# Patient Record
Sex: FEMALE | Race: WHITE | Employment: OTHER | RURAL
[De-identification: names, ages, dates, MRNs, and addresses within clinical notes are randomized per-mention and may not be internally consistent; named-entity substitution may affect disease eponyms.]

---

## 2017-05-13 ENCOUNTER — OFFICE VISIT (OUTPATIENT)
Dept: PRIMARY CARE CLINIC | Age: 62
End: 2017-05-13
Payer: COMMERCIAL

## 2017-05-13 VITALS
BODY MASS INDEX: 37.22 KG/M2 | SYSTOLIC BLOOD PRESSURE: 122 MMHG | OXYGEN SATURATION: 97 % | WEIGHT: 218 LBS | DIASTOLIC BLOOD PRESSURE: 82 MMHG | TEMPERATURE: 98.5 F | HEIGHT: 64 IN | HEART RATE: 81 BPM | RESPIRATION RATE: 16 BRPM

## 2017-05-13 DIAGNOSIS — J06.9 ACUTE URI: Primary | ICD-10-CM

## 2017-05-13 DIAGNOSIS — J02.9 SORE THROAT: ICD-10-CM

## 2017-05-13 LAB — S PYO AG THROAT QL: NORMAL

## 2017-05-13 PROCEDURE — 87880 STREP A ASSAY W/OPTIC: CPT | Performed by: NURSE PRACTITIONER

## 2017-05-13 PROCEDURE — 99213 OFFICE O/P EST LOW 20 MIN: CPT | Performed by: NURSE PRACTITIONER

## 2017-05-13 RX ORDER — DESLORATADINE 5 MG/1
5 TABLET ORAL DAILY
Qty: 30 TABLET | Refills: 5 | Status: SHIPPED | OUTPATIENT
Start: 2017-05-13 | End: 2017-11-12 | Stop reason: SDUPTHER

## 2017-05-13 RX ORDER — AZITHROMYCIN 250 MG/1
TABLET, FILM COATED ORAL
Qty: 1 PACKET | Refills: 0 | Status: SHIPPED | OUTPATIENT
Start: 2017-05-13 | End: 2017-05-23

## 2017-05-13 RX ORDER — MONTELUKAST SODIUM 10 MG/1
10 TABLET ORAL NIGHTLY
Qty: 30 TABLET | Refills: 3 | Status: SHIPPED | OUTPATIENT
Start: 2017-05-13 | End: 2017-10-24 | Stop reason: ALTCHOICE

## 2017-05-28 ENCOUNTER — OFFICE VISIT (OUTPATIENT)
Dept: PRIMARY CARE CLINIC | Age: 62
End: 2017-05-28
Payer: COMMERCIAL

## 2017-05-28 VITALS
DIASTOLIC BLOOD PRESSURE: 74 MMHG | SYSTOLIC BLOOD PRESSURE: 124 MMHG | TEMPERATURE: 98.6 F | BODY MASS INDEX: 38.89 KG/M2 | RESPIRATION RATE: 20 BRPM | WEIGHT: 227.8 LBS | HEART RATE: 77 BPM | OXYGEN SATURATION: 98 % | HEIGHT: 64 IN

## 2017-05-28 DIAGNOSIS — H65.111 ACUTE MUCOID OTITIS MEDIA OF RIGHT EAR: ICD-10-CM

## 2017-05-28 DIAGNOSIS — J01.00 ACUTE MAXILLARY SINUSITIS, RECURRENCE NOT SPECIFIED: Primary | ICD-10-CM

## 2017-05-28 PROCEDURE — 96372 THER/PROPH/DIAG INJ SC/IM: CPT | Performed by: NURSE PRACTITIONER

## 2017-05-28 PROCEDURE — 99213 OFFICE O/P EST LOW 20 MIN: CPT | Performed by: NURSE PRACTITIONER

## 2017-05-28 RX ORDER — CEFTRIAXONE SODIUM 250 MG/1
1000 INJECTION, POWDER, FOR SOLUTION INTRAMUSCULAR; INTRAVENOUS ONCE
Status: COMPLETED | OUTPATIENT
Start: 2017-05-28 | End: 2017-05-28

## 2017-05-28 RX ORDER — BROMPHENIRAMINE MALEATE, PSEUDOEPHEDRINE HYDROCHLORIDE, AND DEXTROMETHORPHAN HYDROBROMIDE 2; 30; 10 MG/5ML; MG/5ML; MG/5ML
5 SYRUP ORAL 4 TIMES DAILY PRN
Qty: 120 ML | Refills: 1 | Status: SHIPPED | OUTPATIENT
Start: 2017-05-28 | End: 2017-10-24 | Stop reason: ALTCHOICE

## 2017-05-28 RX ORDER — DEXAMETHASONE SODIUM PHOSPHATE 10 MG/ML
10 INJECTION INTRAMUSCULAR; INTRAVENOUS ONCE
Status: COMPLETED | OUTPATIENT
Start: 2017-05-28 | End: 2017-05-28

## 2017-05-28 RX ORDER — AMOXICILLIN 875 MG/1
875 TABLET, COATED ORAL 2 TIMES DAILY
Qty: 20 TABLET | Refills: 0 | Status: SHIPPED | OUTPATIENT
Start: 2017-05-28 | End: 2017-06-07

## 2017-05-28 RX ORDER — METHYLPREDNISOLONE 4 MG/1
TABLET ORAL
Qty: 1 KIT | Refills: 0 | Status: SHIPPED | OUTPATIENT
Start: 2017-05-28 | End: 2017-06-03

## 2017-05-28 RX ADMIN — DEXAMETHASONE SODIUM PHOSPHATE 10 MG: 10 INJECTION INTRAMUSCULAR; INTRAVENOUS at 17:15

## 2017-05-28 RX ADMIN — CEFTRIAXONE SODIUM 1000 MG: 250 INJECTION, POWDER, FOR SOLUTION INTRAMUSCULAR; INTRAVENOUS at 17:15

## 2017-05-28 ASSESSMENT — ENCOUNTER SYMPTOMS
ABDOMINAL PAIN: 0
PHOTOPHOBIA: 0
FACIAL SWELLING: 0
EYE PAIN: 0
ABDOMINAL DISTENTION: 0
NAUSEA: 0
BACK PAIN: 0
RHINORRHEA: 0
COLOR CHANGE: 0
EYE DISCHARGE: 0
SHORTNESS OF BREATH: 0
BLOOD IN STOOL: 0
EYE REDNESS: 0
EYE ITCHING: 0
COUGH: 1
SORE THROAT: 1
CHEST TIGHTNESS: 0
CHOKING: 0
DIARRHEA: 0
CONSTIPATION: 0
TROUBLE SWALLOWING: 0
VOMITING: 0
WHEEZING: 0
SINUS PRESSURE: 1

## 2017-07-21 ENCOUNTER — TRANSCRIBE ORDERS (OUTPATIENT)
Dept: MAMMOGRAPHY | Facility: HOSPITAL | Age: 62
End: 2017-07-21

## 2017-07-21 DIAGNOSIS — Z12.31 VISIT FOR SCREENING MAMMOGRAM: Primary | ICD-10-CM

## 2017-08-04 ENCOUNTER — HOSPITAL ENCOUNTER (OUTPATIENT)
Dept: OTHER | Age: 62
Discharge: HOME | End: 2017-08-04
Attending: NURSE PRACTITIONER | Admitting: NURSE PRACTITIONER

## 2017-08-04 LAB
A/G RATIO: 1.7 (ref 0.8–2)
ALBUMIN SERPL-MCNC: 4.8 G/DL (ref 3.4–4.8)
ALP BLD-CCNC: 64 U/L (ref 25–100)
ALT SERPL-CCNC: 24 U/L (ref 4–36)
ANION GAP SERPL CALCULATED.3IONS-SCNC: 17 MMOL/L (ref 3–16)
AST SERPL-CCNC: 25 U/L (ref 8–33)
BASOPHILS ABSOLUTE: 0 K/UL (ref 0–0.1)
BASOPHILS RELATIVE PERCENT: 0.5 %
BILIRUB SERPL-MCNC: 0.6 MG/DL (ref 0.3–1.2)
BUN BLDV-MCNC: 18 MG/DL (ref 6–20)
CALCIUM SERPL-MCNC: 10.2 MG/DL (ref 8.5–10.5)
CHLORIDE BLD-SCNC: 98 MMOL/L (ref 98–107)
CHOLESTEROL, TOTAL: 213 MG/DL (ref 0–200)
CO2: 25 MMOL/L (ref 20–30)
CREAT SERPL-MCNC: 0.7 MG/DL (ref 0.4–1.2)
EOSINOPHILS ABSOLUTE: 0.1 K/UL (ref 0–0.4)
EOSINOPHILS RELATIVE PERCENT: 1.1 %
FOLATE: >20 NG/ML
GFR AFRICAN AMERICAN: >59
GFR NON-AFRICAN AMERICAN: >60
GLOBULIN: 2.8 G/DL
GLUCOSE BLD-MCNC: 88 MG/DL (ref 74–106)
HCT VFR BLD CALC: 46.7 % (ref 37–47)
HDLC SERPL-MCNC: 52 MG/DL (ref 40–60)
HEMOGLOBIN: 15.6 G/DL (ref 11.5–16.5)
LDL CHOLESTEROL CALCULATED: 110 MG/DL
LYMPHOCYTES ABSOLUTE: 2.2 K/UL (ref 1.5–4)
LYMPHOCYTES RELATIVE PERCENT: 29.5 % (ref 20–40)
MCH RBC QN AUTO: 28.3 PG (ref 27–32)
MCHC RBC AUTO-ENTMCNC: 33.4 G/DL (ref 31–35)
MCV RBC AUTO: 84.6 FL (ref 80–100)
MONOCYTES ABSOLUTE: 0.4 K/UL (ref 0.2–0.8)
MONOCYTES RELATIVE PERCENT: 5.1 % (ref 3–10)
NEUTROPHILS ABSOLUTE: 4.7 K/UL (ref 2–7.5)
NEUTROPHILS RELATIVE PERCENT: 63.8 %
PDW BLD-RTO: 14.5 % (ref 11–16)
PLATELET # BLD: 233 K/UL (ref 150–400)
PMV BLD AUTO: 12 FL (ref 6–10)
POTASSIUM SERPL-SCNC: 4.3 MMOL/L (ref 3.4–5.1)
RBC # BLD: 5.52 M/UL (ref 3.8–5.8)
RHEUMATOID FACTOR: <10 IU/ML
SEDIMENTATION RATE, ERYTHROCYTE: 7 MM/HR (ref 0–20)
SODIUM BLD-SCNC: 140 MMOL/L (ref 136–145)
T3 FREE: 3.6 PG/ML (ref 2.3–4.2)
T4 FREE: 1.18 NG/DL (ref 0.89–1.76)
TOTAL PROTEIN: 7.6 G/DL (ref 6.4–8.3)
TRIGL SERPL-MCNC: 255 MG/DL (ref 0–249)
TSH SERPL DL<=0.05 MIU/L-ACNC: 1.39 UIU/ML (ref 0.35–5.5)
URIC ACID, SERUM: 7.5 MG/DL (ref 2.5–7.1)
VITAMIN B-12: 651 PG/ML (ref 211–911)
VITAMIN D 25-HYDROXY: 32.7 (ref 32–100)
VLDLC SERPL CALC-MCNC: 51 MG/DL
WBC # BLD: 7.3 K/UL (ref 4–11)

## 2017-08-07 LAB
ANA INTERPRETATION: NORMAL
ANTI-NUCLEAR ANTIBODY (ANA): NEGATIVE

## 2017-08-14 ENCOUNTER — HOSPITAL ENCOUNTER (OUTPATIENT)
Dept: MAMMOGRAPHY | Facility: HOSPITAL | Age: 62
Discharge: HOME | End: 2017-08-14
Attending: OBSTETRICS & GYNECOLOGY | Admitting: OBSTETRICS & GYNECOLOGY

## 2017-08-14 DIAGNOSIS — Z12.31 VISIT FOR SCREENING MAMMOGRAM: ICD-10-CM

## 2017-08-14 PROCEDURE — 77067 SCR MAMMO BI INCL CAD: CPT | Performed by: RADIOLOGY

## 2017-08-14 PROCEDURE — 77063 BREAST TOMOSYNTHESIS BI: CPT

## 2017-08-14 PROCEDURE — G0202 SCR MAMMO BI INCL CAD: HCPCS

## 2017-08-14 PROCEDURE — 77063 BREAST TOMOSYNTHESIS BI: CPT | Performed by: RADIOLOGY

## 2017-08-15 ENCOUNTER — HOSPITAL ENCOUNTER (OUTPATIENT)
Dept: PHYSICAL THERAPY | Age: 62
Discharge: HOME | End: 2017-08-31
Attending: NURSE PRACTITIONER | Admitting: NURSE PRACTITIONER

## 2017-08-15 ASSESSMENT — PAIN DESCRIPTION - PAIN TYPE: TYPE: ACUTE PAIN

## 2017-08-15 ASSESSMENT — PAIN SCALES - GENERAL: PAINLEVEL_OUTOF10: 2

## 2017-08-17 ENCOUNTER — HOSPITAL ENCOUNTER (OUTPATIENT)
Dept: PHYSICAL THERAPY | Age: 62
Discharge: HOME | End: 2017-08-17
Admitting: NURSE PRACTITIONER

## 2017-08-21 ENCOUNTER — HOSPITAL ENCOUNTER (OUTPATIENT)
Dept: MAMMOGRAPHY | Facility: HOSPITAL | Age: 62
Discharge: HOME | End: 2017-08-21
Admitting: OBSTETRICS & GYNECOLOGY

## 2017-08-21 DIAGNOSIS — R92.8 ABNORMAL MAMMOGRAM: ICD-10-CM

## 2017-08-21 PROCEDURE — 77061 BREAST TOMOSYNTHESIS UNI: CPT | Performed by: RADIOLOGY

## 2017-08-21 PROCEDURE — 77065 DX MAMMO INCL CAD UNI: CPT | Performed by: RADIOLOGY

## 2017-08-21 PROCEDURE — G0279 TOMOSYNTHESIS, MAMMO: HCPCS

## 2017-08-21 PROCEDURE — G0206 DX MAMMO INCL CAD UNI: HCPCS

## 2017-08-22 ENCOUNTER — HOSPITAL ENCOUNTER (OUTPATIENT)
Dept: PHYSICAL THERAPY | Age: 62
Discharge: HOME | End: 2017-08-22
Admitting: NURSE PRACTITIONER

## 2017-08-22 ENCOUNTER — HOSPITAL ENCOUNTER (OUTPATIENT)
Dept: OTHER | Age: 62
Discharge: HOME | End: 2017-08-22
Attending: NURSE PRACTITIONER | Admitting: NURSE PRACTITIONER

## 2017-08-22 DIAGNOSIS — G89.29 CHRONIC TOE PAIN, LEFT FOOT: ICD-10-CM

## 2017-08-22 DIAGNOSIS — M79.671 RIGHT FOOT PAIN: ICD-10-CM

## 2017-08-22 DIAGNOSIS — M79.675 CHRONIC TOE PAIN, LEFT FOOT: ICD-10-CM

## 2017-08-24 ENCOUNTER — HOSPITAL ENCOUNTER (OUTPATIENT)
Dept: PHYSICAL THERAPY | Age: 62
Discharge: HOME | End: 2017-08-24
Admitting: NURSE PRACTITIONER

## 2017-08-29 DIAGNOSIS — M79.672 BILATERAL FOOT PAIN: Primary | ICD-10-CM

## 2017-08-29 DIAGNOSIS — M79.671 BILATERAL FOOT PAIN: Primary | ICD-10-CM

## 2017-08-30 ENCOUNTER — APPOINTMENT (OUTPATIENT)
Dept: MAMMOGRAPHY | Facility: HOSPITAL | Age: 62
End: 2017-08-30

## 2017-08-31 ENCOUNTER — OFFICE VISIT (OUTPATIENT)
Dept: ORTHOPEDIC SURGERY | Facility: CLINIC | Age: 62
End: 2017-08-31

## 2017-08-31 VITALS — HEIGHT: 62 IN | WEIGHT: 235 LBS | BODY MASS INDEX: 43.24 KG/M2 | RESPIRATION RATE: 18 BRPM

## 2017-08-31 DIAGNOSIS — M77.8 CAPSULITIS OF FOOT, LEFT: Primary | ICD-10-CM

## 2017-08-31 DIAGNOSIS — M20.32 HALLUX HAMMERTOE, LEFT: ICD-10-CM

## 2017-08-31 PROCEDURE — 99204 OFFICE O/P NEW MOD 45 MIN: CPT | Performed by: PODIATRIST

## 2017-08-31 RX ORDER — HYDROCHLOROTHIAZIDE 25 MG/1
TABLET ORAL
COMMUNITY
Start: 2017-08-29

## 2017-08-31 RX ORDER — CELECOXIB 200 MG/1
CAPSULE ORAL
COMMUNITY
Start: 2017-08-29 | End: 2019-08-23

## 2017-08-31 RX ORDER — THIAMINE MONONITRATE (VIT B1) 100 MG
100 TABLET ORAL DAILY
COMMUNITY
End: 2019-08-23

## 2017-08-31 RX ORDER — ESTRADIOL 1 MG/1
TABLET ORAL
COMMUNITY
Start: 2017-08-29 | End: 2020-09-14 | Stop reason: SDUPTHER

## 2017-08-31 RX ORDER — MULTIVIT WITH MINERALS/LUTEIN
1000 TABLET ORAL DAILY
COMMUNITY

## 2017-10-24 ENCOUNTER — OFFICE VISIT (OUTPATIENT)
Dept: PRIMARY CARE CLINIC | Age: 62
End: 2017-10-24
Payer: COMMERCIAL

## 2017-10-24 VITALS
WEIGHT: 237 LBS | RESPIRATION RATE: 16 BRPM | TEMPERATURE: 97.9 F | HEIGHT: 64 IN | HEART RATE: 92 BPM | DIASTOLIC BLOOD PRESSURE: 82 MMHG | BODY MASS INDEX: 40.46 KG/M2 | SYSTOLIC BLOOD PRESSURE: 144 MMHG | OXYGEN SATURATION: 99 %

## 2017-10-24 DIAGNOSIS — L25.9 CONTACT DERMATITIS, UNSPECIFIED CONTACT DERMATITIS TYPE, UNSPECIFIED TRIGGER: Primary | ICD-10-CM

## 2017-10-24 DIAGNOSIS — Z23 NEEDS FLU SHOT: ICD-10-CM

## 2017-10-24 PROCEDURE — 90688 IIV4 VACCINE SPLT 0.5 ML IM: CPT | Performed by: PEDIATRICS

## 2017-10-24 PROCEDURE — 96372 THER/PROPH/DIAG INJ SC/IM: CPT | Performed by: PEDIATRICS

## 2017-10-24 PROCEDURE — 90471 IMMUNIZATION ADMIN: CPT | Performed by: PEDIATRICS

## 2017-10-24 PROCEDURE — 99214 OFFICE O/P EST MOD 30 MIN: CPT | Performed by: PEDIATRICS

## 2017-10-24 RX ORDER — METHYLPREDNISOLONE SODIUM SUCCINATE 40 MG/ML
40 INJECTION, POWDER, LYOPHILIZED, FOR SOLUTION INTRAMUSCULAR; INTRAVENOUS ONCE
Status: COMPLETED | OUTPATIENT
Start: 2017-10-24 | End: 2017-10-24

## 2017-10-24 RX ORDER — CLINDAMYCIN PHOSPHATE 10 UG/ML
LOTION TOPICAL
COMMUNITY
Start: 2017-10-05 | End: 2021-01-13

## 2017-10-24 RX ORDER — CELECOXIB 200 MG/1
CAPSULE ORAL
COMMUNITY
Start: 2017-10-05 | End: 2021-01-13

## 2017-10-24 RX ORDER — TRIAMCINOLONE ACETONIDE 1 MG/G
CREAM TOPICAL
Qty: 30 G | Refills: 0 | Status: SHIPPED | OUTPATIENT
Start: 2017-10-24 | End: 2020-09-29

## 2017-10-24 RX ORDER — HYDROXYZINE HYDROCHLORIDE 25 MG/1
25 TABLET, FILM COATED ORAL 3 TIMES DAILY PRN
Qty: 25 TABLET | Refills: 0 | Status: SHIPPED | OUTPATIENT
Start: 2017-10-24 | End: 2017-11-03

## 2017-10-24 RX ORDER — ESTRADIOL 1 MG/1
1 TABLET ORAL DAILY
COMMUNITY
Start: 2017-10-05 | End: 2022-03-03 | Stop reason: SDUPTHER

## 2017-10-24 RX ORDER — DESONIDE 0.5 MG/G
OINTMENT TOPICAL
COMMUNITY
Start: 2017-10-23 | End: 2021-01-13 | Stop reason: ALTCHOICE

## 2017-10-24 RX ADMIN — METHYLPREDNISOLONE SODIUM SUCCINATE 40 MG: 40 INJECTION, POWDER, LYOPHILIZED, FOR SOLUTION INTRAMUSCULAR; INTRAVENOUS at 11:56

## 2017-10-24 ASSESSMENT — PATIENT HEALTH QUESTIONNAIRE - PHQ9
2. FEELING DOWN, DEPRESSED OR HOPELESS: 0
SUM OF ALL RESPONSES TO PHQ9 QUESTIONS 1 & 2: 0
SUM OF ALL RESPONSES TO PHQ QUESTIONS 1-9: 0
1. LITTLE INTEREST OR PLEASURE IN DOING THINGS: 0

## 2017-10-24 ASSESSMENT — ENCOUNTER SYMPTOMS
SINUS PRESSURE: 0
RESPIRATORY NEGATIVE: 1
EYE DISCHARGE: 0
VOMITING: 0
ABDOMINAL PAIN: 0
NAUSEA: 0
SORE THROAT: 0
COUGH: 0
EYES NEGATIVE: 1
BACK PAIN: 0
WHEEZING: 0
GASTROINTESTINAL NEGATIVE: 1
SHORTNESS OF BREATH: 0

## 2017-10-24 NOTE — PATIENT INSTRUCTIONS
Thank you for enrolling in [address] Please follow the instructions below to securely access your online medical record. THYME allows you to send messages to your doctor, view your test results, renew your prescriptions, schedule appointments, and more. How Do I Sign Up? 1. In your Internet browser, go to [email or web address]  2. Click on the Sign Up Now link in the Sign In box. You will see the New Member Sign Up page. 3. Enter your THYME Access Code exactly as it appears below. You will not need to use this code after youve completed the sign-up process. If you do not sign up before the expiration date, you must request a new code. THYME Access Code: Activation code not generated  Current THYME Status: Active    4. Enter your Social Security Number ([id number]) and Date of Birth (mm/dd/yyyy) as indicated and click Submit. You will be taken to the next sign-up page. 5. Create a THYME ID. This will be your THYME login ID and cannot be changed, so think of one that is secure and easy to remember. 6. Create a THYME password. You can change your password at any time. 7. Enter your Password Reset Question and Answer. This can be used at a later time if you forget your password. 8. Enter your e-mail address. You will receive e-mail notification when new information is available in [address] 9. Click Sign Up. You can now view your medical record. Additional Information  If you have questions, please contact your physician practice where you receive care. Remember, THYME is NOT to be used for urgent needs. For medical emergencies, dial 911. · Keep a list of your medicines with you. List all of the prescription medicines, nonprescription medicines, supplements, natural remedies, and vitamins that you take. Tell your healthcare providers who treat you about all of the products you are taking. Your provider can provide you with a form to keep track of them.  Just ask.  · Follow the directions that come with your medicine, including information about food or alcohol. Make sure you know how and when to take your medicine. Do not take more or less than you are supposed to take. · Keep all medicines out of the reach of children. · Store medicines according to the directions on the label. · Monitor yourself. Learn to know how your body reacts to your new medicine and keep track of how it makes you feel before attempting (If your provider has allowed you to do so) to drive or go to work. · Seek emergency medical attention if you think you have used too much of this medicine. An overdose of any prescription medicine can be fatal. Overdose symptoms may include extreme drowsiness, muscle weakness, confusion, cold and clammy skin, pinpoint pupils, shallow breathing, slow heart rate, fainting, or coma. · Don't share prescription medicines with others, even when they seem to have the same symptoms. What may be good for you may be harmful to others. · If you are no longer taking a prescribed medication and you have pills left please take your pills out of their original containers. Mix crushed pills with an undesirable substance, such as cat litter or used coffee grounds. Put the mixture into a disposable container with a lid, such as an empty margarine tub, or into a sealable bag. Cover up or remove any of your personal information on the empty containers by covering it with black permanent marker or duct tape. Place the sealed container with the mixture, and the empty drug containers, in the trash. · If you use a medication that is in the form of a patch, dispose of used patches by folding them in half so that the sticky sides meet, and then flushing them down a toilet. They should not be placed in the household trash where children or pets can find them. · If you have any questions, ask your provider or pharmacist for more information.    · Be sure to keep all appointments for provider visits or tests. We are committed to providing you with the best care possible. In order to help us achieve these goals please remember to bring all medications, herbal products, and over the counter supplements with you to each visit. If your provider has ordered testing for you, please be sure to follow up with our office if you have not received results within 7 days after the testing took place. *If you receive a survey after visiting one of our offices, please take time to share your experience concerning your physician office visit. These surveys are confidential and no health information about you is shared. We are eager to improve for you and we are counting on your feedback to help make that happen.

## 2017-10-24 NOTE — PROGRESS NOTES
Patient ID: Babar Hart is a 58 y.o. female. Chief Complaint   Patient presents with   [address]       Have you seen any other physician or provider since your last visit yes - Jesus Saavedra    Have you had any other diagnostic tests since your last visit? no    Have you changed or stopped any medications since your last visit? no     Review of Systems   Constitutional: Negative. HENT: Negative. Eyes: Negative. Respiratory: Negative. Cardiovascular: Negative. Gastrointestinal: Negative. Genitourinary: Negative. Musculoskeletal: Negative. Skin: Positive for itching and rash. Neurological: Negative. Endo/Heme/Allergies: Negative. Psychiatric/Behavioral: The patient is nervous/anxious. Problems to be addressed this visit:    Patient c/o rash on her neck and face that started Friday and has been spreading. She states that the areas itch but is not painful. She ordered some cream from the dermatologist that she has not started yet. She has had Shingles in the past.   She is very anxious today.
reviewed. No results found for requested labs within last 30 days. Microscopic Examination (no units)   Date Value   01/28/2016 YES     LDL Calculated (mg/dL)   Date Value   08/04/2017 110         Lab Results   Component Value Date    WBC 7.3 08/04/2017    NEUTROABS 4.7 08/04/2017    HGB 15.6 08/04/2017    HCT 46.7 08/04/2017    MCV 84.6 08/04/2017     08/04/2017       Lab Results   Component Value Date    TSH 1.39 08/04/2017       Current Outpatient Prescriptions   Medication Sig Dispense Refill    celecoxib (CELEBREX) 200 MG capsule       clindamycin (CLEOCIN T) 1 % lotion       hydrOXYzine (ATARAX) 25 MG tablet Take 1 tablet by mouth 3 times daily as needed for Itching 25 tablet 0    triamcinolone (KENALOG) 0.1 % cream Apply topically 2 times daily.  30 g 0    desloratadine (CLARINEX) 5 MG tablet Take 1 tablet by mouth daily 30 tablet 5    Multiple Vitamins-Minerals (MULTIVITAL PO) Take by mouth daily      hydrochlorothiazide (HYDRODIURIL) 25 MG tablet Take 25 mg by mouth daily      Ascorbic Acid (VITAMIN C) 500 MG tablet Take 500 mg by mouth daily      vitamin B-12 (CYANOCOBALAMIN) 1000 MCG tablet Take 1,000 mcg by mouth daily      estrogens conjugated, synthetic B, (ENJUVIA) 0.3 MG tablet Take 0.3 mg by mouth daily      desonide (DESOWEN) 0.05 % ointment       estradiol (ESTRACE) 1 MG tablet        Current Facility-Administered Medications   Medication Dose Route Frequency Provider Last Rate Last Dose    methylPREDNISolone sodium (SOLU-MEDROL) injection 40 mg  40 mg Intramuscular Once [employer], DO            During this visit the following were done:  Labs reviewed []    Labs ordered []    Radiology reports Reviewed []    Radiology ordered []    EKG, echo, and/or stress test reviewed []    EEG results reviewed  []    EEG reviewed and interpreted per myself   []    Previous provider/old records requested  []    Previous provider Records Reviewed []    ER records Reviewed []

## 2017-11-09 ENCOUNTER — HOSPITAL ENCOUNTER (OUTPATIENT)
Dept: OTHER | Age: 62
Discharge: HOME | End: 2017-11-09
Attending: INTERNAL MEDICINE | Admitting: INTERNAL MEDICINE

## 2017-11-09 DIAGNOSIS — M79.642 PAIN OF LEFT HAND: ICD-10-CM

## 2017-11-09 DIAGNOSIS — M79.641 RIGHT HAND PAIN: ICD-10-CM

## 2017-11-09 LAB
SEDIMENTATION RATE, ERYTHROCYTE: 9 MM/HR (ref 0–20)
TOTAL CK: 165 U/L (ref 26–174)

## 2017-11-10 LAB
ANA INTERPRETATION: NORMAL
ANTI-NUCLEAR ANTIBODY (ANA): NEGATIVE
C-REACTIVE PROTEIN: 11.9 MG/L (ref 0–5.1)
ENA TO SSA (RO) ANTIBODY: NEGATIVE EU
ENA TO SSB (LA) ANTIBODY: NEGATIVE EU

## 2017-11-11 LAB — CCP IGG ANTIBODIES: 6 UNITS (ref 0–19)

## 2017-11-13 RX ORDER — DESLORATADINE 5 MG/1
TABLET ORAL
Qty: 30 TABLET | Refills: 5 | Status: SHIPPED | OUTPATIENT
Start: 2017-11-13 | End: 2018-02-01 | Stop reason: SDUPTHER

## 2017-12-28 ENCOUNTER — HOSPITAL ENCOUNTER (OUTPATIENT)
Dept: OTHER | Age: 62
Discharge: HOME | End: 2017-12-28
Attending: NURSE PRACTITIONER | Admitting: NURSE PRACTITIONER

## 2017-12-28 LAB
A/G RATIO: 1.7 (ref 0.8–2)
ALBUMIN SERPL-MCNC: 4.5 G/DL (ref 3.4–4.8)
ALP BLD-CCNC: 55 U/L (ref 25–100)
ALT SERPL-CCNC: 24 U/L (ref 4–36)
ANION GAP SERPL CALCULATED.3IONS-SCNC: 13 MMOL/L (ref 3–16)
AST SERPL-CCNC: 24 U/L (ref 8–33)
BASOPHILS ABSOLUTE: 0 K/UL (ref 0–0.1)
BASOPHILS RELATIVE PERCENT: 0.4 %
BILIRUB SERPL-MCNC: 0.5 MG/DL (ref 0.3–1.2)
BILIRUBIN URINE: ABNORMAL
BLOOD, URINE: NEGATIVE
BUN BLDV-MCNC: 15 MG/DL (ref 6–20)
CALCIUM SERPL-MCNC: 9.6 MG/DL (ref 8.5–10.5)
CHLORIDE BLD-SCNC: 96 MMOL/L (ref 98–107)
CHOLESTEROL, TOTAL: 200 MG/DL (ref 0–200)
CLARITY: CLEAR
CO2: 29 MMOL/L (ref 20–30)
COLOR: YELLOW
CREAT SERPL-MCNC: 0.6 MG/DL (ref 0.4–1.2)
EOSINOPHILS ABSOLUTE: 0.1 K/UL (ref 0–0.4)
EOSINOPHILS RELATIVE PERCENT: 1.2 %
FOLATE: 18.39 NG/ML
GFR AFRICAN AMERICAN: >59
GFR NON-AFRICAN AMERICAN: >60
GLOBULIN: 2.6 G/DL
GLUCOSE BLD-MCNC: 93 MG/DL (ref 74–106)
GLUCOSE URINE: NEGATIVE MG/DL
HCT VFR BLD CALC: 43 % (ref 37–47)
HDLC SERPL-MCNC: 52 MG/DL (ref 40–60)
HEMOGLOBIN: 13.8 G/DL (ref 11.5–16.5)
IMMATURE GRANULOCYTES #: 0
IMMATURE GRANULOCYTES %: 0.3 % (ref 0–5)
KETONES, URINE: NEGATIVE MG/DL
LDL CHOLESTEROL CALCULATED: 107 MG/DL
LEUKOCYTE ESTERASE, URINE: NEGATIVE
LYMPHOCYTES ABSOLUTE: 1.7 K/UL (ref 1.5–4)
LYMPHOCYTES RELATIVE PERCENT: 25.1 %
MCH RBC QN AUTO: 27.4 PG (ref 27–32)
MCHC RBC AUTO-ENTMCNC: 32.1 G/DL (ref 31–35)
MCV RBC AUTO: 85.3 FL (ref 80–100)
MICROSCOPIC EXAMINATION: ABNORMAL
MONOCYTES ABSOLUTE: 0.5 K/UL (ref 0.2–0.8)
MONOCYTES RELATIVE PERCENT: 7.4 %
NEUTROPHILS ABSOLUTE: 4.4 K/UL (ref 2–7.5)
NEUTROPHILS RELATIVE PERCENT: 65.6 %
NITRITE, URINE: NEGATIVE
PDW BLD-RTO: 13.5 % (ref 11–16)
PH UA: 6.5
PLATELET # BLD: 237 K/UL (ref 150–400)
PMV BLD AUTO: 11.2 FL (ref 6–10)
POTASSIUM SERPL-SCNC: 3.9 MMOL/L (ref 3.4–5.1)
PROTEIN UA: NEGATIVE MG/DL
RBC # BLD: 5.04 M/UL (ref 3.8–5.8)
SODIUM BLD-SCNC: 138 MMOL/L (ref 136–145)
SPECIFIC GRAVITY UA: 1.01
TOTAL PROTEIN: 7.1 G/DL (ref 6.4–8.3)
TRIGL SERPL-MCNC: 207 MG/DL (ref 0–249)
URINE TYPE: ABNORMAL
UROBILINOGEN, URINE: 0.2 E.U./DL
VITAMIN B-12: 534 PG/ML (ref 211–911)
VITAMIN D 25-HYDROXY: 27.4 (ref 32–100)
VLDLC SERPL CALC-MCNC: 41 MG/DL
WBC # BLD: 6.7 K/UL (ref 4–11)

## 2017-12-29 LAB — TSH SERPL DL<=0.05 MIU/L-ACNC: 1.5 UIU/ML (ref 0.35–5.5)

## 2018-02-01 RX ORDER — DESLORATADINE 5 MG/1
TABLET ORAL
Qty: 90 TABLET | Refills: 1 | Status: SHIPPED | OUTPATIENT
Start: 2018-02-01 | End: 2021-08-10 | Stop reason: SDUPTHER

## 2018-05-09 ENCOUNTER — HOSPITAL ENCOUNTER (OUTPATIENT)
Dept: OTHER | Age: 63
Discharge: HOME | End: 2018-05-09
Attending: NURSE PRACTITIONER | Admitting: NURSE PRACTITIONER

## 2018-05-10 LAB
A/G RATIO: 1.8 (ref 0.8–2)
ALBUMIN SERPL-MCNC: 4.7 G/DL (ref 3.4–4.8)
ALP BLD-CCNC: 53 U/L (ref 25–100)
ALT SERPL-CCNC: 33 U/L (ref 4–36)
ANION GAP SERPL CALCULATED.3IONS-SCNC: 14 MMOL/L (ref 3–16)
AST SERPL-CCNC: 33 U/L (ref 8–33)
BASOPHILS ABSOLUTE: 0 K/UL (ref 0–0.1)
BASOPHILS RELATIVE PERCENT: 0.5 %
BILIRUB SERPL-MCNC: 0.3 MG/DL (ref 0.3–1.2)
BUN BLDV-MCNC: 16 MG/DL (ref 6–20)
C-REACTIVE PROTEIN: 8.2 MG/L (ref 0–5.1)
CALCIUM SERPL-MCNC: 9.9 MG/DL (ref 8.5–10.5)
CHLORIDE BLD-SCNC: 99 MMOL/L (ref 98–107)
CO2: 28 MMOL/L (ref 20–30)
CREAT SERPL-MCNC: 0.8 MG/DL (ref 0.4–1.2)
EOSINOPHILS ABSOLUTE: 0.1 K/UL (ref 0–0.4)
EOSINOPHILS RELATIVE PERCENT: 1.4 %
GFR AFRICAN AMERICAN: >59
GFR NON-AFRICAN AMERICAN: >60
GLOBULIN: 2.6 G/DL
GLUCOSE BLD-MCNC: 140 MG/DL (ref 74–106)
HCT VFR BLD CALC: 43.7 % (ref 37–47)
HEMOGLOBIN: 14.1 G/DL (ref 11.5–16.5)
IMMATURE GRANULOCYTES #: 0 K/UL
IMMATURE GRANULOCYTES %: 0.2 % (ref 0–5)
LYMPHOCYTES ABSOLUTE: 2.2 K/UL (ref 1.5–4)
LYMPHOCYTES RELATIVE PERCENT: 33.5 %
MCH RBC QN AUTO: 28 PG (ref 27–32)
MCHC RBC AUTO-ENTMCNC: 32.3 G/DL (ref 31–35)
MCV RBC AUTO: 86.7 FL (ref 80–100)
MONOCYTES ABSOLUTE: 0.4 K/UL (ref 0.2–0.8)
MONOCYTES RELATIVE PERCENT: 6.6 %
NEUTROPHILS ABSOLUTE: 3.7 K/UL (ref 2–7.5)
NEUTROPHILS RELATIVE PERCENT: 57.8 %
PDW BLD-RTO: 13.5 % (ref 11–16)
PLATELET # BLD: 230 K/UL (ref 150–400)
PMV BLD AUTO: 12.2 FL (ref 6–10)
POTASSIUM SERPL-SCNC: 3.6 MMOL/L (ref 3.4–5.1)
RBC # BLD: 5.04 M/UL (ref 3.8–5.8)
SEDIMENTATION RATE, ERYTHROCYTE: 4 MM/HR (ref 0–20)
SODIUM BLD-SCNC: 141 MMOL/L (ref 136–145)
TOTAL PROTEIN: 7.3 G/DL (ref 6.4–8.3)
WBC # BLD: 6.5 K/UL (ref 4–11)

## 2018-07-27 ENCOUNTER — TRANSCRIBE ORDERS (OUTPATIENT)
Dept: MAMMOGRAPHY | Facility: HOSPITAL | Age: 63
End: 2018-07-27

## 2018-07-27 DIAGNOSIS — Z12.31 VISIT FOR SCREENING MAMMOGRAM: Primary | ICD-10-CM

## 2018-08-15 ENCOUNTER — HOSPITAL ENCOUNTER (OUTPATIENT)
Dept: MAMMOGRAPHY | Facility: HOSPITAL | Age: 63
Discharge: HOME | End: 2018-08-15
Attending: OBSTETRICS & GYNECOLOGY | Admitting: OBSTETRICS & GYNECOLOGY

## 2018-08-15 DIAGNOSIS — Z12.31 VISIT FOR SCREENING MAMMOGRAM: ICD-10-CM

## 2018-08-15 PROCEDURE — 77067 SCR MAMMO BI INCL CAD: CPT | Performed by: RADIOLOGY

## 2018-08-15 PROCEDURE — 77063 BREAST TOMOSYNTHESIS BI: CPT

## 2018-08-15 PROCEDURE — 77067 SCR MAMMO BI INCL CAD: CPT

## 2018-08-15 PROCEDURE — 77063 BREAST TOMOSYNTHESIS BI: CPT | Performed by: RADIOLOGY

## 2018-08-25 ENCOUNTER — OFFICE VISIT (OUTPATIENT)
Dept: PRIMARY CARE CLINIC | Age: 63
End: 2018-08-25
Payer: COMMERCIAL

## 2018-08-25 VITALS
SYSTOLIC BLOOD PRESSURE: 128 MMHG | RESPIRATION RATE: 16 BRPM | TEMPERATURE: 98 F | DIASTOLIC BLOOD PRESSURE: 76 MMHG | BODY MASS INDEX: 36.02 KG/M2 | OXYGEN SATURATION: 98 % | HEIGHT: 64 IN | WEIGHT: 211 LBS

## 2018-08-25 DIAGNOSIS — J30.9 ALLERGIC RHINITIS, UNSPECIFIED SEASONALITY, UNSPECIFIED TRIGGER: ICD-10-CM

## 2018-08-25 DIAGNOSIS — J01.01 ACUTE RECURRENT MAXILLARY SINUSITIS: Primary | ICD-10-CM

## 2018-08-25 DIAGNOSIS — H65.91 RIGHT NON-SUPPURATIVE OTITIS MEDIA: ICD-10-CM

## 2018-08-25 PROCEDURE — 96372 THER/PROPH/DIAG INJ SC/IM: CPT | Performed by: NURSE PRACTITIONER

## 2018-08-25 PROCEDURE — 99213 OFFICE O/P EST LOW 20 MIN: CPT | Performed by: NURSE PRACTITIONER

## 2018-08-25 RX ORDER — METHYLPREDNISOLONE 4 MG/1
4 TABLET ORAL SEE ADMIN INSTRUCTIONS
Qty: 1 KIT | Refills: 0 | Status: SHIPPED | OUTPATIENT
Start: 2018-08-25 | End: 2018-08-31

## 2018-08-25 RX ORDER — CEFDINIR 300 MG/1
300 CAPSULE ORAL 2 TIMES DAILY
Qty: 20 CAPSULE | Refills: 0 | Status: SHIPPED | OUTPATIENT
Start: 2018-08-25 | End: 2018-09-04

## 2018-08-25 RX ORDER — ZOLPIDEM TARTRATE 10 MG/1
5 TABLET ORAL NIGHTLY PRN
Refills: 0 | COMMUNITY
Start: 2018-08-06 | End: 2021-06-14 | Stop reason: SDUPTHER

## 2018-08-25 RX ORDER — CEFTRIAXONE 500 MG/1
500 INJECTION, POWDER, FOR SOLUTION INTRAMUSCULAR; INTRAVENOUS ONCE
Status: COMPLETED | OUTPATIENT
Start: 2018-08-25 | End: 2018-08-25

## 2018-08-25 RX ORDER — ERGOCALCIFEROL 1.25 MG/1
CAPSULE ORAL
Refills: 0 | COMMUNITY
Start: 2018-05-21

## 2018-08-25 RX ORDER — FLUCONAZOLE 150 MG/1
150 TABLET ORAL ONCE
Qty: 2 TABLET | Refills: 0 | Status: SHIPPED | OUTPATIENT
Start: 2018-08-25 | End: 2021-02-09 | Stop reason: SDUPTHER

## 2018-08-25 RX ORDER — FLUTICASONE PROPIONATE 50 MCG
1 SPRAY, SUSPENSION (ML) NASAL DAILY
Qty: 1 BOTTLE | Refills: 3 | Status: SHIPPED | OUTPATIENT
Start: 2018-08-25

## 2018-08-25 RX ORDER — OXYMETAZOLINE HYDROCHLORIDE 1 G/100G
CREAM TOPICAL
Refills: 5 | COMMUNITY
Start: 2018-07-03 | End: 2021-01-13 | Stop reason: ALTCHOICE

## 2018-08-25 RX ORDER — GUAIFENESIN 600 MG/1
1200 TABLET, EXTENDED RELEASE ORAL 2 TIMES DAILY PRN
COMMUNITY
Start: 2018-08-25 | End: 2018-12-23 | Stop reason: SDUPTHER

## 2018-08-25 RX ADMIN — CEFTRIAXONE 500 MG: 500 INJECTION, POWDER, FOR SOLUTION INTRAMUSCULAR; INTRAVENOUS at 08:44

## 2018-08-25 ASSESSMENT — ENCOUNTER SYMPTOMS
SINUS PAIN: 1
SINUS PRESSURE: 1

## 2018-08-25 NOTE — PROGRESS NOTES
Subjective:      Patient ID: Pratik Nolen is a 61 y.o. female. HPI presents today with c/o bilateral otalgia, ear fullness, sinus congestion with pressure and tenderness, and chills that started yesterday. Pain and tenderness is worse in her right ear. This is a recurrent problem for her. She was treated in May with medrol dose pack and amoxicillin which relieved her symptoms. Pertinent negatives include fever, cp, sob, cough, sore throat, purulent mucous, and arthralgias. Review of Systems   Constitutional: Positive for chills. HENT: Positive for congestion, ear pain, sinus pain and sinus pressure. Objective:   Physical Exam   Constitutional: She is oriented to person, place, and time. She appears well-developed and well-nourished. HENT:   Head: Normocephalic. Right Ear: There is tenderness. Left Ear: External ear normal.   Ears:    Nose: Mucosal edema present. Right sinus exhibits maxillary sinus tenderness and frontal sinus tenderness. Left sinus exhibits maxillary sinus tenderness and frontal sinus tenderness. Mouth/Throat: Oropharynx is clear and moist and mucous membranes are normal. No oropharyngeal exudate, posterior oropharyngeal edema or posterior oropharyngeal erythema. Eyes: Pupils are equal, round, and reactive to light. Right eye exhibits no discharge. Left eye exhibits no discharge. No scleral icterus. Neck: Normal range of motion. Neck supple. No thyromegaly present. Cardiovascular: Normal rate, regular rhythm and normal heart sounds. Exam reveals no gallop and no friction rub. No murmur heard. Pulmonary/Chest: Effort normal and breath sounds normal. No respiratory distress. She has no wheezes. She has no rales. Abdominal: Soft. She exhibits no mass. There is no tenderness. Musculoskeletal: Normal range of motion. She exhibits no edema, tenderness or deformity. Lymphadenopathy:     She has cervical adenopathy.         Right cervical: Superficial cervical adenopathy present. Left cervical: Superficial cervical adenopathy present. Neurological: She is alert and oriented to person, place, and time. Skin: Skin is warm and dry. No rash noted. No erythema. Psychiatric: She has a normal mood and affect.  Her behavior is normal. Judgment and thought content normal.       Assessment:      Acute maxillary sinusitis, Right otitis media, allergic rhinitis      Plan:    Rocephin 500mg IM X 1, omnicef 300mg po bid X 7, medrol dose pack, mucines 1200mg po bid, fluticasone nasal spray        JO-ANN Ty NP

## 2018-10-04 ENCOUNTER — HOSPITAL ENCOUNTER (OUTPATIENT)
Facility: HOSPITAL | Age: 63
Discharge: HOME | End: 2018-10-04
Payer: COMMERCIAL

## 2018-10-04 LAB
A/G RATIO: 2.1 (ref 0.8–2)
ALBUMIN SERPL-MCNC: 4.8 G/DL (ref 3.4–4.8)
ALP BLD-CCNC: 57 U/L (ref 25–100)
ALT SERPL-CCNC: 28 U/L (ref 4–36)
ANION GAP SERPL CALCULATED.3IONS-SCNC: 13 MMOL/L (ref 3–16)
AST SERPL-CCNC: 28 U/L (ref 8–33)
BASOPHILS ABSOLUTE: 0 K/UL (ref 0–0.1)
BASOPHILS RELATIVE PERCENT: 0.4 %
BILIRUB SERPL-MCNC: 0.3 MG/DL (ref 0.3–1.2)
BUN BLDV-MCNC: 24 MG/DL (ref 6–20)
CALCIUM SERPL-MCNC: 10.3 MG/DL (ref 8.5–10.5)
CHLORIDE BLD-SCNC: 97 MMOL/L (ref 98–107)
CO2: 29 MMOL/L (ref 20–30)
CREAT SERPL-MCNC: 0.7 MG/DL (ref 0.4–1.2)
EOSINOPHILS ABSOLUTE: 0.1 K/UL (ref 0–0.4)
EOSINOPHILS RELATIVE PERCENT: 1.6 %
GFR AFRICAN AMERICAN: >59
GFR NON-AFRICAN AMERICAN: >60
GLOBULIN: 2.3 G/DL
GLUCOSE BLD-MCNC: 95 MG/DL (ref 74–106)
HCT VFR BLD CALC: 40.3 % (ref 37–47)
HEMOGLOBIN: 13.4 G/DL (ref 11.5–16.5)
IMMATURE GRANULOCYTES #: 0 K/UL
IMMATURE GRANULOCYTES %: 0.1 % (ref 0–5)
LYMPHOCYTES ABSOLUTE: 2.3 K/UL (ref 1.5–4)
LYMPHOCYTES RELATIVE PERCENT: 33.5 %
MCH RBC QN AUTO: 28.9 PG (ref 27–32)
MCHC RBC AUTO-ENTMCNC: 33.3 G/DL (ref 31–35)
MCV RBC AUTO: 87 FL (ref 80–100)
MONOCYTES ABSOLUTE: 0.5 K/UL (ref 0.2–0.8)
MONOCYTES RELATIVE PERCENT: 7.5 %
NEUTROPHILS ABSOLUTE: 3.9 K/UL (ref 2–7.5)
NEUTROPHILS RELATIVE PERCENT: 56.9 %
PDW BLD-RTO: 13.4 % (ref 11–16)
PLATELET # BLD: 239 K/UL (ref 150–400)
PMV BLD AUTO: 11.4 FL (ref 6–10)
POTASSIUM SERPL-SCNC: 3.7 MMOL/L (ref 3.4–5.1)
RBC # BLD: 4.63 M/UL (ref 3.8–5.8)
SODIUM BLD-SCNC: 139 MMOL/L (ref 136–145)
TOTAL PROTEIN: 7.1 G/DL (ref 6.4–8.3)
VITAMIN D 25-HYDROXY: 40.3 (ref 32–100)
WBC # BLD: 6.8 K/UL (ref 4–11)

## 2018-10-04 PROCEDURE — 36415 COLL VENOUS BLD VENIPUNCTURE: CPT

## 2018-10-04 PROCEDURE — 85025 COMPLETE CBC W/AUTO DIFF WBC: CPT

## 2018-10-04 PROCEDURE — 80053 COMPREHEN METABOLIC PANEL: CPT

## 2018-10-04 PROCEDURE — 82306 VITAMIN D 25 HYDROXY: CPT

## 2018-12-23 ENCOUNTER — OFFICE VISIT (OUTPATIENT)
Dept: PRIMARY CARE CLINIC | Age: 63
End: 2018-12-23
Payer: COMMERCIAL

## 2018-12-23 VITALS
WEIGHT: 196.8 LBS | SYSTOLIC BLOOD PRESSURE: 136 MMHG | TEMPERATURE: 98.3 F | HEIGHT: 64 IN | HEART RATE: 69 BPM | DIASTOLIC BLOOD PRESSURE: 77 MMHG | BODY MASS INDEX: 33.6 KG/M2 | OXYGEN SATURATION: 97 %

## 2018-12-23 DIAGNOSIS — J01.01 ACUTE RECURRENT MAXILLARY SINUSITIS: Primary | ICD-10-CM

## 2018-12-23 PROCEDURE — 96372 THER/PROPH/DIAG INJ SC/IM: CPT | Performed by: NURSE PRACTITIONER

## 2018-12-23 PROCEDURE — 99213 OFFICE O/P EST LOW 20 MIN: CPT | Performed by: NURSE PRACTITIONER

## 2018-12-23 RX ORDER — CHLORAL HYDRATE 500 MG
1200 CAPSULE ORAL DAILY
COMMUNITY

## 2018-12-23 RX ORDER — HYDROCHLOROTHIAZIDE 25 MG/1
TABLET ORAL
COMMUNITY
Start: 2017-08-29 | End: 2018-12-23 | Stop reason: SDUPTHER

## 2018-12-23 RX ORDER — METHYLPREDNISOLONE SODIUM SUCCINATE 40 MG/ML
40 INJECTION, POWDER, LYOPHILIZED, FOR SOLUTION INTRAMUSCULAR; INTRAVENOUS ONCE
Status: COMPLETED | OUTPATIENT
Start: 2018-12-23 | End: 2018-12-23

## 2018-12-23 RX ORDER — CEFDINIR 300 MG/1
300 CAPSULE ORAL 2 TIMES DAILY
Qty: 14 CAPSULE | Refills: 0 | Status: SHIPPED | OUTPATIENT
Start: 2018-12-23 | End: 2018-12-30

## 2018-12-23 RX ORDER — MULTIVIT WITH MINERALS/LUTEIN
250 TABLET ORAL DAILY
COMMUNITY

## 2018-12-23 RX ORDER — CELECOXIB 200 MG/1
CAPSULE ORAL
COMMUNITY
Start: 2017-08-29 | End: 2018-12-23 | Stop reason: SDUPTHER

## 2018-12-23 RX ORDER — GUAIFENESIN 600 MG/1
1200 TABLET, EXTENDED RELEASE ORAL 2 TIMES DAILY PRN
Qty: 30 TABLET | Refills: 0 | Status: SHIPPED | OUTPATIENT
Start: 2018-12-23 | End: 2019-11-17 | Stop reason: SDUPTHER

## 2018-12-23 RX ORDER — LOSARTAN POTASSIUM 50 MG/1
TABLET ORAL
COMMUNITY
End: 2021-08-02 | Stop reason: SDUPTHER

## 2018-12-23 RX ORDER — ESTRADIOL 1 MG/1
TABLET ORAL
COMMUNITY
Start: 2017-08-29 | End: 2019-09-29 | Stop reason: SDUPTHER

## 2018-12-23 RX ORDER — NABUMETONE 750 MG/1
750 TABLET, FILM COATED ORAL 2 TIMES DAILY
COMMUNITY

## 2018-12-23 RX ORDER — INFLUENZA A VIRUS A/MICHIGAN/45/2015 X-275 (H1N1) ANTIGEN (FORMALDEHYDE INACTIVATED), INFLUENZA A VIRUS A/SINGAPORE/INFIMH-16-0019/2016 IVR-186 (H3N2) ANTIGEN (FORMALDEHYDE INACTIVATED), INFLUENZA B VIRUS B/PHUKET/3073/2013 ANTIGEN (FORMALDEHYDE INACTIVATED), AND INFLUENZA B VIRUS B/MARYLAND/15/2016 BX-69A ANTIGEN (FORMALDEHYDE INACTIVATED) 15; 15; 15; 15 UG/.5ML; UG/.5ML; UG/.5ML; UG/.5ML
INJECTION, SUSPENSION INTRAMUSCULAR
Refills: 0 | COMMUNITY
Start: 2018-10-25 | End: 2019-09-29 | Stop reason: ALTCHOICE

## 2018-12-23 RX ORDER — CEFTRIAXONE 500 MG/1
500 INJECTION, POWDER, FOR SOLUTION INTRAMUSCULAR; INTRAVENOUS ONCE
Status: COMPLETED | OUTPATIENT
Start: 2018-12-23 | End: 2018-12-23

## 2018-12-23 RX ORDER — THIAMINE MONONITRATE (VIT B1) 100 MG
100 TABLET ORAL
COMMUNITY
End: 2020-09-29 | Stop reason: ALTCHOICE

## 2018-12-23 RX ADMIN — CEFTRIAXONE 500 MG: 500 INJECTION, POWDER, FOR SOLUTION INTRAMUSCULAR; INTRAVENOUS at 11:37

## 2018-12-23 RX ADMIN — METHYLPREDNISOLONE SODIUM SUCCINATE 40 MG: 40 INJECTION, POWDER, LYOPHILIZED, FOR SOLUTION INTRAMUSCULAR; INTRAVENOUS at 11:38

## 2018-12-23 ASSESSMENT — PATIENT HEALTH QUESTIONNAIRE - PHQ9
SUM OF ALL RESPONSES TO PHQ9 QUESTIONS 1 & 2: 0
SUM OF ALL RESPONSES TO PHQ QUESTIONS 1-9: 0
1. LITTLE INTEREST OR PLEASURE IN DOING THINGS: 0
SUM OF ALL RESPONSES TO PHQ QUESTIONS 1-9: 0
2. FEELING DOWN, DEPRESSED OR HOPELESS: 0

## 2018-12-23 ASSESSMENT — ENCOUNTER SYMPTOMS
SINUS PAIN: 1
EYE PAIN: 1
SINUS PRESSURE: 1
COUGH: 1

## 2019-01-25 ENCOUNTER — HOSPITAL ENCOUNTER (OUTPATIENT)
Facility: HOSPITAL | Age: 64
Discharge: HOME | End: 2019-01-25
Payer: COMMERCIAL

## 2019-01-25 ENCOUNTER — HOSPITAL ENCOUNTER (OUTPATIENT)
Dept: GENERAL RADIOLOGY | Facility: HOSPITAL | Age: 64
Discharge: HOME | End: 2019-01-25
Payer: COMMERCIAL

## 2019-01-25 ENCOUNTER — HOSPITAL ENCOUNTER (OUTPATIENT)
Dept: ULTRASOUND IMAGING | Facility: HOSPITAL | Age: 64
Discharge: HOME | End: 2019-01-25
Payer: COMMERCIAL

## 2019-01-25 DIAGNOSIS — M79.661 PAIN IN SHIN, RIGHT: ICD-10-CM

## 2019-01-25 DIAGNOSIS — M79.662 BILATERAL CALF PAIN: ICD-10-CM

## 2019-01-25 DIAGNOSIS — M79.661 BILATERAL CALF PAIN: ICD-10-CM

## 2019-01-25 PROCEDURE — 73590 X-RAY EXAM OF LOWER LEG: CPT

## 2019-01-25 PROCEDURE — 93971 EXTREMITY STUDY: CPT

## 2019-06-11 ENCOUNTER — HOSPITAL ENCOUNTER (OUTPATIENT)
Facility: HOSPITAL | Age: 64
Discharge: HOME | End: 2019-06-11
Payer: COMMERCIAL

## 2019-06-11 LAB
A/G RATIO: 2 (ref 0.8–2)
ALBUMIN SERPL-MCNC: 4.9 G/DL (ref 3.4–4.8)
ALP BLD-CCNC: 56 U/L (ref 25–100)
ALT SERPL-CCNC: 23 U/L (ref 4–36)
ANION GAP SERPL CALCULATED.3IONS-SCNC: 14 MMOL/L (ref 3–16)
AST SERPL-CCNC: 22 U/L (ref 8–33)
BASOPHILS ABSOLUTE: 0 K/UL (ref 0–0.1)
BASOPHILS RELATIVE PERCENT: 0.7 %
BILIRUB SERPL-MCNC: 0.4 MG/DL (ref 0.3–1.2)
BUN BLDV-MCNC: 20 MG/DL (ref 6–20)
CALCIUM SERPL-MCNC: 10 MG/DL (ref 8.5–10.5)
CHLORIDE BLD-SCNC: 98 MMOL/L (ref 98–107)
CHOLESTEROL, TOTAL: 219 MG/DL (ref 0–200)
CO2: 28 MMOL/L (ref 20–30)
CREAT SERPL-MCNC: 0.7 MG/DL (ref 0.4–1.2)
EOSINOPHILS ABSOLUTE: 0.1 K/UL (ref 0–0.4)
EOSINOPHILS RELATIVE PERCENT: 1.6 %
FERRITIN: 66.3 NG/ML (ref 22–322)
FOLATE: >20 NG/ML
GFR AFRICAN AMERICAN: >59
GFR NON-AFRICAN AMERICAN: >60
GLOBULIN: 2.4 G/DL
GLUCOSE BLD-MCNC: 91 MG/DL (ref 74–106)
HCT VFR BLD CALC: 43.7 % (ref 37–47)
HDLC SERPL-MCNC: 60 MG/DL (ref 40–60)
HEMOGLOBIN: 14.5 G/DL (ref 11.5–16.5)
IMMATURE GRANULOCYTES #: 0 K/UL
IMMATURE GRANULOCYTES %: 0.2 % (ref 0–5)
LDL CHOLESTEROL CALCULATED: 112 MG/DL
LYMPHOCYTES ABSOLUTE: 1.8 K/UL (ref 1.5–4)
LYMPHOCYTES RELATIVE PERCENT: 32 %
MCH RBC QN AUTO: 28.9 PG (ref 27–32)
MCHC RBC AUTO-ENTMCNC: 33.2 G/DL (ref 31–35)
MCV RBC AUTO: 87.1 FL (ref 80–100)
MONOCYTES ABSOLUTE: 0.5 K/UL (ref 0.2–0.8)
MONOCYTES RELATIVE PERCENT: 8 %
NEUTROPHILS ABSOLUTE: 3.2 K/UL (ref 2–7.5)
NEUTROPHILS RELATIVE PERCENT: 57.5 %
PDW BLD-RTO: 13.3 % (ref 11–16)
PLATELET # BLD: 228 K/UL (ref 150–400)
PMV BLD AUTO: 11.6 FL (ref 6–10)
POTASSIUM SERPL-SCNC: 4.2 MMOL/L (ref 3.4–5.1)
RBC # BLD: 5.02 M/UL (ref 3.8–5.8)
SODIUM BLD-SCNC: 140 MMOL/L (ref 136–145)
T3 FREE: 2.9 PG/ML (ref 2.3–4.2)
T4 FREE: 1.28 NG/DL (ref 0.89–1.76)
TOTAL IRON BINDING CAPACITY: 425 UG/DL (ref 250–450)
TOTAL PROTEIN: 7.3 G/DL (ref 6.4–8.3)
TRIGL SERPL-MCNC: 233 MG/DL (ref 0–249)
TSH SERPL DL<=0.05 MIU/L-ACNC: 2.35 UIU/ML (ref 0.35–5.5)
VITAMIN B-12: 1609 PG/ML (ref 211–911)
VITAMIN D 25-HYDROXY: 38.1 (ref 32–100)
VLDLC SERPL CALC-MCNC: 47 MG/DL
WBC # BLD: 5.6 K/UL (ref 4–11)

## 2019-06-11 PROCEDURE — 84443 ASSAY THYROID STIM HORMONE: CPT

## 2019-06-11 PROCEDURE — 85025 COMPLETE CBC W/AUTO DIFF WBC: CPT

## 2019-06-11 PROCEDURE — 82306 VITAMIN D 25 HYDROXY: CPT

## 2019-06-11 PROCEDURE — 80053 COMPREHEN METABOLIC PANEL: CPT

## 2019-06-11 PROCEDURE — 82728 ASSAY OF FERRITIN: CPT

## 2019-06-11 PROCEDURE — 83550 IRON BINDING TEST: CPT

## 2019-06-11 PROCEDURE — 82607 VITAMIN B-12: CPT

## 2019-06-11 PROCEDURE — 36415 COLL VENOUS BLD VENIPUNCTURE: CPT

## 2019-06-11 PROCEDURE — 80061 LIPID PANEL: CPT

## 2019-06-11 PROCEDURE — 84439 ASSAY OF FREE THYROXINE: CPT

## 2019-06-11 PROCEDURE — 82746 ASSAY OF FOLIC ACID SERUM: CPT

## 2019-06-11 PROCEDURE — 84481 FREE ASSAY (FT-3): CPT

## 2019-06-25 ENCOUNTER — HOSPITAL ENCOUNTER (OUTPATIENT)
Facility: HOSPITAL | Age: 64
Discharge: HOME | End: 2019-06-25
Payer: COMMERCIAL

## 2019-06-25 LAB
SEDIMENTATION RATE, ERYTHROCYTE: 8 MM/HR (ref 0–20)
TOTAL CK: 251 U/L (ref 26–174)

## 2019-06-25 PROCEDURE — 82550 ASSAY OF CK (CPK): CPT

## 2019-06-25 PROCEDURE — 86140 C-REACTIVE PROTEIN: CPT

## 2019-06-25 PROCEDURE — 85652 RBC SED RATE AUTOMATED: CPT

## 2019-06-25 PROCEDURE — 36415 COLL VENOUS BLD VENIPUNCTURE: CPT

## 2019-06-25 PROCEDURE — 86038 ANTINUCLEAR ANTIBODIES: CPT

## 2019-06-27 LAB — C-REACTIVE PROTEIN: 8.4 MG/L (ref 0–5.1)

## 2019-06-28 LAB — ANTI-NUCLEAR ANTIBODY (ANA): NEGATIVE

## 2019-08-02 ENCOUNTER — TRANSCRIBE ORDERS (OUTPATIENT)
Dept: MAMMOGRAPHY | Facility: HOSPITAL | Age: 64
End: 2019-08-02

## 2019-08-02 DIAGNOSIS — Z12.31 VISIT FOR SCREENING MAMMOGRAM: Primary | ICD-10-CM

## 2019-08-21 ENCOUNTER — HOSPITAL ENCOUNTER (OUTPATIENT)
Dept: MAMMOGRAPHY | Facility: HOSPITAL | Age: 64
Discharge: HOME | End: 2019-08-21
Admitting: OBSTETRICS & GYNECOLOGY

## 2019-08-21 DIAGNOSIS — Z12.31 VISIT FOR SCREENING MAMMOGRAM: ICD-10-CM

## 2019-08-21 PROCEDURE — 77067 SCR MAMMO BI INCL CAD: CPT

## 2019-08-21 PROCEDURE — 77063 BREAST TOMOSYNTHESIS BI: CPT

## 2019-08-21 PROCEDURE — 77063 BREAST TOMOSYNTHESIS BI: CPT | Performed by: RADIOLOGY

## 2019-08-21 PROCEDURE — 77067 SCR MAMMO BI INCL CAD: CPT | Performed by: RADIOLOGY

## 2019-08-23 ENCOUNTER — OFFICE VISIT (OUTPATIENT)
Dept: NEUROLOGY | Facility: CLINIC | Age: 64
End: 2019-08-23

## 2019-08-23 VITALS
DIASTOLIC BLOOD PRESSURE: 72 MMHG | OXYGEN SATURATION: 99 % | SYSTOLIC BLOOD PRESSURE: 132 MMHG | WEIGHT: 211 LBS | HEIGHT: 64 IN | HEART RATE: 72 BPM | BODY MASS INDEX: 36.02 KG/M2

## 2019-08-23 DIAGNOSIS — Z86.19 HISTORY OF INFECTIOUS MONONUCLEOSIS: ICD-10-CM

## 2019-08-23 DIAGNOSIS — R53.81 PHYSICAL DECONDITIONING: ICD-10-CM

## 2019-08-23 DIAGNOSIS — IMO0001 PARESTHESIAS/NUMBNESS: ICD-10-CM

## 2019-08-23 DIAGNOSIS — M79.10 MYALGIA: ICD-10-CM

## 2019-08-23 DIAGNOSIS — B99.9 NEUROPATHY DUE TO INFECTION (HCC): Primary | ICD-10-CM

## 2019-08-23 DIAGNOSIS — G63 NEUROPATHY DUE TO INFECTION (HCC): Primary | ICD-10-CM

## 2019-08-23 DIAGNOSIS — Z51.81 ENCOUNTER FOR THERAPEUTIC DRUG LEVEL MONITORING: ICD-10-CM

## 2019-08-23 PROCEDURE — 86235 NUCLEAR ANTIGEN ANTIBODY: CPT | Performed by: NURSE PRACTITIONER

## 2019-08-23 PROCEDURE — 83921 ORGANIC ACID SINGLE QUANT: CPT | Performed by: NURSE PRACTITIONER

## 2019-08-23 PROCEDURE — 36415 COLL VENOUS BLD VENIPUNCTURE: CPT | Performed by: NURSE PRACTITIONER

## 2019-08-23 PROCEDURE — 84155 ASSAY OF PROTEIN SERUM: CPT | Performed by: NURSE PRACTITIONER

## 2019-08-23 PROCEDURE — 82784 ASSAY IGA/IGD/IGG/IGM EACH: CPT | Performed by: NURSE PRACTITIONER

## 2019-08-23 PROCEDURE — 84165 PROTEIN E-PHORESIS SERUM: CPT | Performed by: NURSE PRACTITIONER

## 2019-08-23 PROCEDURE — 82553 CREATINE MB FRACTION: CPT | Performed by: NURSE PRACTITIONER

## 2019-08-23 PROCEDURE — 82607 VITAMIN B-12: CPT | Performed by: NURSE PRACTITIONER

## 2019-08-23 PROCEDURE — 83036 HEMOGLOBIN GLYCOSYLATED A1C: CPT | Performed by: NURSE PRACTITIONER

## 2019-08-23 PROCEDURE — 80307 DRUG TEST PRSMV CHEM ANLYZR: CPT | Performed by: NURSE PRACTITIONER

## 2019-08-23 PROCEDURE — 86256 FLUORESCENT ANTIBODY TITER: CPT | Performed by: NURSE PRACTITIONER

## 2019-08-23 PROCEDURE — 86060 ANTISTREPTOLYSIN O TITER: CPT | Performed by: NURSE PRACTITIONER

## 2019-08-23 PROCEDURE — 86618 LYME DISEASE ANTIBODY: CPT | Performed by: NURSE PRACTITIONER

## 2019-08-23 PROCEDURE — 86063 ANTISTREPTOLYSIN O SCREEN: CPT | Performed by: NURSE PRACTITIONER

## 2019-08-23 PROCEDURE — 86334 IMMUNOFIX E-PHORESIS SERUM: CPT | Performed by: NURSE PRACTITIONER

## 2019-08-23 PROCEDURE — 86308 HETEROPHILE ANTIBODY SCREEN: CPT | Performed by: NURSE PRACTITIONER

## 2019-08-23 PROCEDURE — 99204 OFFICE O/P NEW MOD 45 MIN: CPT | Performed by: NURSE PRACTITIONER

## 2019-08-23 PROCEDURE — 82550 ASSAY OF CK (CPK): CPT | Performed by: NURSE PRACTITIONER

## 2019-08-23 PROCEDURE — 82746 ASSAY OF FOLIC ACID SERUM: CPT | Performed by: NURSE PRACTITIONER

## 2019-08-23 RX ORDER — PREGABALIN 75 MG/1
75 CAPSULE ORAL DAILY
Qty: 30 CAPSULE | Refills: 0 | Status: SHIPPED | OUTPATIENT
Start: 2019-08-23 | End: 2019-09-13 | Stop reason: SDUPTHER

## 2019-08-23 RX ORDER — FLUTICASONE PROPIONATE 50 MCG
1 SPRAY, SUSPENSION (ML) NASAL
COMMUNITY
Start: 2018-08-25 | End: 2022-05-19

## 2019-08-23 RX ORDER — CLINDAMYCIN PHOSPHATE 10 UG/ML
LOTION TOPICAL
Refills: 0 | COMMUNITY
Start: 2019-07-26 | End: 2021-05-27

## 2019-08-23 RX ORDER — DESLORATADINE 5 MG/1
5 TABLET ORAL DAILY
Refills: 0 | COMMUNITY
Start: 2019-06-25 | End: 2021-05-27

## 2019-08-23 RX ORDER — ERGOCALCIFEROL 1.25 MG/1
CAPSULE ORAL
COMMUNITY
Start: 2018-05-21 | End: 2022-11-17

## 2019-08-23 RX ORDER — CHLORAL HYDRATE 500 MG
1200 CAPSULE ORAL
COMMUNITY

## 2019-08-23 RX ORDER — OXYMETAZOLINE HYDROCHLORIDE 1 G/100G
CREAM TOPICAL
Refills: 6 | COMMUNITY
Start: 2019-08-07 | End: 2023-03-07

## 2019-08-23 RX ORDER — LANOLIN ALCOHOL/MO/W.PET/CERES
1000 CREAM (GRAM) TOPICAL
COMMUNITY
End: 2020-12-07

## 2019-08-23 NOTE — PROGRESS NOTES
"  Subjective:     Patient ID: Alison Benitez is a 64 y.o. female.    CC:   Chief Complaint   Patient presents with   • pain in legs with  burning and tingling       HPI:   History of Present Illness     This is a very pleasant 64-year-old female who presents for initial neurological evaluation of numbness, tingling, pain and weakness type sensation in the bilateral lower extremities from her bilateral thighs all the way into both feet.  She tells me in January to February 2019 she was diagnosed with mono by her primary care provider.  She tells me that her legs felt like they were \"killing her\".  She tells me that wearing snugger pants causes her legs to hurt.  She is been wearing a lot of cotton and loose for clothing.  She tells me that feels like a constant stinging, burning, tingling sensation and very sensitive to touch over the legs. Pain is a constant 6-7/10 pain. She tells me she lost about 13 to 16 pounds while she had the mono and then did regain the weight and tells me that she developed varicose veins suddenly in the back of her legs.  She also saw her arthritis specialist Dr. Bibiana Khan at the time and she recommended she be seen by Dr. Gunderson with Vascular Surgery for evaluation.  She tells me she went and saw Dr. Gunderson and he said that all of her veins look excellent and recommended she be seen by neurology.  Unfortunately today we do not have the notes to go by but we have requested PCP as well as vascular surgery notes for review.  She tells me that she feels like she has become deconditioned and wants to try some physical therapy to strengthen her legs.  She tells me that she is getting some compression hose in size for these tomorrow.  She does have significant arthritis.  She tells me several years ago she was actually diagnosed with idiopathic neuropathy and try gabapentin but this made her too tired so she switched to Lyrica low-dose once a day and this really improved her symptoms.  " She is wondering about restarting this.  She has had a huge issue with sleeping due to the discomfort and pain and is actually been taking Ambien every night to help her sleep for the past several months which has been prescribed by her PCP.  She is really wanting to also come off of this.  She did have some labs completed in June 2019 with her primary care and they are available for review in epic with sed rate of 8, CRP 8.4, GRACIELA negative, CK slightly elevated at 251, iron profile normal, CBC with differential with normal limits, vitamin D 38.1, free T3 normal, TSH normal, free T4 normal, folate normal, ferritin 66.3, vitamin B12 elevated at 1609, lipid panel with total cholesterol 219, triglycerides 233, HDL 60 and .  CMP was within normal limits.  She denies any low back pain.  She does have a history of lumbar disc disease but tells me she has absolutely no low back pain and no radicular symptoms.  She denies any urinary or bowel incontinence or hesitancy or perineal numbness.  She does deny any rashes or bug bites of any kind.  She feels like she is very slowly recovering from the mono and the fatigue.  She does take care of her 4-year-old grandchild at home.    The following portions of the patient's history were reviewed and updated as appropriate: allergies, current medications, past family history, past medical history, past social history, past surgical history and problem list.    Past Medical History:   Diagnosis Date   • Arthritis    • Bursitis    • CTS (carpal tunnel syndrome)     bilateral    • Hypertension    • Lumbosacral disc disease    • Osteoarthritis    • Tendinitis of knee        Past Surgical History:   Procedure Laterality Date   • HYSTERECTOMY N/A     1999   • OOPHORECTOMY Bilateral        Social History     Socioeconomic History   • Marital status: Single     Spouse name: Not on file   • Number of children: Not on file   • Years of education: Not on file   • Highest education level:  Not on file   Tobacco Use   • Smoking status: Never Smoker   Substance and Sexual Activity   • Alcohol use: No   • Drug use: No   • Sexual activity: Defer       Family History   Problem Relation Age of Onset   • Osteoarthritis Other    • Hypertension Other    • Carpal tunnel syndrome Other    • COPD Other    • Diabetes Other    • Stroke Other    • Glaucoma Other    • Heart disease Mother    • Stroke Mother    • Hypertension Brother    • Breast cancer Neg Hx    • Ovarian cancer Neg Hx         Review of Systems   Constitutional: Negative for chills, fatigue, fever and unexpected weight change.   HENT: Negative for ear pain, hearing loss, nosebleeds, rhinorrhea and sore throat.    Eyes: Negative for photophobia, pain, discharge, itching and visual disturbance.   Respiratory: Negative for cough, chest tightness, shortness of breath and wheezing.    Cardiovascular: Negative for chest pain, palpitations and leg swelling.   Gastrointestinal: Negative for abdominal pain, blood in stool, constipation, diarrhea, nausea and vomiting.   Genitourinary: Negative for dysuria, frequency, hematuria and urgency.   Musculoskeletal: Negative for arthralgias, back pain, gait problem, joint swelling, myalgias, neck pain and neck stiffness.   Skin: Negative for rash and wound.   Allergic/Immunologic: Negative for environmental allergies and food allergies.   Neurological: Negative for dizziness, tremors, seizures, syncope, speech difficulty, weakness, light-headedness, numbness and headaches.   Hematological: Negative for adenopathy. Does not bruise/bleed easily.   Psychiatric/Behavioral: Negative for agitation, confusion, decreased concentration, hallucinations, sleep disturbance and suicidal ideas. The patient is not nervous/anxious.    All other systems reviewed and are negative.       Objective:    Neurologic Exam     Mental Status   Oriented to person, place, and time.   Registration: recalls 3 of 3 objects. Recall at 5 minutes:  recalls 3 of 3 objects. Follows 3 step commands.   Attention: normal. Concentration: normal.   Speech: speech is normal   Level of consciousness: alert  Knowledge: good and consistent with education. Able to perform simple calculations.   Able to name object. Able to read. Able to repeat. Able to write. Normal comprehension.     Cranial Nerves   Cranial nerves II through XII intact.     Motor Exam   Muscle bulk: normal  Overall muscle tone: normal    Strength   Strength 5/5 except as noted.   Right quadriceps: 4/5  Left quadriceps: 4/5  Right hamstrin/5  Left hamstrin/5  Right glutei: 5/5  Left glutei: 5/5  Right anterior tibial: 5/5  Left anterior tibial: 5/5  Right posterior tibial: 5/5  Left posterior tibial: 5/5  Right peroneal: 5/5  Left peroneal: 5/5  Right gastroc: 5/5  Left gastroc: 5/5  Quads slightly weak bilaterally but no atrophy     Sensory Exam   Light touch normal.   Vibration normal.   Proprioception normal.   Pinprick normal.     Hyperesthesias of BLE to touch. Decreased stocking sensation of BLE to cold touch.     Gait, Coordination, and Reflexes     Gait  Gait: wide-based    Coordination   Romberg: negative  Finger to nose coordination: normal  Heel to shin coordination: normal  Tandem walking coordination: normal    Tremor   Resting tremor: absent  Intention tremor: absent  Action tremor: absent    Reflexes   Right brachioradialis: 2+  Left brachioradialis: 2+  Right biceps: 2+  Left biceps: 2+  Right triceps: 2+  Left triceps: 2+  Right patellar: 1+  Left patellar: 1+  Right achilles: 1+  Left achilles: 1+  Right : 2+  Left : 2+  Right plantar: normal  Left plantar: normal  Right Rosales: absent  Left Rosales: absent  Right ankle clonus: absent  Left ankle clonus: absent      Physical Exam   Constitutional: She is oriented to person, place, and time. She appears well-developed and well-nourished.   Eyes:   Fundoscopic exam:       The right eye shows red reflex.        The left  eye shows red reflex.   Cardiovascular: Normal rate, regular rhythm, S1 normal, S2 normal and normal heart sounds.   Varicose veins of legs   Pulmonary/Chest: Effort normal and breath sounds normal.   Musculoskeletal:        Lumbar back: Normal. She exhibits normal range of motion, no tenderness, no swelling, no edema, no pain and no spasm.   Negative SLR bilaterally   Neurological: She is oriented to person, place, and time. She has a normal Finger-Nose-Finger Test, a normal Heel to Shin Test, a normal Romberg Test and a normal Tandem Gait Test.   Reflex Scores:       Tricep reflexes are 2+ on the right side and 2+ on the left side.       Bicep reflexes are 2+ on the right side and 2+ on the left side.       Brachioradialis reflexes are 2+ on the right side and 2+ on the left side.       Patellar reflexes are 1+ on the right side and 1+ on the left side.       Achilles reflexes are 1+ on the right side and 1+ on the left side.  Psychiatric: Her speech is normal and behavior is normal. Judgment and thought content normal. Her mood appears anxious. Cognition and memory are normal.       Assessment/Plan:       Alison was seen today for pain in legs with  burning and tingling.    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  -     Ambulatory Referral to Physical Therapy Evaluate and treat; Strengthening  -     Antistreptolysin O (ASO) Titer    Myalgia  -     CK Total & CKMB; Future  -     Antistreptolysin O (ASO) Titer; Future  -     CK Total & CKMB  -     Antistreptolysin O (ASO) Titer    Paresthesias/numbness  -     OSCAR + PE; Future  -     Anti-Hu Antibodies; Future  -     Anti-Otilia 1 Antibody, IgG; Future  -     Methylmalonic Acid, Serum; Future  -     Vitamin B12 & Folate; Future  -     B. Burgdorferi Antibodies, WB  Reflex; Future  -     Antistreptolysin O (ASO) Titer; Future  -     Hemoglobin A1c; Future  -     OSCAR + PE  -     Anti-Hu Antibodies  -     Anti-Otilia 1 Antibody, IgG  -     Methylmalonic Acid, Serum  -     Vitamin B12 & Folate  -     B. Burgdorferi Antibodies, WB Reflex  -     Antistreptolysin O (ASO) Titer  -     Hemoglobin A1c    History of infectious mononucleosis  -     Mononucleosis Test, Qual With Reflex; Future  -     Mononucleosis Test, Qual With Reflex    Encounter for therapeutic drug level monitoring  -     Drug Screen (10), Serum; Future  -     Drug Screen (10), Serum    Physical deconditioning  -     Ambulatory Referral to Physical Therapy Evaluate and treat; Strengthening         She does have some noticeable weakness in her lower extremities especially her quads.  She does not have any low back pain.  She does have a history of lumbar degenerative disc changes but no concerns with her lower back today.  We will wait off on imaging unless everything else is normal.  We will do labs today to rule out other etiology with significantly decreased reflexes in the lower extremities.  Ordered EMG and NCV S.  We will start a low-dose Lyrica 75 mg once in a.m.  She can discuss with her PCP weaning off the Ambien.  We are going to follow-up in 6 weeks for reevaluation. Reviewed medications, potential side effects and signs and symptoms to report. Discussed risk versus benefits of treatment plan with patient and/or family-including medications, labs and radiology that may be ordered. Addressed questions and concerns during visit. Patient and/or family verbalized understanding and agree with plan.    During this visit the following were done:  Labs Reviewed [x]    Labs Ordered [x]    Radiology Reports Reviewed []    Radiology Ordered []    PCP Records Reviewed []    Referring Provider Records Reviewed []    ER Records Reviewed []    Hospital Records Reviewed []    History Obtained From Family []    Radiology Images  Reviewed []    Other Reviewed []    Records Requested [x]requested pcp and vascular surgery notes. Solely going by patient history today.      As part of this patient's treatment plan I am prescribing controlled substances. The patient has been made aware of appropriate use of such medications, including potential risk of somnolence, limited ability to drive and/or work safely, and potential for dependence or overdose. It has also been made clear that these medications are for use by the patient only, without concomitant use of alcohol or other substances unless prescribed. Keep out of reach of children.  Ethan report has been reviewed. If this is going to be prescribed long term, [facility] Controlled Substance Agreement Contract has also been read and signed by patient and myself.  Ethan #[id number] reviewed.    EMR Dragon/Transcription Disclaimer:  Much of this encounter note is an electronic transcription of spoken language to printed text. Electronic transcription of spoken language may permit erroneous words or phrases to be inadvertently transcribed. Although I have reviewed the note for such errors, some may still exist in this documentation.      Kimber Correa, APRN  8/23/2019

## 2019-08-24 LAB
ASO AB SERPL-ACNC: POSITIVE [IU]/ML
ASO AB TITR SER: ABNORMAL {TITER}
CK MB SERPL-CCNC: 6.47 NG/ML
CK SERPL-CCNC: 174 U/L (ref 20–180)
FOLATE SERPL-MCNC: >20 NG/ML (ref 4.78–24.2)
HBA1C MFR BLD: 4.9 % (ref 4.8–5.6)
VIT B12 BLD-MCNC: 951 PG/ML (ref 211–946)

## 2019-08-25 LAB — EBV NA AB SER QL: NEGATIVE

## 2019-08-26 DIAGNOSIS — G63 NEUROPATHY DUE TO INFECTION (HCC): Primary | ICD-10-CM

## 2019-08-26 DIAGNOSIS — B99.9 NEUROPATHY DUE TO INFECTION (HCC): Primary | ICD-10-CM

## 2019-08-26 LAB
ALBUMIN SERPL-MCNC: 4 G/DL (ref 2.9–4.4)
ALBUMIN/GLOB SERPL: 1.2 {RATIO} (ref 0.7–1.7)
ALPHA1 GLOB FLD ELPH-MCNC: 0.3 G/DL (ref 0–0.4)
ALPHA2 GLOB SERPL ELPH-MCNC: 1 G/DL (ref 0.4–1)
B BURGDOR IGG+IGM SER-ACNC: <0.91 ISR (ref 0–0.9)
B BURGDOR IGM SER-ACNC: <0.8 INDEX (ref 0–0.79)
B-GLOBULIN SERPL ELPH-MCNC: 1.3 G/DL (ref 0.7–1.3)
ENA JO1 AB SER-ACNC: <0.2 AI (ref 0–0.9)
GAMMA GLOB SERPL ELPH-MCNC: 0.9 G/DL (ref 0.4–1.8)
GLOBULIN SER CALC-MCNC: 3.5 G/DL (ref 2.2–3.9)
IGA SERPL-MCNC: 171 MG/DL (ref 87–352)
IGG SERPL-MCNC: 924 MG/DL (ref 700–1600)
IGM SERPL-MCNC: 53 MG/DL (ref 26–217)
INTERPRETATION SERPL IEP-IMP: NORMAL
Lab: NORMAL
M-SPIKE: NORMAL G/DL
PROT SERPL-MCNC: 7.5 G/DL (ref 6–8.5)

## 2019-08-26 RX ORDER — PREDNISONE 10 MG/1
TABLET ORAL
Qty: 42 TABLET | Refills: 0 | Status: SHIPPED | OUTPATIENT
Start: 2019-08-26 | End: 2019-09-13 | Stop reason: SDUPTHER

## 2019-08-26 NOTE — PROGRESS NOTES
Please notify patient one of the titers for prior infections (strep infection in this case) was slightly elevated. I spoke with Dr. Shaffer and with her symptoms not really improving he and I have recommended a steroid for about 12 days to see if this will help her symptoms. I am going to send this to the pharmacy for her to take to see if this helps. We will plan to repeat her test at her follow up. Thanks, ELDON Dowell    Fax labs to PCP

## 2019-08-27 ENCOUNTER — TELEPHONE (OUTPATIENT)
Dept: NEUROLOGY | Facility: CLINIC | Age: 64
End: 2019-08-27

## 2019-08-27 LAB
AMPHETAMINES SERPL QL SCN: NEGATIVE NG/ML
BARBITURATES SERPLBLD QL: NEGATIVE UG/ML
BENZODIAZ SERPL QL: NEGATIVE NG/ML
BZE BLD QL SCN: NEGATIVE NG/ML
Lab: NORMAL
METHADONE UR QL: NEGATIVE NG/ML
METHYLMALONATE SERPL-SCNC: 155 NMOL/L (ref 0–378)
OPIATES SERPL QL SCN: NEGATIVE NG/ML
OXYCODONE SERPL-MCNC: NEGATIVE NG/ML
PCP SPEC-MCNC: NEGATIVE NG/ML
PROPOXYPH SPEC QL: NEGATIVE NG/ML
THC SERPLBLD CFM-MCNC: NEGATIVE NG/ML

## 2019-08-27 NOTE — TELEPHONE ENCOUNTER
----- Message from ELDON Rosas sent at 8/26/2019  4:20 PM EDT -----  Please notify patient one of the titers for prior infections (strep infection in this case) was slightly elevated. I spoke with Dr. Shaffer and with her symptoms not really improving he and I have recommended a steroid for about 12 days to see if this will help her symptoms. I am going to send this to the pharmacy for her to take to see if this helps. We will plan to repeat her test at her follow up. Thanks, ELDON Dowell    Fax labs to PCP

## 2019-08-28 ENCOUNTER — TELEPHONE (OUTPATIENT)
Dept: NEUROLOGY | Facility: CLINIC | Age: 64
End: 2019-08-28

## 2019-08-28 NOTE — TELEPHONE ENCOUNTER
----- Message from ELDON Rosas sent at 8/28/2019 10:04 AM EDT -----  Please notify patient additional labs are normal so far. Fax copy to pcp. Thanks, ELDON Dowell

## 2019-08-29 LAB — HU1 AB TITR SER: NORMAL TITER

## 2019-09-03 ENCOUNTER — HOSPITAL ENCOUNTER (OUTPATIENT)
Dept: PHYSICAL THERAPY | Facility: HOSPITAL | Age: 64
Setting detail: THERAPIES SERIES
Discharge: HOME | End: 2019-09-03
Payer: COMMERCIAL

## 2019-09-03 PROCEDURE — 97161 PT EVAL LOW COMPLEX 20 MIN: CPT

## 2019-09-03 ASSESSMENT — PAIN DESCRIPTION - LOCATION: LOCATION: LEG

## 2019-09-03 ASSESSMENT — PAIN DESCRIPTION - ORIENTATION: ORIENTATION: RIGHT;LEFT

## 2019-09-05 ENCOUNTER — HOSPITAL ENCOUNTER (OUTPATIENT)
Dept: PHYSICAL THERAPY | Facility: HOSPITAL | Age: 64
Setting detail: THERAPIES SERIES
Discharge: HOME | End: 2019-09-05
Payer: COMMERCIAL

## 2019-09-05 PROCEDURE — 97110 THERAPEUTIC EXERCISES: CPT

## 2019-09-05 ASSESSMENT — PAIN DESCRIPTION - ORIENTATION: ORIENTATION: RIGHT;LEFT

## 2019-09-05 ASSESSMENT — PAIN DESCRIPTION - LOCATION: LOCATION: LEG

## 2019-09-05 ASSESSMENT — PAIN SCALES - GENERAL: PAINLEVEL_OUTOF10: 7

## 2019-09-10 ENCOUNTER — HOSPITAL ENCOUNTER (OUTPATIENT)
Dept: PHYSICAL THERAPY | Facility: HOSPITAL | Age: 64
Setting detail: THERAPIES SERIES
Discharge: HOME | End: 2019-09-10
Payer: COMMERCIAL

## 2019-09-10 PROCEDURE — 97140 MANUAL THERAPY 1/> REGIONS: CPT

## 2019-09-10 PROCEDURE — 97110 THERAPEUTIC EXERCISES: CPT

## 2019-09-12 ENCOUNTER — TELEPHONE (OUTPATIENT)
Dept: NEUROLOGY | Facility: CLINIC | Age: 64
End: 2019-09-12

## 2019-09-12 ENCOUNTER — HOSPITAL ENCOUNTER (OUTPATIENT)
Dept: PHYSICAL THERAPY | Facility: HOSPITAL | Age: 64
Setting detail: THERAPIES SERIES
Discharge: HOME | End: 2019-09-12
Payer: COMMERCIAL

## 2019-09-12 DIAGNOSIS — B99.9 NEUROPATHY DUE TO INFECTION (HCC): ICD-10-CM

## 2019-09-12 DIAGNOSIS — G63 NEUROPATHY DUE TO INFECTION (HCC): ICD-10-CM

## 2019-09-12 PROCEDURE — 97110 THERAPEUTIC EXERCISES: CPT

## 2019-09-12 PROCEDURE — 97140 MANUAL THERAPY 1/> REGIONS: CPT

## 2019-09-12 NOTE — TELEPHONE ENCOUNTER
Patient called in and stated that she was wondering if she could get another prescription of prednisone because was helping her so much.

## 2019-09-13 RX ORDER — PREDNISONE 10 MG/1
TABLET ORAL
Qty: 42 TABLET | Refills: 0 | Status: SHIPPED | OUTPATIENT
Start: 2019-09-13 | End: 2019-10-10

## 2019-09-13 RX ORDER — PREGABALIN 75 MG/1
75 CAPSULE ORAL 2 TIMES DAILY
Qty: 60 CAPSULE | Refills: 4 | Status: SHIPPED | OUTPATIENT
Start: 2019-09-13 | End: 2019-12-05 | Stop reason: SDUPTHER

## 2019-09-13 NOTE — TELEPHONE ENCOUNTER
We can try 1 more round of steroids but that will be the last because long term steroids can cause issues with bone strength/health and elevated blood sugars. Sending to the pharmacy. Thanks.

## 2019-09-17 ENCOUNTER — HOSPITAL ENCOUNTER (OUTPATIENT)
Dept: PHYSICAL THERAPY | Facility: HOSPITAL | Age: 64
Setting detail: THERAPIES SERIES
Discharge: HOME | End: 2019-09-17
Payer: COMMERCIAL

## 2019-09-17 PROCEDURE — 97110 THERAPEUTIC EXERCISES: CPT

## 2019-09-17 PROCEDURE — 97140 MANUAL THERAPY 1/> REGIONS: CPT

## 2019-09-17 NOTE — FLOWSHEET NOTE
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 She states she was placed on another round of steroids and was advised to take 2 Lyrica per day. Objective:  Observation:   Test measurements:    Palpation:    Exercises:  Exercise Resistance/Repetitions Other comments   Nustep 8' x L5 17   HS stretch 5x20\" 17   Mini squats 3x10 17   Calf raises 3x10 17   Standing hip ext / abd 3x10 B 17   Airex stance 5x20\" 17   Bridges 3x10 17   Piriformis stretch 5x20\" 17                         Other Therapeutic Activities:      Manual Treatments:  Gentle STM /  rolling to B medial distal hamstrings / adductors x 15'    Modalities:        Timed Code Treatment Minutes:  65      Total Treatment Minutes: 65    Treatment/Activity Tolerance:  [x] Patient tolerated treatment well [] Patient limited by fatigue  [] Patient limited by pain  [] Patient limited by other medical complications  [x] Other:  Pt responded favorable to manual therapy; decrease in HS guarded noted compared to last visit.       Pain after treatment:      5/10     Prognosis: [x] Good [] Fair  [] Poor    Patient Requires Follow-up: [x] Yes  [] No    Plan:   [x] Continue per plan of care [] Alter current plan (see comments)  [] Plan of care initiated [] Hold pending MD visit []

## 2019-09-19 ENCOUNTER — HOSPITAL ENCOUNTER (OUTPATIENT)
Dept: PHYSICAL THERAPY | Facility: HOSPITAL | Age: 64
Setting detail: THERAPIES SERIES
Discharge: HOME | End: 2019-09-19
Payer: COMMERCIAL

## 2019-09-19 PROCEDURE — 97110 THERAPEUTIC EXERCISES: CPT

## 2019-09-19 PROCEDURE — 97140 MANUAL THERAPY 1/> REGIONS: CPT

## 2019-09-24 ENCOUNTER — HOSPITAL ENCOUNTER (OUTPATIENT)
Dept: PHYSICAL THERAPY | Facility: HOSPITAL | Age: 64
Setting detail: THERAPIES SERIES
Discharge: HOME | End: 2019-09-24
Payer: COMMERCIAL

## 2019-09-24 PROCEDURE — 97110 THERAPEUTIC EXERCISES: CPT

## 2019-09-24 PROCEDURE — 97140 MANUAL THERAPY 1/> REGIONS: CPT

## 2019-09-26 ENCOUNTER — HOSPITAL ENCOUNTER (OUTPATIENT)
Dept: PHYSICAL THERAPY | Facility: HOSPITAL | Age: 64
Setting detail: THERAPIES SERIES
Discharge: HOME | End: 2019-09-26
Payer: COMMERCIAL

## 2019-09-26 PROCEDURE — 97110 THERAPEUTIC EXERCISES: CPT

## 2019-09-26 PROCEDURE — 97140 MANUAL THERAPY 1/> REGIONS: CPT

## 2019-09-29 ENCOUNTER — OFFICE VISIT (OUTPATIENT)
Dept: PRIMARY CARE CLINIC | Age: 64
End: 2019-09-29
Payer: COMMERCIAL

## 2019-09-29 VITALS — BODY MASS INDEX: 35.87 KG/M2 | WEIGHT: 209 LBS | TEMPERATURE: 97.9 F

## 2019-09-29 DIAGNOSIS — H65.111 ACUTE MUCOID OTITIS MEDIA OF RIGHT EAR: ICD-10-CM

## 2019-09-29 DIAGNOSIS — J01.10 ACUTE NON-RECURRENT FRONTAL SINUSITIS: Primary | ICD-10-CM

## 2019-09-29 PROBLEM — H65.191 ACUTE MUCOID OTITIS MEDIA OF RIGHT EAR: Status: ACTIVE | Noted: 2019-09-29

## 2019-09-29 PROCEDURE — 99213 OFFICE O/P EST LOW 20 MIN: CPT | Performed by: NURSE PRACTITIONER

## 2019-09-29 PROCEDURE — 96372 THER/PROPH/DIAG INJ SC/IM: CPT | Performed by: NURSE PRACTITIONER

## 2019-09-29 RX ORDER — GUAIFENESIN 600 MG/1
600 TABLET, EXTENDED RELEASE ORAL 2 TIMES DAILY
Qty: 30 TABLET | Refills: 0 | Status: SHIPPED | OUTPATIENT
Start: 2019-09-29 | End: 2019-10-14

## 2019-09-29 RX ORDER — PREGABALIN 75 MG/1
CAPSULE ORAL
Refills: 0 | COMMUNITY
Start: 2019-09-13

## 2019-09-29 RX ORDER — UREA 10 %
500 LOTION (ML) TOPICAL DAILY
COMMUNITY
End: 2022-08-31

## 2019-09-29 RX ORDER — CEFDINIR 300 MG/1
300 CAPSULE ORAL 2 TIMES DAILY
Qty: 20 CAPSULE | Refills: 0 | Status: SHIPPED | OUTPATIENT
Start: 2019-09-29 | End: 2019-10-09

## 2019-09-29 RX ORDER — DEXAMETHASONE SODIUM PHOSPHATE 10 MG/ML
10 INJECTION INTRAMUSCULAR; INTRAVENOUS ONCE
Status: COMPLETED | OUTPATIENT
Start: 2019-09-29 | End: 2019-09-29

## 2019-09-29 RX ORDER — CEFTRIAXONE 1 G/1
1 INJECTION, POWDER, FOR SOLUTION INTRAMUSCULAR; INTRAVENOUS ONCE
Status: COMPLETED | OUTPATIENT
Start: 2019-09-29 | End: 2019-09-29

## 2019-09-29 RX ADMIN — DEXAMETHASONE SODIUM PHOSPHATE 10 MG: 10 INJECTION INTRAMUSCULAR; INTRAVENOUS at 11:43

## 2019-09-29 RX ADMIN — CEFTRIAXONE 1 G: 1 INJECTION, POWDER, FOR SOLUTION INTRAMUSCULAR; INTRAVENOUS at 11:42

## 2019-09-29 ASSESSMENT — ENCOUNTER SYMPTOMS
SINUS PAIN: 1
SORE THROAT: 1
COUGH: 1
GASTROINTESTINAL NEGATIVE: 1
WHEEZING: 0
EYES NEGATIVE: 1
SINUS PRESSURE: 1

## 2019-09-29 NOTE — PROGRESS NOTES
Not on file   Occupational History    Not on file   Social Needs    Financial resource strain: Not on file    Food insecurity:     Worry: Not on file     Inability: Not on file    Transportation needs:     Medical: Not on file     Non-medical: Not on file   Tobacco Use    Smoking status: Never Smoker    Smokeless tobacco: Never Used   Substance and Sexual Activity    Alcohol use: No    Drug use: No    Sexual activity: Yes   Lifestyle    Physical activity:     Days per week: Not on file     Minutes per session: Not on file    Stress: Not on file   Relationships    Social connections:     Talks on phone: Not on file     Gets together: Not on file     Attends Yazidism service: Not on file     Active member of club or organization: Not on file     Attends meetings of clubs or organizations: Not on file     Relationship status: Not on file    Intimate partner violence:     Fear of current or ex partner: Not on file     Emotionally abused: Not on file     Physically abused: Not on file     Forced sexual activity: Not on file   Other Topics Concern    Not on file   Social History Narrative    Not on file        Family History   Problem Relation Age of Onset    Cancer Mother         skin ca    High Blood Pressure Mother     Cancer Brother     High Blood Pressure Brother        Vitals:    09/29/19 1037   Temp: 97.9 °F (36.6 °C)   Weight: 209 lb (94.8 kg)     Estimated body mass index is 35.87 kg/m² as calculated from the following:    Height as of 12/23/18: 5' 4\" (1.626 m). Weight as of this encounter: 209 lb (94.8 kg). Physical Exam   Constitutional: She is oriented to person, place, and time. She appears well-developed and well-nourished. HENT:   Head: Normocephalic and atraumatic. Left Ear: External ear normal.   Mouth/Throat: No oropharyngeal exudate. Right TM red, bulging, dull . Oropharynx red and swollen. Tonsils normal. Nasal passages red and swollen.    Eyes: Pupils are equal, round,

## 2019-10-01 ENCOUNTER — HOSPITAL ENCOUNTER (OUTPATIENT)
Dept: PHYSICAL THERAPY | Facility: HOSPITAL | Age: 64
Setting detail: THERAPIES SERIES
Discharge: HOME | End: 2019-10-01
Payer: COMMERCIAL

## 2019-10-01 PROCEDURE — 97110 THERAPEUTIC EXERCISES: CPT

## 2019-10-01 PROCEDURE — 97140 MANUAL THERAPY 1/> REGIONS: CPT

## 2019-10-03 ENCOUNTER — HOSPITAL ENCOUNTER (OUTPATIENT)
Dept: PHYSICAL THERAPY | Facility: HOSPITAL | Age: 64
Setting detail: THERAPIES SERIES
Discharge: HOME | End: 2019-10-03
Payer: COMMERCIAL

## 2019-10-03 PROCEDURE — 97110 THERAPEUTIC EXERCISES: CPT

## 2019-10-03 PROCEDURE — 97140 MANUAL THERAPY 1/> REGIONS: CPT

## 2019-10-08 ENCOUNTER — APPOINTMENT (OUTPATIENT)
Dept: PHYSICAL THERAPY | Facility: HOSPITAL | Age: 64
End: 2019-10-08
Payer: COMMERCIAL

## 2019-10-10 ENCOUNTER — APPOINTMENT (OUTPATIENT)
Dept: PHYSICAL THERAPY | Facility: HOSPITAL | Age: 64
End: 2019-10-10
Payer: COMMERCIAL

## 2019-10-10 ENCOUNTER — OFFICE VISIT (OUTPATIENT)
Dept: NEUROLOGY | Facility: CLINIC | Age: 64
End: 2019-10-10

## 2019-10-10 VITALS
WEIGHT: 216 LBS | HEIGHT: 64 IN | BODY MASS INDEX: 36.88 KG/M2 | HEART RATE: 64 BPM | DIASTOLIC BLOOD PRESSURE: 80 MMHG | OXYGEN SATURATION: 99 % | SYSTOLIC BLOOD PRESSURE: 132 MMHG

## 2019-10-10 DIAGNOSIS — B99.9 NEUROPATHY DUE TO INFECTION (HCC): Primary | ICD-10-CM

## 2019-10-10 DIAGNOSIS — R53.81 PHYSICAL DECONDITIONING: ICD-10-CM

## 2019-10-10 DIAGNOSIS — R29.898 LEG WEAKNESS, BILATERAL: ICD-10-CM

## 2019-10-10 DIAGNOSIS — M51.36 DDD (DEGENERATIVE DISC DISEASE), LUMBAR: ICD-10-CM

## 2019-10-10 DIAGNOSIS — G63 NEUROPATHY DUE TO INFECTION (HCC): Primary | ICD-10-CM

## 2019-10-10 PROCEDURE — 99214 OFFICE O/P EST MOD 30 MIN: CPT | Performed by: NURSE PRACTITIONER

## 2019-10-10 RX ORDER — LOSARTAN POTASSIUM 50 MG/1
50 TABLET ORAL DAILY
COMMUNITY
End: 2022-05-09

## 2019-10-10 RX ORDER — ZOLPIDEM TARTRATE 10 MG/1
5 TABLET ORAL NIGHTLY PRN
COMMUNITY
End: 2021-11-18

## 2019-10-10 NOTE — PROGRESS NOTES
"Subjective:     Patient ID: Alison Benitez is a 64 y.o. female.    CC:   Chief Complaint   Patient presents with   • neuropathy       HPI:   History of Present Illness   This is a very pleasant 64-year-old female who presents for 6 week follow up on numbness, tingling, pain and weakness type sensation in the bilateral lower extremities from her bilateral thighs all the way into both feet.  She tells me in January to February 2019 she was diagnosed with mono by her primary care provider.  She tells me that her legs felt like they were \"killing her\".  She tells me that wearing snugger pants causes her legs to hurt.  She has been wearing a lot of cotton and loose for clothing.  She tells me that feels like a constant stinging, burning, tingling sensation and very sensitive to touch over the legs.  At her initial visit we did additional extensive lab work-up including a repeat CK level of 174.  Immunofixation and protein electrophoresis within normal limits.  Anti-hue and anti-Otilia antibodies negative.  Methylmalonic acid normal at 155.  Vitamin B12 951 and folate greater than 20.  Lyme antibodies negative.  Mono test negative.  ASO titer was elevated at 200.  Drug screen was normal.  Hemoglobin A1c was 4.9%.  I did discuss ASO titer with Dr. Shaffer our neurologist and we gave her a 12-day prednisone taper and she tells me that she felt much better overall and she actually had a second prednisone taper and has been doing physical therapy twice a week and was doing really well with this.  She tells me unfortunately about a week ago she was diagnosed with another sinus infection and every time she has a sinus infection she just feels really down and worn out.  She continues to have the paresthesias in her legs.  She has been prescribed Lyrica 75 mg and she tells me that she is only taking this as needed and did not realize that it should be at least daily and up to twice a day for her discomfort.  She will start " taking this as directed.  She does have known lumbar arthritis and has not had any imaging and that several years and would not be opposed to having an MRI of the lumbar spine.  She does have some weakness in her quads.  An EMG and NCV S was ordered last visit and unfortunately for some unknown reason this was not scheduled but we did get her scheduled today to have this done on 11/18/2019 at 8:45 AM to evaluate for neuropathy, radiculopathy or other myelopathy.  She does tell me that she actually feels significantly better than her first visit here in our office.  She does continue to take her Ambien at night to help her with sleep.  She denies any urinary or bowel incontinence or perineal numbness.  She denies any rashes or bug bites of any kind.  She is felt like her recovery from mono and then her additional infections more recently have caused her to slowly recover versus bouncing back like she used to.     She also saw her arthritis specialist Dr. Bibiana Khan at the time and she recommended she be seen by Dr. Gunderson with Vascular Surgery for evaluation.  She tells me she went and saw Dr. Gunderson and he said that all of her veins look excellent and recommended she be seen by neurology.  She tells me several years ago she was actually diagnosed with idiopathic neuropathy and try gabapentin but this made her too tired so she switched to Lyrica low-dose once a day and this really improved her symptoms. She does take care of her 4-year-old grandchild at home.    The following portions of the patient's history were reviewed and updated as appropriate: allergies, current medications, past family history, past medical history, past social history, past surgical history and problem list.    Past Medical History:   Diagnosis Date   • Arthritis    • Bursitis    • CTS (carpal tunnel syndrome)     bilateral    • Hypertension    • Lumbosacral disc disease    • Osteoarthritis    • Tendinitis of knee        Past Surgical  History:   Procedure Laterality Date   • HYSTERECTOMY N/A        • OOPHORECTOMY Bilateral        Social History     Socioeconomic History   • Marital status: Single     Spouse name: Not on file   • Number of children: Not on file   • Years of education: Not on file   • Highest education level: Not on file   Tobacco Use   • Smoking status: Never Smoker   Substance and Sexual Activity   • Alcohol use: No   • Drug use: No   • Sexual activity: Defer       Family History   Problem Relation Age of Onset   • Osteoarthritis Other    • Hypertension Other    • Carpal tunnel syndrome Other    • COPD Other    • Diabetes Other    • Stroke Other    • Glaucoma Other    • Heart disease Mother    • Stroke Mother    • Hypertension Brother    • Breast cancer Neg Hx    • Ovarian cancer Neg Hx         Review of Systems   All other systems reviewed and are negative.       Objective:    Neurologic Exam  Mental Status   Oriented to person, place, and time.   Speech: speech is normal   Level of consciousness: alert     Cranial Nerves   Cranial nerves II through XII intact.      Motor Exam   Muscle bulk: normal  Overall muscle tone: normal     Strength   Strength 5/5 except as noted.   Right quadriceps: 4/5  Left quadriceps: 4/5  Right hamstrin/5  Left hamstrin/5  Right glutei: 5/5  Left glutei: 5/5  Right anterior tibial: 5/5  Left anterior tibial: 5/5  Right posterior tibial: 5/5  Left posterior tibial: 5/5  Right peroneal: 5/5  Left peroneal: 5/5  Right gastroc: 5/5  Left gastroc: 5/5  Quads slightly weak bilaterally but no atrophy      Sensory Exam   Light touch normal.   Vibration normal.   Proprioception normal.   Pinprick normal.     Hyperesthesias of BLE to touch. Decreased stocking sensation of BLE to cold touch.      Gait, Coordination, and Reflexes      Gait  Gait: wide-based     Coordination   Romberg: negative  Finger to nose coordination: normal  Heel to shin coordination: normal  Tandem walking coordination:  normal     Tremor   Resting tremor: absent  Intention tremor: absent  Action tremor: absent     Reflexes   Right brachioradialis: 2+  Left brachioradialis: 2+  Right biceps: 2+  Left biceps: 2+  Right triceps: 2+  Left triceps: 2+  Right patellar: 1+  Left patellar: 1+  Right achilles: 1+  Left achilles: 1+  Right : 2+  Left : 2+  Right plantar: normal  Left plantar: normal  Right Rosales: absent  Left Rosales: absent  Right ankle clonus: absent  Left ankle clonus: absent        Physical Exam  Varicose veins of legs   Musculoskeletal:        Lumbar back: Normal. She exhibits normal range of motion, no tenderness, no swelling, no edema, no pain and no spasm.   Negative SLR bilaterally   Neurological: She is oriented to person, place, and time. She has a normal Finger-Nose-Finger Test, a normal Heel to Shin Test, a normal Romberg Test and a normal Tandem Gait Test.   Reflex Scores:       Tricep reflexes are 2+ on the right side and 2+ on the left side.       Bicep reflexes are 2+ on the right side and 2+ on the left side.       Brachioradialis reflexes are 2+ on the right side and 2+ on the left side.       Patellar reflexes are 1+ on the right side and 1+ on the left side.       Achilles reflexes are 1+ on the right side and 1+ on the left side.  Psychiatric: Her speech is normal and behavior is normal. Judgment and thought content normal. Her mood appears anxious. Cognition and memory are normal.     Assessment/Plan:       Alison was seen today for neuropathy.    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    Leg weakness, bilateral  -     MRI Lumbar Spine Without Contrast; Future    DDD (degenerative disc disease), lumbar  -     MRI Lumbar Spine Without Contrast; Future           She is aware of her nerve and  muscle study appointment.  Continue physical therapy.  She has improved overall.  I do feel like we should be thorough and get an MRI of the lumbar spine to rule out stenosis.  She is going to follow-up in 8 weeks for reevaluation.  She has had recurrent sinus infections and encouraged her to discuss possibly seeing a ear nose and throat specialist with her PCP.  Reviewed all of her labs in detail. Reviewed medications, potential side effects and signs and symptoms to report. Discussed risk versus benefits of treatment plan with patient and/or family-including medications, labs and radiology that may be ordered. Addressed questions and concerns during visit. Patient and/or family verbalized understanding and agree with plan.  I have encouraged her to take the Lyrica every day for at least a week or 2 and then increase to twice a day for better symptom management.    As part of this patient's treatment plan I am prescribing controlled substances. The patient has been made aware of appropriate use of such medications, including potential risk of somnolence, limited ability to drive and/or work safely, and potential for dependence or overdose. It has also been made clear that these medications are for use by the patient only, without concomitant use of alcohol or other substances unless prescribed. Keep out of reach of children.  Ethan report has been reviewed. If this is going to be prescribed long term, [facility] Controlled Substance Agreement Contract has also been read and signed by patient and myself.  Ethan #[id number] reviewed.    During this visit the following were done:  Labs Reviewed [x]    Labs Ordered []    Radiology Reports Reviewed []    Radiology Ordered [x]    PCP Records Reviewed []    Referring Provider Records Reviewed []    ER Records Reviewed []    Hospital Records Reviewed []    History Obtained From Family []    Radiology Images Reviewed []    Other Reviewed [x]    Records Requested []      EMR  Dragon/Transcription Disclaimer:  Much of this encounter note is an electronic transcription of spoken language to printed text. Electronic transcription of spoken language may permit erroneous words or phrases to be inadvertently transcribed. Although I have reviewed the note for such errors, some may still exist in this documentation.    Kimber Correa, APRN  10/10/2019

## 2019-10-15 ENCOUNTER — HOSPITAL ENCOUNTER (OUTPATIENT)
Dept: PHYSICAL THERAPY | Facility: HOSPITAL | Age: 64
Setting detail: THERAPIES SERIES
Discharge: HOME | End: 2019-10-15
Payer: COMMERCIAL

## 2019-10-15 PROCEDURE — 97110 THERAPEUTIC EXERCISES: CPT

## 2019-10-15 PROCEDURE — 97140 MANUAL THERAPY 1/> REGIONS: CPT

## 2019-10-17 ENCOUNTER — HOSPITAL ENCOUNTER (OUTPATIENT)
Dept: MRI IMAGING | Facility: HOSPITAL | Age: 64
Discharge: HOME | End: 2019-10-17
Admitting: NURSE PRACTITIONER

## 2019-10-17 ENCOUNTER — APPOINTMENT (OUTPATIENT)
Dept: PHYSICAL THERAPY | Facility: HOSPITAL | Age: 64
End: 2019-10-17
Payer: COMMERCIAL

## 2019-10-17 DIAGNOSIS — R29.898 LEG WEAKNESS, BILATERAL: ICD-10-CM

## 2019-10-17 DIAGNOSIS — M51.36 DDD (DEGENERATIVE DISC DISEASE), LUMBAR: ICD-10-CM

## 2019-10-17 PROCEDURE — 72148 MRI LUMBAR SPINE W/O DYE: CPT

## 2019-10-21 ENCOUNTER — TELEPHONE (OUTPATIENT)
Dept: NEUROLOGY | Facility: CLINIC | Age: 64
End: 2019-10-21

## 2019-10-21 DIAGNOSIS — M48.061 SPINAL STENOSIS OF LUMBAR REGION, UNSPECIFIED WHETHER NEUROGENIC CLAUDICATION PRESENT: Primary | ICD-10-CM

## 2019-10-21 NOTE — TELEPHONE ENCOUNTER
Pt is aware of the results below and would like the referral to Neurosurgery, faxed a copy of the results to pcp

## 2019-10-21 NOTE — PROGRESS NOTES
Please notify patient she has significant arthritis of the lower back on MRI Lumbar spine. She does have some bulging discs and there is a concern of narrowing of the spinal canal around L4-L5 level. Would recommend we refer her to neurosurgery for consultation. We can wait until she has her emg/ncvs or do this now. Let me know. This does not mean she needs surgery, but we are consulting with them. Thanks, ELDON Dowell (fax copy of mri l spine to pcp too please)

## 2019-10-21 NOTE — TELEPHONE ENCOUNTER
----- Message from ELDON Rosas sent at 10/21/2019  1:49 PM EDT -----  Please notify patient she has significant arthritis of the lower back on MRI Lumbar spine. She does have some bulging discs and there is a concern of narrowing of the spinal canal around L4-L5 level. Would recommend we refer her to neurosurgery for consultation. We can wait until she has her emg/ncvs or do this now. Let me know. This does not mean she needs surgery, but we are consulting with them. Thanks, ELDON Dowell (fax copy of mri l spine to pcp too please)

## 2019-10-22 ENCOUNTER — HOSPITAL ENCOUNTER (OUTPATIENT)
Dept: PHYSICAL THERAPY | Facility: HOSPITAL | Age: 64
Setting detail: THERAPIES SERIES
Discharge: HOME | End: 2019-10-22
Payer: COMMERCIAL

## 2019-10-22 PROCEDURE — 97140 MANUAL THERAPY 1/> REGIONS: CPT

## 2019-10-22 PROCEDURE — 97110 THERAPEUTIC EXERCISES: CPT

## 2019-10-24 ENCOUNTER — HOSPITAL ENCOUNTER (OUTPATIENT)
Dept: PHYSICAL THERAPY | Facility: HOSPITAL | Age: 64
Setting detail: THERAPIES SERIES
Discharge: HOME | End: 2019-10-24
Payer: COMMERCIAL

## 2019-10-24 PROCEDURE — 97140 MANUAL THERAPY 1/> REGIONS: CPT

## 2019-10-24 PROCEDURE — 97110 THERAPEUTIC EXERCISES: CPT

## 2019-10-29 ENCOUNTER — HOSPITAL ENCOUNTER (OUTPATIENT)
Dept: PHYSICAL THERAPY | Facility: HOSPITAL | Age: 64
Setting detail: THERAPIES SERIES
Discharge: HOME | End: 2019-10-29
Payer: COMMERCIAL

## 2019-10-29 PROCEDURE — 97110 THERAPEUTIC EXERCISES: CPT

## 2019-10-29 PROCEDURE — 97140 MANUAL THERAPY 1/> REGIONS: CPT

## 2019-10-31 ENCOUNTER — OFFICE VISIT (OUTPATIENT)
Dept: NEUROSURGERY | Facility: CLINIC | Age: 64
End: 2019-10-31

## 2019-10-31 ENCOUNTER — HOSPITAL ENCOUNTER (OUTPATIENT)
Dept: PHYSICAL THERAPY | Facility: HOSPITAL | Age: 64
Setting detail: THERAPIES SERIES
Discharge: HOME | End: 2019-10-31
Payer: COMMERCIAL

## 2019-10-31 VITALS
SYSTOLIC BLOOD PRESSURE: 152 MMHG | TEMPERATURE: 98 F | DIASTOLIC BLOOD PRESSURE: 90 MMHG | HEIGHT: 64 IN | WEIGHT: 214 LBS | BODY MASS INDEX: 36.54 KG/M2

## 2019-10-31 DIAGNOSIS — M51.36 DEGENERATIVE DISC DISEASE, LUMBAR: ICD-10-CM

## 2019-10-31 DIAGNOSIS — M48.062 SPINAL STENOSIS OF LUMBAR REGION WITH NEUROGENIC CLAUDICATION: Primary | ICD-10-CM

## 2019-10-31 DIAGNOSIS — M79.605 BILATERAL LEG PAIN: ICD-10-CM

## 2019-10-31 DIAGNOSIS — M79.604 BILATERAL LEG PAIN: ICD-10-CM

## 2019-10-31 PROBLEM — M51.369 DEGENERATIVE DISC DISEASE, LUMBAR: Status: ACTIVE | Noted: 2019-10-31

## 2019-10-31 PROCEDURE — 99203 OFFICE O/P NEW LOW 30 MIN: CPT | Performed by: NEUROLOGICAL SURGERY

## 2019-10-31 PROCEDURE — 97140 MANUAL THERAPY 1/> REGIONS: CPT

## 2019-10-31 PROCEDURE — 97110 THERAPEUTIC EXERCISES: CPT

## 2019-10-31 NOTE — PROGRESS NOTES
Alison Benitez  1955  [id number]      Chief Complaint   Patient presents with   • Back Pain   • Leg Pain       HISTORY OF PRESENT ILLNESS: This is a 64-year-old female who is referred with a chief complaint of pain in the lumbar region radiating into both lower extremities.  This is associated with numbness tingling and paresthesia which is worse with lifting, walking for long periods of time or sitting for prolonged periods of time.  She has not been to physical therapy.  The symptoms have progressed over the past several months.  MRIs were performed and she is referred for neurosurgical consultation.  It is noteworthy that she has a genetic predisposition for degenerative osteoarthritis.  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  She does have a previous diagnosis of lumbosacral disc degeneration her symptoms have worsened and her primary care provider was concerned regarding spinal stenosis and possibility of progressive neurological dysfunction.  Lumbar MRI was performed and she is referred for neurosurgical consultation.    She has been followed by neurology with a presumed idiopathic polyneuropathy.  EMG/NCV are scheduled yet have not been performed as today's encounter.    Past Medical History:   Diagnosis Date   • Arthritis    • Bursitis    • CTS (carpal tunnel syndrome)     bilateral    • Hypertension    • Lumbosacral disc disease    • Osteoarthritis    • Tendinitis of knee        Past Surgical History:   Procedure Laterality Date   • HYSTERECTOMY N/A     1999   • OOPHORECTOMY Bilateral        Family History   Problem Relation Age of Onset   • Osteoarthritis Other    • Hypertension Other    • Carpal tunnel syndrome Other    • COPD Other    • Diabetes Other    • Stroke Other    • Glaucoma Other    • Heart disease Mother    • Stroke Mother    • Hypertension Brother    • Breast cancer Neg Hx    • Ovarian cancer Neg Hx        Social History     Socioeconomic History   •  Marital status: Single     Spouse name: Not on file   • Number of children: Not on file   • Years of education: Not on file   • Highest education level: Not on file   Tobacco Use   • Smoking status: Never Smoker   Substance and Sexual Activity   • Alcohol use: No   • Drug use: No   • Sexual activity: Defer       No Known Allergies      Current Outpatient Medications:   •  clindamycin (CLEOCIN T) 1 % lotion, apply topically to affected area at bedtime, Disp: , Rfl: 0  •  desloratadine (CLARINEX) 5 MG tablet, Take 5 mg by mouth Daily., Disp: , Rfl: 0  •  estradiol (ESTRACE) 1 MG tablet, , Disp: , Rfl:   •  fluticasone (FLONASE) 50 MCG/ACT nasal spray, 1 spray into the nostril(s) as directed by provider., Disp: , Rfl:   •  hydrochlorothiazide (HYDRODIURIL) 25 MG tablet, , Disp: , Rfl:   •  losartan (COZAAR) 50 MG tablet, Take 50 mg by mouth Daily., Disp: , Rfl:   •  Magnesium Gluconate (MAGNESIUM 27 PO), Take  by mouth., Disp: , Rfl:   •  NABUMETONE PO, Take  by mouth., Disp: , Rfl:   •  Omega-3 Fatty Acids (FISH OIL) 1000 MG capsule capsule, Take  by mouth Daily With Breakfast., Disp: , Rfl:   •  pregabalin (LYRICA) 75 MG capsule, Take 1 capsule by mouth 2 (Two) Times a Day., Disp: 60 capsule, Rfl: 4  •  Probiotic Product (PROBIOTIC-10) capsule, Take  by mouth., Disp: , Rfl:   •  RHOFADE 1 % cream, , Disp: , Rfl: 6  •  vitamin B-12 (CYANOCOBALAMIN) 1000 MCG tablet, Take 1,000 mcg by mouth., Disp: , Rfl:   •  vitamin C (ASCORBIC ACID) 250 MG tablet, Take 250 mg by mouth Daily., Disp: , Rfl:   •  vitamin D (ERGOCALCIFEROL) 08843 units capsule capsule, , Disp: , Rfl:   •  zolpidem (AMBIEN) 10 MG tablet, Take 10 mg by mouth At Night As Needed for Sleep., Disp: , Rfl:     Review of Systems   Constitutional: Positive for activity change and fatigue. Negative for appetite change, chills, diaphoresis, fever and unexpected weight change.   HENT: Positive for ear pain. Negative for congestion, dental problem, drooling, ear  discharge, facial swelling, hearing loss, mouth sores, nosebleeds, postnasal drip, rhinorrhea, sinus pressure, sinus pain, sneezing, sore throat, tinnitus, trouble swallowing and voice change.    Eyes: Positive for redness. Negative for photophobia, pain, discharge, itching and visual disturbance.   Respiratory: Negative for apnea, cough, choking, chest tightness, shortness of breath, wheezing and stridor.    Cardiovascular: Negative for chest pain, palpitations and leg swelling.   Gastrointestinal: Negative for abdominal distention, abdominal pain, anal bleeding, blood in stool, constipation, diarrhea, nausea, rectal pain and vomiting.   Endocrine: Negative for cold intolerance, heat intolerance, polydipsia, polyphagia and polyuria.   Genitourinary: Negative for decreased urine volume, difficulty urinating, dyspareunia, dysuria, enuresis, flank pain, frequency, genital sores, hematuria, menstrual problem, pelvic pain, urgency, vaginal bleeding, vaginal discharge and vaginal pain.   Musculoskeletal: Positive for back pain and myalgias. Negative for arthralgias, gait problem, joint swelling, neck pain and neck stiffness.   Skin: Negative for color change, pallor, rash and wound.   Allergic/Immunologic: Negative for environmental allergies, food allergies and immunocompromised state.   Neurological: Positive for numbness. Negative for dizziness, tremors, seizures, syncope, facial asymmetry, speech difficulty, weakness, light-headedness and headaches.   Hematological: Negative for adenopathy. Does not bruise/bleed easily.   Psychiatric/Behavioral: Negative for agitation, behavioral problems, confusion, decreased concentration, dysphoric mood, hallucinations, self-injury, sleep disturbance and suicidal ideas. The patient is not nervous/anxious and is not hyperactive.        Vitals:    10/31/19 1415   BP: 152/90   BP Location: Right arm   Patient Position: Sitting   Temp: 98 °F (36.7 °C)   TempSrc: Temporal   Weight:  "97.1 kg (214 lb)   Height: 162.6 cm (64\")       Neurological Examination:    Mental status/speech: The patient is alert and oriented.  Speech is clear without aphysia or dysarthria.  No overt cognitive deficits.    Cranial nerve examination:    Olfaction: Smell is intact.  Vision: Vision is intact without visual field abnormalities.  Funduscopic examination is normal.  No pupillary irregularity.  Ocular motor examination: The extraocular muscles are intact.  There is no diplopia.  The pupil is round and reactive to both light and accommodation.  There is no nystagmus.  Facial movement/sensation: There is no facial weakness.  Sensation is intact in the first, second, and third divisions of the trigeminal nerve.  The corneal reflex is intact.  Auditory: Hearing is intact to finger rub bilaterally.  Cranial nerves IX, X, XI, XII: Phonation is normal.  No dysphagia.  Tongue is protruded in the midline without atrophy.  The gag reflex is intact.  Shoulder shrug is normal.    Musculoligamentous ligamentous examination: She is slightly obese with a BMI of 36.7.  She has restriction in the range of motion of lumbar spine including flexion extension lateral rotation lateral bending.  However straight leg raising, Lasègue and flip test are negative.  There is no weakness, sensory loss or reflex asymmetry    Medical Decision Making:     Diagnostic Data Set: The lumbar MRI shows multilevel degenerative disc disease throughout the lumbar spine.  At L4-L5 she has a minor anterolisthesis without high-grade spinal stenosis.  She has slight Modic changes at this level as well.      Assessment: Diffuse degenerative osteoarthritis of the lumbar spine          Recommendations: She does not require surgical surgical intervention.  She does not have neurogenic claudication to the degree no other findings on the MRI that would warrant decompression and pedicle screw fixation.  This is best managed conservatively with reassurance.  She is " also quite anxious which I have tried to jesika.  She is not destined to have paralysis or any significant neurological dysfunction related to her degenerative osteoarthritis.  I suggested that she consider an epidural steroid injection or facet injection which I have scheduled with Dr. Everett.  She will call me subsequently.  I appreciate seeing her.        I greatly appreciate the opportunity to see and evaluate this individual.  If you have questions or concerns regarding issues that I may have overlooked please call me at any time: [phone].  Ishan Billings M.D.  [facility]  [address] [place] [address]      Scribed for Grabiel Billings MD by Randall Jiménez CMA. 10/31/2019 2:42 PM     I have read and concur with the information provided by the scribe.  Grabiel Billings MD

## 2019-11-05 ENCOUNTER — HOSPITAL ENCOUNTER (OUTPATIENT)
Dept: PHYSICAL THERAPY | Facility: HOSPITAL | Age: 64
Setting detail: THERAPIES SERIES
Discharge: HOME | End: 2019-11-05
Payer: COMMERCIAL

## 2019-11-05 PROCEDURE — 97110 THERAPEUTIC EXERCISES: CPT

## 2019-11-05 PROCEDURE — 97140 MANUAL THERAPY 1/> REGIONS: CPT

## 2019-11-07 ENCOUNTER — HOSPITAL ENCOUNTER (OUTPATIENT)
Dept: PHYSICAL THERAPY | Facility: HOSPITAL | Age: 64
Setting detail: THERAPIES SERIES
Discharge: HOME | End: 2019-11-07
Payer: COMMERCIAL

## 2019-11-07 PROCEDURE — 97140 MANUAL THERAPY 1/> REGIONS: CPT

## 2019-11-07 PROCEDURE — 97110 THERAPEUTIC EXERCISES: CPT

## 2019-11-17 ENCOUNTER — OFFICE VISIT (OUTPATIENT)
Dept: PRIMARY CARE CLINIC | Age: 64
End: 2019-11-17
Payer: COMMERCIAL

## 2019-11-17 VITALS
OXYGEN SATURATION: 98 % | SYSTOLIC BLOOD PRESSURE: 133 MMHG | DIASTOLIC BLOOD PRESSURE: 75 MMHG | BODY MASS INDEX: 36.56 KG/M2 | WEIGHT: 213 LBS | HEART RATE: 63 BPM | TEMPERATURE: 97.4 F | RESPIRATION RATE: 16 BRPM

## 2019-11-17 DIAGNOSIS — K13.70 ORAL MUCOSAL LESION: ICD-10-CM

## 2019-11-17 DIAGNOSIS — H65.91 RIGHT OTITIS MEDIA WITH EFFUSION: Primary | ICD-10-CM

## 2019-11-17 PROCEDURE — 99213 OFFICE O/P EST LOW 20 MIN: CPT | Performed by: NURSE PRACTITIONER

## 2019-11-17 RX ORDER — CEFDINIR 300 MG/1
300 CAPSULE ORAL 2 TIMES DAILY
Qty: 10 CAPSULE | Refills: 0 | Status: SHIPPED | OUTPATIENT
Start: 2019-11-17 | End: 2019-11-22

## 2019-11-17 RX ORDER — GUAIFENESIN 600 MG/1
1200 TABLET, EXTENDED RELEASE ORAL 2 TIMES DAILY PRN
COMMUNITY
Start: 2019-11-17 | End: 2020-09-29 | Stop reason: ALTCHOICE

## 2019-11-17 ASSESSMENT — ENCOUNTER SYMPTOMS
GASTROINTESTINAL NEGATIVE: 1
RESPIRATORY NEGATIVE: 1
SORE THROAT: 1

## 2019-11-17 ASSESSMENT — PATIENT HEALTH QUESTIONNAIRE - PHQ9: DEPRESSION UNABLE TO ASSESS: URGENT/EMERGENT SITUATION

## 2019-11-18 ENCOUNTER — HOSPITAL ENCOUNTER (OUTPATIENT)
Dept: NEUROLOGY | Facility: HOSPITAL | Age: 64
Discharge: HOME | End: 2019-11-18
Admitting: NURSE PRACTITIONER

## 2019-11-18 ENCOUNTER — TELEPHONE (OUTPATIENT)
Dept: NEUROLOGY | Facility: CLINIC | Age: 64
End: 2019-11-18

## 2019-11-18 DIAGNOSIS — B99.9 NEUROPATHY DUE TO INFECTION (HCC): ICD-10-CM

## 2019-11-18 DIAGNOSIS — G63 NEUROPATHY DUE TO INFECTION (HCC): ICD-10-CM

## 2019-11-18 PROCEDURE — 95886 MUSC TEST DONE W/N TEST COMP: CPT

## 2019-11-18 PROCEDURE — 95910 NRV CNDJ TEST 7-8 STUDIES: CPT

## 2019-11-18 NOTE — TELEPHONE ENCOUNTER
----- Message from ELDON Rosas sent at 11/18/2019 10:25 AM EST -----  Please notify patient her Nerve and muscle study of both lower extremities showed some mild neuropathy.  We will follow-up as scheduled in office.  Please fax a copy of his study to PCP.  Thanks, ELDON Kong.

## 2019-11-18 NOTE — PROGRESS NOTES
Please notify patient her Nerve and muscle study of both lower extremities showed some mild neuropathy.  We will follow-up as scheduled in office.  Please fax a copy of his study to PCP.  Thanks, ELODN Kong.

## 2019-11-19 ENCOUNTER — HOSPITAL ENCOUNTER (OUTPATIENT)
Dept: PHYSICAL THERAPY | Facility: HOSPITAL | Age: 64
Setting detail: THERAPIES SERIES
Discharge: HOME | End: 2019-11-19
Payer: COMMERCIAL

## 2019-11-19 PROCEDURE — 97110 THERAPEUTIC EXERCISES: CPT

## 2019-11-19 PROCEDURE — 97140 MANUAL THERAPY 1/> REGIONS: CPT

## 2019-11-21 ENCOUNTER — APPOINTMENT (OUTPATIENT)
Dept: PHYSICAL THERAPY | Facility: HOSPITAL | Age: 64
End: 2019-11-21
Payer: COMMERCIAL

## 2019-11-26 ENCOUNTER — HOSPITAL ENCOUNTER (OUTPATIENT)
Dept: PHYSICAL THERAPY | Facility: HOSPITAL | Age: 64
Setting detail: THERAPIES SERIES
Discharge: HOME | End: 2019-11-26
Payer: COMMERCIAL

## 2019-11-26 PROCEDURE — 97140 MANUAL THERAPY 1/> REGIONS: CPT

## 2019-11-26 PROCEDURE — 97110 THERAPEUTIC EXERCISES: CPT

## 2019-11-28 ENCOUNTER — APPOINTMENT (OUTPATIENT)
Dept: PHYSICAL THERAPY | Facility: HOSPITAL | Age: 64
End: 2019-11-28
Payer: COMMERCIAL

## 2019-11-29 ENCOUNTER — HOSPITAL ENCOUNTER (OUTPATIENT)
Dept: PHYSICAL THERAPY | Facility: HOSPITAL | Age: 64
Setting detail: THERAPIES SERIES
Discharge: HOME | End: 2019-11-29
Payer: COMMERCIAL

## 2019-11-29 PROCEDURE — 97140 MANUAL THERAPY 1/> REGIONS: CPT

## 2019-11-29 PROCEDURE — 97110 THERAPEUTIC EXERCISES: CPT

## 2019-12-03 ENCOUNTER — HOSPITAL ENCOUNTER (OUTPATIENT)
Dept: PHYSICAL THERAPY | Facility: HOSPITAL | Age: 64
Setting detail: THERAPIES SERIES
Discharge: HOME | End: 2019-12-03
Payer: COMMERCIAL

## 2019-12-03 PROCEDURE — 97110 THERAPEUTIC EXERCISES: CPT

## 2019-12-03 PROCEDURE — 97140 MANUAL THERAPY 1/> REGIONS: CPT

## 2019-12-05 ENCOUNTER — OFFICE VISIT (OUTPATIENT)
Dept: NEUROLOGY | Facility: CLINIC | Age: 64
End: 2019-12-05

## 2019-12-05 VITALS
BODY MASS INDEX: 35.34 KG/M2 | SYSTOLIC BLOOD PRESSURE: 132 MMHG | HEIGHT: 64 IN | DIASTOLIC BLOOD PRESSURE: 80 MMHG | WEIGHT: 207 LBS

## 2019-12-05 DIAGNOSIS — M51.36 DEGENERATIVE DISC DISEASE, LUMBAR: ICD-10-CM

## 2019-12-05 DIAGNOSIS — G63 NEUROPATHY DUE TO INFECTION (HCC): Primary | ICD-10-CM

## 2019-12-05 DIAGNOSIS — B99.9 NEUROPATHY DUE TO INFECTION (HCC): Primary | ICD-10-CM

## 2019-12-05 DIAGNOSIS — M48.061 SPINAL STENOSIS OF LUMBAR REGION WITHOUT NEUROGENIC CLAUDICATION: ICD-10-CM

## 2019-12-05 PROCEDURE — 99214 OFFICE O/P EST MOD 30 MIN: CPT | Performed by: NURSE PRACTITIONER

## 2019-12-05 RX ORDER — PREGABALIN 75 MG/1
75 CAPSULE ORAL 3 TIMES DAILY
Qty: 90 CAPSULE | Refills: 5 | Status: SHIPPED | OUTPATIENT
Start: 2019-12-05 | End: 2020-06-05 | Stop reason: SDUPTHER

## 2019-12-05 NOTE — PROGRESS NOTES
"Subjective:     Patient ID: Alison Benitez is a 64 y.o. female.    CC:   Chief Complaint   Patient presents with   • Peripheral Neuropathy       HPI:   History of Present Illness   This is a very pleasant 64-year-old female who presents for 2 month follow up on numbness, tingling, pain and weakness type sensation in the bilateral lower extremities from her bilateral thighs all the way into both feet.  She tells me in January to February 2019 she was diagnosed with mono by her primary care provider.  She tells me that her legs felt like they were \"killing her\".  She tells me that wearing snugger pants causes her legs to hurt.  She has been wearing a lot of cotton and loose for clothing.  She tells me that feels like a constant stinging, burning, tingling sensation and very sensitive to touch over the legs.  At her initial visit we did additional extensive lab work-up including a repeat CK level of 174.  Immunofixation and protein electrophoresis within normal limits.  Anti-hue and anti-Otilia antibodies negative.  Methylmalonic acid normal at 155.  Vitamin B12 951 and folate greater than 20.  Lyme antibodies negative.  Mono test negative.  ASO titer was elevated at 200.  Drug screen was normal.  Hemoglobin A1c was 4.9%.  I did discuss ASO titer with Dr. Shaffer our neurologist and we gave her a 12-day prednisone taper and she tells me that she felt much better overall and she actually had a second prednisone taper and has been doing physical therapy twice a week and was doing really well with this.     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  She also had an MRI of her lumbar spine without contrast on 10/17/2019 and this did show degenerative disc changes and spinal stenosis.  She was evaluated by Dr. Grabiel Billings with [facility] neurosurgery on 10/31/2019 and he did not " recommend any neurosurgical intervention but he did recommend she continue physical therapy and consider epidural injections with Dr. Everett.  She feels like she has good days and bad days with her legs and lower back.  She would be interested in a consultation to see what type of injection she could have.  Overall she is doing really well on the Lyrica and it was recommended she increase this to 75 mg 3 times a day and would like a prescription.  Otherwise she really feels like she is improving overall. She denies any urinary or bowel incontinence or perineal numbness.  She denies any rashes or bug bites of any kind.  She is felt like her recovery from mono and then her additional infections more recently have caused her to slowly recover versus bouncing back like she used to.       The following portions of the patient's history were reviewed and updated as appropriate: allergies, current medications, past family history, past medical history, past social history, past surgical history and problem list.    Past Medical History:   Diagnosis Date   • Arthritis    • Bursitis    • CTS (carpal tunnel syndrome)     bilateral    • Hypertension    • Lumbosacral disc disease    • Osteoarthritis    • Tendinitis of knee        Past Surgical History:   Procedure Laterality Date   • HYSTERECTOMY N/A     1999   • OOPHORECTOMY Bilateral        Social History     Socioeconomic History   • Marital status: Single     Spouse name: Not on file   • Number of children: Not on file   • Years of education: Not on file   • Highest education level: Not on file   Tobacco Use   • Smoking status: Never Smoker   Substance and Sexual Activity   • Alcohol use: No   • Drug use: No   • Sexual activity: Defer       Family History   Problem Relation Age of Onset   • Osteoarthritis Other    • Hypertension Other    • Carpal tunnel syndrome Other    • COPD Other    • Diabetes Other    • Stroke Other    • Glaucoma Other    • Heart disease Mother    •  "Stroke Mother    • Hypertension Brother    • Breast cancer Neg Hx    • Ovarian cancer Neg Hx         Review of Systems   Constitutional: Negative for chills, fatigue, fever and unexpected weight change.   HENT: Negative for ear pain, hearing loss, nosebleeds, rhinorrhea and sore throat.    Eyes: Negative for photophobia, pain, discharge, itching and visual disturbance.   Respiratory: Negative for cough, chest tightness, shortness of breath and wheezing.    Cardiovascular: Negative for chest pain, palpitations and leg swelling.   Gastrointestinal: Negative for abdominal pain, blood in stool, constipation, diarrhea, nausea and vomiting.   Genitourinary: Negative for dysuria, frequency, hematuria and urgency.   Musculoskeletal: Negative for arthralgias, back pain, gait problem, joint swelling, myalgias, neck pain and neck stiffness.   Skin: Negative for rash and wound.   Allergic/Immunologic: Negative for environmental allergies and food allergies.   Neurological: Negative for dizziness, tremors, seizures, syncope, speech difficulty, weakness, light-headedness, numbness and headaches.   Hematological: Negative for adenopathy. Does not bruise/bleed easily.   Psychiatric/Behavioral: Negative for agitation, confusion, decreased concentration, hallucinations, sleep disturbance and suicidal ideas. The patient is not nervous/anxious.    All other systems reviewed and are negative.       Objective:  /80   Ht 162.6 cm (64\")   Wt 93.9 kg (207 lb)   BMI 35.53 kg/m²     Neurologic Exam  Mental Status   Oriented to person, place, and time.   Speech: speech is normal   Level of consciousness: alert     Cranial Nerves   Cranial nerves II through XII intact.      Motor Exam   Muscle bulk: normal  Overall muscle tone: normal     Strength   Strength 5/5 except as noted.   Right quadriceps: 4/5  Left quadriceps: 4/5  Right hamstrin/5  Left hamstrin/5  Right glutei: 5/5  Left glutei: 5/5  Right anterior tibial: 5/5  Left " anterior tibial: 5/5  Right posterior tibial: 5/5  Left posterior tibial: 5/5  Right peroneal: 5/5  Left peroneal: 5/5  Right gastroc: 5/5  Left gastroc: 5/5  Quads slightly weak bilaterally but no atrophy      Sensory Exam   Light touch normal.   Vibration normal.   Proprioception normal.   Pinprick normal.     Hyperesthesias of BLE to touch. Decreased stocking sensation of BLE to cold touch.      Gait, Coordination, and Reflexes      Gait  Gait: wide-based     Coordination   Romberg: negative  Finger to nose coordination: normal  Heel to shin coordination: normal  Tandem walking coordination: normal     Tremor   Resting tremor: absent  Intention tremor: absent  Action tremor: absent     Reflexes   Right brachioradialis: 2+  Left brachioradialis: 2+  Right biceps: 2+  Left biceps: 2+  Right triceps: 2+  Left triceps: 2+  Right patellar: 1+  Left patellar: 1+  Right achilles: 1+  Left achilles: 1+  Right : 2+  Left : 2+  Right plantar: normal  Left plantar: normal  Right Rosales: absent  Left Rosales: absent  Right ankle clonus: absent  Left ankle clonus: absent     Physical Exam  Varicose veins of legs   Musculoskeletal:        Lumbar back: Normal. She exhibits normal range of motion, no tenderness, no swelling, no edema, no pain and no spasm.   Neurological: She is oriented to person, place, and time. She has a normal Finger-Nose-Finger Test, a normal Heel to Shin Test, a normal Romberg Test and a normal Tandem Gait Test.   Reflex Scores:       Tricep reflexes are 2+ on the right side and 2+ on the left side.       Bicep reflexes are 2+ on the right side and 2+ on the left side.       Brachioradialis reflexes are 2+ on the right side and 2+ on the left side.       Patellar reflexes are 1+ on the right side and 1+ on the left side.       Achilles reflexes are 1+ on the right side and 1+ on the left side.  Psychiatric: Her speech is normal and behavior is normal. Judgment and thought content normal. Her mood  appears anxious. Cognition and memory are normal.    Assessment/Plan:       Alison was seen today for peripheral neuropathy.    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    Spinal stenosis of lumbar region without neurogenic claudication  Comments:  continue with PT and home exercises. Neurosurgery consult did not recommend interventions.  Orders:  -     pregabalin (LYRICA) 75 MG capsule; Take 1 capsule by mouth 3 (Three) Times a Day.  -     Ambulatory Referral to Pain Management    Degenerative disc disease, lumbar  Comments:  continue with PT and home exercises. Neurosurgery consult did not recommend interventions.  Orders:  -     pregabalin (LYRICA) 75 MG capsule; Take 1 capsule by mouth 3 (Three) Times a Day.  -     Ambulatory Referral to Pain Management         She will continue her Lyrica.  Continue physical therapy and then continue home exercises.  Referral to pain management per Dr. Billings recommendations.  She will follow-up in our office in 6 months or sooner if needed. Reviewed medications, potential side effects and signs and symptoms to report. Discussed risk versus benefits of treatment plan with patient and/or family-including medications, labs and radiology that may be ordered. Addressed questions and concerns during visit. Patient and/or family verbalized understanding and agree with plan.    As part of this patient's treatment plan I am prescribing controlled substances. The patient has been made aware of appropriate use of such medications, including potential risk of somnolence, limited ability to drive and/or work safely, and potential for dependence or overdose. It has also been made clear that these medications are for use by the patient only, without concomitant use of alcohol or other substances unless prescribed. Keep out of reach of children.  Ethan ray  has been reviewed. If this is going to be prescribed long term, [facility] Controlled Substance Agreement Contract has also been read and signed by patient and myself.  Ethan #[id number] reviewed.    During this visit the following were done:  Labs Reviewed []    Labs Ordered []    Radiology Reports Reviewed [x]    Radiology Ordered []    PCP Records Reviewed []    Referring Provider Records Reviewed []    ER Records Reviewed []    Hospital Records Reviewed []    History Obtained From Family []    Radiology Images Reviewed [x]    Other Reviewed [x]    Records Requested []      EMR Dragon/Transcription Disclaimer:  Much of this encounter note is an electronic transcription of spoken language to printed text. Electronic transcription of spoken language may permit erroneous words or phrases to be inadvertently transcribed. Although I have reviewed the note for such errors, some may still exist in this documentation.      Kimber Correa, APRN  12/5/2019

## 2019-12-10 ENCOUNTER — APPOINTMENT (OUTPATIENT)
Dept: PHYSICAL THERAPY | Facility: HOSPITAL | Age: 64
End: 2019-12-10
Payer: COMMERCIAL

## 2019-12-12 ENCOUNTER — APPOINTMENT (OUTPATIENT)
Dept: PHYSICAL THERAPY | Facility: HOSPITAL | Age: 64
End: 2019-12-12
Payer: COMMERCIAL

## 2019-12-17 ENCOUNTER — HOSPITAL ENCOUNTER (OUTPATIENT)
Dept: PHYSICAL THERAPY | Facility: HOSPITAL | Age: 64
Setting detail: THERAPIES SERIES
Discharge: HOME | End: 2019-12-17
Payer: COMMERCIAL

## 2019-12-17 PROCEDURE — 97110 THERAPEUTIC EXERCISES: CPT

## 2019-12-17 PROCEDURE — 97140 MANUAL THERAPY 1/> REGIONS: CPT

## 2019-12-24 ENCOUNTER — HOSPITAL ENCOUNTER (OUTPATIENT)
Dept: PHYSICAL THERAPY | Facility: HOSPITAL | Age: 64
Setting detail: THERAPIES SERIES
End: 2019-12-24
Payer: COMMERCIAL

## 2019-12-26 ENCOUNTER — APPOINTMENT (OUTPATIENT)
Dept: PHYSICAL THERAPY | Facility: HOSPITAL | Age: 64
End: 2019-12-26
Payer: COMMERCIAL

## 2019-12-31 ENCOUNTER — APPOINTMENT (OUTPATIENT)
Dept: PHYSICAL THERAPY | Facility: HOSPITAL | Age: 64
End: 2019-12-31
Payer: COMMERCIAL

## 2020-06-05 ENCOUNTER — OFFICE VISIT (OUTPATIENT)
Dept: NEUROLOGY | Facility: CLINIC | Age: 65
End: 2020-06-05

## 2020-06-05 VITALS
HEART RATE: 81 BPM | BODY MASS INDEX: 35.34 KG/M2 | TEMPERATURE: 97.7 F | WEIGHT: 207 LBS | HEIGHT: 64 IN | SYSTOLIC BLOOD PRESSURE: 120 MMHG | OXYGEN SATURATION: 98 % | DIASTOLIC BLOOD PRESSURE: 80 MMHG

## 2020-06-05 DIAGNOSIS — I10 ESSENTIAL HYPERTENSION: ICD-10-CM

## 2020-06-05 DIAGNOSIS — M51.36 DEGENERATIVE DISC DISEASE, LUMBAR: ICD-10-CM

## 2020-06-05 DIAGNOSIS — E53.8 LOW SERUM VITAMIN B12: ICD-10-CM

## 2020-06-05 DIAGNOSIS — E55.9 VITAMIN D DEFICIENCY: ICD-10-CM

## 2020-06-05 DIAGNOSIS — R79.9 ABNORMAL FINDING OF BLOOD CHEMISTRY, UNSPECIFIED: ICD-10-CM

## 2020-06-05 DIAGNOSIS — B99.9 NEUROPATHY DUE TO INFECTION (HCC): Primary | ICD-10-CM

## 2020-06-05 DIAGNOSIS — G63 NEUROPATHY DUE TO INFECTION (HCC): Primary | ICD-10-CM

## 2020-06-05 DIAGNOSIS — M48.061 SPINAL STENOSIS OF LUMBAR REGION WITHOUT NEUROGENIC CLAUDICATION: ICD-10-CM

## 2020-06-05 PROCEDURE — 99213 OFFICE O/P EST LOW 20 MIN: CPT | Performed by: NURSE PRACTITIONER

## 2020-06-05 RX ORDER — DULOXETIN HYDROCHLORIDE 60 MG/1
60 CAPSULE, DELAYED RELEASE ORAL DAILY
Qty: 90 CAPSULE | Refills: 3 | Status: SHIPPED | OUTPATIENT
Start: 2020-06-05 | End: 2020-12-07

## 2020-06-05 RX ORDER — DULOXETIN HYDROCHLORIDE 60 MG/1
60 CAPSULE, DELAYED RELEASE ORAL DAILY
COMMUNITY
Start: 2020-05-17 | End: 2020-06-05

## 2020-06-05 RX ORDER — NABUMETONE 750 MG/1
750 TABLET, FILM COATED ORAL 2 TIMES DAILY
COMMUNITY
Start: 2020-05-11

## 2020-06-05 RX ORDER — PREGABALIN 75 MG/1
75 CAPSULE ORAL 3 TIMES DAILY
Qty: 90 CAPSULE | Refills: 5 | Status: SHIPPED | OUTPATIENT
Start: 2020-06-05 | End: 2020-12-07

## 2020-06-05 NOTE — PROGRESS NOTES
"Subjective:     Patient ID: Alison Benitez is a 65 y.o. female.    CC:   Chief Complaint   Patient presents with   • Peripheral Neuropathy       HPI:   History of Present Illness   This is a very pleasant 65-year-old female who presents for 6 month follow up on numbness, tingling, pain and weakness type sensation in the bilateral lower extremities from her bilateral thighs all the way into both feet.  She tells me in January to February 2019 she was diagnosed with mono by her primary care provider.  She tells me that her legs felt like they were \"killing her\".  She tells me that wearing snugger pants causes her legs to hurt.  She has been wearing a lot of cotton and loose for clothing. She is now on Lyrica 75mg TID taking BID to TID and has had significant improvement in symptoms. Feels better and well controlled. Recently saw pain management and they prescribed Cymbalta too and recommended she follow up with them PRN. She needs refills. No new concerns. Feels like her legs are stronger. Has not seen PCP in over a year and wants lab orders so she can have everything checked locally before her follow up. Rheumatology has also ordered labs and wants to get them all together.    Prior labs & workup: CK level of 174.  Immunofixation and protein electrophoresis within normal limits.  Anti-hue and anti-Otilia antibodies negative.  Methylmalonic acid normal at 155.  Vitamin B12 951 and folate greater than 20.  Lyme antibodies negative.  Mono test negative.  ASO titer was elevated at 200.  Drug screen was normal.  Hemoglobin A1c was 4.9%.  I did discuss ASO titer with Dr. Shaffer our neurologist treated with high dose steroids and completed extensive PT. 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  She also had an MRI of her lumbar spine without contrast on 10/17/2019 and this did show degenerative disc changes and spinal stenosis.  She was evaluated by " Dr. Grabiel Billings with [facility] neurosurgery on 10/31/2019 and he did not recommend any neurosurgical intervention but he did recommend she continue physical therapy and consider epidural injections with Dr. Everett.    The following portions of the patient's history were reviewed and updated as appropriate: allergies, current medications, past family history, past medical history, past social history, past surgical history and problem list.    Past Medical History:   Diagnosis Date   • Arthritis    • Bursitis    • CTS (carpal tunnel syndrome)     bilateral    • Hypertension    • Lumbosacral disc disease    • Osteoarthritis    • Tendinitis of knee        Past Surgical History:   Procedure Laterality Date   • HYSTERECTOMY N/A     1999   • OOPHORECTOMY Bilateral        Social History     Socioeconomic History   • Marital status: Single     Spouse name: Not on file   • Number of children: Not on file   • Years of education: Not on file   • Highest education level: Not on file   Tobacco Use   • Smoking status: Never Smoker   • Smokeless tobacco: Never Used   Substance and Sexual Activity   • Alcohol use: No   • Drug use: No   • Sexual activity: Defer       Family History   Problem Relation Age of Onset   • Osteoarthritis Other    • Hypertension Other    • Carpal tunnel syndrome Other    • COPD Other    • Diabetes Other    • Stroke Other    • Glaucoma Other    • Heart disease Mother    • Stroke Mother    • Hypertension Brother    • Breast cancer Neg Hx    • Ovarian cancer Neg Hx         Review of Systems   Constitutional: Negative.    HENT: Negative.    Eyes: Negative.    Respiratory: Negative.    Cardiovascular: Negative.    Gastrointestinal: Negative.    Endocrine: Negative.    Genitourinary: Negative.    Musculoskeletal: Positive for back pain.   Skin: Negative.    Allergic/Immunologic: Negative.    Neurological: Positive for numbness.   Hematological: Negative.    Psychiatric/Behavioral: The patient is  "nervous/anxious.         Objective:  /80   Pulse 81   Temp 97.7 °F (36.5 °C)   Ht 162.6 cm (64\")   Wt 93.9 kg (207 lb)   SpO2 98%   BMI 35.53 kg/m²     Neurologic Exam     Mental Status   Oriented to person, place, and time.   Speech: speech is normal   Level of consciousness: alert    Cranial Nerves   Cranial nerves II through XII intact.     Motor Exam   Muscle bulk: normal  Overall muscle tone: normal    Strength   Strength 5/5 throughout.   Normal strength now with no atrophy     Sensory Exam   Right leg light touch: decreased from toes  Left leg light touch: decreased from toes  Right leg vibration: decreased from toes  Left leg vibration: decreased from toes  Proprioception normal.   Right leg pinprick: decreased from toes  Left leg pinprick: decreased from toes    Gait, Coordination, and Reflexes     Gait  Gait: normal    Coordination   Finger to nose coordination: normal    Tremor   Resting tremor: absent  Intention tremor: absent  Action tremor: absent    Reflexes   Right brachioradialis: 2+  Left brachioradialis: 2+  Right biceps: 2+  Left biceps: 2+  Right triceps: 2+  Left triceps: 2+  Right patellar: 1+  Left patellar: 1+  Right achilles: 1+  Left achilles: 1+  Right : 2+  Left : 2+      Physical Exam   Constitutional: She is oriented to person, place, and time.   Neurological: She is oriented to person, place, and time. She has normal strength. She has a normal Finger-Nose-Finger Test. Gait normal.   Reflex Scores:       Tricep reflexes are 2+ on the right side and 2+ on the left side.       Bicep reflexes are 2+ on the right side and 2+ on the left side.       Brachioradialis reflexes are 2+ on the right side and 2+ on the left side.       Patellar reflexes are 1+ on the right side and 1+ on the left side.       Achilles reflexes are 1+ on the right side and 1+ on the left side.  Psychiatric: Her speech is normal and behavior is normal. Judgment and thought content normal. Her mood " appears anxious. Cognition and memory are normal.       Assessment/Plan:       Alison was seen today for peripheral neuropathy.    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  -     DULoxetine (CYMBALTA) 60 MG capsule; Take 1 capsule by mouth Daily.  -     Comprehensive Metabolic Panel; Future  -     CBC & Differential; Future  -     Vitamin D 25 Hydroxy; Future  -     Vitamin B12 & Folate; Future  -     Methylmalonic Acid, Serum; Future    Degenerative disc disease, lumbar  Comments:  saw neurosurgery and now pain management-wrote cymbalta and improving, will wait on return to pain management if not better  Orders:  -     DULoxetine (CYMBALTA) 60 MG capsule; Take 1 capsule by mouth Daily.    Spinal stenosis of lumbar region without neurogenic claudication  Comments:  saw neurosurgery and now pain management-wrote cymbalta and improving, will wait on return to pain management if not better  Orders:  -     DULoxetine (CYMBALTA) 60 MG capsule; Take 1 capsule by mouth Daily.    Low serum vitamin B12  -     Vitamin B12 & Folate; Future    Vitamin D deficiency  -     Vitamin D 25 Hydroxy; Future    Essential hypertension  -     Thyroid Panel With TSH; Future  -     Hemoglobin A1c; Future  -     Lipid Panel; Future    Abnormal finding of blood chemistry, unspecified   -     Hemoglobin A1c; Future         Has not seen PCP in over a year and would like orders for all labs to have drawn locally and take to PCP. Ordered per her request since she is not fasting today can get another day at local lab. Continue current meds. Follow up 6 months or sooner if needed. Reviewed medications, potential side effects and signs and symptoms to report. Discussed risk versus benefits of treatment plan with patient and/or family-including medications, labs  and radiology that may be ordered. Addressed questions and concerns during visit. Patient and/or family verbalized understanding and agree with plan.    As part of this patient's treatment plan I am prescribing controlled substances. The patient has been made aware of appropriate use of such medications, including potential risk of somnolence, limited ability to drive and/or work safely, and potential for dependence or overdose. It has also been made clear that these medications are for use by the patient only, without concomitant use of alcohol or other substances unless prescribed. Keep out of reach of children.  Ethan report has been reviewed. If this is going to be prescribed long term, [facility] Controlled Substance Agreement Contract has also been read and signed by patient and myself.  Ethan #[id number] reviewed.    During this visit the following were done:  Labs Reviewed []    Labs Ordered []    Radiology Reports Reviewed []    Radiology Ordered []    PCP Records Reviewed []    Referring Provider Records Reviewed []    ER Records Reviewed []    Hospital Records Reviewed []    History Obtained From Family []    Radiology Images Reviewed []    Other Reviewed [x] pain management notes reviewed from Dr. Anatoliy Everett    Records Requested []      Kimber Correa, ELDON  6/5/2020

## 2020-06-22 ENCOUNTER — TELEPHONE (OUTPATIENT)
Dept: SURGERY | Facility: CLINIC | Age: 65
End: 2020-06-22

## 2020-07-09 DIAGNOSIS — Z01.818 PREOP TESTING: Primary | ICD-10-CM

## 2020-07-09 RX ORDER — POLYETHYLENE GLYCOL 3350 17 G/17G
POWDER, FOR SOLUTION ORAL
Qty: 238 G | Refills: 0 | Status: SHIPPED | OUTPATIENT
Start: 2020-07-09 | End: 2021-08-24

## 2020-07-09 RX ORDER — BISACODYL 5 MG/1
TABLET, DELAYED RELEASE ORAL
Qty: 4 TABLET | Refills: 0 | Status: SHIPPED | OUTPATIENT
Start: 2020-07-09 | End: 2021-05-27

## 2020-07-16 ENCOUNTER — HOSPITAL ENCOUNTER (OUTPATIENT)
Facility: HOSPITAL | Age: 65
Discharge: HOME | End: 2020-07-16
Payer: MEDICARE

## 2020-07-16 LAB
A/G RATIO: 1.7 (ref 0.8–2)
ALBUMIN SERPL-MCNC: 4.3 G/DL (ref 3.4–4.8)
ALP BLD-CCNC: 62 U/L (ref 25–100)
ALT SERPL-CCNC: 22 U/L (ref 4–36)
ANION GAP SERPL CALCULATED.3IONS-SCNC: 13 MMOL/L (ref 3–16)
AST SERPL-CCNC: 20 U/L (ref 8–33)
BASOPHILS ABSOLUTE: 0 K/UL (ref 0–0.1)
BASOPHILS RELATIVE PERCENT: 0.6 %
BILIRUB SERPL-MCNC: <0.2 MG/DL (ref 0.3–1.2)
BUN BLDV-MCNC: 18 MG/DL (ref 6–20)
CALCIUM SERPL-MCNC: 9.6 MG/DL (ref 8.5–10.5)
CHLORIDE BLD-SCNC: 101 MMOL/L (ref 98–107)
CHOLESTEROL, TOTAL: 185 MG/DL (ref 0–200)
CO2: 25 MMOL/L (ref 20–30)
CREAT SERPL-MCNC: 0.6 MG/DL (ref 0.4–1.2)
EOSINOPHILS ABSOLUTE: 0.1 K/UL (ref 0–0.4)
EOSINOPHILS RELATIVE PERCENT: 0.8 %
FOLATE: >20 NG/ML
GFR AFRICAN AMERICAN: >59
GFR NON-AFRICAN AMERICAN: >60
GLOBULIN: 2.5 G/DL
GLUCOSE BLD-MCNC: 101 MG/DL (ref 74–106)
HBA1C MFR BLD: 5.3 %
HCT VFR BLD CALC: 42.4 % (ref 37–47)
HDLC SERPL-MCNC: 49 MG/DL (ref 40–60)
HEMOGLOBIN: 13.9 G/DL (ref 11.5–16.5)
IMMATURE GRANULOCYTES #: 0 K/UL
IMMATURE GRANULOCYTES %: 0.2 % (ref 0–5)
LDL CHOLESTEROL CALCULATED: 85 MG/DL
LYMPHOCYTES ABSOLUTE: 1.8 K/UL (ref 1.5–4)
LYMPHOCYTES RELATIVE PERCENT: 28.4 %
MCH RBC QN AUTO: 28.4 PG (ref 27–32)
MCHC RBC AUTO-ENTMCNC: 32.8 G/DL (ref 31–35)
MCV RBC AUTO: 86.5 FL (ref 80–100)
MONOCYTES ABSOLUTE: 0.6 K/UL (ref 0.2–0.8)
MONOCYTES RELATIVE PERCENT: 10.2 %
NEUTROPHILS ABSOLUTE: 3.7 K/UL (ref 2–7.5)
NEUTROPHILS RELATIVE PERCENT: 59.8 %
PDW BLD-RTO: 13.7 % (ref 11–16)
PLATELET # BLD: 224 K/UL (ref 150–400)
PMV BLD AUTO: 11.5 FL (ref 6–10)
POTASSIUM SERPL-SCNC: 3.8 MMOL/L (ref 3.4–5.1)
RBC # BLD: 4.9 M/UL (ref 3.8–5.8)
SODIUM BLD-SCNC: 139 MMOL/L (ref 136–145)
T4 FREE: 1.28 NG/DL (ref 0.89–1.76)
TOTAL PROTEIN: 6.8 G/DL (ref 6.4–8.3)
TRIGL SERPL-MCNC: 256 MG/DL (ref 0–249)
TSH SERPL DL<=0.05 MIU/L-ACNC: 1.28 UIU/ML (ref 0.35–5.5)
VITAMIN B-12: 1845 PG/ML (ref 211–911)
VITAMIN D 25-HYDROXY: 39.8 (ref 32–100)
VLDLC SERPL CALC-MCNC: 51 MG/DL
WBC # BLD: 6.3 K/UL (ref 4–11)

## 2020-07-16 PROCEDURE — 82607 VITAMIN B-12: CPT

## 2020-07-16 PROCEDURE — 80061 LIPID PANEL: CPT

## 2020-07-16 PROCEDURE — 85025 COMPLETE CBC W/AUTO DIFF WBC: CPT

## 2020-07-16 PROCEDURE — 83921 ORGANIC ACID SINGLE QUANT: CPT

## 2020-07-16 PROCEDURE — 84481 FREE ASSAY (FT-3): CPT

## 2020-07-16 PROCEDURE — 36415 COLL VENOUS BLD VENIPUNCTURE: CPT

## 2020-07-16 PROCEDURE — 84443 ASSAY THYROID STIM HORMONE: CPT

## 2020-07-16 PROCEDURE — 80053 COMPREHEN METABOLIC PANEL: CPT

## 2020-07-16 PROCEDURE — 82746 ASSAY OF FOLIC ACID SERUM: CPT

## 2020-07-16 PROCEDURE — 84439 ASSAY OF FREE THYROXINE: CPT

## 2020-07-16 PROCEDURE — 82306 VITAMIN D 25 HYDROXY: CPT

## 2020-07-16 PROCEDURE — 83036 HEMOGLOBIN GLYCOSYLATED A1C: CPT

## 2020-07-17 LAB — T3 FREE: 2.9 PG/ML (ref 2.3–4.2)

## 2020-07-22 ENCOUNTER — HOSPITAL ENCOUNTER (OUTPATIENT)
Facility: HOSPITAL | Age: 65
Discharge: HOME | End: 2020-07-22
Payer: MEDICARE

## 2020-07-22 ENCOUNTER — PREP FOR SURGERY (OUTPATIENT)
Dept: OTHER | Facility: HOSPITAL | Age: 65
End: 2020-07-22

## 2020-07-22 ENCOUNTER — HOSPITAL ENCOUNTER (OUTPATIENT)
Dept: GENERAL RADIOLOGY | Facility: HOSPITAL | Age: 65
Discharge: HOME | End: 2020-07-22
Payer: MEDICARE

## 2020-07-22 DIAGNOSIS — Z12.11 COLON CANCER SCREENING: Primary | ICD-10-CM

## 2020-07-22 LAB
METHYLMALONIC ACID: 0.15 UMOL/L (ref 0–0.4)
SARS-COV-2, NAAT: NOT DETECTED

## 2020-07-22 PROCEDURE — 73502 X-RAY EXAM HIP UNI 2-3 VIEWS: CPT

## 2020-07-22 PROCEDURE — U0002 COVID-19 LAB TEST NON-CDC: HCPCS

## 2020-07-23 ENCOUNTER — HOSPITAL ENCOUNTER (OUTPATIENT)
Facility: HOSPITAL | Age: 65
Setting detail: OUTPATIENT SURGERY
Discharge: HOME HEALTH | End: 2020-07-23
Attending: SURGERY | Admitting: SURGERY
Payer: MEDICARE

## 2020-07-23 ENCOUNTER — ANESTHESIA (OUTPATIENT)
Dept: ENDOSCOPY | Facility: HOSPITAL | Age: 65
End: 2020-07-23
Payer: MEDICARE

## 2020-07-23 ENCOUNTER — OUTSIDE FACILITY SERVICE (OUTPATIENT)
Dept: SURGERY | Facility: CLINIC | Age: 65
End: 2020-07-23

## 2020-07-23 ENCOUNTER — ANESTHESIA EVENT (OUTPATIENT)
Dept: ENDOSCOPY | Facility: HOSPITAL | Age: 65
End: 2020-07-23
Payer: MEDICARE

## 2020-07-23 VITALS
HEIGHT: 64 IN | WEIGHT: 225 LBS | RESPIRATION RATE: 19 BRPM | OXYGEN SATURATION: 98 % | TEMPERATURE: 98.5 F | HEART RATE: 106 BPM | DIASTOLIC BLOOD PRESSURE: 106 MMHG | SYSTOLIC BLOOD PRESSURE: 126 MMHG | BODY MASS INDEX: 38.41 KG/M2

## 2020-07-23 VITALS
RESPIRATION RATE: 35 BRPM | DIASTOLIC BLOOD PRESSURE: 106 MMHG | SYSTOLIC BLOOD PRESSURE: 202 MMHG | OXYGEN SATURATION: 96 %

## 2020-07-23 PROCEDURE — 3700000000 HC ANESTHESIA ATTENDED CARE: Performed by: SURGERY

## 2020-07-23 PROCEDURE — 3700000001 HC ADD 15 MINUTES (ANESTHESIA): Performed by: SURGERY

## 2020-07-23 PROCEDURE — G0121 COLON CA SCRN NOT HI RSK IND: HCPCS | Performed by: SURGERY

## 2020-07-23 PROCEDURE — 7100000011 HC PHASE II RECOVERY - ADDTL 15 MIN: Performed by: SURGERY

## 2020-07-23 PROCEDURE — 6360000002 HC RX W HCPCS: Performed by: NURSE ANESTHETIST, CERTIFIED REGISTERED

## 2020-07-23 PROCEDURE — 2500000003 HC RX 250 WO HCPCS: Performed by: NURSE ANESTHETIST, CERTIFIED REGISTERED

## 2020-07-23 PROCEDURE — 7100000010 HC PHASE II RECOVERY - FIRST 15 MIN: Performed by: SURGERY

## 2020-07-23 PROCEDURE — 2580000003 HC RX 258: Performed by: SURGERY

## 2020-07-23 PROCEDURE — 3609027000 HC COLONOSCOPY: Performed by: SURGERY

## 2020-07-23 RX ORDER — SODIUM CHLORIDE, SODIUM LACTATE, POTASSIUM CHLORIDE, CALCIUM CHLORIDE 600; 310; 30; 20 MG/100ML; MG/100ML; MG/100ML; MG/100ML
INJECTION, SOLUTION INTRAVENOUS CONTINUOUS
Status: DISCONTINUED | OUTPATIENT
Start: 2020-07-23 | End: 2020-07-23 | Stop reason: HOSPADM

## 2020-07-23 RX ORDER — PROPOFOL 10 MG/ML
INJECTION, EMULSION INTRAVENOUS PRN
Status: DISCONTINUED | OUTPATIENT
Start: 2020-07-23 | End: 2020-07-23 | Stop reason: SDUPTHER

## 2020-07-23 RX ADMIN — PROPOFOL 60 MG: 10 INJECTION, EMULSION INTRAVENOUS at 09:47

## 2020-07-23 RX ADMIN — LIDOCAINE HYDROCHLORIDE 20 MG: 10 INJECTION, SOLUTION INFILTRATION; PERINEURAL at 09:43

## 2020-07-23 RX ADMIN — PROPOFOL 60 MG: 10 INJECTION, EMULSION INTRAVENOUS at 09:43

## 2020-07-23 RX ADMIN — SODIUM CHLORIDE, POTASSIUM CHLORIDE, SODIUM LACTATE AND CALCIUM CHLORIDE: 600; 310; 30; 20 INJECTION, SOLUTION INTRAVENOUS at 09:02

## 2020-07-23 RX ADMIN — PROPOFOL 40 MG: 10 INJECTION, EMULSION INTRAVENOUS at 09:45

## 2020-07-23 ASSESSMENT — PAIN SCALES - GENERAL
PAINLEVEL_OUTOF10: 0

## 2020-07-23 NOTE — ANESTHESIA POSTPROCEDURE EVALUATION
Department of Anesthesiology  Postprocedure Note    Patient: Rafaela Hopson  MRN: [id number]  YOB: 1955  Date of evaluation: 7/23/2020  Time:  11:57 AM     Procedure Summary     Date:  07/23/20 Room / Location:  [facility] ENDO 01 / [facility] [address]    Anesthesia Start:  0930 Anesthesia Stop:  4756    Procedure:  COLORECTAL CANCER SCREENING, NOT HIGH RISK (N/A ) Diagnosis:  (Z12.11)    Surgeon:  Vashti Padilla MD Responsible Provider:  JO-ANN Armijo CRNA    Anesthesia Type:  MAC ASA Status:  3          Anesthesia Type: MAC    Davie Phase I: Davie Score: 10    Davie Phase II: Davie Score: 10    Last vitals: Reviewed and per EMR flowsheets.        Anesthesia Post Evaluation    Patient location during evaluation: bedside  Patient participation: complete - patient participated  Level of consciousness: awake and alert  Airway patency: patent  Nausea & Vomiting: no nausea and no vomiting  Complications: no  Cardiovascular status: blood pressure returned to baseline  Respiratory status: acceptable  Hydration status: stable

## 2020-07-23 NOTE — PROGRESS NOTES
Pt tolerating PO well without N/V. IV DC'ed with tip intact and pressure applied. No complications at site. DC instructions with follow up given without questions. Condition stable. Belongings with pt. Pt left endoscopy with herlinda Dodd. Pt ambulatory. DC instructions in hand.

## 2020-07-23 NOTE — ANESTHESIA PRE PROCEDURE
Department of Anesthesiology  Preprocedure Note       Name:  Margarita Dalton   Age:  72 y.o.  :  1955                                          MRN:  [id number]         Date:  2020      Surgeon: Lisa Camara):  Gregory Blanco MD    Procedure: Procedure(s):  COLORECTAL CANCER SCREENING, NOT HIGH RISK    Medications prior to admission:   Prior to Admission medications    Medication Sig Start Date End Date Taking?  Authorizing Provider   guaiFENesin (MUCINEX) 600 MG extended release tablet Take 2 tablets by mouth 2 times daily as needed for Congestion 19   JO-ANN Velasco NP   Magnesium Gluconate 500 (27 Mg) MG TABS tablet Take by mouth    Historical Provider, MD   pregabalin (LYRICA) 75 MG capsule take 1 capsule by mouth twice a day 19   Historical Provider, MD   vitamin B-1 (THIAMINE) 100 MG tablet Take 100 mg by mouth    Historical Provider, MD   Ascorbic Acid (VITAMIN C) 250 MG tablet Take 250 mg by mouth    Historical Provider, MD   losartan (COZAAR) 50 MG tablet Take by mouth     Historical Provider, MD   nabumetone (RELAFEN) 750 MG tablet Take by mouth     Historical Provider, MD   Probiotic Product (PROBIOTIC-10) CAPS Take by mouth    Historical Provider, MD   Omega-3 Fatty Acids (FISH OIL) 1000 MG CAPS Take 3,000 mg by mouth 3 times daily    Historical Provider, MD   zolpidem (AMBIEN) 10 MG tablet take 1 tablet by mouth at bedtime if needed 18   Historical Provider, MD   RHOFADE 1 % CREA  7/3/18   Historical Provider, MD   vitamin D (ERGOCALCIFEROL) 08723 units CAPS capsule  18   Historical Provider, MD   fluticasone (FLONASE) 50 MCG/ACT nasal spray 1 spray by Nasal route daily 18   JO-ANN Velasco NP   desloratadine (CLARINEX) 5 MG tablet take 1 tablet by mouth once daily 18   Mimi Fragoso DO   celecoxib (CELEBREX) 200 MG capsule  10/5/17   Historical Provider, MD   clindamycin (CLEOCIN T) 1 % lotion  10/5/17   Historical Provider, MD mcekononide (DESOWEN) 0.05 % ointment  10/23/17   Historical Provider, MD   estradiol (ESTRACE) 1 MG tablet  10/5/17   Historical Provider, MD   triamcinolone (KENALOG) 0.1 % cream Apply topically 2 times daily. Patient not taking: Reported on 2019 10/24/17   Nam Hernandez, DO   Multiple Vitamins-Minerals (MULTIVITAL PO) Take by mouth daily    Historical Provider, MD   hydrochlorothiazide (HYDRODIURIL) 25 MG tablet Take 25 mg by mouth daily    Historical Provider, MD   vitamin B-12 (CYANOCOBALAMIN) 1000 MCG tablet Take 1,000 mcg by mouth daily    Historical Provider, MD   estrogens conjugated, synthetic B, (ENJUVIA) 0.3 MG tablet Take 0.3 mg by mouth daily    Historical Provider, MD       Current medications:    Current Facility-Administered Medications   Medication Dose Route Frequency Provider Last Rate Last Dose    lactated ringers infusion   Intravenous Continuous Thomas Mayes MD 80 mL/hr at 20 0902         Allergies:  No Known Allergies    Problem List:    Patient Active Problem List   Diagnosis Code    Contact dermatitis L25.9    Acute non-recurrent frontal sinusitis J01.10    Acute mucoid otitis media of right ear H65.111       Past Medical History:        Diagnosis Date    Arthritis        Past Surgical History:        Procedure Laterality Date     SECTION      x 2    COLONOSCOPY  2010    normal findings    HYSTERECTOMY         Social History:    Social History     Tobacco Use    Smoking status: Never Smoker    Smokeless tobacco: Never Used   Substance Use Topics    Alcohol use:  No                                Counseling given: Not Answered      Vital Signs (Current):   Vitals:    20 0855   BP: (!) 184/94   Pulse: 88   Resp: 20   Temp: 98.5 °F (36.9 °C)   TempSrc: Oral   SpO2: 100%   Weight: 225 lb (102.1 kg)   Height: 5' 4\" (1.626 m)                                              BP Readings from Last 3 Encounters:   20 (!) 184/94   19 133/75   18 136/77 regular  Rate: normal                    Neuro/Psych:   Negative Neuro/Psych ROS              GI/Hepatic/Renal: Neg GI/Hepatic/Renal ROS            Endo/Other:              Pt had PAT visit. ROS comment: BMI 38 Abdominal:   (+) obese,         Vascular: negative vascular ROS. Anesthesia Plan      MAC     ASA 3       Induction: intravenous. Anesthetic plan and risks discussed with patient. Use of blood products discussed with patient whom.                    JO-ANN Rosario - MEGHANN   7/23/2020

## 2020-07-23 NOTE — OP NOTE
PATIENT:    Nancy Contreras    DATE OF SURGERY:  07/23/20    PHYSICIAN:    Colton Sanders MD, FACS    REFERRING PHYSICIAN:  JO-ANN Santana CNP      YOB: 1955    PREOPERATIVE DIAGNOSIS:   Colon cancer screening, average risk    POSTOPERATIVE DIAGNOSIS: Hemorrhoids, otherwise normal colonoscopy      Estimated blood loss: None    PROCEDURE:  Colonoscopy     HISTORY:   The patient was sent to me as a consultation via JO-ANN Santana CNP for evaluation and treatment of the above-mentioned symptomatology. The patient is here now today for elective colonoscopy. I did meet with the patient preoperatively who understands the full risks and benefits of the above-mentioned procedure. We will proceed with this today on an elective basis. ANESTHESIA:  The patient was monitored both preoperatively and postoperatively in the normal fashion from a cardiovascular standpoint. Oxygen was delivered at 2 liters per nasal cannula, and oxygen saturations were monitored during the procedure. The patient remained stable from a cardiovascular standpoint throughout the entire procedure. OPERATIVE PROCEDURE:  The patient was taken to the endoscopy unit and placed in the supine position and given anesthesia as mentioned above. The patient was then placed in the left lateral decubitus position and the scope was introduced into the patients anus and then into the rectum without difficulty. The scope was carefully advanced throughout the colon to the ileocecal valve. All mucosal surfaces were visualized. Upon careful withdrawal of the scope, there was noted to be no evidence of polyps, diverticula or mass lesions. Cecum ileocecal valve well visualized. Patient has small hemorrhoids that were not thrombosed and not prolapse. No bleeding. .      A retroflexed view was obtained of the patients rectum and this showed small hemorrhoids, no prolapse or bleeding.   Digital rectal examination was performed and revealed good sphincter tone and no obvious masses. The patient was stable at this point in time and subsequently transferred back to the recovery room in stable condition.     QUALITY OF PREP: Adequate    PLAN: Repeat colonoscopy in 10 years    Electronically signed by Mary Jesus MD, FACS on 7/23/2020 at 9:54 AM

## 2020-07-23 NOTE — PROGRESS NOTES
Pt arrives ambulatory. No complaints noted. Verifies she is here for colonoscopy with Dr. Paz Alicea. States prep was effective. Verbalizes understanding of procedure. Consent on chart. States daughter Maryam Og will drive her home post-procedure. HR regular. Lungs clear. +BS. Denies chest pain or SOA. Side rails up x 2.

## 2020-08-27 ENCOUNTER — TRANSCRIBE ORDERS (OUTPATIENT)
Dept: MAMMOGRAPHY | Facility: HOSPITAL | Age: 65
End: 2020-08-27

## 2020-08-27 DIAGNOSIS — Z12.31 VISIT FOR SCREENING MAMMOGRAM: Primary | ICD-10-CM

## 2020-09-14 ENCOUNTER — OFFICE VISIT (OUTPATIENT)
Dept: OBSTETRICS AND GYNECOLOGY | Facility: CLINIC | Age: 65
End: 2020-09-14

## 2020-09-14 ENCOUNTER — HOSPITAL ENCOUNTER (OUTPATIENT)
Dept: MAMMOGRAPHY | Facility: HOSPITAL | Age: 65
Discharge: HOME | End: 2020-09-14
Admitting: OBSTETRICS & GYNECOLOGY

## 2020-09-14 DIAGNOSIS — Z79.890 HORMONE REPLACEMENT THERAPY (HRT): ICD-10-CM

## 2020-09-14 DIAGNOSIS — Z12.31 VISIT FOR SCREENING MAMMOGRAM: ICD-10-CM

## 2020-09-14 DIAGNOSIS — Z79.890 POST-MENOPAUSE ON HRT (HORMONE REPLACEMENT THERAPY): Primary | ICD-10-CM

## 2020-09-14 DIAGNOSIS — Z00.00 ANNUAL PHYSICAL EXAM: ICD-10-CM

## 2020-09-14 DIAGNOSIS — N95.1 MENOPAUSAL SYMPTOMS: ICD-10-CM

## 2020-09-14 PROCEDURE — 77067 SCR MAMMO BI INCL CAD: CPT | Performed by: RADIOLOGY

## 2020-09-14 PROCEDURE — 77063 BREAST TOMOSYNTHESIS BI: CPT

## 2020-09-14 PROCEDURE — 77063 BREAST TOMOSYNTHESIS BI: CPT | Performed by: RADIOLOGY

## 2020-09-14 PROCEDURE — 99387 INIT PM E/M NEW PAT 65+ YRS: CPT | Performed by: OBSTETRICS & GYNECOLOGY

## 2020-09-14 PROCEDURE — 77067 SCR MAMMO BI INCL CAD: CPT

## 2020-09-14 RX ORDER — ESTRADIOL 1 MG/1
1 TABLET ORAL DAILY
Qty: 90 TABLET | Refills: 4 | Status: SHIPPED | OUTPATIENT
Start: 2020-09-14 | End: 2021-04-13 | Stop reason: CLARIF

## 2020-09-14 NOTE — PROGRESS NOTES
GYN Annual Exam     CC - Here for annual exam. Refill ERT    Subjective   HPI  Alison May Emmanuel is a 65 y.o. female, , who presents for annual well woman exam.  She is postmenopausal.  Patient reports problems with: none.  Partner Status: Marital Status: .  New Partners since last visit: no.      Additional OB/GYN History   Current contraception: contraceptive methods: Post menopausal status  Desires to: cont ERT  Last Pap : 2019  Last Completed Pap Smear     Patient has no health maintenance due at this time        History of abnormal Pap smear: no  Family history of uterine, colon, breast, or ovarian cancer: no  Performs monthly Self-Breast Exam: yes  Last mammogram: today - pending  Last Completed Mammogram       Status Date      MAMMOGRAM Done 2019 MAMMO SCREENING DIGITAL TOMOSYNTHESIS BILATERAL W CAD     Patient has more history with this topic...        Last colonoscopy: 2020 hemorrhoids  Last Completed Colonoscopy       Status Date      COLONOSCOPY No completions recorded        Last DEXA: [id number]   Exercises Regularly: no  Feelings of Anxiety or Depression: no    Tobacco Usage?: No   OB History        3    Para   3    Term   3            AB        Living           SAB        TAB        Ectopic        Molar        Multiple        Live Births                    Health Maintenance   Topic Date Due   • ZOSTER VACCINE (1 of 2) 2005   • HEPATITIS C SCREENING  2017   • MEDICARE ANNUAL WELLNESS  2017   • COLONOSCOPY  2017   • Pneumococcal Vaccine Once at 65 Years Old  2020   •  AMB Pneumococcal Vaccine 65+ (1 of 1 - PPSV23) 2020   • INFLUENZA VACCINE  2020   • MAMMOGRAM  2021   • TDAP/TD VACCINES (3 - Td) 2022       The additional following portions of the patient's history were reviewed and updated as appropriate: allergies, current medications, past family history, past medical history, past social history and  past surgical history.    Review of Systems    I have reviewed and agree with the HPI, ROS, and historical information as entered above. Doyle Garland MD    Objective   Breastfeeding No     Physical Exam   Breast without masses, no skin changes or retractions, nontender, metrical  Axilla normal no lymphadenopathy  Heart regular rate no murmurs rubs or gallops  Lungs clear to auscultation normal bilateral breath sounds  Abdomen soft nontender no hepatosplenomegaly no masses no guarding or rebound  External genitalia normal atrophic changes appropriate for age no inflammation or discharge  Vagina normal for age, no bleeding inflammation or discharge  Cervix without lesions no bleeding or discharge  Bimanual normal adnexa normal nontender  Rectovaginal negative Hemoccult negative    Assessment/Plan     Assessment     Problem List Items Addressed This Visit        Genitourinary    Menopausal symptoms       Other    Hormone replacement therapy (HRT)      Other Visit Diagnoses     Post-menopause on HRT (hormone replacement therapy)    -  Primary    Annual physical exam              1. GYN annual well woman exam.   2. Patient doing well will continue estradiol    Plan     1. We will change Paps to every 3 years, will need a brief visit every year to renew hormones    Doyle Garland MD  09/14/2020

## 2020-09-29 ENCOUNTER — HOSPITAL ENCOUNTER (OUTPATIENT)
Dept: PHYSICAL THERAPY | Facility: HOSPITAL | Age: 65
Setting detail: THERAPIES SERIES
Discharge: HOME | End: 2020-09-29
Payer: MEDICARE

## 2020-09-29 ENCOUNTER — OFFICE VISIT (OUTPATIENT)
Dept: FAMILY MEDICINE CLINIC | Age: 65
End: 2020-09-29
Payer: MEDICARE

## 2020-09-29 VITALS
TEMPERATURE: 97.6 F | OXYGEN SATURATION: 98 % | DIASTOLIC BLOOD PRESSURE: 82 MMHG | HEART RATE: 80 BPM | RESPIRATION RATE: 18 BRPM | BODY MASS INDEX: 42.25 KG/M2 | HEIGHT: 64 IN | SYSTOLIC BLOOD PRESSURE: 138 MMHG | WEIGHT: 247.5 LBS

## 2020-09-29 LAB
BILIRUBIN, POC: NEGATIVE
BLOOD URINE, POC: NEGATIVE
CLARITY, POC: CLEAR
COLOR, POC: YELLOW
GLUCOSE URINE, POC: NEGATIVE
KETONES, POC: NEGATIVE
LEUKOCYTE EST, POC: NEGATIVE
NITRITE, POC: NEGATIVE
PH, POC: 7.5
PROTEIN, POC: NEGATIVE
SPECIFIC GRAVITY, POC: 1.02
UROBILINOGEN, POC: 0.2

## 2020-09-29 PROCEDURE — 81002 URINALYSIS NONAUTO W/O SCOPE: CPT | Performed by: NURSE PRACTITIONER

## 2020-09-29 PROCEDURE — G0402 INITIAL PREVENTIVE EXAM: HCPCS | Performed by: NURSE PRACTITIONER

## 2020-09-29 PROCEDURE — 97161 PT EVAL LOW COMPLEX 20 MIN: CPT

## 2020-09-29 PROCEDURE — 97140 MANUAL THERAPY 1/> REGIONS: CPT

## 2020-09-29 RX ORDER — DULOXETIN HYDROCHLORIDE 60 MG/1
60 CAPSULE, DELAYED RELEASE ORAL DAILY
COMMUNITY

## 2020-09-29 ASSESSMENT — PATIENT HEALTH QUESTIONNAIRE - PHQ9
9. THOUGHTS THAT YOU WOULD BE BETTER OFF DEAD, OR OF HURTING YOURSELF: 1
10. IF YOU CHECKED OFF ANY PROBLEMS, HOW DIFFICULT HAVE THESE PROBLEMS MADE IT FOR YOU TO DO YOUR WORK, TAKE CARE OF THINGS AT HOME, OR GET ALONG WITH OTHER PEOPLE: 0
7. TROUBLE CONCENTRATING ON THINGS, SUCH AS READING THE NEWSPAPER OR WATCHING TELEVISION: 1
6. FEELING BAD ABOUT YOURSELF - OR THAT YOU ARE A FAILURE OR HAVE LET YOURSELF OR YOUR FAMILY DOWN: 1
SUM OF ALL RESPONSES TO PHQ9 QUESTIONS 1 & 2: 3
SUM OF ALL RESPONSES TO PHQ QUESTIONS 1-9: 9
5. POOR APPETITE OR OVEREATING: 1
8. MOVING OR SPEAKING SO SLOWLY THAT OTHER PEOPLE COULD HAVE NOTICED. OR THE OPPOSITE, BEING SO FIGETY OR RESTLESS THAT YOU HAVE BEEN MOVING AROUND A LOT MORE THAN USUAL: 0
3. TROUBLE FALLING OR STAYING ASLEEP: 1
4. FEELING TIRED OR HAVING LITTLE ENERGY: 1
1. LITTLE INTEREST OR PLEASURE IN DOING THINGS: 3
SUM OF ALL RESPONSES TO PHQ QUESTIONS 1-9: 9
2. FEELING DOWN, DEPRESSED OR HOPELESS: 0

## 2020-09-29 ASSESSMENT — PAIN DESCRIPTION - ORIENTATION: ORIENTATION: LEFT

## 2020-09-29 ASSESSMENT — COLUMBIA-SUICIDE SEVERITY RATING SCALE - C-SSRS
1. WITHIN THE PAST MONTH, HAVE YOU WISHED YOU WERE DEAD OR WISHED YOU COULD GO TO SLEEP AND NOT WAKE UP?: NO
6. HAVE YOU EVER DONE ANYTHING, STARTED TO DO ANYTHING, OR PREPARED TO DO ANYTHING TO END YOUR LIFE?: NO
2. HAVE YOU ACTUALLY HAD ANY THOUGHTS OF KILLING YOURSELF?: NO

## 2020-09-29 ASSESSMENT — LIFESTYLE VARIABLES: HOW OFTEN DO YOU HAVE A DRINK CONTAINING ALCOHOL: 0

## 2020-09-29 ASSESSMENT — PAIN DESCRIPTION - LOCATION: LOCATION: BACK;HIP;LEG

## 2020-09-29 ASSESSMENT — PAIN SCALES - GENERAL: PAINLEVEL_OUTOF10: 8

## 2020-09-29 NOTE — PATIENT INSTRUCTIONS
The medication list included in this document is our record of what you are currently taking, including any changes that were made at today's visit.  If you find any differences when compared to your medications at home, or have any questions that were not answered at your visit, please contact the office. · Keep a list of your medicines with you. List all of the prescription medicines, nonprescription medicines, supplements, natural remedies, and vitamins that you take. Tell your healthcare providers who treat you about all of the products you are taking. Your provider can provide you with a form to keep track of them. Just ask. · Follow the directions that come with your medicine, including information about food or alcohol. Make sure you know how and when to take your medicine. Do not take more or less than you are supposed to take. · Keep all medicines out of the reach of children. · Store medicines according to the directions on the label. · Monitor yourself. Learn to know how your body reacts to your new medicine and keep track of how it makes you feel before attempting (If your provider has allowed you to do so) to drive or go to work. · Seek emergency medical attention if you think you have used too much of this medicine. An overdose of any prescription medicine can be fatal. Overdose symptoms may include extreme drowsiness, muscle weakness, confusion, cold and clammy skin, pinpoint pupils, shallow breathing, slow heart rate, fainting, or coma. · Don't share prescription medicines with others, even when they seem to have the same symptoms. What may be good for you may be harmful to others. · If you are no longer taking a prescribed medication and you have pills left please take your pills out of their original containers. Mix crushed pills with an undesirable substance, such as cat litter or used coffee grounds.  Put the mixture into a disposable container with a lid, such as an empty margarine tub, or into a sealable bag. Cover up or remove any of your personal information on the empty containers by covering it with black permanent marker or duct tape. Place the sealed container with the mixture, and the empty drug containers, in the trash. · If you use a medication that is in the form of a patch, dispose of used patches by folding them in half so that the sticky sides meet, and then flushing them down a toilet. They should not be placed in the household trash where children or pets can find them. · If you have any questions, ask your provider or pharmacist for more information. · Be sure to keep all appointments for provider visits or tests. We are committed to providing you with the best care possible. In order to help us achieve these goals please remember to bring all medications, herbal products, and over the counter supplements with you to each visit. If your provider has ordered testing for you, please be sure to follow up with our office if you have not received results within 7 days after the testing took place. *If you receive a survey after visiting one of our offices, please take time to share your experience concerning your physician office visit. These surveys are confidential and no health information about you is shared. We are eager to improve for you and we are counting on your feedback to help make that happen. Patient Education        Heart-Healthy Diet: Care Instructions  Your Care Instructions     A heart-healthy diet has lots of vegetables, fruits, nuts, beans, and whole grains, and is low in salt. It limits foods that are high in saturated fat, such as meats, cheeses, and fried foods. It may be hard to change your diet, but even small changes can lower your risk of heart attack and heart disease. Follow-up care is a key part of your treatment and safety. Be sure to make and go to all appointments, and call your doctor if you are having problems.  It's also a good idea to know your test results and keep a list of the medicines you take. How can you care for yourself at home? Watch your portions  Learn what a serving is. A \"serving\" and a \"portion\" are not always the same thing. Make sure that you are not eating larger portions than are recommended. For example, a serving of pasta is ½ cup. A serving size of meat is 2 to 3 ounces. A 3-ounce serving is about the size of a deck of cards. Measure serving sizes until you are good at [place]" them. Keep in mind that restaurants often serve portions that are 2 or 3 times the size of one serving. To keep your energy level up and keep you from feeling hungry, eat often but in smaller portions. Eat only the number of calories you need to stay at a healthy weight. If you need to lose weight, eat fewer calories than your body burns (through exercise and other physical activity). Eat more fruits and vegetables  Eat a variety of fruit and vegetables every day. Dark green, deep orange, red, or yellow fruits and vegetables are especially good for you. Examples include spinach, carrots, peaches, and berries. Keep carrots, celery, and other veggies handy for snacks. Buy fruit that is in season and store it where you can see it so that you will be tempted to eat it. Cook dishes that have a lot of veggies in them, such as stir-fries and soups. Limit saturated and trans fat  Read food labels, and try to avoid saturated and trans fats. They increase your risk of heart disease. Use olive or canola oil when you cook. Bake, broil, grill, or steam foods instead of frying them. Choose lean meats instead of high-fat meats such as hot dogs and sausages. Cut off all visible fat when you prepare meat. Eat fish, skinless poultry, and meat alternatives such as soy products instead of high-fat meats. Soy products, such as tofu, may be especially good for your heart. Choose low-fat or fat-free milk and dairy products.   Eat foods high in fiber  Eat a variety of grain products every day. Include whole-grain foods that have lots of fiber and nutrients. Examples of whole-grain foods include oats, whole wheat bread, and brown rice. Buy whole-grain breads and cereals, instead of white bread or pastries. Limit salt and sodium  Limit how much salt and sodium you eat to help lower your blood pressure. Taste food before you salt it. Add only a little salt when you think you need it. With time, your taste buds will adjust to less salt. Eat fewer snack items, fast foods, and other high-salt, processed foods. Check food labels for the amount of sodium in packaged foods. Choose low-sodium versions of canned goods (such as soups, vegetables, and beans). Limit sugar  Limit drinks and foods with added sugar. These include candy, desserts, and soda pop. Limit alcohol  Limit alcohol to no more than 2 drinks a day for men and 1 drink a day for women. Too much alcohol can cause health problems. When should you call for help? Watch closely for changes in your health, and be sure to contact your doctor if:  You would like help planning heart-healthy meals. Where can you learn more? Go to https://Flexible Medical Systemspepiceweb.healthKinems Learning Games. org and sign in to your Emb[employer]et account. Enter V137 in the mydala box to learn more about \"Heart-Healthy Diet: Care Instructions. \"     If you do not have an account, please click on the \"Sign Up Now\" link. Current as of: August 22, 2019               Content Version: 12.5  © 8905-9330 Healthwise, Incorporated. Care instructions adapted under license by [facility] ([facility]). If you have questions about a medical condition or this instruction, always ask your healthcare professional. [place] [address] any warranty or liability for your use of this information. Patient Education        Learning About How to Make a Home Safe  Making your home safe:  Overview  You can help protect the person in your care by making the home safe. Here are some general tips for how to lower the chance of getting injured in the home. Pad sharp corners on furniture and counter tops. Keep objects that are used often within easy reach. Install handrails around the toilet and in the shower. Use a tub mat to prevent slipping. Use a shower chair or bath bench when the person bathes. Provide good lighting inside and outside the home. Put night-lights in bedrooms, hallways, and bathrooms. Have light at the top and bottom of stairways. Have a first aid kit. It is also important to be aware of safe temperatures in the home. When helping someone bathe, use the back of your hand to test the water to make sure it's not too hot. Lower the temperature setting in the hot water heater to 120°F or lower to avoid burns. And make sure other liquids (such as coffee, tea, or soup) are not too hot. How can you protect from fire and carbon monoxide? Home safety alarms are very important. A smoke alarm can detect small amounts of smoke. This can allow time to escape from a fire. And a carbon monoxide alarm can let people know about this deadly gas before it starts to make them sick. Put smoke alarms: On each level of the home, in the hallway outside sleeping areas, and inside each bedroom. In the center of a ceiling, or on a wall 6 to 12 inches from the ceiling. This is where smoke goes first. Avoid places near doors, windows, or air ducts. Put a carbon monoxide alarm in the hallway outside of the bedrooms in each sleeping area of the house. The alarm should be placed high on the wall. Make sure that the alarm can't be covered up by furniture or drapes. Test alarms regularly by pressing the test button. Replace non-lithium batteries in alarms twice a year. Have a plan for getting out of the home if there is a fire. Practice by having a fire drill. Keep a fire extinguisher in the kitchen. What can you do to prevent falls?   You can help prevent falls by keeping rooms uncluttered, with clear walkways around furniture. Keep electrical cords off the floor, and remove throw rugs to prevent tripping. If there are steps in the home, make sure they all have handrails, and always use the handrails. Don't leave items on the steps, and be sure to fix any that are loose, broken, or uneven. How can you increase safety for people with dementia? If you are caring for someone who has dementia, you may need to make some extra changes to create a safe home. People with dementia have a loss of mental skills, such as memory, problem solving, and learning. So things that might not have been a danger to them before can cause safety problems now. Here are some things to consider:  Don't move furniture around. The person may become confused. Use locks on doors and cupboards. Lock up knives, scissors, medicines, cleaning supplies, and other dangerous items. Use hidden switches or controls for the stove, thermostat, water heater, and other appliances. If your loved one is still cooking, think about whether that is safe. It may be okay with some help, depending on your loved one's condition. But for people who have memory or thinking problems, it's best to avoid any activities that might not be safe. If the person tends to wander or to try to leave the home, install motion-sensor lights on all doors and windows. Have emergency numbers in a central area near a phone. Include 911 and numbers for the doctor and family members. Get medical alert jewelry for the person so you can be contacted if he or she wanders away. If possible, provide a safe place for wandering, such as an enclosed yard or garden. Where can you learn more? Go to https://chpepiceweb.VIS Research. org and sign in to your Change.org account. Enter A930 in the UGOBE box to learn more about \"Learning About How to Make a Home Safe. \"     If you do not have an account, please click on the \"Sign Up Now\" link. Current as of: December 9, 2019               Content Version: 12.5  © 2006-2020 [employer]. Care instructions adapted under license by [facility] ([facility]). If you have questions about a medical condition or this instruction, always ask your healthcare professional. [address] any warranty or liability for your use of this information. Patient Education        Learning About Physical Activity  What is physical activity? Physical activity is any kind of activity that gets your body moving. The types of physical activity that can help you get fit and stay healthy include:  Aerobic or \"cardio\" activities that make your heart beat faster and make you breathe harder, such as brisk walking, riding a bike, or running. Aerobic activities strengthen your heart and lungs and build up your endurance. Strength training activities that make your muscles work against, or \"resist,\" something, such as lifting weights or doing push-ups. These activities help tone and strengthen your muscles. Stretches that allow you to move your joints and muscles through their full range of motion. Stretching helps you be more flexible and avoid injury. What are the benefits of physical activity? Being active is one of the best things you can do for your health. It helps you to:  Feel stronger and have more energy to do all the things you like to do. Focus better at school or work. Feel, think, and sleep better. Reach and stay at a healthy weight. Lose fat and build lean muscle. Lower your risk for serious health problems. Keep your bones, muscles, and joints strong. How can you make physical activity part of your life? Get at least 30 minutes of exercise on most days of the week. Walking is a good choice. You also may want to do other activities, such as running, swimming, cycling, or playing tennis or team sports.   Pick activities that you like--ones that make your heart beat faster, your muscles stronger, and your muscles and joints more flexible. If you find more than one thing you like doing, do them all. You don't have to do the same thing every day. Get your heart pumping every day. Any activity that makes your heart beat faster and keeps it at that rate for a while counts. Here are some great ways to get your heart beating faster:  Go for a brisk walk, run, or bike ride. Go for a hike or swim. Go in-line skating. Play a game of touch football, basketball, or soccer. Ride a bike. Play tennis or racquetball. Climb stairs. Even some household chores can be aerobic--just do them at a faster pace. Vacuuming, raking or mowing the lawn, sweeping the garage, and washing and waxing the car all can help get your heart rate up. Strengthen your muscles during the week. You don't have to lift heavy weights or grow big, bulky muscles to get stronger. Doing a few simple activities that make your muscles work against, or \"resist,\" something can help you get stronger. For example, you can:  Do push-ups or sit-ups, which use your own body weight as resistance. Lift weights or dumbbells or use stretch bands at home or in a gym or community center. Stretch your muscles often. Stretching will help you as you become more active. It can help you stay flexible, loosen tight muscles, and avoid injury. It can also help improve your balance and posture and can be a great way to relax. Be sure to stretch the muscles you'll be using when you work out. It's best to warm your muscles slightly before you stretch them. Walk or do some other light aerobic activity for a few minutes, and then start stretching. When you stretch your muscles:  Do it slowly. Stretching is not about going fast or making sudden movements. Don't push or bounce during a stretch. Hold each stretch for at least 15 to 30 seconds, if you can. You should feel a stretch in the muscle, but not pain.   Breathe out as you do the stretch. Then breathe in as you hold the stretch. Don't hold your breath. If you're worried about how more activity might affect your health, have a checkup before you start. Follow any special advice your doctor gives you for getting a smart start. Where can you learn more? Go to https://chpepiceweb.Crowdvance. org and sign in to your [employer] account. Enter T244 in the [facility] box to learn more about \"Learning About Physical Activity. \"     If you do not have an account, please click on the \"Sign Up Now\" link. Current as of: January 16, 2020               Content Version: 12.5  © 3553-1660 Healthwise, [employer]. Care instructions adapted under license by [facility] ([facility]). If you have questions about a medical condition or this instruction, always ask your healthcare professional. [address] any warranty or liability for your use of this information. Personalized Preventive Plan for Dinh Trent - 9/29/2020  Medicare offers a range of preventive health benefits. Some of the tests and screenings are paid in full while other may be subject to a deductible, co-insurance, and/or copay. Some of these benefits include a comprehensive review of your medical history including lifestyle, illnesses that may run in your family, and various assessments and screenings as appropriate. After reviewing your medical record and screening and assessments performed today your provider may have ordered immunizations, labs, imaging, and/or referrals for you. A list of these orders (if applicable) as well as your Preventive Care list are included within your After Visit Summary for your review. Other Preventive Recommendations:    A preventive eye exam performed by an eye specialist is recommended every 1-2 years to screen for glaucoma; cataracts, macular degeneration, and other eye disorders. A preventive dental visit is recommended every 6 months.   Try to get

## 2020-09-29 NOTE — PROGRESS NOTES
Physical Therapy  Initial Assessment  Date: 2020  Patient Name: Dinh Trent  MRN: [id number]  : 1955     Treatment Diagnosis: Back pain, LLE pain    Subjective   General  Chart Reviewed: Yes  Referring Practitioner: JO-ANN Govea  Referral Date : 20  Diagnosis: R leg pain, L leg pain  PT Visit Information  PT Insurance Information: [facility] [address], [employer], [facility]  Subjective  Subjective: Pt presents with complaints of L lumbar pain and hip pain that radiates down the back of her leg to her knee. Prolonged walking, especially up hill or stairs, increases pain. She also has difficulty getting in / out of the shower. Pt also has some difficulty sleeping at night. Pain Screening  Patient Currently in Pain: Yes  Pain Assessment  Pain Assessment: 0-10  Pain Level: 8(the past several weeks pain has been 8/10.)  Pain Location: Back;Hip;Leg  Pain Orientation: Left  Vital Signs  Patient Currently in Pain: Yes    Orientation  Orientation  Overall Orientation Status: Within Normal Limits    Social/Functional History  Social/Functional History  Additional Comments: cares for her granddaughter 3 days per week    Objective     Observation/Palpation  Palpation: grade II-III tenderness distal L HS and ITB  Observation: increased lumbar lordosis, mild forward trunk lean    Spine  Lumbar: flexion: WNL, ext: 0-10 deg w/ pain    Strength RLE  Strength RLE: WNL  Strength LLE  Strength LLE: WNL     Additional Measures  Special Tests: LEFS: 13/80.  + L sitting slump test.  (-) L hip / SI compression.   LLE LAD decreases symptoms         Manual therapy:  LLE LAD, STM distal HS / ITB (rolling)  Exercises to be added at next visit:  Exercise 1: Nustep (warm up) 8', L5  Exercise 2: Seated HS stretch w/ belt 5x20\"  Exercise 3: Seated mid rows: GTB 3x10  Exercise 4: Hip abd / add (BTB/ball) 3x10  Exercise 5: HS curls: OTB 3x10  Exercise 6: LTR x30  Exercise 7: Piriformis stretch 5x20\"  Exercise 8: Ab brace with march 3x10       Assessment   Conditions Requiring Skilled Therapeutic Intervention  Assessment: Pt will benefit from skilled PT to address lumbar pain and LLE pain and associated deficits to restore optimal functional level. Treatment Diagnosis: Back pain, LLE pain  Prognosis: Good  Decision Making: Low Complexity  REQUIRES PT FOLLOW UP: Yes  Activity Tolerance  Activity Tolerance: Patient Tolerated treatment well         Plan   Plan  Times per week: 2x/week  Plan weeks: 6-8 weeks  Current Treatment Recommendations: Strengthening, Neuromuscular Re-education, Home Exercise Program, Manual Therapy - Soft Tissue Mobilization, ROM, Patient/Caregiver Education & Training, Modalities      Goals  Short term goals  Time Frame for Short term goals: 3 weeks  Short term goal 1: Pt to be I with HEP  Short term goal 2: Pt to perform daily activities with pain of 5/10 or less. Short term goal 3: Pt to be educated on posture correction and proper lumbar mechanics. Long term goals  Time Frame for Long term goals : 6-8 weeks  Long term goal 1: LEFS score to improve to 50/80 indicating improved function  Long term goal 2: Pt to perform daily activities with pain of less than 3/10  Long term goal 3: Pt to demonstrate (-) LLE sitting slump test.  Long term goal 4: Tenderness to palpation to reduce to trace or less at LLE. Long term goal 5: Pt to be transitioned to an advanced self care [facility], PT     Certification of Medical Necessity: It will be understood that this treatment plan is certified medically necessary by the documenting therapist and referring physician mentioned in this report. Unless the physician indicated otherwise through written correspondence with our office, all further referrals will act as certification of medical necessity on this treatment plan.       Thank you for this referral.  If you have questions regarding this plan of care, please call [phone].           Revisions to this plan (optional):                     Please sign and return this plan to:   FAX: [phone]      Signature:                                 Date:

## 2020-09-29 NOTE — PROGRESS NOTES
Chief Complaint   Patient presents with    Medicare AWV       Have you seen any other physician or provider since your last visit no    Have you had any other diagnostic tests since your last visit? no    Have you changed or stopped any medications since your last visit? no     I have recommended that this patient have a mammogram but she declines at this time. I have discussed the risks and benefits of this examination with her. The patient verbalizes understanding.        She states she already had the mammogram done at [facility] in [facility] by Dr. Lorelei Dominguez

## 2020-09-29 NOTE — PROGRESS NOTES
Medicare Annual Wellness Visit  Name: Shayy Li Date: 2020   MRN: [id number] Sex: Female   Age: 72 y.o. Ethnicity: Non-/Non    : 1955 Race: Jef Chaparro is here for Medicare AWV    Screenings for behavioral, psychosocial and functional/safety risks, and cognitive dysfunction are all negative except as indicated below. These results, as well as other patient data from the [address] form, are documented in Flowsheets linked to this Encounter. No Known Allergies      Prior to Visit Medications    Medication Sig Taking? Authorizing Provider   DULoxetine (CYMBALTA) 60 MG extended release capsule Take 60 mg by mouth daily Yes Historical Provider, MD   bisacodyl (DULCOLAX) 5 MG EC tablet Clear liquid diet day prior to colonoscopy.  2 at 3pm and 2 at 7pm. Yes Historical Provider, MD   Magnesium Gluconate 500 (27 Mg) MG TABS tablet Take by mouth Yes Historical Provider, MD   pregabalin (LYRICA) 75 MG capsule take 1 capsule by mouth twice a day Yes Historical Provider, MD   Ascorbic Acid (VITAMIN C) 250 MG tablet Take 250 mg by mouth Yes Historical Provider, MD   losartan (COZAAR) 50 MG tablet Take by mouth  Yes Historical Provider, MD   nabumetone (RELAFEN) 750 MG tablet Take by mouth  Yes Historical Provider, MD   Probiotic Product (PROBIOTIC-10) CAPS Take by mouth Yes Historical Provider, MD   Omega-3 Fatty Acids (FISH OIL) 1000 MG CAPS Take 3,000 mg by mouth 3 times daily Yes Historical Provider, MD   zolpidem (AMBIEN) 10 MG tablet take 1 tablet by mouth at bedtime if needed Yes Historical Provider, MD   RHOFADE 1 % CREA  Yes Historical Provider, MD   vitamin D (ERGOCALCIFEROL) 89193 units CAPS capsule  Yes Historical Provider, MD   fluticasone (FLONASE) 50 MCG/ACT nasal spray 1 spray by Nasal route daily Yes JO-ANN Blanco NP   desloratadine (CLARINEX) 5 MG tablet take 1 tablet by mouth once daily Yes Anice Challenger,    desonide (Irais Curtis) 0.05 % ointment  Yes Historical Provider, MD   estradiol (ESTRACE) 1 MG tablet  Yes Historical Provider, MD   Multiple Vitamins-Minerals (MULTIVITAL PO) Take by mouth daily Yes Historical Provider, MD   hydrochlorothiazide (HYDRODIURIL) 25 MG tablet Take 25 mg by mouth daily Yes Historical Provider, MD   vitamin B-12 (CYANOCOBALAMIN) 1000 MCG tablet Take 1,000 mcg by mouth daily Yes Historical Provider, MD   estrogens conjugated, synthetic B, (ENJUVIA) 0.3 MG tablet Take 0.3 mg by mouth daily Yes Historical Provider, MD   celecoxib (CELEBREX) 200 MG capsule   Historical Provider, MD   clindamycin (CLEOCIN T) 1 % lotion   Historical Provider, MD         Past Medical History:   Diagnosis Date    Arthritis        Past Surgical History:   Procedure Laterality Date     SECTION      x 2    COLONOSCOPY  2010    normal findings    COLONOSCOPY N/A 2020    COLORECTAL CANCER SCREENING, NOT HIGH RISK performed by Matt Talamantes MD at [facility] END[facility]    HYSTERECTOMY           Family History   Problem Relation Age of Onset    Cancer Mother         skin ca    High Blood Pressure Mother     Cancer Brother     High Blood Pressure Brother        CareTeam (Including outside providers/suppliers regularly involved in providing care):   Patient Care Team:  JO-ANN Jeffers - CNP as PCP - General    Wt Readings from Last 3 Encounters:   20 247 lb 8 oz (112.3 kg)   20 225 lb (102.1 kg)   19 213 lb (96.6 kg)     Vitals:    20 0930   BP: 138/82   Pulse: 80   Resp: 18   Temp: 97.6 °F (36.4 °C)   TempSrc: Temporal   SpO2: 98%   Weight: 247 lb 8 oz (112.3 kg)   Height: 5' 4\" (1.626 m)     Body mass index is 42.48 kg/m². Based upon direct observation of the patient, evaluation of cognition reveals recent and remote memory intact.     General Appearance: alert and oriented to person, place and time, well developed and well- nourished, in no acute distress  Skin: warm and dry, no rash or erythema  Head: normocephalic and atraumatic  Eyes: pupils equal, round, and reactive to light, extraocular eye movements intact, conjunctivae normal  ENT: tympanic membrane, external ear and ear canal normal bilaterally, nose without deformity, nasal mucosa and turbinates normal without polyps  Neck: supple and non-tender without mass, no thyromegaly or thyroid nodules, no cervical lymphadenopathy  Pulmonary/Chest: clear to auscultation bilaterally- no wheezes, rales or rhonchi, normal air movement, no respiratory distress  Cardiovascular: normal rate, regular rhythm, normal S1 and S2, no murmurs, rubs, clicks, or gallops, distal pulses intact, no carotid bruits  Abdomen: soft, non-tender, non-distended, normal bowel sounds, no masses or organomegaly  Extremities: no cyanosis, clubbing or edema  Musculoskeletal: normal range of motion, no joint swelling, deformity, positive for tenderness  Neurologic: reflexes normal and symmetric, no cranial nerve deficit, gait, coordination and speech normal    Patient's complete Health Risk Assessment and screening values have been reviewed and are found in Flowsheets. The following problems were reviewed today and where indicated follow up appointments were made and/or referrals ordered. Positive Risk Factor Screenings with Interventions:     Depression:  PHQ-2 Score: 3  PHQ-9 Total Score: 9    Severity:1-4 = minimal depression, 5-9 = mild depression, 10-14 = moderate depression, 15-19 = moderately severe depression, 20-27 = severe depression  Depression Interventions:  · Relaxation techniques discussed    General Health:  General  In general, how would you say your health is?: Good  In the past 7 days, have you experienced any of the following?  New or Increased Pain, New or Increased Fatigue, Loneliness, Social Isolation, Stress or Anger?: (!) New or Increased Pain  Do you get the social and emotional support that you need?: Yes  Do you have a Living Will?: (!) No  General 1955    HIV screen  03/22/1970    DTaP/Tdap/Td vaccine (1 - Tdap) 03/22/1974    Cervical cancer screen  03/22/1976    Shingles Vaccine (1 of 2) 03/22/2005    DEXA (modify frequency per FRAX score)  03/22/2010    Pneumococcal 65+ years Vaccine (1 of 1 - PPSV23) 03/22/2020    Annual Wellness Visit (AWV)  07/22/2020    Breast cancer screen  08/15/2020    Flu vaccine (1) 09/01/2020    Potassium monitoring  07/16/2021    Creatinine monitoring  07/16/2021    Diabetes screen  07/16/2023    Lipid screen  07/16/2025    Colon cancer screen colonoscopy  07/23/2030    Hepatitis A vaccine  Aged Out    Hepatitis B vaccine  Aged Out    Hib vaccine  Aged Out    Meningococcal (ACWY) vaccine  Aged Out     Recommendations for [employer] Due: see orders and patient instructions/AVS.  . Recommended screening schedule for the next 5-10 years is provided to the patient in written form: see Patient Instructions/AVS.    Mavis Lopez was seen today for medicare awv. Diagnoses and all orders for this visit:    Thyroid nodule  -     US Thyroid; Future  -     TSH without Reflex; Future  -     T4, FREE; Future    Fatigue, unspecified type  -     Lipid Panel; Future  -     Comprehensive Metabolic Panel; Future  -     CBC Auto Differential; Future  -     Vitamin B12; Future  -     TSH without Reflex; Future  -     T4, FREE; Future    Screening for cholesterol level    Vitamin D deficiency  -     Vitamin D 25 Hydroxy; Future    Screening for diabetes mellitus          Controlled Substance Monitoring:    Acute and Chronic Pain Monitoring:   RX Monitoring 9/29/2020   Attestation The Prescription Monitoring Report for this patient was reviewed today.

## 2020-09-29 NOTE — PROGRESS NOTES
Health Maintenance Due This Visit   Colonoscopy No   Mammogram Yes   Annual Wellness Visit Yes   Microalbumin No   HgbA1C No   Diabetic Eye Exam No    House Bill One Due This Visit   SKYLER Yes   UDS No   Contract No

## 2020-09-30 RX ORDER — FLUCONAZOLE 150 MG/1
TABLET ORAL
Qty: 2 TABLET | Refills: 0 | Status: SHIPPED | OUTPATIENT
Start: 2020-09-30 | End: 2021-05-27

## 2020-10-01 ENCOUNTER — HOSPITAL ENCOUNTER (OUTPATIENT)
Dept: ULTRASOUND IMAGING | Facility: HOSPITAL | Age: 65
Discharge: HOME | End: 2020-10-01
Payer: MEDICARE

## 2020-10-01 ENCOUNTER — HOSPITAL ENCOUNTER (OUTPATIENT)
Dept: PHYSICAL THERAPY | Facility: HOSPITAL | Age: 65
Setting detail: THERAPIES SERIES
Discharge: HOME | End: 2020-10-01
Payer: MEDICARE

## 2020-10-01 PROCEDURE — 97110 THERAPEUTIC EXERCISES: CPT

## 2020-10-01 PROCEDURE — 76536 US EXAM OF HEAD AND NECK: CPT

## 2020-10-01 PROCEDURE — 97140 MANUAL THERAPY 1/> REGIONS: CPT

## 2020-10-02 ENCOUNTER — HOSPITAL ENCOUNTER (OUTPATIENT)
Facility: HOSPITAL | Age: 65
Discharge: HOME | End: 2020-10-02
Payer: MEDICARE

## 2020-10-02 LAB
A/G RATIO: 1.8 (ref 0.8–2)
ALBUMIN SERPL-MCNC: 4.3 G/DL (ref 3.4–4.8)
ALP BLD-CCNC: 67 U/L (ref 25–100)
ALT SERPL-CCNC: 23 U/L (ref 4–36)
ANION GAP SERPL CALCULATED.3IONS-SCNC: 10 MMOL/L (ref 3–16)
AST SERPL-CCNC: 21 U/L (ref 8–33)
BASOPHILS ABSOLUTE: 0 K/UL (ref 0–0.1)
BASOPHILS RELATIVE PERCENT: 0.6 %
BILIRUB SERPL-MCNC: 0.3 MG/DL (ref 0.3–1.2)
BUN BLDV-MCNC: 20 MG/DL (ref 6–20)
CALCIUM SERPL-MCNC: 9.5 MG/DL (ref 8.5–10.5)
CHLORIDE BLD-SCNC: 101 MMOL/L (ref 98–107)
CHOLESTEROL, TOTAL: 198 MG/DL (ref 0–200)
CO2: 28 MMOL/L (ref 20–30)
CREAT SERPL-MCNC: 0.7 MG/DL (ref 0.4–1.2)
EOSINOPHILS ABSOLUTE: 0.1 K/UL (ref 0–0.4)
EOSINOPHILS RELATIVE PERCENT: 2.2 %
GFR AFRICAN AMERICAN: >59
GFR NON-AFRICAN AMERICAN: >60
GLOBULIN: 2.4 G/DL
GLUCOSE BLD-MCNC: 100 MG/DL (ref 74–106)
HCT VFR BLD CALC: 42.7 % (ref 37–47)
HDLC SERPL-MCNC: 50 MG/DL (ref 40–60)
HEMOGLOBIN: 13.4 G/DL (ref 11.5–16.5)
IMMATURE GRANULOCYTES #: 0 K/UL
IMMATURE GRANULOCYTES %: 0.3 % (ref 0–5)
LDL CHOLESTEROL CALCULATED: 100 MG/DL
LYMPHOCYTES ABSOLUTE: 2.1 K/UL (ref 1.5–4)
LYMPHOCYTES RELATIVE PERCENT: 32.4 %
MCH RBC QN AUTO: 27.7 PG (ref 27–32)
MCHC RBC AUTO-ENTMCNC: 31.4 G/DL (ref 31–35)
MCV RBC AUTO: 88.4 FL (ref 80–100)
MONOCYTES ABSOLUTE: 0.5 K/UL (ref 0.2–0.8)
MONOCYTES RELATIVE PERCENT: 8.2 %
NEUTROPHILS ABSOLUTE: 3.6 K/UL (ref 2–7.5)
NEUTROPHILS RELATIVE PERCENT: 56.3 %
PDW BLD-RTO: 13.6 % (ref 11–16)
PLATELET # BLD: 214 K/UL (ref 150–400)
PMV BLD AUTO: 10.6 FL (ref 6–10)
POTASSIUM SERPL-SCNC: 4.2 MMOL/L (ref 3.4–5.1)
RBC # BLD: 4.83 M/UL (ref 3.8–5.8)
SODIUM BLD-SCNC: 139 MMOL/L (ref 136–145)
T4 FREE: 0.96 NG/DL (ref 0.89–1.76)
TOTAL PROTEIN: 6.7 G/DL (ref 6.4–8.3)
TRIGL SERPL-MCNC: 242 MG/DL (ref 0–249)
TSH SERPL DL<=0.05 MIU/L-ACNC: 5.05 UIU/ML (ref 0.35–5.5)
VITAMIN B-12: 1698 PG/ML (ref 211–911)
VITAMIN D 25-HYDROXY: 36.8 (ref 32–100)
VLDLC SERPL CALC-MCNC: 48 MG/DL
WBC # BLD: 6.4 K/UL (ref 4–11)

## 2020-10-02 PROCEDURE — 84439 ASSAY OF FREE THYROXINE: CPT

## 2020-10-02 PROCEDURE — 84443 ASSAY THYROID STIM HORMONE: CPT

## 2020-10-02 PROCEDURE — 82306 VITAMIN D 25 HYDROXY: CPT

## 2020-10-02 PROCEDURE — 36415 COLL VENOUS BLD VENIPUNCTURE: CPT

## 2020-10-02 PROCEDURE — 80053 COMPREHEN METABOLIC PANEL: CPT

## 2020-10-02 PROCEDURE — 85025 COMPLETE CBC W/AUTO DIFF WBC: CPT

## 2020-10-02 PROCEDURE — 80061 LIPID PANEL: CPT

## 2020-10-02 PROCEDURE — 82607 VITAMIN B-12: CPT

## 2020-10-06 ENCOUNTER — HOSPITAL ENCOUNTER (OUTPATIENT)
Dept: PHYSICAL THERAPY | Facility: HOSPITAL | Age: 65
Setting detail: THERAPIES SERIES
Discharge: HOME | End: 2020-10-06
Payer: MEDICARE

## 2020-10-06 PROCEDURE — 97110 THERAPEUTIC EXERCISES: CPT

## 2020-10-06 PROCEDURE — 97140 MANUAL THERAPY 1/> REGIONS: CPT

## 2020-10-06 NOTE — FLOWSHEET NOTE
Physical Therapy Daily Treatment Note   Date:  10/06/20    TIme In:    4690                Time Out:  [address]    Patient Name:  Tia Mann    :  1955  MRN: [id number]    Restrictions/Precautions:    Pertinent Medical History:  Medical/Treatment Diagnosis Information:  ·   Back pain, LLE pain     Insurance/Certification information:    [employer], [employer] supplement, [place] Medicaid  Physician Information:    JO-ANN Krause  Plan of care signed (Y/N):    Visit# / total visits:     3/    G-Code (if applicable):      Date / Visit # G-Code Applied:         Progress Note: []  Yes  [x]  No  Next due by: Visit #10      Pain level:   0/10  Subjective:  Pt reports she can already see some improvement since starting PT.       Objective:   Observation:    Test measurements:     Palpation:    Exercises:  Exercise Resistance/Repetitions Other comments   Nustep 8', L5 6   Seated HS stretch w/ belt 5x20\" 6   Seated mid rows GTB 3x10 6   Hip abd / add  BTB / ball 3x10 6   HS curls OTB 3x10 6   LTR x30 6   Piriformis stretch 5x20\" 6   Ab brace with march 3x10 6                         Other Therapeutic Activities:      Manual Treatments:  Distal L HS STM, LLE SAD, ITB rolling    Modalities:        Timed Code Treatment Minutes:  65      Total Treatment Minutes: 85    Treatment/Activity Tolerance:  [x] Patient tolerated treatment well [] Patient limited by fatigue  [] Patient limited by pain  [] Patient limited by other medical complications  [] Other:       Pain after treatment:      0/10    Prognosis: [x] Good [] Fair  [] Poor    Patient Requires Follow-up: [x] Yes  [] No    Plan:   [x] Continue per plan of care [] Alter current plan (see comments)  [] Plan of care initiated [] Hold pending MD visit [] Discharge    Plan for Next Session:        Electronically signed by:  Farida Velazquez PT

## 2020-10-06 NOTE — FLOWSHEET NOTE
Physical Therapy Daily Treatment Note   Date:  10/1/2020    TIme In:   1503                   Time Out:  1600    Patient Name:  Hyacinth Mcpherson    :  1955  MRN: [id number]    Restrictions/Precautions:    Pertinent Medical History:  Medical/Treatment Diagnosis Information:  ·   Back pain, LLE pain  ·    Insurance/Certification information:    [facility] [employer], [employer] supplement, [place] Medicaid  Physician Information:    JO-ANN Suggs  Plan of care signed (Y/N):    Visit# / total visits:     2/    G-Code (if applicable):      Date / Visit # G-Code Applied:         Progress Note: []  Yes  [x]  No  Next due by: Visit #10      Pain level:   0/10  Subjective:  Pt reports no new complaints since IE. She saw her arthritis MD and was given some recommendations for medication changes, etc.      Objective:   Observation:    Test measurements:     Palpation:    Exercises:  Exercise Resistance/Repetitions Other comments   Nustep 8', L5 1   Seated HS stretch w/ belt 5x20\" 1   Seated mid rows GTB 3x10 1   Hip abd / add  BTB / ball 3x10 1   HS curls OTB 3x10 1   LTR x30 1   Piriformis stretch 5x20\" 1   Ab brace with march 3x10 1                         Other Therapeutic Activities:      Manual Treatments:  Distal L HS STM, LLE SAD, ITB rolling    Modalities:        Timed Code Treatment Minutes:  55      Total Treatment Minutes:  55    Treatment/Activity Tolerance:  [x] Patient tolerated treatment well [] Patient limited by fatigue  [] Patient limited by pain  [] Patient limited by other medical complications  [x] Other: Pt responded well to manual therapy with decrease muscle guarding; tenderness and muscle guarding noted primarily at distal HS region. Pt was educated in and issued a written HEP today.       Pain after treatment:      0/10    Prognosis: [x] Good [] Fair  [] Poor    Patient Requires Follow-up: [x] Yes  [] No    Plan:   [x] Continue per plan of care [] Alter current plan (see comments)  [] Plan

## 2020-10-07 ENCOUNTER — TELEPHONE (OUTPATIENT)
Dept: FAMILY MEDICINE CLINIC | Age: 65
End: 2020-10-07

## 2020-10-07 NOTE — TELEPHONE ENCOUNTER
Patient's referral, along with all pertinent medical records faxed to the attention of Dr. Quita Murphy (Endocrin) on 10/07/20, they will contact the patient and our office with appointment date/time/location.

## 2020-10-08 ENCOUNTER — HOSPITAL ENCOUNTER (OUTPATIENT)
Dept: PHYSICAL THERAPY | Facility: HOSPITAL | Age: 65
Setting detail: THERAPIES SERIES
Discharge: HOME | End: 2020-10-08
Payer: MEDICARE

## 2020-10-08 PROCEDURE — 97110 THERAPEUTIC EXERCISES: CPT

## 2020-10-08 PROCEDURE — 97140 MANUAL THERAPY 1/> REGIONS: CPT

## 2020-10-09 ENCOUNTER — NURSE ONLY (OUTPATIENT)
Dept: PRIMARY CARE CLINIC | Age: 65
End: 2020-10-09
Payer: MEDICARE

## 2020-10-09 PROCEDURE — 90688 IIV4 VACCINE SPLT 0.5 ML IM: CPT | Performed by: NURSE PRACTITIONER

## 2020-10-09 PROCEDURE — G0008 ADMIN INFLUENZA VIRUS VAC: HCPCS | Performed by: NURSE PRACTITIONER

## 2020-10-09 NOTE — PROGRESS NOTES
Chief Complaint   Patient presents with    Injections       Vaccine Information Sheet, \"Influenza - Inactivated\"  given to Dereje Nelson, or parent/legal guardian of  Dereje Nelson and verbalized understanding. Patient responses:    Have you ever had a reaction to a flu vaccine? No  Do you have any current illness? No  Have you ever had [place] Syndrome? No  Do you have a serious allergy to any of the follow: Neomycin, Polymyxin, Thimerosal, eggs or egg products? No    Flu vaccine given per order. Please see immunization tab. Risks and benefits explained. Current VIS given.

## 2020-10-12 NOTE — FLOWSHEET NOTE
Physical Therapy Daily Treatment Note   Date:  10/8/20    TIme In:   935                Time Out:  [id number]    Patient Name:  Soo Durán    :  1955  MRN: [id number]    Restrictions/Precautions:    Pertinent Medical History:  Medical/Treatment Diagnosis Information:  ·   Back pain, LLE pain     Insurance/Certification information:    [employer], [employer] supplement, [place] Medicaid  Physician Information:    JO-ANN Rodríguez  Plan of care signed (Y/N):    Visit# / total visits:     4/    G-Code (if applicable):      Date / Visit # G-Code Applied:         Progress Note: []  Yes  [x]  No  Next due by: Visit #10      Pain level:   \"sore\"/10  Subjective:  Pt reports she is sore today but can see improvement with her mobility. Objective:   Observation:    Test measurements:     Palpation:    Exercises:  Exercise Resistance/Repetitions Other comments   Nustep 8', L5 8   Seated HS stretch w/ belt 5x20\" 8   Seated mid rows GTB 3x10 8   Hip abd / add  BTB / ball 3x10 8   HS curls OTB 3x10 8   LTR x30 8   Piriformis stretch 5x20\" 8   Ab brace with march 3x10 8                         Other Therapeutic Activities:      Manual Treatments:  Distal L HS STM, LLE SAD, ITB rolling B    Modalities:        Timed Code Treatment Minutes:  65      Total Treatment Minutes: 85    Treatment/Activity Tolerance:  [x] Patient tolerated treatment well [] Patient limited by fatigue  [] Patient limited by pain  [] Patient limited by other medical complications  [x] Other: Pt had decrease in L distal HS tightness with STM today.         Pain after treatment:      0/10 \"sore\"    Prognosis: [x] Good [] Fair  [] Poor    Patient Requires Follow-up: [x] Yes  [] No    Plan:   [x] Continue per plan of care [] Alter current plan (see comments)  [] Plan of care initiated [] Hold pending MD visit [] Discharge    Plan for Next Session:        Electronically signed by:  Coleridge Landau, PT

## 2020-10-13 ENCOUNTER — HOSPITAL ENCOUNTER (OUTPATIENT)
Dept: PHYSICAL THERAPY | Facility: HOSPITAL | Age: 65
Setting detail: THERAPIES SERIES
Discharge: HOME | End: 2020-10-13
Payer: MEDICARE

## 2020-10-13 PROCEDURE — 97140 MANUAL THERAPY 1/> REGIONS: CPT

## 2020-10-13 PROCEDURE — 97110 THERAPEUTIC EXERCISES: CPT

## 2020-10-13 NOTE — FLOWSHEET NOTE
Physical Therapy Daily Treatment Note   Date:  10/13/20    TIme In:  1034                 Time Out:  6721    Patient Name:  Vineet Mckeon    :  1955  MRN: [id number]    Restrictions/Precautions:    Pertinent Medical History:  Medical/Treatment Diagnosis Information:  ·   Back pain, LLE pain     Insurance/Certification information:    [facility], [address], [place] Medicaid  Physician Information:    JO-ANN Ramon  Plan of care signed (Y/N):    Visit# / total visits:     5/    G-Code (if applicable):      Date / Visit # G-Code Applied:         Progress Note: []  Yes  [x]  No  Next due by: Visit #10      Pain level:   \"sore\"/10  Subjective:  Pt reports she rode in a car for a prolonged period Friday evening which caused increase pain in her back. Objective:   Observation:    Test measurements:     Palpation:    Exercises:  Exercise Resistance/Repetitions Other comments   Nustep 8', L5 13   Seated HS stretch w/ belt 5x20\" 13   Seated mid rows GTB 3x10 13   Hip abd / add  BTB / ball 3x10 13   HS curls OTB 3x10 13   LTR x30 13   Piriformis stretch 5x20\" 13   Ab brace with march 3x10 13                         Other Therapeutic Activities:      Manual Treatments:  Distal L HS STM, LLE SAD, ITB rolling     Modalities:        Timed Code Treatment Minutes:  70      Total Treatment Minutes: 75    Treatment/Activity Tolerance:  [x] Patient tolerated treatment well [] Patient limited by fatigue  [] Patient limited by pain  [] Patient limited by other medical complications  [x] Other: Pt demonstrated increase in L distal HS tenderness today but responded well to manual therapy with decrease in pain / tenderness. She is responding well to current POC with overall decrease in pain and subjective reports of improved mobility with daily activities.           Pain after treatment:      0/10 \"sore\"    Prognosis: [x] Good [] Fair  [] Poor    Patient Requires Follow-up: [x] Yes  [] No    Plan:   [x] Continue per plan of care [] Alter current plan (see comments)  [] Plan of care initiated [] Hold pending MD visit [] Discharge    Plan for Next Session:        Electronically signed by:  Rell Mittal PT

## 2020-10-15 ENCOUNTER — HOSPITAL ENCOUNTER (OUTPATIENT)
Dept: PHYSICAL THERAPY | Facility: HOSPITAL | Age: 65
Setting detail: THERAPIES SERIES
Discharge: HOME | End: 2020-10-15
Payer: MEDICARE

## 2020-10-15 PROCEDURE — 97140 MANUAL THERAPY 1/> REGIONS: CPT

## 2020-10-15 PROCEDURE — 97110 THERAPEUTIC EXERCISES: CPT

## 2020-10-15 NOTE — FLOWSHEET NOTE
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 He is on study ADVL 1513 Entinostat.     He has not missed any doses of medication. Medication is well tolerated. He had his follow up chest CT suggested by Dr. Rousseau to investigate nature of lung nodule.  He has no respiratory symptoms. Mom thinks he has been more \"nasal\" this week.  Goe is doing well. He continues to have very mild intermittent headaches. He has been drinking well. No rashes related to medication. He rolled off the bed early [place] the cabin [place] and scraped his left shoulder and left/forehead/orbit area.  He can move his shoulder easily.  He thinks the metadate is helping but still has difficulty with processing information and wonders about increasing the dose. He continues on his stable dose of steroids (0.75mg daily).  Geo is also asking about his Melatonin.  He believes Dr. Azul told him to change something after Daylight Saving. There are no other complaints or concerns at this time.       Fam/Soc: His family has booked their travel to [place] next Thursday. His dad has a clotting disorder, requiring him to take Warfarin daily.     History was obtained from Geo and his parents.       Allergies   Allergen Reactions     Blood Transfusion Related (Informational Only) Swelling     Periorbital swelling post platelet transfusion     No Known Drug Allergies        Current Outpatient Prescriptions   Medication     study - entinostat (IDS# 5050) 1 mg tablet     study - entinostat (IDS# 5050) 5 mg tablet     melatonin 3 MG tablet     fexofenadine (ALLEGRA) 180 MG tablet     methylphenidate (METADATE CD) 10 MG CR capsule     mupirocin (BACTROBAN) 2 % ointment     fluticasone " Physical Therapy Daily Treatment Note   Date:  10/15/20    TIme In:   1600               Time Out:  1708    Patient Name:  Tiff Wilhelm    :  1955  MRN: [id number]    Restrictions/Precautions:    Pertinent Medical History:  Medical/Treatment Diagnosis Information:  ·   Back pain, LLE pain     Insurance/Certification information:    [facility] [employer], [employer] supplement, [place] Medicaid  Physician Information:    JO-ANN Card  Plan of care signed (Y/N):    Visit# / total visits:     6/    G-Code (if applicable):      Date / Visit # G-Code Applied:         Progress Note: []  Yes  [x]  No  Next due by: Visit #10      Pain level:   \"sore\"/10  Subjective:  Pt reports she had an injection in her L hip today at the arthritis MD office. Objective:   Observation:    Test measurements:     Palpation:    Exercises:  Exercise Resistance/Repetitions Other comments   Nustep 8', L5 15   Seated HS stretch w/ belt 5x20\" 15   Seated mid rows GTB 3x10 15   Hip abd / add  BTB / ball 3x10 15   HS curls OTB 3x10 15   LTR x30 15   Piriformis stretch 5x20\" 15   Ab brace with march 3x10 15   Clam shells 3x10  Next visit   LAQ w/ weight 3# 2-3x/10 B Next visit    Mini lunge 3x10 B Next visit           Other Therapeutic Activities:      Manual Treatments:  Distal L HS STM, LLE SAD, ITB rolling -25'     Modalities:        Timed Code Treatment Minutes:  60      Total Treatment Minutes: 65    Treatment/Activity Tolerance:  [x] Patient tolerated treatment well [] Patient limited by fatigue  [] Patient limited by pain  [] Patient limited by other medical complications  [x] Other: Pt demonstrated some increase in L ITB tenderness. She responded well to manual therapy with decrease in muscle guarding. Pt reporting mild pain at the end of PT session.        Pain after treatment:      1-2/10     Prognosis: [x] Good [] Fair  [] Poor    Patient Requires Follow-up: [x] Yes  [] No    Plan:   [x] Continue per plan of care [] (FLONASE) 50 MCG/ACT spray     dexamethasone (DECADRON) 0.5 MG tablet     pentoxifylline (TRENTAL) 400 MG CR tablet     sulfamethoxazole-trimethoprim (BACTRIM/SEPTRA) 400-80 MG per tablet     omeprazole (PRILOSEC) 20 MG CR capsule     potassium phosphate, monobasic, (K-PHOS) 500 MG tablet     calcium carbonate-vitamin D 600-400 MG-UNIT CHEW     vitamin E (GNP VITAMIN E) 400 UNIT capsule     Cholecalciferol 400 UNITS CHEW     polyethylene glycol (MIRALAX/GLYCOLAX) packet     No current facility-administered medications for this visit.        Past Medical History:   Diagnosis Date     Cranial nerve dysfunction      Dyspepsia      Ependymoma (H)      Gastro-oesophageal reflux disease      Hearing loss      Intracranial hemorrhage (H)      Migraine      Pilonidal cyst     7-2015     Reduced vision      Refractory obstruction of nasal airway     2nd to nasal valve prolapse     Sleep apnea      Strabismus     gaze palsy        Past Surgical History:   Procedure Laterality Date     GRAFT CARTILAGE FROM POSTERIOR AURICLE Left 10/6/2016    Procedure: GRAFT CARTILAGE FROM POSTERIOR AURICLE;  Surgeon: Tyler Richards MD;  Location: [facility]     INCISION AND DRAINAGE PERINEAL, COMBINED Bilateral 7/18/2015    Procedure: COMBINED INCISION AND DRAINAGE PERINEAL;  Surgeon: Dequan Timmons MD;  [facility]     OPTICAL TRACKING SYSTEM CRANIOTOMY, EXCISE TUMOR, COMBINED N/A 4/13/2015    Procedure: COMBINED OPTICAL TRACKING SYSTEM CRANIOTOMY, EXCISE TUMOR;  Surgeon: Francis Velazquez MD;  [facility]     OPTICAL TRACKING SYSTEM CRANIOTOMY, EXCISE TUMOR, COMBINED N/A 4/16/2015    Procedure: COMBINED OPTICAL TRACKING SYSTEM CRANIOTOMY, EXCISE TUMOR;  Surgeon: Francis Velazquez MD;  [facility]     OPTICAL TRACKING SYSTEM CRANIOTOMY, EXCISE TUMOR, COMBINED Bilateral 5/28/2015    Procedure: COMBINED OPTICAL TRACKING SYSTEM CRANIOTOMY, EXCISE TUMOR;  Surgeon: Francis Velazquez MD;  [facility]  Alter current plan (see comments)  [] Plan of care initiated [] Hold pending MD visit [] Discharge    Plan for Next Session:        Electronically signed by:  Kevin French PT "    OPTICAL TRACKING SYSTEM CRANIOTOMY, EXCISE TUMOR, COMBINED Bilateral 1/14/2016    Procedure: COMBINED OPTICAL TRACKING SYSTEM CRANIOTOMY, EXCISE TUMOR;  Surgeon: Francis Velazquez MD;  [facility]     OPTICAL TRACKING SYSTEM VENTRICULOSTOMY  4/16/2015    Procedure: OPTICAL TRACKING SYSTEM VENTRICULOSTOMY;  Surgeon: Francis Velazquez MD;  [facility]     REMOVE PORT VASCULAR ACCESS N/A 10/6/2016    Procedure: REMOVE PORT VASCULAR ACCESS;  Surgeon: Bruno Perea MD;  [facility]     RHINOPLASTY N/A 10/6/2016    Procedure: RHINOPLASTY;  Surgeon: Tyler Richards MD;  [facility]     VASCULAR SURGERY  5-2015    single lumen power port       Family History   Problem Relation Age of Onset     Circulatory Father      PE/DVT     Hypothyroidism Father 30     DIABETES Maternal Grandmother      DIABETES Paternal Grandmother      DIABETES Paternal Grandfather      C.A.D. Paternal Grandfather      Hypertension Maternal Grandfather      Thyroid Disease Paternal Aunt      unknown whether hypo or hyper       Review of Systems   Constitutional: Negative.         In a wheelchair    HENT: Negative.  Negative for dental problem, mouth sores and trouble swallowing.    Respiratory: Negative.  Negative for cough.    Cardiovascular: Negative.  Negative for palpitations.   Gastrointestinal: Negative.    Endocrine:        Follows with Dr. Martin   Genitourinary: Negative.    Musculoskeletal: Negative.    Skin: Negative.  Negative for rash.   Neurological: Positive for headaches (unchanged, mild).   Psychiatric/Behavioral: Negative.    All other systems reviewed and are negative.      BP 99/61 (BP Location: Right arm, Patient Position: Fowlers, Cuff Size: Adult Large)  Pulse 88  Temp 97.9  F (36.6  C) (Axillary)  Resp 24  Ht 1.803 m (5' 10.98\")  Wt 76 kg (167 lb 8.8 oz)  SpO2 99%  BMI 23.38 kg/m2  Physical Exam   Constitutional: He is oriented to person, place, and time.   HENT:   Head: " Normocephalic.   Nose: Nose normal.   Eyes: Pupils are equal, round, and reactive to light. Right eye exhibits no discharge. No scleral icterus.   Neck: Normal range of motion.   Cardiovascular: Normal rate, regular rhythm and normal heart sounds.    No murmur heard.  Pulmonary/Chest: Effort normal and breath sounds normal. No respiratory distress. He has no wheezes.   Abdominal: Soft. Bowel sounds are normal. There is no tenderness.   Genitourinary:   Genitourinary Comments: deferred   Lymphadenopathy:     He has no cervical adenopathy.   Neurological: He is alert and oriented to person, place, and time. A cranial nerve deficit is present. Coordination abnormal.   Stands with assist. Ataxic   Skin: Skin is warm and dry.   Multiple bruises in different stages of healing.  Largest left shoulder and left shin. He has some scabbed excoriations around the left eye from fall.   Psychiatric: Mood and affect normal.     Imaging:      CHEST CT FINDINGS:    Motion artifact partially limits evaluation.     Lines and tubes: None.     Mediastinum: No thyroid nodules. Central tracheobronchial tree is patent. Heart size is normal. No pericardial effusion.  Normal  thoracic vasculature. No thoracic lymphadenopathy.      Lungs: Groundglass nodules of various sizes in the left lower lobe and\ right middle lobe. Focal region of tree-in-bud opacities in the medial right middle lobe. No pleural effusion. No pneumothorax. No consolidation.     Bones and soft tissues: No suspicious bone findings. Motion artifact gives the false appearance of moderate retrolisthesis of T11 on T12, seen as normal on the MR dated 10/31/2017.     Upper abdomen: Limited.         IMPRESSION:   1. New groundglass nodules in the left lower lobe and right middle lobe, as well as a focal region of tree-in-bud opacities in the medial right middle lobe. The presence of tree-in-bud opacities likely indicates acute infection. Groundglass opacities are nonspecific, but  could be a sequela of drug therapy (including hemorrhage), or indicate  atypical infection. Recommend follow-up CT upon resolution of\ symptoms.  2. Assessment for pulmonary embolism could not be performed on this noncontrast study.        Labs:  Results for orders placed or performed in visit on 11/08/17   CBC with platelets differential   Result Value Ref Range    WBC 6.4 4.0 - 11.0 10e9/L    RBC Count 4.09 (L) 4.4 - 5.9 10e12/L    Hemoglobin 13.4 13.3 - 17.7 g/dL    Hematocrit 40.3 40.0 - 53.0 %    MCV 99 78 - 100 fl    MCH 32.8 26.5 - 33.0 pg    MCHC 33.3 31.5 - 36.5 g/dL    RDW 12.3 10.0 - 15.0 %    Platelet Count 105 (L) 150 - 450 10e9/L    Diff Method Automated Method     % Neutrophils 61.8 %    % Lymphocytes 9.2 %    % Monocytes 17.2 %    % Eosinophils 11.0 %    % Basophils 0.5 %    % Immature Granulocytes 0.3 %    Nucleated RBCs 0 0 /100    Absolute Neutrophil 4.0 1.6 - 8.3 10e9/L    Absolute Lymphocytes 0.6 (L) 0.8 - 5.3 10e9/L    Absolute Monocytes 1.1 0.0 - 1.3 10e9/L    Absolute Eosinophils 0.7 0.0 - 0.7 10e9/L    Absolute Basophils 0.0 0.0 - 0.2 10e9/L    Abs Immature Granulocytes 0.0 0 - 0.4 10e9/L    Absolute Nucleated RBC 0.0    Comprehensive metabolic panel   Result Value Ref Range    Sodium 142 133 - 144 mmol/L    Potassium 3.9 3.4 - 5.3 mmol/L    Chloride 105 98 - 110 mmol/L    Carbon Dioxide 32 20 - 32 mmol/L    Anion Gap 5 3 - 14 mmol/L    Glucose 106 (H) 70 - 99 mg/dL    Urea Nitrogen 19 7 - 21 mg/dL    Creatinine 1.13 (H) 0.50 - 1.00 mg/dL    GFR Estimate 84 >60 mL/min/1.7m2    GFR Estimate If Black >90 >60 mL/min/1.7m2    Calcium 8.4 (L) 9.1 - 10.3 mg/dL    Bilirubin Total 0.4 0.2 - 1.3 mg/dL    Albumin 2.8 (L) 3.4 - 5.0 g/dL    Protein Total 6.1 (L) 6.8 - 8.8 g/dL    Alkaline Phosphatase 98 65 - 260 U/L    ALT 17 0 - 50 U/L    AST 17 0 - 35 U/L   Magnesium   Result Value Ref Range    Magnesium 2.1 1.6 - 2.3 mg/dL   Phosphorus   Result Value Ref Range    Phosphorus 2.9 2.8 - 4.6 mg/dL      *Note: Due to a large number of results and/or encounters for the requested time period, some results have not been displayed. A complete set of results can be found in Results Review.       Impression:  1. Ependymoma - MRI last week showing no significant change in the size and enhancement of fourth  ventricle ependymoma since 5/1/2017  2. Chest CT with evidence of tumor but  groundglass nodules worrisome for infection.   3. Creatinine increased form last week but under the eligible age/gender based parameter for the study which is 1.7  4. Healing bruising and scrapes form recent fall.    5. Some processing and memory problems.       Plan:  1. Reviewed Chest CT results with family. We are not concerned about metastatic disease rather this could be infection. We will start Azithromax 500mg one day 1 and 250mg Days 2-5 since her will be traveling soon. We will repeat chest CT in 2 months to assure clearing and to rule out hemorrhage..  2. Continue with Entinostat - He will begin Cycle 8.  He is to take 6mg weekly. New drug dispensed today. He was given a diary.  3. He is encouraged to maintain good hydration - increased creatinine likely due to recent contrast.   4. RTC in 1 week  5. We will increase his Metadate CD to 20mg in the AM.  6. Discussed Melatonin dosing with Dr. Azul. He should make a change for the winter season - He is currently taking 3mg. He should take 2mg at the onset of darkness and then 1 mg one hour before bed.   7. Reviewed things to keep in mind while in [place] (such as not drinking water or ice while there).  We will provide a current list of his medicine to dad next week to take with him.  Dad will let me know where they are staying so that I can tell him where to go in case of medical need.       Kristi Schuler, ALAN CNP

## 2020-10-16 ENCOUNTER — PATIENT MESSAGE (OUTPATIENT)
Dept: FAMILY MEDICINE CLINIC | Age: 65
End: 2020-10-16

## 2020-10-19 NOTE — TELEPHONE ENCOUNTER
From: Carlos Hardy  To: JO-ANN Dao  Sent: 10/16/2020 10:22 AM EDT  Subject: Test Results Question    can you send copy of my blood work to [facility]. Lawrence [address]ila [address]. she asked me when I was there. Iwent to see her. I went back yesterday for bursitis shot in left hip. p t tuesday and thursday. I am somewhat better. but struggling a little too.  Have a good weekend!!

## 2020-10-20 ENCOUNTER — HOSPITAL ENCOUNTER (OUTPATIENT)
Dept: PHYSICAL THERAPY | Facility: HOSPITAL | Age: 65
Setting detail: THERAPIES SERIES
Discharge: HOME | End: 2020-10-20
Payer: MEDICARE

## 2020-10-20 PROCEDURE — 97110 THERAPEUTIC EXERCISES: CPT

## 2020-10-20 PROCEDURE — 97140 MANUAL THERAPY 1/> REGIONS: CPT

## 2020-10-20 NOTE — FLOWSHEET NOTE
Physical Therapy Daily Treatment Note   Date:  10/20/20    TIme In:   0848               Time Out:  1010    Patient Name:  Ashley Swain    :  1955  MRN: [id number]    Restrictions/Precautions:    Pertinent Medical History:  Medical/Treatment Diagnosis Information:  ·   Back pain, LLE pain     Insurance/Certification information:    [employer], [employer], [id number] Medicaid  Physician Information:    JO-ANN Guzman  Plan of care signed (Y/N):    Visit# / total visits:     7/    G-Code (if applicable):      Date / Visit # G-Code Applied:         Progress Note: []  Yes  [x]  No  Next due by: Visit #10      Pain level:   0/10  Subjective:  Pt reports having no pain currently; no new complaints.           Objective:   Observation:    Test measurements:     Palpation:    Exercises:  Exercise Resistance/Repetitions Other comments   Nustep 8', L5 20   Seated HS stretch w/ belt 5x20\" 20   Seated mid rows GTB 3x10 20   Hip abd / add  BTB / ball 3x10 20   HS curls OTB 3x10 20   LTR x30 20   Piriformis stretch 5x20\" 20   Ab brace with march 3x10 20   Clam shells 3x10  20   LAQ w/ weight 3# 2-3x/10 B 20   Mini lunge 3x10 B 20          Other Therapeutic Activities:      Manual Treatments:  Distal L HS STM, LLE SAD, B ITB rolling -25'     Modalities:        Timed Code Treatment Minutes: 75       Total Treatment Minutes: 75    Treatment/Activity Tolerance:  [x] Patient tolerated treatment well [] Patient limited by fatigue  [] Patient limited by pain  [] Patient limited by other medical complications  [] Other:       Pain after treatment:      2-3/10     Prognosis: [x] Good [] Fair  [] Poor    Patient Requires Follow-up: [x] Yes  [] No    Plan:   [x] Continue per plan of care [] Alter current plan (see comments)  [] Plan of care initiated [] Hold pending MD visit [] Discharge    Plan for Next Session:        Electronically signed by:  Amara Olivera PT

## 2020-10-22 ENCOUNTER — HOSPITAL ENCOUNTER (OUTPATIENT)
Dept: PHYSICAL THERAPY | Facility: HOSPITAL | Age: 65
Setting detail: THERAPIES SERIES
Discharge: HOME | End: 2020-10-22
Payer: MEDICARE

## 2020-10-22 PROCEDURE — 97140 MANUAL THERAPY 1/> REGIONS: CPT

## 2020-10-22 PROCEDURE — 97110 THERAPEUTIC EXERCISES: CPT

## 2020-10-22 NOTE — FLOWSHEET NOTE
Physical Therapy Daily Treatment Note   Date:  10/22/20    TIme In:  1012                Time Out:  1152    Patient Name:  Radha Landers    :  1955  MRN: [id number]    Restrictions/Precautions:    Pertinent Medical History:  Medical/Treatment Diagnosis Information:  ·   Back pain, LLE pain     Insurance/Certification information:    [employer], [employer], [id number] Medicaid  Physician Information:    JO-ANN Woodard  Plan of care signed (Y/N):    Visit# / total visits:     8/    G-Code (if applicable):      Date / Visit # G-Code Applied:         Progress Note: []  Yes  [x]  No  Next due by: Visit #10      Pain level:   0/10  Subjective:  Pt reports she is having some soreness but no increase in pain. Objective:   Observation:    Test measurements:     Palpation:    Exercises:  Exercise Resistance/Repetitions Other comments   Nustep 8', L5 22   Seated HS stretch w/ belt 5x20\" 22   Seated mid rows GTB 3x10 22   Hip abd / add  BTB / ball 3x10 22   HS curls OTB 3x10 22   LTR x30 22   Piriformis stretch 5x20\" 22   Ab brace with march 3x10 22   Clam shells 3x10  22   LAQ w/ weight 3# 2-3x/10 B 22   Mini lunge 3x10 B 22          Other Therapeutic Activities:      Manual Treatments:  Distal L HS STM, LLE SAD, B ITB rolling -25'     Modalities:        Timed Code Treatment Minutes:  80      Total Treatment Minutes: 85    Treatment/Activity Tolerance:  [x] Patient tolerated treatment well [] Patient limited by fatigue  [] Patient limited by pain  [] Patient limited by other medical complications  [x] Other: Pt tolerated manual therapy well with decrease in HS tightness. Pt is tolerated advancement of therex well.         Pain after treatment:      0/10     Prognosis: [x] Good [] Fair  [] Poor    Patient Requires Follow-up: [x] Yes  [] No    Plan:   [x] Continue per plan of care [] Alter current plan (see comments)  [] Plan of care initiated [] Hold pending MD visit [] Discharge    Plan for Next Session:        Electronically signed by:  Joyce Nixon, PT

## 2020-10-27 ENCOUNTER — HOSPITAL ENCOUNTER (OUTPATIENT)
Dept: PHYSICAL THERAPY | Facility: HOSPITAL | Age: 65
Setting detail: THERAPIES SERIES
Discharge: HOME | End: 2020-10-27
Payer: MEDICARE

## 2020-10-27 PROCEDURE — 97140 MANUAL THERAPY 1/> REGIONS: CPT

## 2020-10-27 PROCEDURE — 97110 THERAPEUTIC EXERCISES: CPT

## 2020-10-27 NOTE — FLOWSHEET NOTE
Physical Therapy Re-Assessment Note   Date:  10/27/20    TIme In:  1105                Time Out:  1235    Patient Name:  Ludy Norman    :  1955  MRN: [id number]    Restrictions/Precautions:    Pertinent Medical History:  Medical/Treatment Diagnosis Information:  ·   Back pain, LLE pain     Insurance/Certification information:    [facility], [employer], [place] Medicaid  Physician Information:    JO-ANN Li  Plan of care signed (Y/N):    Visit# / total visits:     9/    G-Code (if applicable):      Date / Visit # G-Code Applied:         Progress Note: [x]  Yes  []  No  Next due by: 20      Pain level:   0/10  Subjective:  Pt reports she has noticed an approximately 50% improvement in her symptoms since starting PT one month ago         Objective:   Observation:    Test measurements: LEFS: .  Palpation:  Grade I-II tenderness distal L hamstring. Exercises:  Exercise Resistance/Repetitions Other comments   Nustep 8', L5 27   Seated HS stretch w/ belt 5x20\" 27   Seated mid rows GTB 3x10 27   Hip abd / add  BTB / ball 3x10 27   HS curls OTB 3x10 27   LTR x30 27   Piriformis stretch 5x20\" 27   Ab brace with march 3x10 27   Clam shells 3x10  27   LAQ w/ weight 3# 2-3x/10 B 27   Mini lunge 3x10 B 27          Other Therapeutic Activities:      Manual Treatments:  Distal L HS STM, LLE SAD, B ITB rolling -25'     Modalities:        Timed Code Treatment Minutes:  80      Total Treatment Minutes: 80    Treatment/Activity Tolerance:  [x] Patient tolerated treatment well [] Patient limited by fatigue  [] Patient limited by pain  [] Patient limited by other medical complications  [x] Other:   Pt has responded well to PT with overall decrease in pain and improved function. She continues to demonstrate tenderness at B ITB as well as distal L hamstrings. She will continue to benefit from skilled PT to further address deficits and restore optimal functional level.        Pain

## 2020-10-29 ENCOUNTER — HOSPITAL ENCOUNTER (OUTPATIENT)
Dept: PHYSICAL THERAPY | Facility: HOSPITAL | Age: 65
Setting detail: THERAPIES SERIES
Discharge: HOME | End: 2020-10-29
Payer: MEDICARE

## 2020-10-29 PROCEDURE — 97140 MANUAL THERAPY 1/> REGIONS: CPT

## 2020-10-29 PROCEDURE — 97110 THERAPEUTIC EXERCISES: CPT

## 2020-10-29 NOTE — FLOWSHEET NOTE
Physical Therapy Daily Note   Date:  10/29/20    TIme In:                  Time Out:      Patient Name:  Carlos Hardy    :  1955  MRN: [id number]    Restrictions/Precautions:    Pertinent Medical History:  Medical/Treatment Diagnosis Information:  ·   Back pain, LLE pain     Insurance/Certification information:    [facility], [address], [place] Medicaid  Physician Information:    JO-ANN Joel  Plan of care signed (Y/N):    Visit# / total visits:     10-Code (if applicable):      Date / Visit # G-Code Applied:         Progress Note: []  Yes  [x]  No  Next due by: 20      Pain level:   4/10  Subjective:  Pt reports having some increase in back pain from being more active the past day. Objective:   Observation:    Test measurements: LEFS: .  Palpation:  Grade I-II tenderness distal L hamstring. Exercises:  Exercise Resistance/Repetitions Other comments   Nustep 8', L5 29   Seated HS stretch w/ belt 5x20\" 29   Seated mid rows GTB 3x10 29   Hip abd / add  BTB / ball 3x10 29   HS curls OTB 3x10 29   LTR x30 29   Piriformis stretch 5x20\" 29   Ab brace with march 3x10 29   Clam shells 3x10  29   LAQ w/ weight 3# 2-3x/10 B 29   Mini lunge 3x10 B 29          Other Therapeutic Activities:      Manual Treatments:  Distal L HS STM, LLE SAD, B ITB rolling -25'     Modalities:        Timed Code Treatment Minutes:  80      Total Treatment Minutes: 85    Treatment/Activity Tolerance:  [x] Patient tolerated treatment well [] Patient limited by fatigue  [] Patient limited by pain  [] Patient limited by other medical complications  [x] Other:   Pt responded well to treatment with decrease in back and leg pain.        Pain after treatment:      2/10     Prognosis: [x] Good [] Fair  [] Poor    Patient Requires Follow-up: [x] Yes  [] No    Plan:   [x] Continue per plan of care [] Alter current plan (see comments)  [] Plan of care initiated [] Hold pending MD

## 2020-12-07 ENCOUNTER — OFFICE VISIT (OUTPATIENT)
Dept: NEUROLOGY | Facility: CLINIC | Age: 65
End: 2020-12-07

## 2020-12-07 VITALS
WEIGHT: 230 LBS | DIASTOLIC BLOOD PRESSURE: 82 MMHG | BODY MASS INDEX: 39.27 KG/M2 | SYSTOLIC BLOOD PRESSURE: 132 MMHG | HEIGHT: 64 IN | HEART RATE: 85 BPM | OXYGEN SATURATION: 98 % | TEMPERATURE: 98.1 F

## 2020-12-07 DIAGNOSIS — G63 NEUROPATHY DUE TO INFECTION (HCC): ICD-10-CM

## 2020-12-07 DIAGNOSIS — B99.9 NEUROPATHY DUE TO INFECTION (HCC): ICD-10-CM

## 2020-12-07 DIAGNOSIS — M48.061 SPINAL STENOSIS OF LUMBAR REGION WITHOUT NEUROGENIC CLAUDICATION: ICD-10-CM

## 2020-12-07 DIAGNOSIS — M51.36 DEGENERATIVE DISC DISEASE, LUMBAR: ICD-10-CM

## 2020-12-07 PROCEDURE — 99213 OFFICE O/P EST LOW 20 MIN: CPT | Performed by: NURSE PRACTITIONER

## 2020-12-07 RX ORDER — PREGABALIN 75 MG/1
75 CAPSULE ORAL 3 TIMES DAILY
Qty: 270 CAPSULE | Refills: 1 | Status: SHIPPED | OUTPATIENT
Start: 2020-12-07 | End: 2021-05-27

## 2020-12-07 RX ORDER — DULOXETIN HYDROCHLORIDE 60 MG/1
60 CAPSULE, DELAYED RELEASE ORAL DAILY
Qty: 90 CAPSULE | Refills: 3 | Status: SHIPPED | OUTPATIENT
Start: 2020-12-07 | End: 2021-05-27

## 2020-12-07 RX ORDER — AMOXICILLIN 250 MG
CAPSULE ORAL
COMMUNITY
End: 2021-05-27

## 2020-12-07 RX ORDER — CHLORAL HYDRATE 500 MG
CAPSULE ORAL
COMMUNITY
End: 2020-12-09 | Stop reason: SDUPTHER

## 2020-12-07 RX ORDER — CHOLECALCIFEROL (VITAMIN D3) 125 MCG
1 CAPSULE ORAL
COMMUNITY
End: 2021-04-13 | Stop reason: SDDI

## 2020-12-07 RX ORDER — ASCORBIC ACID 100 MG
TABLET,CHEWABLE ORAL
COMMUNITY
End: 2021-04-13 | Stop reason: SDDI

## 2020-12-07 RX ORDER — BACILLUS COAGULANS/INULIN 1B-250 MG
CAPSULE ORAL
COMMUNITY
End: 2020-12-07

## 2020-12-07 NOTE — PROGRESS NOTES
Subjective:     Patient ID: Alison Benitez is a 65 y.o. female.    CC:   Chief Complaint   Patient presents with   • Peripheral Neuropathy   • Pain   • Back Pain       HPI:   History of Present Illness     This is a very pleasant 65-year-old female who presents for 6-month follow-up on neuropathy of bilateral lower extremities from her thighs to her feet that have been present since January to February 2019 after being diagnosed with mono.  She has been taking Lyrica 75 mg 3 times a day as well as Cymbalta 60 mg daily and she has felt significant improvement in her symptoms.  She completed extensive physical therapy which helped make her stronger.  She does also follow with rheumatology.  She has gained significant weight since not being able to exercise and get out as much since COVID-19 pandemic.  She is very serious about losing the weight again.  She wishes to continue her current medications.  She has remained healthy overall.  She does have multiple thyroid cyst and is scheduled to see endocrinology on 12/30/2020.  She just received an injection for bursitis in her hip by her rheumatologist Dr. Bibiana Khan.    Prior labs & workup: CK level of 174.  Immunofixation and protein electrophoresis within normal limits.  Anti-hue and anti-Otilia antibodies negative.  Methylmalonic acid normal at 155.  Vitamin B12 951 and folate greater than 20.  Lyme antibodies negative.  Mono test negative.  ASO titer was elevated at 200.  Drug screen was normal.  Hemoglobin A1c was 4.9%.  I did discuss ASO titer with Dr. Shaffer our neurologist treated with high dose steroids and completed extensive PT. 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  She also had an MRI of her lumbar spine without contrast on 10/17/2019 and this did show degenerative disc changes and spinal stenosis.  She was evaluated by Dr. Grabiel Billings with [facility] neurosurgery on 10/31/2019  and he did not recommend any neurosurgical intervention but he did recommend she continue physical therapy and consider epidural injections with Dr. Everett.    The following portions of the patient's history were reviewed and updated as appropriate: allergies, current medications, past family history, past medical history, past social history, past surgical history and problem list.    Past Medical History:   Diagnosis Date   • Arthritis    • Bursitis    • CTS (carpal tunnel syndrome)     bilateral    • Hemorrhoids    • Hypertension    • Lumbosacral disc disease    • Neuropathy    • Osteoarthritis    • SBE (subacute bacterial endocarditis)    • Tendinitis of knee    •  (vaginal birth after )        Past Surgical History:   Procedure Laterality Date   • ABDOMINAL HYSTERECTOMY     •  SECTION     • COLONOSCOPY     • DILATATION AND CURETTAGE     • HYSTERECTOMY N/A        • MANDIBLE FRACTURE SURGERY     • OOPHORECTOMY Bilateral    • TUBAL ABDOMINAL LIGATION         Social History     Socioeconomic History   • Marital status:      Spouse name: Not on file   • Number of children: Not on file   • Years of education: Not on file   • Highest education level: Not on file   Tobacco Use   • Smoking status: Never Smoker   • Smokeless tobacco: Never Used   Substance and Sexual Activity   • Alcohol use: No   • Drug use: No   • Sexual activity: Not Currently       Family History   Problem Relation Age of Onset   • Osteoarthritis Other    • Hypertension Other    • Carpal tunnel syndrome Other    • COPD Other    • Diabetes Other    • Stroke Other    • Glaucoma Other    • Heart disease Mother    • Stroke Mother    • Hypertension Brother    • Breast cancer Neg Hx    • Ovarian cancer Neg Hx         Review of Systems   Constitutional: Negative for chills, fatigue, fever and unexpected weight change.   HENT: Negative for ear pain, hearing loss, nosebleeds, rhinorrhea and sore throat.    Eyes: Negative for  "photophobia, pain, discharge, itching and visual disturbance.   Respiratory: Negative for cough, chest tightness, shortness of breath and wheezing.    Cardiovascular: Negative for chest pain, palpitations and leg swelling.   Gastrointestinal: Negative for abdominal pain, blood in stool, constipation, diarrhea, nausea and vomiting.   Genitourinary: Negative for dysuria, frequency, hematuria and urgency.   Musculoskeletal: Negative for arthralgias, back pain, gait problem, joint swelling, myalgias, neck pain and neck stiffness.   Skin: Negative for rash and wound.   Allergic/Immunologic: Negative for environmental allergies and food allergies.   Neurological: Negative for dizziness, tremors, seizures, syncope, speech difficulty, weakness, light-headedness, numbness and headaches.   Hematological: Negative for adenopathy. Does not bruise/bleed easily.   Psychiatric/Behavioral: Negative for agitation, confusion, decreased concentration, hallucinations, sleep disturbance and suicidal ideas. The patient is not nervous/anxious.    All other systems reviewed and are negative.       Objective:  /82   Pulse 85   Temp 98.1 °F (36.7 °C)   Ht 162.6 cm (64\")   Wt 104 kg (230 lb)   SpO2 98%   BMI 39.48 kg/m²     Neurologic Exam     Mental Status   Oriented to person, place, and time.   Speech: speech is normal   Level of consciousness: alert    Cranial Nerves   Cranial nerves II through XII intact.     Motor Exam   Muscle bulk: normal  Overall muscle tone: normal    Strength   Strength 5/5 throughout.     Gait, Coordination, and Reflexes     Gait  Gait: normal    Coordination   Finger to nose coordination: normal    Tremor   Resting tremor: absent  Intention tremor: absent  Action tremor: absent    Reflexes   Right brachioradialis: 2+  Left brachioradialis: 2+  Right biceps: 2+  Left biceps: 2+  Right triceps: 2+  Left triceps: 2+  Right patellar: 1+  Left patellar: 1+  Right achilles: 1+  Left achilles: 1+  Right " : 2+  Left : 2+      Physical Exam  Neurological:      Mental Status: She is oriented to person, place, and time.      Coordination: Finger-Nose-Finger Test normal.      Gait: Gait is intact.      Deep Tendon Reflexes: Strength normal.      Reflex Scores:       Tricep reflexes are 2+ on the right side and 2+ on the left side.       Bicep reflexes are 2+ on the right side and 2+ on the left side.       Brachioradialis reflexes are 2+ on the right side and 2+ on the left side.       Patellar reflexes are 1+ on the right side and 1+ on the left side.       Achilles reflexes are 1+ on the right side and 1+ on the left side.  Psychiatric:         Mood and Affect: Mood is anxious.         Speech: Speech normal.         Behavior: Behavior normal.         Thought Content: Thought content normal.         Cognition and Memory: Cognition and memory normal.         Judgment: Judgment normal.         Assessment/Plan:       Diagnoses and all orders for this visit:    1. 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  Dispense: 270 capsule; Refill: 1  -     DULoxetine (CYMBALTA) 60 MG capsule; Take 1 capsule by mouth Daily.  Dispense: 90 capsule; Refill: 3    2. Degenerative disc disease, lumbar  Comments:  evaluated by neurosurgery-surgery not recommended at this time, completed PT  Orders:  -     DULoxetine (CYMBALTA) 60 MG capsule; Take 1 capsule by mouth Daily.  Dispense: 90 capsule; Refill: 3    3. Spinal stenosis of lumbar region without neurogenic claudication  Comments:  evaluated by neurosurgery-surgery not recommended at this time, completed PT  Orders:  -     DULoxetine (CYMBALTA) 60 MG capsule; Take 1 capsule by mouth Daily.  Dispense: 90 capsule; Refill: 3           She is doing well overall from a neurological standpoint.  Continue current medications.  Follow-up in 6 months  or sooner if needed.  To restart exercising with social distancing and mask wearing as able.  She had blood work with her PCP which is reviewed through my chart CBC with auto differential within normal limits on 10/2/2020, vitamin B12 1698, vitamin D 2536.8, TSH 5.05 and free T4 0.96, lipid panel total cholesterol 198, triglycerides 242, HDL 50,  and VLDL 48, CMP within normal limits.  Reviewed medications, potential side effects and signs and symptoms to report. Discussed risk versus benefits of treatment plan with patient and/or family-including medications, labs and radiology that may be ordered. Addressed questions and concerns during visit. Patient and/or family verbalized understanding and agree with plan.    AS THE PROVIDER, I PERSONALLY WORE PPE DURING ENTIRE FACE TO FACE ENCOUNTER IN CLINIC WITH THE PATIENT. PATIENT ALSO WORE PPE DURING ENTIRE FACE TO FACE ENCOUNTER EXCEPT FOR A MAX OF 30 SECONDS DURING NEUROLOGICAL EVALUATION OF CRANIAL NERVES AND THEN MASK WAS PLACED BACK OVER PATIENT FACE FOR REMAINDER OF VISIT. I WASHED MY HANDS BEFORE AND AFTER VISIT.    As part of this patient's treatment plan I am prescribing controlled substances. The patient has been made aware of appropriate use of such medications, including potential risk of somnolence, limited ability to drive and/or work safely, and potential for dependence or overdose. It has also been made clear that these medications are for use by the patient only, without concomitant use of alcohol or other substances unless prescribed. Keep out of reach of children.  Ethan report has been reviewed. If this is going to be prescribed long term, [facility] Controlled Substance Agreement Contract has also been read and signed by patient and myself.    During this visit the following were done:  Labs Reviewed [x]    Labs Ordered []    Radiology Reports Reviewed []    Radiology Ordered []    PCP Records Reviewed [x]    Referring Provider Records Reviewed []     ER Records Reviewed []    Hospital Records Reviewed []    History Obtained From Family []    Radiology Images Reviewed []    Other Reviewed []    Records Requested []      Kimber Correa, APRN  12/7/2020

## 2020-12-09 ENCOUNTER — OFFICE VISIT (OUTPATIENT)
Dept: ENDOCRINOLOGY | Facility: CLINIC | Age: 65
End: 2020-12-09

## 2020-12-09 DIAGNOSIS — E03.9 ACQUIRED HYPOTHYROIDISM: Primary | ICD-10-CM

## 2020-12-09 DIAGNOSIS — E04.2 NONTOXIC MULTINODULAR GOITER: ICD-10-CM

## 2020-12-09 PROCEDURE — 99204 OFFICE O/P NEW MOD 45 MIN: CPT | Performed by: INTERNAL MEDICINE

## 2020-12-09 RX ORDER — CHOLECALCIFEROL (VITAMIN D3) 50 MCG
2000 TABLET ORAL DAILY
COMMUNITY

## 2020-12-09 RX ORDER — MULTIPLE VITAMINS W/ MINERALS TAB 9MG-400MCG
1 TAB ORAL DAILY
COMMUNITY
End: 2021-11-18

## 2020-12-09 RX ORDER — PREGABALIN 75 MG/1
75 CAPSULE ORAL 2 TIMES DAILY
COMMUNITY
End: 2021-05-27 | Stop reason: SDUPTHER

## 2020-12-09 RX ORDER — LEVOTHYROXINE SODIUM 0.05 MG/1
50 TABLET ORAL DAILY
Qty: 30 TABLET | Refills: 11 | Status: SHIPPED | OUTPATIENT
Start: 2020-12-09 | End: 2021-09-27 | Stop reason: SDUPTHER

## 2020-12-09 NOTE — ASSESSMENT & PLAN NOTE
A new problem. No further work up.,  Review of records and hx from patient indicate this has been stable since 2013.   Periodic neck exam should suffice for follow up in future

## 2020-12-09 NOTE — PROGRESS NOTES
Office Note      Date: 2020  Patient Name: Alison Benitez  MRN: [id number]  : 1955    Chief Complaint   Patient presents with   • Thyroid Problem     nodules bilaterally       History of Present Illness:   Alison Benitez is a 65 y.o. female who presents for Thyroid Problem (nodules bilaterally)  .She is seen as a new pt for Linn Mckeon.   Duration- first found in 2013  Severity mild  Location- thyroid.  Context- she was having carotid ultrasound and thyroid nodularity was noted. She had yearly ultrasound until 2016 and then once recently.  Review of old records shows stability with largest nodule on right being 1.1 cm and on left 1.2  tsh was 5.    She has 2 siblings with goiters removed- not cancer  No radiation exposure  No hi iodine intake.    She has no compressive symptoms but for several months has noted fatigue and wt gain   Subjective          Review of Systems:   Review of Systems   Constitutional: Positive for fatigue and unexpected weight change.   HENT: Negative.    Eyes: Negative.    Respiratory: Negative.    Cardiovascular: Negative.    Gastrointestinal: Negative.    Endocrine: Positive for heat intolerance.   Genitourinary: Negative.    Musculoskeletal: Negative.    Neurological: Positive for numbness.   Hematological: Negative.    Psychiatric/Behavioral: Negative.        The following portions of the patient's history were reviewed and updated as appropriate: allergies, current medications, past family history, past medical history, past social history, past surgical history and problem list.    Objective     There were no vitals taken for this visit.    Labs:    CBC w/DIFF  Lab Results   Component Value Date    WBC 6.4 10/02/2020    RBC 4.83 10/02/2020    HGB 13.4 10/02/2020    HCT 42.7 10/02/2020    MCV 88.4 10/02/2020    MCH 27.7 10/02/2020    MCHC 31.4 10/02/2020    RDW 13.6 10/02/2020    MPV 10.6 (H) 10/02/2020     10/02/2020    NEUTRORELPCT 56.3 10/02/2020     LYMPHORELPCT 32.4 10/02/2020    MONORELPCT 8.2 10/02/2020    EOSRELPCT 2.2 10/02/2020    BASORELPCT 0.6 10/02/2020    AUTOIGPER 0.3 10/02/2020    NEUTROABS 3.6 10/02/2020    LYMPHSABS 2.1 10/02/2020    MONOSABS 0.5 10/02/2020    EOSABS 0.1 10/02/2020    BASOSABS 0.0 10/02/2020    AUTOIGNUM 0.0 10/02/2020       T4  Free T4   Date Value Ref Range Status   10/02/2020 0.96 0.89 - 1.76 ng/dL Final       TSH  No results found for: TSHBASE     Physical Exam:  Physical Exam    Assessment / Plan      Assessment & Plan:  Problem List Items Addressed This Visit        Endocrine    Acquired hypothyroidism - Primary    Current Assessment & Plan     Mildly high tsh in the setting of a structurally abnormally thyroid, warrants a trial of t4  A new problem          Relevant Medications    levothyroxine (Synthroid) 50 MCG tablet    Nontoxic multinodular goiter    Current Assessment & Plan     A new problem. No further work up.,  Review of records and hx from patient indicate this has been stable since 2013.   Periodic neck exam should suffice for follow up in future          Relevant Medications    levothyroxine (Synthroid) 50 MCG tablet           Abdifatah Jordan MD   12/09/2020

## 2020-12-09 NOTE — ASSESSMENT & PLAN NOTE
Mildly high tsh in the setting of a structurally abnormally thyroid, warrants a trial of t4  A new problem

## 2021-01-13 ENCOUNTER — OFFICE VISIT (OUTPATIENT)
Dept: FAMILY MEDICINE CLINIC | Age: 66
End: 2021-01-13
Payer: MEDICARE

## 2021-01-13 VITALS
HEART RATE: 88 BPM | RESPIRATION RATE: 18 BRPM | BODY MASS INDEX: 41.66 KG/M2 | OXYGEN SATURATION: 98 % | HEIGHT: 64 IN | WEIGHT: 244 LBS | SYSTOLIC BLOOD PRESSURE: 130 MMHG | TEMPERATURE: 96.8 F | DIASTOLIC BLOOD PRESSURE: 84 MMHG

## 2021-01-13 DIAGNOSIS — J01.90 ACUTE BACTERIAL SINUSITIS: ICD-10-CM

## 2021-01-13 DIAGNOSIS — H65.93 BILATERAL OTITIS MEDIA WITH EFFUSION: Primary | ICD-10-CM

## 2021-01-13 DIAGNOSIS — B96.89 ACUTE BACTERIAL SINUSITIS: ICD-10-CM

## 2021-01-13 PROCEDURE — 1036F TOBACCO NON-USER: CPT | Performed by: NURSE PRACTITIONER

## 2021-01-13 PROCEDURE — 3017F COLORECTAL CA SCREEN DOC REV: CPT | Performed by: NURSE PRACTITIONER

## 2021-01-13 PROCEDURE — G8482 FLU IMMUNIZE ORDER/ADMIN: HCPCS | Performed by: NURSE PRACTITIONER

## 2021-01-13 PROCEDURE — G8417 CALC BMI ABV UP PARAM F/U: HCPCS | Performed by: NURSE PRACTITIONER

## 2021-01-13 PROCEDURE — 4040F PNEUMOC VAC/ADMIN/RCVD: CPT | Performed by: NURSE PRACTITIONER

## 2021-01-13 PROCEDURE — 1123F ACP DISCUSS/DSCN MKR DOCD: CPT | Performed by: NURSE PRACTITIONER

## 2021-01-13 PROCEDURE — 1090F PRES/ABSN URINE INCON ASSESS: CPT | Performed by: NURSE PRACTITIONER

## 2021-01-13 PROCEDURE — G8400 PT W/DXA NO RESULTS DOC: HCPCS | Performed by: NURSE PRACTITIONER

## 2021-01-13 PROCEDURE — G8428 CUR MEDS NOT DOCUMENT: HCPCS | Performed by: NURSE PRACTITIONER

## 2021-01-13 PROCEDURE — 96372 THER/PROPH/DIAG INJ SC/IM: CPT | Performed by: NURSE PRACTITIONER

## 2021-01-13 PROCEDURE — 99213 OFFICE O/P EST LOW 20 MIN: CPT | Performed by: NURSE PRACTITIONER

## 2021-01-13 RX ORDER — FLUCONAZOLE 100 MG/1
100 TABLET ORAL DAILY
Qty: 5 TABLET | Refills: 0 | Status: SHIPPED | OUTPATIENT
Start: 2021-01-13 | End: 2021-01-18

## 2021-01-13 RX ORDER — CEFTRIAXONE 1 G/1
1 INJECTION, POWDER, FOR SOLUTION INTRAMUSCULAR; INTRAVENOUS ONCE
Status: COMPLETED | OUTPATIENT
Start: 2021-01-13 | End: 2021-01-13

## 2021-01-13 RX ORDER — CEFDINIR 300 MG/1
300 CAPSULE ORAL 2 TIMES DAILY
Qty: 10 CAPSULE | Refills: 0 | Status: SHIPPED | OUTPATIENT
Start: 2021-01-13 | End: 2021-01-18

## 2021-01-13 RX ORDER — LEVOTHYROXINE SODIUM 0.05 MG/1
50 TABLET ORAL DAILY
COMMUNITY
Start: 2020-12-09 | End: 2022-10-05

## 2021-01-13 RX ADMIN — CEFTRIAXONE 1 G: 1 INJECTION, POWDER, FOR SOLUTION INTRAMUSCULAR; INTRAVENOUS at 15:59

## 2021-01-13 ASSESSMENT — PATIENT HEALTH QUESTIONNAIRE - PHQ9
SUM OF ALL RESPONSES TO PHQ QUESTIONS 1-9: 0
SUM OF ALL RESPONSES TO PHQ QUESTIONS 1-9: 0
2. FEELING DOWN, DEPRESSED OR HOPELESS: 0

## 2021-01-13 ASSESSMENT — ENCOUNTER SYMPTOMS
RHINORRHEA: 1
SINUS PRESSURE: 1
RESPIRATORY NEGATIVE: 1
EYES NEGATIVE: 1
GASTROINTESTINAL NEGATIVE: 1

## 2021-01-13 NOTE — PROGRESS NOTES
Administrations This Visit     cefTRIAXone (ROCEPHIN) injection 1 g     Admin Date  01/13/2021  15:59 Action  Given Dose  1 g Route  Intramuscular Site  Dorsogluteal Left Administered By  Brendan Cabello RN    Ordering Provider: Pama Goltz, APRN    NDC: 90989-946-89    Lot#: [id number]    : Via [employer]    Patient Supplied?: No              Patient tolerated injection well. Patient advised to wait 20 minutes in the office following the injection. No signs/symptoms of reaction noted after 20 minutes.

## 2021-01-13 NOTE — PATIENT INSTRUCTIONS
We are committed to providing you with the best care possible. In order to help us achieve these goals please remember to bring all medications, herbal products, and over the counter supplements with you to each visit. If your provider has ordered testing for you, please be sure to follow up with our office if you have not received results within 7 days after the testing took place. *If you receive a survey after visiting one of our offices, please take time to share your experience concerning your physician office visit. These surveys are confidential and no health information about you is shared. We are eager to improve for you and we are counting on your feedback to help make that happen. · Keep a list of your medicines with you. List all of the prescription medicines, nonprescription medicines, supplements, natural remedies, and vitamins that you take. Tell your healthcare providers who treat you about all of the products you are taking. Your provider can provide you with a form to keep track of them. Just ask. · Follow the directions that come with your medicine, including information about food or alcohol. Make sure you know how and when to take your medicine. Do not take more or less than you are supposed to take. · Keep all medicines out of the reach of children. · Store medicines according to the directions on the label. · Monitor yourself. Learn to know how your body reacts to your new medicine and keep track of how it makes you feel before attempting (If your provider has allowed you to do so) to drive or go to work. · Seek emergency medical attention if you think you have used too much of this medicine. An overdose of any prescription medicine can be fatal. Overdose symptoms may include extreme drowsiness, muscle weakness, confusion, cold and clammy skin, pinpoint pupils, shallow breathing, slow heart rate, fainting, or coma. · Don't share prescription medicines with others, even when they seem to have the same symptoms. What may be good for you may be harmful to others. · If you are no longer taking a prescribed medication and you have pills left please take your pills out of their original containers. Mix crushed pills with an undesirable substance, such as cat litter or used coffee grounds. Put the mixture into a disposable container with a lid, such as an empty margarine tub, or into a sealable bag. Cover up or remove any of your personal information on the empty containers by covering it with black permanent marker or duct tape. Place the sealed container with the mixture, and the empty drug containers, in the trash. · If you use a medication that is in the form of a patch, dispose of used patches by folding them in half so that the sticky sides meet, and then flushing them down a toilet. They should not be placed in the household trash where children or pets can find them. · If you have any questions, ask your provider or pharmacist for more information. · Be sure to keep all appointments for provider visits or tests.

## 2021-01-13 NOTE — PROGRESS NOTES
SUBJECTIVE:  Judah Barlow is a 72 y.o. female that presents with   Chief Complaint   Patient presents with    Ear Drainage     Bilateral - Clear Drainage    Otalgia     Bilateral    .    HPI:    This is a very pleasant 51-year-old white female who presents today with complaints of pinkeye for which she has been to the eye doctor yesterday and he is put her on some steroids having her to wash her eye out. She has not gotten a steroid yet so we will she is having problems getting it filled insurance did not approve it so she is not sure if he is going to leave her on that put her on something else. Is that the it is the left thigh and she says that it is frozen together every morning when she gets up. She has used cold water to rinse it out. It is the conjunctive is red irritated. She is having bilateral ear pain and she says that both ears are draining that they actually run some onto her her pillowcase. Does like she has sinus pressure and sinus congestion. She has had no fever or chills. No chest pain or shortness of breath. She says she is done everything she can to prevent getting Covid. Ear Drainage   There is pain in both ears. This is a new problem. The current episode started in the past 7 days. The problem occurs constantly. There has been no fever. The pain is at a severity of 8/10. Associated symptoms include rhinorrhea. Otalgia   There is pain in both ears. This is a chronic problem. The current episode started in the past 7 days. The problem occurs constantly. There has been no fever. The pain is at a severity of 8/10. Associated symptoms include rhinorrhea. She has tried nothing for the symptoms. The treatment provided no relief. Review of Systems   Constitutional: Negative. HENT: Positive for congestion, ear pain, rhinorrhea and sinus pressure. Eyes: Negative. Respiratory: Negative. Cardiovascular: Negative. Gastrointestinal: Negative. Endocrine: Negative. Genitourinary: Negative. Musculoskeletal: Negative. Neurological: Negative. Psychiatric/Behavioral: Negative. All other systems reviewed and are negative. OBJECTIVE:  /84   Pulse 88   Temp 96.8 °F (36 °C) (Infrared)   Resp 18   Ht 5' 4\" (1.626 m)   Wt 244 lb (110.7 kg)   SpO2 98% Comment: room air  Breastfeeding No   BMI 41.88 kg/m²   Physical Exam  Vitals signs and nursing note reviewed. Constitutional:       Appearance: Normal appearance. HENT:      Head: Normocephalic and atraumatic. Right Ear: Ear canal and external ear normal. Tympanic membrane is erythematous. Left Ear: Ear canal and external ear normal. Tympanic membrane is erythematous. Nose: Congestion present. Mouth/Throat:      Mouth: Mucous membranes are moist.      Pharynx: Oropharynx is clear. Eyes:      Conjunctiva/sclera: Conjunctivae normal.   Neck:      Musculoskeletal: Normal range of motion and neck supple. Cardiovascular:      Rate and Rhythm: Normal rate and regular rhythm. Pulses: Normal pulses. Heart sounds: Normal heart sounds. Pulmonary:      Effort: Pulmonary effort is normal.      Breath sounds: Normal breath sounds. Musculoskeletal: Normal range of motion. Skin:     General: Skin is warm and dry. Capillary Refill: Capillary refill takes less than 2 seconds. Neurological:      Mental Status: She is alert and oriented to person, place, and time. Psychiatric:         Mood and Affect: Mood normal.         Behavior: Behavior normal.         Thought Content: Thought content normal.         Judgment: Judgment normal.       No results found for requested labs within last 30 days.        Hemoglobin A1C (%)   Date Value   07/16/2020 5.3     Microscopic Examination (no units)   Date Value   12/28/2017 Not Indicated     LDL Calculated (mg/dL)   Date Value   10/02/2020 100         Lab Results   Component Value Date    WBC 6.4 10/02/2020    NEUTROABS 3.6 10/02/2020 HGB 13.4 10/02/2020    HCT 42.7 10/02/2020    MCV 88.4 10/02/2020     10/02/2020       Lab Results   Component Value Date    TSH 5.05 10/02/2020         ASSESSMENT/PLAN:   Diagnosis Orders   1. Bilateral otitis media with effusion  cefTRIAXone (ROCEPHIN) injection 1 g   2. Acute bacterial sinusitis  cefTRIAXone (ROCEPHIN) injection 1 g         No orders of the defined types were placed in this encounter.        Outpatient Encounter Medications as of 1/13/2021   Medication Sig Dispense Refill    levothyroxine (SYNTHROID) 50 MCG tablet Take 50 mcg by mouth daily      metroNIDAZOLE (METROCREAM) 0.75 % cream Apply 1 applicator topically daily      cefdinir (OMNICEF) 300 MG capsule Take 1 capsule by mouth 2 times daily for 5 days 10 capsule 0    fluconazole (DIFLUCAN) 100 MG tablet Take 1 tablet by mouth daily for 5 days 5 tablet 0    DULoxetine (CYMBALTA) 60 MG extended release capsule Take 60 mg by mouth daily      Magnesium Gluconate 500 (27 Mg) MG TABS tablet Take by mouth      pregabalin (LYRICA) 75 MG capsule take 1 capsule by mouth twice a day  0    Ascorbic Acid (VITAMIN C) 250 MG tablet Take 250 mg by mouth      losartan (COZAAR) 50 MG tablet Take by mouth       nabumetone (RELAFEN) 750 MG tablet Take by mouth       Probiotic Product (PROBIOTIC-10) CAPS Take by mouth      Omega-3 Fatty Acids (FISH OIL) 1000 MG CAPS Take 3,000 mg by mouth 3 times daily      zolpidem (AMBIEN) 10 MG tablet take 1 tablet by mouth at bedtime if needed  0    vitamin D (ERGOCALCIFEROL) 57593 units CAPS capsule   0    desloratadine (CLARINEX) 5 MG tablet take 1 tablet by mouth once daily 90 tablet 1    estradiol (ESTRACE) 1 MG tablet Take 1 mg by mouth daily       Multiple Vitamins-Minerals (MULTIVITAL PO) Take by mouth daily      hydrochlorothiazide (HYDRODIURIL) 25 MG tablet Take 25 mg by mouth daily      vitamin B-12 (CYANOCOBALAMIN) 1000 MCG tablet Take 1,000 mcg by mouth daily      [DISCONTINUED] bisacodyl (DULCOLAX) 5 MG EC tablet Clear liquid diet day prior to colonoscopy. 2 at 3pm and 2 at 7pm.      fluticasone (FLONASE) 50 MCG/ACT nasal spray 1 spray by Nasal route daily (Patient not taking: Reported on 2021) 1 Bottle 3    [DISCONTINUED] RHOFADE 1 % CREA   5    [DISCONTINUED] celecoxib (CELEBREX) 200 MG capsule       [DISCONTINUED] clindamycin (CLEOCIN T) 1 % lotion       [DISCONTINUED] desonide (DESOWEN) 0.05 % ointment       [DISCONTINUED] estrogens conjugated, synthetic B, (ENJUVIA) 0.3 MG tablet Take 0.3 mg by mouth daily      [] cefTRIAXone (ROCEPHIN) injection 1 g        No facility-administered encounter medications on file as of 2021. Return if symptoms worsen or fail to improve. PATIENT COUNSELING    Counseling was provided today regardingthe following topics: Healthy eating habits, Regular exercise, substance abuse and healthy sleep habits. Discussed use, benefit, and side effectsof prescribed medications. Barriers to medication compliance addressed. All patient questions answered. Pt voiced understanding.

## 2021-01-19 ENCOUNTER — TRANSCRIBE ORDERS (OUTPATIENT)
Dept: ADMINISTRATIVE | Facility: HOSPITAL | Age: 66
End: 2021-01-19

## 2021-01-19 DIAGNOSIS — Z12.31 VISIT FOR SCREENING MAMMOGRAM: Primary | ICD-10-CM

## 2021-01-24 ENCOUNTER — OFFICE VISIT (OUTPATIENT)
Dept: PRIMARY CARE CLINIC | Age: 66
End: 2021-01-24
Payer: MEDICARE

## 2021-01-24 VITALS
TEMPERATURE: 96.9 F | HEART RATE: 99 BPM | OXYGEN SATURATION: 100 % | DIASTOLIC BLOOD PRESSURE: 80 MMHG | BODY MASS INDEX: 42.84 KG/M2 | WEIGHT: 249.6 LBS | SYSTOLIC BLOOD PRESSURE: 136 MMHG | RESPIRATION RATE: 18 BRPM

## 2021-01-24 DIAGNOSIS — H92.03 OTALGIA OF BOTH EARS: ICD-10-CM

## 2021-01-24 DIAGNOSIS — J01.40 SUBACUTE PANSINUSITIS: Primary | ICD-10-CM

## 2021-01-24 PROCEDURE — 96372 THER/PROPH/DIAG INJ SC/IM: CPT | Performed by: NURSE PRACTITIONER

## 2021-01-24 PROCEDURE — 1123F ACP DISCUSS/DSCN MKR DOCD: CPT | Performed by: NURSE PRACTITIONER

## 2021-01-24 PROCEDURE — 1090F PRES/ABSN URINE INCON ASSESS: CPT | Performed by: NURSE PRACTITIONER

## 2021-01-24 PROCEDURE — 1036F TOBACCO NON-USER: CPT | Performed by: NURSE PRACTITIONER

## 2021-01-24 PROCEDURE — 3017F COLORECTAL CA SCREEN DOC REV: CPT | Performed by: NURSE PRACTITIONER

## 2021-01-24 PROCEDURE — G8417 CALC BMI ABV UP PARAM F/U: HCPCS | Performed by: NURSE PRACTITIONER

## 2021-01-24 PROCEDURE — 4040F PNEUMOC VAC/ADMIN/RCVD: CPT | Performed by: NURSE PRACTITIONER

## 2021-01-24 PROCEDURE — G8427 DOCREV CUR MEDS BY ELIG CLIN: HCPCS | Performed by: NURSE PRACTITIONER

## 2021-01-24 PROCEDURE — G8482 FLU IMMUNIZE ORDER/ADMIN: HCPCS | Performed by: NURSE PRACTITIONER

## 2021-01-24 PROCEDURE — 99213 OFFICE O/P EST LOW 20 MIN: CPT | Performed by: NURSE PRACTITIONER

## 2021-01-24 PROCEDURE — G8400 PT W/DXA NO RESULTS DOC: HCPCS | Performed by: NURSE PRACTITIONER

## 2021-01-24 RX ORDER — CEFTRIAXONE SODIUM 250 MG/1
250 INJECTION, POWDER, FOR SOLUTION INTRAMUSCULAR; INTRAVENOUS ONCE
Status: COMPLETED | OUTPATIENT
Start: 2021-01-24 | End: 2021-01-24

## 2021-01-24 RX ORDER — GUAIFENESIN 600 MG/1
600 TABLET, EXTENDED RELEASE ORAL 2 TIMES DAILY
Qty: 30 TABLET | Refills: 0 | Status: SHIPPED | OUTPATIENT
Start: 2021-01-24 | End: 2021-02-08

## 2021-01-24 RX ORDER — DEXAMETHASONE SODIUM PHOSPHATE 10 MG/ML
10 INJECTION INTRAMUSCULAR; INTRAVENOUS ONCE
Status: COMPLETED | OUTPATIENT
Start: 2021-01-24 | End: 2021-01-24

## 2021-01-24 RX ORDER — PREDNISONE 20 MG/1
20 TABLET ORAL 2 TIMES DAILY
Qty: 10 TABLET | Refills: 0 | Status: SHIPPED | OUTPATIENT
Start: 2021-01-24 | End: 2021-01-29

## 2021-01-24 RX ADMIN — CEFTRIAXONE SODIUM 250 MG: 250 INJECTION, POWDER, FOR SOLUTION INTRAMUSCULAR; INTRAVENOUS at 11:48

## 2021-01-24 RX ADMIN — DEXAMETHASONE SODIUM PHOSPHATE 10 MG: 10 INJECTION INTRAMUSCULAR; INTRAVENOUS at 11:49

## 2021-01-24 ASSESSMENT — ENCOUNTER SYMPTOMS
FACIAL SWELLING: 1
SINUS PRESSURE: 1
EYE DISCHARGE: 1
SINUS PAIN: 1

## 2021-01-24 NOTE — PROGRESS NOTES
Pt co bilateral ear pain x sinus presure, nasal drainage , facial pain swelling x yesterday she was just treated for same thing a few weeks ago and started feeling better now sx are back.

## 2021-01-24 NOTE — PROGRESS NOTES
Zaheer Scott 72 y.o. presents today for   Chief Complaint   Patient presents with    Otalgia    Sinusitis        HPI:  Zaheer Scott states  Her face feels swollen with bilateral ear pain. She was diagnosed last week with a sinus infection and was given a shot and she would like another one. She felt better for a few days but began having increased drainage and facial pain and doesn't want to wait for it to get worse. Family History   Problem Relation Age of Onset    Cancer Mother         skin ca    High Blood Pressure Mother     Cancer Brother     High Blood Pressure Brother         Social History     Socioeconomic History    Marital status:       Spouse name: Not on file    Number of children: Not on file    Years of education: Not on file    Highest education level: Not on file   Occupational History    Not on file   Social Needs    Financial resource strain: Not on file    Food insecurity     Worry: Not on file     Inability: Not on file    Transportation needs     Medical: Not on file     Non-medical: Not on file   Tobacco Use    Smoking status: Never Smoker    Smokeless tobacco: Never Used   Substance and Sexual Activity    Alcohol use: No    Drug use: No    Sexual activity: Yes   Lifestyle    Physical activity     Days per week: Not on file     Minutes per session: Not on file    Stress: Not on file   Relationships    Social connections     Talks on phone: Not on file     Gets together: Not on file     Attends [employer] service: Not on file     Active member of club or organization: Not on file     Attends meetings of clubs or organizations: Not on file     Relationship status: Not on file    Intimate partner violence     Fear of current or ex partner: Not on file     Emotionally abused: Not on file     Physically abused: Not on file     Forced sexual activity: Not on file   Other Topics Concern    Not on file   Social History Narrative    Not on file        Past Surgical History:   Procedure Laterality Date     SECTION      x 2    COLONOSCOPY  2010    normal findings    COLONOSCOPY N/A 2020    COLORECTAL CANCER SCREENING, NOT HIGH RISK performed by Rob Garcia MD at [facility]          Past Medical History:   Diagnosis Date    Arthritis         Current Outpatient Medications   Medication Sig Dispense Refill    predniSONE (DELTASONE) 20 MG tablet Take 1 tablet by mouth 2 times daily for 5 days 10 tablet 0    guaiFENesin (MUCINEX) 600 MG extended release tablet Take 1 tablet by mouth 2 times daily for 15 days 30 tablet 0    levothyroxine (SYNTHROID) 50 MCG tablet Take 50 mcg by mouth daily      metroNIDAZOLE (METROCREAM) 0.75 % cream Apply 1 applicator topically daily      DULoxetine (CYMBALTA) 60 MG extended release capsule Take 60 mg by mouth daily      Magnesium Gluconate 500 (27 Mg) MG TABS tablet Take by mouth      pregabalin (LYRICA) 75 MG capsule take 1 capsule by mouth twice a day  0    Ascorbic Acid (VITAMIN C) 250 MG tablet Take 250 mg by mouth      losartan (COZAAR) 50 MG tablet Take by mouth       nabumetone (RELAFEN) 750 MG tablet Take by mouth       Probiotic Product (PROBIOTIC-10) CAPS Take by mouth      Omega-3 Fatty Acids (FISH OIL) 1000 MG CAPS Take 3,000 mg by mouth 3 times daily      zolpidem (AMBIEN) 10 MG tablet take 1 tablet by mouth at bedtime if needed  0    vitamin D (ERGOCALCIFEROL) 48443 units CAPS capsule   0    fluticasone (FLONASE) 50 MCG/ACT nasal spray 1 spray by Nasal route daily 1 Bottle 3    desloratadine (CLARINEX) 5 MG tablet take 1 tablet by mouth once daily 90 tablet 1    estradiol (ESTRACE) 1 MG tablet Take 1 mg by mouth daily       Multiple Vitamins-Minerals (MULTIVITAL PO) Take by mouth daily      hydrochlorothiazide (HYDRODIURIL) 25 MG tablet Take 25 mg by mouth daily      vitamin B-12 (CYANOCOBALAMIN) 1000 MCG tablet Take 1,000 mcg by mouth daily       Current Facility-Administered Medications   Medication Dose Route Frequency Provider Last Rate Last Admin    dexamethasone (DECADRON) injection 10 mg  10 mg Intramuscular Once May Reynolds APRN - [facility]        cefTRIAXone (ROCEPHIN) injection 250 mg  250 mg Intramuscular Once JO-ANN Schuler - [facility]            Review of Systems   HENT: Positive for congestion, ear pain, facial swelling, sinus pressure and sinus pain. Eyes: Positive for discharge. Increased tearing bilaterally        /80   Pulse 99   Temp 96.9 °F (36.1 °C)   Resp 18   Wt 249 lb 9.6 oz (113.2 kg)   SpO2 100%   BMI 42.84 kg/m²      Physical Exam  Constitutional:       Appearance: Normal appearance. She is obese. HENT:      Head: Normocephalic. Right Ear: External ear normal.      Left Ear: External ear normal.      Ears:      Comments: Bilateral erythema in canals     Nose: Nose normal.      Mouth/Throat:      Mouth: Mucous membranes are dry. Eyes:      Pupils: Pupils are equal, round, and reactive to light. Neck:      Musculoskeletal: Normal range of motion. Cardiovascular:      Rate and Rhythm: Normal rate and regular rhythm. Pulses: Normal pulses. Heart sounds: Normal heart sounds. Pulmonary:      Effort: Pulmonary effort is normal.      Breath sounds: Normal breath sounds. Abdominal:      Palpations: Abdomen is soft. Musculoskeletal: Normal range of motion. Skin:     General: Skin is warm. Capillary Refill: Capillary refill takes less than 2 seconds. Neurological:      General: No focal deficit present. Mental Status: She is alert. Psychiatric:         Mood and Affect: Mood normal.          ASSESSMENT/PLAN    1. Subacute pansinusitis  Encouraged pt to take medication as directed and to f/u in office if s/s persist or worsen. Instructed pt to begin taking Prednisone orally tomorrow since receiving a steroid injection in the office.  - predniSONE (DELTASONE) 20 MG tablet;  Take 1 tablet by mouth 2 times daily for 5 days  Dispense: 10 tablet; Refill: 0  - guaiFENesin (MUCINEX) 600 MG extended release tablet; Take 1 tablet by mouth 2 times daily for 15 days  Dispense: 30 tablet; Refill: 0  - cefTRIAXone (ROCEPHIN) injection 250 mg    2. Otalgia of both ears  Pt to f/u in office if s/s persist or worsen.   Pt is agreeable to poc.  - dexamethasone (DECADRON) injection 10 mg             JO-ANN Henson - CNP

## 2021-02-02 ENCOUNTER — OFFICE VISIT (OUTPATIENT)
Dept: FAMILY MEDICINE CLINIC | Age: 66
End: 2021-02-02
Payer: MEDICARE

## 2021-02-02 ENCOUNTER — HOSPITAL ENCOUNTER (OUTPATIENT)
Facility: HOSPITAL | Age: 66
Discharge: HOME | End: 2021-02-02
Payer: MEDICARE

## 2021-02-02 VITALS
HEART RATE: 84 BPM | TEMPERATURE: 97.7 F | HEIGHT: 64 IN | RESPIRATION RATE: 18 BRPM | WEIGHT: 244.5 LBS | DIASTOLIC BLOOD PRESSURE: 70 MMHG | BODY MASS INDEX: 41.74 KG/M2 | OXYGEN SATURATION: 99 % | SYSTOLIC BLOOD PRESSURE: 124 MMHG

## 2021-02-02 DIAGNOSIS — E53.8 B12 DEFICIENCY: ICD-10-CM

## 2021-02-02 DIAGNOSIS — E04.2 MULTIPLE THYROID NODULES: ICD-10-CM

## 2021-02-02 DIAGNOSIS — E55.9 VITAMIN D DEFICIENCY: ICD-10-CM

## 2021-02-02 DIAGNOSIS — Z13.220 SCREENING FOR CHOLESTEROL LEVEL: ICD-10-CM

## 2021-02-02 DIAGNOSIS — R53.83 FATIGUE, UNSPECIFIED TYPE: ICD-10-CM

## 2021-02-02 DIAGNOSIS — E83.42 HYPOMAGNESEMIA: ICD-10-CM

## 2021-02-02 DIAGNOSIS — E04.2 MULTIPLE THYROID NODULES: Primary | ICD-10-CM

## 2021-02-02 PROCEDURE — 99214 OFFICE O/P EST MOD 30 MIN: CPT | Performed by: FAMILY MEDICINE

## 2021-02-02 PROCEDURE — 1036F TOBACCO NON-USER: CPT | Performed by: FAMILY MEDICINE

## 2021-02-02 PROCEDURE — G8417 CALC BMI ABV UP PARAM F/U: HCPCS | Performed by: FAMILY MEDICINE

## 2021-02-02 PROCEDURE — 80061 LIPID PANEL: CPT

## 2021-02-02 PROCEDURE — 1090F PRES/ABSN URINE INCON ASSESS: CPT | Performed by: FAMILY MEDICINE

## 2021-02-02 PROCEDURE — G8400 PT W/DXA NO RESULTS DOC: HCPCS | Performed by: FAMILY MEDICINE

## 2021-02-02 PROCEDURE — 1123F ACP DISCUSS/DSCN MKR DOCD: CPT | Performed by: FAMILY MEDICINE

## 2021-02-02 PROCEDURE — 84443 ASSAY THYROID STIM HORMONE: CPT

## 2021-02-02 PROCEDURE — G8482 FLU IMMUNIZE ORDER/ADMIN: HCPCS | Performed by: FAMILY MEDICINE

## 2021-02-02 PROCEDURE — 3017F COLORECTAL CA SCREEN DOC REV: CPT | Performed by: FAMILY MEDICINE

## 2021-02-02 PROCEDURE — 84439 ASSAY OF FREE THYROXINE: CPT

## 2021-02-02 PROCEDURE — G8427 DOCREV CUR MEDS BY ELIG CLIN: HCPCS | Performed by: FAMILY MEDICINE

## 2021-02-02 PROCEDURE — 83735 ASSAY OF MAGNESIUM: CPT

## 2021-02-02 PROCEDURE — 82306 VITAMIN D 25 HYDROXY: CPT

## 2021-02-02 PROCEDURE — 4040F PNEUMOC VAC/ADMIN/RCVD: CPT | Performed by: FAMILY MEDICINE

## 2021-02-02 PROCEDURE — 85027 COMPLETE CBC AUTOMATED: CPT

## 2021-02-02 PROCEDURE — 82746 ASSAY OF FOLIC ACID SERUM: CPT

## 2021-02-02 PROCEDURE — 82607 VITAMIN B-12: CPT

## 2021-02-02 RX ORDER — LOTEPREDNOL ETABONATE 5 MG/ML
SUSPENSION/ DROPS OPHTHALMIC
COMMUNITY
Start: 2021-01-20 | End: 2021-04-06 | Stop reason: ALTCHOICE

## 2021-02-02 ASSESSMENT — ENCOUNTER SYMPTOMS
RESPIRATORY NEGATIVE: 1
EYES NEGATIVE: 1
RHINORRHEA: 1
GASTROINTESTINAL NEGATIVE: 1
SINUS PRESSURE: 1

## 2021-02-02 NOTE — PROGRESS NOTES
Chief Complaint   Patient presents with    Thyroid Problem    Annual Exam       Have you seen any other physician or provider since your last visit yes - eye doctor    Have you had any other diagnostic tests since your last visit? no    Have you changed or stopped any medications since your last visit?  yes - for sinus and red eye

## 2021-02-02 NOTE — PROGRESS NOTES
SUBJECTIVE:    Patient ID: Kennedy Velasco is a 72 y.o. female. Chief Complaint   Patient presents with    Thyroid Problem    Annual Exam       HPI: office visit  She has had some significant sinus issues. She si having some improvement with the medicine Rut Staples gave her. She says she is feeling better. She is really struggling with a lot of medical issues. She has had some thyroid issues that she is concerned about. She still having some fatigue though she definitely feels somewhat better since starting the thyroid medicine. She has had some different issues going on that she is trying to continue to follow-up with. She has had some cholesterol issues her B12 was low her magnesium was low her vitamin D was low. She says she is trying to make sure with all the specialist she states that she is doing all the right things. Her blood pressures have not been bad at all. She denies any chest pain or increasing shortness of breath. She is little anxious. She has not had any recent falls or injuries. She does not have any other medication problems that she can tell. She does wonder if she really needs to be taking all the medicines that she is on. Review of Systems   Constitutional: Negative. HENT: Positive for congestion, ear pain, rhinorrhea and sinus pressure. Eyes: Negative. Respiratory: Negative. Cardiovascular: Negative. Gastrointestinal: Negative. Endocrine: Negative. Genitourinary: Negative. Musculoskeletal: Negative. Neurological: Negative. Psychiatric/Behavioral: Negative. All other systems reviewed and are negative.        OBJECTIVE:  /70   Pulse 84   Temp 97.7 °F (36.5 °C)   Resp 18   Ht 5' 4\" (1.626 m)   Wt 244 lb 8 oz (110.9 kg)   SpO2 99% Comment: ra  BMI 41.97 kg/m²    Wt Readings from Last 3 Encounters:   02/02/21 244 lb 8 oz (110.9 kg)   01/24/21 249 lb 9.6 oz (113.2 kg)   01/13/21 244 lb (110.7 kg)     BP Readings from Last 3 Encounters: 02/02/21 124/70   01/24/21 136/80   01/13/21 130/84      Pulse Readings from Last 3 Encounters:   02/02/21 84   01/24/21 99   01/13/21 88     Body mass index is 41.97 kg/m². Resp Readings from Last 3 Encounters:   02/02/21 18   01/24/21 18   01/13/21 18     Past medical, surgical, family and social history were reviewed and updated with the patient. Physical Exam  Vitals signs and nursing note reviewed. Constitutional:       Appearance: Normal appearance. HENT:      Head: Normocephalic and atraumatic. Right Ear: Ear canal and external ear normal. Tympanic membrane is erythematous. Left Ear: Ear canal and external ear normal. Tympanic membrane is erythematous. Nose: Congestion present. Mouth/Throat:      Mouth: Mucous membranes are moist.      Pharynx: Oropharynx is clear. Eyes:      Conjunctiva/sclera: Conjunctivae normal.   Neck:      Musculoskeletal: Normal range of motion and neck supple. Cardiovascular:      Rate and Rhythm: Normal rate and regular rhythm. Pulses: Normal pulses. Heart sounds: Normal heart sounds. Pulmonary:      Effort: Pulmonary effort is normal.      Breath sounds: Normal breath sounds. Musculoskeletal: Normal range of motion. Skin:     General: Skin is warm and dry. Capillary Refill: Capillary refill takes less than 2 seconds. Neurological:      Mental Status: She is alert and oriented to person, place, and time. Psychiatric:         Attention and Perception: Attention and perception normal.         Mood and Affect: Affect normal. Mood is anxious. Speech: Speech is rapid and pressured. Behavior: Behavior normal. Behavior is cooperative. Thought Content: Thought content normal.         Cognition and Memory: Cognition and memory normal.         Judgment: Judgment normal.         No results found for requested labs within last 30 days.      Hemoglobin A1C (%)   Date Value   07/16/2020 5.3     Microscopic Examination (no units)   Date Value   12/28/2017 Not Indicated     LDL Calculated (mg/dL)   Date Value   02/02/2021 109       Lab Results   Component Value Date    WBC 14.1 02/02/2021    NEUTROABS 3.6 10/02/2020    HGB 14.1 02/02/2021    HCT 45.0 02/02/2021    MCV 89.6 02/02/2021     02/02/2021     Lab Results   Component Value Date    TSH 1.39 02/02/2021       ASSESSMENT:    Diagnosis Orders   1. Multiple thyroid nodules  TSH without Reflex    T4, FREE   2. Fatigue, unspecified type  TSH without Reflex    T4, FREE    CBC   3. Vitamin D deficiency  VITAMIN D 25 HYDROXY   4. Hypomagnesemia  MAGNESIUM   5. B12 deficiency  VITAMIN B12 & FOLATE   6. Screening for cholesterol level  LIPID PANEL        PLAN:  No orders of the defined types were placed in this encounter. Medications Discontinued During This Encounter   Medication Reason    metroNIDAZOLE (METROCREAM) 0.75 % cream LIST CLEANUP       Controlled Substances Monitoring: Attestation: The Prescription Monitoring Report for this patient was reviewed today. Albert Luna MD)  Periodic Controlled Substance Monitoring: Possible medication side effects, risk of tolerance/dependence & alternative treatments discussed., No signs of potential drug abuse or diversion identified. , Assessed functional status., Obtaining appropriate analgesic effect of treatment. , Random urine drug screen sent today. Albert Luna MD)    Please note: This chart was generated using Dragon dictation software. Although every effort was made to ensure the accuracy of this automated transcription, some errors in transcription may have occurred.

## 2021-02-02 NOTE — PATIENT INSTRUCTIONS
We are committed to providing you with the best care possible. In order to help us achieve these goals please remember to bring all medications, herbal products, and over the counter supplements with you to each visit. If your provider has ordered testing for you, please be sure to follow up with our office if you have not received results within 7 days after the testing took place. *If you receive a survey after visiting one of our offices, please take time to share your experience concerning your physician office visit. These surveys are confidential and no health information about you is shared. We are eager to improve for you and we are counting on your feedback to help make that happen. · Keep a list of your medicines with you. List all of the prescription medicines, nonprescription medicines, supplements, natural remedies, and vitamins that you take. Tell your healthcare providers who treat you about all of the products you are taking. Your provider can provide you with a form to keep track of them. Just ask. · Follow the directions that come with your medicine, including information about food or alcohol. Make sure you know how and when to take your medicine. Do not take more or less than you are supposed to take. · Keep all medicines out of the reach of children. · Store medicines according to the directions on the label. · Monitor yourself. Learn to know how your body reacts to your new medicine and keep track of how it makes you feel before attempting (If your provider has allowed you to do so) to drive or go to work. · Seek emergency medical attention if you think you have used too much of this medicine. An overdose of any prescription medicine can be fatal. Overdose symptoms may include extreme drowsiness, muscle weakness, confusion, cold and clammy skin, pinpoint pupils, shallow breathing, slow heart rate, fainting, or coma.

## 2021-02-03 LAB
CHOLESTEROL, TOTAL: 208 MG/DL (ref 0–200)
FOLATE: 17.48 NG/ML
HCT VFR BLD CALC: 45 % (ref 37–47)
HDLC SERPL-MCNC: 60 MG/DL (ref 40–60)
HEMOGLOBIN: 14.1 G/DL (ref 11.5–16.5)
LDL CHOLESTEROL CALCULATED: 109 MG/DL
MAGNESIUM: 2 MG/DL (ref 1.7–2.4)
MCH RBC QN AUTO: 28.1 PG (ref 27–32)
MCHC RBC AUTO-ENTMCNC: 31.3 G/DL (ref 31–35)
MCV RBC AUTO: 89.6 FL (ref 80–100)
PDW BLD-RTO: 14.4 % (ref 11–16)
PLATELET # BLD: 262 K/UL (ref 150–400)
PMV BLD AUTO: 11.9 FL (ref 6–10)
RBC # BLD: 5.02 M/UL (ref 3.8–5.8)
T4 FREE: 1.5 NG/DL (ref 0.89–1.76)
TRIGL SERPL-MCNC: 194 MG/DL (ref 0–249)
TSH SERPL DL<=0.05 MIU/L-ACNC: 1.39 UIU/ML (ref 0.27–4.2)
VITAMIN B-12: 732 PG/ML (ref 211–911)
VITAMIN D 25-HYDROXY: 33.6 (ref 32–100)
VLDLC SERPL CALC-MCNC: 39 MG/DL
WBC # BLD: 14.1 K/UL (ref 4–11)

## 2021-02-08 ENCOUNTER — TELEPHONE (OUTPATIENT)
Dept: FAMILY MEDICINE CLINIC | Age: 66
End: 2021-02-08

## 2021-02-09 ENCOUNTER — OFFICE VISIT (OUTPATIENT)
Dept: FAMILY MEDICINE CLINIC | Age: 66
End: 2021-02-09
Payer: MEDICARE

## 2021-02-09 VITALS
HEIGHT: 64 IN | WEIGHT: 248 LBS | HEART RATE: 88 BPM | TEMPERATURE: 97.3 F | SYSTOLIC BLOOD PRESSURE: 130 MMHG | OXYGEN SATURATION: 99 % | DIASTOLIC BLOOD PRESSURE: 76 MMHG | BODY MASS INDEX: 42.34 KG/M2 | RESPIRATION RATE: 18 BRPM

## 2021-02-09 DIAGNOSIS — H61.21 IMPACTED CERUMEN, RIGHT EAR: ICD-10-CM

## 2021-02-09 DIAGNOSIS — J32.9 CHRONIC SINUSITIS, UNSPECIFIED LOCATION: ICD-10-CM

## 2021-02-09 DIAGNOSIS — H65.93 BILATERAL OTITIS MEDIA WITH EFFUSION: Primary | ICD-10-CM

## 2021-02-09 PROCEDURE — G8400 PT W/DXA NO RESULTS DOC: HCPCS | Performed by: NURSE PRACTITIONER

## 2021-02-09 PROCEDURE — 3017F COLORECTAL CA SCREEN DOC REV: CPT | Performed by: NURSE PRACTITIONER

## 2021-02-09 PROCEDURE — G8427 DOCREV CUR MEDS BY ELIG CLIN: HCPCS | Performed by: NURSE PRACTITIONER

## 2021-02-09 PROCEDURE — 99213 OFFICE O/P EST LOW 20 MIN: CPT | Performed by: NURSE PRACTITIONER

## 2021-02-09 PROCEDURE — 4040F PNEUMOC VAC/ADMIN/RCVD: CPT | Performed by: NURSE PRACTITIONER

## 2021-02-09 PROCEDURE — G8482 FLU IMMUNIZE ORDER/ADMIN: HCPCS | Performed by: NURSE PRACTITIONER

## 2021-02-09 PROCEDURE — 96372 THER/PROPH/DIAG INJ SC/IM: CPT | Performed by: NURSE PRACTITIONER

## 2021-02-09 PROCEDURE — 1123F ACP DISCUSS/DSCN MKR DOCD: CPT | Performed by: NURSE PRACTITIONER

## 2021-02-09 PROCEDURE — 1090F PRES/ABSN URINE INCON ASSESS: CPT | Performed by: NURSE PRACTITIONER

## 2021-02-09 PROCEDURE — G8417 CALC BMI ABV UP PARAM F/U: HCPCS | Performed by: NURSE PRACTITIONER

## 2021-02-09 PROCEDURE — 1036F TOBACCO NON-USER: CPT | Performed by: NURSE PRACTITIONER

## 2021-02-09 PROCEDURE — 69209 REMOVE IMPACTED EAR WAX UNI: CPT | Performed by: NURSE PRACTITIONER

## 2021-02-09 RX ORDER — METHYLPREDNISOLONE SODIUM SUCCINATE 125 MG/2ML
125 INJECTION, POWDER, LYOPHILIZED, FOR SOLUTION INTRAMUSCULAR; INTRAVENOUS ONCE
Status: COMPLETED | OUTPATIENT
Start: 2021-02-09 | End: 2021-02-09

## 2021-02-09 RX ORDER — CEPHALEXIN 500 MG/1
500 CAPSULE ORAL 3 TIMES DAILY
Qty: 30 CAPSULE | Refills: 0 | Status: SHIPPED | OUTPATIENT
Start: 2021-02-09 | End: 2021-02-19

## 2021-02-09 RX ORDER — METHYLPREDNISOLONE 4 MG/1
TABLET ORAL
Qty: 21 TABLET | Refills: 0 | Status: SHIPPED | OUTPATIENT
Start: 2021-02-09 | End: 2021-02-28 | Stop reason: ALTCHOICE

## 2021-02-09 RX ORDER — FLUCONAZOLE 150 MG/1
150 TABLET ORAL ONCE
Qty: 2 TABLET | Refills: 0 | Status: SHIPPED | OUTPATIENT
Start: 2021-02-09 | End: 2021-02-09

## 2021-02-09 RX ADMIN — METHYLPREDNISOLONE SODIUM SUCCINATE 125 MG: 125 INJECTION, POWDER, LYOPHILIZED, FOR SOLUTION INTRAMUSCULAR; INTRAVENOUS at 12:04

## 2021-02-09 ASSESSMENT — PATIENT HEALTH QUESTIONNAIRE - PHQ9
2. FEELING DOWN, DEPRESSED OR HOPELESS: 0
SUM OF ALL RESPONSES TO PHQ9 QUESTIONS 1 & 2: 0
SUM OF ALL RESPONSES TO PHQ QUESTIONS 1-9: 0
SUM OF ALL RESPONSES TO PHQ QUESTIONS 1-9: 0

## 2021-02-09 ASSESSMENT — ENCOUNTER SYMPTOMS: RHINORRHEA: 1

## 2021-02-09 NOTE — PROGRESS NOTES
Administrations This Visit     methylPREDNISolone sodium (SOLU-MEDROL) injection 125 mg     Admin Date  02/09/2021  12:04 Action  Given Dose  125 mg Route  Intramuscular Site  Dorsogluteal Right Administered By  Trav Latham RN    Ordering Provider: JO-ANN Rush CNP    NDC: 8951-5827-28    Lot#: [id number]    : [id number]. Patient Supplied?: No              Patient tolerated injection well. Patient advised to wait 20 minutes in the office following the injection. No signs/symptoms of reaction noted after 20 minutes. Righ ear irrigated using Rhino Ear Washer with luke warm water and hydrogen peroxide . No cerumen irrigated. TM: wnl. Pt tolerated well.

## 2021-02-09 NOTE — PROGRESS NOTES
SUBJECTIVE:    Patient ID: Stephanie Leung is a 72 y.o. female. Chief Complaint   Patient presents with    Ear Drainage     right ear     Otalgia     HPI:    Ear Drainage   There is pain in the right ear. This is a recurrent problem. The current episode started 1 to 4 weeks ago. The problem has been unchanged. There has been no fever. The pain is at a severity of 4/10. The pain is moderate. Associated symptoms include headaches and rhinorrhea. Pertinent negatives include no abdominal pain, coughing, diarrhea, ear discharge, hearing loss, neck pain, rash, sore throat or vomiting. She has tried antibiotics and NSAIDs for the symptoms. The treatment provided mild relief. Otalgia   There is pain in the right ear. This is a recurrent problem. The current episode started 1 to 4 weeks ago. The problem occurs constantly. The problem has been waxing and waning. There has been no fever. The pain is at a severity of 4/10. The pain is mild. Associated symptoms include headaches and rhinorrhea. Pertinent negatives include no abdominal pain, coughing, diarrhea, ear discharge, hearing loss, neck pain, rash, sore throat or vomiting. She has tried antibiotics, ear drops and NSAIDs for the symptoms. The treatment provided mild relief.         Active Ambulatory Problems     Diagnosis Date Noted    Contact dermatitis 10/24/2017    Acute non-recurrent frontal sinusitis 2019    Acute mucoid otitis media of right ear 2019     Resolved Ambulatory Problems     Diagnosis Date Noted    No Resolved Ambulatory Problems     Past Medical History:   Diagnosis Date    Arthritis      No Known Allergies   Past Surgical History:   Procedure Laterality Date     SECTION      x 2    COLONOSCOPY  2010    normal findings    COLONOSCOPY N/A 2020    COLORECTAL CANCER SCREENING, NOT HIGH RISK performed by Ruby Oliva MD at [facility] ENDOSCOPY    HYSTERECTOMY        Family History   Problem Relation Age of Onset  Cancer Mother         skin ca    High Blood Pressure Mother     Cancer Brother     High Blood Pressure Brother        Patient's medications, allergies, past medical, surgical, social and family histories were reviewed and updated as appropriate. Current Outpatient Medications on File Prior to Visit   Medication Sig Dispense Refill    ELDERBERRY PO Take by mouth      levothyroxine (SYNTHROID) 50 MCG tablet Take 50 mcg by mouth daily      DULoxetine (CYMBALTA) 60 MG extended release capsule Take 60 mg by mouth daily      Magnesium Gluconate 500 (27 Mg) MG TABS tablet Take by mouth      pregabalin (LYRICA) 75 MG capsule take 1 capsule by mouth twice a day  0    Ascorbic Acid (VITAMIN C) 250 MG tablet Take 250 mg by mouth      losartan (COZAAR) 50 MG tablet Take by mouth       nabumetone (RELAFEN) 750 MG tablet Take by mouth       Probiotic Product (PROBIOTIC-10) CAPS Take by mouth      Omega-3 Fatty Acids (FISH OIL) 1000 MG CAPS Take 3,000 mg by mouth 3 times daily      zolpidem (AMBIEN) 10 MG tablet take 1 tablet by mouth at bedtime if needed  0    vitamin D (ERGOCALCIFEROL) 13455 units CAPS capsule   0    desloratadine (CLARINEX) 5 MG tablet take 1 tablet by mouth once daily 90 tablet 1    estradiol (ESTRACE) 1 MG tablet Take 1 mg by mouth daily       Multiple Vitamins-Minerals (MULTIVITAL PO) Take by mouth daily      hydrochlorothiazide (HYDRODIURIL) 25 MG tablet Take 25 mg by mouth daily      vitamin B-12 (CYANOCOBALAMIN) 1000 MCG tablet Take 1,000 mcg by mouth daily      loteprednol (LOTEMAX) 0.5 % ophthalmic suspension USE 1 DROP IN EACH EYE 4 TIMES A DAY      fluticasone (FLONASE) 50 MCG/ACT nasal spray 1 spray by Nasal route daily (Patient not taking: Reported on 2/9/2021) 1 Bottle 3     No current facility-administered medications on file prior to visit. Review of Systems   Constitutional: Negative for activity change, appetite change, chills and fever.    HENT: Positive for congestion, ear pain (fullness, right) and rhinorrhea. Negative for ear discharge, hearing loss, nosebleeds, sore throat and trouble swallowing. Eyes: Negative for pain and redness. Respiratory: Negative for cough, chest tightness and shortness of breath. Cardiovascular: Negative for chest pain, palpitations and leg swelling. Gastrointestinal: Negative for abdominal pain, diarrhea, nausea and vomiting. Genitourinary: Negative for difficulty urinating, dysuria and flank pain. Musculoskeletal: Negative for arthralgias, back pain, gait problem and neck pain. Skin: Negative for color change, pallor, rash and wound. Allergic/Immunologic: Positive for environmental allergies. Negative for food allergies. Neurological: Positive for headaches. Negative for dizziness and numbness. Hematological: Negative for adenopathy. Does not bruise/bleed easily. Psychiatric/Behavioral: Negative for agitation and sleep disturbance. The patient is not nervous/anxious. OBJECTIVE:  /76   Pulse 88   Temp 97.3 °F (36.3 °C) (Infrared)   Resp 18   Ht 5' 4\" (1.626 m)   Wt 248 lb (112.5 kg)   SpO2 99% Comment: room air  Breastfeeding No   BMI 42.57 kg/m²        Physical Exam  Vitals signs and nursing note reviewed. Constitutional:       General: She is not in acute distress. Appearance: Normal appearance. She is well-developed. She is not ill-appearing, toxic-appearing or diaphoretic. HENT:      Head: Normocephalic and atraumatic. Right Ear: Hearing, ear canal and external ear normal. Drainage and tenderness present. A middle ear effusion is present. There is impacted cerumen. Tympanic membrane is erythematous. Tympanic membrane has decreased mobility. Left Ear: Hearing, ear canal and external ear normal. Tenderness present. A middle ear effusion is present. There is impacted cerumen. Tympanic membrane is erythematous. Tympanic membrane has decreased mobility.       Nose: Mucosal edema and congestion present. No laceration or rhinorrhea. Right Sinus: No maxillary sinus tenderness or frontal sinus tenderness. Left Sinus: No maxillary sinus tenderness or frontal sinus tenderness. Mouth/Throat:      Dentition: Normal dentition. No dental caries. Pharynx: Uvula midline. Tonsils: No tonsillar exudate. Eyes:      General: Lids are normal. No scleral icterus. Right eye: No discharge. Left eye: No discharge. Conjunctiva/sclera: Conjunctivae normal.      Pupils: Pupils are equal, round, and reactive to light. Neck:      Musculoskeletal: Full passive range of motion without pain, normal range of motion and neck supple. Thyroid: No thyroid mass or thyromegaly. Vascular: Normal carotid pulses. No carotid bruit, hepatojugular reflux or JVD. Trachea: Trachea and phonation normal. No tracheal tenderness or tracheal deviation. Cardiovascular:      Rate and Rhythm: Normal rate and regular rhythm. Pulses: Normal pulses. Heart sounds: Normal heart sounds, S1 normal and S2 normal. Heart sounds not distant. No murmur. No friction rub. No gallop. Pulmonary:      Effort: Pulmonary effort is normal. No tachypnea, bradypnea or accessory muscle usage. Breath sounds: Normal breath sounds. No stridor. No decreased breath sounds, wheezing, rhonchi or rales. Chest:      Chest wall: No tenderness. Abdominal:      General: Bowel sounds are normal. There is no distension. Palpations: Abdomen is soft. There is no hepatomegaly, splenomegaly or mass. Tenderness: There is no abdominal tenderness. There is no guarding or rebound. Hernia: No hernia is present. Musculoskeletal: Normal range of motion. General: No tenderness or deformity. Lymphadenopathy:      Cervical: No cervical adenopathy. Skin:     General: Skin is warm and dry. Capillary Refill: Capillary refill takes less than 2 seconds.       Coloration: Skin is not pale. Findings: No bruising, erythema or rash. Neurological:      Mental Status: She is alert and oriented to person, place, and time. She is not disoriented. GCS: GCS eye subscore is 4. GCS verbal subscore is 5. GCS motor subscore is 6. Cranial Nerves: No cranial nerve deficit. Sensory: No sensory deficit. Motor: No abnormal muscle tone. Coordination: Coordination normal.      Deep Tendon Reflexes: Reflexes normal.   Psychiatric:         Speech: Speech normal.         Behavior: Behavior normal.         Thought Content: Thought content normal.         Judgment: Judgment normal.         No results found for requested labs within last 30 days. Hemoglobin A1C (%)   Date Value   07/16/2020 5.3     Microscopic Examination (no units)   Date Value   12/28/2017 Not Indicated     LDL Calculated (mg/dL)   Date Value   02/02/2021 109       Lab Results   Component Value Date    WBC 14.1 02/02/2021    NEUTROABS 3.6 10/02/2020    HGB 14.1 02/02/2021    HCT 45.0 02/02/2021    MCV 89.6 02/02/2021     02/02/2021     Lab Results   Component Value Date    TSH 1.39 02/02/2021        ASSESSMENT/PLAN:     Luke Johnston was seen today for ear drainage and otalgia. Diagnoses and all orders for this visit:    Bilateral otitis media with effusion  -     External Referral To ENT  -     methylPREDNISolone (MEDROL DOSEPACK) 4 MG tablet; Take by mouth 891932 stop  -     methylPREDNISolone sodium (SOLU-MEDROL) injection 125 mg  -     [place] REMOVAL IMPACTED CERUMEN IRRIGATION/LVG UNILAT  -     cephALEXin (KEFLEX) 500 MG capsule; Take 1 capsule by mouth 3 times daily for 10 days  -     fluconazole (DIFLUCAN) 150 MG tablet; Take 1 tablet by mouth once for 1 dose May repeat in 3-5 days, if symptoms persist or recur. Chronic sinusitis, unspecified location  -     External Referral To ENT  -     methylPREDNISolone (MEDROL DOSEPACK) 4 MG tablet;  Take by mouth [id number] stop  -     methylPREDNISolone sodium (SOLU-MEDROL) injection 125 mg  -     [place] REMOVAL IMPACTED CERUMEN IRRIGATION/LVG UNILAT  Ceruminosis is noted. Wax is removed by syringing and manual debridement. Instructions for home care to prevent wax buildup are given. -     cephALEXin (KEFLEX) 500 MG capsule; Take 1 capsule by mouth 3 times daily for 10 days  -     fluconazole (DIFLUCAN) 150 MG tablet; Take 1 tablet by mouth once for 1 dose May repeat in 3-5 days, if symptoms persist or recur. Impacted cerumen, right ear   -     [place] REMOVAL IMPACTED CERUMEN IRRIGATION/LVG UNILAT  - Tolerated procedure without any distress           Administrations This Visit     methylPREDNISolone sodium (SOLU-MEDROL) injection 125 mg     Admin Date  02/09/2021 Action  Given Dose  125 mg Route  Intramuscular Administered By  Pily Oakes RN                Controlled Substances Monitoring:     RX Monitoring 2/2/2021   Attestation The Prescription Monitoring Report for this patient was reviewed today. Periodic Controlled Substance Monitoring Possible medication side effects, risk of tolerance/dependence & alternative treatments discussed. ;No signs of potential drug abuse or diversion identified. ;Assessed functional status. ;Obtaining appropriate analgesic effect of treatment. ;Random urine drug screen sent today. Chronic Pain > 50 MEDD Obtained or confirmed \"Consent for Opioid Use\" on file. PATIENT COUNSELING     Counseling was provided today regarding the following topics: Healthy eating habits, Regular exercise, substance abuse and healthy sleep habits. Discussed use, benefit, and side effects of prescribed medications. Barriers to medication compliance addressed. All patient questions answered. Patient voiced understanding. Medications Discontinued During This Encounter   Medication Reason    fluconazole (DIFLUCAN) 150 MG tablet REORDER       Return if symptoms worsen or fail to improve.     JO-ANN Cavazos - CNP     Education was provided for discussed topics. Call the office with worsening complaints or any side effects to any medications. If an emergency please call 911. Please note: This chart was generated using Dragon dictation software. Although every effort was made to ensure the accuracy of this automated transcription, some errors in transcription may have occurred.

## 2021-02-11 DIAGNOSIS — H92.09 OTALGIA, UNSPECIFIED LATERALITY: Primary | ICD-10-CM

## 2021-02-18 ENCOUNTER — TELEPHONE (OUTPATIENT)
Dept: FAMILY MEDICINE CLINIC | Age: 66
End: 2021-02-18

## 2021-02-18 NOTE — TELEPHONE ENCOUNTER
Patient scheduled to see [facility] (Dr. Erica Schrader) on 03/19/21 at 11am.    Patient's referral, along with all pertinent medical records faxed to the attention of [facility] scheduling on 02/16/21. Patient contacted advised of appointment date/time/location. Patient verbalized understanding of all instructions.  (Per [facility], I have also mailed patient referral with appointment details.)

## 2021-02-21 ASSESSMENT — ENCOUNTER SYMPTOMS
EYE REDNESS: 0
BACK PAIN: 0
SORE THROAT: 0
DIARRHEA: 0
NAUSEA: 0
TROUBLE SWALLOWING: 0
EYE PAIN: 0
COUGH: 0
VOMITING: 0
ABDOMINAL PAIN: 0
COLOR CHANGE: 0
CHEST TIGHTNESS: 0
SHORTNESS OF BREATH: 0

## 2021-02-28 ENCOUNTER — OFFICE VISIT (OUTPATIENT)
Dept: PRIMARY CARE CLINIC | Age: 66
End: 2021-02-28
Payer: MEDICARE

## 2021-02-28 VITALS
TEMPERATURE: 97.5 F | OXYGEN SATURATION: 98 % | SYSTOLIC BLOOD PRESSURE: 130 MMHG | HEART RATE: 95 BPM | DIASTOLIC BLOOD PRESSURE: 74 MMHG | BODY MASS INDEX: 42.91 KG/M2 | RESPIRATION RATE: 18 BRPM | WEIGHT: 250 LBS

## 2021-02-28 DIAGNOSIS — M26.621 ARTHRALGIA OF RIGHT TEMPOROMANDIBULAR JOINT: Primary | ICD-10-CM

## 2021-02-28 PROCEDURE — 96372 THER/PROPH/DIAG INJ SC/IM: CPT | Performed by: NURSE PRACTITIONER

## 2021-02-28 PROCEDURE — 1123F ACP DISCUSS/DSCN MKR DOCD: CPT | Performed by: NURSE PRACTITIONER

## 2021-02-28 PROCEDURE — 3017F COLORECTAL CA SCREEN DOC REV: CPT | Performed by: NURSE PRACTITIONER

## 2021-02-28 PROCEDURE — G8417 CALC BMI ABV UP PARAM F/U: HCPCS | Performed by: NURSE PRACTITIONER

## 2021-02-28 PROCEDURE — G8482 FLU IMMUNIZE ORDER/ADMIN: HCPCS | Performed by: NURSE PRACTITIONER

## 2021-02-28 PROCEDURE — 1036F TOBACCO NON-USER: CPT | Performed by: NURSE PRACTITIONER

## 2021-02-28 PROCEDURE — 4040F PNEUMOC VAC/ADMIN/RCVD: CPT | Performed by: NURSE PRACTITIONER

## 2021-02-28 PROCEDURE — G8427 DOCREV CUR MEDS BY ELIG CLIN: HCPCS | Performed by: NURSE PRACTITIONER

## 2021-02-28 PROCEDURE — 1090F PRES/ABSN URINE INCON ASSESS: CPT | Performed by: NURSE PRACTITIONER

## 2021-02-28 PROCEDURE — 99213 OFFICE O/P EST LOW 20 MIN: CPT | Performed by: NURSE PRACTITIONER

## 2021-02-28 PROCEDURE — G8400 PT W/DXA NO RESULTS DOC: HCPCS | Performed by: NURSE PRACTITIONER

## 2021-02-28 RX ORDER — KETOROLAC TROMETHAMINE 30 MG/ML
60 INJECTION, SOLUTION INTRAMUSCULAR; INTRAVENOUS ONCE
Status: COMPLETED | OUTPATIENT
Start: 2021-02-28 | End: 2021-02-28

## 2021-02-28 RX ORDER — METHYLPREDNISOLONE SODIUM SUCCINATE 125 MG/2ML
125 INJECTION, POWDER, LYOPHILIZED, FOR SOLUTION INTRAMUSCULAR; INTRAVENOUS ONCE
Status: COMPLETED | OUTPATIENT
Start: 2021-02-28 | End: 2021-02-28

## 2021-02-28 RX ADMIN — KETOROLAC TROMETHAMINE 60 MG: 30 INJECTION, SOLUTION INTRAMUSCULAR; INTRAVENOUS at 12:02

## 2021-02-28 RX ADMIN — METHYLPREDNISOLONE SODIUM SUCCINATE 125 MG: 125 INJECTION, POWDER, LYOPHILIZED, FOR SOLUTION INTRAMUSCULAR; INTRAVENOUS at 12:03

## 2021-02-28 ASSESSMENT — ENCOUNTER SYMPTOMS
VOICE CHANGE: 0
EYES NEGATIVE: 1
SINUS PRESSURE: 0
FACIAL SWELLING: 1
SINUS PAIN: 0
RHINORRHEA: 0
TROUBLE SWALLOWING: 0
SORE THROAT: 0
RESPIRATORY NEGATIVE: 1

## 2021-02-28 NOTE — PROGRESS NOTES
Pt co right ear pain x few months . She has tried several abx and steroids and it is not getting any better.

## 2021-02-28 NOTE — PROGRESS NOTES
This is a 72 y.o. female who presents with   Chief Complaint   Patient presents with    Otalgia     right ear        SUBJECTIVE:     Per history  Is on NSAID for arthritis . Right eye swollen painful  And skin to face irritated and raised areas. .treated in past with antibiotics. And steroids. Has ENT  appt for this in 2 weeks. Current Outpatient Medications   Medication Sig Dispense Refill    ELDERBERRY PO Take by mouth      loteprednol (LOTEMAX) 0.5 % ophthalmic suspension USE 1 DROP IN EACH EYE 4 TIMES A DAY      levothyroxine (SYNTHROID) 50 MCG tablet Take 50 mcg by mouth daily      DULoxetine (CYMBALTA) 60 MG extended release capsule Take 60 mg by mouth daily      Magnesium Gluconate 500 (27 Mg) MG TABS tablet Take by mouth      pregabalin (LYRICA) 75 MG capsule take 1 capsule by mouth twice a day  0    Ascorbic Acid (VITAMIN C) 250 MG tablet Take 250 mg by mouth      losartan (COZAAR) 50 MG tablet Take by mouth       nabumetone (RELAFEN) 750 MG tablet Take by mouth       Probiotic Product (PROBIOTIC-10) CAPS Take by mouth      Omega-3 Fatty Acids (FISH OIL) 1000 MG CAPS Take 3,000 mg by mouth 3 times daily      zolpidem (AMBIEN) 10 MG tablet Take 5 mg by mouth nightly as needed. 0    vitamin D (ERGOCALCIFEROL) 91368 units CAPS capsule   0    fluticasone (FLONASE) 50 MCG/ACT nasal spray 1 spray by Nasal route daily 1 Bottle 3    desloratadine (CLARINEX) 5 MG tablet take 1 tablet by mouth once daily 90 tablet 1    estradiol (ESTRACE) 1 MG tablet Take 1 mg by mouth daily       Multiple Vitamins-Minerals (MULTIVITAL PO) Take by mouth daily      hydrochlorothiazide (HYDRODIURIL) 25 MG tablet Take 25 mg by mouth daily      vitamin B-12 (CYANOCOBALAMIN) 1000 MCG tablet Take 1,000 mcg by mouth daily       No current facility-administered medications for this visit. No Known Allergies    Review of Systems   Constitutional: Negative. Negative for fever.    HENT: Positive for dental problem, ear discharge and facial swelling. Negative for congestion, drooling, hearing loss, mouth sores, nosebleeds, postnasal drip, rhinorrhea, sinus pressure, sinus pain, sneezing, sore throat, tinnitus, trouble swallowing and voice change. Eyes: Negative. Respiratory: Negative. Cardiovascular: Negative. Skin: Positive for rash. All other systems reviewed and are negative. OBJECTIVE:     /74   Pulse 95   Temp 97.5 °F (36.4 °C)   Resp 18   Wt 250 lb (113.4 kg)   SpO2 98%   BMI 42.91 kg/m²      Physical Exam  Vitals signs and nursing note reviewed. Constitutional:       General: She is not in acute distress. Appearance: She is obese. She is not ill-appearing, toxic-appearing or diaphoretic. HENT:      Head: Normocephalic and atraumatic. No raccoon eyes, Barron's sign, abrasion, contusion, masses, right periorbital erythema, left periorbital erythema or laceration. Hair is normal.      Jaw: Tenderness, swelling and pain on movement present. Salivary Glands: Right salivary gland is tender. Right salivary gland is not diffusely enlarged. Comments: Exam indicated TMJ. Patient is rubbing this area so some swelling to tragus . And right cheek over TMJ. On exam on opening and closing jaw  Pain is increased at TMJ. Direct  palpation causes pain. Is missing several lower teeth on lower jaw. RIGHT TM normal exam on visualization, some edema to outer portion   of ear canal.     Right Ear: Tympanic membrane normal.      Left Ear: Tympanic membrane and external ear normal.      Nose: Nose normal.      Mouth/Throat:      Mouth: Mucous membranes are moist.   Eyes:      Pupils: Pupils are equal, round, and reactive to light. Neck:      Musculoskeletal: Normal range of motion. Neck rigidity and muscular tenderness present. Cardiovascular:      Rate and Rhythm: Normal rate. Pulses: Normal pulses.    Pulmonary:      Effort: Pulmonary effort is normal.      Breath sounds: Normal breath sounds. Lymphadenopathy:      Cervical: No cervical adenopathy. Skin:     General: Skin is warm and dry. Findings: Erythema present. Neurological:      Mental Status: She is alert. Orders Placed This Encounter   Medications    methylPREDNISolone sodium (SOLU-MEDROL) injection 125 mg    ketorolac (TORADOL) injection 60 mg        Orders Placed This Encounter   Medications    methylPREDNISolone sodium (SOLU-MEDROL) injection 125 mg    ketorolac (TORADOL) injection 60 mg       ASSESSMENT/PLAN    No follow-ups on file. Right Ear and facial pain  TMJ dysfunction given steroid IM and Toradol  For short term pain control. To use foot ball type mouth guard till she sees ENT  While sleeping. Continue RX NSAID and add Tylenol OTC as directed .

## 2021-03-07 ENCOUNTER — HOSPITAL ENCOUNTER (OUTPATIENT)
Facility: HOSPITAL | Age: 66
Discharge: HOME | End: 2021-03-07
Payer: MEDICARE

## 2021-03-07 LAB
A/G RATIO: 1.3 (ref 0.8–2)
ALBUMIN SERPL-MCNC: 4 G/DL (ref 3.4–4.8)
ALP BLD-CCNC: 68 U/L (ref 25–100)
ALT SERPL-CCNC: 29 U/L (ref 4–36)
ANION GAP SERPL CALCULATED.3IONS-SCNC: 10 MMOL/L (ref 3–16)
AST SERPL-CCNC: 22 U/L (ref 8–33)
BASOPHILS ABSOLUTE: 0 K/UL (ref 0–0.1)
BASOPHILS RELATIVE PERCENT: 0.6 %
BILIRUB SERPL-MCNC: 0.4 MG/DL (ref 0.3–1.2)
BUN BLDV-MCNC: 24 MG/DL (ref 6–20)
CALCIUM SERPL-MCNC: 9.5 MG/DL (ref 8.5–10.5)
CHLORIDE BLD-SCNC: 100 MMOL/L (ref 98–107)
CO2: 26 MMOL/L (ref 20–30)
CREAT SERPL-MCNC: 0.8 MG/DL (ref 0.4–1.2)
EOSINOPHILS ABSOLUTE: 0.1 K/UL (ref 0–0.4)
EOSINOPHILS RELATIVE PERCENT: 1.5 %
GFR AFRICAN AMERICAN: >59
GFR NON-AFRICAN AMERICAN: >60
GLOBULIN: 3.2 G/DL
GLUCOSE BLD-MCNC: 108 MG/DL (ref 74–106)
HCT VFR BLD CALC: 43 % (ref 37–47)
HEMOGLOBIN: 13.9 G/DL (ref 11.5–16.5)
IMMATURE GRANULOCYTES #: 0 K/UL
IMMATURE GRANULOCYTES %: 0.6 % (ref 0–5)
LYMPHOCYTES ABSOLUTE: 2.5 K/UL (ref 1.5–4)
LYMPHOCYTES RELATIVE PERCENT: 33.8 %
MCH RBC QN AUTO: 28.8 PG (ref 27–32)
MCHC RBC AUTO-ENTMCNC: 32.3 G/DL (ref 31–35)
MCV RBC AUTO: 89.2 FL (ref 80–100)
MONOCYTES ABSOLUTE: 0.7 K/UL (ref 0.2–0.8)
MONOCYTES RELATIVE PERCENT: 9.4 %
NEUTROPHILS ABSOLUTE: 3.9 K/UL (ref 2–7.5)
NEUTROPHILS RELATIVE PERCENT: 54.1 %
PDW BLD-RTO: 14 % (ref 11–16)
PLATELET # BLD: 243 K/UL (ref 150–400)
PMV BLD AUTO: 10.3 FL (ref 6–10)
POTASSIUM SERPL-SCNC: 3.8 MMOL/L (ref 3.4–5.1)
RBC # BLD: 4.82 M/UL (ref 3.8–5.8)
SODIUM BLD-SCNC: 136 MMOL/L (ref 136–145)
TOTAL PROTEIN: 7.2 G/DL (ref 6.4–8.3)
WBC # BLD: 7.2 K/UL (ref 4–11)

## 2021-03-07 PROCEDURE — 80053 COMPREHEN METABOLIC PANEL: CPT

## 2021-03-07 PROCEDURE — 85025 COMPLETE CBC W/AUTO DIFF WBC: CPT

## 2021-03-07 PROCEDURE — 36415 COLL VENOUS BLD VENIPUNCTURE: CPT

## 2021-03-09 ENCOUNTER — OFFICE VISIT (OUTPATIENT)
Dept: FAMILY MEDICINE CLINIC | Age: 66
End: 2021-03-09
Payer: MEDICARE

## 2021-03-09 VITALS
DIASTOLIC BLOOD PRESSURE: 78 MMHG | BODY MASS INDEX: 42.08 KG/M2 | OXYGEN SATURATION: 98 % | SYSTOLIC BLOOD PRESSURE: 158 MMHG | RESPIRATION RATE: 18 BRPM | WEIGHT: 246.5 LBS | HEART RATE: 85 BPM | TEMPERATURE: 97.7 F | HEIGHT: 64 IN

## 2021-03-09 DIAGNOSIS — L40.9 PSORIASIS: Primary | ICD-10-CM

## 2021-03-09 DIAGNOSIS — Z78.0 POST-MENOPAUSAL: ICD-10-CM

## 2021-03-09 PROCEDURE — 99214 OFFICE O/P EST MOD 30 MIN: CPT | Performed by: FAMILY MEDICINE

## 2021-03-09 PROCEDURE — G8482 FLU IMMUNIZE ORDER/ADMIN: HCPCS | Performed by: FAMILY MEDICINE

## 2021-03-09 PROCEDURE — 1036F TOBACCO NON-USER: CPT | Performed by: FAMILY MEDICINE

## 2021-03-09 PROCEDURE — 1090F PRES/ABSN URINE INCON ASSESS: CPT | Performed by: FAMILY MEDICINE

## 2021-03-09 PROCEDURE — 4040F PNEUMOC VAC/ADMIN/RCVD: CPT | Performed by: FAMILY MEDICINE

## 2021-03-09 PROCEDURE — G8400 PT W/DXA NO RESULTS DOC: HCPCS | Performed by: FAMILY MEDICINE

## 2021-03-09 PROCEDURE — 1123F ACP DISCUSS/DSCN MKR DOCD: CPT | Performed by: FAMILY MEDICINE

## 2021-03-09 PROCEDURE — G8427 DOCREV CUR MEDS BY ELIG CLIN: HCPCS | Performed by: FAMILY MEDICINE

## 2021-03-09 PROCEDURE — 3017F COLORECTAL CA SCREEN DOC REV: CPT | Performed by: FAMILY MEDICINE

## 2021-03-09 PROCEDURE — G8417 CALC BMI ABV UP PARAM F/U: HCPCS | Performed by: FAMILY MEDICINE

## 2021-03-09 RX ORDER — CALCIPOTRIENE 0.05 MG/ML
SOLUTION TOPICAL 2 TIMES DAILY
Qty: 60 ML | Refills: 1 | Status: SHIPPED | OUTPATIENT
Start: 2021-03-09 | End: 2021-03-10 | Stop reason: SDUPTHER

## 2021-03-09 ASSESSMENT — ENCOUNTER SYMPTOMS
NAUSEA: 0
SHORTNESS OF BREATH: 0
SORE THROAT: 0
ABDOMINAL PAIN: 0
RHINORRHEA: 1
BACK PAIN: 0
COLOR CHANGE: 0
EYE REDNESS: 0
TROUBLE SWALLOWING: 0
COUGH: 0
DIARRHEA: 0
CHEST TIGHTNESS: 0
EYE PAIN: 0
VOMITING: 0

## 2021-03-09 NOTE — PROGRESS NOTES
Chief Complaint   Patient presents with    Thyroid Problem    Otalgia     right       Have you seen any other physician or provider since your last visit yes - mack    Have you had any other diagnostic tests since your last visit? no    Have you changed or stopped any medications since your last visit? no

## 2021-03-09 NOTE — PROGRESS NOTES
reviewed and updated with the patient. Physical Exam  Vitals signs and nursing note reviewed. Constitutional:       General: She is not in acute distress. Appearance: Normal appearance. She is well-developed. She is not ill-appearing, toxic-appearing or diaphoretic. HENT:      Head: Normocephalic and atraumatic. Right Ear: Hearing, ear canal and external ear normal. Drainage and tenderness present. A middle ear effusion is present. There is impacted cerumen. Tympanic membrane is erythematous. Tympanic membrane has decreased mobility. Left Ear: Hearing, ear canal and external ear normal. Tenderness present. A middle ear effusion is present. There is impacted cerumen. Tympanic membrane is erythematous. Tympanic membrane has decreased mobility. Nose: Mucosal edema and congestion present. No laceration or rhinorrhea. Right Sinus: No maxillary sinus tenderness or frontal sinus tenderness. Left Sinus: No maxillary sinus tenderness or frontal sinus tenderness. Mouth/Throat:      Dentition: Normal dentition. No dental caries. Pharynx: Uvula midline. Tonsils: No tonsillar exudate. Eyes:      General: Lids are normal. No scleral icterus. Right eye: No discharge. Left eye: No discharge. Conjunctiva/sclera: Conjunctivae normal.      Pupils: Pupils are equal, round, and reactive to light. Neck:      Musculoskeletal: Full passive range of motion without pain, normal range of motion and neck supple. Thyroid: No thyroid mass or thyromegaly. Vascular: Normal carotid pulses. No carotid bruit, hepatojugular reflux or JVD. Trachea: Trachea and phonation normal. No tracheal tenderness or tracheal deviation. Cardiovascular:      Rate and Rhythm: Normal rate and regular rhythm. Pulses: Normal pulses. Heart sounds: Normal heart sounds, S1 normal and S2 normal. Heart sounds not distant. No murmur. No friction rub. No gallop.     Pulmonary: Effort: Pulmonary effort is normal. No tachypnea, bradypnea or accessory muscle usage. Breath sounds: Normal breath sounds. No stridor. No decreased breath sounds, wheezing, rhonchi or rales. Chest:      Chest wall: No tenderness. Abdominal:      General: Bowel sounds are normal. There is no distension. Palpations: Abdomen is soft. There is no hepatomegaly, splenomegaly or mass. Tenderness: There is no abdominal tenderness. There is no guarding or rebound. Hernia: No hernia is present. Musculoskeletal: Normal range of motion. General: No tenderness or deformity. Lymphadenopathy:      Cervical: No cervical adenopathy. Skin:     General: Skin is warm and dry. Capillary Refill: Capillary refill takes less than 2 seconds. Coloration: Skin is not pale. Findings: No bruising, erythema or rash. Neurological:      Mental Status: She is alert and oriented to person, place, and time. She is not disoriented. GCS: GCS eye subscore is 4. GCS verbal subscore is 5. GCS motor subscore is 6. Cranial Nerves: No cranial nerve deficit. Sensory: No sensory deficit. Motor: No abnormal muscle tone. Coordination: Coordination normal.      Deep Tendon Reflexes: Reflexes normal.   Psychiatric:         Speech: Speech normal.         Behavior: Behavior normal.         Thought Content:  Thought content normal.         Judgment: Judgment normal.          Results in Past 30 Days  Result Component Current Result Ref Range Previous Result Ref Range   Albumin/Globulin Ratio 1.3 (3/7/2021) 0.8 - 2.0 Not in Time Range    Albumin 4.0 (3/7/2021) 3.4 - 4.8 g/dL Not in Time Range    Alkaline Phosphatase 68 (3/7/2021) 25 - 100 U/L Not in Time Range    ALT 29 (3/7/2021) 4 - 36 U/L Not in Time Range    AST 22 (3/7/2021) 8 - 33 U/L Not in Time Range    BUN 24 (H) (3/7/2021) 6 - 20 mg/dL Not in Time Range    Calcium 9.5 (3/7/2021) 8.5 - 10.5 mg/dL Not in Time Range    Chloride 100 (3/7/2021) 98 - 107 mmol/L Not in Time Range    CO2 26 (3/7/2021) 20 - 30 mmol/L Not in Time Range    CREATININE 0.8 (3/7/2021) 0.4 - 1.2 mg/dL Not in Time Range    GFR  >59 (3/7/2021) >59 Not in Time Range    GFR Non- >60 (3/7/2021) >59 Not in Time Range    Globulin 3.2 (3/7/2021) g/dL Not in Time Range    Glucose 108 (H) (3/7/2021) 74 - 106 mg/dL Not in Time Range    Potassium 3.8 (3/7/2021) 3.4 - 5.1 mmol/L Not in Time Range    Sodium 136 (3/7/2021) 136 - 145 mmol/L Not in Time Range    Total Bilirubin 0.4 (3/7/2021) 0.3 - 1.2 mg/dL Not in Time Range    Total Protein 7.2 (3/7/2021) 6.4 - 8.3 g/dL Not in Time Range      Hemoglobin A1C (%)   Date Value   07/16/2020 5.3     Microscopic Examination (no units)   Date Value   12/28/2017 Not Indicated     LDL Calculated (mg/dL)   Date Value   02/02/2021 109       Lab Results   Component Value Date    WBC 7.2 03/07/2021    NEUTROABS 3.9 03/07/2021    HGB 13.9 03/07/2021    HCT 43.0 03/07/2021    MCV 89.2 03/07/2021     03/07/2021     Lab Results   Component Value Date    TSH 1.39 02/02/2021       ASSESSMENT:    Diagnosis Orders   1. Psoriasis     2. Post-menopausal  DEXA Bone Density Axial Skeleton        PLAN:  Orders Placed This Encounter   Medications    calcipotriene (DOVONEX) 0.005 % solution     Sig: Apply topically 2 times daily     Dispense:  60 mL     Refill:  1        There are no discontinued medications. Controlled Substances Monitoring:      Please note: This chart was generated using Dragon dictation software. Although every effort was made to ensure the accuracy of this automated transcription, some errors in transcription may have occurred.

## 2021-03-10 RX ORDER — CALCIPOTRIENE 0.05 MG/ML
SOLUTION TOPICAL 2 TIMES DAILY
Qty: 60 ML | Refills: 1 | Status: SHIPPED | OUTPATIENT
Start: 2021-03-10 | End: 2021-04-06

## 2021-03-30 ENCOUNTER — HOSPITAL ENCOUNTER (OUTPATIENT)
Dept: GENERAL RADIOLOGY | Facility: HOSPITAL | Age: 66
Discharge: HOME | End: 2021-03-30
Payer: MEDICARE

## 2021-03-30 DIAGNOSIS — Z78.0 POST-MENOPAUSAL: ICD-10-CM

## 2021-03-30 PROCEDURE — 77080 DXA BONE DENSITY AXIAL: CPT

## 2021-04-06 ENCOUNTER — OFFICE VISIT (OUTPATIENT)
Dept: FAMILY MEDICINE CLINIC | Age: 66
End: 2021-04-06
Payer: MEDICARE

## 2021-04-06 VITALS
HEIGHT: 64 IN | DIASTOLIC BLOOD PRESSURE: 76 MMHG | TEMPERATURE: 97 F | OXYGEN SATURATION: 98 % | RESPIRATION RATE: 18 BRPM | WEIGHT: 252 LBS | SYSTOLIC BLOOD PRESSURE: 128 MMHG | BODY MASS INDEX: 43.02 KG/M2 | HEART RATE: 76 BPM

## 2021-04-06 DIAGNOSIS — R63.5 WEIGHT GAIN: ICD-10-CM

## 2021-04-06 DIAGNOSIS — R53.83 FATIGUE, UNSPECIFIED TYPE: ICD-10-CM

## 2021-04-06 DIAGNOSIS — I10 ESSENTIAL HYPERTENSION: ICD-10-CM

## 2021-04-06 DIAGNOSIS — L25.9 CONTACT DERMATITIS, UNSPECIFIED CONTACT DERMATITIS TYPE, UNSPECIFIED TRIGGER: ICD-10-CM

## 2021-04-06 DIAGNOSIS — J32.9 CHRONIC SINUSITIS, UNSPECIFIED LOCATION: Primary | ICD-10-CM

## 2021-04-06 PROCEDURE — 99214 OFFICE O/P EST MOD 30 MIN: CPT | Performed by: FAMILY MEDICINE

## 2021-04-06 RX ORDER — CEFDINIR 300 MG/1
300 CAPSULE ORAL 2 TIMES DAILY
Qty: 20 CAPSULE | Refills: 0 | Status: SHIPPED | OUTPATIENT
Start: 2021-04-06 | End: 2021-04-16

## 2021-04-06 ASSESSMENT — PATIENT HEALTH QUESTIONNAIRE - PHQ9
1. LITTLE INTEREST OR PLEASURE IN DOING THINGS: 0
SUM OF ALL RESPONSES TO PHQ9 QUESTIONS 1 & 2: 0
SUM OF ALL RESPONSES TO PHQ QUESTIONS 1-9: 0
2. FEELING DOWN, DEPRESSED OR HOPELESS: 0

## 2021-04-06 ASSESSMENT — ENCOUNTER SYMPTOMS
BACK PAIN: 0
SHORTNESS OF BREATH: 0
EYE REDNESS: 0
RHINORRHEA: 1
EYE PAIN: 0
DIARRHEA: 0
NAUSEA: 0
TROUBLE SWALLOWING: 0
COLOR CHANGE: 0
SORE THROAT: 0
CHEST TIGHTNESS: 0
ABDOMINAL PAIN: 0
VOMITING: 0
COUGH: 0

## 2021-04-06 NOTE — PROGRESS NOTES
SUBJECTIVE:    Patient ID: Jenna Tarango is a 77 y.o. female. Chief Complaint   Patient presents with    Sinus Problem     \"think I may have sinus infection\" - has been to ENT regarding otalgia and hearing     Hearing Loss    Hypertension       HPI: office visit  Her skin is better. She is having less ear issues. She did have some hearing loss. She is not ready to have a hearing aid. She is having some sinus congestion is worse. She Is having some thick drainage. She has green nasal secreations. She is having continued anxiety. She is having good blood pressures. She is wondering about her other medication. She is wanting to stop some of them. She has follow up with gyn and her neurologist and the endocrinologist.  She is frustated with her weight gain. She is not having any chest pain or increase in sob. Review of Systems   Constitutional: Negative for activity change, appetite change, chills and fever. HENT: Positive for congestion, ear pain (fullness, right) and rhinorrhea. Negative for ear discharge, hearing loss, nosebleeds, sore throat and trouble swallowing. Eyes: Negative for pain and redness. Respiratory: Negative for cough, chest tightness and shortness of breath. Cardiovascular: Negative for chest pain, palpitations and leg swelling. Gastrointestinal: Negative for abdominal pain, diarrhea, nausea and vomiting. Genitourinary: Negative for difficulty urinating, dysuria and flank pain. Musculoskeletal: Negative for arthralgias, back pain, gait problem and neck pain. Skin: Negative for color change, pallor, rash and wound. Allergic/Immunologic: Positive for environmental allergies. Negative for food allergies. Neurological: Positive for headaches. Negative for dizziness and numbness. Hematological: Negative for adenopathy. Does not bruise/bleed easily. Psychiatric/Behavioral: Negative for agitation and sleep disturbance. The patient is not nervous/anxious. All other systems reviewed and are negative. OBJECTIVE:  /76   Pulse 76   Temp 97 °F (36.1 °C) (Infrared)   Resp 18   Ht 5' 4\" (1.626 m)   Wt 252 lb (114.3 kg)   SpO2 98% Comment: room air  Breastfeeding No   BMI 43.26 kg/m²    Wt Readings from Last 3 Encounters:   04/06/21 252 lb (114.3 kg)   03/09/21 246 lb 8 oz (111.8 kg)   02/28/21 250 lb (113.4 kg)     BP Readings from Last 3 Encounters:   04/06/21 128/76   03/09/21 (!) 158/78   02/28/21 130/74      Pulse Readings from Last 3 Encounters:   04/06/21 76   03/09/21 85   02/28/21 95     Body mass index is 43.26 kg/m². Resp Readings from Last 3 Encounters:   04/06/21 18   03/09/21 18   02/28/21 18     Past medical, surgical, family and social history were reviewed and updated with the patient. Physical Exam  Vitals signs and nursing note reviewed. Constitutional:       General: She is not in acute distress. Appearance: Normal appearance. She is well-developed. She is not ill-appearing, toxic-appearing or diaphoretic. HENT:      Head: Normocephalic and atraumatic. Right Ear: Hearing, ear canal and external ear normal. Drainage and tenderness present. A middle ear effusion is present. Tympanic membrane is erythematous. Tympanic membrane has decreased mobility. Left Ear: Hearing, ear canal and external ear normal. Tenderness present. A middle ear effusion is present. Tympanic membrane is erythematous. Tympanic membrane has decreased mobility. Nose: Mucosal edema and congestion present. No laceration or rhinorrhea. Right Sinus: No maxillary sinus tenderness or frontal sinus tenderness. Left Sinus: No maxillary sinus tenderness or frontal sinus tenderness. Mouth/Throat:      Dentition: Normal dentition. No dental caries. Pharynx: Uvula midline. Tonsils: No tonsillar exudate. Eyes:      General: Lids are normal. No scleral icterus. Right eye: No discharge. Left eye: No discharge. Conjunctiva/sclera: Conjunctivae normal.      Pupils: Pupils are equal, round, and reactive to light. Neck:      Musculoskeletal: Full passive range of motion without pain, normal range of motion and neck supple. Thyroid: No thyroid mass or thyromegaly. Vascular: Normal carotid pulses. No carotid bruit, hepatojugular reflux or JVD. Trachea: Trachea and phonation normal. No tracheal tenderness or tracheal deviation. Cardiovascular:      Rate and Rhythm: Normal rate and regular rhythm. Pulses: Normal pulses. Heart sounds: Normal heart sounds, S1 normal and S2 normal. Heart sounds not distant. No murmur. No friction rub. No gallop. Pulmonary:      Effort: Pulmonary effort is normal. No tachypnea, bradypnea or accessory muscle usage. Breath sounds: Normal breath sounds. No stridor. No decreased breath sounds, wheezing, rhonchi or rales. Chest:      Chest wall: No tenderness. Abdominal:      General: Bowel sounds are normal. There is no distension. Palpations: Abdomen is soft. There is no hepatomegaly, splenomegaly or mass. Tenderness: There is no abdominal tenderness. There is no guarding or rebound. Hernia: No hernia is present. Musculoskeletal: Normal range of motion. General: No tenderness or deformity. Lymphadenopathy:      Cervical: No cervical adenopathy. Skin:     General: Skin is warm and dry. Capillary Refill: Capillary refill takes less than 2 seconds. Coloration: Skin is not pale. Findings: No bruising, erythema or rash. Neurological:      Mental Status: She is alert and oriented to person, place, and time. She is not disoriented. GCS: GCS eye subscore is 4. GCS verbal subscore is 5. GCS motor subscore is 6. Cranial Nerves: No cranial nerve deficit. Sensory: No sensory deficit. Motor: No abnormal muscle tone.       Coordination: Coordination normal.      Deep Tendon Reflexes: Reflexes normal.

## 2021-04-08 ENCOUNTER — TELEPHONE (OUTPATIENT)
Dept: FAMILY MEDICINE CLINIC | Age: 66
End: 2021-04-08

## 2021-04-13 ENCOUNTER — LAB (OUTPATIENT)
Dept: LAB | Facility: HOSPITAL | Age: 66
End: 2021-04-13

## 2021-04-13 ENCOUNTER — OFFICE VISIT (OUTPATIENT)
Dept: ENDOCRINOLOGY | Facility: CLINIC | Age: 66
End: 2021-04-13

## 2021-04-13 VITALS
SYSTOLIC BLOOD PRESSURE: 128 MMHG | BODY MASS INDEX: 42.72 KG/M2 | WEIGHT: 250.2 LBS | HEIGHT: 64 IN | DIASTOLIC BLOOD PRESSURE: 72 MMHG | TEMPERATURE: 97.8 F

## 2021-04-13 DIAGNOSIS — E03.9 ACQUIRED HYPOTHYROIDISM: Primary | ICD-10-CM

## 2021-04-13 DIAGNOSIS — E03.9 ACQUIRED HYPOTHYROIDISM: ICD-10-CM

## 2021-04-13 LAB — TSH SERPL DL<=0.05 MIU/L-ACNC: 1.63 UIU/ML (ref 0.27–4.2)

## 2021-04-13 PROCEDURE — 99213 OFFICE O/P EST LOW 20 MIN: CPT | Performed by: INTERNAL MEDICINE

## 2021-04-13 PROCEDURE — 84443 ASSAY THYROID STIM HORMONE: CPT

## 2021-04-13 RX ORDER — CALCIPOTRIENE 0.05 MG/ML
SOLUTION TOPICAL 2 TIMES DAILY
COMMUNITY
Start: 2021-03-11 | End: 2021-05-27

## 2021-04-13 RX ORDER — MAGNESIUM GLUCONATE 27 MG(500)
1 TABLET ORAL DAILY
COMMUNITY
End: 2021-05-27

## 2021-04-13 RX ORDER — CEFDINIR 300 MG/1
300 CAPSULE ORAL 2 TIMES DAILY
COMMUNITY
Start: 2021-04-06 | End: 2021-05-27

## 2021-04-13 RX ORDER — FLUOCINOLONE ACETONIDE 0.25 MG/G
OINTMENT TOPICAL
COMMUNITY
Start: 2021-03-22 | End: 2022-11-17

## 2021-04-13 NOTE — PROGRESS NOTES
"     Office Note      Date: 2021  Patient Name: Alison Benitez  MRN: [id number]  : 1955    Chief Complaint   Patient presents with   • Follow-up   • Thyroid Problem       History of Present Illness:   Alison Benitez is a 66 y.o. female who presents for Follow-up and Thyroid Problem  She has mild hypothyroidism and I started her on low dose thyroid medication last time.  She has a multinodular goiter which has been stable since  based upon ultrasound.  She continues to gain weight but really has not made any changes  She feels well.     Subjective          Review of Systems:   Review of Systems   Constitutional: Positive for unexpected weight change.       The following portions of the patient's history were reviewed and updated as appropriate: allergies, current medications, past family history, past medical history, past social history, past surgical history and problem list.    Objective     Visit Vitals  /72   Temp 97.8 °F (36.6 °C) (Infrared)   Ht 162.6 cm (64\")   Wt 113 kg (250 lb 3.2 oz)   BMI 42.95 kg/m²       Labs:    CBC w/DIFF  Lab Results   Component Value Date    WBC 7.2 2021    RBC 4.82 2021    HGB 13.9 2021    HCT 43.0 2021    MCV 89.2 2021    MCH 28.8 2021    MCHC 32.3 2021    RDW 14.0 2021    MPV 10.3 (H) 2021     2021    NEUTRORELPCT 54.1 2021    LYMPHORELPCT 33.8 2021    MONORELPCT 9.4 2021    EOSRELPCT 1.5 2021    BASORELPCT 0.6 2021    AUTOIGPER 0.6 2021    NEUTROABS 3.9 2021    LYMPHSABS 2.5 2021    MONOSABS 0.7 2021    EOSABS 0.1 2021    BASOSABS 0.0 2021    AUTOIGNUM 0.0 2021       T4  Free T4   Date Value Ref Range Status   2021 1.5 0.89 - 1.76 ng/dL Final       TSH  No results found for: TSHBASE     Physical Exam:  Physical Exam  Vitals reviewed.   Constitutional:       General: She is not in acute distress.     " Appearance: Normal appearance. She is not ill-appearing, toxic-appearing or diaphoretic.   Neck:      Comments: Irregular goiter  Lymphadenopathy:      Cervical: No cervical adenopathy.   Neurological:      Mental Status: She is alert.         Assessment / Plan      Assessment & Plan:  Problem List Items Addressed This Visit        Other    Acquired hypothyroidism - Primary    Current Assessment & Plan     Clinically euthyroid  tsh ordered  Medication to be adjusted based upon results          Relevant Medications    levothyroxine (Synthroid) 50 MCG tablet    Other Relevant Orders    TSH           Abdifatah Jordan MD   04/13/2021

## 2021-04-20 ENCOUNTER — HOSPITAL ENCOUNTER (OUTPATIENT)
Dept: PHYSICAL THERAPY | Facility: HOSPITAL | Age: 66
Setting detail: THERAPIES SERIES
Discharge: HOME | End: 2021-04-20
Payer: MEDICARE

## 2021-04-20 PROCEDURE — 97161 PT EVAL LOW COMPLEX 20 MIN: CPT

## 2021-04-20 ASSESSMENT — PAIN DESCRIPTION - LOCATION: LOCATION: BACK

## 2021-04-20 NOTE — PROGRESS NOTES
Physical Therapy  Initial Assessment  Date: 2021  Patient Name: Jose Roberto Monahan  MRN: [id number]  : 1955     Treatment Diagnosis: Lumbar spinal stenosis, LBP, core weakness    Subjective   General  Chart Reviewed: Yes  Patient assessed for rehabilitation services?: Yes  Referring Practitioner: Kevin Montana MD  Referral Date : 21  Diagnosis: Low back pain, spinal stenosis  PT Visit Information  PT Insurance Information: [place] [email or web address]  Subjective  Subjective: Pt presents with complaints of back and B LE pain. She describes having weakness in her legs and has poor endurance when walking long distances. She reports having some numbness in her feet and describes having numbness in her hands; she is scheduled to see a neurologist.  Pt reports having difficulty standing to wash dishes and has some dificulty dressing herself, requiring her to lean against something for support. Pain Screening  Patient Currently in Pain: Yes  Pain Assessment  Pain Assessment: 0-10  Pain Level: (pain ranges from 4/10 on average to 8/10 with activity. Pt states her pain is worse in the afternoons / evenings.)  Pain Type: Chronic pain  Pain Location: Back  Pain Orientation: Right;Left  Pain Radiating Towards: B LE's  Pain Frequency: Intermittent  Vital Signs  Patient Currently in Pain: Yes    Orientation  Orientation  Overall Orientation Status: Within Normal Limits    Social/Functional History  Social/Functional History  Occupation: Retired  Leisure & Hobbies: cares for her grandauramater 2 full days a week and then in the afternoons 2 days. Objective     Observation/Palpation  Palpation: Grade III tenderness L GT region, ITB,  Grade II-III on R side. Grade II tenderness B SI region. Grade II-III tenderness R pes anserine region. Grade II tenderness distal L HS with muscle guarded noted. Observation: gait: decreased trunk rotation, R pelvic retraction during stance phase, R lateral trunk lean.   Wide base of support Treatment Recommendations: Strengthening, Neuromuscular Re-education, Home Exercise Program, Manual Therapy - Soft Tissue Mobilization, ROM, Patient/Caregiver Education & Training, Manual Therapy - Joint Manipulation, Gait Training, Modalities      Goals  Short term goals  Time Frame for Short term goals: 3 weeks  Short term goal 1: Pt to be I with HEP  Short term goal 2: Pt to be educated in posture correction techniques and proper lumbar body mechanics  Short term goal 3: Pt to perform daily activities with average pain of 5/10 or less. Short term goal 4: Pt to demonstrate B hip abd strength of 4-/5 or greater  Long term goals  Time Frame for Long term goals : 6-8 weeks  Long term goal 1: Back Index score to improve to 25% or less indicating improved function. Long term goal 2: Pt to demonstrate 4/5 lower abdominal strength with good control  Long term goal 3: Pt to demonstrate 4/5 or greater B hip abduction strength to improve gait mechanics  Long term goal 4: Pt to be able to ambulate / stand for 45 minutes with pain of 2/10 or less. Patient Goals   Patient goals : To be stronger in her legs, to be able to walker longer distances to walk with granddaughter to the park and do shopping, to be able to play with her granddaughter with more endurance and less pain. Kraig Brown, PT     Certification of Medical Necessity: It will be understood that this treatment plan is certified medically necessary by the documenting therapist and referring physician mentioned in this report. Unless the physician indicated otherwise through written correspondence with our office, all further referrals will act as certification of medical necessity on this treatment plan. Thank you for this referral.  If you have questions regarding this plan of care, please call [phone].           Revisions to this plan (optional):                     Please sign and return this plan to:   FAX: [phone]      Signature: Date:

## 2021-04-27 ENCOUNTER — HOSPITAL ENCOUNTER (OUTPATIENT)
Dept: PHYSICAL THERAPY | Facility: HOSPITAL | Age: 66
Setting detail: THERAPIES SERIES
Discharge: HOME | End: 2021-04-27
Payer: MEDICARE

## 2021-04-27 PROCEDURE — 97110 THERAPEUTIC EXERCISES: CPT

## 2021-04-27 NOTE — FLOWSHEET NOTE
Physical Therapy Daily Treatment Note   Date:  2021    TIme In:    1032                  Time Out:  1138    Patient Name:  Lita Sandoval    :  1955  MRN: [id number]    Restrictions/Precautions:   No estim!!  Pertinent Medical History:  Medical/Treatment Diagnosis Information:    Lumbar spinal stenosis, LBP, core weakness  ·    ·    Insurance/Certification information:    Lita lopez   Physician Information:    Mary Tamez MD  Plan of care signed (Y/N):    Visit# / total visits:    2/    G-Code (if applicable):      Date / Visit # G-Code Applied:         Progress Note: []  Yes  [x]  No  Next due by: Visit #10      Pain level:   0/10  Subjective:  Pt reports her back is okay just stiff today. Objective:   Observation:    Test measurements:      Palpation:    Exercises:  Exercise Resistance/Repetitions Other comments   Nustep L5x8' 27   St HS st at step 5x20\" 27   Minisquats 3x10 27   Calf raises 3x10 27   Hip abd/add: sitting with good posture 3x10 BTB 27   Sitting mid rows with good posture 3x10 OTB 27   LTR 3x10 27   Piriformis/Figure 4 st 5x20\" 27   Bridges 3x10 27   Ab brace 3x10 27               Other Therapeutic Activities:      Manual Treatments:   Lumbar / hip / hamstring STM as needed. Not today. Modalities:  No estim! MH to low back in supine x 15' prn. Timed Code Treatment Minutes:  48      Total Treatment Minutes:  66    Treatment/Activity Tolerance:  [x] Patient tolerated treatment well [] Patient limited by fatigue  [] Patient limited by pain  [] Patient limited by other medical complications  [x] Other: Pt completed tx with no pain and mild c/o lumbar stiffness.     Pain after treatment:      0/10    Prognosis: [x] Good [] Fair  [] Poor    Patient Requires Follow-up: [x] Yes  [] No    Plan:   [x] Continue per plan of care [] Alter current plan (see comments)  [] Plan of care initiated [] Hold pending MD visit [] Discharge    Plan for Next Session:

## 2021-04-29 ENCOUNTER — APPOINTMENT (OUTPATIENT)
Dept: PHYSICAL THERAPY | Facility: HOSPITAL | Age: 66
End: 2021-04-29
Payer: MEDICARE

## 2021-05-04 ENCOUNTER — APPOINTMENT (OUTPATIENT)
Dept: PHYSICAL THERAPY | Facility: HOSPITAL | Age: 66
End: 2021-05-04
Payer: MEDICARE

## 2021-05-06 ENCOUNTER — HOSPITAL ENCOUNTER (OUTPATIENT)
Dept: PHYSICAL THERAPY | Facility: HOSPITAL | Age: 66
Setting detail: THERAPIES SERIES
Discharge: HOME | End: 2021-05-06
Payer: MEDICARE

## 2021-05-06 ENCOUNTER — OFFICE VISIT (OUTPATIENT)
Dept: FAMILY MEDICINE CLINIC | Age: 66
End: 2021-05-06
Payer: MEDICARE

## 2021-05-06 VITALS
DIASTOLIC BLOOD PRESSURE: 78 MMHG | SYSTOLIC BLOOD PRESSURE: 138 MMHG | OXYGEN SATURATION: 97 % | RESPIRATION RATE: 18 BRPM | BODY MASS INDEX: 41.62 KG/M2 | HEIGHT: 64 IN | WEIGHT: 243.8 LBS | TEMPERATURE: 97.9 F | HEART RATE: 87 BPM

## 2021-05-06 DIAGNOSIS — I10 ESSENTIAL HYPERTENSION: ICD-10-CM

## 2021-05-06 DIAGNOSIS — R63.5 WEIGHT GAIN: ICD-10-CM

## 2021-05-06 DIAGNOSIS — E04.1 THYROID NODULE: Primary | ICD-10-CM

## 2021-05-06 PROCEDURE — 99213 OFFICE O/P EST LOW 20 MIN: CPT | Performed by: FAMILY MEDICINE

## 2021-05-06 PROCEDURE — 97110 THERAPEUTIC EXERCISES: CPT

## 2021-05-06 PROCEDURE — 97140 MANUAL THERAPY 1/> REGIONS: CPT

## 2021-05-06 RX ORDER — OXYMETAZOLINE HYDROCHLORIDE 1 G/100G
CREAM TOPICAL
COMMUNITY
Start: 2021-04-15

## 2021-05-06 RX ORDER — FLUOCINOLONE ACETONIDE 0.25 MG/G
OINTMENT TOPICAL
COMMUNITY
Start: 2021-03-22 | End: 2021-08-02

## 2021-05-06 ASSESSMENT — ENCOUNTER SYMPTOMS
BACK PAIN: 0
TROUBLE SWALLOWING: 0
DIARRHEA: 0
RHINORRHEA: 1
NAUSEA: 0
SORE THROAT: 0
EYE PAIN: 0
EYE REDNESS: 0
COLOR CHANGE: 0
ABDOMINAL PAIN: 0
SHORTNESS OF BREATH: 0
CHEST TIGHTNESS: 0
COUGH: 0
VOMITING: 0

## 2021-05-06 NOTE — FLOWSHEET NOTE
Physical Therapy Daily Treatment Note   Date:  2021    TIme In:  0831                    Time Out:  1004    Patient Name:  Paula Rashid    :  1955  MRN: [id number]    Restrictions/Precautions:   No estim!!  Pertinent Medical History:  Medical/Treatment Diagnosis Information:  ·   Lumbar spinal stenosis, LBP, core weakness     Insurance/Certification information:    Lita lopez   Physician Information:    Neha Hanson MD  Plan of care signed (Y/N):    Visit# / total visits:    3/    G-Code (if applicable):      Date / Visit # G-Code Applied:         Progress Note: []  Yes  [x]  No  Next due by: Visit #10      Pain level:   \"sore\" /10  Subjective:  Pt reports she did very well after last visit. She states that the days she watches her granddaughter that she has to stretch more often throughout the day. Objective:   Observation: Grade II tenderness B ITB region and lateral hamstrings   Test measurements:   Hip abd: 4-/5 B.     Palpation:    Exercises:  Exercise Resistance/Repetitions Other comments   Nustep L5x10' 6   St HS st at step 5x20\" 6   Minisquats 3x10 6   Calf raises 3x10 6   Hip abd/add: sitting with good posture 3x10 BTB 6   Standing hip ext / abd 2x10 6   Side stepping 2 laps 6   Sitting mid rows with good posture 3x10 OTB 6   LTR 3x10 6   Piriformis/Figure 4 st 5x20\" 6   Bridges 2x10 6   Ab brace 3x10 6               Other Therapeutic Activities:      Manual Treatments:   Rolling to B ITB region x 10 min    Modalities:  No estim! MH to low back in supine x 15' prn. Timed Code Treatment Minutes:  65      Total Treatment Minutes:  85    Treatment/Activity Tolerance:  [x] Patient tolerated treatment well [] Patient limited by fatigue  [] Patient limited by pain  [] Patient limited by other medical complications  [x] Other: Pt has attended 3/4 approved PT visits with good tolerance. Pt demonstrates muscular soreness after exercise sessions.   She reports being consistent with her HEP. Pt demonstrates tenderness to palpation at B ITB region indicative of compensatory guarding due to hip and core weakness. Pt has difficulty with bridging exercise, unable to fully extend, due to weakness and discomfort. She also demonstrates gait deviations due to hip / core weakness including increase in lateral sway during stance phase. Pt would benefit from consistent PT for at least 4 weeks to allow for advancement of core and LE strengthening and establishment of HEP to improve overall quality of life. Pain after treatment:      \"Sore\" 1-2/10    Prognosis: [x] Good [] Fair  [] Poor    Patient Requires Follow-up: [x] Yes  [] No    Plan:   [x] Continue per plan of care [] Alter current plan (see comments)  [] Plan of care initiated [] Hold pending MD visit [] Discharge    Plan for Next Session:      Goals  Short term goals  Time Frame for Short term goals: 3 weeks  Short term goal 1: Pt to be I with HEP  Short term goal 2: Pt to be educated in posture correction techniques and proper lumbar body mechanics  Short term goal 3: Pt to perform daily activities with average pain of 5/10 or less. Short term goal 4: Pt to demonstrate B hip abd strength of 4-/5 or greater  Long term goals  Time Frame for Long term goals : 6-8 weeks  Long term goal 1: Back Index score to improve to 25% or less indicating improved function. Long term goal 2: Pt to demonstrate 4/5 lower abdominal strength with good control  Long term goal 3: Pt to demonstrate 4/5 or greater B hip abduction strength to improve gait mechanics  Long term goal 4: Pt to be able to ambulate / stand for 45 minutes with pain of 2/10 or less. Patient Goals   Patient goals :  To be stronger in her legs, to be able to walker longer distances to walk with granddaughter to the park and do shopping, to be able to play with her granddaughter with more endurance and less pain.       Electronically signed by:  Kyle Massey PT

## 2021-05-06 NOTE — PROGRESS NOTES
Chief Complaint   Patient presents with    Thyroid Problem       Have you seen any other physician or provider since your last visit yes - endocrinology    Have you had any other diagnostic tests since your last visit? no    Have you changed or stopped any medications since your last visit? no

## 2021-05-06 NOTE — PROGRESS NOTES
SUBJECTIVE:    Patient ID: Bobo Montelongo is a 77 y.o. female. Chief Complaint   Patient presents with    Thyroid Problem       HPI: office visit  The office today in follow-up of her thyroid issues. She feels like she is doing better. She has been to see endocrinology. She is not having as many issues. She does feel like she is feeling better. She is been working on her weight. She is really trying hard to lose some weight. She is got more active. She thinks that that is all helping her. She has not had any recent falls or injuries. She is not having any chest pain or shortness of breath. She does not have any medication from that she can tell. Review of Systems   Constitutional: Negative for activity change, appetite change, chills and fever. HENT: Positive for congestion, ear pain (fullness, right) and rhinorrhea. Negative for ear discharge, hearing loss, nosebleeds, sore throat and trouble swallowing. Eyes: Negative for pain and redness. Respiratory: Negative for cough, chest tightness and shortness of breath. Cardiovascular: Negative for chest pain, palpitations and leg swelling. Gastrointestinal: Negative for abdominal pain, diarrhea, nausea and vomiting. Genitourinary: Negative for difficulty urinating, dysuria and flank pain. Musculoskeletal: Negative for arthralgias, back pain, gait problem and neck pain. Skin: Negative for color change, pallor, rash and wound. Allergic/Immunologic: Positive for environmental allergies. Negative for food allergies. Neurological: Positive for headaches. Negative for dizziness and numbness. Hematological: Negative for adenopathy. Does not bruise/bleed easily. Psychiatric/Behavioral: Negative for agitation and sleep disturbance. The patient is not nervous/anxious. All other systems reviewed and are negative.        OBJECTIVE:  /78   Pulse 87   Temp 97.9 °F (36.6 °C)   Resp 18   Ht 5' 4\" (1.626 m)   Wt 243 lb 12.8 oz (110.6 kg)   SpO2 97% Comment: beronica  BMI 41.85 kg/m²    Wt Readings from Last 3 Encounters:   05/06/21 243 lb 12.8 oz (110.6 kg)   04/06/21 252 lb (114.3 kg)   03/09/21 246 lb 8 oz (111.8 kg)     BP Readings from Last 3 Encounters:   05/06/21 138/78   04/06/21 128/76   03/09/21 (!) 158/78      Pulse Readings from Last 3 Encounters:   05/06/21 87   04/06/21 76   03/09/21 85     Body mass index is 41.85 kg/m². Resp Readings from Last 3 Encounters:   05/06/21 18   04/06/21 18   03/09/21 18     Past medical, surgical, family and social history were reviewed and updated with the patient. Physical Exam  Vitals signs and nursing note reviewed. Constitutional:       General: She is not in acute distress. Appearance: Normal appearance. She is well-developed. She is not ill-appearing, toxic-appearing or diaphoretic. HENT:      Head: Normocephalic and atraumatic. Right Ear: Hearing, ear canal and external ear normal. Drainage and tenderness present. A middle ear effusion is present. Tympanic membrane is erythematous. Tympanic membrane has decreased mobility. Left Ear: Hearing, ear canal and external ear normal. Tenderness present. A middle ear effusion is present. Tympanic membrane is erythematous. Tympanic membrane has decreased mobility. Nose: Mucosal edema and congestion present. No laceration or rhinorrhea. Right Sinus: No maxillary sinus tenderness or frontal sinus tenderness. Left Sinus: No maxillary sinus tenderness or frontal sinus tenderness. Mouth/Throat:      Dentition: Normal dentition. No dental caries. Pharynx: Uvula midline. Tonsils: No tonsillar exudate. Eyes:      General: Lids are normal. No scleral icterus. Right eye: No discharge. Left eye: No discharge. Conjunctiva/sclera: Conjunctivae normal.      Pupils: Pupils are equal, round, and reactive to light.    Neck:      Musculoskeletal: Full passive range of motion without pain, normal requested labs within last 30 days. Hemoglobin A1C (%)   Date Value   07/16/2020 5.3     Microscopic Examination (no units)   Date Value   12/28/2017 Not Indicated     LDL Calculated (mg/dL)   Date Value   02/02/2021 109       Lab Results   Component Value Date    WBC 7.2 03/07/2021    NEUTROABS 3.9 03/07/2021    HGB 13.9 03/07/2021    HCT 43.0 03/07/2021    MCV 89.2 03/07/2021     03/07/2021     Lab Results   Component Value Date    TSH 1.39 02/02/2021       ASSESSMENT:    Diagnosis Orders   1. Thyroid nodule  TSH without Reflex    T4, FREE   2. Essential hypertension     3. Weight gain          PLAN:  No orders of the defined types were placed in this encounter. There are no discontinued medications. Controlled Substances Monitoring:      Please note: This chart was generated using Dragon dictation software. Although every effort was made to ensure the accuracy of this automated transcription, some errors in transcription may have occurred.

## 2021-05-10 ENCOUNTER — HOSPITAL ENCOUNTER (OUTPATIENT)
Dept: PHYSICAL THERAPY | Facility: HOSPITAL | Age: 66
Setting detail: THERAPIES SERIES
Discharge: HOME | End: 2021-05-10
Payer: MEDICARE

## 2021-05-10 PROCEDURE — 97110 THERAPEUTIC EXERCISES: CPT

## 2021-05-10 PROCEDURE — 97140 MANUAL THERAPY 1/> REGIONS: CPT

## 2021-05-10 NOTE — FLOWSHEET NOTE
Good [] Fair  [] Poor    Patient Requires Follow-up: [x] Yes  [] No    Plan:   [x] Continue per plan of care [] Alter current plan (see comments)  [] Plan of care initiated [] Hold pending MD visit [] Discharge    Plan for Next Session:      Goals  Short term goals  Time Frame for Short term goals: 3 weeks  Short term goal 1: Pt to be I with HEP  Short term goal 2: Pt to be educated in posture correction techniques and proper lumbar body mechanics  Short term goal 3: Pt to perform daily activities with average pain of 5/10 or less. Short term goal 4: Pt to demonstrate B hip abd strength of 4-/5 or greater  Long term goals  Time Frame for Long term goals : 6-8 weeks  Long term goal 1: Back Index score to improve to 25% or less indicating improved function. Long term goal 2: Pt to demonstrate 4/5 lower abdominal strength with good control  Long term goal 3: Pt to demonstrate 4/5 or greater B hip abduction strength to improve gait mechanics  Long term goal 4: Pt to be able to ambulate / stand for 45 minutes with pain of 2/10 or less. Patient Goals   Patient goals :  To be stronger in her legs, to be able to walker longer distances to walk with granddaughter to the park and do shopping, to be able to play with her granddaughter with more endurance and less pain.       Electronically signed by:  Nuvia Batista, PT

## 2021-05-11 ENCOUNTER — APPOINTMENT (OUTPATIENT)
Dept: PHYSICAL THERAPY | Facility: HOSPITAL | Age: 66
End: 2021-05-11
Payer: MEDICARE

## 2021-05-13 ENCOUNTER — APPOINTMENT (OUTPATIENT)
Dept: PHYSICAL THERAPY | Facility: HOSPITAL | Age: 66
End: 2021-05-13
Payer: MEDICARE

## 2021-05-13 ENCOUNTER — TELEPHONE (OUTPATIENT)
Dept: FAMILY MEDICINE CLINIC | Age: 66
End: 2021-05-13

## 2021-05-13 DIAGNOSIS — G89.29 CHRONIC LOW BACK PAIN, UNSPECIFIED BACK PAIN LATERALITY, UNSPECIFIED WHETHER SCIATICA PRESENT: Primary | ICD-10-CM

## 2021-05-13 DIAGNOSIS — M54.50 CHRONIC LOW BACK PAIN, UNSPECIFIED BACK PAIN LATERALITY, UNSPECIFIED WHETHER SCIATICA PRESENT: Primary | ICD-10-CM

## 2021-05-13 NOTE — TELEPHONE ENCOUNTER
I have faxed referral and records to [facility] for PT, they will contact the patient with appointment date/time.

## 2021-05-18 ENCOUNTER — HOSPITAL ENCOUNTER (OUTPATIENT)
Dept: PHYSICAL THERAPY | Facility: HOSPITAL | Age: 66
Setting detail: THERAPIES SERIES
Discharge: HOME | End: 2021-05-18
Payer: MEDICARE

## 2021-05-18 DIAGNOSIS — M54.41 CHRONIC MIDLINE LOW BACK PAIN WITH BILATERAL SCIATICA: Primary | ICD-10-CM

## 2021-05-18 DIAGNOSIS — M54.42 CHRONIC MIDLINE LOW BACK PAIN WITH BILATERAL SCIATICA: Primary | ICD-10-CM

## 2021-05-18 DIAGNOSIS — G89.29 CHRONIC MIDLINE LOW BACK PAIN WITH BILATERAL SCIATICA: Primary | ICD-10-CM

## 2021-05-18 PROCEDURE — 97140 MANUAL THERAPY 1/> REGIONS: CPT

## 2021-05-18 PROCEDURE — 97110 THERAPEUTIC EXERCISES: CPT

## 2021-05-18 NOTE — TELEPHONE ENCOUNTER
Deepti can't schedule patient at this time. I have faxed referral and records to [facility].

## 2021-05-18 NOTE — FLOWSHEET NOTE
Physical Therapy Daily Treatment Note / Re-Assessment   Date:  2021    TIme In:  1002                    Time Out:  6361    Patient Name:  Arnulfo Correa    :  1955  MRN: [id number]    Restrictions/Precautions:   No estim!!  Pertinent Medical History:  Medical/Treatment Diagnosis Information:  ·   Lumbar spinal stenosis, LBP, core weakness     Insurance/Certification information:    Lita lopez   Physician Information:    Emilee Hopson MD  Plan of care signed (Y/N):    Visit# / total visits:    5/    G-Code (if applicable):      Date / Visit # G-Code Applied:         Progress Note: [x]  Yes  []  No  Next due by: 21      Pain level:   2/10  Subjective:  Pt reports she is \"stiff and sore\" today and has increase in pain with activity. She reports having an exacerbation of pain since her last PT visit causing her to significantly limit her activity. Objective:   Observation: Grade II tenderness B ITB region and lateral hamstrings   Test measurements:   Hip abd: 4-/5 B. Back Index: 54%   Palpation:    Exercises:  Exercise Resistance/Repetitions Other comments   Nustep L5x10' 18   St HS st at step 5x20\" 18   Minisquats 3x10 18   Calf raises 3x10 18   Hip abd/add: sitting with good posture 3x10 BTB 18   Standing hip ext / abd 2x10 18   Side stepping 2 laps 18   Sitting mid rows with good posture 3x10 OTB 18   LTR 3x10 18   Piriformis/Figure 4 st 5x20\" 18   Bridges 2x10 18   Ab brace 3x10 18               Other Therapeutic Activities:      Manual Treatments:   STM to B distal HS / ITB x 25'    Modalities:  No estim! MH to low back and HS in supine x 15' prn.       Timed Code Treatment Minutes:  72      Total Treatment Minutes:  90    Treatment/Activity Tolerance:  [x] Patient tolerated treatment well [] Patient limited by fatigue  [] Patient limited by pain  [] Patient limited by other medical complications  [x] Other: Pt responded well to [facility] and  after therex with decrease in reports of pain and tenderness. She is responding well to PT intervention and will continue to benefit from skilled PT to address core strength, LE flexibility, and postural correction to improve daily function. Pain after treatment:      \"better\" 1-2/10    Prognosis: [x] Good [] Fair  [] Poor    Patient Requires Follow-up: [x] Yes  [] No    Plan:   [x] Continue per plan of care [] Alter current plan (see comments)  [] Plan of care initiated [] Hold pending MD visit [] Discharge    Plan for Next Session:      Goals  Short term goals  Time Frame for Short term goals: 3 weeks  Short term goal 1: Pt to be I with HEP -MET  Short term goal 2: Pt to be educated in posture correction techniques and proper lumbar body mechanics -ongoing   Short term goal 3: Pt to perform daily activities with average pain of 5/10 or less. Short term goal 4: Pt to demonstrate B hip abd strength of 4-/5 or greater -MET  Long term goals  Time Frame for Long term goals : 6-8 weeks  Long term goal 1: Back Index score to improve to 25% or less indicating improved function. Long term goal 2: Pt to demonstrate 4/5 lower abdominal strength with good control  Long term goal 3: Pt to demonstrate 4/5 or greater B hip abduction strength to improve gait mechanics  Long term goal 4: Pt to be able to ambulate / stand for 45 minutes with pain of 2/10 or less. Patient Goals   Patient goals :  To be stronger in her legs, to be able to walker longer distances to walk with granddaughter to the park and do shopping, to be able to play with her granddaughter with more endurance and less pain.       Electronically signed by:  Joselin Soto, PT

## 2021-05-20 ENCOUNTER — HOSPITAL ENCOUNTER (OUTPATIENT)
Dept: PHYSICAL THERAPY | Facility: HOSPITAL | Age: 66
Setting detail: THERAPIES SERIES
Discharge: HOME | End: 2021-05-20
Payer: MEDICARE

## 2021-05-20 PROCEDURE — 97110 THERAPEUTIC EXERCISES: CPT

## 2021-05-20 PROCEDURE — 97140 MANUAL THERAPY 1/> REGIONS: CPT

## 2021-05-20 NOTE — FLOWSHEET NOTE
Physical Therapy Daily Treatment Note  Date:  2021    TIme In:   6009                   Time Out: 200    Patient Name:  Jose Roberto Monahan    :  1955  MRN: [id number]    Restrictions/Precautions:   No estim!!  Pertinent Medical History:  Medical/Treatment Diagnosis Information:  ·   Lumbar spinal stenosis, LBP, core weakness     Insurance/Certification information:    Lita lopez   Physician Information:    Kevin Montana MD  Plan of care signed (Y/N):    Visit# / total visits:    6/    G-Code (if applicable):      Date / Visit # G-Code Applied:         Progress Note: []  Yes  [x]  No  Next due by: 21      Pain level:   4/10  Subjective:  Pt reports she \"can't move her lower back\". Notes having stiffness and pain in her lower back region. Objective:   Observation: Grade II tenderness B ITB region and lateral hamstrings   Test measurements:   Hip abd: 4-/5 B. Back Index: 54%   Palpation:    Exercises:  Exercise Resistance/Repetitions Other comments   Nustep L5x10' 20   St HS st at step 5x20\" 20   Minisquats 3x10 20   Calf raises 3x10 20   Hip abd/add: sitting with good posture 3x10 BTB 20   Standing hip ext / abd 2x10 20   Side stepping 2 laps 20   Sitting mid rows with good posture 3x10 OTB 20   LTR 3x10 20   Piriformis/Figure 4 st 5x20\" 20   Bridges 2x10 20   Ab brace 3x10 20               Other Therapeutic Activities:      Manual Treatments:   STM to B distal HS / ITB x 25'    Modalities:  No estim! MH to low back and HS in supine x 15' prn. Timed Code Treatment Minutes:  70      Total Treatment Minutes:  90    Treatment/Activity Tolerance:  [x] Patient tolerated treatment well [] Patient limited by fatigue  [] Patient limited by pain  [] Patient limited by other medical complications  [x] Other: Pt responded well to treatment with decrease in back and HS region pain.      Pain after treatment:      1-2/10    Prognosis: [x] Good [] Fair  [] Poor    Patient Requires

## 2021-05-25 ENCOUNTER — APPOINTMENT (OUTPATIENT)
Dept: PHYSICAL THERAPY | Facility: HOSPITAL | Age: 66
End: 2021-05-25
Payer: MEDICARE

## 2021-05-27 ENCOUNTER — OFFICE VISIT (OUTPATIENT)
Dept: NEUROLOGY | Facility: CLINIC | Age: 66
End: 2021-05-27

## 2021-05-27 ENCOUNTER — APPOINTMENT (OUTPATIENT)
Dept: PHYSICAL THERAPY | Facility: HOSPITAL | Age: 66
End: 2021-05-27
Payer: MEDICARE

## 2021-05-27 VITALS
BODY MASS INDEX: 41.66 KG/M2 | DIASTOLIC BLOOD PRESSURE: 82 MMHG | OXYGEN SATURATION: 97 % | HEART RATE: 90 BPM | TEMPERATURE: 97.5 F | HEIGHT: 64 IN | WEIGHT: 244 LBS | SYSTOLIC BLOOD PRESSURE: 138 MMHG

## 2021-05-27 DIAGNOSIS — B99.9 NEUROPATHY DUE TO INFECTION (HCC): Primary | ICD-10-CM

## 2021-05-27 DIAGNOSIS — M51.36 DEGENERATIVE DISC DISEASE, LUMBAR: ICD-10-CM

## 2021-05-27 DIAGNOSIS — G63 NEUROPATHY DUE TO INFECTION (HCC): Primary | ICD-10-CM

## 2021-05-27 DIAGNOSIS — M48.061 SPINAL STENOSIS OF LUMBAR REGION WITHOUT NEUROGENIC CLAUDICATION: ICD-10-CM

## 2021-05-27 PROCEDURE — 99214 OFFICE O/P EST MOD 30 MIN: CPT | Performed by: NURSE PRACTITIONER

## 2021-05-27 RX ORDER — DULOXETIN HYDROCHLORIDE 60 MG/1
60 CAPSULE, DELAYED RELEASE ORAL DAILY
Qty: 90 CAPSULE | Refills: 3 | Status: SHIPPED | OUTPATIENT
Start: 2021-05-27 | End: 2021-11-18

## 2021-05-27 RX ORDER — PREGABALIN 75 MG/1
75 CAPSULE ORAL 3 TIMES DAILY
Qty: 270 CAPSULE | Refills: 1 | Status: SHIPPED | OUTPATIENT
Start: 2021-05-27 | End: 2021-11-18

## 2021-05-27 NOTE — PROGRESS NOTES
Subjective:     Patient ID: Alison Benitez is a 66 y.o. female.    CC:   Chief Complaint   Patient presents with   • Peripheral Neuropathy   • Back Pain       HPI:   History of Present Illness   This is a very pleasant 66-year-old female who presents for 6-month follow-up on neuropathy of bilateral lower extremities from her thighs to her feet that have been present since January to February 2019 after being diagnosed with mono.  She has been taking Lyrica 75 mg 2-3 times a day as well as Cymbalta 60 mg daily and she has felt significant improvement in her symptoms. She also tells me she has now been diagnosed with Fibromyalgia as well. She is back into PT for her legs and low back pain. Having to sit down when standing or walking for long periods of time. Feels some weakness in her legs and pain in low back when walking, relieved with rest and sitting. Denies urinary or bowel incontinence or hesitancy and denies perineal numbness. Feels back may be worse. She would like to be referred back to Dr. Anatoliy Everett pain management for injections which was recommended previously by Dr. Billings with neurosurgery but at the time she declined. She is not interested in surgery and has been told surgery is not recommended. She does have lumbar spinal stenosis.     She sees endocrinology for hypothyroidism which is now stable.    She is followed by rheumatologist Dr. Bibiana Khan.    She did have recent labs with primary care provider on 3/7/2021 with CMP essentially normal, CBC normal, CH normal, free T4 normal, magnesium normal.     Prior labs & workup: CK level of 174.  Immunofixation and protein electrophoresis within normal limits.  Anti-hue and anti-Otilia antibodies negative.  Methylmalonic acid normal at 155.  Vitamin B12 951 and folate greater than 20.  Lyme antibodies negative.  Mono test negative.  ASO titer was elevated at 200.  Drug screen was normal.  Hemoglobin A1c was 4.9%.  I did discuss ASO titer with   Edmund our neurologist treated with high dose steroids and completed extensive PT. 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  She also had an MRI of her lumbar spine without contrast on 10/17/2019 and this did show degenerative disc changes and spinal stenosis.  She was evaluated by Dr. Grabiel Billings with [facility] neurosurgery on 10/31/2019 and he did not recommend any neurosurgical intervention but he did recommend she continue physical therapy and consider pain management evaluation.    The following portions of the patient's history were reviewed and updated as appropriate: allergies, current medications, past family history, past medical history, past social history, past surgical history and problem list.    Past Medical History:   Diagnosis Date   • Arthritis    • Bursitis    • CTS (carpal tunnel syndrome)     bilateral    • Hemorrhoids    • Hypertension    • Lumbosacral disc disease    • Neuropathy    • Osteoarthritis    • SBE (subacute bacterial endocarditis)    • Tendinitis of knee    • Thyroid disorder    • Thyroid nodule    •  (vaginal birth after )        Past Surgical History:   Procedure Laterality Date   • ABDOMINAL HYSTERECTOMY     •  SECTION     •  SECTION     • COLONOSCOPY     • DILATATION AND CURETTAGE     • HYSTERECTOMY N/A        • MANDIBLE FRACTURE SURGERY     • OOPHORECTOMY Bilateral    • TUBAL ABDOMINAL LIGATION     • TUBAL ABDOMINAL LIGATION         Social History     Socioeconomic History   • Marital status:      Spouse name: Not on file   • Number of children: Not on file   • Years of education: Not on file   • Highest education level: Not on file   Tobacco Use   • Smoking status: Never Smoker   • Smokeless tobacco: Never Used   Vaping Use   • Vaping Use: Never used   Substance and Sexual Activity   • Alcohol use: No   • Drug use: No   • Sexual activity: Not Currently  "      Family History   Problem Relation Age of Onset   • Osteoarthritis Other    • Hypertension Other    • Carpal tunnel syndrome Other    • COPD Other    • Diabetes Other    • Stroke Other    • Glaucoma Other    • Heart disease Mother    • Hypertension Brother    • Heart disease Father    • Breast cancer Neg Hx    • Ovarian cancer Neg Hx         Review of Systems   Constitutional: Negative for chills, fatigue, fever and unexpected weight change.   HENT: Negative for ear pain, hearing loss, nosebleeds, rhinorrhea and sore throat.    Eyes: Negative for photophobia, pain, discharge, itching and visual disturbance.   Respiratory: Negative for cough, chest tightness, shortness of breath and wheezing.    Cardiovascular: Negative for chest pain, palpitations and leg swelling.   Gastrointestinal: Negative for abdominal pain, blood in stool, constipation, diarrhea, nausea and vomiting.   Genitourinary: Negative for dysuria, frequency, hematuria and urgency.   Musculoskeletal: Positive for back pain and gait problem. Negative for arthralgias, joint swelling, myalgias, neck pain and neck stiffness.   Skin: Negative for rash and wound.   Allergic/Immunologic: Negative for environmental allergies and food allergies.   Neurological: Positive for weakness and numbness. Negative for dizziness, tremors, seizures, syncope, speech difficulty, light-headedness and headaches.   Hematological: Negative for adenopathy. Does not bruise/bleed easily.   Psychiatric/Behavioral: Negative for agitation, confusion, decreased concentration, hallucinations, sleep disturbance and suicidal ideas. The patient is nervous/anxious.    All other systems reviewed and are negative.       Objective:  /82   Pulse 90   Temp 97.5 °F (36.4 °C)   Ht 162.6 cm (64\")   Wt 111 kg (244 lb)   SpO2 97%   BMI 41.88 kg/m²     Neurologic Exam     Mental Status   Oriented to person, place, and time.   Speech: speech is normal   Level of consciousness: " alert    Cranial Nerves   Cranial nerves II through XII intact.     Motor Exam   Muscle bulk: normal  Overall muscle tone: normal    Strength   Strength 5/5 throughout.     Sensory Exam   Light touch normal.   Vibration normal.   Proprioception normal.   Pinprick normal.     DECREASED STOCKING COLD SENSATION BLE     Gait, Coordination, and Reflexes     Gait  Gait: wide-based (ANTALGIA)    Coordination   Finger to nose coordination: normal    Tremor   Resting tremor: absent  Intention tremor: absent  Action tremor: absent    Reflexes   Right brachioradialis: 2+  Left brachioradialis: 2+  Right biceps: 2+  Left biceps: 2+  Right patellar: 1+  Left patellar: 1+  Right achilles: 1+  Left achilles: 1+  Right : 2+  Left : 2+  Right plantar: normal  Left plantar: normal  Right ankle clonus: absent  Left ankle clonus: absent      Physical Exam  Musculoskeletal:      Lumbar back: Tenderness present. No bony tenderness. Decreased range of motion. Negative right straight leg raise test and negative left straight leg raise test.   Neurological:      Mental Status: She is oriented to person, place, and time.      Coordination: Finger-Nose-Finger Test normal.      Deep Tendon Reflexes: Strength normal.      Reflex Scores:       Bicep reflexes are 2+ on the right side and 2+ on the left side.       Brachioradialis reflexes are 2+ on the right side and 2+ on the left side.       Patellar reflexes are 1+ on the right side and 1+ on the left side.       Achilles reflexes are 1+ on the right side and 1+ on the left side.  Psychiatric:         Mood and Affect: Mood is anxious.         Speech: Speech normal.         Behavior: Behavior normal.         Thought Content: Thought content normal.         Cognition and Memory: Cognition and memory normal.         Judgment: Judgment normal.         Assessment/Plan:       Diagnoses and all orders for this visit:    1. 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  Dispense: 270 capsule; Refill: 1  -     DULoxetine (CYMBALTA) 60 MG capsule; Take 1 capsule by mouth Daily.  Dispense: 90 capsule; Refill: 3    2. Degenerative disc disease, lumbar  Comments:  evaluated by neurosurgery-surgery not recommended at this time, continue with PT  Orders:  -     DULoxetine (CYMBALTA) 60 MG capsule; Take 1 capsule by mouth Daily.  Dispense: 90 capsule; Refill: 3  -     Ambulatory Referral to Pain Management    3. Spinal stenosis of lumbar region without neurogenic claudication  Comments:  evaluated by neurosurgery-surgery not recommended at this time, continue with PT  Orders:  -     pregabalin (LYRICA) 75 MG capsule; Take 1 capsule by mouth 3 (Three) Times a Day.  Dispense: 270 capsule; Refill: 1  -     DULoxetine (CYMBALTA) 60 MG capsule; Take 1 capsule by mouth Daily.  Dispense: 90 capsule; Refill: 3  -     Ambulatory Referral to Pain Management           Continue physical therapy.  Would recommend continuing current medications.  Would recommend referral back to pain management per her request to discuss possible rhizotomy or epidural injections.  If she is not improving could consider MRI lumbar spine repeat with neurosurgery consultation again but she currently is not interested in surgery.  She will follow-up in our clinic in 6 months or sooner if needed.  Discussed signs and symptoms of cauda equina syndrome or symptoms which warrant emergency department care.    Reviewed medications, potential side effects and signs and symptoms to report. Discussed risk versus benefits of treatment plan with patient and/or family-including medications, labs and radiology that may be ordered. Addressed questions and concerns during visit. Patient and/or family verbalized understanding and agree with plan.    AS THE PROVIDER, I PERSONALLY WORE PPE DURING ENTIRE FACE TO FACE  ENCOUNTER IN CLINIC WITH THE PATIENT. PATIENT ALSO WORE PPE DURING ENTIRE FACE TO FACE ENCOUNTER EXCEPT FOR A MAX OF 30 SECONDS DURING NEUROLOGICAL EVALUATION OF CRANIAL NERVES AND THEN MASK WAS PLACED BACK OVER PATIENT FACE FOR REMAINDER OF VISIT. I WASHED MY HANDS BEFORE AND AFTER VISIT.    As part of this patient's treatment plan I am prescribing controlled substances. The patient has been made aware of appropriate use of such medications, including potential risk of somnolence, limited ability to drive and/or work safely, and potential for dependence or overdose. It has also been made clear that these medications are for use by the patient only, without concomitant use of alcohol or other substances unless prescribed. Keep out of reach of children.  Ethan report has been reviewed. If this is going to be prescribed long term, [facility] Controlled Substance Agreement Contract has also been read and signed by patient and myself.    During this visit the following were done:  Labs Reviewed []    Labs Ordered []    Radiology Reports Reviewed [x]    Radiology Ordered []    PCP Records Reviewed []    Referring Provider Records Reviewed []    ER Records Reviewed []    Hospital Records Reviewed []    History Obtained From Family []    Radiology Images Reviewed []    Other Reviewed [x]    Records Requested []      Kimber Correa, ELDON  5/27/2021

## 2021-06-07 ENCOUNTER — HOSPITAL ENCOUNTER (OUTPATIENT)
Dept: MRI IMAGING | Facility: HOSPITAL | Age: 66
Discharge: HOME | End: 2021-06-07
Payer: MEDICARE

## 2021-06-07 DIAGNOSIS — M54.41 CHRONIC MIDLINE LOW BACK PAIN WITH BILATERAL SCIATICA: ICD-10-CM

## 2021-06-07 DIAGNOSIS — M54.42 CHRONIC MIDLINE LOW BACK PAIN WITH BILATERAL SCIATICA: ICD-10-CM

## 2021-06-07 DIAGNOSIS — G89.29 CHRONIC MIDLINE LOW BACK PAIN WITH BILATERAL SCIATICA: ICD-10-CM

## 2021-06-07 LAB
BUN BLDV-MCNC: 16 MG/DL (ref 6–20)
CREAT SERPL-MCNC: 0.7 MG/DL (ref 0.4–1.2)
GFR AFRICAN AMERICAN: >59
GFR NON-AFRICAN AMERICAN: >60

## 2021-06-07 PROCEDURE — 6360000004 HC RX CONTRAST MEDICATION: Performed by: FAMILY MEDICINE

## 2021-06-07 PROCEDURE — A9579 GAD-BASE MR CONTRAST NOS,1ML: HCPCS | Performed by: FAMILY MEDICINE

## 2021-06-07 PROCEDURE — 36415 COLL VENOUS BLD VENIPUNCTURE: CPT

## 2021-06-07 PROCEDURE — 84520 ASSAY OF UREA NITROGEN: CPT

## 2021-06-07 PROCEDURE — 82565 ASSAY OF CREATININE: CPT

## 2021-06-07 PROCEDURE — 72158 MRI LUMBAR SPINE W/O & W/DYE: CPT

## 2021-06-07 RX ADMIN — GADOTERIDOL 20 ML: 279.3 INJECTION, SOLUTION INTRAVENOUS at 10:53

## 2021-06-14 ENCOUNTER — OFFICE VISIT (OUTPATIENT)
Dept: FAMILY MEDICINE CLINIC | Age: 66
End: 2021-06-14
Payer: MEDICARE

## 2021-06-14 VITALS
WEIGHT: 245.9 LBS | TEMPERATURE: 97.5 F | HEART RATE: 88 BPM | RESPIRATION RATE: 18 BRPM | HEIGHT: 64 IN | BODY MASS INDEX: 41.98 KG/M2 | DIASTOLIC BLOOD PRESSURE: 78 MMHG | OXYGEN SATURATION: 98 % | SYSTOLIC BLOOD PRESSURE: 118 MMHG

## 2021-06-14 DIAGNOSIS — Z71.1 FEARED CONDITION NOT DEMONSTRATED: ICD-10-CM

## 2021-06-14 DIAGNOSIS — L50.9 URTICARIA: Primary | ICD-10-CM

## 2021-06-14 DIAGNOSIS — G47.00 INSOMNIA, UNSPECIFIED TYPE: Primary | ICD-10-CM

## 2021-06-14 PROCEDURE — 99212 OFFICE O/P EST SF 10 MIN: CPT | Performed by: PHYSICIAN ASSISTANT

## 2021-06-14 RX ORDER — ZOLPIDEM TARTRATE 10 MG/1
5 TABLET ORAL NIGHTLY PRN
Qty: 30 TABLET | Refills: 0 | Status: CANCELLED | OUTPATIENT
Start: 2021-06-14 | End: 2021-07-14

## 2021-06-14 RX ORDER — TRIAMCINOLONE ACETONIDE 0.25 MG/G
CREAM TOPICAL
Qty: 1 TUBE | Refills: 0 | Status: SHIPPED | OUTPATIENT
Start: 2021-06-14 | End: 2022-03-08

## 2021-06-14 ASSESSMENT — ENCOUNTER SYMPTOMS
RESPIRATORY NEGATIVE: 1
GASTROINTESTINAL NEGATIVE: 1

## 2021-06-14 NOTE — TELEPHONE ENCOUNTER
Patient seen in the office by Evangelista BUSCH. Medication pending for approval. Fifi Hawkins up to date.

## 2021-06-14 NOTE — TELEPHONE ENCOUNTER
After reviewing SKYLER on 6/14/2021 ISA Motley refused to refill or request Dr. Kaila Dawson refill patient's Ambien as she has never had it prescribed from this office - it has always been prescribed by a provider in an [place] [address] office.      Nieves Motley notified patient

## 2021-06-14 NOTE — PROGRESS NOTES
25 mg by mouth daily      vitamin B-12 (CYANOCOBALAMIN) 1000 MCG tablet Take 1,000 mcg by mouth daily      fluocinolone (SYNALAR) 0.025 % ointment APPLY SPARINGLY TOPICALLY TO THE AFFECTED AREA FOUR TIMES DAILY       No current facility-administered medications on file prior to visit. Review of Systems   Constitutional: Negative. HENT: Negative. Respiratory: Negative. Cardiovascular: Negative. Gastrointestinal: Negative. Skin: Positive for rash. Neurological: Negative. Past Medical History:   Diagnosis Date    Arthritis      Past Surgical History:   Procedure Laterality Date     SECTION      x 2    COLONOSCOPY  2010    normal findings    COLONOSCOPY N/A 2020    COLORECTAL CANCER SCREENING, NOT HIGH RISK performed by Seng De Leon MD at [facility]    HYSTERECTOMY       Family History   Problem Relation Age of Onset    Cancer Mother         skin ca    High Blood Pressure Mother     Cancer Brother     High Blood Pressure Brother       Social History     Tobacco Use   Smoking Status Never Smoker   Smokeless Tobacco Never Used       OBJECTIVE:   Wt Readings from Last 3 Encounters:   21 245 lb 14.4 oz (111.5 kg)   21 243 lb 12.8 oz (110.6 kg)   21 252 lb (114.3 kg)     BP Readings from Last 3 Encounters:   21 118/78   21 138/78   21 128/76       /78   Pulse 88   Temp 97.5 °F (36.4 °C) (Temporal)   Resp 18   Ht 5' 4\" (1.626 m)   Wt 245 lb 14.4 oz (111.5 kg)   SpO2 98% Comment: room air  BMI 42.21 kg/m²    Physical Exam  Vitals reviewed. Constitutional:       General: She is not in acute distress. Appearance: Normal appearance. She is normal weight. She is not ill-appearing or toxic-appearing. HENT:      Head: Normocephalic and atraumatic. Mouth/Throat:      Mouth: Mucous membranes are moist.      Pharynx: Oropharynx is clear.    Eyes:      Conjunctiva/sclera: Conjunctivae normal.      Pupils: Pupils are equal, round, and reactive to light. Cardiovascular:      Rate and Rhythm: Normal rate and regular rhythm. Heart sounds: Normal heart sounds. No murmur heard. No gallop. Pulmonary:      Effort: Pulmonary effort is normal.      Breath sounds: Normal breath sounds. No wheezing, rhonchi or rales. Chest:       Skin:     General: Skin is warm and dry. Findings: Erythema and rash present. Neurological:      Mental Status: She is alert and oriented to person, place, and time. Psychiatric:         Mood and Affect: Mood normal.         Behavior: Behavior normal.         Thought Content: Thought content normal.         Judgment: Judgment normal.         Lab Results   Component Value Date     03/07/2021    K 3.8 03/07/2021     03/07/2021    CO2 26 03/07/2021    GLUCOSE 108 03/07/2021    BUN 16 06/07/2021    CREATININE 0.7 06/07/2021    CALCIUM 9.5 03/07/2021    PROT 7.2 03/07/2021    LABALBU 4.0 03/07/2021    BILITOT 0.4 03/07/2021    ALT 29 03/07/2021    AST 22 03/07/2021     Hemoglobin A1C (%)   Date Value   07/16/2020 5.3     Microscopic Examination (no units)   Date Value   12/28/2017 Not Indicated     LDL Calculated (mg/dL)   Date Value   02/02/2021 109       Lab Results   Component Value Date    WBC 7.2 03/07/2021    NEUTROABS 3.9 03/07/2021    HGB 13.9 03/07/2021    HCT 43.0 03/07/2021    MCV 89.2 03/07/2021     03/07/2021     Lab Results   Component Value Date    TSH 1.39 02/02/2021       ASSESSMENT/PLAN:     1. Urticaria  - triamcinolone (KENALOG) 0.025 % cream; Apply topically 2 times daily. Dispense: 1 Tube; Refill: 0    2. Feared condition not demonstrated      No orders of the defined types were placed in this encounter.        Electronically signed by ISA Rich on 6/14/2021 at 1:58 PM

## 2021-06-15 ENCOUNTER — TELEPHONE (OUTPATIENT)
Dept: FAMILY MEDICINE CLINIC | Age: 66
End: 2021-06-15

## 2021-06-15 RX ORDER — ZOLPIDEM TARTRATE 10 MG/1
5 TABLET ORAL NIGHTLY PRN
Qty: 30 TABLET | Refills: 0 | Status: SHIPPED | OUTPATIENT
Start: 2021-06-15 | End: 2021-07-21 | Stop reason: SDUPTHER

## 2021-06-22 ENCOUNTER — TELEPHONE (OUTPATIENT)
Dept: FAMILY MEDICINE CLINIC | Age: 66
End: 2021-06-22

## 2021-06-24 ENCOUNTER — OFFICE VISIT (OUTPATIENT)
Dept: FAMILY MEDICINE CLINIC | Age: 66
End: 2021-06-24
Payer: MEDICARE

## 2021-06-24 VITALS
HEIGHT: 64 IN | RESPIRATION RATE: 18 BRPM | OXYGEN SATURATION: 98 % | WEIGHT: 245 LBS | DIASTOLIC BLOOD PRESSURE: 76 MMHG | SYSTOLIC BLOOD PRESSURE: 138 MMHG | TEMPERATURE: 97.5 F | HEART RATE: 84 BPM | BODY MASS INDEX: 41.83 KG/M2

## 2021-06-24 DIAGNOSIS — Z00.00 ROUTINE GENERAL MEDICAL EXAMINATION AT A HEALTH CARE FACILITY: Primary | ICD-10-CM

## 2021-06-24 PROCEDURE — G0439 PPPS, SUBSEQ VISIT: HCPCS | Performed by: FAMILY MEDICINE

## 2021-06-24 ASSESSMENT — LIFESTYLE VARIABLES: HOW OFTEN DO YOU HAVE A DRINK CONTAINING ALCOHOL: 0

## 2021-06-24 ASSESSMENT — PATIENT HEALTH QUESTIONNAIRE - PHQ9
SUM OF ALL RESPONSES TO PHQ QUESTIONS 1-9: 0
1. LITTLE INTEREST OR PLEASURE IN DOING THINGS: 0
SUM OF ALL RESPONSES TO PHQ QUESTIONS 1-9: 0
SUM OF ALL RESPONSES TO PHQ9 QUESTIONS 1 & 2: 0
2. FEELING DOWN, DEPRESSED OR HOPELESS: 0
SUM OF ALL RESPONSES TO PHQ QUESTIONS 1-9: 0

## 2021-06-24 NOTE — PATIENT INSTRUCTIONS
Personalized Preventive Plan for Mitul Mcqueen - 6/24/2021  Medicare offers a range of preventive health benefits. Some of the tests and screenings are paid in full while other may be subject to a deductible, co-insurance, and/or copay. Some of these benefits include a comprehensive review of your medical history including lifestyle, illnesses that may run in your family, and various assessments and screenings as appropriate. After reviewing your medical record and screening and assessments performed today your provider may have ordered immunizations, labs, imaging, and/or referrals for you. A list of these orders (if applicable) as well as your Preventive Care list are included within your After Visit Summary for your review. Other Preventive Recommendations:    · A preventive eye exam performed by an eye specialist is recommended every 1-2 years to screen for glaucoma; cataracts, macular degeneration, and other eye disorders. · A preventive dental visit is recommended every 6 months. · Try to get at least 150 minutes of exercise per week or 10,000 steps per day on a pedometer . · Order or download the FREE \"Exercise & Physical Activity: Your Everyday Guide\" from [employer]. Call 4-883.488.5964 or search The [employer] online. · You need 0505-4258 mg of calcium and 7051-8634 IU of vitamin D per day. It is possible to meet your calcium requirement with diet alone, but a vitamin D supplement is usually necessary to meet this goal.  · When exposed to the sun, use a sunscreen that protects against both UVA and UVB radiation with an SPF of 30 or greater. Reapply every 2 to 3 hours or after sweating, drying off with a towel, or swimming. · Always wear a seat belt when traveling in a car. Always wear a helmet when riding a bicycle or motorcycle. Personalized Preventive Plan for Mitul Mcqueen - 6/24/2021  Medicare offers a range of preventive health benefits.  Some of the tests and screenings are paid in full while other may be subject to a deductible, co-insurance, and/or copay. Some of these benefits include a comprehensive review of your medical history including lifestyle, illnesses that may run in your family, and various assessments and screenings as appropriate. After reviewing your medical record and screening and assessments performed today your provider may have ordered immunizations, labs, imaging, and/or referrals for you. A list of these orders (if applicable) as well as your Preventive Care list are included within your After Visit Summary for your review. Other Preventive Recommendations:    A preventive eye exam performed by an eye specialist is recommended every 1-2 years to screen for glaucoma; cataracts, macular degeneration, and other eye disorders. A preventive dental visit is recommended every 6 months. Try to get at least 150 minutes of exercise per week or 10,000 steps per day on a pedometer . Order or download the FREE \"Exercise & Physical Activity: Your Everyday Guide\" from [employer]. Call 3-870.582.8672 or search [employer] online. You need 4763-0828 mg of calcium and 9911-2970 IU of vitamin D per day. It is possible to meet your calcium requirement with diet alone, but a vitamin D supplement is usually necessary to meet this goal.  When exposed to the sun, use a sunscreen that protects against both UVA and UVB radiation with an SPF of 30 or greater. Reapply every 2 to 3 hours or after sweating, drying off with a towel, or swimming. Always wear a seat belt when traveling in a car. Always wear a helmet when riding a bicycle or motorcycle. Personalized Preventive Plan for Antwon Romero - 6/24/2021  Medicare offers a range of preventive health benefits. Some of the tests and screenings are paid in full while other may be subject to a deductible, co-insurance, and/or copay.     Some of these benefits include a comprehensive review of your medical history including lifestyle, illnesses that may run in your family, and various assessments and screenings as appropriate. After reviewing your medical record and screening and assessments performed today your provider may have ordered immunizations, labs, imaging, and/or referrals for you. A list of these orders (if applicable) as well as your Preventive Care list are included within your After Visit Summary for your review. Other Preventive Recommendations:    A preventive eye exam performed by an eye specialist is recommended every 1-2 years to screen for glaucoma; cataracts, macular degeneration, and other eye disorders. A preventive dental visit is recommended every 6 months. Try to get at least 150 minutes of exercise per week or 10,000 steps per day on a pedometer . Order or download the FREE \"Exercise & Physical Activity: Your Everyday Guide\" from [employer]. Call 2-210.125.2700 or search [employer] online. You need 7396-2747 mg of calcium and 3389-8523 IU of vitamin D per day. It is possible to meet your calcium requirement with diet alone, but a vitamin D supplement is usually necessary to meet this goal.  When exposed to the sun, use a sunscreen that protects against both UVA and UVB radiation with an SPF of 30 or greater. Reapply every 2 to 3 hours or after sweating, drying off with a towel, or swimming. Always wear a seat belt when traveling in a car. Always wear a helmet when riding a bicycle or motorcycle.

## 2021-06-24 NOTE — PROGRESS NOTES
Medicare Annual Wellness Visit  Name: Laura Frost Date: 2021   MRN: [id number] Sex: Female   Age: 77 y.o. Ethnicity: Non-/Non    : 1955 Race: Perry Pardo is here for Medicare AWV    Screenings for behavioral, psychosocial and functional/safety risks, and cognitive dysfunction are all negative except as indicated below. These results, as well as other patient data from the [address] form, are documented in Flowsheets linked to this Encounter. No Known Allergies    Prior to Visit Medications    Medication Sig Taking? Authorizing Provider   zolpidem (AMBIEN) 10 MG tablet Take 0.5 tablets by mouth nightly as needed for Sleep for up to 30 days. Yes Glenn Bañuelos MD   triamcinolone (KENALOG) 0.025 % cream Apply topically 2 times daily.  Yes Nieves García   fluocinolone (SYNALAR) 0.025 % ointment APPLY SPARINGLY TOPICALLY TO THE AFFECTED AREA FOUR TIMES DAILY Yes Historical Provider, MD   RHOFADE 1 % CREA  Yes Historical Provider, MD   ELDERDESTINY PO Take by mouth Yes Historical Provider, MD   levothyroxine (SYNTHROID) 50 MCG tablet Take 50 mcg by mouth daily Yes Historical Provider, MD   DULoxetine (CYMBALTA) 60 MG extended release capsule Take 60 mg by mouth daily Yes Historical Provider, MD   Magnesium Gluconate 500 (27 Mg) MG TABS tablet Take by mouth Yes Historical Provider, MD   Ascorbic Acid (VITAMIN C) 250 MG tablet Take 250 mg by mouth Yes Historical Provider, MD   losartan (COZAAR) 50 MG tablet Take by mouth  Yes Historical Provider, MD   nabumetone (RELAFEN) 750 MG tablet Take 750 mg by mouth 2 times daily  Yes Historical Provider, MD   Probiotic Product (PROBIOTIC-10) CAPS Take by mouth Yes Historical Provider, MD   Omega-3 Fatty Acids (FISH OIL) 1000 MG CAPS Take 3,000 mg by mouth 3 times daily Yes Historical Provider, MD   vitamin D (ERGOCALCIFEROL) 66525 units CAPS capsule  Yes Historical Provider, MD   fluticasone (FLONASE) 50 MCG/ACT nasal Flulaval, Fluarix and 3 yrs and older Afluria) 10/24/2017, 10/09/2020    Pneumococcal Polysaccharide (Bolqlxosc24) 10/29/2019    Tdap (Boostrix, Adacel) 10/15/2019        Health Maintenance   Topic Date Due    Hepatitis C screen  Never done    Shingles Vaccine (1 of 2) Never done    DEXA (modify frequency per FRAX score)  Never done   [employer] Visit (AWV)  09/30/2021    Potassium monitoring  03/07/2022    Creatinine monitoring  06/07/2022    Breast cancer screen  09/14/2022    Diabetes screen  07/16/2023    Pneumococcal 65+ years Vaccine (1 of 1 - PPSV23) 10/29/2024    Lipid screen  02/02/2026    DTaP/Tdap/Td vaccine (2 - Td or Tdap) 10/15/2029    Colon cancer screen colonoscopy  07/23/2030    Flu vaccine  Completed    COVID-19 Vaccine  Completed    Hepatitis A vaccine  Aged Out    Hepatitis B vaccine  Aged Out    Hib vaccine  Aged Out    Meningococcal (ACWY) vaccine  Aged Out     Recommendations for Workbooks Due: see orders and patient instructions/AVS.  . Recommended screening schedule for the next 5-10 years is provided to the patient in written form: see Patient Instructions/AVS.    There are no diagnoses linked to this encounter.

## 2021-06-28 ENCOUNTER — TELEPHONE (OUTPATIENT)
Dept: NEUROLOGY | Facility: CLINIC | Age: 66
End: 2021-06-28

## 2021-06-28 NOTE — TELEPHONE ENCOUNTER
Provider: DANIE FUENTES    Caller: KARLA COLUNGA    Relationship to Patient: SELF    Phone Number: [phone]    Reason for Call: THE PT CALLED IN TODAY BECAUSE SHE STILL HAS NOT HEARD ANYTHING BACK ABOUT THE PAIN MANAGEMENT REFERRAL. I TRANSFERRED THE PT OVER TO PAIN MANAGEMENT SO SHE CAN DISCUSS THAT REFERRAL WITH THEM.

## 2021-07-21 ENCOUNTER — TELEPHONE (OUTPATIENT)
Dept: FAMILY MEDICINE CLINIC | Age: 66
End: 2021-07-21

## 2021-07-21 DIAGNOSIS — M54.41 CHRONIC MIDLINE LOW BACK PAIN WITH BILATERAL SCIATICA: Primary | ICD-10-CM

## 2021-07-21 DIAGNOSIS — M54.42 CHRONIC MIDLINE LOW BACK PAIN WITH BILATERAL SCIATICA: Primary | ICD-10-CM

## 2021-07-21 DIAGNOSIS — G47.00 INSOMNIA, UNSPECIFIED TYPE: ICD-10-CM

## 2021-07-21 DIAGNOSIS — G89.29 CHRONIC MIDLINE LOW BACK PAIN WITH BILATERAL SCIATICA: Primary | ICD-10-CM

## 2021-07-21 RX ORDER — ZOLPIDEM TARTRATE 10 MG/1
10 TABLET ORAL NIGHTLY PRN
Qty: 30 TABLET | Refills: 5 | Status: SHIPPED | OUTPATIENT
Start: 2021-07-21 | End: 2021-08-20

## 2021-07-21 NOTE — TELEPHONE ENCOUNTER
Patient has been taking whole tablet of Ambien due to not being able to cut it in half. The tablet size changed and it makes it difficult to cut. She is needing refill on Ambien.

## 2021-07-27 ENCOUNTER — TELEPHONE (OUTPATIENT)
Dept: FAMILY MEDICINE CLINIC | Age: 66
End: 2021-07-27

## 2021-07-27 NOTE — TELEPHONE ENCOUNTER
Patient's referral, along with all pertinent medical records faxed to the attention of Dr. Tommie Dorantes ([facility]) on 07/27/21, they will contact the patient and our office with appointment date/time/location.

## 2021-08-02 ENCOUNTER — OFFICE VISIT (OUTPATIENT)
Dept: FAMILY MEDICINE CLINIC | Age: 66
End: 2021-08-02
Payer: MEDICARE

## 2021-08-02 VITALS
TEMPERATURE: 97.4 F | RESPIRATION RATE: 18 BRPM | SYSTOLIC BLOOD PRESSURE: 138 MMHG | HEART RATE: 82 BPM | OXYGEN SATURATION: 99 % | WEIGHT: 243.7 LBS | BODY MASS INDEX: 41.6 KG/M2 | HEIGHT: 64 IN | DIASTOLIC BLOOD PRESSURE: 70 MMHG

## 2021-08-02 DIAGNOSIS — E04.1 THYROID NODULE: ICD-10-CM

## 2021-08-02 DIAGNOSIS — I10 ESSENTIAL HYPERTENSION: ICD-10-CM

## 2021-08-02 DIAGNOSIS — Z78.0 POST-MENOPAUSAL: ICD-10-CM

## 2021-08-02 DIAGNOSIS — H57.89 IRRITATION OF EYE: ICD-10-CM

## 2021-08-02 DIAGNOSIS — J02.9 ACUTE PHARYNGITIS, UNSPECIFIED ETIOLOGY: Primary | ICD-10-CM

## 2021-08-02 DIAGNOSIS — G89.29 CHRONIC MIDLINE LOW BACK PAIN WITH BILATERAL SCIATICA: ICD-10-CM

## 2021-08-02 DIAGNOSIS — M54.41 CHRONIC MIDLINE LOW BACK PAIN WITH BILATERAL SCIATICA: ICD-10-CM

## 2021-08-02 DIAGNOSIS — M54.42 CHRONIC MIDLINE LOW BACK PAIN WITH BILATERAL SCIATICA: ICD-10-CM

## 2021-08-02 PROCEDURE — 87426 SARSCOV CORONAVIRUS AG IA: CPT | Performed by: FAMILY MEDICINE

## 2021-08-02 PROCEDURE — 99214 OFFICE O/P EST MOD 30 MIN: CPT | Performed by: FAMILY MEDICINE

## 2021-08-02 RX ORDER — LOSARTAN POTASSIUM 50 MG/1
50 TABLET ORAL DAILY
Qty: 90 TABLET | Refills: 3 | Status: SHIPPED | OUTPATIENT
Start: 2021-08-02 | End: 2022-03-03 | Stop reason: SDUPTHER

## 2021-08-02 RX ORDER — OLOPATADINE HYDROCHLORIDE 2 MG/ML
1 SOLUTION/ DROPS OPHTHALMIC DAILY
Qty: 2.5 ML | Refills: 2 | Status: SHIPPED | OUTPATIENT
Start: 2021-08-02 | End: 2022-03-08

## 2021-08-02 SDOH — ECONOMIC STABILITY: FOOD INSECURITY: WITHIN THE PAST 12 MONTHS, YOU WORRIED THAT YOUR FOOD WOULD RUN OUT BEFORE YOU GOT MONEY TO BUY MORE.: NEVER TRUE

## 2021-08-02 SDOH — ECONOMIC STABILITY: FOOD INSECURITY: WITHIN THE PAST 12 MONTHS, THE FOOD YOU BOUGHT JUST DIDN'T LAST AND YOU DIDN'T HAVE MONEY TO GET MORE.: NEVER TRUE

## 2021-08-02 ASSESSMENT — ENCOUNTER SYMPTOMS
GASTROINTESTINAL NEGATIVE: 1
RESPIRATORY NEGATIVE: 1

## 2021-08-02 ASSESSMENT — SOCIAL DETERMINANTS OF HEALTH (SDOH): HOW HARD IS IT FOR YOU TO PAY FOR THE VERY BASICS LIKE FOOD, HOUSING, MEDICAL CARE, AND HEATING?: NOT VERY HARD

## 2021-08-02 NOTE — PROGRESS NOTES
SUBJECTIVE:    Patient ID: Aide Patel is a 77 y.o. female. Chief Complaint   Patient presents with    Back Pain    Pharyngitis    Eye Problem       HPI: office visit  She has had a normal bone density. She is having some sore throat. She has a grandson who has had some vomiting. She is not really known of any exposure otherwise that she is head. She is having some difficult difficulty with her eyes. They are itchy and burning. She says she is not had any chemical exposures. She is having some allergy type symptoms. Her back pain obviously is getting worse. Her MRI does show her to have some significant findings. She is afraid of surgery. That she is interested in other options. She is having good blood pressure readings at home. She has not had any chest pain or increasing shortness of breath. She says she is feeling like she is trying to stay active despite her pain. She has not had any medication problems otherwise that she can tell    Review of Systems   Constitutional: Positive for fatigue. HENT: Positive for congestion, postnasal drip and sore throat. Eyes: Positive for redness and itching. Respiratory: Negative. Cardiovascular: Negative. Gastrointestinal: Negative. Musculoskeletal: Positive for arthralgias, back pain, gait problem and myalgias. Skin: Positive for rash. All other systems reviewed and are negative. OBJECTIVE:  /70   Pulse 82   Temp 97.4 °F (36.3 °C)   Resp 18   Ht 5' 4\" (1.626 m)   Wt 243 lb 11.2 oz (110.5 kg)   SpO2 99% Comment: ra  BMI 41.83 kg/m²    Wt Readings from Last 3 Encounters:   08/02/21 243 lb 11.2 oz (110.5 kg)   06/24/21 245 lb (111.1 kg)   06/14/21 245 lb 14.4 oz (111.5 kg)     BP Readings from Last 3 Encounters:   08/02/21 138/70   06/24/21 138/76   06/14/21 118/78      Pulse Readings from Last 3 Encounters:   08/02/21 82   06/24/21 84   06/14/21 88     Body mass index is 41.83 kg/m².    Resp Readings from Last 3 Encounters:   08/02/21 18   06/24/21 18   06/14/21 18     Past medical, surgical, family and social history were reviewed and updated with the patient. Physical Exam  Vitals and nursing note reviewed. Constitutional:       General: She is not in acute distress. Appearance: Normal appearance. She is well-developed and normal weight. She is not ill-appearing or toxic-appearing. HENT:      Head: Normocephalic and atraumatic. Right Ear: Hearing, tympanic membrane, ear canal and external ear normal.      Left Ear: Hearing, tympanic membrane, ear canal and external ear normal.      Nose: Nose normal.      Mouth/Throat:      Lips: Pink. Mouth: Mucous membranes are moist.      Tongue: No lesions. Tongue does not deviate from midline. Palate: No mass and lesions. Pharynx: Posterior oropharyngeal erythema present. No pharyngeal swelling, oropharyngeal exudate or uvula swelling. Tonsils: No tonsillar exudate or tonsillar abscesses. Eyes:      Conjunctiva/sclera: Conjunctivae normal.      Pupils: Pupils are equal, round, and reactive to light. Neck:      Thyroid: Thyromegaly present. No thyroid mass or thyroid tenderness. Trachea: Trachea normal.   Cardiovascular:      Rate and Rhythm: Normal rate and regular rhythm. Heart sounds: Normal heart sounds, S1 normal and S2 normal. No murmur heard. No friction rub. No gallop. Pulmonary:      Effort: Pulmonary effort is normal.      Breath sounds: Normal breath sounds. No wheezing, rhonchi or rales. Musculoskeletal:      Cervical back: Full passive range of motion without pain, normal range of motion and neck supple. Lymphadenopathy:      Cervical: Cervical adenopathy present. Neurological:      Mental Status: She is alert and oriented to person, place, and time. Psychiatric:         Attention and Perception: Attention and perception normal.         Mood and Affect: Mood is anxious.          Speech: Speech normal.

## 2021-08-04 ASSESSMENT — ENCOUNTER SYMPTOMS
SORE THROAT: 1
BACK PAIN: 1
EYE ITCHING: 1
EYE REDNESS: 1

## 2021-08-09 ENCOUNTER — HOSPITAL ENCOUNTER (OUTPATIENT)
Facility: HOSPITAL | Age: 66
Discharge: HOME | End: 2021-08-09
Payer: MEDICARE

## 2021-08-09 DIAGNOSIS — I10 ESSENTIAL HYPERTENSION: ICD-10-CM

## 2021-08-09 DIAGNOSIS — E04.1 THYROID NODULE: ICD-10-CM

## 2021-08-09 LAB
A/G RATIO: 1.8 (ref 0.8–2)
ALBUMIN SERPL-MCNC: 4.3 G/DL (ref 3.4–4.8)
ALP BLD-CCNC: 65 U/L (ref 25–100)
ALT SERPL-CCNC: 26 U/L (ref 4–36)
ANION GAP SERPL CALCULATED.3IONS-SCNC: 11 MMOL/L (ref 3–16)
AST SERPL-CCNC: 25 U/L (ref 8–33)
BASOPHILS ABSOLUTE: 0 K/UL (ref 0–0.1)
BASOPHILS RELATIVE PERCENT: 0.5 %
BILIRUB SERPL-MCNC: 0.3 MG/DL (ref 0.3–1.2)
BUN BLDV-MCNC: 23 MG/DL (ref 6–20)
C-REACTIVE PROTEIN: 13.4 MG/L (ref 0–5.1)
CALCIUM SERPL-MCNC: 9.4 MG/DL (ref 8.5–10.5)
CHLORIDE BLD-SCNC: 100 MMOL/L (ref 98–107)
CO2: 28 MMOL/L (ref 20–30)
CREAT SERPL-MCNC: 0.7 MG/DL (ref 0.4–1.2)
EOSINOPHILS ABSOLUTE: 0.1 K/UL (ref 0–0.4)
EOSINOPHILS RELATIVE PERCENT: 1.2 %
GFR AFRICAN AMERICAN: >59
GFR NON-AFRICAN AMERICAN: >60
GLOBULIN: 2.4 G/DL
GLUCOSE BLD-MCNC: 94 MG/DL (ref 74–106)
HCT VFR BLD CALC: 37.2 % (ref 37–47)
HEMOGLOBIN: 12.4 G/DL (ref 11.5–16.5)
IMMATURE GRANULOCYTES #: 0 K/UL
IMMATURE GRANULOCYTES %: 0.2 % (ref 0–5)
LYMPHOCYTES ABSOLUTE: 1.9 K/UL (ref 1.5–4)
LYMPHOCYTES RELATIVE PERCENT: 34 %
MCH RBC QN AUTO: 28.6 PG (ref 27–32)
MCHC RBC AUTO-ENTMCNC: 33.3 G/DL (ref 31–35)
MCV RBC AUTO: 85.7 FL (ref 80–100)
MONOCYTES ABSOLUTE: 0.6 K/UL (ref 0.2–0.8)
MONOCYTES RELATIVE PERCENT: 9.8 %
NEUTROPHILS ABSOLUTE: 3.1 K/UL (ref 2–7.5)
NEUTROPHILS RELATIVE PERCENT: 54.3 %
PDW BLD-RTO: 14.3 % (ref 11–16)
PLATELET # BLD: 185 K/UL (ref 150–400)
PMV BLD AUTO: 10.8 FL (ref 6–10)
POTASSIUM SERPL-SCNC: 3.7 MMOL/L (ref 3.4–5.1)
RBC # BLD: 4.34 M/UL (ref 3.8–5.8)
SEDIMENTATION RATE, ERYTHROCYTE: 10 MM/HR (ref 0–30)
SODIUM BLD-SCNC: 139 MMOL/L (ref 136–145)
TOTAL PROTEIN: 6.7 G/DL (ref 6.4–8.3)
TSH SERPL DL<=0.05 MIU/L-ACNC: 1.17 UIU/ML (ref 0.27–4.2)
WBC # BLD: 5.6 K/UL (ref 4–11)

## 2021-08-09 PROCEDURE — 36415 COLL VENOUS BLD VENIPUNCTURE: CPT

## 2021-08-09 PROCEDURE — 86140 C-REACTIVE PROTEIN: CPT

## 2021-08-09 PROCEDURE — 85652 RBC SED RATE AUTOMATED: CPT

## 2021-08-09 PROCEDURE — 84443 ASSAY THYROID STIM HORMONE: CPT

## 2021-08-09 PROCEDURE — 85025 COMPLETE CBC W/AUTO DIFF WBC: CPT

## 2021-08-09 PROCEDURE — 80053 COMPREHEN METABOLIC PANEL: CPT

## 2021-08-09 NOTE — TELEPHONE ENCOUNTER
Patient scheduled to see Dr. Underwood Daily (Neuro[facility]) on 08/24/21 at 11:20. Patient's referral, along with all pertinent medical records faxed to the attention of scheduling on 07/27/21. Patient contacted advised of appointment date/time/location. Patient verbalized understanding of all instructions.  (Per Neurosurg)

## 2021-08-10 RX ORDER — DESLORATADINE 5 MG/1
TABLET ORAL
Qty: 90 TABLET | Refills: 3 | Status: SHIPPED | OUTPATIENT
Start: 2021-08-10 | End: 2022-03-03 | Stop reason: SDUPTHER

## 2021-08-10 NOTE — TELEPHONE ENCOUNTER
Patient called, requested refill.        Next Office Visit Date:  Future Appointments   Date Time Provider Jessie Victoria   11/3/2021  9:30 AM Pascual Arzate MD [address]   6/27/2022 10:00 AM [facility] please review via [address]

## 2021-08-22 ENCOUNTER — TELEPHONE (OUTPATIENT)
Dept: FAMILY MEDICINE CLINIC | Age: 66
End: 2021-08-22

## 2021-08-22 RX ORDER — ZOLPIDEM TARTRATE 10 MG/1
1 TABLET ORAL NIGHTLY
COMMUNITY
Start: 2021-08-22 | End: 2022-01-24

## 2021-08-24 ENCOUNTER — OFFICE VISIT (OUTPATIENT)
Dept: NEUROSURGERY | Facility: CLINIC | Age: 66
End: 2021-08-24

## 2021-08-24 VITALS — TEMPERATURE: 97.1 F | BODY MASS INDEX: 40.97 KG/M2 | WEIGHT: 240 LBS | HEIGHT: 64 IN

## 2021-08-24 DIAGNOSIS — M54.9 MECHANICAL BACK PAIN: Primary | ICD-10-CM

## 2021-08-24 DIAGNOSIS — M51.36 DEGENERATIVE DISC DISEASE, LUMBAR: ICD-10-CM

## 2021-08-24 DIAGNOSIS — M43.16 SPONDYLOLISTHESIS OF LUMBAR REGION: ICD-10-CM

## 2021-08-24 DIAGNOSIS — Q76.49 SPINAL DEFORMITY: ICD-10-CM

## 2021-08-24 DIAGNOSIS — M48.062 SPINAL STENOSIS OF LUMBAR REGION WITH NEUROGENIC CLAUDICATION: ICD-10-CM

## 2021-08-24 PROCEDURE — 99214 OFFICE O/P EST MOD 30 MIN: CPT | Performed by: NEUROLOGICAL SURGERY

## 2021-08-24 RX ORDER — ZOLPIDEM TARTRATE 10 MG/1
1 TABLET ORAL
COMMUNITY
Start: 2021-08-22

## 2021-08-24 RX ORDER — OXYMETAZOLINE HYDROCHLORIDE 1 G/100G
CREAM TOPICAL
COMMUNITY
Start: 2021-04-15 | End: 2022-11-17

## 2021-08-24 RX ORDER — DESLORATADINE 5 MG/1
5 TABLET ORAL DAILY
COMMUNITY
Start: 2021-08-10

## 2021-08-24 RX ORDER — ESTRADIOL 1 MG/1
TABLET ORAL
COMMUNITY
Start: 2021-08-23 | End: 2021-09-28 | Stop reason: SDUPTHER

## 2021-08-24 RX ORDER — OLOPATADINE HYDROCHLORIDE 2 MG/ML
1 SOLUTION/ DROPS OPHTHALMIC
COMMUNITY
Start: 2021-08-02 | End: 2022-10-28

## 2021-08-24 RX ORDER — ASCORBIC ACID 250 MG
250 TABLET ORAL
COMMUNITY
End: 2021-11-18

## 2021-08-24 RX ORDER — LOSARTAN POTASSIUM 50 MG/1
50 TABLET ORAL
COMMUNITY
Start: 2021-08-02 | End: 2021-11-18

## 2021-08-24 RX ORDER — CHLORAL HYDRATE 500 MG
1200 CAPSULE ORAL
COMMUNITY
End: 2021-11-18

## 2021-08-24 RX ORDER — LEVOTHYROXINE SODIUM 50 UG/1
1 CAPSULE ORAL
COMMUNITY
End: 2021-09-24 | Stop reason: SDUPTHER

## 2021-08-24 RX ORDER — SALIVA STIMULANT COMB. NO.7
GEL (GRAM) MUCOUS MEMBRANE AS NEEDED
COMMUNITY
End: 2021-11-18

## 2021-08-24 RX ORDER — MAGNESIUM GLUCONATE 27 MG(500)
500 TABLET ORAL
COMMUNITY
End: 2021-11-18

## 2021-08-24 RX ORDER — MELATONIN
2
COMMUNITY
End: 2021-11-18

## 2021-08-24 NOTE — PROGRESS NOTES
Patient: Alison Benitez  : 1955    Primary Care Provider: Kacy Huertas MD    Requesting Provider: As above        History    Chief Complaint: Back and thigh pain with some walking and standing intolerance.    History of Present Illness: Ms. Benitez is a 66-year-old former  who is known to our service.  She saw my colleague Dr. Billings several years ago.  At that time she was treated for mechanical back pain.  She did not have neurogenic claudication at that time.  She now describes back pain that will extend in the back of her thighs.  This is worse if she is up on her feet too long.  She is able to sit down or lie down for a while and then get up and go again.  She is also worse with lifting and sweeping.  She has been treated with Cymbalta and Lyrica.  Those are helpful.  Physical therapy has been helpful.  She reports having a neuropathy as well.  Over the last several months she has been doing better.  She finds that her gait has become more stooped.    Review of Systems   Constitutional: Positive for fatigue. Negative for activity change, appetite change, chills, diaphoresis, fever and unexpected weight change.   HENT: Negative for congestion, dental problem, drooling, ear discharge, ear pain, facial swelling, hearing loss, mouth sores, nosebleeds, postnasal drip, rhinorrhea, sinus pressure, sinus pain, sneezing, sore throat, tinnitus, trouble swallowing and voice change.    Eyes: Positive for redness. Negative for photophobia, pain, discharge, itching and visual disturbance.   Respiratory: Positive for choking. Negative for apnea, cough, chest tightness, shortness of breath, wheezing and stridor.    Cardiovascular: Negative for chest pain, palpitations and leg swelling.   Gastrointestinal: Negative for abdominal distention, abdominal pain, anal bleeding, blood in stool, constipation, diarrhea, nausea, rectal pain and vomiting.   Endocrine: Positive for cold intolerance and heat  "intolerance. Negative for polydipsia, polyphagia and polyuria.   Genitourinary: Positive for flank pain. Negative for decreased urine volume, difficulty urinating, dysuria, enuresis, frequency, genital sores, hematuria and urgency.   Musculoskeletal: Positive for back pain and gait problem. Negative for arthralgias, joint swelling, myalgias, neck pain and neck stiffness.   Skin: Negative for color change, pallor, rash and wound.   Allergic/Immunologic: Negative for environmental allergies, food allergies and immunocompromised state.   Neurological: Positive for numbness. Negative for dizziness, tremors, seizures, syncope, facial asymmetry, speech difficulty, weakness, light-headedness and headaches.   Hematological: Negative for adenopathy. Does not bruise/bleed easily.   Psychiatric/Behavioral: Positive for sleep disturbance. Negative for agitation, behavioral problems, confusion, decreased concentration, dysphoric mood, hallucinations, self-injury and suicidal ideas. The patient is nervous/anxious. The patient is not hyperactive.        The patient's past medical history, past surgical history, family history, and social history have been reviewed at length in the electronic medical record.    Physical Exam:   Temp 97.1 °F (36.2 °C)   Ht 162.6 cm (64\")   Wt 109 kg (240 lb)   BMI 41.20 kg/m²   CONSTITUTIONAL: Patient is well-nourished, pleasant and appears stated age.  CV: Heart regular rate and rhythm without murmur, rub, or gallop.  PULMONARY: Lungs are clear to ascultation.  MUSCULOSKELETAL:  Straight leg raising is negative.  Eddie's Sign is negative.  ROM in the low back is normal.  Tenderness in the back to palpation is not observed.  NEUROLOGICAL:  Orientation, memory, attention span, language function, and cognition have been examined and are intact.  Strength is intact in the lower extremities to direct testing.  Muscle tone is normal throughout.  Station and gait are normal.  Sensation is intact to " light touch testing throughout.  Deep tendon reflexes are difficult to elicit throughout.  Coordination is intact.      Medical Decision Making    Data Review:   (All imaging studies were personally reviewed unless stated otherwise)  MRI of the lumbar spine dated 6/7/2021 demonstrates some diffuse degenerative disc disease and facet arthropathy.  There is high-grade stenosis at L3-4 and more moderate stenosis at L4-5 where there is a low-grade listhesis.  There is also a prominent joint effusion on the left at L4-5.  She has some degree of scoliosis.    Diagnosis:   1.  Chronic mechanical low back pain.  2.  Lumbar stenosis with neurogenic claudication.  3.  Lumbar degenerative disc disease.  4.  L4-5 spondylolisthesis with potential instability.  5.  Lumbar spinal deformity.    Treatment Options:   I had a lengthy discussion with the patient.  By and large she is getting by with her symptoms currently.  If her claudication symptoms worsen then she will follow-up to discuss additional intervention.  We might consider an epidural injection if her symptoms persist.       Diagnosis Plan   1. Mechanical back pain     2. Spinal stenosis of lumbar region with neurogenic claudication     3. Degenerative disc disease, lumbar     4. Spondylolisthesis of lumbar region     5. Spinal deformity         Scribed for Iván Stanley MD by RICHARD Brandt 8/24/2021 12:08 EDT      I, Dr. Stanley, personally performed the services described in the documentation, as scribed in my presence, and it is both accurate and complete.

## 2021-09-24 DIAGNOSIS — E03.9 ACQUIRED HYPOTHYROIDISM: ICD-10-CM

## 2021-09-24 RX ORDER — LEVOTHYROXINE SODIUM 50 UG/1
50 CAPSULE ORAL DAILY
Qty: 90 CAPSULE | Refills: 0 | Status: SHIPPED | OUTPATIENT
Start: 2021-09-24 | End: 2021-11-18

## 2021-09-24 NOTE — TELEPHONE ENCOUNTER
PATIENT CALLED REGARDING HER RX FOR LEVOTHYROXINE. SHE STATES THAT HER PHARMACY RECEIVED THE RX FOR 30 DAYS. SHE IS REQUESTING THAT THIS RX BE RESENT FOR 90 DAYS SO THAT SHE CAN GET THAT RX FOR $3.60. KESHAWN MORENO

## 2021-09-27 RX ORDER — LEVOTHYROXINE SODIUM 0.05 MG/1
50 TABLET ORAL DAILY
Qty: 90 TABLET | Refills: 3 | Status: SHIPPED | OUTPATIENT
Start: 2021-09-27 | End: 2022-03-03 | Stop reason: SDUPTHER

## 2021-09-28 ENCOUNTER — OFFICE VISIT (OUTPATIENT)
Dept: OBSTETRICS AND GYNECOLOGY | Facility: CLINIC | Age: 66
End: 2021-09-28

## 2021-09-28 ENCOUNTER — HOSPITAL ENCOUNTER (OUTPATIENT)
Dept: MAMMOGRAPHY | Facility: HOSPITAL | Age: 66
Discharge: HOME | End: 2021-09-28
Admitting: OBSTETRICS & GYNECOLOGY

## 2021-09-28 VITALS
HEIGHT: 64 IN | WEIGHT: 237 LBS | SYSTOLIC BLOOD PRESSURE: 126 MMHG | BODY MASS INDEX: 40.46 KG/M2 | DIASTOLIC BLOOD PRESSURE: 82 MMHG

## 2021-09-28 DIAGNOSIS — Z79.890 HORMONE REPLACEMENT THERAPY (HRT): Primary | ICD-10-CM

## 2021-09-28 DIAGNOSIS — N95.1 MENOPAUSAL SYMPTOMS: ICD-10-CM

## 2021-09-28 DIAGNOSIS — B37.31 YEAST VAGINITIS: ICD-10-CM

## 2021-09-28 DIAGNOSIS — Z12.31 VISIT FOR SCREENING MAMMOGRAM: ICD-10-CM

## 2021-09-28 PROBLEM — Z78.0 POSTMENOPAUSAL: Status: ACTIVE | Noted: 2021-09-28

## 2021-09-28 PROCEDURE — 77063 BREAST TOMOSYNTHESIS BI: CPT

## 2021-09-28 PROCEDURE — 77067 SCR MAMMO BI INCL CAD: CPT

## 2021-09-28 PROCEDURE — 77063 BREAST TOMOSYNTHESIS BI: CPT | Performed by: RADIOLOGY

## 2021-09-28 PROCEDURE — 99397 PER PM REEVAL EST PAT 65+ YR: CPT | Performed by: OBSTETRICS & GYNECOLOGY

## 2021-09-28 PROCEDURE — 77067 SCR MAMMO BI INCL CAD: CPT | Performed by: RADIOLOGY

## 2021-09-28 RX ORDER — FLUCONAZOLE 150 MG/1
150 TABLET ORAL DAILY
Qty: 3 TABLET | Refills: 4 | Status: SHIPPED | OUTPATIENT
Start: 2021-09-28 | End: 2021-11-18

## 2021-09-28 RX ORDER — NABUMETONE 750 MG/1
1 TABLET, FILM COATED ORAL EVERY 12 HOURS
COMMUNITY
Start: 2021-08-31 | End: 2021-11-18

## 2021-09-28 RX ORDER — ESTRADIOL 1 MG/1
1 TABLET ORAL DAILY
Qty: 90 TABLET | Refills: 4 | Status: SHIPPED | OUTPATIENT
Start: 2021-09-28 | End: 2022-05-09

## 2021-09-28 NOTE — PROGRESS NOTES
GYN Annual Exam     CC - Here for annual exam.   Pt has had her Mammogram today  Refill estradiol     Subjective   HPI  Alison May Emmanuel is a 66 y.o. female, , who presents for annual well woman exam.  She is postmenopausal. Prior VELMA in  for  AUB. reports problems with: recurrent vaginal yeast infections and vaginal odor. Recent dx of thyroid disease. She has had some skin tags removed recently.  Partner Status: single     Desires STD Screening: no.    Additional OB/GYN History   Current contraception: tubal and VELMA    Last Pap : 2020  Last Completed Pap Smear     This patient has no relevant Health Maintenance data.        History of abnormal Pap smear: no  Family history of uterine, colon, breast, or ovarian cancer: no  Last mammogram:   Last Completed Mammogram          Scheduled - MAMMOGRAM (Every 2 Years) Scheduled for 2020  Mammo Screening Digital Tomosynthesis Bilateral With CAD    2019  Mammo Screening Digital Tomosynthesis Bilateral With CAD    08/15/2018  Mammo Screening Digital Tomosynthesis Bilateral With CAD    2017  Mammo diagnostic digital tomosynthesis left w CAD    2017  Mammo Screening Digital Tomosynthesis Bilateral With CAD    Only the first 5 history entries have been loaded, but more history exists.              Last colonoscopy: recent - f/u 10 years   Last Completed Colonoscopy          COLORECTAL CANCER SCREENING (COLONOSCOPY - Every 10 Years) Next due on 2020  Outside Procedure: CO COLONOSCOPY FLX DX W/COLLJ SPEC WHEN PFRMD              Last DEXA: Pt had bone density in [place] 2021 normal   Exercises Regularly: no  Feelings of Anxiety or Depression: -stable on Cymbalta (back)    Tobacco Usage?:     Health Maintenance   Topic Date Due   • DXA SCAN  Never done   • ZOSTER VACCINE (1 of 2) Never done   • HEPATITIS C SCREENING  Never done   • ANNUAL WELLNESS VISIT  Never done   • Pneumococcal Vaccine 65+ (1 of 1 -  PPSV23) Never done   • INFLUENZA VACCINE  10/01/2021   • MAMMOGRAM  09/14/2022   • TDAP/TD VACCINES (5 - Td or Tdap) 10/15/2029   • COLORECTAL CANCER SCREENING  07/23/2030   • COVID-19 Vaccine  Completed       Current Outpatient Medications   Medication Sig Dispense Refill   • ascorbic acid (VITAMIN C) 1000 MG tablet Take 1,000 mg by mouth Daily.     • cholecalciferol (Cholecalciferol) 25 MCG (1000 UT) tablet Take 2 tablets by mouth.     • Cyanocobalamin (VITAMIN B 12 PO) Take 3,000 mg by mouth Daily.     • desloratadine (CLARINEX) 5 MG tablet Take 5 mg by mouth Daily.     • dry mouth gel (BIOTENE ORALBALANCE) gel Apply  to the mouth or throat As Needed for Dry Mouth.     • DULoxetine (CYMBALTA) 60 MG capsule Take 1 capsule by mouth Daily. 90 capsule 3   • ELDERBERRY PO tk 2 po qd     • estradiol (ESTRACE) 1 MG tablet Take 1 tablet by mouth Daily for 90 days. 90 tablet 4   • fluocinolone (SYNALAR) 0.025 % ointment APPLY SPARINGLY TOPICALLY TO THE AFFECTED AREA FOUR TIMES DAILY     • hydrochlorothiazide (HYDRODIURIL) 25 MG tablet      • levothyroxine (Synthroid) 50 MCG tablet Take 1 tablet by mouth Daily. 90 tablet 3   • losartan (COZAAR) 50 MG tablet Take 50 mg by mouth Daily.     • Magnesium 100 MG capsule once daily 500 mg     • MULTIPLE VITAMINS-CALCIUM PO Take 1 tablet by mouth.     • nabumetone (RELAFEN) 750 MG tablet Take 750 mg by mouth 2 (Two) Times a Day.     • nabumetone (RELAFEN) 750 MG tablet Take 1 tablet by mouth Every 12 (Twelve) Hours.     • olopatadine (PATADAY) 0.2 % solution ophthalmic solution 1 drop.     • Omega-3 Fatty Acids (FISH OIL) 1000 MG capsule capsule Take 1,200 mg by mouth Daily With Breakfast.     • Oxymetazoline HCl (Rhofade) 1 % cream Apply to face daily     • pregabalin (LYRICA) 75 MG capsule Take 1 capsule by mouth 3 (Three) Times a Day. 270 capsule 1   • Probiotic Product (PROBIOTIC ACIDOPHILUS BEADS PO) Take 1 capsule by mouth 2 (Two) Times a Day.     • RHOFADE 1 % cream   6   •  vitamin D (ERGOCALCIFEROL) 19860 units capsule capsule      • zolpidem (AMBIEN) 10 MG tablet Take 5 mg by mouth At Night As Needed for Sleep. Half tab daily.     • ascorbic acid (VITAMIN C) 250 MG tablet Take 250 mg by mouth.     • Cholecalciferol (Vitamin D) 50 MCG (2000 UT) tablet Take 2,000 Units by mouth Daily.     • ELDERBERRY PO Take 1 capsule by mouth.     • fluconazole (Diflucan) 150 MG tablet Take 1 tablet by mouth Daily. Take daily every month as needed 3 tablet 4   • fluticasone (FLONASE) 50 MCG/ACT nasal spray 1 spray into the nostril(s) as directed by provider.     • levothyroxine sodium (TIROSINT) 50 MCG capsule Take 1 capsule by mouth Daily. 90 capsule 0   • losartan (COZAAR) 50 MG tablet Take 50 mg by mouth.     • magnesium gluconate (MAGONATE) 500 MG tablet Take 500 mg by mouth.     • metroNIDAZOLE (METROCREAM) 0.75 % cream Apply 1 application topically to the appropriate area as directed Daily.     • multivitamin with minerals tablet tablet Take 1 tablet by mouth Daily.     • Nabumetone 1000 MG tablet Take 1 tablet by mouth Every 12 (Twelve) Hours.     • Omega-3 Fatty Acids (fish oil) 1000 MG capsule capsule Take 1,200 mg by mouth.     • Probiotic Product capsule Take 1 capsule by mouth.     • zolpidem (AMBIEN) 10 MG tablet Take 1 tablet by mouth.       No current facility-administered medications for this visit.       The additional following portions of the patient's history were reviewed and updated as appropriate: current medications, past family history, past medical history, past social history, past surgical history and problem list.    Review of Systems   Constitutional: Negative.    HENT: Negative.    Eyes: Negative.    Respiratory: Negative.    Cardiovascular: Negative.    Gastrointestinal: Negative.    Endocrine: Negative.    Genitourinary: Negative.    Musculoskeletal: Negative.    Skin: Negative.    Allergic/Immunologic: Negative.    Neurological: Negative.    Hematological: Negative.   "  Psychiatric/Behavioral: Negative.        I have reviewed and agree with the HPI, ROS, and historical information as entered above. Doyle Garland MD    Objective   /82   Ht 162.6 cm (64\")   Wt 108 kg (237 lb)   Breastfeeding No   BMI 40.68 kg/m²     PE    Breast: Without masses ,nontender, no skin changes or retractions  Axilla: Normal, no lymphadenopathy  Heart: Regular rate no murmurs rubs or gallops  Lungs: Clear to auscultation, normal breath sounds bilaterally  Abdomen: Soft nontender, no hepatosplenomegaly, no guarding or rebound, no masses  Pelvic exam  External genitalia: Normal introitus and vulva  Vagina: Normal mucosa no bleeding inflammation or discharge  Bladder: Normal position nontender  Urethral meatus and urethra: Normal nontender  Vaginal cuff intact  Bimanual: Nontender adnexa clear, previous hysterectomy  Anal: No external lesions or hemorrhoids    Rectovaginal:negative, Hemoccult negative    Assessment/Plan     Assessment     Problem List Items Addressed This Visit        Endocrine and Metabolic    Hormone replacement therapy (HRT) - Primary       Genitourinary and Reproductive     Menopausal symptoms    Yeast vaginitis    Overview     Recurrent         Relevant Medications    fluconazole (Diflucan) 150 MG tablet          1. GYN annual well woman exam.   2. Recurrent yeast infection    Plan     1. Next pap due in: 2 years  2. Reviewed monthly self breast exams.  Instructed to call with lumps, pain, or breast discharge.  Yearly mammograms ordered.  3. Reviewed risks of ERT including increased risk of breast cancer, increased blood clots, increased heart disease.  Patient strongly desires to stay on or start ERT.  She understands we will use the lowest amount that adequately controls her symptoms.  4. Anticipatory menopausal guidance given.  Preventative health initiatives reviewed  Doyle Garland MD  09/28/2021  "

## 2021-09-29 ENCOUNTER — TELEPHONE (OUTPATIENT)
Dept: ENDOCRINOLOGY | Facility: CLINIC | Age: 66
End: 2021-09-29

## 2021-10-06 ENCOUNTER — TELEPHONE (OUTPATIENT)
Dept: OBSTETRICS AND GYNECOLOGY | Facility: CLINIC | Age: 66
End: 2021-10-06

## 2021-10-06 NOTE — TELEPHONE ENCOUNTER
Patient called to see if her vitamin and probiotic intake would interfere with her diflucan medication. Informed patient it should not. Verbalized understanding.

## 2021-10-19 ENCOUNTER — OFFICE VISIT (OUTPATIENT)
Dept: ENDOCRINOLOGY | Facility: CLINIC | Age: 66
End: 2021-10-19

## 2021-10-19 VITALS
HEIGHT: 64 IN | WEIGHT: 245 LBS | HEART RATE: 76 BPM | DIASTOLIC BLOOD PRESSURE: 74 MMHG | BODY MASS INDEX: 41.83 KG/M2 | OXYGEN SATURATION: 98 % | SYSTOLIC BLOOD PRESSURE: 130 MMHG

## 2021-10-19 DIAGNOSIS — E03.9 ACQUIRED HYPOTHYROIDISM: Primary | ICD-10-CM

## 2021-10-19 PROCEDURE — 99213 OFFICE O/P EST LOW 20 MIN: CPT | Performed by: INTERNAL MEDICINE

## 2021-10-19 NOTE — PROGRESS NOTES
"     Office Note      Date: 10/19/2021  Patient Name: Alison Benitez  MRN: [id number]  : 1955    Chief Complaint   Patient presents with   • Thyroid Problem       History of Present Illness:   Alison Benitez is a 66 y.o. female who presents for Thyroid Problem  SHE IS HERE FOR ROUTINE APPOINTMENT  SHE IS ON 50 MCG PER DAY  --------------------------------  SHE REPORTS NO CHANGES TO HER MEDICAL HX.   TSH IN AUGUST FROM [facility] WAS 1.1  --------------------------------  SHE NOTES SHE IS HOT ALL THE TIME AND SHE  SWEATS ALL THE TIME    SHE  HAS SYMPTOMATIC ROSACEA  ---------------------------------    Subjective          Review of Systems:   Review of Systems   Constitutional: Positive for fatigue. Negative for unexpected weight change.   HENT: Negative for trouble swallowing and voice change.    Eyes: Negative for visual disturbance.   Cardiovascular: Negative for palpitations.   Endocrine: Positive for heat intolerance.   Skin:        ROSACEA   Neurological: Negative for tremors.   Psychiatric/Behavioral: Negative for dysphoric mood and sleep disturbance. The patient is not nervous/anxious.        The following portions of the patient's history were reviewed and updated as appropriate: allergies, current medications, past family history, past medical history, past social history, past surgical history and problem list.    Objective     Visit Vitals  /74   Pulse 76   Ht 162.6 cm (64\")   Wt 111 kg (245 lb)   SpO2 98%   BMI 42.05 kg/m²       Labs:    CBC w/DIFF  Lab Results   Component Value Date    WBC 5.6 2021    RBC 4.34 2021    HGB 12.4 2021    HCT 37.2 2021    MCV 85.7 2021    MCH 28.6 2021    MCHC 33.3 2021    RDW 14.3 2021    MPV 10.8 (H) 2021     2021    NEUTRORELPCT 54.3 2021    LYMPHORELPCT 34.0 2021    MONORELPCT 9.8 2021    EOSRELPCT 1.2 2021    BASORELPCT 0.5 2021    AUTOIGPER 0.2 " 08/09/2021    NEUTROABS 3.1 08/09/2021    LYMPHSABS 1.9 08/09/2021    MONOSABS 0.6 08/09/2021    EOSABS 0.1 08/09/2021    BASOSABS 0.0 08/09/2021    AUTOIGNUM 0.0 08/09/2021       T4  Free T4   Date Value Ref Range Status   02/02/2021 1.5 0.89 - 1.76 ng/dL Final       TSH  No results found for: TSHBASE     Physical Exam:  Physical Exam  Vitals reviewed.   Constitutional:       Appearance: Normal appearance.   HENT:      Head: Normocephalic and atraumatic.   Eyes:      Extraocular Movements: Extraocular movements intact.   Neck:      Comments: NO GOITER NOTED  Lymphadenopathy:      Cervical: No cervical adenopathy.   Skin:     Comments: ROSACEA   Psychiatric:         Mood and Affect: Mood normal.         Thought Content: Thought content normal.         Judgment: Judgment normal.         Assessment / Plan      Assessment & Plan:  Problem List Items Addressed This Visit        Other    Acquired hypothyroidism - Primary    Current Assessment & Plan     CLINICALLY AND CHEMICALLY EUTHYROID. OUTSIDE LABS  REVIEWED.  NO CHANGES ARE NEEDED.          Relevant Medications    levothyroxine sodium (TIROSINT) 50 MCG capsule    levothyroxine (Synthroid) 50 MCG tablet           Abdifatah Jordan MD   10/19/2021

## 2021-10-27 ENCOUNTER — TRANSCRIBE ORDERS (OUTPATIENT)
Dept: ADMINISTRATIVE | Facility: HOSPITAL | Age: 66
End: 2021-10-27

## 2021-10-27 DIAGNOSIS — Z12.31 VISIT FOR SCREENING MAMMOGRAM: Primary | ICD-10-CM

## 2021-11-09 ENCOUNTER — OFFICE VISIT (OUTPATIENT)
Dept: FAMILY MEDICINE CLINIC | Age: 66
End: 2021-11-09
Payer: MEDICARE

## 2021-11-09 VITALS
DIASTOLIC BLOOD PRESSURE: 78 MMHG | SYSTOLIC BLOOD PRESSURE: 128 MMHG | WEIGHT: 242.4 LBS | BODY MASS INDEX: 41.38 KG/M2 | HEIGHT: 64 IN | TEMPERATURE: 98.2 F | HEART RATE: 82 BPM | OXYGEN SATURATION: 97 %

## 2021-11-09 DIAGNOSIS — I10 ESSENTIAL HYPERTENSION: ICD-10-CM

## 2021-11-09 DIAGNOSIS — M54.50 CHRONIC LOW BACK PAIN, UNSPECIFIED BACK PAIN LATERALITY, UNSPECIFIED WHETHER SCIATICA PRESENT: ICD-10-CM

## 2021-11-09 DIAGNOSIS — G89.29 CHRONIC LOW BACK PAIN, UNSPECIFIED BACK PAIN LATERALITY, UNSPECIFIED WHETHER SCIATICA PRESENT: ICD-10-CM

## 2021-11-09 DIAGNOSIS — R30.0 DYSURIA: Primary | ICD-10-CM

## 2021-11-09 DIAGNOSIS — R63.5 WEIGHT GAIN: ICD-10-CM

## 2021-11-09 DIAGNOSIS — G47.00 INSOMNIA, UNSPECIFIED TYPE: ICD-10-CM

## 2021-11-09 LAB
BILIRUBIN, POC: NORMAL
BLOOD URINE, POC: NORMAL
CLARITY, POC: CLEAR
COLOR, POC: YELLOW
GLUCOSE URINE, POC: NORMAL
KETONES, POC: NORMAL
LEUKOCYTE EST, POC: NORMAL
NITRITE, POC: NORMAL
PH, POC: 6
PROTEIN, POC: NORMAL
SPECIFIC GRAVITY, POC: 1.01
UROBILINOGEN, POC: 0.2

## 2021-11-09 PROCEDURE — 99214 OFFICE O/P EST MOD 30 MIN: CPT | Performed by: FAMILY MEDICINE

## 2021-11-09 PROCEDURE — 81002 URINALYSIS NONAUTO W/O SCOPE: CPT | Performed by: FAMILY MEDICINE

## 2021-11-09 RX ORDER — DOXYCYCLINE HYCLATE 100 MG/1
CAPSULE ORAL
COMMUNITY
Start: 2021-11-04

## 2021-11-09 ASSESSMENT — ENCOUNTER SYMPTOMS
GASTROINTESTINAL NEGATIVE: 1
EYE REDNESS: 1
EYE ITCHING: 1
BACK PAIN: 1
SORE THROAT: 1
RESPIRATORY NEGATIVE: 1

## 2021-11-09 NOTE — PROGRESS NOTES
SUBJECTIVE:    Patient ID: Jennie Orlando is a 77 y.o. female. Chief Complaint   Patient presents with    Check-Up       HPI: office visit  She is in the office today in follow-up of her regular medical problems. She feels like she is doing relatively well. She has had some dysuria. She has not had any chest pain or shortness of breath. She is still having a lot of back pain. She has not had any recent falls or injuries. She is trying to do better with her weight. She is really watching what she eats. She is frustrated with her little bit of weight loss. Review of Systems   Constitutional: Positive for fatigue. HENT: Positive for congestion, postnasal drip and sore throat. Eyes: Positive for redness and itching. Respiratory: Negative. Cardiovascular: Negative. Gastrointestinal: Negative. Musculoskeletal: Positive for arthralgias, back pain, gait problem and myalgias. Skin: Positive for rash. All other systems reviewed and are negative. OBJECTIVE:  /78 (Site: Right Upper Arm, Position: Sitting)   Pulse 82   Temp 98.2 °F (36.8 °C) (Temporal)   Ht 5' 4\" (1.626 m)   Wt 242 lb 6.4 oz (110 kg)   SpO2 97%   BMI 41.61 kg/m²    Wt Readings from Last 3 Encounters:   11/09/21 242 lb 6.4 oz (110 kg)   08/02/21 243 lb 11.2 oz (110.5 kg)   06/24/21 245 lb (111.1 kg)     BP Readings from Last 3 Encounters:   11/09/21 128/78   08/02/21 138/70   06/24/21 138/76      Pulse Readings from Last 3 Encounters:   11/09/21 82   08/02/21 82   06/24/21 84     Body mass index is 41.61 kg/m². Resp Readings from Last 3 Encounters:   08/02/21 18   06/24/21 18   06/14/21 18     Past medical, surgical, family and social history were reviewed and updated with the patient. Physical Exam  Vitals and nursing note reviewed. Constitutional:       General: She is not in acute distress. Appearance: Normal appearance. She is well-developed and normal weight.  She is not ill-appearing or toxic-appearing. HENT:      Head: Normocephalic and atraumatic. Right Ear: Hearing, tympanic membrane, ear canal and external ear normal.      Left Ear: Hearing, tympanic membrane, ear canal and external ear normal.      Nose: Nose normal.      Mouth/Throat:      Lips: Pink. Mouth: Mucous membranes are moist.      Tongue: No lesions. Tongue does not deviate from midline. Palate: No mass and lesions. Pharynx: Posterior oropharyngeal erythema present. No pharyngeal swelling, oropharyngeal exudate or uvula swelling. Tonsils: No tonsillar exudate or tonsillar abscesses. Eyes:      Conjunctiva/sclera: Conjunctivae normal.      Pupils: Pupils are equal, round, and reactive to light. Neck:      Thyroid: Thyromegaly present. No thyroid mass or thyroid tenderness. Trachea: Trachea normal.   Cardiovascular:      Rate and Rhythm: Normal rate and regular rhythm. Heart sounds: Normal heart sounds, S1 normal and S2 normal. No murmur heard. No friction rub. No gallop. Pulmonary:      Effort: Pulmonary effort is normal.      Breath sounds: Normal breath sounds. No wheezing, rhonchi or rales. Musculoskeletal:      Cervical back: Full passive range of motion without pain, normal range of motion and neck supple. Lymphadenopathy:      Cervical: Cervical adenopathy present. Neurological:      Mental Status: She is alert and oriented to person, place, and time. Psychiatric:         Attention and Perception: Attention and perception normal.         Mood and Affect: Mood is anxious. Speech: Speech normal.         Behavior: Behavior normal. Behavior is cooperative. Thought Content: Thought content normal.         Cognition and Memory: Cognition and memory normal.         Judgment: Judgment normal.          No results found for requested labs within last 30 days.      Hemoglobin A1C (%)   Date Value   07/16/2020 5.3     Microscopic Examination (no units)   Date Value 12/28/2017 Not Indicated     LDL Calculated (mg/dL)   Date Value   02/02/2021 109       Lab Results   Component Value Date    WBC 5.6 08/09/2021    NEUTROABS 3.1 08/09/2021    HGB 12.4 08/09/2021    HCT 37.2 08/09/2021    MCV 85.7 08/09/2021     08/09/2021     Lab Results   Component Value Date    TSH 1.17 08/09/2021       ASSESSMENT:    Diagnosis Orders   1. Dysuria  POCT Urinalysis no Micro   2. Essential hypertension     3. Chronic low back pain, unspecified back pain laterality, unspecified whether sciatica present     4. Insomnia, unspecified type     5. Weight gain          PLAN:  Continue meds as is for now     There are no discontinued medications. Controlled Substances Monitoring:      Please note: This chart was generated using Dragon dictation software. Although every effort was made to ensure the accuracy of this automated transcription, some errors in transcription may have occurred.

## 2021-11-09 NOTE — PROGRESS NOTES
Chief Complaint   Patient presents with    Check-Up     Pt here today for check up     Have you seen any other physician or provider since your last visit yes - endocrinologist, dermatologist    Have you had any other diagnostic tests since your last visit? no    Have you changed or stopped any medications since your last visit? no

## 2021-11-10 ENCOUNTER — NURSE ONLY (OUTPATIENT)
Dept: FAMILY MEDICINE CLINIC | Age: 66
End: 2021-11-10
Payer: MEDICARE

## 2021-11-10 DIAGNOSIS — Z23 FLU VACCINE NEED: Primary | ICD-10-CM

## 2021-11-10 PROCEDURE — G0008 ADMIN INFLUENZA VIRUS VAC: HCPCS | Performed by: FAMILY MEDICINE

## 2021-11-10 PROCEDURE — 90694 VACC AIIV4 NO PRSRV 0.5ML IM: CPT | Performed by: FAMILY MEDICINE

## 2021-11-18 ENCOUNTER — OFFICE VISIT (OUTPATIENT)
Dept: NEUROLOGY | Facility: CLINIC | Age: 66
End: 2021-11-18

## 2021-11-18 VITALS
BODY MASS INDEX: 40.97 KG/M2 | OXYGEN SATURATION: 98 % | HEIGHT: 64 IN | TEMPERATURE: 97.3 F | DIASTOLIC BLOOD PRESSURE: 80 MMHG | HEART RATE: 94 BPM | SYSTOLIC BLOOD PRESSURE: 130 MMHG | WEIGHT: 240 LBS

## 2021-11-18 DIAGNOSIS — G63 NEUROPATHY DUE TO INFECTION (HCC): Primary | ICD-10-CM

## 2021-11-18 DIAGNOSIS — B99.9 NEUROPATHY DUE TO INFECTION (HCC): Primary | ICD-10-CM

## 2021-11-18 DIAGNOSIS — M48.061 SPINAL STENOSIS OF LUMBAR REGION WITHOUT NEUROGENIC CLAUDICATION: ICD-10-CM

## 2021-11-18 DIAGNOSIS — M79.7 FIBROMYALGIA: ICD-10-CM

## 2021-11-18 PROCEDURE — 99214 OFFICE O/P EST MOD 30 MIN: CPT | Performed by: NURSE PRACTITIONER

## 2021-11-18 RX ORDER — PREGABALIN 75 MG/1
75 CAPSULE ORAL 3 TIMES DAILY
Qty: 270 CAPSULE | Refills: 1 | Status: SHIPPED | OUTPATIENT
Start: 2021-11-18 | End: 2022-03-03 | Stop reason: SDUPTHER

## 2021-11-18 RX ORDER — DULOXETIN HYDROCHLORIDE 60 MG/1
60 CAPSULE, DELAYED RELEASE ORAL DAILY
Qty: 90 CAPSULE | Refills: 3 | Status: SHIPPED | OUTPATIENT
Start: 2021-11-18 | End: 2022-03-03 | Stop reason: SDUPTHER

## 2021-11-18 RX ORDER — MULTIPLE VITAMINS W/ MINERALS TAB 9MG-400MCG
1 TAB ORAL DAILY
COMMUNITY

## 2021-11-18 NOTE — PROGRESS NOTES
Subjective:     Patient ID: Alison Benitez is a 66 y.o. female.    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  She has also recently been diagnosed with fibromyalgia by her rheumatologist.  She is currently taking Lyrica 75 mg 2-3 times a day along with Cymbalta 60 mg daily.  She tells me that some days are difficult but overall she feels that she is doing very well.  She has noticed that the more she walks or stands the more pain she has in the back of her legs and has to rest.  Since last visit she was evaluated by Dr. Stanley with [facility] neurosurgery on 8/24/2021 and they discussed continuing conservative since she has been doing well overall.  She did have a repeat MRI of the lumbar spine on 6/7/2021 and this did show some high-grade and moderate stenosis from L3-L4 and L4-L5.  She wants to wait on injections for now.  If she has worsening symptoms can be referred for epidural injections and follow-up again with neurosurgery.  She felt this was reasonable. She is looking forward to traveling to [place] this week with her daughters for a fun trip.    She had labs with primary care provider 8/9/2021-TSH normal, sed rate 10.4, CBC with differential essentially normal, BUN and creatinine look good.     She sees endocrinology for hypothyroidism which is now stable.     She is followed by rheumatologist Dr. Bibiana Khan.     Prior labs & workup: CK level of 174.  Immunofixation and protein electrophoresis within normal limits.  Anti-hue and anti-Otilia antibodies negative.  Methylmalonic acid normal at 155.  Vitamin B12 951 and folate greater than 20.  Lyme antibodies negative.  Mono test negative.  ASO titer was elevated at 200. Hemoglobin A1c was  4.9%.  She was treated with high dose steroids and physical therapy. 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     The following portions of the patient's history were reviewed and updated as appropriate: allergies, current medications, past family history, past medical history, past social history, past surgical history and problem list.    Past Medical History:   Diagnosis Date   • Arthritis    • Bursitis    • Claustrophobia    • CTS (carpal tunnel syndrome)     bilateral    • Fibromyalgia    • Hemorrhoids    • Hypertension    • Low back pain    • Lumbosacral disc disease    • Neuropathy    • Osteoarthritis    • SBE (subacute bacterial endocarditis)    • Tendinitis of knee    • Thyroid disorder    • Thyroid nodule    •  (vaginal birth after )        Past Surgical History:   Procedure Laterality Date   • ABDOMINAL HYSTERECTOMY     •  SECTION     •  SECTION     • COLONOSCOPY     • DILATATION AND CURETTAGE     • HYSTERECTOMY N/A        • MANDIBLE FRACTURE SURGERY     • OOPHORECTOMY Bilateral    • TUBAL ABDOMINAL LIGATION     • TUBAL ABDOMINAL LIGATION         Social History     Socioeconomic History   • Marital status: Single   Tobacco Use   • Smoking status: Never Smoker   • Smokeless tobacco: Never Used   Vaping Use   • Vaping Use: Never used   Substance and Sexual Activity   • Alcohol use: No   • Drug use: No   • Sexual activity: Not Currently       Family History   Problem Relation Age of Onset   • Osteoarthritis Other    • Hypertension Other    • Carpal tunnel syndrome Other    • COPD Other    • Diabetes Other    • Stroke Other    • Glaucoma Other    • Heart disease Mother    • Asthma Mother    • Hypertension Brother    • Heart disease Father    • Breast cancer Neg Hx    • Ovarian cancer Neg Hx         Review of Systems   Constitutional: Negative for chills, fatigue, fever and unexpected weight change.  "  HENT: Negative for ear pain, hearing loss, nosebleeds, rhinorrhea and sore throat.    Eyes: Negative for photophobia, pain, discharge, itching and visual disturbance.   Respiratory: Negative for cough, chest tightness, shortness of breath and wheezing.    Cardiovascular: Negative for chest pain, palpitations and leg swelling.   Gastrointestinal: Negative for abdominal pain, blood in stool, constipation, diarrhea, nausea and vomiting.   Genitourinary: Negative for dysuria, frequency, hematuria and urgency.   Musculoskeletal: Positive for back pain and gait problem. Negative for arthralgias, joint swelling, myalgias, neck pain and neck stiffness.   Skin: Negative for rash and wound.   Allergic/Immunologic: Negative for environmental allergies and food allergies.   Neurological: Positive for numbness. Negative for dizziness, tremors, seizures, syncope, speech difficulty, weakness, light-headedness and headaches.   Hematological: Negative for adenopathy. Does not bruise/bleed easily.   Psychiatric/Behavioral: Negative for agitation, confusion, decreased concentration, hallucinations, sleep disturbance and suicidal ideas. The patient is not nervous/anxious.    All other systems reviewed and are negative.       Objective:  /80   Pulse 94   Temp 97.3 °F (36.3 °C)   Ht 162.6 cm (64\")   Wt 109 kg (240 lb)   SpO2 98%   BMI 41.20 kg/m²     Neurologic Exam     Mental Status   Oriented to person, place, and time.   Speech: speech is normal   Level of consciousness: alert    Cranial Nerves   Cranial nerves II through XII intact.     Motor Exam   Muscle bulk: normal  Overall muscle tone: normal    Strength   Strength 5/5 throughout.     Sensory Exam   Light touch normal.   Vibration normal.   Proprioception normal.   Pinprick normal.     Decreased stocking cold sensation BLE     Gait, Coordination, and Reflexes     Gait  Gait: (mild antalgia)    Coordination   Finger to nose coordination: normal    Tremor   Resting " tremor: absent  Intention tremor: absent  Action tremor: absent    Reflexes   Right brachioradialis: 2+  Left brachioradialis: 2+  Right biceps: 2+  Left biceps: 2+  Right patellar: 1+  Left patellar: 1+  Right achilles: 1+  Left achilles: 1+  Right : 2+  Left : 2+      Physical Exam  Constitutional:       Appearance: Normal appearance.   Musculoskeletal:      Lumbar back: Decreased range of motion. Negative right straight leg raise test and negative left straight leg raise test.   Neurological:      Mental Status: She is alert and oriented to person, place, and time.      Coordination: Finger-Nose-Finger Test normal.      Deep Tendon Reflexes: Strength normal.      Reflex Scores:       Bicep reflexes are 2+ on the right side and 2+ on the left side.       Brachioradialis reflexes are 2+ on the right side and 2+ on the left side.       Patellar reflexes are 1+ on the right side and 1+ on the left side.       Achilles reflexes are 1+ on the right side and 1+ on the left side.  Psychiatric:         Mood and Affect: Mood is anxious.         Speech: Speech normal.         Behavior: Behavior normal.         Thought Content: Thought content normal.         Cognition and Memory: Cognition and memory normal.         Judgment: Judgment normal.         Assessment/Plan:       Diagnoses and all orders for this visit:    1. 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  Dispense: 90 capsule; Refill: 3  -     pregabalin (LYRICA) 75 MG capsule; Take 1 capsule by mouth 3 (Three) Times a Day.  Dispense: 270 capsule; Refill: 1    2. Spinal stenosis of lumbar region without neurogenic claudication  -     DULoxetine (CYMBALTA) 60 MG capsule; Take 1 capsule by mouth Daily.  Dispense: 90 capsule; Refill: 3  -     pregabalin (LYRICA) 75 MG capsule; Take 1 capsule by mouth 3 (Three) Times a Day.  Dispense: 270 capsule; Refill: 1    3.  Fibromyalgia  Comments:  recently diagnosed by Dr. Bibiana Khan Rheumatology  Orders:  -     DULoxetine (CYMBALTA) 60 MG capsule; Take 1 capsule by mouth Daily.  Dispense: 90 capsule; Refill: 3  -     pregabalin (LYRICA) 75 MG capsule; Take 1 capsule by mouth 3 (Three) Times a Day.  Dispense: 270 capsule; Refill: 1           Continue current medications. Reviewed neurosurgery notes. If she has any worsening of claudication she is to contact neurosurgery for further treatment options. She will follow up in 6 months or sooner if needed. Continue to take frequent rests when walking or being more active.   Reviewed medications, potential side effects and signs and symptoms to report. Discussed risk versus benefits of treatment plan with patient and/or family-including medications, labs and radiology that may be ordered. Addressed questions and concerns during visit. Patient and/or family verbalized understanding and agree with plan.    AS THE PROVIDER, I PERSONALLY WORE PPE DURING ENTIRE FACE TO FACE ENCOUNTER IN CLINIC WITH THE PATIENT. PATIENT ALSO WORE PPE DURING ENTIRE FACE TO FACE ENCOUNTER EXCEPT FOR A MAX OF 30 SECONDS DURING NEUROLOGICAL EVALUATION OF CRANIAL NERVES AND THEN MASK WAS PLACED BACK OVER PATIENT FACE FOR REMAINDER OF VISIT. I WASHED MY HANDS BEFORE AND AFTER VISIT.    As part of this patient's treatment plan I am prescribing controlled substances. The patient has been made aware of appropriate use of such medications, including potential risk of somnolence, limited ability to drive and/or work safely, and potential for dependence or overdose. It has also been made clear that these medications are for use by the patient only, without concomitant use of alcohol or other substances unless prescribed. Keep out of reach of children.  Ethan report has been reviewed. If this is going to be prescribed long term, [facility] Controlled Substance Agreement Contract has also been read and signed by patient and  myself.    During this visit the following were done:  Labs Reviewed [x]    Labs Ordered []    Radiology Reports Reviewed [x]    Radiology Ordered []    PCP Records Reviewed [x]    Referring Provider Records Reviewed []    ER Records Reviewed []    Hospital Records Reviewed []    History Obtained From Family []    Radiology Images Reviewed []    Other Reviewed [x]  Neurosurgery notes and pcp notes  Records Requested []      Kimber Correa, APRN  11/18/2021

## 2021-11-30 NOTE — FLOWSHEET NOTE
Physical Therapy Discharge Summary   Date:  21    Patient Name:  Geri Holstein    :  1955  MRN: [id number]    Restrictions/Precautions:    Pertinent Medical History:  Medical/Treatment Diagnosis Information:  ·   Back pain, LLE pain     Insurance/Certification information:    [facility], [address], [place] Medicaid  Physician Information:    JO-ANN Card  Plan of care signed (Y/N):    Visit# / total visits:     10/    G-Code (if applicable):      Date / Visit # G-Code Applied:         Progress Note: []  Yes  [x]  No  Next due by: 20      Subjective:  N/A. Objective:   Observation:    Test measurements: LEFS: .  Palpation:  Grade I-II tenderness distal L hamstring. Exercises:  Exercise Resistance/Repetitions Other comments   Nustep 8', L5    Seated HS stretch w/ belt 5x20\"    Seated mid rows GTB 3x10    Hip abd / add  BTB / ball 3x10    HS curls OTB 3x10    LTR x30    Piriformis stretch 5x20\"    Ab brace with march 3x10    Clam shells 3x10     LAQ w/ weight 3# 2-3x/10 B    Mini lunge 3x10 B           Other Therapeutic Activities:      Manual Treatments:  Distal L HS STM, LLE SAD, B ITB rolling -25'. Not today. Modalities:      Treatment/Activity Tolerance:  [] Patient tolerated treatment well [] Patient limited by fatigue  [] Patient limited by pain  [] Patient limited by other medical complications  [x] Other:   Pt never returned to not having auth from ins to continue PT. Pt was progressing well toward goals.       Prognosis: [x] Good [] Fair  [] Poor    Patient Requires Follow-up: [x] Yes  [] No    Plan:   [x] Continue per plan of care [] Alter current plan (see comments)  [] Plan of care initiated [] Hold pending MD visit [] Discharge    Plan for Next Session:      Goals  Short term goals  Time Frame for Short term goals: 3 weeks  Short term goal 1: Pt to be I with HEP -MET  Short term goal 2: Pt to perform daily activities with pain of 5/10 or less. -partially met  Short term goal 3: Pt to be educated on posture correction and proper lumbar mechanics. -ongoing  Long term goals  Time Frame for Long term goals : 6-8 weeks  Long term goal 1: LEFS score to improve to 50/80 indicating improved function  Long term goal 2: Pt to perform daily activities with pain of less than 3/10  Long term goal 3: Pt to demonstrate (-) LLE sitting slump test.  Long term goal 4: Tenderness to palpation to reduce to trace or less at LLE.   Long term goal 5: Pt to be transitioned to an advanced self care HEP       Electronically signed by:  Francesca Eller PTA

## 2022-01-24 DIAGNOSIS — G47.00 INSOMNIA, UNSPECIFIED TYPE: Primary | ICD-10-CM

## 2022-01-24 RX ORDER — ZOLPIDEM TARTRATE 10 MG/1
TABLET ORAL
Qty: 30 TABLET | Refills: 2 | Status: SHIPPED | OUTPATIENT
Start: 2022-01-24 | End: 2022-04-27

## 2022-02-10 ENCOUNTER — OFFICE VISIT (OUTPATIENT)
Dept: FAMILY MEDICINE CLINIC | Age: 67
End: 2022-02-10
Payer: MEDICARE

## 2022-02-10 DIAGNOSIS — R52 BODY ACHES: ICD-10-CM

## 2022-02-10 DIAGNOSIS — U07.1 COVID: Primary | ICD-10-CM

## 2022-02-10 DIAGNOSIS — Z11.52 ENCOUNTER FOR SCREENING FOR COVID-19: ICD-10-CM

## 2022-02-10 DIAGNOSIS — J02.9 SORE THROAT: ICD-10-CM

## 2022-02-10 LAB
Lab: ABNORMAL
QC PASS/FAIL: ABNORMAL
S PYO AG THROAT QL: NORMAL
SARS-COV-2 RDRP RESP QL NAA+PROBE: POSITIVE

## 2022-02-10 PROCEDURE — 99213 OFFICE O/P EST LOW 20 MIN: CPT | Performed by: FAMILY MEDICINE

## 2022-02-10 PROCEDURE — 87635 SARS-COV-2 COVID-19 AMP PRB: CPT | Performed by: FAMILY MEDICINE

## 2022-02-10 PROCEDURE — 87880 STREP A ASSAY W/OPTIC: CPT | Performed by: FAMILY MEDICINE

## 2022-02-10 PROCEDURE — 87804 INFLUENZA ASSAY W/OPTIC: CPT | Performed by: FAMILY MEDICINE

## 2022-02-10 RX ORDER — AZITHROMYCIN 250 MG/1
250 TABLET, FILM COATED ORAL SEE ADMIN INSTRUCTIONS
Qty: 6 TABLET | Refills: 0 | Status: SHIPPED | OUTPATIENT
Start: 2022-02-10 | End: 2022-02-15

## 2022-02-10 RX ORDER — PREDNISONE 1 MG/1
5 TABLET ORAL DAILY
Qty: 21 TABLET | Refills: 0 | Status: SHIPPED | OUTPATIENT
Start: 2022-02-10 | End: 2022-02-16

## 2022-02-10 RX ORDER — BENZONATATE 200 MG/1
200 CAPSULE ORAL 3 TIMES DAILY PRN
Qty: 30 CAPSULE | Refills: 0 | Status: SHIPPED | OUTPATIENT
Start: 2022-02-10 | End: 2022-02-17

## 2022-02-10 NOTE — PROGRESS NOTES
Per Aleene Soulier, MD orders patient scheduled for Monoclonal Antibody infusion at [facility] 2/11/2022 to arrive at 2pm - patient given appointment information and instructed to take positive test documentation to the appointment with her - patient verbalized understanding

## 2022-02-27 ASSESSMENT — ENCOUNTER SYMPTOMS
GASTROINTESTINAL NEGATIVE: 1
EYE REDNESS: 1
SORE THROAT: 1
BACK PAIN: 1
RESPIRATORY NEGATIVE: 1
EYE ITCHING: 1

## 2022-02-27 NOTE — PROGRESS NOTES
SUBJECTIVE:    Patient ID: Akosua Kovacs is a 77 y.o. female. Chief Complaint   Patient presents with    Cough     X1 week    Congestion    Pharyngitis    Dizziness    Ear Fullness    Fever    Generalized Body Aches       HPI: office visit  She is in the office today complaining of some significant cough and congestion. She is having some sinus pressure. She says she is had some low-grade fever. She is having some minimal shortness of breath. She is getting up some thick sputum at times when she coughs. She is having some ear pain. She has had some sore throat. She has had some dizziness. She does have some body aches. She says she has felt bad for couple days. She said she thought she better come be tested. She has not had any high fevers at home though she has had some low-grade fever. She is having some increased weakness. Review of Systems   Constitutional: Positive for fatigue. HENT: Positive for congestion, postnasal drip and sore throat. Eyes: Positive for redness and itching. Respiratory: Negative. Cardiovascular: Negative. Gastrointestinal: Negative. Musculoskeletal: Positive for arthralgias, back pain, gait problem and myalgias. Skin: Positive for rash. All other systems reviewed and are negative. OBJECTIVE:  There were no vitals taken for this visit. Wt Readings from Last 3 Encounters:   11/09/21 242 lb 6.4 oz (110 kg)   08/02/21 243 lb 11.2 oz (110.5 kg)   06/24/21 245 lb (111.1 kg)     BP Readings from Last 3 Encounters:   11/09/21 128/78   08/02/21 138/70   06/24/21 138/76      Pulse Readings from Last 3 Encounters:   11/09/21 82   08/02/21 82   06/24/21 84     There is no height or weight on file to calculate BMI. Resp Readings from Last 3 Encounters:   08/02/21 18   06/24/21 18   06/14/21 18     Past medical, surgical, family and social history were reviewed and updated with the patient. Physical Exam  Vitals and nursing note reviewed. Constitutional:       General: She is not in acute distress. Appearance: Normal appearance. She is well-developed and normal weight. She is not ill-appearing or toxic-appearing. HENT:      Head: Normocephalic and atraumatic. Right Ear: Hearing, tympanic membrane, ear canal and external ear normal.      Left Ear: Hearing, tympanic membrane, ear canal and external ear normal.      Nose: Nasal tenderness, mucosal edema, congestion and rhinorrhea present. Mouth/Throat:      Lips: Pink. Mouth: Mucous membranes are moist.      Tongue: No lesions. Tongue does not deviate from midline. Palate: No mass and lesions. Pharynx: Posterior oropharyngeal erythema present. No pharyngeal swelling, oropharyngeal exudate or uvula swelling. Tonsils: No tonsillar exudate or tonsillar abscesses. Eyes:      General: Lids are normal.      Conjunctiva/sclera: Conjunctivae normal.      Pupils: Pupils are equal, round, and reactive to light. Neck:      Thyroid: Thyromegaly present. No thyroid mass or thyroid tenderness. Trachea: Trachea normal.   Cardiovascular:      Rate and Rhythm: Normal rate and regular rhythm. Heart sounds: Normal heart sounds, S1 normal and S2 normal. No murmur heard. No friction rub. No gallop. Pulmonary:      Effort: Pulmonary effort is normal.      Breath sounds: Normal breath sounds. No wheezing, rhonchi or rales. Musculoskeletal:      Cervical back: Full passive range of motion without pain, normal range of motion and neck supple. Lymphadenopathy:      Cervical: Cervical adenopathy present. Neurological:      Mental Status: She is alert and oriented to person, place, and time. Psychiatric:         Attention and Perception: Attention and perception normal.         Mood and Affect: Mood is anxious. Speech: Speech normal.         Behavior: Behavior normal. Behavior is cooperative. Thought Content:  Thought content normal.         Cognition and Memory: Cognition and memory normal.         Judgment: Judgment normal.          No results found for requested labs within last 30 days. Hemoglobin A1C (%)   Date Value   07/16/2020 5.3     Microscopic Examination (no units)   Date Value   12/28/2017 Not Indicated     LDL Calculated (mg/dL)   Date Value   02/02/2021 109       Lab Results   Component Value Date    WBC 5.6 08/09/2021    NEUTROABS 3.1 08/09/2021    HGB 12.4 08/09/2021    HCT 37.2 08/09/2021    MCV 85.7 08/09/2021     08/09/2021     Lab Results   Component Value Date    TSH 1.17 08/09/2021       ASSESSMENT/PLAN    Diagnosis Orders   1. COVID  azithromycin (ZITHROMAX) 250 MG tablet   2. Encounter for screening for COVID-19  POCT COVID-19 Rapid, NAAT   3. Body aches  POCT Influenza A/B   4. Sore throat  POCT rapid strep A       Orders Placed This Encounter   Medications    benzonatate (TESSALON) 200 MG capsule     Sig: Take 1 capsule by mouth 3 times daily as needed for Cough     Dispense:  30 capsule     Refill:  0    azithromycin (ZITHROMAX) 250 MG tablet     Sig: Take 1 tablet by mouth See Admin Instructions for 5 days 500mg on day 1 followed by 250mg on days 2 - 5     Dispense:  6 tablet     Refill:  0    predniSONE (DELTASONE) 5 MG tablet     Sig: Take 1 tablet by mouth daily for 6 days Take 6 tablets by mouth on day 1, 5 on day 2, 4 on day 3, 3 on day 4, 2 on day 5, 1 on day 6. Dispense:  21 tablet     Refill:  0        There are no discontinued medications. Controlled Substances Monitoring:      Please note: This chart was generated using Dragon dictation software. Although every effort was made to ensure the accuracy of this automated transcription, some errors in transcription may have occurred.

## 2022-02-28 ENCOUNTER — OFFICE VISIT (OUTPATIENT)
Dept: FAMILY MEDICINE CLINIC | Age: 67
End: 2022-02-28
Payer: MEDICARE

## 2022-02-28 DIAGNOSIS — R22.41 LOCALIZED SWELLING OF RIGHT FOOT: Primary | ICD-10-CM

## 2022-02-28 DIAGNOSIS — R09.81 HEAD CONGESTION: ICD-10-CM

## 2022-02-28 LAB
INFLUENZA A ANTIBODY: NORMAL
INFLUENZA B ANTIBODY: NORMAL

## 2022-02-28 PROCEDURE — 99213 OFFICE O/P EST LOW 20 MIN: CPT | Performed by: NURSE PRACTITIONER

## 2022-02-28 PROCEDURE — 87804 INFLUENZA ASSAY W/OPTIC: CPT | Performed by: NURSE PRACTITIONER

## 2022-02-28 ASSESSMENT — ENCOUNTER SYMPTOMS: BACK PAIN: 1

## 2022-02-28 NOTE — PROGRESS NOTES
Chief Complaint   Patient presents with    Congestion     Covid + on the 10th    Cough    Foot Pain     right       Patient here today for sick visit only. Health Maintenance not reviewed during this visit.

## 2022-02-28 NOTE — PROGRESS NOTES
Social Connections:     Frequency of Communication with Friends and Family: Not on file    Frequency of Social Gatherings with Friends and Family: Not on file    Attends Congregation Services: Not on file    Active Member of Clubs or Organizations: Not on file    Attends Club or Organization Meetings: Not on file    Marital Status: Not on file   Intimate Partner Violence:     Fear of Current or Ex-Partner: Not on file    Emotionally Abused: Not on file    Physically Abused: Not on file    Sexually Abused: Not on file   Housing Stability:     Unable to Pay for Housing in the Last Year: Not on file    Number of [place] in the Last Year: Not on file    Unstable Housing in the Last Year: Not on file        Past Surgical History:   Procedure Laterality Date     SECTION      x 2    COLONOSCOPY  2010    normal findings    COLONOSCOPY N/A 2020    COLORECTAL CANCER SCREENING, NOT HIGH RISK performed by Ade Lakhani MD at [address] [place] [address]          Past Medical History:   Diagnosis Date    Arthritis         Current Outpatient Medications   Medication Sig Dispense Refill    doxycycline hyclate (VIBRAMYCIN) 100 MG capsule TAKE ONE CAPSULE BY MOUTH TWICE DAILY FOR 8 WEEKS      metroNIDAZOLE (METROCREAM) 0.75 % cream APPLY EXTERNALLY TO THE AFFECTED AREA EVERY DAY      desloratadine (CLARINEX) 5 MG tablet take 1 tablet by mouth once daily 90 tablet 3    losartan (COZAAR) 50 MG tablet Take 1 tablet by mouth daily 90 tablet 3    olopatadine (PATADAY) 0.2 % SOLN ophthalmic solution Place 1 drop into both eyes daily 2.5 mL 2    triamcinolone (KENALOG) 0.025 % cream Apply topically 2 times daily.  (Patient not taking: Reported on 2021) 1 Tube 0    RHOFADE 1 % CREA Apply to face daily      ELDERBERRY PO Take 1 capsule by mouth daily  (Patient not taking: Reported on 2021)      levothyroxine (SYNTHROID) 50 MCG tablet Take 50 mcg by mouth daily      DULoxetine (CYMBALTA) 60 MG extended release capsule Take 60 mg by mouth daily      Magnesium Gluconate 500 (27 Mg) MG TABS tablet Take 500 mg by mouth daily       pregabalin (LYRICA) 75 MG capsule take 1 capsule by mouth twice a day  0    Ascorbic Acid (VITAMIN C) 250 MG tablet Take 250 mg by mouth daily       nabumetone (RELAFEN) 750 MG tablet Take 750 mg by mouth 2 times daily       Probiotic Product (PROBIOTIC-10) CAPS Take 1 capsule by mouth daily  (Patient not taking: Reported on 11/9/2021)      Omega-3 Fatty Acids (FISH OIL) 1000 MG CAPS Take 1,200 mg by mouth daily       vitamin D (ERGOCALCIFEROL) 31605 units CAPS capsule Take by mouth   0    fluticasone (FLONASE) 50 MCG/ACT nasal spray 1 spray by Nasal route daily 1 Bottle 3    estradiol (ESTRACE) 1 MG tablet Take 1 mg by mouth daily       Multiple Vitamins-Minerals (MULTIVITAL PO) Take 1 tablet by mouth daily       hydrochlorothiazide (HYDRODIURIL) 25 MG tablet Take 25 mg by mouth daily      vitamin B-12 (CYANOCOBALAMIN) 1000 MCG tablet Take 1,000 mcg by mouth daily       No current facility-administered medications for this visit. Review of Systems   HENT: Positive for congestion. Musculoskeletal: Positive for back pain. Right foot swelling with pain   All other systems reviewed and are negative. There were no vitals taken for this visit. Physical Exam  Constitutional:       Appearance: Normal appearance. HENT:      Head: Normocephalic and atraumatic. Nose: Congestion present. Mouth/Throat:      Mouth: Mucous membranes are moist.      Pharynx: Oropharynx is clear. Eyes:      Extraocular Movements: Extraocular movements intact. Conjunctiva/sclera: Conjunctivae normal.      Pupils: Pupils are equal, round, and reactive to light. Cardiovascular:      Rate and Rhythm: Normal rate and regular rhythm. Pulses: Normal pulses. Heart sounds: Normal heart sounds.    Pulmonary:      Effort: Pulmonary effort is normal.      Breath sounds: Normal breath sounds. Musculoskeletal:         General: Swelling and tenderness present. Normal range of motion. Cervical back: Normal range of motion and neck supple. Comments: Right foot edema 1+, erythema of toes, tenderness to the top of her foot on palpation   Skin:     General: Skin is warm and dry. Capillary Refill: Capillary refill takes less than 2 seconds. Neurological:      General: No focal deficit present. Mental Status: She is alert and oriented to person, place, and time. Psychiatric:         Mood and Affect: Mood normal.         Behavior: Behavior normal.          ASSESSMENT/PLAN    1. Localized swelling of right foot  Advised patient to elevate her foot as much as possible and to use ice intermittently as needed for swelling and pain. She can take Tylenol alternating with ibuprofen. She should go for x-ray and Doppler to evaluate for stress fracture or blood clot. Patient is agreeable plan of care  - XR FOOT RIGHT (2 VIEWS); Future  - VL EXTREMITY VENOUS RIGHT; Future    2.  Head congestion  Flu negative  Patient admits her congestion is likely due to her previous infection of COVID-19 and she has medication to take over-the-counter.  - POCT Influenza A/B             JO-ANN Jurado - CNP

## 2022-03-01 ENCOUNTER — HOSPITAL ENCOUNTER (OUTPATIENT)
Facility: HOSPITAL | Age: 67
Discharge: HOME | End: 2022-03-01
Payer: MEDICARE

## 2022-03-01 ENCOUNTER — HOSPITAL ENCOUNTER (OUTPATIENT)
Dept: GENERAL RADIOLOGY | Facility: HOSPITAL | Age: 67
Discharge: HOME | End: 2022-03-01
Payer: MEDICARE

## 2022-03-01 ENCOUNTER — HOSPITAL ENCOUNTER (OUTPATIENT)
Dept: ULTRASOUND IMAGING | Facility: HOSPITAL | Age: 67
Discharge: HOME | End: 2022-03-01
Payer: MEDICARE

## 2022-03-01 DIAGNOSIS — R22.41 LOCALIZED SWELLING OF RIGHT FOOT: ICD-10-CM

## 2022-03-01 PROCEDURE — 93971 EXTREMITY STUDY: CPT

## 2022-03-01 PROCEDURE — 73620 X-RAY EXAM OF FOOT: CPT

## 2022-03-02 ENCOUNTER — TELEPHONE (OUTPATIENT)
Dept: ENDOCRINOLOGY | Facility: CLINIC | Age: 67
End: 2022-03-02

## 2022-03-02 NOTE — TELEPHONE ENCOUNTER
Spoke with patient.  She wanted to know what the first medication that Dr. Jordan had prescribed for her for thyroid.  Advised Levothyroxine 50mcg.

## 2022-03-02 NOTE — TELEPHONE ENCOUNTER
PATIENT IS REQUESTING TO SPEAK WITH CLINIC STAFF REGARDING ONE OF HER PRESCRIPTIONS.    CALL BACK [phone]

## 2022-03-03 DIAGNOSIS — B99.9 NEUROPATHY DUE TO INFECTION: ICD-10-CM

## 2022-03-03 DIAGNOSIS — G63 NEUROPATHY DUE TO INFECTION: ICD-10-CM

## 2022-03-03 DIAGNOSIS — M48.061 SPINAL STENOSIS OF LUMBAR REGION WITHOUT NEUROGENIC CLAUDICATION: ICD-10-CM

## 2022-03-03 DIAGNOSIS — E03.9 ACQUIRED HYPOTHYROIDISM: ICD-10-CM

## 2022-03-03 DIAGNOSIS — M79.7 FIBROMYALGIA: ICD-10-CM

## 2022-03-03 RX ORDER — PREGABALIN 75 MG/1
75 CAPSULE ORAL 3 TIMES DAILY
Qty: 270 CAPSULE | Refills: 1 | Status: SHIPPED | OUTPATIENT
Start: 2022-03-03 | End: 2022-05-19

## 2022-03-03 RX ORDER — LOSARTAN POTASSIUM 50 MG/1
50 TABLET ORAL DAILY
Qty: 90 TABLET | Refills: 3 | Status: SHIPPED | OUTPATIENT
Start: 2022-03-03

## 2022-03-03 RX ORDER — HYDROCHLOROTHIAZIDE 25 MG/1
25 TABLET ORAL DAILY
Qty: 90 TABLET | Refills: 0 | Status: SHIPPED | OUTPATIENT
Start: 2022-03-03 | End: 2022-03-30 | Stop reason: SDUPTHER

## 2022-03-03 RX ORDER — PREGABALIN 75 MG/1
75 CAPSULE ORAL 3 TIMES DAILY
Qty: 270 CAPSULE | Refills: 1 | Status: CANCELLED | OUTPATIENT
Start: 2022-03-03

## 2022-03-03 RX ORDER — DULOXETIN HYDROCHLORIDE 60 MG/1
60 CAPSULE, DELAYED RELEASE ORAL DAILY
Qty: 90 CAPSULE | Refills: 3 | Status: SHIPPED | OUTPATIENT
Start: 2022-03-03 | End: 2022-05-19

## 2022-03-03 RX ORDER — DULOXETIN HYDROCHLORIDE 60 MG/1
60 CAPSULE, DELAYED RELEASE ORAL DAILY
Qty: 90 CAPSULE | Refills: 3 | Status: CANCELLED | OUTPATIENT
Start: 2022-03-03

## 2022-03-03 RX ORDER — DESLORATADINE 5 MG/1
TABLET ORAL
Qty: 90 TABLET | Refills: 0 | Status: SHIPPED | OUTPATIENT
Start: 2022-03-03 | End: 2022-03-30 | Stop reason: SDUPTHER

## 2022-03-03 RX ORDER — LEVOTHYROXINE SODIUM 0.05 MG/1
50 TABLET ORAL DAILY
Qty: 90 TABLET | Refills: 1 | Status: SHIPPED | OUTPATIENT
Start: 2022-03-03 | End: 2022-11-21 | Stop reason: SDUPTHER

## 2022-03-03 NOTE — TELEPHONE ENCOUNTER
Rx Refill Note  Requested Prescriptions     Pending Prescriptions Disp Refills   • DULoxetine (CYMBALTA) 60 MG capsule 90 capsule 3     Sig: Take 1 capsule by mouth Daily.   • pregabalin (LYRICA) 75 MG capsule 270 capsule 1     Sig: Take 1 capsule by mouth 3 (Three) Times a Day.      Last office visit with prescribing clinician: 11/18/2021      Next office visit with prescribing clinician: 5/19/2022            Vicenta Molina CMA  03/03/22, 10:48 EST

## 2022-03-03 NOTE — TELEPHONE ENCOUNTER
Patient called requesting her levothyroxine (Synthroid) 50 MCG tablet be sent to [facility]. Please advise.     Last ov 10/19/21  Follow up scheduled 10/20/22

## 2022-03-03 NOTE — TELEPHONE ENCOUNTER
Patient stated that she used to get this from her gynecologist but he is retiring and was wondering if you could send it in for her.

## 2022-03-03 NOTE — TELEPHONE ENCOUNTER
Caller: Alison Benitez May    Relationship: Self    Best call back number: [phone]    Requested Prescriptions:   Requested Prescriptions     Pending Prescriptions Disp Refills   • DULoxetine (CYMBALTA) 60 MG capsule 90 capsule 3     Sig: Take 1 capsule by mouth Daily.   • pregabalin (LYRICA) 75 MG capsule 270 capsule 1     Sig: Take 1 capsule by mouth 3 (Three) Times a Day.        Pharmacy where request should be sent: [facility] - [address] - [phone] [facility] [phone]     Additional details provided by patient: PT IS SETTING UP MAIL DELIVERY.  PT STATES SHE HAS ENOUGH MEDICATION FOR NOW.    Does the patient have less than a 3 day supply:  [] Yes  [x] No    Stevie Man Rep   03/03/22 10:09 EST

## 2022-03-06 RX ORDER — ESTRADIOL 1 MG/1
1 TABLET ORAL DAILY
Qty: 21 TABLET | Refills: 0 | Status: SHIPPED | OUTPATIENT
Start: 2022-03-06 | End: 2022-03-30 | Stop reason: SDUPTHER

## 2022-03-08 ENCOUNTER — OFFICE VISIT (OUTPATIENT)
Dept: FAMILY MEDICINE CLINIC | Age: 67
End: 2022-03-08
Payer: MEDICARE

## 2022-03-08 VITALS
TEMPERATURE: 97.7 F | DIASTOLIC BLOOD PRESSURE: 74 MMHG | RESPIRATION RATE: 16 BRPM | WEIGHT: 244.8 LBS | HEART RATE: 82 BPM | HEIGHT: 64 IN | OXYGEN SATURATION: 98 % | BODY MASS INDEX: 41.79 KG/M2 | SYSTOLIC BLOOD PRESSURE: 128 MMHG

## 2022-03-08 DIAGNOSIS — U07.1 COVID TOES: Primary | ICD-10-CM

## 2022-03-08 DIAGNOSIS — R23.8 COVID TOES: Primary | ICD-10-CM

## 2022-03-08 PROCEDURE — 99213 OFFICE O/P EST LOW 20 MIN: CPT | Performed by: NURSE PRACTITIONER

## 2022-03-08 RX ORDER — DIAPER,BRIEF,INFANT-TODD,DISP
EACH MISCELLANEOUS
Qty: 30 G | Refills: 1 | Status: SHIPPED | OUTPATIENT
Start: 2022-03-08 | End: 2022-03-15

## 2022-03-08 RX ORDER — PREDNISONE 20 MG/1
20 TABLET ORAL 2 TIMES DAILY
Qty: 10 TABLET | Refills: 0 | Status: SHIPPED | OUTPATIENT
Start: 2022-03-08 | End: 2022-03-13

## 2022-03-08 NOTE — PROGRESS NOTES
Chief Complaint   Patient presents with    Foot Pain     both feet       Have you seen any other physician or provider since your last visit no    Have you had any other diagnostic tests since your last visit? yes - M&W for xray & US    Have you changed or stopped any medications since your last visit?  yes - see med list

## 2022-03-09 NOTE — PROGRESS NOTES
Ignacia Avila 77 y.o. presents today for   Chief Complaint   Patient presents with    Foot Pain     both feet        HPI:  Ignacia Avila states she is still having pain in her feet. It was the right foot that was sore and swollen but now she is having pain and discoloration of her toes on both feet. She said she has done a lot of research and believes she has COVID toes. She has soaked them in epsom salt a few times but says she is just worried and wants them assessed. She does not want to go to podiatry. Family History   Problem Relation Age of Onset    Cancer Mother         skin ca    High Blood Pressure Mother     Cancer Brother     High Blood Pressure Brother         Social History     Socioeconomic History    Marital status:      Spouse name: Not on file    Number of children: Not on file    Years of education: Not on file    Highest education level: Not on file   Occupational History    Not on file   Tobacco Use    Smoking status: Never Smoker    Smokeless tobacco: Never Used   Substance and Sexual Activity    Alcohol use: No    Drug use: No    Sexual activity: Yes   Other Topics Concern    Not on file   Social History Narrative    Not on file     Social Determinants of Health     Financial Resource Strain: Low Risk     Difficulty of Paying Living Expenses: Not very hard   Food Insecurity: No Food Insecurity    Worried About Running Out of Food in the Last Year: Never true    Gemma of Food in the Last Year: Never true   Transportation Needs:     Lack of Transportation (Medical): Not on file    Lack of Transportation (Non-Medical):  Not on file   Physical Activity:     Days of Exercise per Week: Not on file    Minutes of Exercise per Session: Not on file   Stress:     Feeling of Stress : Not on file   Social Connections:     Frequency of Communication with Friends and Family: Not on file    Frequency of Social Gatherings with Friends and Family: Not on file   [address] [place] [address] Attends Voodoo Services: Not on file    Active Member of Clubs or Organizations: Not on file    Attends Club or Organization Meetings: Not on file    Marital Status: Not on file   Intimate Partner Violence:     Fear of Current or Ex-Partner: Not on file    Emotionally Abused: Not on file    Physically Abused: Not on file    Sexually Abused: Not on file   Housing Stability:     Unable to Pay for Housing in the Last Year: Not on file    Number of [place] in the Last Year: Not on file    Unstable Housing in the Last Year: Not on file        Past Surgical History:   Procedure Laterality Date     SECTION      x 2    COLONOSCOPY  2010    normal findings    COLONOSCOPY N/A 2020    COLORECTAL CANCER SCREENING, NOT HIGH RISK performed by Yaneli Vance MD at \[facility]"" 10[address]          Past Medical History:   Diagnosis Date    Arthritis         Current Outpatient Medications   Medication Sig Dispense Refill    predniSONE (DELTASONE) 20 MG tablet Take 1 tablet by mouth 2 times daily for 5 days 10 tablet 0    hydrocortisone (ALA-LUCIANA) 1 % cream Apply topically 2 times daily.  30 g 1    estradiol (ESTRACE) 1 MG tablet Take 1 tablet by mouth daily 21 tablet 0    losartan (COZAAR) 50 MG tablet Take 1 tablet by mouth daily 90 tablet 3    desloratadine (CLARINEX) 5 MG tablet take 1 tablet by mouth once daily 90 tablet 0    hydroCHLOROthiazide (HYDRODIURIL) 25 MG tablet Take 1 tablet by mouth daily 90 tablet 0    doxycycline hyclate (VIBRAMYCIN) 100 MG capsule TAKE ONE CAPSULE BY MOUTH TWICE DAILY FOR 8 WEEKS      metroNIDAZOLE (METROCREAM) 0.75 % cream APPLY EXTERNALLY TO THE AFFECTED AREA EVERY DAY      RHOFADE 1 % CREA Apply to face daily      DULoxetine (CYMBALTA) 60 MG extended release capsule Take 60 mg by mouth daily      Magnesium Gluconate 500 (27 Mg) MG TABS tablet Take 500 mg by mouth daily       pregabalin (LYRICA) 75 MG capsule take 1 capsule by mouth twice a day  0    Ascorbic Acid (VITAMIN C) 250 MG tablet Take 250 mg by mouth daily       nabumetone (RELAFEN) 750 MG tablet Take 750 mg by mouth 2 times daily       Omega-3 Fatty Acids (FISH OIL) 1000 MG CAPS Take 1,200 mg by mouth daily       vitamin D (ERGOCALCIFEROL) 95558 units CAPS capsule Take by mouth   0    fluticasone (FLONASE) 50 MCG/ACT nasal spray 1 spray by Nasal route daily 1 Bottle 3    Multiple Vitamins-Minerals (MULTIVITAL PO) Take 1 tablet by mouth daily       vitamin B-12 (CYANOCOBALAMIN) 1000 MCG tablet Take 1,000 mcg by mouth daily      levothyroxine (SYNTHROID) 50 MCG tablet Take 50 mcg by mouth daily       No current facility-administered medications for this visit. Review of Systems   Musculoskeletal:        Painful toes of both feet with discolation   All other systems reviewed and are negative. /74   Pulse 82   Temp 97.7 °F (36.5 °C) (Infrared)   Resp 16   Ht 5' 4\" (1.626 m)   Wt 244 lb 12.8 oz (111 kg)   SpO2 98% Comment: ra  BMI 42.02 kg/m²      Physical Exam  Musculoskeletal:        Feet:           ASSESSMENT/PLAN    1. COVID toes  Advised pt to take medication as directed and to f/u if s/s persist or worsen. She is agreeable to poc. - predniSONE (DELTASONE) 20 MG tablet; Take 1 tablet by mouth 2 times daily for 5 days  Dispense: 10 tablet; Refill: 0  - hydrocortisone (ALA-LUCIANA) 1 % cream; Apply topically 2 times daily.   Dispense: 30 g; Refill: [facility] [address] [place] [address], APRN - [facility]

## 2022-03-26 ENCOUNTER — HOSPITAL ENCOUNTER (OUTPATIENT)
Facility: HOSPITAL | Age: 67
Discharge: HOME | End: 2022-03-26
Payer: MEDICARE

## 2022-03-26 LAB
A/G RATIO: 1.8 (ref 0.8–2)
ALBUMIN SERPL-MCNC: 4.5 G/DL (ref 3.4–4.8)
ALP BLD-CCNC: 71 U/L (ref 25–100)
ALT SERPL-CCNC: 32 U/L (ref 4–36)
ANION GAP SERPL CALCULATED.3IONS-SCNC: 14 MMOL/L (ref 3–16)
AST SERPL-CCNC: 30 U/L (ref 8–33)
BASOPHILS ABSOLUTE: 0 K/UL (ref 0–0.1)
BASOPHILS RELATIVE PERCENT: 0.6 %
BILIRUB SERPL-MCNC: 0.3 MG/DL (ref 0.3–1.2)
BUN BLDV-MCNC: 17 MG/DL (ref 6–20)
C-REACTIVE PROTEIN: 14.2 MG/L (ref 0–5.1)
CALCIUM SERPL-MCNC: 9.3 MG/DL (ref 8.5–10.5)
CHLORIDE BLD-SCNC: 98 MMOL/L (ref 98–107)
CO2: 26 MMOL/L (ref 20–30)
CREAT SERPL-MCNC: 0.7 MG/DL (ref 0.4–1.2)
EOSINOPHILS ABSOLUTE: 0.1 K/UL (ref 0–0.4)
EOSINOPHILS RELATIVE PERCENT: 1.1 %
GFR AFRICAN AMERICAN: >59
GFR NON-AFRICAN AMERICAN: >60
GLOBULIN: 2.5 G/DL
GLUCOSE BLD-MCNC: 92 MG/DL (ref 74–106)
HCT VFR BLD CALC: 43.3 % (ref 37–47)
HEMOGLOBIN: 14 G/DL (ref 11.5–16.5)
IMMATURE GRANULOCYTES #: 0 K/UL
IMMATURE GRANULOCYTES %: 0.4 % (ref 0–5)
LYMPHOCYTES ABSOLUTE: 1.7 K/UL (ref 1.5–4)
LYMPHOCYTES RELATIVE PERCENT: 30.3 %
MCH RBC QN AUTO: 28.2 PG (ref 27–32)
MCHC RBC AUTO-ENTMCNC: 32.3 G/DL (ref 31–35)
MCV RBC AUTO: 87.1 FL (ref 80–100)
MONOCYTES ABSOLUTE: 0.4 K/UL (ref 0.2–0.8)
MONOCYTES RELATIVE PERCENT: 7.2 %
NEUTROPHILS ABSOLUTE: 3.3 K/UL (ref 2–7.5)
NEUTROPHILS RELATIVE PERCENT: 60.4 %
PDW BLD-RTO: 13.9 % (ref 11–16)
PLATELET # BLD: 177 K/UL (ref 150–400)
PMV BLD AUTO: 10.8 FL (ref 6–10)
POTASSIUM SERPL-SCNC: 4 MMOL/L (ref 3.4–5.1)
RBC # BLD: 4.97 M/UL (ref 3.8–5.8)
SEDIMENTATION RATE, ERYTHROCYTE: 2 MM/HR (ref 0–30)
SODIUM BLD-SCNC: 138 MMOL/L (ref 136–145)
TOTAL PROTEIN: 7 G/DL (ref 6.4–8.3)
WBC # BLD: 5.4 K/UL (ref 4–11)

## 2022-03-26 PROCEDURE — 80053 COMPREHEN METABOLIC PANEL: CPT

## 2022-03-26 PROCEDURE — 86140 C-REACTIVE PROTEIN: CPT

## 2022-03-26 PROCEDURE — 36415 COLL VENOUS BLD VENIPUNCTURE: CPT

## 2022-03-26 PROCEDURE — 85652 RBC SED RATE AUTOMATED: CPT

## 2022-03-26 PROCEDURE — 82306 VITAMIN D 25 HYDROXY: CPT

## 2022-03-26 PROCEDURE — 85025 COMPLETE CBC W/AUTO DIFF WBC: CPT

## 2022-03-28 LAB — VITAMIN D 25-HYDROXY: 34.5 (ref 32–100)

## 2022-03-30 ENCOUNTER — OFFICE VISIT (OUTPATIENT)
Dept: FAMILY MEDICINE CLINIC | Age: 67
End: 2022-03-30
Payer: MEDICARE

## 2022-03-30 VITALS
DIASTOLIC BLOOD PRESSURE: 70 MMHG | TEMPERATURE: 98.3 F | OXYGEN SATURATION: 98 % | BODY MASS INDEX: 41.83 KG/M2 | HEIGHT: 64 IN | WEIGHT: 245 LBS | SYSTOLIC BLOOD PRESSURE: 124 MMHG | HEART RATE: 82 BPM | RESPIRATION RATE: 18 BRPM

## 2022-03-30 DIAGNOSIS — Z78.0 POST-MENOPAUSAL: ICD-10-CM

## 2022-03-30 DIAGNOSIS — M79.671 RIGHT FOOT PAIN: ICD-10-CM

## 2022-03-30 DIAGNOSIS — I10 ESSENTIAL HYPERTENSION: ICD-10-CM

## 2022-03-30 DIAGNOSIS — U07.1 COVID TOES: Primary | ICD-10-CM

## 2022-03-30 DIAGNOSIS — J30.2 SEASONAL ALLERGIES: ICD-10-CM

## 2022-03-30 DIAGNOSIS — R23.8 COVID TOES: Primary | ICD-10-CM

## 2022-03-30 PROCEDURE — 99214 OFFICE O/P EST MOD 30 MIN: CPT | Performed by: FAMILY MEDICINE

## 2022-03-30 PROCEDURE — 96372 THER/PROPH/DIAG INJ SC/IM: CPT | Performed by: FAMILY MEDICINE

## 2022-03-30 RX ORDER — HYDROCHLOROTHIAZIDE 25 MG/1
25 TABLET ORAL DAILY
Qty: 90 TABLET | Refills: 3 | Status: SHIPPED | OUTPATIENT
Start: 2022-03-30 | End: 2022-10-05 | Stop reason: SDUPTHER

## 2022-03-30 RX ORDER — METHYLPREDNISOLONE SODIUM SUCCINATE 125 MG/2ML
125 INJECTION, POWDER, LYOPHILIZED, FOR SOLUTION INTRAMUSCULAR; INTRAVENOUS ONCE
Status: COMPLETED | OUTPATIENT
Start: 2022-03-30 | End: 2022-03-30

## 2022-03-30 RX ORDER — KETOROLAC TROMETHAMINE 30 MG/ML
60 INJECTION, SOLUTION INTRAMUSCULAR; INTRAVENOUS ONCE
Status: COMPLETED | OUTPATIENT
Start: 2022-03-30 | End: 2022-03-30

## 2022-03-30 RX ORDER — DESLORATADINE 5 MG/1
TABLET ORAL
Qty: 90 TABLET | Refills: 3 | Status: SHIPPED | OUTPATIENT
Start: 2022-03-30 | End: 2022-05-23 | Stop reason: SDUPTHER

## 2022-03-30 RX ORDER — ESTRADIOL 1 MG/1
1 TABLET ORAL DAILY
Qty: 21 TABLET | Refills: 5 | Status: SHIPPED | OUTPATIENT
Start: 2022-03-30 | End: 2022-08-17 | Stop reason: SDUPTHER

## 2022-03-30 RX ORDER — PREDNISONE 1 MG/1
5 TABLET ORAL DAILY
Qty: 21 TABLET | Refills: 0 | Status: SHIPPED | OUTPATIENT
Start: 2022-03-30 | End: 2022-04-05

## 2022-03-30 RX ADMIN — METHYLPREDNISOLONE SODIUM SUCCINATE 125 MG: 125 INJECTION, POWDER, LYOPHILIZED, FOR SOLUTION INTRAMUSCULAR; INTRAVENOUS at 13:19

## 2022-03-30 RX ADMIN — KETOROLAC TROMETHAMINE 60 MG: 30 INJECTION, SOLUTION INTRAMUSCULAR; INTRAVENOUS at 13:18

## 2022-03-30 ASSESSMENT — ENCOUNTER SYMPTOMS
EYE REDNESS: 1
EYE ITCHING: 1
GASTROINTESTINAL NEGATIVE: 1
BACK PAIN: 1
SORE THROAT: 1
RESPIRATORY NEGATIVE: 1

## 2022-03-30 NOTE — PROGRESS NOTES
ill-appearing or toxic-appearing. HENT:      Head: Normocephalic and atraumatic. Right Ear: Hearing, tympanic membrane, ear canal and external ear normal.      Left Ear: Hearing, tympanic membrane, ear canal and external ear normal.      Nose: Nose normal.      Mouth/Throat:      Lips: Pink. Mouth: Mucous membranes are moist.      Tongue: No lesions. Tongue does not deviate from midline. Palate: No mass and lesions. Pharynx: Posterior oropharyngeal erythema present. No pharyngeal swelling, oropharyngeal exudate or uvula swelling. Tonsils: No tonsillar exudate or tonsillar abscesses. Eyes:      Conjunctiva/sclera: Conjunctivae normal.      Pupils: Pupils are equal, round, and reactive to light. Neck:      Thyroid: Thyromegaly present. No thyroid mass or thyroid tenderness. Trachea: Trachea normal.   Cardiovascular:      Rate and Rhythm: Normal rate and regular rhythm. Heart sounds: Normal heart sounds, S1 normal and S2 normal. No murmur heard. No friction rub. No gallop. Pulmonary:      Effort: Pulmonary effort is normal.      Breath sounds: Normal breath sounds. No wheezing, rhonchi or rales. Musculoskeletal:      Cervical back: Full passive range of motion without pain, normal range of motion and neck supple. Right foot: Decreased range of motion. Normal capillary refill. Swelling, tenderness and bony tenderness present. Normal pulse. Left foot: Normal capillary refill. Normal pulse. Legs:       Comments: Some discoloration of her toes which has been consistent since COVID. Tenderness noted over the dorsal aspect of her foot. Lymphadenopathy:      Cervical: Cervical adenopathy present. Neurological:      Mental Status: She is alert and oriented to person, place, and time. Psychiatric:         Attention and Perception: Attention and perception normal.         Mood and Affect: Mood is anxious.          Speech: Speech normal.         Behavior: Behavior normal. Behavior is cooperative. Thought Content: Thought content normal.         Cognition and Memory: Cognition and memory normal.         Judgment: Judgment normal.          Results in Past 30 Days  Result Component Current Result Ref Range Previous Result Ref Range   Albumin/Globulin Ratio 1.8 (3/26/2022) 0.8 - 2.0 Not in Time Range    Albumin 4.5 (3/26/2022) 3.4 - 4.8 g/dL Not in Time Range    Alkaline Phosphatase 71 (3/26/2022) 25 - 100 U/L Not in Time Range    ALT 32 (3/26/2022) 4 - 36 U/L Not in Time Range    AST 30 (3/26/2022) 8 - 33 U/L Not in Time Range    BUN 17 (3/26/2022) 6 - 20 mg/dL Not in Time Range    Calcium 9.3 (3/26/2022) 8.5 - 10.5 mg/dL Not in Time Range    Chloride 98 (3/26/2022) 98 - 107 mmol/L Not in Time Range    CO2 26 (3/26/2022) 20 - 30 mmol/L Not in Time Range    CREATININE 0.7 (3/26/2022) 0.4 - 1.2 mg/dL Not in Time Range    GFR  >59 (3/26/2022) >59 Not in Time Range    GFR Non- >60 (3/26/2022) >59 Not in Time Range    Globulin 2.5 (3/26/2022) Not Established g/dL Not in Time Range    Glucose 92 (3/26/2022) 74 - 106 mg/dL Not in Time Range    Potassium 4.0 (3/26/2022) 3.4 - 5.1 mmol/L Not in Time Range    Sodium 138 (3/26/2022) 136 - 145 mmol/L Not in Time Range    Total Bilirubin 0.3 (3/26/2022) 0.3 - 1.2 mg/dL Not in Time Range    Total Protein 7.0 (3/26/2022) 6.4 - 8.3 g/dL Not in Time Range      Hemoglobin A1C (%)   Date Value   07/16/2020 5.3     Microscopic Examination (no units)   Date Value   12/28/2017 Not Indicated     LDL Calculated (mg/dL)   Date Value   02/02/2021 109       Lab Results   Component Value Date    WBC 5.4 03/26/2022    NEUTROABS 3.3 03/26/2022    HGB 14.0 03/26/2022    HCT 43.3 03/26/2022    MCV 87.1 03/26/2022     03/26/2022     Lab Results   Component Value Date    TSH 1.17 08/09/2021       ASSESSMENT/PLAN    Diagnosis Orders   1. COVID toes to be slowly resolving.     2. Right foot pain continues to be a problem. She is trying to do some increase in activity. She is wanting to lose some weight. She is try to get this for her to stop hurting. predniSONE (DELTASONE) 5 MG tablet    diclofenac sodium (VOLTAREN) 1 % GEL    methylPREDNISolone sodium (SOLU-MEDROL) injection 125 mg    ketorolac (TORADOL) injection 60 mg   3. Seasonal allergies she seems to be doing okay with her current regimen. desloratadine (CLARINEX) 5 MG tablet   4. Post-menopausal no significant problems with her hormone. estradiol (ESTRACE) 1 MG tablet   5. Essential hypertension blood pressures have been good at home. hydroCHLOROthiazide (HYDRODIURIL) 25 MG tablet       Orders Placed This Encounter   Medications    desloratadine (CLARINEX) 5 MG tablet     Sig: take 1 tablet by mouth once daily     Dispense:  90 tablet     Refill:  3    hydroCHLOROthiazide (HYDRODIURIL) 25 MG tablet     Sig: Take 1 tablet by mouth daily     Dispense:  90 tablet     Refill:  3    estradiol (ESTRACE) 1 MG tablet     Sig: Take 1 tablet by mouth daily     Dispense:  21 tablet     Refill:  5    predniSONE (DELTASONE) 5 MG tablet     Sig: Take 1 tablet by mouth daily for 6 days Take 6 tablets by mouth on day 1, 5 on day 2, 4 on day 3, 3 on day 4, 2 on day 5, 1 on day 6. Dispense:  21 tablet     Refill:  0    diclofenac sodium (VOLTAREN) 1 % GEL     Sig: Apply 4 g topically 4 times daily     Dispense:  100 g     Refill:  0    methylPREDNISolone sodium (SOLU-MEDROL) injection 125 mg    ketorolac (TORADOL) injection 60 mg        Medications Discontinued During This Encounter   Medication Reason    desloratadine (CLARINEX) 5 MG tablet REORDER    hydroCHLOROthiazide (HYDRODIURIL) 25 MG tablet REORDER    estradiol (ESTRACE) 1 MG tablet REORDER       Controlled Substances Monitoring:      Please note: This chart was generated using Dragon dictation software.  Although every effort was made to ensure the accuracy of this automated transcription, some errors in transcription may have occurred.

## 2022-03-30 NOTE — PROGRESS NOTES
Patient here today for sick visit only. Health Maintenance not reviewed during this visit. Administrations This Visit     ketorolac (TORADOL) injection 60 mg     Admin Date  03/30/2022  13:18 Action  Given Dose  60 mg Route  IntraMUSCular Site  Deltoid Right Administered By  Maryjo Edmonds MA    Ordering Provider: Odie Cowden, MD    NDC: 25892-799-46    : Romana Noble    Patient Supplied?: No          methylPREDNISolone sodium (SOLU-MEDROL) injection 125 mg     Admin Date  03/30/2022  13:19 Action  Given Dose  125 mg Route  IntraMUSCular Site  Dorsogluteal Right Administered By  Lisandra Alanis    Ordering Provider: Odie Cowden, MD    NDC: 2797-8200-28    : [address] [facility]. Patient Supplied?: No                Patient tolerated injection well. Patient advised to wait 20 minutes in the office following the injection. No signs/symptoms of reaction noted after 20 minutes.

## 2022-04-11 ENCOUNTER — HOSPITAL ENCOUNTER (OUTPATIENT)
Dept: PHYSICAL THERAPY | Facility: HOSPITAL | Age: 67
Setting detail: THERAPIES SERIES
Discharge: HOME | End: 2022-04-11
Payer: MEDICARE

## 2022-04-11 PROCEDURE — 97161 PT EVAL LOW COMPLEX 20 MIN: CPT

## 2022-04-12 ASSESSMENT — PAIN DESCRIPTION - ORIENTATION: ORIENTATION: RIGHT;LEFT

## 2022-04-12 ASSESSMENT — PAIN DESCRIPTION - PAIN TYPE: TYPE: CHRONIC PAIN

## 2022-04-12 ASSESSMENT — PAIN DESCRIPTION - LOCATION: LOCATION: BACK;HIP;LEG;FOOT

## 2022-04-12 NOTE — PROGRESS NOTES
Physical Therapy  Initial Assessment  Date: 2022  Patient Name: Miguel Quezada  MRN: [id number]  : 1955     Treatment Diagnosis: LBP, DDD, lumbar stenosis, fibromyalgia, deconditioning after COVID    Subjective   General  Chart Reviewed: Yes  Patient assessed for rehabilitation services?: Yes  Family / Caregiver Present: No  Referring Practitioner: Logan Wells MD  Referral Date : 22  Diagnosis: LBP, DDD, spinal stenosis, deconditioning after COVID  PT Visit Information  PT Insurance Information: [employer] [facility]  Subjective  Subjective: Pt presents with a history of chronic back and hip pain, fibromyalgia, and neuropathy. Pt has been treated at this facility in the past for back and LE pain. She presents today with complaints of back and LE pain but also of difficulty walking and tightness in her legs from her hamstrings to her ankles. She reports that she has difficulty walking long distances and has to stop and rest due to back and leg pain. After she rests for a few minutes she is able to resume walking. She notes that sweeping and vacuuming increases her symptoms. Pt reports that she had COVID-19 in February and has had some fatigue from that and had issues with \"COVID toes\" but reports that issue has mostly resolved. She has a history of B feet hammer toes, as well. She reports her feet hurt especially at night.   Pain Screening  Patient Currently in Pain: Yes  Pain Assessment  Pain Assessment: 0-10  Pain Level:  (\"sore\" at baseline, but states pain is controlled at rest.  She notes with prolonged activity her pain can reach 10/10 and requires her to sit and rest for relief.)  Pain Type: Chronic pain  Pain Location: Back;Hip;Leg;Foot  Pain Orientation: Right;Left  Vital Signs  Patient Currently in Pain: Yes    Orientation  Orientation  Overall Orientation Status: Within Normal Limits    Objective     Observation/Palpation  Palpation: grade II-III tenderness L distal IT band  Observation: B hammer toe, L foot 2nd and 3rd toes . Gait: wide base of support, increased hip flexion and ER during swing phase B, increased trunk rotation especially during LLE stance phase. AROM RLE (degrees)  RLE AROM: Exceptions  RLE General AROM: great toe ext: 0-50 deg PROM  R Hip External Rotation 0-45: 37  R Hip Internal Rotation 0-45: 24  R Ankle Dorsiflexion 0-20: 0  AROM LLE (degrees)  LLE AROM : Exceptions  LLE General AROM: L great toe ext: 0-62 deg PROM  L Hip External Rotation 0-45: 29  L Hip Internal Rotation 0-45: 22  L Ankle Dorsiflexion 0-20: 0-10 deg    Strength RLE  Strength RLE: Exception  R Hip Flexion: 4-/5  R Hip Internal Rotation: 4+/5  R Hip External Rotation: 5/5  R Knee Flexion: 5/5  R Knee Extension: 5/5  R Ankle Dorsiflexion: 5/5  R Ankle Plantar flexion: 5/5  R Ankle Inversion: 5/5  R Ankle Eversion: 5/5  Strength LLE  Strength LLE: Exception  L Hip Flexion: 4-/5  L Hip Internal Rotation: 4/5  L Knee Flexion: 5/5  L Knee Extension: 5/5  L Ankle Dorsiflexion: 5/5  L Ankle Plantar Flexion: 5/5  L Ankle Inversion: 5/5  L Ankle Eversion: 5/5  Strength Other  Other: : (position II, 3 trial average) R: 57.9#, L: 48.5#, pt notes pain in B hands with strength testing     Additional Measures  Special Tests: LEFS: 18/80, Back Index: 40%. TU\".   MCTSB: 3/4 (increased body sway with EC on foam)        Exercises / treatment to be initiated at next visit:  Exercise 1: Nustep L5 x 8'  Exercise 2: Standing hip abd w/ posture correction and ab brace: 3x10 B  Exercise 3: Standing marching (slow, posture focus, w/ ab brace) 3x10  Exercise 4: Seated hip add w/ ball (posture focus) 3x10  Exercise 5: Standing mid / low rows: OTB 3x10  Exercise 6: Mini wall squat with bicep curls: 10\" hold, 5 rounds  Exercise 7: Digiflex: 2' each individual / full : red  Exercise 8: Seated HS stretch w/ strap 5x20\"  Exercise 9: Standing hip flexor stretch 5x20\"  Manual therapy: LE / lumbar STM as needed  May use MH / ES as needed to lumbar spine        Assessment   Conditions Requiring Skilled Therapeutic Intervention  Body structures, Functions, Activity limitations: Decreased functional mobility ; Decreased ROM; Decreased strength;Decreased endurance;Decreased high-level IADLs;Decreased balance  Assessment: Pt will benefit from skilled PT to address deficits due to chronic LBP, lumbar stenosis, and fibromyalgia in order to reduce pain and restore optimal daily function. Treatment Diagnosis: LBP, DDD, lumbar stenosis, fibromyalgia, deconditioning after COVID  REQUIRES PT FOLLOW UP: Yes  Activity Tolerance  Activity Tolerance: Patient Tolerated treatment well         Plan   Plan  Times per week: 2x/week  Plan weeks: 6 weeks  Current Treatment Recommendations: Strengthening,Neuromuscular Re-education,Home Exercise Program,Safety Education & Training,Manual Therapy - Soft Tissue Mobilization,ROM,Balance Training,Endurance Training,Patient/Caregiver Education & Training,Manual Therapy - Joint Manipulation,Functional Mobility Training,Gait Training,Modalities      Goals  Short term goals  Time Frame for Short term goals: 3 weeks  Short term goal 1: Pt to be I with HEP  Short term goal 2: Pt to demonstate TUG time of 12\" or less  Short term goal 3: Pt to demonstrate B hip flexion strength of 4/5 or greater. Short term goal 4: Pt to be able to ambulate 10 minutes of community ambulation without increase in pain. Short term goal 5: Pt to be educated in proper body mechanics for sweeping / vacuuming to improve function and decrease pain. Long term goals  Time Frame for Long term goals : 6 weeks  Long term goal 1: Back Index score to improve to 20% or less indicating improved function. Long term goal 2: Pt to sweep or vacuum at home for 15 minutes without increase in symptoms.   Long term goal 3: TUG time to improve to 10\" or less indicating improved mobility  Long term goal 4: B hand  strength to improve by 5# or greater with no pain upon testing. Long term goal 5: Pt to be able to perform 20 minutes of community ambulation without increase in symptoms. Kike Haines PT     Certification of Medical Necessity: It will be understood that this treatment plan is certified medically necessary by the documenting therapist and referring physician mentioned in this report. Unless the physician indicated otherwise through written correspondence with our office, all further referrals will act as certification of medical necessity on this treatment plan. Thank you for this referral.  If you have questions regarding this plan of care, please call [phone].           Revisions to this plan (optional):                     Please sign and return this plan to:   FAX: [phone]      Signature:                                 Date:

## 2022-04-15 ENCOUNTER — HOSPITAL ENCOUNTER (OUTPATIENT)
Dept: PHYSICAL THERAPY | Facility: HOSPITAL | Age: 67
Setting detail: THERAPIES SERIES
Discharge: HOME | End: 2022-04-15
Payer: MEDICARE

## 2022-04-15 PROCEDURE — 97110 THERAPEUTIC EXERCISES: CPT

## 2022-04-15 NOTE — FLOWSHEET NOTE
Physical Therapy Daily Treatment Note   Date:  4/15/2022    TIme In:  916                    Time Out:  777    Patient Name:  Dwight Ryan    :  1955  MRN: [id number]    Restrictions/Precautions:    Pertinent Medical History:  Medical/Treatment Diagnosis Information:  ·   LBP, DDD, lumbar stenosis, fibromyalgia, deconditioning after COVID  ·    Insurance/Certification information:    [facility]  Physician Information:    Mehreen Lara MD  Plan of care signed (Y/N):    Visit# / total visits:     2/    G-Code (if applicable):      Date / Visit # G-Code Applied:         Progress Note: []  Yes  [x]  No  Next due by: Visit #10      Pain level:   0/10  Subjective: Pt reports having some general soreness but no significant changes since IE. Objective:   Observation:    Test measurements:     Palpation:    Exercises:  Exercise Resistance/Repetitions Other comments   Nustep L5 x 10' 15   Standing marching  (slow, focus on form with upright posture) 3x10 15   Standing hip abd w/ posture correction 3x10 B 15   Seated hip abd w/ ball Upright posture 3x10 15   Standing mid / low rows OTB 3x10 15   Mini wall squats with bicep curls 2# 3x10 15   Digiflex: Ind and all 2' each B, Red 15   Seated HS stretch w/ strap 5x20\" 15   Standing hip flexor stretch 5x20\" 15                    Other Therapeutic Activities:      Manual Treatments:      Modalities:  MH lumbar spine x 15'       Timed Code Treatment Minutes: 60       Total Treatment Minutes:  82    Treatment/Activity Tolerance:  [x] Patient tolerated treatment well [] Patient limited by fatigue  [] Patient limited by pain  [] Patient limited by other medical complications  [x] Other: Pt progressed through initiation of activity today without increase in pain.     Pain after treatment:      0/10    Prognosis: [x] Good [] Fair  [] Poor    Patient Requires Follow-up: [x] Yes  [] No    Plan:   [x] Continue per plan of care [] Alter current plan (see comments)  [] Plan of care initiated [] Hold pending MD visit [] Discharge    Plan for Next Session:        Electronically signed by:  Marcia Wilkerson PT

## 2022-04-18 ENCOUNTER — HOSPITAL ENCOUNTER (OUTPATIENT)
Dept: PHYSICAL THERAPY | Facility: HOSPITAL | Age: 67
Setting detail: THERAPIES SERIES
Discharge: HOME | End: 2022-04-18
Payer: MEDICARE

## 2022-04-18 PROCEDURE — 97110 THERAPEUTIC EXERCISES: CPT

## 2022-04-18 NOTE — FLOWSHEET NOTE
No    Plan:   [x] Continue per plan of care [] Alter current plan (see comments)  [] Plan of care initiated [] Hold pending MD visit [] Discharge    Plan for Next Session:        Electronically signed by:  Mary Solorzano PT

## 2022-04-21 ENCOUNTER — HOSPITAL ENCOUNTER (OUTPATIENT)
Dept: PHYSICAL THERAPY | Facility: HOSPITAL | Age: 67
Setting detail: THERAPIES SERIES
Discharge: HOME | End: 2022-04-21
Payer: MEDICARE

## 2022-04-21 PROCEDURE — 97110 THERAPEUTIC EXERCISES: CPT

## 2022-04-21 NOTE — FLOWSHEET NOTE
of care initiated [] Hold pending MD visit [] Discharge    Plan for Next Session:        Electronically signed by:  Re Valencia PT

## 2022-04-22 ENCOUNTER — TELEPHONE (OUTPATIENT)
Dept: FAMILY MEDICINE CLINIC | Age: 67
End: 2022-04-22

## 2022-04-22 NOTE — TELEPHONE ENCOUNTER
----- Message from Carmela Patterson sent at 4/22/2022 10:15 AM EDT -----  Subject: Message to Provider    QUESTIONS  Information for Provider? Patient got 2 shots because she had covid toes. She wants to know what shots she received. please advise   ---------------------------------------------------------------------------  --------------  CALL BACK INFO  What is the best way for the office to contact you? OK to leave message on   voicemail  Preferred Call Back Phone Number? [phone]  ---------------------------------------------------------------------------  --------------  SCRIPT ANSWERS  Relationship to Patient?  Self

## 2022-04-26 ENCOUNTER — HOSPITAL ENCOUNTER (OUTPATIENT)
Dept: PHYSICAL THERAPY | Facility: HOSPITAL | Age: 67
Setting detail: THERAPIES SERIES
Discharge: HOME | End: 2022-04-26
Payer: MEDICARE

## 2022-04-26 DIAGNOSIS — G47.00 INSOMNIA, UNSPECIFIED TYPE: ICD-10-CM

## 2022-04-26 PROCEDURE — 97110 THERAPEUTIC EXERCISES: CPT

## 2022-04-26 NOTE — FLOWSHEET NOTE
Physical Therapy Daily Treatment Note   Date:  2022    TIme In:  1000                   Time Out:  1050    Patient Name:  Dwight Ryan    :  1955  MRN: [id number]    Restrictions/Precautions:    Pertinent Medical History:  Medical/Treatment Diagnosis Information:  ·   LBP, DDD, lumbar stenosis, fibromyalgia, deconditioning after COVID     Insurance/Certification information:    [email or web address]  Physician Information:    Mehreen Lara MD  Plan of care signed (Y/N):    Visit# / total visits:     5/    G-Code (if applicable):      Date / Visit # G-Code Applied:         Progress Note: []  Yes  [x]  No  Next due by: Visit #10      Pain level:   0/10  Subjective: Pt reports she was able to do her HEP consistently since last visit without increase in pain. She is pleased with her progress the past week.       Objective:   Observation:    Test measurements:     Palpation:    Exercises:  Exercise Resistance/Repetitions Other comments   Nustep L5 x 10' 26   Standing marching  (slow, focus on form with upright posture) 3x10 26   Standing hip abd w/ posture correction 3x10 B 26   Seated hip abd w/ ball Upright posture 3x10 26   Standing mid / low rows OTB 3x10 26   Mini wall squats with bicep curls 2# 3x10 26   Digiflex: Ind and all 2' each B, Red 26   Seated HS stretch w/ strap 5x20\" 26   Standing hip flexor stretch 5x20\" 26                    Other Therapeutic Activities:      Manual Treatments:      Modalities:  MH lumbar spine x 15'       Timed Code Treatment Minutes:  35      Total Treatment Minutes:  50    Treatment/Activity Tolerance:  [x] Patient tolerated treatment well [] Patient limited by fatigue  [] Patient limited by pain  [] Patient limited by other medical complications  [] Other:     Pain after treatment:      0/10    Prognosis: [x] Good [] Fair  [] Poor    Patient Requires Follow-up: [x] Yes  [] No    Plan:   [x] Continue per plan of care [] Alter current plan (see comments)  [] Plan of care initiated [] Hold pending MD visit [] Discharge    Plan for Next Session:        Electronically signed by:  Tawnya Modi PT

## 2022-04-27 RX ORDER — ZOLPIDEM TARTRATE 10 MG/1
TABLET ORAL
Qty: 30 TABLET | Refills: 2 | Status: SHIPPED | OUTPATIENT
Start: 2022-04-27 | End: 2022-07-21

## 2022-04-28 ENCOUNTER — HOSPITAL ENCOUNTER (OUTPATIENT)
Dept: PHYSICAL THERAPY | Facility: HOSPITAL | Age: 67
Setting detail: THERAPIES SERIES
Discharge: HOME | End: 2022-04-28
Payer: MEDICARE

## 2022-04-28 PROCEDURE — 97110 THERAPEUTIC EXERCISES: CPT

## 2022-04-28 NOTE — FLOWSHEET NOTE
Physical Therapy Daily Treatment Note   Date:  2022    TIme In:   1033                 Time Out:      Patient Name:  Osmani Reilly    :  1955  MRN: [id number]    Restrictions/Precautions:    Pertinent Medical History:  Medical/Treatment Diagnosis Information:  ·   LBP, DDD, lumbar stenosis, fibromyalgia, deconditioning after COVID     Insurance/Certification information:    [email or web address]  Physician Information:    Lupe Conklin MD  Plan of care signed (Y/N):    Visit# / total visits:     6/    G-Code (if applicable):      Date / Visit # G-Code Applied:         Progress Note: []  Yes  [x]  No  Next due by: Visit #10      Pain level:   0/10  Subjective: Pt reports she is seeing overall improvement and is tolerating her HEP well. Objective:   Observation:    Test measurements:     Palpation:    Exercises:  Exercise Resistance/Repetitions Other comments   Nustep L5 x 10' 28   Standing marching  (slow, focus on form with upright posture) 3x10 28   Standing hip abd w/ posture correction 3x10 B 28   Seated hip abd w/ ball Upright posture 3x10 28   Standing mid / low rows OTB 3x10 28   Mini wall squats with bicep curls 2# 3x10 28   Digiflex: Ind and all 2' each B, Red 28   Seated HS stretch w/ strap 5x20\" 28   Standing hip flexor stretch 5x20\" 28                    Other Therapeutic Activities:      Manual Treatments:      Modalities:  MH lumbar spine x 15'       Timed Code Treatment Minutes: 55       Total Treatment Minutes:  57    Treatment/Activity Tolerance:  [x] Patient tolerated treatment well [] Patient limited by fatigue  [] Patient limited by pain  [] Patient limited by other medical complications  [x] Other: PT verbally reviewed HEP with patient as she was asking regarding past HEP's. Pt was advised she could perform all of the exercises from this round and last round of PT for HEP.      Pain after treatment:      0/10    Prognosis: [x] Good [] Fair  [] Poor    Patient Requires Follow-up: [x] Yes  [] No    Plan:   [x] Continue per plan of care [] Alter current plan (see comments)  [] Plan of care initiated [] Hold pending MD visit [] Discharge    Plan for Next Session:        Electronically signed by:  Francoise Angelucci, PT

## 2022-05-03 ENCOUNTER — HOSPITAL ENCOUNTER (OUTPATIENT)
Dept: PHYSICAL THERAPY | Facility: HOSPITAL | Age: 67
Setting detail: THERAPIES SERIES
Discharge: HOME | End: 2022-05-03
Payer: MEDICARE

## 2022-05-03 PROCEDURE — 97110 THERAPEUTIC EXERCISES: CPT

## 2022-05-03 NOTE — FLOWSHEET NOTE
Physical Therapy Daily Treatment Note / Discharge Summary    Date:  5/3/2022    TIme In:   9113                 Time Out: 3395     Patient Name:  Ryan Martinez    :  1955  MRN: [id number]    Restrictions/Precautions:    Pertinent Medical History:  Medical/Treatment Diagnosis Information:  ·   LBP, DDD, lumbar stenosis, fibromyalgia, deconditioning after COVID     Insurance/Certification information:    [id number]  Physician Information:    Dinora Reilly MD  Plan of care signed (Y/N):    Visit# / total visits:     7/    G-Code (if applicable):      Date / Visit # G-Code Applied:         Progress Note: [x]  Yes  []  No  Next due by: NA      Pain level:   0/10  Subjective: Pt reports she is pleased with her progress so far and feels she is ready for d/c to HEP. Objective:   Observation:    Test measurements:  Back Index: 40% (same as eval), LEFS: 38/80 (18/80 at eval).   TU\"  AROM:  R  L   Hip ER: 0-29 deg 0-33 deg  Hip IR:  0-23 deg 0-25 deg  Ankle DF: 0-4 deg 0-12 deg    MMT:  Hip flexion: 4+/5 B   Palpation:    Exercises:  Exercise Resistance/Repetitions Other comments   Nustep L5 x 10' 3   Standing marching  (slow, focus on form with upright posture) 3x10 3   Standing hip abd w/ posture correction 3x10 B 3   Seated hip abd w/ ball Upright posture 3x10 3   Standing mid / low rows OTB 3x10 3   Mini wall squats with bicep curls 2# 3x10 3   Digiflex: Ind and all 2' each B, Red 3   Seated HS stretch w/ strap 5x20\" 3   Standing hip flexor stretch 5x20\" 3                    Other Therapeutic Activities:      Manual Treatments:      Modalities:  MH lumbar spine x 15'       Timed Code Treatment Minutes: 40       Total Treatment Minutes:  58    Treatment/Activity Tolerance:  [x] Patient tolerated treatment well [] Patient limited by fatigue  [] Patient limited by pain  [] Patient limited by other medical complications  [x] Other: Pt has responded well to PT intervention with improved LEFS score and improved hip and ankle AROM. She has been advanced with her HEP and was issued a final combined advanced HEP. Pt demonstrated good understanding of HEP and d/c plans. Pt was encouraged to perform her stretches daily and her strengthening activities 3x/week. Plan to d/c pt at this time to established HEP. She has met 3/5 STG and progressing toward LTG. She is expected to further progress toward goals with continued performance of HEP. Pain after treatment:      0/10    Prognosis: [x] Good [] Fair  [] Poor    Patient Requires Follow-up: [x] Yes  [] No    Plan:   [] Continue per plan of care [] Alter current plan (see comments)  [] Plan of care initiated [] Hold pending MD visit [x] Discharge    Plan for Next Session:      Goals  Short term goals  Time Frame for Short term goals: 3 weeks  Short term goal 1: Pt to be I with HEP - MET  Short term goal 2: Pt to demonstate TUG time of 12\" or less -notmet  Short term goal 3: Pt to demonstrate B hip flexion strength of 4/5 or greater. -MET  Short term goal 4: Pt to be able to ambulate 10 minutes of community ambulation without increase in pain. -partially met  Short term goal 5: Pt to be educated in proper body mechanics for sweeping / vacuuming to improve function and decrease pain. -met  Long term goals  Time Frame for Long term goals : 6 weeks  Long term goal 1: Back Index score to improve to 20% or less indicating improved function. -not met  Long term goal 2: Pt to sweep or vacuum at home for 15 minutes without increase in symptoms. -progressing  Long term goal 3: TUG time to improve to 10\" or less indicating improved mobility -not met  Long term goal 4: B hand  strength to improve by 5# or greater with no pain upon testing. Long term goal 5: Pt to be able to perform 20 minutes of community ambulation without increase in symptoms.  -progressing       Electronically signed by:  Sonia Maurice, PT

## 2022-05-06 ENCOUNTER — OFFICE VISIT (OUTPATIENT)
Dept: PRIMARY CARE CLINIC | Age: 67
End: 2022-05-06
Payer: MEDICARE

## 2022-05-06 VITALS
DIASTOLIC BLOOD PRESSURE: 86 MMHG | OXYGEN SATURATION: 98 % | WEIGHT: 245 LBS | SYSTOLIC BLOOD PRESSURE: 138 MMHG | BODY MASS INDEX: 41.83 KG/M2 | HEIGHT: 64 IN | HEART RATE: 84 BPM

## 2022-05-06 DIAGNOSIS — L02.211 ABSCESS OF SKIN OF ABDOMEN: Primary | ICD-10-CM

## 2022-05-06 PROCEDURE — 99213 OFFICE O/P EST LOW 20 MIN: CPT | Performed by: PHYSICIAN ASSISTANT

## 2022-05-06 RX ORDER — SULFAMETHOXAZOLE AND TRIMETHOPRIM 800; 160 MG/1; MG/1
1 TABLET ORAL 2 TIMES DAILY
Qty: 20 TABLET | Refills: 0 | Status: SHIPPED | OUTPATIENT
Start: 2022-05-06 | End: 2022-05-16

## 2022-05-06 RX ORDER — MUPIROCIN CALCIUM 20 MG/G
CREAM TOPICAL
Qty: 30 G | Refills: 0 | Status: SHIPPED | OUTPATIENT
Start: 2022-05-06 | End: 2022-06-05

## 2022-05-06 ASSESSMENT — ENCOUNTER SYMPTOMS
EYE PAIN: 0
SORE THROAT: 0
COLOR CHANGE: 1
CONSTIPATION: 0
SHORTNESS OF BREATH: 0
COUGH: 0
ABDOMINAL PAIN: 0
DIARRHEA: 0

## 2022-05-06 NOTE — PROGRESS NOTES
Chief Complaint   Patient presents with    Cyst     between breasts     Since Monday     Have you seen another provider for this condition recently- Has seen Dermatology before for same problem     Have you had any recent diagnostic testing for this condition- No    Do you currently take any medications for this condition- No

## 2022-05-06 NOTE — PROGRESS NOTES
ourfavio Mcintosh (:  1955) is a 79 y.o. female,Established patient, here for evaluation of the following chief complaint(s):  Cyst (between breasts)           Subjective   SUBJECTIVE/OBJECTIVE:  HPI   Ms. Derek Causey is a pleasant 79year old female who presents with 1 week history of abdominal lesion. She describes worsening redness and pain over the past 24 hours. She has a history of a previous infected cyst to the right of current lesion. She denies any fever, chills, nausea, vomiting or diarrhea. She has been scrubbing the area. Review of Systems   Constitutional: Negative for chills, fatigue and fever. HENT: Negative for congestion, ear pain, nosebleeds and sore throat. Eyes: Negative for pain and visual disturbance. Respiratory: Negative for cough and shortness of breath. Cardiovascular: Negative for chest pain and palpitations. Gastrointestinal: Negative for abdominal pain, constipation and diarrhea. Genitourinary: Negative for difficulty urinating and flank pain. Musculoskeletal: Negative for arthralgias, gait problem and myalgias. Skin: Positive for color change and rash. Neurological: Negative for syncope, light-headedness and headaches. Psychiatric/Behavioral: Negative for behavioral problems, confusion and dysphoric mood. Objective     Vitals:    22 1431   BP: 138/86   Site: Right Upper Arm   Position: Sitting   Pulse: 84   SpO2: 98%   Weight: 245 lb (111.1 kg)   Height: 5' 4\" (1.626 m)     Physical Exam  Vitals reviewed. Constitutional:       Appearance: Normal appearance. HENT:      Head: Normocephalic and atraumatic. Right Ear: Tympanic membrane, ear canal and external ear normal.      Left Ear: Tympanic membrane, ear canal and external ear normal.      Nose: Nose normal.      Mouth/Throat:      Mouth: Mucous membranes are moist.      Pharynx: Oropharynx is clear. Eyes:      Extraocular Movements: Extraocular movements intact. Conjunctiva/sclera: Conjunctivae normal.      Pupils: Pupils are equal, round, and reactive to light. Neck:      Vascular: No carotid bruit. Cardiovascular:      Rate and Rhythm: Normal rate and regular rhythm. Pulses: Normal pulses. Heart sounds: Normal heart sounds. Pulmonary:      Effort: Pulmonary effort is normal.      Breath sounds: Normal breath sounds. Abdominal:      General: Bowel sounds are normal.      Palpations: Abdomen is soft. Musculoskeletal:         General: Normal range of motion. Cervical back: Normal range of motion. Skin:     General: Skin is warm. Findings: Erythema and lesion (mid abdomen abcess overlying cyst like area with erythema and induration) present. No rash. Neurological:      General: No focal deficit present. Mental Status: She is alert and oriented to person, place, and time. Psychiatric:         Mood and Affect: Mood normal.         Thought Content: Thought content normal.                 ASSESSMENT/PLAN:  1. Abscess of skin of abdomen  I advised antibacterial soap. I marked the area and advised patient if increasing in size she is to report to the ED over the weekend. We will make a referral for general surgery early next week for possible excision of cyst and overlying abscess. - sulfamethoxazole-trimethoprim (BACTRIM DS;SEPTRA DS) 800-160 MG per tablet; Take 1 tablet by mouth 2 times daily for 10 days  Dispense: 20 tablet; Refill: 0  - mupirocin (BACTROBAN) 2 % cream; Apply 3 times daily. Dispense: 30 g; Refill: 0        Return if symptoms worsen or fail to improve. An electronic signature was used to authenticate this note.     --Heidy Davenport PA-C

## 2022-05-09 ENCOUNTER — OFFICE VISIT (OUTPATIENT)
Dept: SURGERY | Facility: CLINIC | Age: 67
End: 2022-05-09

## 2022-05-09 VITALS
HEART RATE: 94 BPM | WEIGHT: 245.8 LBS | RESPIRATION RATE: 18 BRPM | TEMPERATURE: 97.3 F | SYSTOLIC BLOOD PRESSURE: 140 MMHG | OXYGEN SATURATION: 98 % | DIASTOLIC BLOOD PRESSURE: 78 MMHG | HEIGHT: 64 IN | BODY MASS INDEX: 41.96 KG/M2

## 2022-05-09 DIAGNOSIS — L02.211 CUTANEOUS ABSCESS OF ABDOMINAL WALL: Primary | ICD-10-CM

## 2022-05-09 PROCEDURE — 10061 I&D ABSCESS COMP/MULTIPLE: CPT | Performed by: SURGERY

## 2022-05-09 PROCEDURE — 99213 OFFICE O/P EST LOW 20 MIN: CPT | Performed by: SURGERY

## 2022-05-09 RX ORDER — LOSARTAN POTASSIUM 50 MG/1
1 TABLET ORAL DAILY
COMMUNITY
Start: 2022-03-03

## 2022-05-09 RX ORDER — ESTRADIOL 1 MG/1
1 TABLET ORAL DAILY
COMMUNITY
Start: 2022-03-30 | End: 2022-09-07

## 2022-05-09 RX ORDER — SULFAMETHOXAZOLE AND TRIMETHOPRIM 800; 160 MG/1; MG/1
TABLET ORAL
COMMUNITY
Start: 2022-05-06 | End: 2022-05-24 | Stop reason: SDUPTHER

## 2022-05-09 NOTE — PROGRESS NOTES
"Patient: Alison Benitez    YOB: 1955    Date: 05/09/2022    Primary Care Provider: Kacy Huertas MD    Chief Complaint   Patient presents with   • Abscess     chest       SUBJECTIVE:    History of present illness:  Patient is here for evaluation of an abscess on her chest wall.  Patient was seen by her primary care provider recently and she is taking Bactrim DS as directed. She states onset was 1 week with a hard knot under the skin. She denies any drainage but it is \"full of drainage\".    She is currently on antibiotics, she has had this type abscess in the past.  This is located in the right upper quadrant upper aspect, tender to touch, erythematous in nature, associated with a palpable mass, nonradiating, worse with pressure, not relieved.    The following portions of the patient's history were reviewed and updated as appropriate: allergies, current medications, past family history, past medical history, past social history, past surgical history and problem list.      Review of Systems   Constitutional: Negative for chills, fever and unexpected weight change.   HENT: Negative for hearing loss, trouble swallowing and voice change.    Eyes: Negative for visual disturbance.   Respiratory: Negative for apnea, cough, chest tightness, shortness of breath and wheezing.    Cardiovascular: Negative for chest pain, palpitations and leg swelling.   Gastrointestinal: Negative for abdominal distention, abdominal pain, anal bleeding, blood in stool, constipation, diarrhea, nausea, rectal pain and vomiting.   Endocrine: Negative for cold intolerance and heat intolerance.   Genitourinary: Negative for difficulty urinating, dysuria and flank pain.   Musculoskeletal: Negative for back pain and gait problem.   Skin: Positive for color change. Negative for rash and wound.   Neurological: Negative for dizziness, syncope, speech difficulty, weakness, light-headedness, numbness and headaches.   Hematological: " Negative for adenopathy. Does not bruise/bleed easily.   Psychiatric/Behavioral: Negative for confusion. The patient is not nervous/anxious.        Allergies:  No Known Allergies    Medications:    Current Outpatient Medications:   •  ascorbic acid (VITAMIN C) 1000 MG tablet, Take 1,000 mg by mouth Daily., Disp: , Rfl:   •  Cholecalciferol (Vitamin D) 50 MCG (2000 UT) tablet, Take 2,000 Units by mouth Daily., Disp: , Rfl:   •  Cyanocobalamin (VITAMIN B 12 PO), Take 3,000 mg by mouth Daily., Disp: , Rfl:   •  desloratadine (CLARINEX) 5 MG tablet, Take 5 mg by mouth Daily., Disp: , Rfl:   •  DULoxetine (CYMBALTA) 60 MG capsule, Take 1 capsule by mouth Daily., Disp: 90 capsule, Rfl: 3  •  estradiol (ESTRACE) 1 MG tablet, Take 1 tablet by mouth Daily., Disp: , Rfl:   •  fluocinolone (SYNALAR) 0.025 % ointment, APPLY SPARINGLY TOPICALLY TO THE AFFECTED AREA FOUR TIMES DAILY, Disp: , Rfl:   •  fluticasone (FLONASE) 50 MCG/ACT nasal spray, 1 spray into the nostril(s) as directed by provider., Disp: , Rfl:   •  hydrochlorothiazide (HYDRODIURIL) 25 MG tablet, , Disp: , Rfl:   •  levothyroxine (Synthroid) 50 MCG tablet, Take 1 tablet by mouth Daily for 180 days., Disp: 90 tablet, Rfl: 1  •  losartan (COZAAR) 50 MG tablet, Take 1 tablet by mouth Daily., Disp: , Rfl:   •  Magnesium 100 MG capsule, once daily 500 mg, Disp: , Rfl:   •  metroNIDAZOLE (METROCREAM) 0.75 % cream, Apply 1 application topically to the appropriate area as directed Daily., Disp: , Rfl:   •  MULTIPLE VITAMINS-CALCIUM PO, Take 1 tablet by mouth., Disp: , Rfl:   •  multivitamin with minerals tablet tablet, Take 1 tablet by mouth Daily., Disp: , Rfl:   •  mupirocin (BACTROBAN) 2 % ointment, , Disp: , Rfl:   •  nabumetone (RELAFEN) 750 MG tablet, Take 750 mg by mouth 2 (Two) Times a Day., Disp: , Rfl:   •  olopatadine (PATADAY) 0.2 % solution ophthalmic solution, 1 drop., Disp: , Rfl:   •  Omega-3 Fatty Acids (FISH OIL) 1000 MG capsule capsule, Take 1,200 mg  by mouth Daily With Breakfast., Disp: , Rfl:   •  Oxymetazoline HCl (Rhofade) 1 % cream, Apply to face daily, Disp: , Rfl:   •  pregabalin (LYRICA) 75 MG capsule, Take 1 capsule by mouth 3 (Three) Times a Day., Disp: 270 capsule, Rfl: 1  •  RHOFADE 1 % cream, , Disp: , Rfl: 6  •  sulfamethoxazole-trimethoprim (BACTRIM DS,SEPTRA DS) 800-160 MG per tablet, , Disp: , Rfl:   •  vitamin D (ERGOCALCIFEROL) 31176 units capsule capsule, , Disp: , Rfl:   •  zolpidem (AMBIEN) 10 MG tablet, Take 1 tablet by mouth., Disp: , Rfl:     History:  Past Medical History:   Diagnosis Date   • Arthritis    • Bursitis    • Claustrophobia    • CTS (carpal tunnel syndrome)     bilateral    • Fibromyalgia    • Hemorrhoids    • Hypertension    • Low back pain    • Lumbosacral disc disease    • Neuropathy    • Osteoarthritis    • SBE (subacute bacterial endocarditis)    • Tendinitis of knee    • Thyroid disorder    • Thyroid nodule    •  (vaginal birth after )        Past Surgical History:   Procedure Laterality Date   • ABDOMINAL HYSTERECTOMY     •  SECTION     •  SECTION     • COLONOSCOPY     • DILATATION AND CURETTAGE     • HYSTERECTOMY N/A        • MANDIBLE FRACTURE SURGERY     • OOPHORECTOMY Bilateral    • TUBAL ABDOMINAL LIGATION     • TUBAL ABDOMINAL LIGATION         Family History   Problem Relation Age of Onset   • Osteoarthritis Other    • Hypertension Other    • Carpal tunnel syndrome Other    • COPD Other    • Diabetes Other    • Stroke Other    • Glaucoma Other    • Heart disease Mother    • Asthma Mother    • Hypertension Brother    • Heart disease Father    • Breast cancer Neg Hx    • Ovarian cancer Neg Hx        Social History     Tobacco Use   • Smoking status: Never Smoker   • Smokeless tobacco: Never Used   Vaping Use   • Vaping Use: Never used   Substance Use Topics   • Alcohol use: No   • Drug use: No        OBJECTIVE:    Vital Signs:   Vitals:    22 1122   BP: 140/78   Pulse: 94  "  Resp: 18   Temp: 97.3 °F (36.3 °C)   TempSrc: Temporal   SpO2: 98%   Weight: 111 kg (245 lb 12.8 oz)   Height: 162.6 cm (64\")       Physical Exam:   General Appearance:    Alert, cooperative, in no acute distress   Head:    Normocephalic, without obvious abnormality, atraumatic   Eyes:            Lids and lashes normal, conjunctivae and sclerae normal, no   icterus, no pallor, corneas clear, PERRLA   Ears:    Ears appear intact with no abnormalities noted   Throat:   No oral lesions, no thrush, oral mucosa moist   Neck:   No adenopathy, supple, trachea midline, no thyromegaly, no   carotid bruit, no JVD   Lungs:     Clear to auscultation,respirations regular, even and                  unlabored    Heart:    Regular rhythm and normal rate, normal S1 and S2, no            murmur, no gallop, no rub, no click   Chest Wall:    No abnormalities observed   Abdomen:     Normal bowel sounds, no masses, no organomegaly, soft        non-tender, non-distended, no guarding, no rebound                tenderness   Extremities:   Moves all extremities well, no edema, no cyanosis, no             redness   Pulses:   Pulses palpable and equal bilaterally   Skin:   No bleeding, bruising or rash, obvious abscess in the right upper quadrant   Lymph nodes:   No palpable adenopathy   Neurologic:   Cranial nerves 2 - 12 grossly intact, sensation intact, DTR       present and equal bilaterally     Results Review:   I reviewed the patient's new clinical results.  I reviewed the patient's new imaging results and agree with the interpretation.  I reviewed the patient's other test results and agree with the interpretation    Review of Systems was reviewed and confirmed as accurate as documented by the MA.    ASSESSMENT/PLAN:    1. Cutaneous abscess of abdominal wall        Procedure:     I recommended abscess drainage to the patient. I explained the indication as well as the risks and benefits which include bleeding, further infection " requiring additional procedures, non healing of the wound etc. The patient understands these and wishes to proceed.      The patient was brought to the procedure room. Consent and time out were performed. The area was prepped and draped in the usual fashion. 1% lidocaine with epinephrine was infused locally. The abscess was then incised and drained sharply with a #11 blade. Purulent contents were evacuated and irrigated with saline and peroxide. Minimal blood loss had occurred and was well controlled with pressure. There were no complications and the patient tolerated the procedure well. Wound instructions were given.    I discussed the patients findings and my recommendations with patient        Electronically signed by Eric Green MD  05/12/22

## 2022-05-19 ENCOUNTER — OFFICE VISIT (OUTPATIENT)
Dept: NEUROLOGY | Facility: CLINIC | Age: 67
End: 2022-05-19

## 2022-05-19 VITALS
HEART RATE: 96 BPM | WEIGHT: 247 LBS | HEIGHT: 64 IN | BODY MASS INDEX: 42.17 KG/M2 | DIASTOLIC BLOOD PRESSURE: 88 MMHG | OXYGEN SATURATION: 98 % | SYSTOLIC BLOOD PRESSURE: 140 MMHG

## 2022-05-19 DIAGNOSIS — B99.9 NEUROPATHY DUE TO INFECTION: Primary | ICD-10-CM

## 2022-05-19 DIAGNOSIS — G63 NEUROPATHY DUE TO INFECTION: Primary | ICD-10-CM

## 2022-05-19 DIAGNOSIS — M48.061 SPINAL STENOSIS OF LUMBAR REGION WITHOUT NEUROGENIC CLAUDICATION: ICD-10-CM

## 2022-05-19 DIAGNOSIS — M79.7 FIBROMYALGIA: ICD-10-CM

## 2022-05-19 PROCEDURE — 99214 OFFICE O/P EST MOD 30 MIN: CPT | Performed by: NURSE PRACTITIONER

## 2022-05-19 RX ORDER — PREGABALIN 75 MG/1
75 CAPSULE ORAL 3 TIMES DAILY
Qty: 270 CAPSULE | Refills: 1 | Status: SHIPPED | OUTPATIENT
Start: 2022-05-19 | End: 2022-10-27 | Stop reason: SDUPTHER

## 2022-05-19 RX ORDER — DULOXETIN HYDROCHLORIDE 60 MG/1
60 CAPSULE, DELAYED RELEASE ORAL DAILY
Qty: 90 CAPSULE | Refills: 3 | Status: SHIPPED | OUTPATIENT
Start: 2022-05-19 | End: 2022-05-23 | Stop reason: SDUPTHER

## 2022-05-19 NOTE — PROGRESS NOTES
Subjective:     Patient ID: Alison Benitez is a 67 y.o. female.    CC:   Chief Complaint   Patient presents with   • Peripheral Neuropathy   • Fibromyalgia   • Back Pain       HPI:   History of Present Illness     This is a very pleasant 67-year-old female who presents for 6-month neurology follow-up on post-infection neuropathy of bilateral lower extremities since 01/2019 to 02/2019 after being diagnosed with mononucleosis. She has also been diagnosed with fibromyalgia by rheumatology. She is currently taking pregabalin 75 mg 2 to 3 times a day along with duloxetine 60 mg daily. She does also have chronic low back pain and previously was evaluated by [facility] neurosurgery in 08/2021 and for now she has preferred conservative therapy treatments. She has preferred to wait on injections. She has tried physical therapy in the past which has been helpful.     She is here for follow-up and medication refills today.     She did have blood work on 03/26/2022 at outlying facility with sed rate of 2. CBC with auto differential within normal limits. Vitamin D level 34.5, low normal. C-reactive protein 14.2 mg/L and overall stable compared to prior labs as well as comprehensive metabolic panel within normal limits. This was done with [facility] lab.    The patient was diagnosed with fibromyalgia in 11/2021. She is still seeing Dr. 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 She reports having pain in her left hand sometimes in the morning. She also experiences pain in her back and hips, but she feels better when she gets up and gets going. Dr. Bibiana Khan prescribes nabumetone 750 mg twice daily.     The patient states that she experiences fluctuations in pain. As an example, she went shopping on 05/18/2022 and she was able to walk back and forth to the car, but she sometimes could not do that. She does not experience pain all day. She has stiffness in her back when she first gets up in the  "morning and has to hold onto the wall. It used to take her 30 minutes or 45 minutes, but she sometimes experiences pain for 2 hours. She reports that wintertime makes her symptoms worse.     She states she has had the back issues and it is her arthritis. She would like to wait and see if her hip is not causing more problem than her back. She is not going to see orthopedics, but she will see her primary care provider for an annual visit on 06/27/2022 if she can wait until then. She would like to have an MRI of her hip. She defers those injections because she wants to have them when she gets \"really bad\".     The patient states that she has done physical therapy recently. She has been there 2 times a weeks for 1 month or 6 weeks and it has been helpful. She states that they put the heat stones on her back and show her some exercises. She rides a bike. She also does some exercises at home. The patient states that the current dose of Lyrica works well for her.     The patient states that she had COVID-19 in 02/2022 and had \"COVID toes\". She received the monoclonal antibodies back then. She has had swelling and pain in her right foot since that time.    She denies any falls in the past year. She feels unsteady sometimes when she is walking, but she \"gets control of it\". She has a hand rail in her bathtub and a bath mat.     The patient states that she had a sebaceous cyst on her chest removed recently with general surgery.    She is scheduled to see her PCP in June 2022 with annual labs/physical.    She sees endocrinology for hypothyroidism which is now stable.     Prior labs & workup: CK level of 174.  Immunofixation and protein electrophoresis within normal limits.  Anti-hue and anti-Otilia antibodies negative.  Methylmalonic acid normal at 155.  Vitamin B12 951 and folate greater than 20.  Lyme antibodies negative.  Mono test negative.  ASO titer was elevated at 200. Hemoglobin A1c was 4.9%.  She was treated with high " dose steroids and physical therapy. 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 repeat MRI of the lumbar spine on 2021 and this did show some high-grade and moderate stenosis from L3-L4 and L4-L5.       The following portions of the patient's history were reviewed and updated as appropriate: allergies, current medications, past family history, past medical history, past social history, past surgical history and problem list.    Past Medical History:   Diagnosis Date   • Arthritis    • Bursitis    • Claustrophobia    • CTS (carpal tunnel syndrome)     bilateral    • Fibromyalgia    • Hemorrhoids    • Hypertension    • Low back pain    • Lumbosacral disc disease    • Neuropathy    • Osteoarthritis    • SBE (subacute bacterial endocarditis)    • Tendinitis of knee    • Thyroid disorder    • Thyroid nodule    •  (vaginal birth after )        Past Surgical History:   Procedure Laterality Date   • ABDOMINAL HYSTERECTOMY     •  SECTION     •  SECTION     • COLONOSCOPY     • DILATATION AND CURETTAGE     • HYSTERECTOMY N/A        • MANDIBLE FRACTURE SURGERY     • OOPHORECTOMY Bilateral    • TUBAL ABDOMINAL LIGATION     • TUBAL ABDOMINAL LIGATION         Social History     Socioeconomic History   • Marital status: Single   Tobacco Use   • Smoking status: Never Smoker   • Smokeless tobacco: Never Used   Vaping Use   • Vaping Use: Never used   Substance and Sexual Activity   • Alcohol use: No   • Drug use: No   • Sexual activity: Not Currently       Family History   Problem Relation Age of Onset   • Osteoarthritis Other    • Hypertension Other    • Carpal tunnel syndrome Other    • COPD Other    • Diabetes Other    • Stroke Other    • Glaucoma Other    • Heart disease Mother    • Asthma Mother    • Hypertension Brother    • Heart disease Father    • Breast cancer Neg Hx    • Ovarian cancer Neg Hx           Current  Outpatient Medications:   •  ascorbic acid (VITAMIN C) 1000 MG tablet, Take 1,000 mg by mouth Daily., Disp: , Rfl:   •  Cholecalciferol (Vitamin D) 50 MCG (2000 UT) tablet, Take 2,000 Units by mouth Daily., Disp: , Rfl:   •  Cyanocobalamin (VITAMIN B 12 PO), Take 3,000 mg by mouth Daily., Disp: , Rfl:   •  desloratadine (CLARINEX) 5 MG tablet, Take 5 mg by mouth Daily., Disp: , Rfl:   •  DULoxetine (CYMBALTA) 60 MG capsule, Take 1 capsule by mouth Daily., Disp: 90 capsule, Rfl: 3  •  estradiol (ESTRACE) 1 MG tablet, Take 1 tablet by mouth Daily., Disp: , Rfl:   •  fluocinolone (SYNALAR) 0.025 % ointment, APPLY SPARINGLY TOPICALLY TO THE AFFECTED AREA FOUR TIMES DAILY, Disp: , Rfl:   •  hydrochlorothiazide (HYDRODIURIL) 25 MG tablet, , Disp: , Rfl:   •  levothyroxine (Synthroid) 50 MCG tablet, Take 1 tablet by mouth Daily for 180 days., Disp: 90 tablet, Rfl: 1  •  losartan (COZAAR) 50 MG tablet, Take 1 tablet by mouth Daily., Disp: , Rfl:   •  Magnesium 100 MG capsule, once daily 500 mg, Disp: , Rfl:   •  MULTIPLE VITAMINS-CALCIUM PO, Take 1 tablet by mouth., Disp: , Rfl:   •  multivitamin with minerals tablet tablet, Take 1 tablet by mouth Daily., Disp: , Rfl:   •  mupirocin (BACTROBAN) 2 % ointment, , Disp: , Rfl:   •  nabumetone (RELAFEN) 750 MG tablet, Take 750 mg by mouth 2 (Two) Times a Day., Disp: , Rfl:   •  olopatadine (PATADAY) 0.2 % solution ophthalmic solution, 1 drop., Disp: , Rfl:   •  Omega-3 Fatty Acids (FISH OIL) 1000 MG capsule capsule, Take 1,200 mg by mouth Daily With Breakfast., Disp: , Rfl:   •  Oxymetazoline HCl (Rhofade) 1 % cream, Apply to face daily, Disp: , Rfl:   •  pregabalin (LYRICA) 75 MG capsule, Take 1 capsule by mouth 3 (Three) Times a Day., Disp: 270 capsule, Rfl: 1  •  RHOFADE 1 % cream, , Disp: , Rfl: 6  •  sulfamethoxazole-trimethoprim (BACTRIM DS,SEPTRA DS) 800-160 MG per tablet, , Disp: , Rfl:   •  vitamin D (ERGOCALCIFEROL) 38472 units capsule capsule, , Disp: , Rfl:   •   "zolpidem (AMBIEN) 10 MG tablet, Take 1 tablet by mouth., Disp: , Rfl:      Review of Systems   Constitutional: Negative for chills, fatigue, fever and unexpected weight change.   HENT: Negative for ear pain, hearing loss, nosebleeds, rhinorrhea and sore throat.    Eyes: Negative for photophobia, pain, discharge, itching and visual disturbance.   Respiratory: Negative for cough, chest tightness, shortness of breath and wheezing.    Cardiovascular: Negative for chest pain, palpitations and leg swelling.   Gastrointestinal: Negative for abdominal pain, blood in stool, constipation, diarrhea, nausea and vomiting.   Genitourinary: Negative for dysuria, frequency, hematuria and urgency.   Musculoskeletal: Positive for arthralgias, back pain, gait problem, myalgias and neck pain. Negative for joint swelling and neck stiffness.   Skin: Negative for rash and wound.   Allergic/Immunologic: Negative for environmental allergies and food allergies.   Neurological: Positive for numbness. Negative for dizziness, tremors, seizures, syncope, speech difficulty, weakness, light-headedness and headaches.   Hematological: Negative for adenopathy. Does not bruise/bleed easily.   Psychiatric/Behavioral: Negative for agitation, confusion, decreased concentration, hallucinations, sleep disturbance and suicidal ideas. The patient is not nervous/anxious.         Objective:  /88   Pulse 96   Ht 162.6 cm (64\")   Wt 112 kg (247 lb)   SpO2 98%   BMI 42.40 kg/m²     Neurologic Exam     Mental Status   Oriented to person, place, and time.   Speech: speech is normal   Level of consciousness: alert    Cranial Nerves   Cranial nerves II through XII intact.     Motor Exam   Muscle bulk: normal  Overall muscle tone: normal    Strength   Strength 5/5 except as noted.   Decreased ROM of spine/hips/knees related to know osteoarthritis     Sensory Exam   Light touch normal.   Right arm vibration: normal  Left arm vibration: normal  Right leg " vibration: decreased from ankle  Left leg vibration: decreased from ankle  Pinprick normal.     Decreased stocking sensation BLE     Gait, Coordination, and Reflexes     Gait  Gait: (antalgia chronic)    Coordination   Finger to nose coordination: normal    Tremor   Resting tremor: absent  Intention tremor: absent  Action tremor: absent    Reflexes   Right brachioradialis: 2+  Left brachioradialis: 2+  Right biceps: 2+  Left biceps: 2+  Right patellar: 1+  Left patellar: 1+  Right achilles: 1+  Left achilles: 1+  Right : 2+  Right plantar: normal  Left plantar: normal  Right ankle clonus: absent  Left ankle clonus: absent      Physical Exam  Constitutional:       Appearance: Normal appearance.   Musculoskeletal:      Lumbar back: Decreased range of motion. Negative right straight leg raise test and negative left straight leg raise test.      Right lower le+ Edema present.      Left lower le+ Edema present.   Neurological:      Mental Status: She is alert and oriented to person, place, and time.      Coordination: Finger-Nose-Finger Test normal.      Deep Tendon Reflexes:      Reflex Scores:       Bicep reflexes are 2+ on the right side and 2+ on the left side.       Brachioradialis reflexes are 2+ on the right side and 2+ on the left side.       Patellar reflexes are 1+ on the right side and 1+ on the left side.       Achilles reflexes are 1+ on the right side and 1+ on the left side.  Psychiatric:         Mood and Affect: Mood is anxious.         Speech: Speech normal.         Behavior: Behavior normal.         Thought Content: Thought content normal.         Cognition and Memory: Cognition and memory normal.         Judgment: Judgment normal.         Assessment/Plan:       Diagnoses and all orders for this visit:    1. 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  Dispense: 270 capsule; Refill: 1  -     DULoxetine  (CYMBALTA) 60 MG capsule; Take 1 capsule by mouth Daily.  Dispense: 90 capsule; Refill: 3    2. Spinal stenosis of lumbar region without neurogenic claudication  -     pregabalin (LYRICA) 75 MG capsule; Take 1 capsule by mouth 3 (Three) Times a Day.  Dispense: 270 capsule; Refill: 1  -     DULoxetine (CYMBALTA) 60 MG capsule; Take 1 capsule by mouth Daily.  Dispense: 90 capsule; Refill: 3    3. Fibromyalgia  -     pregabalin (LYRICA) 75 MG capsule; Take 1 capsule by mouth 3 (Three) Times a Day.  Dispense: 270 capsule; Refill: 1  -     DULoxetine (CYMBALTA) 60 MG capsule; Take 1 capsule by mouth Daily.  Dispense: 90 capsule; Refill: 3      We discussed s&s of cauda equina and when she should present to ER in relation to her low back pain- total loss of her feeling in her legs and perineal area, unable to move her legs, and loss of bowel or bladder control suddenly/or bowel/bladder hesitance. Advised to observe these signs of spinal cord compression and she has to go to the hospital if any of these happens. She verbalizes understanding.    The controlled substance agreement is signed today.    Advised to keep her feet elevated, limit salt in her diet, and drink plenty of water. F/U with PCP if swelling in feet persists.    Continue current medications.    She will return to the clinic in 6 months for a follow-up or sooner if needed.     She will continue with all of her specialists as well.     Reviewed medications, potential side effects and signs and symptoms to report. Discussed risk versus benefits of treatment plan with patient and/or family-including medications, labs and radiology that may be ordered. Addressed questions and concerns during visit. Patient and/or family verbalized understanding and agree with plan.    AS THE PROVIDER, I PERSONALLY WORE PPE DURING ENTIRE FACE TO FACE ENCOUNTER IN CLINIC WITH THE PATIENT. PATIENT ALSO WORE PPE DURING ENTIRE FACE TO FACE ENCOUNTER EXCEPT FOR A MAX OF 30 SECONDS  DURING NEUROLOGICAL EVALUATION OF CRANIAL NERVES AND THEN MASK WAS PLACED BACK OVER PATIENT FACE FOR REMAINDER OF VISIT. I WASHED MY HANDS BEFORE AND AFTER VISIT.    As part of this patient's treatment plan I am prescribing controlled substances. The patient has been made aware of appropriate use of such medications, including potential risk of somnolence, limited ability to drive and/or work safely, and potential for dependence or overdose. It has also been made clear that these medications are for use by the patient only, without concomitant use of alcohol or other substances unless prescribed. Keep out of reach of children.  Ethan report has been reviewed. If this is going to be prescribed long term, [facility] Controlled Substance Agreement Contract has also been read and signed by patient and myself.    During this visit the following were done:  Labs Reviewed [x]    Labs Ordered []    Radiology Reports Reviewed []    Radiology Ordered []    PCP Records Reviewed []    Referring Provider Records Reviewed []    ER Records Reviewed []    Hospital Records Reviewed []    History Obtained From Family []    Radiology Images Reviewed []    Other Reviewed [x]    Records Requested []      Transcribed from ambient dictation for ELDON Rosas by GLENN MERRITT.  05/19/22   15:08 EDT    Patient verbalized consent to the visit recording.  I have personally performed the services described in this document as transcribed by the above individual, and it is both accurate and complete.  ELDON Rosas  5/19/2022  16:07 EDT

## 2022-05-23 ENCOUNTER — TELEPHONE (OUTPATIENT)
Dept: ENDOCRINOLOGY | Facility: CLINIC | Age: 67
End: 2022-05-23

## 2022-05-23 DIAGNOSIS — G63 NEUROPATHY DUE TO INFECTION: ICD-10-CM

## 2022-05-23 DIAGNOSIS — J30.2 SEASONAL ALLERGIES: ICD-10-CM

## 2022-05-23 DIAGNOSIS — B99.9 NEUROPATHY DUE TO INFECTION: ICD-10-CM

## 2022-05-23 DIAGNOSIS — M79.7 FIBROMYALGIA: ICD-10-CM

## 2022-05-23 DIAGNOSIS — M48.061 SPINAL STENOSIS OF LUMBAR REGION WITHOUT NEUROGENIC CLAUDICATION: ICD-10-CM

## 2022-05-23 RX ORDER — DESLORATADINE 5 MG/1
TABLET ORAL
Qty: 90 TABLET | Refills: 3 | Status: SHIPPED | OUTPATIENT
Start: 2022-05-23 | End: 2022-10-05 | Stop reason: SDUPTHER

## 2022-05-23 RX ORDER — DULOXETIN HYDROCHLORIDE 60 MG/1
60 CAPSULE, DELAYED RELEASE ORAL DAILY
Qty: 90 CAPSULE | Refills: 3 | Status: SHIPPED | OUTPATIENT
Start: 2022-05-23 | End: 2022-11-17 | Stop reason: SDUPTHER

## 2022-05-23 NOTE — TELEPHONE ENCOUNTER
Caller: KARLA    Relationship: PATIENT    Best call back number: [phone]  Requested Prescriptions:   Requested Prescriptions     Pending Prescriptions Disp Refills   • DULoxetine (CYMBALTA) 60 MG capsule 90 capsule 3     Sig: Take 1 capsule by mouth Daily.        Pharmacy where request should be sent: [facility]      Additional details provided by patient: PATIENT STATES THE REFILL ORDER WAS SENT TO [facility] IN [place] BY [facility]    Does the patient have less than a 3 day supply:  [] Yes  [x] No    Stevie BUTT Rep   05/23/22 09:57 EDT

## 2022-05-23 NOTE — TELEPHONE ENCOUNTER
Rx Refill Note  Requested Prescriptions     Pending Prescriptions Disp Refills   • DULoxetine (CYMBALTA) 60 MG capsule 90 capsule 3     Sig: Take 1 capsule by mouth Daily.      Last office visit with prescribing clinician: 5/19/2022      Next office visit with prescribing clinician: 11/17/2022            Roxy Vargas MA  05/23/22, 10:55 EDT

## 2022-05-24 ENCOUNTER — OFFICE VISIT (OUTPATIENT)
Dept: SURGERY | Facility: CLINIC | Age: 67
End: 2022-05-24

## 2022-05-24 VITALS
HEIGHT: 64 IN | TEMPERATURE: 97.1 F | BODY MASS INDEX: 42.17 KG/M2 | OXYGEN SATURATION: 99 % | SYSTOLIC BLOOD PRESSURE: 124 MMHG | DIASTOLIC BLOOD PRESSURE: 82 MMHG | HEART RATE: 90 BPM | WEIGHT: 247 LBS

## 2022-05-24 DIAGNOSIS — L02.219 CUTANEOUS ABSCESS OF TRUNK, UNSPECIFIED SITE OF TRUNK: Primary | ICD-10-CM

## 2022-05-24 PROCEDURE — 99212 OFFICE O/P EST SF 10 MIN: CPT | Performed by: SURGERY

## 2022-05-24 RX ORDER — SULFAMETHOXAZOLE AND TRIMETHOPRIM 800; 160 MG/1; MG/1
1 TABLET ORAL 2 TIMES DAILY
Qty: 14 TABLET | Refills: 0 | Status: SHIPPED | OUTPATIENT
Start: 2022-05-24 | End: 2022-05-31

## 2022-05-24 RX ORDER — NYSTATIN AND TRIAMCINOLONE ACETONIDE 100000; 1 [USP'U]/G; MG/G
1 OINTMENT TOPICAL 2 TIMES DAILY
Qty: 30 G | Refills: 0 | Status: SHIPPED | OUTPATIENT
Start: 2022-05-24 | End: 2022-11-17

## 2022-06-10 NOTE — PROGRESS NOTES
"Patient: Alison Benitez  YOB: 1955    Date: 06/13/2022    Primary Care Provider: Kacy Huertas MD    Chief Complaint   Patient presents with   • Abscess     abdominal       History: Patient is here for follow-up abdominal abscess.  Patient was seen in the office 05/09/2022 at which time she had an incision and drainage of the abscess.  Patient was seen for initial follow-up by Dr. Kendall 05/24/2022 at which time she complained of \"irritaion\" at the site, she was given Rx Bactrim DS and topical Mycolog ointment. Patient states the abscess is improving.    She has done well from the previous sebaceous cyst abscess drainage, she now has another sebaceous cyst in the right upper quadrant anterior abdominal wall.  Present for many months, associated with a palpable mass, slightly tender to palpation, nonradiating, nothing makes it better.    The following portions of the patient's history were reviewed and updated as appropriate: allergies, current medications, past family history, past medical history, past social history, past surgical history and problem list.    Review of Systems   Constitutional: Negative for chills, fever and unexpected weight change.   HENT: Negative for hearing loss, trouble swallowing and voice change.    Eyes: Negative for visual disturbance.   Respiratory: Negative for apnea, cough, chest tightness, shortness of breath and wheezing.    Cardiovascular: Negative for chest pain, palpitations and leg swelling.   Gastrointestinal: Negative for abdominal distention, abdominal pain, anal bleeding, blood in stool, constipation, diarrhea, nausea, rectal pain and vomiting.   Endocrine: Negative for cold intolerance and heat intolerance.   Genitourinary: Negative for difficulty urinating, dysuria and flank pain.   Musculoskeletal: Negative for back pain and gait problem.   Skin: Negative for color change, rash and wound.   Neurological: Negative for dizziness, syncope, speech " "difficulty, weakness, light-headedness, numbness and headaches.   Hematological: Negative for adenopathy. Does not bruise/bleed easily.   Psychiatric/Behavioral: Negative for confusion. The patient is not nervous/anxious.        Vital Signs  Vitals:    06/13/22 0851   BP: 148/86   BP Location: Left arm   Patient Position: Sitting   Cuff Size: Adult   Pulse: 108   Temp: 98 °F (36.7 °C)   TempSrc: Infrared   SpO2: 99%   Weight: 111 kg (243 lb 12.8 oz)   Height: 162.6 cm (64\")       Allergies:  No Known Allergies    Medications:    Current Outpatient Medications:   •  ascorbic acid (VITAMIN C) 1000 MG tablet, Take 1,000 mg by mouth Daily., Disp: , Rfl:   •  Cholecalciferol (Vitamin D) 50 MCG (2000 UT) tablet, Take 2,000 Units by mouth Daily., Disp: , Rfl:   •  Cyanocobalamin (VITAMIN B 12 PO), Take 3,000 mg by mouth Daily., Disp: , Rfl:   •  desloratadine (CLARINEX) 5 MG tablet, Take 5 mg by mouth Daily., Disp: , Rfl:   •  DULoxetine (CYMBALTA) 60 MG capsule, Take 1 capsule by mouth Daily., Disp: 90 capsule, Rfl: 3  •  estradiol (ESTRACE) 1 MG tablet, Take 1 tablet by mouth Daily., Disp: , Rfl:   •  fluocinolone (SYNALAR) 0.025 % ointment, APPLY SPARINGLY TOPICALLY TO THE AFFECTED AREA FOUR TIMES DAILY, Disp: , Rfl:   •  hydrochlorothiazide (HYDRODIURIL) 25 MG tablet, , Disp: , Rfl:   •  levothyroxine (Synthroid) 50 MCG tablet, Take 1 tablet by mouth Daily for 180 days., Disp: 90 tablet, Rfl: 1  •  losartan (COZAAR) 50 MG tablet, Take 1 tablet by mouth Daily., Disp: , Rfl:   •  Magnesium 100 MG capsule, once daily 500 mg, Disp: , Rfl:   •  MULTIPLE VITAMINS-CALCIUM PO, Take 1 tablet by mouth., Disp: , Rfl:   •  multivitamin with minerals tablet tablet, Take 1 tablet by mouth Daily., Disp: , Rfl:   •  mupirocin (BACTROBAN) 2 % ointment, , Disp: , Rfl:   •  nabumetone (RELAFEN) 750 MG tablet, Take 750 mg by mouth 2 (Two) Times a Day., Disp: , Rfl:   •  nystatin-triamcinolone (MYCOLOG) 641666-0.1 UNIT/GM-% ointment, Apply " 1 application topically to the appropriate area as directed 2 (Two) Times a Day., Disp: 30 g, Rfl: 0  •  olopatadine (PATADAY) 0.2 % solution ophthalmic solution, 1 drop., Disp: , Rfl:   •  Omega-3 Fatty Acids (FISH OIL) 1000 MG capsule capsule, Take 1,200 mg by mouth Daily With Breakfast., Disp: , Rfl:   •  Oxymetazoline HCl (Rhofade) 1 % cream, Apply to face daily, Disp: , Rfl:   •  pregabalin (LYRICA) 75 MG capsule, Take 1 capsule by mouth 3 (Three) Times a Day., Disp: 270 capsule, Rfl: 1  •  RHOFADE 1 % cream, , Disp: , Rfl: 6  •  vitamin D (ERGOCALCIFEROL) 48790 units capsule capsule, , Disp: , Rfl:   •  zolpidem (AMBIEN) 10 MG tablet, Take 1 tablet by mouth., Disp: , Rfl:     Physical Exam:   General Appearance:    Alert, cooperative, in no acute distress   Head:    Normocephalic, without obvious abnormality, atraumatic   Lungs:     Clear to auscultation,respirations regular, even and                  unlabored    Heart:    Regular rhythm and normal rate, normal S1 and S2, no            murmur, no gallop, no rub, no click   Abdomen:     Normal bowel sounds, no masses, no organomegaly, soft        non-tender, non-distended, no guarding, no rebound                tenderness   Extremities:   Moves all extremities well, no edema, no cyanosis, no             redness   Pulses:   Pulses palpable and equal bilaterally   Skin:   No bleeding, bruising or rash, small sebaceous cyst noted in the anterior abdominal wall right upper quadrant     Results Review:   I reviewed the patient's new clinical results.  I reviewed the patient's new imaging results and agree with the interpretation.  I reviewed the patient's other test results and agree with the interpretation     Review of Systems was reviewed and confirmed as accurate as documented by the MA.    ASSESSMENT/PLAN:    1. Sebaceous cyst       I did have a detailed and extensive discussion with the patient in the office today and she is done well from her previous  incision and drainage of sebaceous cyst abscess but now she needs to undergo sebaceous cyst removal of another separate sebaceous cyst in the anterior abdominal wall in the right upper quadrant.  Risk and benefits were discussed with her, she understands and agrees, and wishes to proceed.      Electronically signed by Eric Green MD  06/13/22

## 2022-06-13 ENCOUNTER — OFFICE VISIT (OUTPATIENT)
Dept: SURGERY | Facility: CLINIC | Age: 67
End: 2022-06-13

## 2022-06-13 VITALS
TEMPERATURE: 98 F | OXYGEN SATURATION: 99 % | DIASTOLIC BLOOD PRESSURE: 86 MMHG | HEIGHT: 64 IN | WEIGHT: 243.8 LBS | HEART RATE: 108 BPM | BODY MASS INDEX: 41.62 KG/M2 | SYSTOLIC BLOOD PRESSURE: 148 MMHG

## 2022-06-13 DIAGNOSIS — L72.3 SEBACEOUS CYST: Primary | ICD-10-CM

## 2022-06-13 PROCEDURE — 99213 OFFICE O/P EST LOW 20 MIN: CPT | Performed by: SURGERY

## 2022-06-28 NOTE — PROGRESS NOTES
Location:upper abdominal wall    Procedure:  excision      I recommend excision. Procedure and the risks and benefits were explained including bleeding and infection. The patient understands these and wishes to proceed.     The patient was brought to the procedure room. Consent and time out were performed. The area was prepped and draped in the usual fashion. 1% lidocaine with epinephrine was infused locally. An elliptical incision was made around the lesion. Full thickness excision was performed. The lesion size was 1.7 cm. The wound was closed in layers with interrupted simple vicryl and Nylon for the skin. Wound closure size was 2.2 cm. There were no complications and the patient tolerated the procedure well. Hemostasis was well controlled with pressure and there was minimal blood loss. Wound instructions were given.

## 2022-06-29 ENCOUNTER — PROCEDURE VISIT (OUTPATIENT)
Dept: SURGERY | Facility: CLINIC | Age: 67
End: 2022-06-29

## 2022-06-29 DIAGNOSIS — L72.3 SEBACEOUS CYST: Primary | ICD-10-CM

## 2022-06-29 DIAGNOSIS — L72.3 SEBACEOUS CYST: ICD-10-CM

## 2022-06-29 PROCEDURE — 11403 EXC TR-EXT B9+MARG 2.1-3CM: CPT | Performed by: SURGERY

## 2022-06-29 PROCEDURE — 12031 INTMD RPR S/A/T/EXT 2.5 CM/<: CPT | Performed by: SURGERY

## 2022-06-30 LAB — REF LAB TEST METHOD: NORMAL

## 2022-07-05 NOTE — PROGRESS NOTES
"Patient: Alison Benitez    YOB: 1955    Date: 07/06/2022    Primary Care Provider: Kacy Huertas MD    Chief Complaint   Patient presents with   • Cyst     Post op, stitch removal       History of present illness:  I saw the patient in the office today as a followup from their recent chest wall lesion excision, the pathology report did show an epidermal inclusion cyst.  They state that they have done well and are having no problems.    Vital Signs:  Vitals:    07/06/22 1225   BP: 126/92   Pulse: 74   Resp: 14   Temp: 97 °F (36.1 °C)   SpO2: 99%   Weight: 109 kg (241 lb)   Height: 162.6 cm (64\")       Physical Exam:   General Appearance:    Alert, cooperative, in no acute distress, wound clean dry without infection   Abdomen:     no masses, no organomegaly, soft non-tender, non-distended, no guarding, wounds are well healed   Chest:      Clear to ausculation       Assessment / Plan:    1. Postoperative visit        I did discuss the situation with the patient today in the office and they have done well from their recent lesion excision, I don't think that the patient needs any further intervention and I need to see them back only if they have further problems. Pathology report was reviewed with the patient in the office.    Electronically signed by Eric Green MD  07/06/22                      "

## 2022-07-06 ENCOUNTER — OFFICE VISIT (OUTPATIENT)
Dept: SURGERY | Facility: CLINIC | Age: 67
End: 2022-07-06

## 2022-07-06 VITALS
HEIGHT: 64 IN | BODY MASS INDEX: 41.15 KG/M2 | HEART RATE: 74 BPM | RESPIRATION RATE: 14 BRPM | WEIGHT: 241 LBS | TEMPERATURE: 97 F | SYSTOLIC BLOOD PRESSURE: 126 MMHG | DIASTOLIC BLOOD PRESSURE: 92 MMHG | OXYGEN SATURATION: 99 %

## 2022-07-06 DIAGNOSIS — Z48.89 POSTOPERATIVE VISIT: Primary | ICD-10-CM

## 2022-07-06 PROCEDURE — 99024 POSTOP FOLLOW-UP VISIT: CPT | Performed by: SURGERY

## 2022-07-20 ENCOUNTER — OFFICE VISIT (OUTPATIENT)
Dept: FAMILY MEDICINE CLINIC | Age: 67
End: 2022-07-20
Payer: MEDICARE

## 2022-07-20 VITALS
WEIGHT: 242.6 LBS | SYSTOLIC BLOOD PRESSURE: 136 MMHG | BODY MASS INDEX: 41.42 KG/M2 | HEART RATE: 83 BPM | OXYGEN SATURATION: 98 % | TEMPERATURE: 98 F | HEIGHT: 64 IN | DIASTOLIC BLOOD PRESSURE: 82 MMHG

## 2022-07-20 DIAGNOSIS — Z00.00 MEDICARE ANNUAL WELLNESS VISIT, SUBSEQUENT: Primary | ICD-10-CM

## 2022-07-20 PROCEDURE — 99214 OFFICE O/P EST MOD 30 MIN: CPT | Performed by: FAMILY MEDICINE

## 2022-07-20 PROCEDURE — 1123F ACP DISCUSS/DSCN MKR DOCD: CPT | Performed by: FAMILY MEDICINE

## 2022-07-20 ASSESSMENT — PATIENT HEALTH QUESTIONNAIRE - PHQ9
SUM OF ALL RESPONSES TO PHQ QUESTIONS 1-9: 0
SUM OF ALL RESPONSES TO PHQ QUESTIONS 1-9: 0
2. FEELING DOWN, DEPRESSED OR HOPELESS: 0
SUM OF ALL RESPONSES TO PHQ QUESTIONS 1-9: 0
1. LITTLE INTEREST OR PLEASURE IN DOING THINGS: 0
SUM OF ALL RESPONSES TO PHQ QUESTIONS 1-9: 0
SUM OF ALL RESPONSES TO PHQ9 QUESTIONS 1 & 2: 0

## 2022-07-20 ASSESSMENT — LIFESTYLE VARIABLES
HOW OFTEN DO YOU HAVE A DRINK CONTAINING ALCOHOL: NEVER
HOW MANY STANDARD DRINKS CONTAINING ALCOHOL DO YOU HAVE ON A TYPICAL DAY: PATIENT DOES NOT DRINK

## 2022-07-20 NOTE — PATIENT INSTRUCTIONS
Personalized Preventive Plan for Lia Logan - 7/20/2022  Medicare offers a range of preventive health benefits. Some of the tests and screenings are paid in full while other may be subject to a deductible, co-insurance, and/or copay. Some of these benefits include a comprehensive review of your medical history including lifestyle, illnesses that may run in your family, and various assessments and screenings as appropriate. After reviewing your medical record and screening and assessments performed today your provider may have ordered immunizations, labs, imaging, and/or referrals for you. A list of these orders (if applicable) as well as your Preventive Care list are included within your After Visit Summary for your review. Other Preventive Recommendations:    A preventive eye exam performed by an eye specialist is recommended every 1-2 years to screen for glaucoma; cataracts, macular degeneration, and other eye disorders. A preventive dental visit is recommended every 6 months. Try to get at least 150 minutes of exercise per week or 10,000 steps per day on a pedometer . Order or download the FREE \"Exercise & Physical Activity: Your Everyday Guide\" from [employer]. Call 8-499.534.6811 or search [employer] online. You need 4156-4697 mg of calcium and 3623-6215 IU of vitamin D per day. It is possible to meet your calcium requirement with diet alone, but a vitamin D supplement is usually necessary to meet this goal.  When exposed to the sun, use a sunscreen that protects against both UVA and UVB radiation with an SPF of 30 or greater. Reapply every 2 to 3 hours or after sweating, drying off with a towel, or swimming. Always wear a seat belt when traveling in a car. Always wear a helmet when riding a bicycle or motorcycle.

## 2022-07-20 NOTE — PROGRESS NOTES
Health Maintenance Due This Visit   Colonoscopy No   Mammogram No   Annual Wellness Visit No   Microalbumin No   HgbA1C No   Diabetic Eye Exam No              Bone Dexa No                  Chief Complaint   Patient presents with    Medicare AWV       Have you seen any other physician or provider since your last visit Yes, Dr. Juan F Eduardo, Neuro, Arthritis      Have you had any other diagnostic tests since your last visit? no    Have you changed or stopped any medications since your last visit? no

## 2022-07-20 NOTE — PROGRESS NOTES
Medicare Annual Wellness Visit    Sindy Gagnon is here for Medicare AWV    Assessment & Plan   Medicare annual wellness visit, subsequent    Recommendations for Preventive Services Due: see orders and patient instructions/AVS.  Recommended screening schedule for the next 5-10 years is provided to the patient in written form: see Patient Instructions/AVS.     Return in 6 months (on 1/20/2023) for Medicare Annual Wellness Visit in 1 year. Subjective   The following acute and/or chronic problems were also addressed today:      Patient's complete Health Risk Assessment and screening values have been reviewed and are found in Flowsheets. The following problems were reviewed today and where indicated follow up appointments were made and/or referrals ordered.     Positive Risk Factor Screenings with Interventions:             General Health and ACP:  General  In general, how would you say your health is?: Good  In the past 7 days, have you experienced any of the following: New or Increased Pain, New or Increased Fatigue, Loneliness, Social Isolation, Stress or Anger?: No  Do you get the social and emotional support that you need?: Yes  Do you have a Living Will?: (!) No    Advance Directives       Power of  Living Will ACP-Advance Directive ACP-Power of     Not on File Not on File Not on File Not on File        General Health Risk Interventions:  Discsussed the need for her to make a living will    Health Habits/Nutrition:  Physical Activity: Insufficiently Active    Days of Exercise per Week: 5 days    Minutes of Exercise per Session: 20 min     Have you lost any weight without trying in the past 3 months?: No  Body mass index: (!) 41.64  Have you seen the dentist within the past year?: Yes  Health Habits/Nutrition Interventions:  Inadequate physical activity:  patient agrees to increase physical activity as follows: increasing walking once she gets moved             Objective   Vitals:    07/20/22 1326   BP: 136/82   Site: Left Upper Arm   Position: Sitting   Pulse: 83   Temp: 98 °F (36.7 °C)   TempSrc: Temporal   SpO2: 98%   Weight: 242 lb 9.6 oz (110 kg)   Height: 5' 4\" (1.626 m)      Body mass index is 41.64 kg/m². No Known Allergies  Prior to Visit Medications    Medication Sig Taking?  Authorizing Provider   desloratadine (CLARINEX) 5 MG tablet take 1 tablet by mouth once daily Yes Quinn Dorantes MD   hydroCHLOROthiazide (HYDRODIURIL) 25 MG tablet Take 1 tablet by mouth daily Yes Quinn Dorantes MD   estradiol (ESTRACE) 1 MG tablet Take 1 tablet by mouth daily Yes Quinn Dorantes MD   diclofenac sodium (VOLTAREN) 1 % GEL Apply 4 g topically 4 times daily Yes Quinn Dorantes MD   losartan (COZAAR) 50 MG tablet Take 1 tablet by mouth daily Yes Quinn Dorantes MD   metroNIDAZOLE (METROCREAM) 0.75 % cream APPLY EXTERNALLY TO THE AFFECTED AREA EVERY DAY Yes Historical Provider, MD   RHOFADE 1 % CREA Apply to face daily Yes Historical Provider, MD   levothyroxine (SYNTHROID) 50 MCG tablet Take 50 mcg by mouth daily Yes Historical Provider, MD   DULoxetine (CYMBALTA) 60 MG extended release capsule Take 60 mg by mouth daily Yes Historical Provider, MD   pregabalin (LYRICA) 75 MG capsule take 1 capsule by mouth twice a day Yes Historical Provider, MD   Ascorbic Acid (VITAMIN C) 250 MG tablet Take 250 mg by mouth daily  Yes Historical Provider, MD   nabumetone (RELAFEN) 750 MG tablet Take 750 mg by mouth 2 times daily  Yes Historical Provider, MD   Omega-3 Fatty Acids (FISH OIL) 1000 MG CAPS Take 1,200 mg by mouth daily  Yes Historical Provider, MD   vitamin D (ERGOCALCIFEROL) 56446 units CAPS capsule Take by mouth  Yes Historical Provider, MD   Multiple Vitamins-Minerals (MULTIVITAL PO) Take 1 tablet by mouth daily  Yes Historical Provider, MD   vitamin B-12 (CYANOCOBALAMIN) 1000 MCG tablet Take 1,000 mcg by mouth daily Yes Historical Provider, MD   doxycycline hyclate (VIBRAMYCIN) 100 MG capsule TAKE ONE CAPSULE BY MOUTH TWICE DAILY FOR 8 WEEKS  Patient not taking: No sig reported  Historical Provider, MD   Magnesium Gluconate 500 (27 Mg) MG TABS tablet Take 500 mg by mouth daily   Patient not taking: Reported on 7/20/2022  Historical Provider, MD   fluticasone (FLONASE) 50 MCG/ACT nasal spray 1 spray by Nasal route daily  Patient not taking: Reported on 7/20/2022  JO-ANN George - NP       CareTeam (Including outside providers/suppliers regularly involved in providing care):   Patient Care Team:  Jose Daniel Neff MD as PCP - General (Family Medicine)  Jose Daniel Neff MD as PCP - [facility] Empaneled Provider     Reviewed and updated this visit:  Tobacco  Allergies  Meds  Problems  Med Hx  Surg Hx  Soc Hx  Fam Hx

## 2022-07-21 DIAGNOSIS — G47.00 INSOMNIA, UNSPECIFIED TYPE: ICD-10-CM

## 2022-07-21 RX ORDER — ZOLPIDEM TARTRATE 10 MG/1
TABLET ORAL
Qty: 30 TABLET | Refills: 5 | Status: SHIPPED | OUTPATIENT
Start: 2022-07-21 | End: 2022-10-12 | Stop reason: SDUPTHER

## 2022-07-21 NOTE — TELEPHONE ENCOUNTER
Refill request received from pharmacy      Next Office Visit Date:  Future Appointments   Date Time Provider Jessie Kessler   1/24/2023 11:15 AM Alyson Mccullough MD [address] [place] [address]   7/25/2023  2:00 PM Alyson Mccullough MD [facility] - Please review via [address]

## 2022-08-10 ENCOUNTER — TELEPHONE (OUTPATIENT)
Dept: NEUROSURGERY | Facility: CLINIC | Age: 67
End: 2022-08-10

## 2022-08-10 DIAGNOSIS — M43.16 SPONDYLOLISTHESIS OF LUMBAR REGION: Primary | ICD-10-CM

## 2022-08-10 NOTE — TELEPHONE ENCOUNTER
Spoke to patient. Advised an MRI has been ordered. She asked if the MRI will cover her hips, because she is having walking difficulties and feels her hips may be involved.     I advised I would schedule her appointments (once MRI order has been placed) and call her back.

## 2022-08-10 NOTE — TELEPHONE ENCOUNTER
"Provider: YECENIA STANLEY MD    Caller: KARLA COLUNGA  Relationship to Patient: SELF    Pharmacy: [facility] [address] [place] [address] [phone] [facility] [phone]     Phone Number: [phone]    Reason for Call: SEVERE FLARE UP OF LBP INTO HIP; RECEIVED HIP INJECTION AND NO RELIEF.     When was the patient last seen: Office Visit with Yecenia Stanley MD (08/24/2021)    When did it start: LAST WEEK, WALKED TO PHARMACY FROM CLOSE PARKING SPOT, REQ'D HELP FROM DAUGHTER, TO BRING CAR TO DOOR.     Where is it located: LOW BACK; LEFT HIP AREA; JUST BARELY INTO THIGH.     Characteristics of symptom/severity: BASELINE PAIN WHEN SAW LAST FOR SAME PROBLEM: 5/10 \"UNDER CONTROL.\"    PAIN SCALE 10/10; \"UNABLE TO WALK TO CAR FROM PHARMACY AND WE WERE PARKED NEAR DOOR; EXCRUTIATING PAIN.\" STABBING PAIN IN HIP DOWN TO THIGH ON LEFT SIDE.     FEELS LIKE BONE ON BONE, NOT LIKE SCIATIC NERVE PAIN IN PAST.     Timing- Is it constant or intermittent: CONSTANT    What makes it worse: ANY ACTIVITY; ANY WALKING; MORNING IT TAKES ABOUT 30 MINS BEFORE SORENESS SUBSIDES; BUT WAKE UP WITH PAIN NOW FOR 2 HRS.    What makes it better: HEAT USED TO HELP, BUT EVEN THAT DOESN'T HELP ANYMORE.     What therapies/medications have you tried: PRESCRIBED MEDICATIONS ARE NOT HAVING ANY EFFECT ON THIS; LYRICA/GABAPENTIN, ETC. TOPICAL OINTMENT ON HIP BIOFREEZE \"WELL BEYOND THAT LEVEL OF PAIN, NO RELIEF.\"    PATIENT DESCRIBES DRASTIC CHANGE TO QUALITY OF LIFE; HAS TO COMPLETELY CHANGE GAIT/STRIDE.     DENIES LOSS BOWEL/BLADDER.    NEUROPATHY COULD BE CAUSE OF FOOT NUMBNESS, BUT NUMBNESS/LOSS OF FEELING IN BOTH FEET.    PATIENT DID PHYSICAL THERAPY FOR 5 WEEKS AROUND April, \"IT WAS ABSOLUTELY WONDERFUL AND HELPED SO MUCH.\"     PATIENT CAN'T MAKE MULTIPLE TRIPS TO [place]; WOULD LIKE TO KNOW IN ADVANCE IF IMAGING IS NEEDED PRIOR TO FOLLOW UP; STATES WILLING TO DO ANYTHING MRI/CT/XR.    REQUESTING ANY IMAGING PRIOR TO F/UP APPT " BE SENT TO:  [facility] -- [address] [address]    Tel: [phone]    PLEASE CONTACT PATIENT DIRECTLY FOR ANY FURTHER CLINICAL INFO THAT IS NEEDED OR TO ADVISE ON IMAGING BEFORE A FOLLOW UP APPT. PATIENT WOULD PREFER TO SEE DR. ALVARENGA DIRECTLY IF POSSIBLE.     THANK YOU VERY MUCH.

## 2022-08-12 DIAGNOSIS — M43.16 SPONDYLOLISTHESIS OF LUMBAR REGION: Primary | ICD-10-CM

## 2022-08-12 DIAGNOSIS — M51.36 DEGENERATIVE DISC DISEASE, LUMBAR: ICD-10-CM

## 2022-08-12 DIAGNOSIS — M48.062 SPINAL STENOSIS OF LUMBAR REGION WITH NEUROGENIC CLAUDICATION: ICD-10-CM

## 2022-08-12 NOTE — TELEPHONE ENCOUNTER
Caller: Alison Benitez    Relationship: Self    Best call back number:[phone]    What orders are you requesting (i.e. lab or imaging):MRI OF LUMBAR    In what timeframe would the patient need to come in:ASAP    Where will you receive your lab/imaging services:[facility]    Additional notes:PT CALLED AND STATES THAT SHE WOULD LIKE HER ORDERS SENT TO THERE AS IT IS EASIER FOR HER TO GO TO-THANK YOU

## 2022-08-12 NOTE — TELEPHONE ENCOUNTER
Patient's MRI has been scheduled at [facility]. I have notified patient of appointment date and time. She is going to call me back after speaking with her daughter about when she can get here for a follow up (after the MRI).

## 2022-08-15 NOTE — TELEPHONE ENCOUNTER
Patient returned my call. She stated she could come in Friday, 8/19/22, afternoon. Appointment scheduled. I let her know that Marycarmen LEVIN also ordered x-rays. She is going to get them done at the same time she gets her MRI done and will bring all images on a disk. Orders have been faxed to [phone].

## 2022-08-17 DIAGNOSIS — Z78.0 POST-MENOPAUSAL: ICD-10-CM

## 2022-08-17 RX ORDER — ESTRADIOL 1 MG/1
1 TABLET ORAL DAILY
Qty: 63 TABLET | Refills: 3 | Status: SHIPPED | OUTPATIENT
Start: 2022-08-17

## 2022-08-17 NOTE — TELEPHONE ENCOUNTER
Patient called, requested refill.        Next Office Visit Date:  Future Appointments   Date Time Provider Jessie Kessler   8/19/2022 12:00 PM [facility] 1 [facility]   1/24/2023 11:15 AM Herb Rivas MD [facility]   7/25/2023  2:00 PM Herb Rivas MD [place] [address] please review via PDMP

## 2022-08-19 ENCOUNTER — OFFICE VISIT (OUTPATIENT)
Dept: NEUROSURGERY | Facility: CLINIC | Age: 67
End: 2022-08-19

## 2022-08-19 ENCOUNTER — HOSPITAL ENCOUNTER (OUTPATIENT)
Facility: HOSPITAL | Age: 67
Discharge: HOME | End: 2022-08-19
Payer: MEDICARE

## 2022-08-19 ENCOUNTER — HOSPITAL ENCOUNTER (OUTPATIENT)
Dept: MRI IMAGING | Facility: HOSPITAL | Age: 67
Discharge: HOME | End: 2022-08-19
Payer: MEDICARE

## 2022-08-19 ENCOUNTER — HOSPITAL ENCOUNTER (OUTPATIENT)
Dept: GENERAL RADIOLOGY | Facility: HOSPITAL | Age: 67
Discharge: HOME | End: 2022-08-19
Payer: MEDICARE

## 2022-08-19 DIAGNOSIS — M43.16 SPONDYLOLISTHESIS OF LUMBAR REGION: ICD-10-CM

## 2022-08-19 DIAGNOSIS — M51.36 DEGENERATIVE DISC DISEASE, LUMBAR: Primary | ICD-10-CM

## 2022-08-19 DIAGNOSIS — G89.29 CHRONIC BILATERAL LOW BACK PAIN WITH LEFT-SIDED SCIATICA: ICD-10-CM

## 2022-08-19 DIAGNOSIS — M54.42 CHRONIC BILATERAL LOW BACK PAIN WITH LEFT-SIDED SCIATICA: ICD-10-CM

## 2022-08-19 DIAGNOSIS — M79.604 BILATERAL LEG PAIN: ICD-10-CM

## 2022-08-19 DIAGNOSIS — M41.126 ADOLESCENT IDIOPATHIC SCOLIOSIS OF LUMBAR REGION: ICD-10-CM

## 2022-08-19 DIAGNOSIS — M79.605 BILATERAL LEG PAIN: ICD-10-CM

## 2022-08-19 DIAGNOSIS — M79.7 FIBROMYALGIA: ICD-10-CM

## 2022-08-19 DIAGNOSIS — Q76.49 SPINAL DEFORMITY: ICD-10-CM

## 2022-08-19 DIAGNOSIS — M48.062 SPINAL STENOSIS OF LUMBAR REGION WITH NEUROGENIC CLAUDICATION: ICD-10-CM

## 2022-08-19 DIAGNOSIS — M70.62 TROCHANTERIC BURSITIS OF LEFT HIP: ICD-10-CM

## 2022-08-19 DIAGNOSIS — M48.061 SPINAL STENOSIS OF LUMBAR REGION WITHOUT NEUROGENIC CLAUDICATION: ICD-10-CM

## 2022-08-19 DIAGNOSIS — M54.9 MECHANICAL BACK PAIN: ICD-10-CM

## 2022-08-19 DIAGNOSIS — M47.26 OSTEOARTHRITIS OF SPINE WITH RADICULOPATHY, LUMBAR REGION: ICD-10-CM

## 2022-08-19 DIAGNOSIS — M51.9 LUMBOSACRAL DISC DISEASE: ICD-10-CM

## 2022-08-19 DIAGNOSIS — M43.16 SPONDYLOLISTHESIS AT L1-L2 LEVEL: ICD-10-CM

## 2022-08-19 DIAGNOSIS — G62.9 NEUROPATHY: ICD-10-CM

## 2022-08-19 PROCEDURE — 99214 OFFICE O/P EST MOD 30 MIN: CPT | Performed by: PHYSICIAN ASSISTANT

## 2022-08-19 PROCEDURE — 72110 X-RAY EXAM L-2 SPINE 4/>VWS: CPT

## 2022-08-19 PROCEDURE — 72148 MRI LUMBAR SPINE W/O DYE: CPT

## 2022-08-19 RX ORDER — DOXYCYCLINE HYCLATE 20 MG
TABLET ORAL
COMMUNITY
Start: 2022-07-29

## 2022-08-19 NOTE — PROGRESS NOTES
Alison Benitez   1955   [id number]       2022     Chief Complaint   Patient presents with   • Follow-up     Spinal stenosis of lumbar region       HPI   This is a 66 yo female with known joint pain, fibromyalgia, degenerative dis disease with some degree of spinal stenosis, hip bursitis, peripheral neuropathy, osteoporosis and chronic pain who presents with worsening back pain. Her pain will radiate to the hips and improves with movement. Pain is worse in the am. Therapy with heat stones is helpful. Lyrica is very beneficial. She has new films to review.    Chronic Illnesses:  Past Medical History:  No date: Arthritis  No date: Bursitis  No date: Claustrophobia  No date: CTS (carpal tunnel syndrome)      Comment:  bilateral   No date: Fibromyalgia  No date: Hemorrhoids  No date: Hypertension  No date: Low back pain  No date: Lumbosacral disc disease  No date: Neuropathy  No date: Osteoarthritis  No date: SBE (subacute bacterial endocarditis)  No date: Tendinitis of knee  No date: Thyroid disorder  No date: Thyroid nodule  No date:  (vaginal birth after )     Past Surgical History:   Procedure Laterality Date   • ABDOMINAL HYSTERECTOMY     •  SECTION     •  SECTION     • COLONOSCOPY     • CYST REMOVAL N/A 2022   • DILATATION AND CURETTAGE     • HYSTERECTOMY N/A        • MANDIBLE FRACTURE SURGERY     • OOPHORECTOMY Bilateral    • TUBAL ABDOMINAL LIGATION     • TUBAL ABDOMINAL LIGATION          No Known Allergies       Current Outpatient Medications:   •  ascorbic acid (VITAMIN C) 1000 MG tablet, Take 1,000 mg by mouth Daily., Disp: , Rfl:   •  Cholecalciferol (Vitamin D) 50 MCG ( UT) tablet, Take 2,000 Units by mouth Daily., Disp: , Rfl:   •  Cyanocobalamin (VITAMIN B 12 PO), Take 3,000 mg by mouth Daily., Disp: , Rfl:   •  desloratadine (CLARINEX) 5 MG tablet, Take 5 mg by mouth Daily., Disp: , Rfl:   •  Diclofenac Sodium (VOLTAREN) 1 % gel gel, , Disp: , Rfl:   •   doxycycline (PERIOSTAT) 20 MG tablet, , Disp: , Rfl:   •  DULoxetine (CYMBALTA) 60 MG capsule, Take 1 capsule by mouth Daily., Disp: 90 capsule, Rfl: 3  •  estradiol (ESTRACE) 1 MG tablet, Take 1 tablet by mouth Daily., Disp: , Rfl:   •  fluocinolone (SYNALAR) 0.025 % ointment, APPLY SPARINGLY TOPICALLY TO THE AFFECTED AREA FOUR TIMES DAILY, Disp: , Rfl:   •  hydrochlorothiazide (HYDRODIURIL) 25 MG tablet, , Disp: , Rfl:   •  levothyroxine (Synthroid) 50 MCG tablet, Take 1 tablet by mouth Daily for 180 days., Disp: 90 tablet, Rfl: 1  •  losartan (COZAAR) 50 MG tablet, Take 1 tablet by mouth Daily., Disp: , Rfl:   •  multivitamin with minerals tablet tablet, Take 1 tablet by mouth Daily., Disp: , Rfl:   •  mupirocin (BACTROBAN) 2 % ointment, , Disp: , Rfl:   •  nabumetone (RELAFEN) 750 MG tablet, Take 750 mg by mouth 2 (Two) Times a Day., Disp: , Rfl:   •  nystatin-triamcinolone (MYCOLOG) 618264-0.1 UNIT/GM-% ointment, Apply 1 application topically to the appropriate area as directed 2 (Two) Times a Day., Disp: 30 g, Rfl: 0  •  olopatadine (PATADAY) 0.2 % solution ophthalmic solution, 1 drop., Disp: , Rfl:   •  Omega-3 Fatty Acids (FISH OIL) 1000 MG capsule capsule, Take 1,200 mg by mouth Daily With Breakfast., Disp: , Rfl:   •  Oxymetazoline HCl (Rhofade) 1 % cream, Apply to face daily, Disp: , Rfl:   •  pregabalin (LYRICA) 75 MG capsule, Take 1 capsule by mouth 3 (Three) Times a Day., Disp: 270 capsule, Rfl: 1  •  RHOFADE 1 % cream, , Disp: , Rfl: 6  •  vitamin D (ERGOCALCIFEROL) 62664 units capsule capsule, , Disp: , Rfl:   •  zolpidem (AMBIEN) 10 MG tablet, Take 1 tablet by mouth., Disp: , Rfl:      Social History     Socioeconomic History   • Marital status: Single   Tobacco Use   • Smoking status: Never Smoker   • Smokeless tobacco: Never Used   Vaping Use   • Vaping Use: Never used   Substance and Sexual Activity   • Alcohol use: No   • Drug use: No   • Sexual activity: Not Currently        family history  includes Asthma in her mother; COPD in an other family member; Carpal tunnel syndrome in an other family member; Diabetes in an other family member; Glaucoma in an other family member; Heart disease in her father and mother; Hypertension in her brother and another family member; Osteoarthritis in an other family member; Stroke in an other family member.     Social History    Tobacco Use      Smoking status: Never Smoker      Smokeless tobacco: Never Used       There is no height or weight on file to calculate BMI.   Class 3 Severe Obesity (BMI >=40). Obesity-related health conditions include the following: osteoarthritis. Obesity is unchanged. BMI is is above average; BMI management plan is completed. We discussed portion control and increasing exercise.       There were no vitals taken for this visit.   Physical Examination:  HEENT-wnl  Lungs-No wheezing or SOB      Neurologic Exam   Patient is alert and oriented.  Pupils are equal and reactive.  Visual fields are full.  EOMI without nystagmus.    Gait steady  Reflexes intact.  No focal weakness  SLR is negative.  Palpation of the hip is painful     Radiological Data Review:              Assessment and Plan:  Diagnoses and all orders for this visit:    1. Spondylolisthesis of lumbar region (Primary)  -     Ambulatory Referral to Physical Therapy Evaluate and treat; Soft Tissue Mobilizaton; Stretching, ROM, Strengthening  -     Ambulatory Referral to Pain Management    2. Spinal stenosis of lumbar region with neurogenic claudication  -     Ambulatory Referral to Physical Therapy Evaluate and treat; Soft Tissue Mobilizaton; Stretching, ROM, Strengthening  -     Ambulatory Referral to Pain Management    3. Degenerative disc disease, lumbar  -     Ambulatory Referral to Physical Therapy Evaluate and treat; Soft Tissue Mobilizaton; Stretching, ROM, Strengthening  -     Ambulatory Referral to Pain Management    4. Mechanical back pain  -     Ambulatory Referral to  Physical Therapy Evaluate and treat; Soft Tissue Mobilizaton; Stretching, ROM, Strengthening  -     Ambulatory Referral to Pain Management    5. Spinal deformity  -     Ambulatory Referral to Physical Therapy Evaluate and treat; Soft Tissue Mobilizaton; Stretching, ROM, Strengthening  -     Ambulatory Referral to Pain Management    6. Spinal stenosis of lumbar region without neurogenic claudication  -     Ambulatory Referral to Pain Management    7. Bilateral leg pain  -     Ambulatory Referral to Pain Management    8. Osteoarthritis of spine with radiculopathy, lumbar region  -     Ambulatory Referral to Pain Management    9. Trochanteric bursitis of left hip  -     Ambulatory Referral to Pain Management    10. Fibromyalgia    11. Chronic bilateral low back pain with left-sided sciatica    12. Lumbosacral disc disease    13. Neuropathy    14. Adolescent idiopathic scoliosis of lumbar region    I have made a referral to PM for injections and referral to PT.  F/U prn.      Maribell Conroy, NOEL      PCP:  Kacy Huertas MD

## 2022-08-23 ENCOUNTER — HOSPITAL ENCOUNTER (OUTPATIENT)
Dept: PHYSICAL THERAPY | Facility: HOSPITAL | Age: 67
Setting detail: THERAPIES SERIES
Discharge: HOME | End: 2022-08-23
Payer: MEDICARE

## 2022-08-23 PROCEDURE — 97161 PT EVAL LOW COMPLEX 20 MIN: CPT

## 2022-08-23 PROCEDURE — 97110 THERAPEUTIC EXERCISES: CPT

## 2022-08-23 NOTE — PROGRESS NOTES
kicked in the back while swimming, and took a ride on a side by side; all of which she felt may have contributed to her exacerbation. She was seen by neurosurgery for a consult. She states has a sharp pain in her L lumbar region when standing. She notes having soreness in her L hamstring region, as well. Pain Screening   Pain Screening  Patient Currently in Pain: Yes  Pain Assessment: 0-10  Best Pain Level: 0 (when sitting)  Worst Pain Level: 9 (with standing / activity. Pt states she has to sit often. She keeps a chair in her kitchen to sit when she cooks, etc.)    Functional Status       Occupation/Interests:   Pt enjoys playing with her grandchildren      OBJECTIVE EXAMINATION     Observations:  General Observations  Description: posture: forward trunk lean, pt stands with weight shifted more toward forefoot B, genu varus B      Left Strength  Right Strength         Strength LLE  L Hip Flexion: 4/5  L Hip Internal Rotation: 4+/5  L Hip External Rotation: 4-/5  L Knee Flexion: 5/5  L Knee Extension: 5/5            Lumbar Assessment   AROM Lumbar Spine   Lumbar spine general AROM: flexion: 0-30 deg       Trunk Strength     Trunk Strength  Upper abdominals: Fair  Lower abdominals: Poor (difficulty with active contraction of lower abdominals; requires verbal and tactile cues to initiate)     Muscle Length/Flexibility: Muscle Length LE  General Muscle Length - LE: Length assessed (+ L>R ITB and piriformis tightness)  Left Psoas / Iliacus: Tight (decreased L>R hip flexor length with grade I tenderness to palpation at muscle belly.)    Special Tests:   Special Tests Lumbar Spine  Lumbar Special Tests:  (Back Index: 46%)         ASSESSMENT     Impression: Assessment: Pt will benefit from skilled PT to address lumbar deficits due to degenerative changes in order to restore optimal functional level.     Body Structures, Functions, Activity Limitations Requiring Skilled Therapeutic Intervention: Decreased ROM, Decreased strength, Decreased high-level IADLs, Decreased posture, Increased pain, Decreased endurance    Statement of Medical Necessity: Physical Therapy is both indicated and medically necessary as outlined in the POC to increase the likelihood of meeting the functionally related goals stated below. Patient's Activity Tolerance: Patient tolerated evaluation without incident, Patient tolerated treatment well      Patient's rehabilitation potential/prognosis is considered to be: Good        GOALS   Patient Goal(s):    Short Term Goals Completed by 3 weeks Goal Status   Pt to be I with HEP     Pt to be educated in posture correction techniques and self correct with verbal cues. Pt to demonstrate a 1/2 grade increase in LLE strength where limited. Pt to perform daily activities with average pain of 5/10 or less. Long Term Goals Completed by 6 weeks Goal Status   Pt to demonstrate 5/5 LLE strength grossly     Back Index score to improve to 25% or less indicating improved function. Pt to be able to stand to cook / wash dishes for 30 minutes or greater without having to sit due to pain. Pt to be able to ambulate community distances, such as at a grocery store, with pain of 3/10 or less.                                               TREATMENT PLAN       Requires PT Follow-Up: Yes    Pt. actively involved in establishing Plan of Care and Goals: Yes  Patient/ Caregiver education and instruction:               Treatment may include any combination of the following: Strengthening, ROM, Manual Therapy - Soft Tissue Mobilization, Neuromuscular re-education, Pain management, Home exercise program, Patient/Caregiver education & training, Therapeutic activities, Integrated dry needling, Modalities     Frequency / Duration:  Patient to be seen 1-2x/week for 6-8 weeks weeks      Eval Complexity:    Decision Making: Low Complexity    PT Treatment Completed:  Exercises:      Treatment Reasoning    Exercise 1: LTR - short range with light ab brace x30  Exercise 2: Piriformis stretch 5x20\"  Exercise 3: PPT 3x10  Exercise 4: Supine hip flexor stretch: 5x1'  Exercise 5: Ab brace with partial lift x10    Limitations addressed: Mobility, Strength, Flexibility                     Modalities  Moist Heat: (CPT 94922) Treatment Reasoning    Patient Position: Supine hook-lying  Moist heat location: Low back  Post treatment skin assessment: Intact Limitations addressed: Pain modulation, Tissue extensibility                         Therapist Signature: Maty Espinosa, PT    Date: 0/77/0778     I certify that the above Therapy Services are being furnished while the patient is under my care. I agree with the treatment plan and certify that this therapy is necessary. [de-identified] Signature:  ___________________________   Date:_______                                                                   ISA Winchester        Physician Comments: _______________________________________________    Please sign and return to [address] [place] [address]. Please fax to the location listed below.  [facility] YOU for this referral!    [address] [facility]  [address] [facility] [address]  Dept: [address] [place] [address] Drive: [phone]

## 2022-08-25 ENCOUNTER — HOSPITAL ENCOUNTER (OUTPATIENT)
Dept: PHYSICAL THERAPY | Facility: HOSPITAL | Age: 67
Setting detail: THERAPIES SERIES
Discharge: HOME | End: 2022-08-25
Payer: MEDICARE

## 2022-08-25 PROCEDURE — 97110 THERAPEUTIC EXERCISES: CPT

## 2022-08-25 NOTE — FLOWSHEET NOTE
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 Objective:  Observation:   Test measurements:    Palpation:    Exercises:  Exercise Resistance/Repetitions Other comments   Nustep L5 x 8' 25   Wall posture w/ TKE BTB 3x10 25   Supine hip flexor stretch 5x1' B 25   Piriformis stretch  5x20\" 25   Ab brace with partial lift (partial bridge) 3x10 25   LTR - short ROM  25   Hip abd / add BTB / ball 3x10  25                              Other Therapeutic Activities:      Manual Treatments:      Modalities:  MH lumbar spine x 15'      Timed Code Treatment Minutes:  55      Total Treatment Minutes:  75    Treatment/Activity Tolerance:  [x] Patient tolerated treatment well [] Patient limited by fatigue  [] Patient limited by pain  [] Patient limited by other medical complications  [x] Other: Pt was given verbal cues to assist with performance of therex to which she responded well.      Pain after treatment:      2/10    Prognosis: [x] Good [] Fair  [] Poor    Patient Requires Follow-up: [x] Yes  [] No    Plan:   [x] Continue per plan of care [] Alter current plan (see comments)  [] Plan of care initiated [] Hold pending MD visit [] Discharge    Plan for Next Session:        Electronically signed by:  Cintia Velasquez PT

## 2022-08-29 ENCOUNTER — HOSPITAL ENCOUNTER (OUTPATIENT)
Dept: PHYSICAL THERAPY | Facility: HOSPITAL | Age: 67
Setting detail: THERAPIES SERIES
Discharge: HOME | End: 2022-08-29
Payer: MEDICARE

## 2022-08-29 PROCEDURE — 97110 THERAPEUTIC EXERCISES: CPT

## 2022-08-29 NOTE — FLOWSHEET NOTE
fatigue  [] Patient limited by pain  [] Patient limited by other medical complications  [x] Other: Pt progressed through activity well today. Gentle STM was added to lumbar paraspinals in sidelying position to address muscle guarding. Pt was encouraged to contact her pcp to discuss her syncope episode from yesterday as she had not talked to her pcp about this, yet.       Pain after treatment:      2/10    Prognosis: [x] Good [] Fair  [] Poor    Patient Requires Follow-up: [x] Yes  [] No    Plan:   [x] Continue per plan of care [] Alter current plan (see comments)  [] Plan of care initiated [] Hold pending MD visit [] Discharge    Plan for Next Session:        Electronically signed by:  Fe Holliday PT

## 2022-08-30 ENCOUNTER — APPOINTMENT (OUTPATIENT)
Dept: PHYSICAL THERAPY | Facility: HOSPITAL | Age: 67
End: 2022-08-30
Payer: MEDICARE

## 2022-08-31 ENCOUNTER — HOSPITAL ENCOUNTER (OUTPATIENT)
Facility: HOSPITAL | Age: 67
Discharge: HOME | End: 2022-08-31
Payer: MEDICARE

## 2022-08-31 ENCOUNTER — OFFICE VISIT (OUTPATIENT)
Dept: FAMILY MEDICINE CLINIC | Age: 67
End: 2022-08-31
Payer: MEDICARE

## 2022-08-31 ENCOUNTER — APPOINTMENT (OUTPATIENT)
Dept: PHYSICAL THERAPY | Facility: HOSPITAL | Age: 67
End: 2022-08-31
Payer: MEDICARE

## 2022-08-31 VITALS
TEMPERATURE: 97.7 F | HEIGHT: 64 IN | RESPIRATION RATE: 18 BRPM | HEART RATE: 78 BPM | BODY MASS INDEX: 38.41 KG/M2 | DIASTOLIC BLOOD PRESSURE: 78 MMHG | OXYGEN SATURATION: 97 % | SYSTOLIC BLOOD PRESSURE: 136 MMHG | WEIGHT: 225 LBS

## 2022-08-31 DIAGNOSIS — R42 DIZZINESS: ICD-10-CM

## 2022-08-31 DIAGNOSIS — G89.29 ACUTE EXACERBATION OF CHRONIC LOW BACK PAIN: ICD-10-CM

## 2022-08-31 DIAGNOSIS — G89.29 ACUTE EXACERBATION OF CHRONIC LOW BACK PAIN: Primary | ICD-10-CM

## 2022-08-31 DIAGNOSIS — M54.50 ACUTE EXACERBATION OF CHRONIC LOW BACK PAIN: ICD-10-CM

## 2022-08-31 DIAGNOSIS — M54.50 ACUTE EXACERBATION OF CHRONIC LOW BACK PAIN: Primary | ICD-10-CM

## 2022-08-31 LAB
BILIRUBIN, POC: NORMAL
BLOOD URINE, POC: NORMAL
C-REACTIVE PROTEIN: 11.9 MG/L (ref 0–5.1)
CLARITY, POC: CLEAR
COLOR, POC: YELLOW
GLUCOSE URINE, POC: NORMAL
HCT VFR BLD CALC: 41.6 % (ref 37–47)
HEMOGLOBIN: 13.5 G/DL (ref 11.5–16.5)
KETONES, POC: NORMAL
LEUKOCYTE EST, POC: NORMAL
MCH RBC QN AUTO: 27.9 PG (ref 27–32)
MCHC RBC AUTO-ENTMCNC: 32.5 G/DL (ref 31–35)
MCV RBC AUTO: 86 FL (ref 80–100)
NITRITE, POC: NORMAL
PDW BLD-RTO: 13.7 % (ref 11–16)
PH, POC: 7
PLATELET # BLD: 200 K/UL (ref 150–400)
PMV BLD AUTO: 11.6 FL (ref 6–10)
PROTEIN, POC: NORMAL
RBC # BLD: 4.84 M/UL (ref 3.8–5.8)
SEDIMENTATION RATE, ERYTHROCYTE: 17 MM/HR (ref 0–30)
SPECIFIC GRAVITY, POC: >1.03
UROBILINOGEN, POC: 0.2
WBC # BLD: 6.1 K/UL (ref 4–11)

## 2022-08-31 PROCEDURE — 81002 URINALYSIS NONAUTO W/O SCOPE: CPT | Performed by: FAMILY MEDICINE

## 2022-08-31 PROCEDURE — 1123F ACP DISCUSS/DSCN MKR DOCD: CPT | Performed by: FAMILY MEDICINE

## 2022-08-31 PROCEDURE — 36415 COLL VENOUS BLD VENIPUNCTURE: CPT

## 2022-08-31 PROCEDURE — 85652 RBC SED RATE AUTOMATED: CPT

## 2022-08-31 PROCEDURE — 85027 COMPLETE CBC AUTOMATED: CPT

## 2022-08-31 PROCEDURE — 99214 OFFICE O/P EST MOD 30 MIN: CPT | Performed by: FAMILY MEDICINE

## 2022-08-31 PROCEDURE — 86140 C-REACTIVE PROTEIN: CPT

## 2022-08-31 RX ORDER — CLOTRIMAZOLE AND BETAMETHASONE DIPROPIONATE 10; .64 MG/G; MG/G
CREAM TOPICAL
COMMUNITY
Start: 2022-08-09

## 2022-08-31 RX ORDER — HYDROCODONE BITARTRATE AND ACETAMINOPHEN 10; 325 MG/1; MG/1
1 TABLET ORAL EVERY 6 HOURS PRN
Qty: 120 TABLET | Refills: 0 | Status: SHIPPED | OUTPATIENT
Start: 2022-08-31 | End: 2022-09-20 | Stop reason: SDUPTHER

## 2022-08-31 SDOH — ECONOMIC STABILITY: FOOD INSECURITY: WITHIN THE PAST 12 MONTHS, YOU WORRIED THAT YOUR FOOD WOULD RUN OUT BEFORE YOU GOT MONEY TO BUY MORE.: NEVER TRUE

## 2022-08-31 SDOH — ECONOMIC STABILITY: FOOD INSECURITY: WITHIN THE PAST 12 MONTHS, THE FOOD YOU BOUGHT JUST DIDN'T LAST AND YOU DIDN'T HAVE MONEY TO GET MORE.: NEVER TRUE

## 2022-08-31 ASSESSMENT — ENCOUNTER SYMPTOMS
RESPIRATORY NEGATIVE: 1
GASTROINTESTINAL NEGATIVE: 1
BACK PAIN: 1
EYE ITCHING: 1
SORE THROAT: 1
EYE REDNESS: 1

## 2022-08-31 ASSESSMENT — SOCIAL DETERMINANTS OF HEALTH (SDOH): HOW HARD IS IT FOR YOU TO PAY FOR THE VERY BASICS LIKE FOOD, HOUSING, MEDICAL CARE, AND HEATING?: NOT VERY HARD

## 2022-08-31 NOTE — PROGRESS NOTES
SUBJECTIVE:    Patient ID: Nina Gale is a 79 y.o. female. Chief Complaint   Patient presents with    Dizziness    Hip Pain       HPI: office visit  She is in the office today in follow-up of her pain. She has been to see the spinal specialist.  They have done an MRI. She is having a tremendous amount of pain in the lower lumbar area. Near L3 L4-L5 area. She has not had any falls or injuries. She is been told by them that she could see pain management. She cannot explain why the pain is so much worse right now. She is absolutely miserable. She is not sleeping well. She is not able to do much of anything. She says laying down is the best position she said usually in the past with her regular back pain she could have walked a little and sat down a minute and then walked a little more. She said that she cannot do that now. She says that she does understand why it so much worse. She is having some occasional dizziness. She feels somewhat lightheaded. She is not been drinking quite as much in the last few days as she normally would. She is not able to get to the kitchen very easily due to her pain. Review of Systems   Constitutional:  Positive for fatigue. HENT:  Positive for congestion, postnasal drip and sore throat. Eyes:  Positive for redness and itching. Respiratory: Negative. Cardiovascular: Negative. Gastrointestinal: Negative. Musculoskeletal:  Positive for arthralgias, back pain, gait problem and myalgias. Skin:  Positive for rash. All other systems reviewed and are negative.      OBJECTIVE:  /78 (Position: Standing)   Pulse 78   Temp 97.7 °F (36.5 °C)   Resp 18   Ht 5' 4\" (1.626 m)   Wt 225 lb (102.1 kg)   SpO2 97% Comment: ra  BMI 38.62 kg/m²    Wt Readings from Last 3 Encounters:   08/31/22 225 lb (102.1 kg)   08/19/22 220 lb (99.8 kg)   07/20/22 242 lb 9.6 oz (110 kg)     BP Readings from Last 3 Encounters:   08/31/22 136/78   07/20/22 136/82 05/06/22 138/86      Pulse Readings from Last 3 Encounters:   08/31/22 78   07/20/22 83   05/06/22 84     Body mass index is 38.62 kg/m². Resp Readings from Last 3 Encounters:   08/31/22 18   03/30/22 18   03/08/22 16     Past medical, surgical, family and social history were reviewed and updated with the patient. Physical Exam  Vitals and nursing note reviewed. Constitutional:       General: She is not in acute distress. Appearance: Normal appearance. She is well-developed and normal weight. She is not ill-appearing or toxic-appearing. HENT:      Head: Normocephalic and atraumatic. Right Ear: Hearing, tympanic membrane, ear canal and external ear normal.      Left Ear: Hearing, tympanic membrane, ear canal and external ear normal.      Nose: Nose normal.      Mouth/Throat:      Lips: Pink. Mouth: Mucous membranes are moist.      Tongue: No lesions. Tongue does not deviate from midline. Palate: No mass and lesions. Pharynx: No pharyngeal swelling, oropharyngeal exudate or uvula swelling. Tonsils: No tonsillar exudate or tonsillar abscesses. Eyes:      Conjunctiva/sclera: Conjunctivae normal.      Pupils: Pupils are equal, round, and reactive to light. Neck:      Thyroid: Thyromegaly present. No thyroid mass or thyroid tenderness. Trachea: Trachea normal.   Cardiovascular:      Rate and Rhythm: Normal rate and regular rhythm. Heart sounds: Normal heart sounds, S1 normal and S2 normal. No murmur heard. No friction rub. No gallop. Pulmonary:      Effort: Pulmonary effort is normal.      Breath sounds: Normal breath sounds. No wheezing, rhonchi or rales. Musculoskeletal:      Cervical back: Full passive range of motion without pain, normal range of motion and neck supple. Lumbar back: Spasms, tenderness and bony tenderness present. Decreased range of motion. Lymphadenopathy:      Cervical: No cervical adenopathy.    Neurological:      Mental Status: She is alert and oriented to person, place, and time. Psychiatric:         Attention and Perception: Attention and perception normal.         Mood and Affect: Mood is anxious. Speech: Speech normal.         Behavior: Behavior normal. Behavior is cooperative. Thought Content: Thought content normal.         Cognition and Memory: Cognition and memory normal.         Judgment: Judgment normal.        No results found for requested labs within last 30 days. Hemoglobin A1C (%)   Date Value   07/16/2020 5.3     Microscopic Examination (no units)   Date Value   12/28/2017 Not Indicated     LDL Calculated (mg/dL)   Date Value   02/02/2021 109       Lab Results   Component Value Date/Time    WBC 6.1 08/31/2022 10:34 AM    NEUTROABS 3.3 03/26/2022 01:22 PM    HGB 13.5 08/31/2022 10:34 AM    HCT 41.6 08/31/2022 10:34 AM    MCV 86.0 08/31/2022 10:34 AM     08/31/2022 10:34 AM     Lab Results   Component Value Date    TSH 1.17 08/09/2021       ASSESSMENT/PLAN    Diagnosis Orders   1. Acute exacerbation of chronic low back pain  Sedimentation Rate    C-Reactive Protein    CBC    HYDROcodone-acetaminophen (NORCO)  MG per tablet   Looking at the MRI that she had done at the other facility it does appear that she does have some significant arthritis as well as some degenerative disc disease. She does have some findings of the do show some bone marrow edema some edema in the psoas muscle as well as some questionable findings that might indicate some infection. They did recommend some blood work which we will do today. They also thought we should consider a biopsy. We will wait on the blood work results tomorrow and then decide on further definitive treatment.   I will go ahead and put in a order for her to go back to see the spinal specialist.   2. Dizziness  POCT Urinalysis no Micro          Orders Placed This Encounter   Medications    HYDROcodone-acetaminophen (NORCO)  MG per tablet     Sig: Take 1 tablet by mouth every 6 hours as needed for Pain for up to 30 days. Intended supply: 30 days     Dispense:  120 tablet     Refill:  0     Reduce doses taken as pain becomes manageable          Medications Discontinued During This Encounter   Medication Reason    Magnesium Gluconate 500 (27 Mg) MG TABS tablet LIST CLEANUP       Controlled Substances Monitoring:      Please note: This chart was generated using Dragon dictation software. Although every effort was made to ensure the accuracy of this automated transcription, some errors in transcription may have occurred.

## 2022-08-31 NOTE — PROGRESS NOTES
Chief Complaint   Patient presents with    Dizziness    Hip Pain       Have you seen any other physician or provider since your last visit yes - ENT, neurosurgeon    Have you had any other diagnostic tests since your last visit? yes - mri xray    Have you changed or stopped any medications since your last visit?  yes - cream for ear

## 2022-09-01 ENCOUNTER — HOSPITAL ENCOUNTER (OUTPATIENT)
Dept: PHYSICAL THERAPY | Facility: HOSPITAL | Age: 67
Setting detail: THERAPIES SERIES
Discharge: HOME | End: 2022-09-01
Payer: MEDICARE

## 2022-09-01 ENCOUNTER — APPOINTMENT (OUTPATIENT)
Dept: PHYSICAL THERAPY | Facility: HOSPITAL | Age: 67
End: 2022-09-01
Payer: MEDICARE

## 2022-09-01 PROCEDURE — 97140 MANUAL THERAPY 1/> REGIONS: CPT

## 2022-09-01 PROCEDURE — 97110 THERAPEUTIC EXERCISES: CPT

## 2022-09-01 NOTE — FLOWSHEET NOTE
tenderness elicited. Pt was encouraged to discuss this finding with her pcp. Pt reported having increase in pain following treatment today.         Pain after treatment:      8/10    Prognosis: [x] Good [] Fair  [] Poor    Patient Requires Follow-up: [x] Yes  [] No    Plan:   [x] Continue per plan of care [] Alter current plan (see comments)  [] Plan of care initiated [] Hold pending MD visit [] Discharge    Plan for Next Session:        Electronically signed by:  Jennifer Avery, PT

## 2022-09-06 ENCOUNTER — TELEPHONE (OUTPATIENT)
Dept: NEUROSURGERY | Facility: CLINIC | Age: 67
End: 2022-09-06

## 2022-09-06 ENCOUNTER — HOSPITAL ENCOUNTER (OUTPATIENT)
Facility: HOSPITAL | Age: 67
Discharge: HOME | End: 2022-09-06
Payer: MEDICARE

## 2022-09-06 ENCOUNTER — OFFICE VISIT (OUTPATIENT)
Dept: FAMILY MEDICINE CLINIC | Age: 67
End: 2022-09-06
Payer: MEDICARE

## 2022-09-06 VITALS
DIASTOLIC BLOOD PRESSURE: 76 MMHG | BODY MASS INDEX: 39.99 KG/M2 | TEMPERATURE: 98 F | HEART RATE: 86 BPM | SYSTOLIC BLOOD PRESSURE: 130 MMHG | OXYGEN SATURATION: 98 % | WEIGHT: 233 LBS | RESPIRATION RATE: 18 BRPM

## 2022-09-06 DIAGNOSIS — M54.50 ACUTE EXACERBATION OF CHRONIC LOW BACK PAIN: ICD-10-CM

## 2022-09-06 DIAGNOSIS — M48.062 SPINAL STENOSIS OF LUMBAR REGION WITH NEUROGENIC CLAUDICATION: Primary | ICD-10-CM

## 2022-09-06 DIAGNOSIS — T14.8XXA ABRASION: Primary | ICD-10-CM

## 2022-09-06 DIAGNOSIS — M46.46 DISCITIS OF LUMBAR REGION: ICD-10-CM

## 2022-09-06 DIAGNOSIS — G89.29 ACUTE EXACERBATION OF CHRONIC LOW BACK PAIN: ICD-10-CM

## 2022-09-06 LAB
BASOPHILS ABSOLUTE: 0 K/UL (ref 0–0.1)
BASOPHILS RELATIVE PERCENT: 0.6 %
C-REACTIVE PROTEIN: 10.2 MG/L (ref 0–5.1)
EOSINOPHILS ABSOLUTE: 0.1 K/UL (ref 0–0.4)
EOSINOPHILS RELATIVE PERCENT: 1 %
HCT VFR BLD CALC: 40.9 % (ref 37–47)
HEMOGLOBIN: 13.6 G/DL (ref 11.5–16.5)
IMMATURE GRANULOCYTES #: 0 K/UL
IMMATURE GRANULOCYTES %: 0.1 % (ref 0–5)
LYMPHOCYTES ABSOLUTE: 1.9 K/UL (ref 1.5–4)
LYMPHOCYTES RELATIVE PERCENT: 28.3 %
MCH RBC QN AUTO: 28.2 PG (ref 27–32)
MCHC RBC AUTO-ENTMCNC: 33.3 G/DL (ref 31–35)
MCV RBC AUTO: 84.7 FL (ref 80–100)
MONOCYTES ABSOLUTE: 0.5 K/UL (ref 0.2–0.8)
MONOCYTES RELATIVE PERCENT: 7.7 %
NEUTROPHILS ABSOLUTE: 4.2 K/UL (ref 2–7.5)
NEUTROPHILS RELATIVE PERCENT: 62.3 %
PDW BLD-RTO: 13.7 % (ref 11–16)
PLATELET # BLD: 211 K/UL (ref 150–400)
PMV BLD AUTO: 11.3 FL (ref 6–10)
RBC # BLD: 4.83 M/UL (ref 3.8–5.8)
SEDIMENTATION RATE, ERYTHROCYTE: 6 MM/HR (ref 0–30)
WBC # BLD: 6.8 K/UL (ref 4–11)

## 2022-09-06 PROCEDURE — 1123F ACP DISCUSS/DSCN MKR DOCD: CPT | Performed by: FAMILY MEDICINE

## 2022-09-06 PROCEDURE — 99213 OFFICE O/P EST LOW 20 MIN: CPT | Performed by: FAMILY MEDICINE

## 2022-09-06 PROCEDURE — 86140 C-REACTIVE PROTEIN: CPT

## 2022-09-06 PROCEDURE — 85025 COMPLETE CBC W/AUTO DIFF WBC: CPT

## 2022-09-06 PROCEDURE — 85652 RBC SED RATE AUTOMATED: CPT

## 2022-09-06 PROCEDURE — 36415 COLL VENOUS BLD VENIPUNCTURE: CPT

## 2022-09-06 ASSESSMENT — ENCOUNTER SYMPTOMS
EYE REDNESS: 1
BACK PAIN: 1
SORE THROAT: 1
RESPIRATORY NEGATIVE: 1
GASTROINTESTINAL NEGATIVE: 1
EYE ITCHING: 1

## 2022-09-06 NOTE — PROGRESS NOTES
SUBJECTIVE:    Patient ID: Jada Corral is a 79 y.o. female. Chief Complaint   Patient presents with    Back Pain       HPI: office visit  She is still having severe low back pain. She says this has been bad for 2 months. She says it never lasts this long. She is usually able to take the physical therapy and it makes her better but it is not helping at all. She is unable to do many things at home. She is having trouble getting to the bathroom and even wiping. She is having so much pain that she has taken the pain medication, half at a time a couple times a day. She has an abrasion on her right elbow that is still bothering her from the near fall she had a while back. She is hardly able to walk most days. Review of Systems   Constitutional:  Positive for fatigue. HENT:  Positive for congestion, postnasal drip and sore throat. Eyes:  Positive for redness and itching. Respiratory: Negative. Cardiovascular: Negative. Gastrointestinal: Negative. Musculoskeletal:  Positive for arthralgias, back pain, gait problem and myalgias. Skin:  Positive for rash. All other systems reviewed and are negative. OBJECTIVE:  /76   Pulse 86   Temp 98 °F (36.7 °C)   Resp 18   Wt 233 lb (105.7 kg)   SpO2 98% Comment: ra  BMI 39.99 kg/m²    Wt Readings from Last 3 Encounters:   09/06/22 233 lb (105.7 kg)   08/31/22 225 lb (102.1 kg)   08/19/22 220 lb (99.8 kg)     BP Readings from Last 3 Encounters:   09/06/22 130/76   08/31/22 136/78   07/20/22 136/82      Pulse Readings from Last 3 Encounters:   09/06/22 86   08/31/22 78   07/20/22 83     Body mass index is 39.99 kg/m². Resp Readings from Last 3 Encounters:   09/06/22 18   08/31/22 18   03/30/22 18     Past medical, surgical, family and social history were reviewed and updated with the patient. Physical Exam  Vitals and nursing note reviewed. Constitutional:       General: She is not in acute distress.      Appearance: Normal last 30 days. Hemoglobin A1C (%)   Date Value   07/16/2020 5.3     Microscopic Examination (no units)   Date Value   12/28/2017 Not Indicated     LDL Calculated (mg/dL)   Date Value   02/02/2021 109       Lab Results   Component Value Date/Time    WBC 6.1 08/31/2022 10:34 AM    NEUTROABS 3.3 03/26/2022 01:22 PM    HGB 13.5 08/31/2022 10:34 AM    HCT 41.6 08/31/2022 10:34 AM    MCV 86.0 08/31/2022 10:34 AM     08/31/2022 10:34 AM     Lab Results   Component Value Date    TSH 1.17 08/09/2021       ASSESSMENT/PLAN    Diagnosis Orders   1. Abrasion  silver sulfADIAZINE (SILVADENE) 1 % cream      2. Acute exacerbation of chronic low back pain  Spoke to dr Bryanna Good who wants to see her scan and her blood work and have her in the office this week. Orders Placed This Encounter   Medications    silver sulfADIAZINE (SILVADENE) 1 % cream     Sig: Apply topically daily. Dispense:  50 g     Refill:  0        There are no discontinued medications. Controlled Substances Monitoring:      Please note: This chart was generated using Dragon dictation software. Although every effort was made to ensure the accuracy of this automated transcription, some errors in transcription may have occurred.

## 2022-09-06 NOTE — PROGRESS NOTES
Chief Complaint   Patient presents with    Back Pain       Have you seen any other physician or provider since your last visit?  yes    Have you had any other diagnostic tests since your last visit? no    Have you changed or stopped any medications since your last visit? no

## 2022-09-06 NOTE — TELEPHONE ENCOUNTER
Dr. Stanley has talked with the patients PCP, and they have discussed her case.    Per Dr. Stanley: Patient needs to be seen later in the week with labs. Patient also needs to bring her most recent MRI disc with her as well.     Uyen, Can you call the patient and get her on his schedule please, and have her obtain the labs a day or so before the appointment.

## 2022-09-06 NOTE — TELEPHONE ENCOUNTER
Lab orders have been sent to [facility], and I have been assured the results will be ready by her appointment tomorrow. She is aware of her lab work, appointment , and that she needs to bring her previous imaging disk with her.

## 2022-09-07 ENCOUNTER — OFFICE VISIT (OUTPATIENT)
Dept: NEUROSURGERY | Facility: CLINIC | Age: 67
End: 2022-09-07

## 2022-09-07 ENCOUNTER — APPOINTMENT (OUTPATIENT)
Dept: PHYSICAL THERAPY | Facility: HOSPITAL | Age: 67
End: 2022-09-07
Payer: MEDICARE

## 2022-09-07 VITALS — HEIGHT: 64 IN | BODY MASS INDEX: 39.95 KG/M2 | TEMPERATURE: 97.1 F | WEIGHT: 234 LBS

## 2022-09-07 DIAGNOSIS — M48.062 SPINAL STENOSIS OF LUMBAR REGION WITH NEUROGENIC CLAUDICATION: ICD-10-CM

## 2022-09-07 DIAGNOSIS — M51.36 DEGENERATIVE DISC DISEASE, LUMBAR: ICD-10-CM

## 2022-09-07 DIAGNOSIS — M70.62 TROCHANTERIC BURSITIS OF LEFT HIP: Primary | ICD-10-CM

## 2022-09-07 DIAGNOSIS — M43.16 SPONDYLOLISTHESIS OF LUMBAR REGION: ICD-10-CM

## 2022-09-07 PROCEDURE — 99213 OFFICE O/P EST LOW 20 MIN: CPT | Performed by: NEUROLOGICAL SURGERY

## 2022-09-07 RX ORDER — HYDROCODONE BITARTRATE AND ACETAMINOPHEN 10; 325 MG/1; MG/1
TABLET ORAL
COMMUNITY
Start: 2022-08-31

## 2022-09-07 RX ORDER — ESTRADIOL 1 MG/1
1 TABLET ORAL DAILY
COMMUNITY
Start: 2022-06-08

## 2022-09-07 RX ORDER — MELATONIN
1 DAILY
COMMUNITY

## 2022-09-07 RX ORDER — CLOTRIMAZOLE AND BETAMETHASONE DIPROPIONATE 10; .64 MG/G; MG/G
CREAM TOPICAL
COMMUNITY
Start: 2022-08-09 | End: 2023-03-07

## 2022-09-07 NOTE — PROGRESS NOTES
Patient: Alison Benitez  : 1955    Primary Care Provider: Kacy Huertas MD    Requesting Provider: As above        History    Chief Complaint: Low back and left hip pain.    History of Present Illness: Ms. Benitez is a 67-year-old woman that I saw last in August of last year with back and thigh pain with some walking and standing intolerance.  She had seen Dr. Billings several years prior to that.  She was felt to have mechanical pain as well as some degree of neurogenic claudication.  She had spinal stenosis, spondylolisthesis, and some degree of deformity.  Her symptoms have increased.  She was seen here in recent weeks with complaints of pain in her left inguinal and anterior hip region as well as pain in her back.  Typically her symptoms will improve after she is up and moving around for a while.  At least the hip symptoms do.  She vaguely describes some symptoms in a stocking distribution.  She has had no fevers or chills.    Review of Systems   Constitutional: Positive for activity change. Negative for appetite change, chills, diaphoresis, fatigue, fever and unexpected weight change.   HENT: Negative for congestion, dental problem, drooling, ear discharge, ear pain, facial swelling, hearing loss, mouth sores, nosebleeds, postnasal drip, rhinorrhea, sinus pressure, sneezing, sore throat, tinnitus, trouble swallowing and voice change.    Eyes: Negative for photophobia, pain, discharge, redness, itching and visual disturbance.   Respiratory: Negative for apnea, cough, choking, chest tightness, shortness of breath, wheezing and stridor.    Cardiovascular: Negative for chest pain, palpitations and leg swelling.   Gastrointestinal: Negative for abdominal distention, abdominal pain, anal bleeding, blood in stool, constipation, diarrhea, nausea, rectal pain and vomiting.   Endocrine: Negative for cold intolerance, heat intolerance, polydipsia, polyphagia and polyuria.   Genitourinary: Negative for  "decreased urine volume, difficulty urinating, dysuria, enuresis, flank pain, frequency, genital sores, hematuria and urgency.   Musculoskeletal: Positive for arthralgias, back pain and myalgias. Negative for gait problem, joint swelling, neck pain and neck stiffness.   Skin: Negative for color change, pallor, rash and wound.   Allergic/Immunologic: Negative for environmental allergies, food allergies and immunocompromised state.   Neurological: Positive for dizziness and light-headedness. Negative for tremors, seizures, syncope, facial asymmetry, speech difficulty, weakness, numbness and headaches.   Hematological: Negative for adenopathy. Does not bruise/bleed easily.   Psychiatric/Behavioral: Negative for agitation, behavioral problems, confusion, decreased concentration, dysphoric mood, hallucinations, self-injury, sleep disturbance and suicidal ideas. The patient is not nervous/anxious and is not hyperactive.    All other systems reviewed and are negative.      The patient's past medical history, past surgical history, family history, and social history have been reviewed at length in the electronic medical record.      Physical Exam:   Temp 97.1 °F (36.2 °C) (Infrared)   Ht 162.6 cm (64\")   Wt 106 kg (234 lb)   BMI 40.17 kg/m²   Eddie signs are negative  Straight leg raising is negative.  She has tenderness in the left greater trochanteric region.    Medical Decision Making    Data Review:   (All imaging studies were personally reviewed unless stated otherwise)  MRI of the lumbar spine dated 8/19/2022 is reviewed.  I looked at the films and of asked Dr. Avendano to look at them as well.  We both feel that she has significant discogenic change at L3-4.  Despite the radiology interpretation I think it unlikely that this represents discitis.  Endplates are well preserved.  She has generous stenosis at L3-4 with some joint effusions.  Generous stenosis at L4-5 with a sizable left joint effusion is observed.  She " has some degree of deformity in the coronal plane as well.    Her CRP is 10.2.  It is been elevated all long and even a year ago was 13.4.  ESR is 6.  WBC is 6.8.    Diagnosis:   1.  Left trochanteric bursitis.  2.  Lumbar stenosis with some degree of neurogenic claudication.  4.  L4-5 spondylolisthesis, low-grade.  4.  Lumbar informative.    Treatment Options:   The patient does not have the appearance of someone with active discitis/osteomyelitis.  I think were dealing with significant degenerative change in her back.  She probably has some degree of claudication.  And I think her hip symptoms could represent trochanteric bursitis.  I am going to refer to one of my pain colleagues for a trochanteric block and lumbar blocks if necessary.  She will follow-up with me in about a month to check on her progress.       Diagnosis Plan   1. Trochanteric bursitis of left hip     2. Spinal stenosis of lumbar region with neurogenic claudication     3. Spondylolisthesis of lumbar region     4. Degenerative disc disease, lumbar         Scribed for Iván Stanley MD by Yessi Villagran THELMA 9/7/2022 13:04 EDT    I, Dr. Stanley, personally performed the services described in the documentation, as scribed in my presence, and it is both accurate and complete.

## 2022-09-08 ENCOUNTER — HOSPITAL ENCOUNTER (OUTPATIENT)
Dept: PHYSICAL THERAPY | Facility: HOSPITAL | Age: 67
Setting detail: THERAPIES SERIES
Discharge: HOME | End: 2022-09-08
Payer: MEDICARE

## 2022-09-08 PROCEDURE — 97110 THERAPEUTIC EXERCISES: CPT

## 2022-09-08 PROCEDURE — 97140 MANUAL THERAPY 1/> REGIONS: CPT

## 2022-09-08 PROCEDURE — 97116 GAIT TRAINING THERAPY: CPT

## 2022-09-08 NOTE — FLOWSHEET NOTE
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 Bakari Saavedra, neurosurgeon, this week for a consult. She reports she is being referred to a pain specialist for injections in her L hip and possibly her spine. Objective:  Observation: Gait:  Pt demonstrates a steppage gait with her LLE and circumducts during swing phase. Test measurements:    Palpation:    Exercises:  Exercise Resistance/Repetitions Other comments   Nustep L5 x 8' 8   Wall posture w/ TKE BTB 3x10 8   Supine hip flexor stretch 5x1' B 8   Piriformis stretch  5x20\" 8   Ab brace with partial lift (partial bridge) 3x10 8   LTR - short ROM  8   Hip abd / add BTB / ball 3x10  8                              Other Therapeutic Activities:  Gait training w/ sp cane and verbal cues for improved hip mechanics     Manual Treatments:  PA glides lumbar spine, STM lumbar paraspinals     Modalities:  Ice pack to lumbar spine and L hip x 10'      Timed Code Treatment Minutes:  80      Total Treatment Minutes: 92    Treatment/Activity Tolerance:  [x] Patient tolerated treatment well [] Patient limited by fatigue  [] Patient limited by pain  [] Patient limited by other medical complications  [x] Other:  Pt demonstrates gait deviations including L steppage gait and circumduction that was addressed today with gait training. A sp cane was attempted to decrease pressure on her L hip but little change reported by pt. She will continue to benefit from skilled PT to address hip and core strengthening, pain modulation, and improvement of gait mechanics.      Pain after treatment:      9/10    Prognosis: [x] Good [] Fair  [] Poor    Patient Requires Follow-up: [x] Yes  [] No    Plan:   [x] Continue per plan of care [] Alter current plan (see comments)  [] Plan of care initiated [] Hold pending MD visit [] Discharge    Plan for Next Session:        Electronically signed by:  Matt Hawkins, PT

## 2022-09-09 ENCOUNTER — TELEPHONE (OUTPATIENT)
Dept: NEUROSURGERY | Facility: CLINIC | Age: 67
End: 2022-09-09

## 2022-09-09 NOTE — TELEPHONE ENCOUNTER
Caller: Emmanuel Alison May    Relationship: Self    Best call back number:[phone]    What is the best time to reach you:    Who are you requesting to speak with (clinical staff, provider,  specific staff member):CLINICAL STAFF    Do you know the name of the person who called:NA    What was the call regarding:PT AND HER DAUGHTER CALLED AND THEY ARE ASKING TO SPEAK TO  NURSE-PT STATES THAT THEY WERE UNDER THE IMPRESSION THAT  WAS GOING TO TALK TO  FOR A SOONER APPT.-SCHEDULING CALLED AND PT IS SCHEDULED FOR 09/27/22-PTS DAUGHTER STATES THAT THE PT CAN NOT WAIT THAT LONG FOR AN APPT. THEY ARE REACHING OUT TO SEE IF THERE IS ANYTHING SOONER-PTS DAUGHTER STATES IF NOT PT IS NEEDING TO COME BACK TO BE SEEN BY OUR OFFICE-PLEASE CONTACT PT BACK TO ADVISE-THANK YOU    Do you require a callback:YES

## 2022-09-12 ENCOUNTER — OFFICE VISIT (OUTPATIENT)
Dept: ORTHOPEDIC SURGERY | Facility: CLINIC | Age: 67
End: 2022-09-12

## 2022-09-12 VITALS
WEIGHT: 234 LBS | SYSTOLIC BLOOD PRESSURE: 123 MMHG | HEIGHT: 64 IN | DIASTOLIC BLOOD PRESSURE: 81 MMHG | BODY MASS INDEX: 39.95 KG/M2

## 2022-09-12 DIAGNOSIS — G62.9 NEUROPATHY: ICD-10-CM

## 2022-09-12 DIAGNOSIS — M25.572 BILATERAL ANKLE PAIN, UNSPECIFIED CHRONICITY: Primary | ICD-10-CM

## 2022-09-12 DIAGNOSIS — M48.062 SPINAL STENOSIS OF LUMBAR REGION WITH NEUROGENIC CLAUDICATION: ICD-10-CM

## 2022-09-12 DIAGNOSIS — M79.672 BILATERAL FOOT PAIN: ICD-10-CM

## 2022-09-12 DIAGNOSIS — M25.571 BILATERAL ANKLE PAIN, UNSPECIFIED CHRONICITY: Primary | ICD-10-CM

## 2022-09-12 DIAGNOSIS — M79.671 BILATERAL FOOT PAIN: ICD-10-CM

## 2022-09-12 DIAGNOSIS — I87.8 VENOUS STASIS: ICD-10-CM

## 2022-09-12 PROBLEM — M70.62 GREATER TROCHANTERIC BURSITIS OF LEFT HIP: Status: ACTIVE | Noted: 2022-09-12

## 2022-09-12 PROCEDURE — 99204 OFFICE O/P NEW MOD 45 MIN: CPT | Performed by: ORTHOPAEDIC SURGERY

## 2022-09-12 NOTE — PROGRESS NOTES
"Chief Complaint: \"Pain in my lower back, left hip, left leg.\"        History of Present Illness:   Patient: Ms. Alison Benitez, 67 y.o. female   Referring Physician: Dr. Nimesh Stanley   Reason for Referral: Consultation for chronic intractable lower back and left hip pain.   Pain History:  Patient reports a longstanding history of chronic progressive lower back and left hip pain, which began without incident. Patient reports a longstanding history of lower back and hip pain associated with neurogenic claudication.  Many years ago, she was evaluated by Dr. Billings for this condition.  At that time, it was felt that patient was dealing with mechanical back pain and some degree of neurogenic claudication.  She has a known history of lumbar spinal stenosis, advanced degenerative changes, and a spondylolisthesis.  She saw Dr. Stanley last week and reportedly has significant increase in intensity of her symptoms. More recently, she started experiencing pain in the left inguinal region and anterior aspect of the hip and left thigh.  She also does describe some symptoms in stocking distribution. MRI of the lumbar spine without contrast on 8/19/2022 also reviewed by Dr. Avendano and Dr. Stanley revealed significant endplate and discogenic changes at L3-L4 unlikely to represent discitis as endplates are still well preserved.  There is severe stenosis at L3-L4 with bilateral facet joint effusions.  There is also significant stenosis at L4-L5 with bilateral facet joint effusions more prominent on the left side.  Her CRP is 10.2 and has been elevated (last year 13.4), ESR: 6, WBC: 6.8.  She denies fevers or chills. Alison Benitez underwent neurosurgical consultation with Dr. Iván Stanley on 9/7/2022, and was found not to be a surgical candidate.  Dr. Stanley's opinion was of mechanical lower back pain with a component of lumbar stenosis with claudication.  Possible trochanteric bursitis.  No evidence of active discitis or " osteomyelitis.  Patient underwent orthopedic consultation with Dr. Lashaun Mariscal on 9/12/2022.  She reported that her pain has been getting much worse over the past 2 months.  Many days she has difficulties even walking in her house.  She also has a history of polyneuropathy.  EMG/NCV on 11/18/2019 revealed sensorimotor neuropathy.  She also has some hammertoes but reports that they do not hurt.  Patient requested a second opinion for her back issues, and Dr. Mariscal facilitated a referral to Dr. Juaquin Perla for this.  Patient has failed to obtain pain relief with conservative measures for more than months including oral analgesics (Lyrica, etc), physical therapy (ongoing, last visit on 09/13/2022), to name a few. Pain has progressed in intensity over the past several months.  Pain Description: Constant lower back pain with intermittent exacerbation, described as aching, burning, dull, sharp, shooting, stabbing and throbbing sensation.   Radiation of Pain: The pain radiates into the posterior aspect of the left hip, sometimes a posterior aspect of the left thigh or anterior aspect of the left thigh and into the shin.  Pain intensity today: 7/10  Average pain intensity last week: 8/10  Pain intensity ranges from: 5/10 to 10/10  Aggravating factors: Pain increases with bending, twisting, standing, walking. Patient describes neurogenic claudication. Patient does not use a cane or walker  Alleviating factors: Pain decreases with sitting down, lying down on her side  Associated Symptoms:   Patient reports pain (knees down), numbness, and weakness in the lower extremities.   Patient denies any new bladder or bowel problems.   Patient reports difficulties with her balance and reports recent falls.   Pain interferes with ADLs, general activities (ability to walk, stand, transition from different positions), and affects patient's quality of life  Pain does not interfere with sleep (takes Ambien)    Review of previous  therapies and additional medical records:  Alison Benitez has already failed the following measures, including:   Conservative Measures: Oral analgesics, opioids, topical analgesics, ice, heat, physical therapy   Interventional Measures: None. GTB injections at [facility] last 3 months ago  Surgical Measures: No history of previous lumbar spine or hip surgery    Alison Benitez underwent neurosurgical consultation with Dr. Iván Stanley on 9/7/2022, and was found not to be a surgical candidate.  Alison Benitez presents with significant comorbidities including Claustrophobia, CTS (carpal tunnel syndrome), Fibromyalgia, Hypertension, Neuropathy, hypothyroidism   In terms of current analgesics, Alison Benitez takes: nabumetone 750 MG 2 Times a Day, Norco, diclofenac 1 % gel, pregabalin 75 MG 1 capsule 3 Times a Day,duloxetine 60 MG Daily. Patient also takes zolpidem 10 MG tablet  I have reviewed Ethan Report consistent with medication reconciliation.  SOAPP: Low Risk     PHQ-2 Depression Screening  Little interest or pleasure in doing things? 0-->not at all   Feeling down, depressed, or hopeless? 0-->not at all   PHQ-2 Total Score 0     Global Pain Scale 09-15  2022          Pain 20          Feelings 8          Clinical outcomes 19          Activities 25          GPS Total: 72            The [place] Back Pain Disability Scale  DATE 09-15  2022          Sleep through the night 0          Turn over in bed 0          Get out of bed 2          Make your bed 4          Put on socks (pantyhose) 0          Ride in a car 0          Sit in a chair for several hours 0          Stand up for 20-30 minutes 5          Climb one flight of stairs 5          Walk a few blocks (200-300 yards)  5          Walk several miles 5          Run one block (about 50 yards) 5          Take food out of the refrigerator 4          Reach up to high shelves 4          Move a chair 4          Pull or push heavy doors 4           Bend over to clean the bathtub 4          Throw a ball 5          Carry two bags of groceries 5          Lift and carry a heavy suitcase 5          Total score 72            Review of Diagnostic Studies:  I have reviewed the images with the patient and used the images and a tridimensional spine model to explain findings. I have reviewed the report, as well.  MRI of the lumbar spine without contrast on 8/19/2022 revealed marrow edema of the adjacent endplates at L3-4 with mild T2 hyperintensity/edema in the disc.  Associated T2 hyperintense edema of the left psoas muscle.  No evidence of paravertebral fluid collection.  Grade 1 anterolisthesis of L3 on L4 and L4-L5.  Vertebral body heights are normal.  The conus ends at L1.  Axial imaging:  T12-L1, L1-L2: Disc bulge, facet hypertrophy.  No significant canal or foraminal stenosis  L2-L3: Left paracentral disc protrusion superimposed on disc bulge.  Facet arthrosis.  Ligamentum flavum hypertrophy.  Mild canal stenosis.  Moderate to severe left lateral recess stenosis with probable impingement of the transversing left L3 nerve root.  Moderate left and mild right neuroforaminal stenosis.  Findings are concerning for early osteomyelitis/discitis but may also relate to moderate type I reactive endplate changes.  L3-L4: Central disc protrusion, facet hypertrophy, ligamentum flavum hypertrophy contributing to severe canal stenosis.  Moderate to severe left and mild right neuroforaminal stenosis.  Small facet joint effusions.  L4-L5: Disc bulge, facet hypertrophy, ligamentum flavum hypertrophy contributing to moderate canal stenosis.  Bilateral facet joint effusions.  Moderate to severe right and mild left neuroforaminal stenosis  L5-S1: Central disc protrusion abutting the ventral thecal sac and descending S1 nerve roots.  Mild bilateral foraminal stenosis  Left hip x-rays 9/12/2022: There is some enthesopathy at both greater trochanters and ischial tuberosities.  Mild  degenerative changes  AP pelvis, AP lateral left hip, ordered for pain, shows some dorsal acetabular osteophytes, joint space still reasonable both hips, some enthesopathy both greater trochanters and ischial tuberosities, no fractures, no comparison  Lumbar spine x-rays on 2022 revealed multilevel degenerative disc disease and facet hypertrophy.  Grade 1 anterolisthesis of L4 on L5    Review of Systems   Constitutional: Positive for appetite change.   Eyes: Positive for redness and itching.   Endocrine: Positive for cold intolerance and heat intolerance.   Musculoskeletal: Positive for back pain, joint swelling, myalgias and neck stiffness.   Neurological: Positive for dizziness and light-headedness.   All other systems reviewed and are negative.        Patient Active Problem List   Diagnosis   • Neuropathy   • History of infectious mononucleosis   • Physical deconditioning   • Bilateral leg pain   • DDD (degenerative disc disease), lumbar   • Lumbar stenosis with neurogenic claudication   • Hormone replacement therapy (HRT)   • Menopausal symptoms   • Acquired hypothyroidism   • Nontoxic multinodular goiter   • Postmenopausal   • Yeast vaginitis   • Fibromyalgia   • Osteoarthritis   • Lumbosacral disc disease   • Vertebrogenic low back pain   • Bursitis   • Adolescent idiopathic scoliosis of lumbar region   • Greater trochanteric bursitis of left hip   • Gait disturbance   • Other insomnia   • JACKY (generalized anxiety disorder)       Past Medical History:   Diagnosis Date   • Arthritis    • Bursitis    • Claustrophobia    • CTS (carpal tunnel syndrome)     bilateral    • Fibromyalgia    • Hemorrhoids    • Hypertension    • Low back pain    • Lumbosacral disc disease    • Neuropathy    • Osteoarthritis    • SBE (subacute bacterial endocarditis)    • Tendinitis of knee    • Thyroid disorder    • Thyroid nodule    •  (vaginal birth after )          Past Surgical History:   Procedure Laterality Date    • ABDOMINAL HYSTERECTOMY     •  SECTION     •  SECTION     • COLONOSCOPY     • CYST REMOVAL N/A 2022   • DILATATION AND CURETTAGE     • HYSTERECTOMY N/A        • MANDIBLE FRACTURE SURGERY     • OOPHORECTOMY Bilateral    • TUBAL ABDOMINAL LIGATION     • TUBAL ABDOMINAL LIGATION           Family History   Problem Relation Age of Onset   • Arthritis Mother    • Stroke Mother    • Glaucoma Mother    • Hypertension Mother    • Heart disease Mother    • Heart failure Mother    • Heart disease Father    • Lung disease Father    • Hypertension Brother    • Breast cancer Neg Hx    • Ovarian cancer Neg Hx          Social History     Socioeconomic History   • Marital status: Single   Tobacco Use   • Smoking status: Never Smoker   • Smokeless tobacco: Never Used   Vaping Use   • Vaping Use: Never used   Substance and Sexual Activity   • Alcohol use: No   • Drug use: No   • Sexual activity: Not Currently           Current Outpatient Medications:   •  ascorbic acid (VITAMIN C) 1000 MG tablet, Take 1,000 mg by mouth Daily., Disp: , Rfl:   •  Cholecalciferol (Vitamin D) 50 MCG (2000 UT) tablet, Take 2,000 Units by mouth Daily., Disp: , Rfl:   •  cholecalciferol (VITAMIN D3) 25 MCG (1000 UT) tablet, Take 1 tablet by mouth Daily., Disp: , Rfl:   •  clotrimazole-betamethasone (LOTRISONE) 1-0.05 % cream, APPLY CREAM TOPICALLY TO RIGHT EAR TWICE DAILY, Disp: , Rfl:   •  Cyanocobalamin 3000 MCG capsule, Take 3,000 mg by mouth Daily., Disp: , Rfl:   •  desloratadine (CLARINEX) 5 MG tablet, Take 5 mg by mouth Daily., Disp: , Rfl:   •  Diclofenac Sodium (VOLTAREN) 1 % gel gel, , Disp: , Rfl:   •  doxycycline (PERIOSTAT) 20 MG tablet, , Disp: , Rfl:   •  DULoxetine (CYMBALTA) 60 MG capsule, Take 1 capsule by mouth Daily., Disp: 90 capsule, Rfl: 3  •  estradiol (ESTRACE) 1 MG tablet, Take 1 mg by mouth Daily., Disp: , Rfl:   •  fluocinolone (SYNALAR) 0.025 % ointment, APPLY SPARINGLY TOPICALLY TO THE AFFECTED AREA  "FOUR TIMES DAILY, Disp: , Rfl:   •  hydrochlorothiazide (HYDRODIURIL) 25 MG tablet, , Disp: , Rfl:   •  HYDROcodone-acetaminophen (NORCO)  MG per tablet, TAKE 1 TABLET BY MOUTH EVERY 6 HOURS AS NEEDED FOR PAIN FOR UP TO 30 DAYS REDUCE DOSES TAKEN AS PAIN BECOMES MANAGEABLE, Disp: , Rfl:   •  losartan (COZAAR) 50 MG tablet, Take 1 tablet by mouth Daily., Disp: , Rfl:   •  MULTIPLE VITAMINS-MINERALS ER PO, Take 1 tablet by mouth Daily., Disp: , Rfl:   •  multivitamin with minerals tablet tablet, Take 1 tablet by mouth Daily., Disp: , Rfl:   •  mupirocin (BACTROBAN) 2 % ointment, , Disp: , Rfl:   •  nabumetone (RELAFEN) 750 MG tablet, Take 750 mg by mouth 2 (Two) Times a Day., Disp: , Rfl:   •  nystatin-triamcinolone (MYCOLOG) 172350-4.1 UNIT/GM-% ointment, Apply 1 application topically to the appropriate area as directed 2 (Two) Times a Day., Disp: 30 g, Rfl: 0  •  olopatadine (PATADAY) 0.2 % solution ophthalmic solution, 1 drop., Disp: , Rfl:   •  Omega-3 Fatty Acids (FISH OIL) 1000 MG capsule capsule, Take 1,200 mg by mouth Daily With Breakfast., Disp: , Rfl:   •  Oxymetazoline HCl (Rhofade) 1 % cream, Apply to face daily, Disp: , Rfl:   •  pregabalin (LYRICA) 75 MG capsule, Take 1 capsule by mouth 3 (Three) Times a Day., Disp: 270 capsule, Rfl: 1  •  RHOFADE 1 % cream, , Disp: , Rfl: 6  •  silver sulfadiazine (SILVADENE, SSD) 1 % cream, , Disp: , Rfl:   •  vitamin D (ERGOCALCIFEROL) 81430 units capsule capsule, , Disp: , Rfl:   •  zolpidem (AMBIEN) 10 MG tablet, Take 1 tablet by mouth., Disp: , Rfl:   •  levothyroxine (Synthroid) 50 MCG tablet, Take 1 tablet by mouth Daily for 180 days., Disp: 90 tablet, Rfl: 1      No Known Allergies      /80   Pulse 94   Temp 96.8 °F (36 °C)   Ht 162.6 cm (64\")   Wt 108 kg (237 lb 6.4 oz)   SpO2 98%   BMI 40.75 kg/m²       Physical Exam:  Constitutional: Patient appears well-developed, well-nourished, well-hydrated, appears younger than stated age  HEENT: Head: " Normocephalic and atraumatic  Eyes: Conjunctivae and lids are normal  Pupils: Equal, round, reactive to light  Peripheral vascular exam: Posterior tibialis: right 2+ and left 2+. Dorsalis pedis: right 2+ and left 2+. No edema. Presence of varicose veins.  Musculoskeletal   Gait and station: Gait evaluation demonstrated shuffling   Lumbar spine: Passive and active range of motion are limited secondary to pain. Extension, lateral flexion, rotation of the lumbar spine increased and reproduced pain. Lumbar facet joint loading maneuvers are positive.  Eddie test, Gaenslen's test, thigh thrust test, SI compression test, Yeoman's test are negative   Piriformis maneuvers are negative   Palpation of the bilateral ischial tuberosities, unrevealing   Hip joints: The range of motion of the hip joints is almost full and without pain   Palpation of the bilateral greater trochanter, unrevealing   Examination of the Iliotibial band: unrevealing   Neurological:   Patient is alert and oriented to person, place, and time.   Speech: Normal.   Cortical function: Normal mental status.   Reflex Scores:  Right patellar: 0+  Left patellar: 0+  Right Achilles: 0+  Left Achilles: 0+  Motor strength: 5/5  Motor Tone: Normal  Involuntary movements: None.   Superficial/Primitive Reflexes: Primitive reflexes were absent.   Right Rosales: Absent  Left Rosales: Absent  Right ankle clonus: Absent  Left ankle clonus: Absent   Babinsky: Absent  Long tract signs: Negative. Straight leg raising test: Negative. Femoral stretch sign: Negative.   Sensory exam: Intact to light touch, intact pain and temperature sensation, intact vibration sensation and normal proprioception.   Coordination: Normal finger to nose and heel to shin. Normal balance and negative Romberg's sign   Skin and subcutaneous tissue: Skin is warm and intact. No rash noted. No cyanosis.   Psychiatric: Judgment and insight: Normal. Recent and remote memory: Intact. Mood and affect:  Normal.     ASSESSMENT:   1. Lumbar stenosis with neurogenic claudication    2. Spondylosis of lumbar region without myelopathy or radiculopathy    3. DDD (degenerative disc disease), lumbar    4. Vertebrogenic low back pain    5. Fibromyalgia    6. Neuropathy    7. JACKY (generalized anxiety disorder)    8. Other insomnia    9. Physical deconditioning    10. Gait disturbance          PLAN/MEDICAL DECISION MAKING:  Ms. Alison Benitez, 67 y.o. female presents with a longstanding history of chronic progressive lower back and left hip and left lower extremity pain associated with neurogenic claudication, which began without incident. Several years ago, she was evaluated by Dr. Billings for this condition.  At that time, it was felt that patient was dealing with mechanical back pain and some degree of neurogenic claudication.  She has a known history of lumbar spinal stenosis, advanced degenerative changes, and spondylolisthesis. She saw Dr. Stanley last week and reported a significant increase in the intensity of her symptoms. For the past 6 months, she has been experiencing more claudication and more pain radiating down her left leg with a polyradicular pattern (L3, L4, S1). She also describes symptoms in a stocking distribution consistent with polyneuropathy consistent with findings of EMG/NCV on 11/18/2019 that revealed sensorimotor neuropathy.  MRI of the lumbar spine without contrast on 8/19/2022 also reviewed by Dr. Avendano and Dr. Stanley revealed significant endplate and discogenic changes at L3-L4 unlikely to represent discitis as endplates are well preserved.  Severe stenosis at L3-L4 with bilateral facet joint effusions. Severe stenosis at L4-L5 with bilateral facet joint effusions more prominent on the left side.  She also has a foraminal disc protrusion at L5-S1 that could account for some S1 radicular symptoms.  Her CRP is 10.2 and has been elevated (last year 13.4), ESR: 6, WBC: 6.8. She denies fevers or  chills or any other symptoms. Alison Benitez underwent neurosurgical consultation with Dr. Iván Stanley on 9/7/2022, and was found not to be a surgical candidate. Dr. Stanley's opinion was of mechanical lower back pain and lumbar stenosis with claudication. No evidence of discitis or osteomyelitis. Patient underwent orthopedic consultation with Dr. Lashaun Mariscal on 9/12/2022.  She reported that her pain has been getting much worse over the past several months.  Many days she has difficulties even walking in her house. Patient has failed to obtain pain relief with conservative measures for more than 6 months including oral analgesics (Lyrica, Norco, etc), physical therapy (ongoing, last visit on 09/13/2022), to name a few. Pain has progressed in intensity over the past several months. A comprehensive initial evaluation including history and physical exam were performed including pertinent physiologic and functional assessment. Patient presents with intractable pain due to the diagnoses listed above. Patient has failed to respond to conservative modalities, as referenced under HPI including the impact of patient's moderate-to-severe pain contributing to significant impairment in daily activities, ADLs, and a negative impact on quality of life. Supporting diagnostic studies of patient's chronic pain condition have been reviewed. I have reviewed all available patient's medical records as well as previous therapies as referenced above. I had a lengthy conversation with Ms. Alison Benitez regarding her chronic pain condition and potential therapeutic options including risks, benefits, alternative therapies, to name a few. We have discussed using a stepwise approach starting with the shortest or least intense level of treatment, care, or service as determined by the extent required to diagnose and or treat patient's condition. The treatments proposed are consistent with the patient's medical condition and are  known to be as safe and effective by current guidelines and standard of care. There is no evidence of absolute contraindications for the proposed procedures under the current circumstances. These treatments are not considered experimental or investigational. The duration and frequency proposed are considered appropriate for the service in accordance with accepted standards of medical practice for the diagnosis and or treatment of the patient's condition and or intended to improve the patient's level of function. These services will be furnished in a setting appropriate to the patient's medical needs and condition. Therefore, I have proposed the following plan:  1. Interventional pain management measures: Patient will be scheduled for diagnostic and therapeutic left L3-L4 and left L4-L5 transforaminal epidural steroid injections using the lowest effective dose of steroids, under C-arm fluoroscopic guidance, with the use of contrast dye (unless contraindicated) to confirm appropriate needle placement and spread of contrast dye. We may repeat therapeutic left L3-L4 and left L4-L5 transforaminal epidural steroid injections  depending on patient's outcome.  As per current guidelines, epidurals will be limited to a maximum of 4 sessions per spinal region in a rolling twelve (12) month period. Continuation of epidural steroid injections over 12 months would only be considered under the following provisions;  • Patient is a high-risk surgical candidate, or the patient does not desire surgery, or recurrence of pain in the same location relieved with ESIs for at least three months and epidural provides at least 50% sustained improvement of pain and/or 50% objective improvement in function (using same scale as baseline)  • Pain is severe enough to cause a significant degree of functional disability or vocational disability  • The primary care provider will be notified regarding continuation of procedures and repeat steroid use    In addition, we will discuss other potential treatments for her spinal stenosis including a mild procedure, or eventually a spinal cord stimulator trial.  She also presents with significant mechanical lower back pain with evidence of severe facet arthropathy with lumbar facet joint effusions from L3-L4 through L5-S1.  If her radicular symptoms resolve, then, we could address this condition.  In terms of hip issues, SI issues, peripheral nerve entrapment, or greater trochanteric bursitis, her physical examination has been unrevealing.  2. Diagnostic studies:  A. MRI of the thoracic spine without contrast to assess capacity of the spinal canal and epidural space for a potential spinal cord stimulator trial  B. Flexion and extension X-rays of the lumbar spine have been ordered  3. Pharmacological measures: Reviewed and discussed;   A. Patient takes nabumetone 750 MG 2 Times a Day, Norco, diclofenac 1 % gel, pregabalin 75 MG 1 capsule 3 Times a Day,duloxetine 60 MG Daily. Patient also takes zolpidem 10 MG tablet  B. Trial with Rheumate one tablet daily  C. Start pyridoxine 100 mg one tablet by mouth daily, #30, take for 30 days, no refills  D. Start alpha lipoid acid 5652-1222 mg per day divided into 3 doses  E. Trial with prilocaine 2%, lidocaine 10%, imipramine 3%, capsaicin 0.001% and mannitol 20%  cream, apply 1 to 2 grams of cream to the affected areas every 4 to 6 hours as needed  4. Long-term rehabilitation efforts:  A. The patient has a history of falls. I did complete a risk assessment for falls. Fall precautions: Patient has been instructed regarding universal fall precautions, such as;   • Using gait aids a cane or a rolling walker at the appropriate height at all times for ambulation   • Removing all area rugs and coffee tables to create a safe environment at home  • Ensure clean, dry floors  • Wearing supportive footwear and properly fitting clothing  • Ensure bed/chair is appropriate height and patient's  feet can touch the floor  • Using a shower transfer bench  • Using walk-in shower and having shower safety bars installed  • Ensure proper lighting, minimize glare  • Have nightlights operational and in use  • Participation in an exercise program for gait training, balance training and strength  • Avoid carrying laundry up and down steps  • Ensure proper compliance and organization of medications to avoid errors   • Avoid use of over the counter sedatives and alcohol consumption  • Ensure easy access to call bell, glasses, TV control, telephone  • Ensure glasses/hearing aids are in use or close by (on top of night table)  B. Continue a comprehensive physical therapy program for water therapy, therapeutic exercise, core strengthening, gait and balance training, neurodynamics, E-STIM, myofascial release, cupping, dry needling, home exercises  C. Start an exercise program such as chair yoga and water therapy  D. Contrast therapy: Apply ice-packs for 15-20 minutes, followed by heating pads for 15-20 minutes to affected area   E. Referral to Dr. Dougie Khan for psychological screening for spinal cord stimulation and intrathecal therapies.  F. Referral to [facility]. Patient's Body mass index is 40.75 kg/m². Patient counseled on the importance of weight loss to help with overall health and pain control.   N. Alison Benitez  reports that she has never smoked. She has never used smokeless tobacco.   5. The patient and her family have been instructed to contact my office with any questions or difficulties. The patient understands the plan and agrees to proceed accordingly.    The patient has a documented plan of care to address chronic pain. Alison Benitez reports a pain score of  7/10.  Given her pain assessment as noted, treatment options were discussed and the following options were decided upon as a follow-up plan to address the patient's pain: continuation of current  treatment plan for pain, educational materials on pain management, home exercises and therapy, prescription for non-opiod analgesics, referral to Physical Therapy, referral to specialist for assistance in pain treatment guidance, steroid injections, use of non-medical modalities (ice, heat, stretching and/or behavior modifications) and interventional pain management measures.             Pain Management Panel    There is no flowsheet data to display.          CURT query complete. CURT reviewed by Moris Ellis MD.     Pain Medications             DULoxetine (CYMBALTA) 60 MG capsule Take 1 capsule by mouth Daily.    HYDROcodone-acetaminophen (NORCO)  MG per tablet TAKE 1 TABLET BY MOUTH EVERY 6 HOURS AS NEEDED FOR PAIN FOR UP TO 30 DAYS REDUCE DOSES TAKEN AS PAIN BECOMES MANAGEABLE    nabumetone (RELAFEN) 750 MG tablet Take 750 mg by mouth 2 (Two) Times a Day.    pregabalin (LYRICA) 75 MG capsule Take 1 capsule by mouth 3 (Three) Times a Day.         Please note that portions of this note were completed with a voice recognition program.     Moris Ellis MD    Patient Care Team:  Kacy Huertas MD as PCP - General (Family Medicine)  Anatoliy Everett MD as Consulting Physician (Anesthesiology)  Abdifatah Jordan MD as Consulting Physician (Endocrinology)  Kimber Correa APRN as Nurse Practitioner (Nurse Practitioner)  Moris Ellis MD as Consulting Physician (Pain Medicine)     No orders of the defined types were placed in this encounter.        Future Appointments   Date Time Provider Department Center   10/4/2022  9:40 AM [facility] BR SCREENING [facility] BR 60 [facility]   10/4/2022 10:30 AM Coleen Rand APRN [facility]  [facility]   11/17/2022 10:00 AM Kimber Correa APRN [facility]   12/23/2022  3:15 PM Abdifatah Jordan MD [facility] END BM [facility]

## 2022-09-12 NOTE — TELEPHONE ENCOUNTER
I have reached out to Dr. Ellis's office about moving patient up.  They will let me know shortly.

## 2022-09-12 NOTE — PROGRESS NOTES
NEW PATIENT    Patient: Alison Benitez  : 1955    Primary Care Provider: Kacy Huertas MD    Requesting Provider: As above    Pain and Initial Evaluation of the Left Ankle, Pain and Initial Evaluation of the Right Ankle, Pain and Initial Evaluation of the Left Foot, and Pain and Initial Evaluation of the Right Foot      History    Chief Complaint: Bilateral foot pain, left hip pain    History of Present Illness: This is an extremely pleasant 67-year-old woman here today with her daughter and granddaughter.  She has a long history of back problems.  She has seen neurosurgery.  She had an MRI of the lumbar spine done at another institution on 2022.  It was read as showing possible discitis with endplate edema at L3-4.  I reviewed the report but not the films, I reviewed the neurosurgery notes, they did not think that she had infection.  She does have an elevated CRP, it has been elevated for about a year.  It has been recommended that she see Dr. Ellis for trochanteric bursitis and radiculopathy.  She reports that her radicular pain has gotten much worse over the past 2 months.  She has not lost any bladder or bowel control.  She reports many days since difficult to even walk in her house.  She has done physical therapy with only minimal improvement.  She also has neuropathy.  She had EMGs nerve conduction studies done 2019  which showed sensorimotor neuropathy.  She takes Lyrica for the pain.  She has hammertoes but reports they do not hurt.  She does not wear compression stockings.  She is here for advice as to what to do about her feet.  she also has had hip pain including groin pain and trochanteric pain on the left.  We will x-ray it to see if she needs to see one of the partners.      The patient does not have a personal or family history of DVT, PE, hypercoagulable state or risk factors for clotting.        Current Outpatient Medications on File Prior to Visit   Medication Sig  Dispense Refill   • ascorbic acid (VITAMIN C) 1000 MG tablet Take 1,000 mg by mouth Daily.     • Cholecalciferol (Vitamin D) 50 MCG (2000 UT) tablet Take 2,000 Units by mouth Daily.     • cholecalciferol (VITAMIN D3) 25 MCG (1000 UT) tablet Take 1 tablet by mouth Daily.     • clotrimazole-betamethasone (LOTRISONE) 1-0.05 % cream APPLY CREAM TOPICALLY TO RIGHT EAR TWICE DAILY     • Cyanocobalamin 3000 MCG capsule Take 3,000 mg by mouth Daily.     • desloratadine (CLARINEX) 5 MG tablet Take 5 mg by mouth Daily.     • Diclofenac Sodium (VOLTAREN) 1 % gel gel      • doxycycline (PERIOSTAT) 20 MG tablet      • DULoxetine (CYMBALTA) 60 MG capsule Take 1 capsule by mouth Daily. 90 capsule 3   • estradiol (ESTRACE) 1 MG tablet Take 1 mg by mouth Daily.     • fluocinolone (SYNALAR) 0.025 % ointment APPLY SPARINGLY TOPICALLY TO THE AFFECTED AREA FOUR TIMES DAILY     • hydrochlorothiazide (HYDRODIURIL) 25 MG tablet      • HYDROcodone-acetaminophen (NORCO)  MG per tablet TAKE 1 TABLET BY MOUTH EVERY 6 HOURS AS NEEDED FOR PAIN FOR UP TO 30 DAYS REDUCE DOSES TAKEN AS PAIN BECOMES MANAGEABLE     • losartan (COZAAR) 50 MG tablet Take 1 tablet by mouth Daily.     • MULTIPLE VITAMINS-MINERALS ER PO Take 1 tablet by mouth Daily.     • multivitamin with minerals tablet tablet Take 1 tablet by mouth Daily.     • mupirocin (BACTROBAN) 2 % ointment      • nabumetone (RELAFEN) 750 MG tablet Take 750 mg by mouth 2 (Two) Times a Day.     • nystatin-triamcinolone (MYCOLOG) 434850-4.1 UNIT/GM-% ointment Apply 1 application topically to the appropriate area as directed 2 (Two) Times a Day. 30 g 0   • olopatadine (PATADAY) 0.2 % solution ophthalmic solution 1 drop.     • Omega-3 Fatty Acids (FISH OIL) 1000 MG capsule capsule Take 1,200 mg by mouth Daily With Breakfast.     • Oxymetazoline HCl (Rhofade) 1 % cream Apply to face daily     • pregabalin (LYRICA) 75 MG capsule Take 1 capsule by mouth 3 (Three) Times a Day. 270 capsule 1   •  RHOFADE 1 % cream   6   • silver sulfadiazine (SILVADENE, SSD) 1 % cream      • vitamin D (ERGOCALCIFEROL) 32959 units capsule capsule      • zolpidem (AMBIEN) 10 MG tablet Take 1 tablet by mouth.     • levothyroxine (Synthroid) 50 MCG tablet Take 1 tablet by mouth Daily for 180 days. 90 tablet 1     No current facility-administered medications on file prior to visit.      No Known Allergies   Past Medical History:   Diagnosis Date   • Arthritis    • Bursitis    • Claustrophobia    • CTS (carpal tunnel syndrome)     bilateral    • Fibromyalgia    • Hemorrhoids    • Hypertension    • Low back pain    • Lumbosacral disc disease    • Neuropathy    • Osteoarthritis    • SBE (subacute bacterial endocarditis)    • Tendinitis of knee    • Thyroid disorder    • Thyroid nodule    •  (vaginal birth after )      Past Surgical History:   Procedure Laterality Date   • ABDOMINAL HYSTERECTOMY     •  SECTION     •  SECTION     • COLONOSCOPY     • CYST REMOVAL N/A 2022   • DILATATION AND CURETTAGE     • HYSTERECTOMY N/A        • MANDIBLE FRACTURE SURGERY     • OOPHORECTOMY Bilateral    • TUBAL ABDOMINAL LIGATION     • TUBAL ABDOMINAL LIGATION       Family History   Problem Relation Age of Onset   • Osteoarthritis Other    • Hypertension Other    • Carpal tunnel syndrome Other    • COPD Other    • Diabetes Other    • Stroke Other    • Glaucoma Other    • Heart disease Mother    • Asthma Mother    • Hypertension Brother    • Heart disease Father    • Breast cancer Neg Hx    • Ovarian cancer Neg Hx       Social History     Socioeconomic History   • Marital status: Single   Tobacco Use   • Smoking status: Never Smoker   • Smokeless tobacco: Never Used   Vaping Use   • Vaping Use: Never used   Substance and Sexual Activity   • Alcohol use: No   • Drug use: No   • Sexual activity: Not Currently        Review of Systems   Constitutional: Positive for activity change and fatigue.   HENT: Positive for ear  "discharge and hearing loss.    Eyes: Positive for redness.   Respiratory: Negative.    Cardiovascular: Negative.    Gastrointestinal: Negative.    Endocrine: Positive for cold intolerance and heat intolerance.   Genitourinary: Negative.    Musculoskeletal: Positive for arthralgias, back pain, gait problem and myalgias.   Skin: Negative.    Allergic/Immunologic: Negative.    Neurological: Positive for dizziness and light-headedness.   Hematological: Negative.    Psychiatric/Behavioral: Negative.        The following portions of the patient's history were reviewed and updated as appropriate: allergies, current medications, past family history, past medical history, past social history, past surgical history and problem list.    Physical Exam:   /81   Ht 162.6 cm (64.02\")   Wt 106 kg (234 lb)   BMI 40.15 kg/m²   GENERAL: Body habitus: morbidly obese    Lower extremity edema: Right: 1+ pitting; Left: 1+ pitting    Varicose veins:  Right: moderate and venous stasis pigment; Left: moderate and venous stasis pigment    Gait: Gait is normal, a little unsteady     Mental Status:  awake and alert; oriented to person, place, and time    Voice:  clear  SKIN:  Lower extremity: venous stasis pigment    Hair Growth(lower extremity):  Right:diminished; Left:  diminished  NAILS: Toenails: normal  HEENT: Head: Normocephalic, atraumatic,  without obvious abnormality.  eye: normal external eye, no icterus  ears:normal external ears  PULM:  Repiratory effort normal  CV:  Dorsalis Pedis:  Right: 2+; Left:2+    Posterior Tibial: Right:2+; Left:2+    Capillary Refill:  Brisk  MSK:      Tibia:  Right:  tender over subcutaneous border; Left:  tender over subcutaneous border      Ankle:  Right: No tenderness, normal range of motion,; Left:  non tender and ROM  normal      Foot:  Right:  No focal tenderness, mild midfoot osteophytes, moderate hammertoes; Left:  No tenderness, more prominent hammertoes, second MTPJ is dorsally " dislocated but nontender      NEURO: Heel Walking:  Right:  Able to get fore feet off the ground, unsteady; Left:  Able to get fore feet off the ground, unsteady    Toe Walking:  Right:  able to get up on toes, difficulty with balance; Left:  able to get up on toes, difficulty with balance     Kimberly-Ridge 5.07 monofilament test: patchy decrease    Lower extremity sensation: diminished       Calf Atrophy:none    Motor Function: all 5/5 to manual motor testing       Medical Decision Making    Data Review:   ordered and reviewed x-rays today, reviewed prior lab results, reviewed radiology results and reviewed outside records    Assessment and Plan/ Diagnosis/Treatment options:   1. Bilateral ankle pain, unspecified chronicity  We talked about neuropathic foot care.  I went over the information sheet with her.  I discussed it in detail with both the patient and her daughter.  She is wearing good shoes today.  She reports she likes to wear fashionable shoes but I told her not to do that any longer.  I explained the risk of neuropathic ulcers and amputation.  Her daughter asked about the dislocated toe on the left, I explained that pain is the only indication for surgery.  Given that the toe does not hurt, and does not have recurrent ulcerations I do not think it needs surgical intervention.  We went over appropriate shoewear and everything in great detail.  She is going to see Dr. Ellis for possible bursa injection on the left.  I advised her that if that does not help enough I would recommend she see one of our partners here in the office.  - XR Ankle 2 View Bilateral  - XR Hip With or Without Pelvis 2 - 3 View Left    2. Bilateral foot pain  As above  - XR Foot 2 View Bilateral    3. Spinal stenosis of lumbar region with neurogenic claudication  She is having worsening symptoms.  She is interested in another opinion.  I would recommend that she see Dr. Andreas Perla.  We will make the referral at her request.  -  Ambulatory Referral to Orthopedic Surgery    4. Venous stasis  Aside from everything else she also has significant venous stasis.  I recommend compression socks daily.  I explained edema and venous stasis to the patient, and the often hereditary nature of the problem, and the contribution of weight, trauma, age, etc. I explained how these factors cause edema (due to gravity, etc) I explained how the edema can lead to ulceration.  I explained how the edema can cause pain, stiffness, aching, cramping, etc. I explained that it is a very very common problem, so common that even [employer] markets compression stockings.  I explained that diuretics cannot correct the problem. I recommend wearing support stockings (compression stockings) daily, we discussed what type and where to find them          5. Neuropathy  She definitely has fairly dense neuropathy, as above we went over neuropathic foot care, I will be happy to see her anytime      Over 45min spent for visit, both reviewing data and discussing all problems      Part of this encounter note is an electronic transcription/translation of spoken language to printed text. The electronic translation of spoken language may permit erroneous, or at times, nonsensical words or phrases to be inadvertently transcribed; Although I have reviewed the note for such errors, some may still exist.          Lashaun Mariscal MD

## 2022-09-13 ENCOUNTER — HOSPITAL ENCOUNTER (OUTPATIENT)
Dept: PHYSICAL THERAPY | Facility: HOSPITAL | Age: 67
Setting detail: THERAPIES SERIES
Discharge: HOME | End: 2022-09-13
Payer: MEDICARE

## 2022-09-13 PROCEDURE — 97140 MANUAL THERAPY 1/> REGIONS: CPT

## 2022-09-13 PROCEDURE — 97110 THERAPEUTIC EXERCISES: CPT

## 2022-09-13 NOTE — FLOWSHEET NOTE
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 Danelle Tamez yesterday for a consultation on her feet. She notes that she also had x-rays of her hips performed. She notes that she has tried changing her morning routine today to help with her pain. Objective:  Observation: Gait:  Pt demonstrates a steppage gait with her LLE and circumducts during swing phase. Test measurements:    Palpation:    Exercises:  Exercise Resistance/Repetitions Other comments   Nustep L5 x 8' 13   Wall posture w/ TKE BTB 3x10 13   Supine hip flexor stretch 5x1' B 13   Standing hip abd 2x10 B 13   Seated hip ER / IR w/ t-band 3x10 OTB 13   Piriformis stretch  5x20\" 13   Ab brace with partial lift (partial bridge) 3x10 13   LTR - short ROM x30 13   Hip abd / add BTB / ball 3x10  13                              Other Therapeutic Activities:      Manual Treatments:  PA glides lumbar spine, STM lumbar paraspinals, inferior pelvic distraction x15'    Modalities:  Ice pack to lumbar spine and L hip x 10' - not performed today       Timed Code Treatment Minutes:  70      Total Treatment Minutes:  75    Treatment/Activity Tolerance:  [x] Patient tolerated treatment well [] Patient limited by fatigue  [] Patient limited by pain  [] Patient limited by other medical complications  [x] Other:  Pt progressed through activity well today including addition of new exercises. She reported some increase in pain / stiffness after manual therapy but reports that this usually subsides shortly after her PT session.     Pain after treatment:      6/10    Prognosis: [x] Good [] Fair  [] Poor    Patient Requires Follow-up: [x] Yes  [] No    Plan:   [x] Continue per plan of care [] Alter current plan (see comments)  [] Plan of care initiated [] Hold pending MD visit [] Discharge    Plan for Next Session:        Electronically signed by:  Chandana Sterling PT

## 2022-09-15 ENCOUNTER — OFFICE VISIT (OUTPATIENT)
Dept: PAIN MEDICINE | Facility: CLINIC | Age: 67
End: 2022-09-15

## 2022-09-15 ENCOUNTER — HOSPITAL ENCOUNTER (OUTPATIENT)
Dept: GENERAL RADIOLOGY | Facility: HOSPITAL | Age: 67
Discharge: HOME | End: 2022-09-15
Admitting: NEUROLOGICAL SURGERY

## 2022-09-15 VITALS
TEMPERATURE: 96.8 F | OXYGEN SATURATION: 98 % | WEIGHT: 237.4 LBS | BODY MASS INDEX: 40.53 KG/M2 | HEIGHT: 64 IN | HEART RATE: 94 BPM | SYSTOLIC BLOOD PRESSURE: 148 MMHG | DIASTOLIC BLOOD PRESSURE: 80 MMHG

## 2022-09-15 DIAGNOSIS — M79.7 FIBROMYALGIA: ICD-10-CM

## 2022-09-15 DIAGNOSIS — M51.36 DDD (DEGENERATIVE DISC DISEASE), LUMBAR: ICD-10-CM

## 2022-09-15 DIAGNOSIS — M48.062 LUMBAR STENOSIS WITH NEUROGENIC CLAUDICATION: Primary | ICD-10-CM

## 2022-09-15 DIAGNOSIS — M48.062 LUMBAR STENOSIS WITH NEUROGENIC CLAUDICATION: ICD-10-CM

## 2022-09-15 DIAGNOSIS — F41.1 GAD (GENERALIZED ANXIETY DISORDER): ICD-10-CM

## 2022-09-15 DIAGNOSIS — M54.51 VERTEBROGENIC LOW BACK PAIN: ICD-10-CM

## 2022-09-15 DIAGNOSIS — M47.816 SPONDYLOSIS OF LUMBAR REGION WITHOUT MYELOPATHY OR RADICULOPATHY: ICD-10-CM

## 2022-09-15 DIAGNOSIS — Z01.818 PREOPERATIVE EXAMINATION: ICD-10-CM

## 2022-09-15 DIAGNOSIS — R26.9 GAIT DISTURBANCE: ICD-10-CM

## 2022-09-15 DIAGNOSIS — Z00.8 PRE-SURGICAL PSYCHOLOGICAL ASSESSMENT, ENCOUNTER FOR: ICD-10-CM

## 2022-09-15 DIAGNOSIS — G62.9 NEUROPATHY: ICD-10-CM

## 2022-09-15 DIAGNOSIS — G47.09 OTHER INSOMNIA: ICD-10-CM

## 2022-09-15 DIAGNOSIS — M43.16 SPONDYLOLISTHESIS OF LUMBAR REGION: ICD-10-CM

## 2022-09-15 DIAGNOSIS — R53.81 PHYSICAL DECONDITIONING: ICD-10-CM

## 2022-09-15 PROCEDURE — 72120 X-RAY BEND ONLY L-S SPINE: CPT

## 2022-09-15 PROCEDURE — 99204 OFFICE O/P NEW MOD 45 MIN: CPT | Performed by: ANESTHESIOLOGY

## 2022-09-15 RX ORDER — ME-TETRAHYDROFOLATE/B12/HRB236 1-1-500 MG
1 CAPSULE ORAL DAILY
Qty: 90 CAPSULE | Refills: 1 | Status: SHIPPED | OUTPATIENT
Start: 2022-09-15 | End: 2022-11-22

## 2022-09-15 RX ORDER — MULTIVITAMIN WITH IRON
100 TABLET ORAL DAILY
Qty: 30 TABLET | Refills: 0 | Status: SHIPPED | OUTPATIENT
Start: 2022-09-15

## 2022-09-15 RX ORDER — ST. JOHN'S WORT 300 MG
400 CAPSULE ORAL 3 TIMES DAILY
Qty: 180 CAPSULE | Refills: 5 | Status: SHIPPED | OUTPATIENT
Start: 2022-09-15

## 2022-09-19 ENCOUNTER — PATIENT ROUNDING (BHMG ONLY) (OUTPATIENT)
Dept: PAIN MEDICINE | Facility: CLINIC | Age: 67
End: 2022-09-19

## 2022-09-19 DIAGNOSIS — G89.29 ACUTE EXACERBATION OF CHRONIC LOW BACK PAIN: ICD-10-CM

## 2022-09-19 DIAGNOSIS — M54.50 ACUTE EXACERBATION OF CHRONIC LOW BACK PAIN: ICD-10-CM

## 2022-09-19 NOTE — PROGRESS NOTES
A MY-CHART MESSAGE HAS BEEN SENT TO THE PATIENT FOR PATIENT R[place]ING WITH [facility].

## 2022-09-20 ENCOUNTER — HOSPITAL ENCOUNTER (OUTPATIENT)
Dept: PHYSICAL THERAPY | Facility: HOSPITAL | Age: 67
Setting detail: THERAPIES SERIES
Discharge: HOME | End: 2022-09-20
Payer: MEDICARE

## 2022-09-20 PROCEDURE — 97110 THERAPEUTIC EXERCISES: CPT

## 2022-09-20 PROCEDURE — 97140 MANUAL THERAPY 1/> REGIONS: CPT

## 2022-09-20 RX ORDER — HYDROCODONE BITARTRATE AND ACETAMINOPHEN 10; 325 MG/1; MG/1
1 TABLET ORAL EVERY 6 HOURS PRN
Qty: 120 TABLET | Refills: 0 | Status: SHIPPED | OUTPATIENT
Start: 2022-09-20 | End: 2022-10-20

## 2022-09-20 NOTE — FLOWSHEET NOTE
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 Landy Gonzalez with [facility] for another consult on her back pain. She reports she is being scheduled for injections in her low back / hip. She reports she was encouraged to continue PT. Objective:  Observation: Gait:  Pt demonstrates a steppage gait with her LLE and circumducts during swing phase. Test measurements:  Back Index: 48%  Palpation:  Muscle guarding noted at L>R lumbar paraspinals and at L gluteus medius region.      Exercises:  Exercise Resistance/Repetitions Other comments   Nustep L5 x 8' 20   Wall posture w/ TKE BTB 3x10 20   Supine hip flexor stretch 5x1' B 20   Standing hip abd 2x10 B 20   Seated hip ER / IR w/ t-band 3x10 OTB 20   Piriformis stretch  5x20\" 20   Ab brace with partial lift (partial bridge) 3x10 20   LTR - short ROM x30 20   Hip abd / add BTB / ball 3x10  20                              Other Therapeutic Activities:      Manual Treatments:  PA glides lumbar spine, STM lumbar paraspinals, inferior pelvic distraction x15'    Modalities: Ice pack to lumbar spine and L hip x 10' - not performed today       Timed Code Treatment Minutes:  75      Total Treatment Minutes:  105    Treatment/Activity Tolerance:  [x] Patient tolerated treatment well [] Patient limited by fatigue  [] Patient limited by pain  [] Patient limited by other medical complications  [x] Other: Pt has responded favorable to skilled PT with improved postural awareness and exercise tolerance. Her Back index is approximately the same as the eval.  She will benefit from skilled PT to further address core and hip weakness as well as gait training to improve compensation. She may benefit from addition of integrated dry needling to the lumbar and upper gluteal region to address muscle guarding and tenderness. Pain after treatment:      2-3/10    Prognosis: [x] Good [] Fair  [] Poor    Patient Requires Follow-up: [x] Yes  [] No    Plan:   [x] Continue per plan of care [] Alter current plan (see comments)  [] Plan of care initiated [] Hold pending MD visit [] Discharge    Plan for Next Session:      Patient Goal(s):    Short Term Goals Completed by 3 weeks Goal Status   Pt to be I with HEP MET and ongoing   Pt to be educated in posture correction techniques and self correct with verbal cues. ongoing   Pt to demonstrate a 1/2 grade increase in LLE strength where limited. Pt to perform daily activities with average pain of 5/10 or less. Long Term Goals Completed by 6 weeks Goal Status   Pt to demonstrate 5/5 LLE strength grossly    Back Index score to improve to 25% or less indicating improved function. Pt to be able to stand to cook / wash dishes for 30 minutes or greater without having to sit due to pain. Pt to be able to ambulate community distances, such as at a grocery store, with pain of 3/10 or less.                                  Electronically signed by: Michael Nash, PT

## 2022-09-27 ENCOUNTER — HOSPITAL ENCOUNTER (OUTPATIENT)
Dept: PHYSICAL THERAPY | Facility: HOSPITAL | Age: 67
Setting detail: THERAPIES SERIES
Discharge: HOME | End: 2022-09-27
Payer: MEDICARE

## 2022-09-27 PROCEDURE — 97140 MANUAL THERAPY 1/> REGIONS: CPT

## 2022-09-27 PROCEDURE — 97110 THERAPEUTIC EXERCISES: CPT

## 2022-09-29 NOTE — FLOWSHEET NOTE
hip x 10' - not performed today       Timed Code Treatment Minutes:  70      Total Treatment Minutes: 80    Treatment/Activity Tolerance:  [x] Patient tolerated treatment well [] Patient limited by fatigue  [] Patient limited by pain  [] Patient limited by other medical complications  [x] Other: Dry needling was initiated today to address lumbar and upper gluteal pain with good tolerance noted. She is making progress toward standing tolerance goal.  Pt reports that she when she started PT she could only  her kitchen to do chores for approximately 5 minutes and keeps a rolling chair so she can sit often. Now she reports that she can stand for 10 minutes before needing to sit. She also reports that she can stand at [employer] for singing whereas prior she remained seated due to pain. She does note that she has some difficulty with turning to look behind her when backing up in her car and would like to address this trunk rotation portion. She is responding favorable and will continue to benefit from further treatment to restore optimal functional level. Pain after treatment:      2-3/10    Prognosis: [x] Good [] Fair  [] Poor    Patient Requires Follow-up: [x] Yes  [] No    Plan:   [x] Continue per plan of care [] Alter current plan (see comments)  [] Plan of care initiated [] Hold pending MD visit [] Discharge    Plan for Next Session:      Patient Goal(s):    Short Term Goals Completed by 3 weeks Goal Status   Pt to be I with HEP MET and ongoing   Pt to be educated in posture correction techniques and self correct with verbal cues. ongoing   Pt to demonstrate a 1/2 grade increase in LLE strength where limited. Pt to perform daily activities with average pain of 5/10 or less. Long Term Goals Completed by 6 weeks Goal Status   Pt to demonstrate 5/5 LLE strength grossly    Back Index score to improve to 25% or less indicating improved function.     Pt to be able to stand to cook / wash dishes for 30 minutes or greater without having to sit due to pain. Can now stand for 10 minutes 9/27/22   Pt to be able to ambulate community distances, such as at a grocery store, with pain of 3/10 or less.                                  Electronically signed by:  Marcy Soriano, PT

## 2022-10-03 ENCOUNTER — OUTSIDE FACILITY SERVICE (OUTPATIENT)
Dept: PAIN MEDICINE | Facility: CLINIC | Age: 67
End: 2022-10-03

## 2022-10-03 PROCEDURE — 64484 NJX AA&/STRD TFRM EPI L/S EA: CPT | Performed by: ANESTHESIOLOGY

## 2022-10-03 PROCEDURE — 99152 MOD SED SAME PHYS/QHP 5/>YRS: CPT | Performed by: ANESTHESIOLOGY

## 2022-10-03 PROCEDURE — 64483 NJX AA&/STRD TFRM EPI L/S 1: CPT | Performed by: ANESTHESIOLOGY

## 2022-10-04 ENCOUNTER — TELEPHONE (OUTPATIENT)
Dept: PAIN MEDICINE | Facility: CLINIC | Age: 67
End: 2022-10-04

## 2022-10-04 ENCOUNTER — APPOINTMENT (OUTPATIENT)
Dept: MAMMOGRAPHY | Facility: HOSPITAL | Age: 67
End: 2022-10-04

## 2022-10-05 ENCOUNTER — OFFICE VISIT (OUTPATIENT)
Dept: FAMILY MEDICINE CLINIC | Age: 67
End: 2022-10-05
Payer: MEDICARE

## 2022-10-05 VITALS
HEART RATE: 78 BPM | HEIGHT: 64 IN | SYSTOLIC BLOOD PRESSURE: 134 MMHG | DIASTOLIC BLOOD PRESSURE: 70 MMHG | WEIGHT: 234 LBS | RESPIRATION RATE: 18 BRPM | TEMPERATURE: 98.1 F | BODY MASS INDEX: 39.95 KG/M2 | OXYGEN SATURATION: 98 %

## 2022-10-05 DIAGNOSIS — I10 ESSENTIAL HYPERTENSION: ICD-10-CM

## 2022-10-05 DIAGNOSIS — Z23 NEED FOR INFLUENZA VACCINATION: Primary | ICD-10-CM

## 2022-10-05 DIAGNOSIS — J30.2 SEASONAL ALLERGIES: ICD-10-CM

## 2022-10-05 PROCEDURE — 1123F ACP DISCUSS/DSCN MKR DOCD: CPT | Performed by: FAMILY MEDICINE

## 2022-10-05 PROCEDURE — 90694 VACC AIIV4 NO PRSRV 0.5ML IM: CPT | Performed by: FAMILY MEDICINE

## 2022-10-05 PROCEDURE — 99214 OFFICE O/P EST MOD 30 MIN: CPT | Performed by: FAMILY MEDICINE

## 2022-10-05 PROCEDURE — G0008 ADMIN INFLUENZA VIRUS VAC: HCPCS | Performed by: FAMILY MEDICINE

## 2022-10-05 RX ORDER — CEPHRADINE 500 MG
400 CAPSULE ORAL 3 TIMES DAILY
COMMUNITY
Start: 2022-09-15

## 2022-10-05 RX ORDER — DESLORATADINE 5 MG/1
TABLET ORAL
Qty: 90 TABLET | Refills: 3 | Status: SHIPPED | OUTPATIENT
Start: 2022-10-05

## 2022-10-05 RX ORDER — ME-TETRAHYDROFOLATE/B12/HRB236 1-1-500 MG
1 CAPSULE ORAL DAILY
COMMUNITY
Start: 2022-09-15

## 2022-10-05 RX ORDER — PYRIDOXINE HCL (VITAMIN B6) 100 MG
100 TABLET ORAL DAILY
COMMUNITY
Start: 2022-09-15

## 2022-10-05 RX ORDER — HYDROCHLOROTHIAZIDE 25 MG/1
25 TABLET ORAL DAILY
Qty: 90 TABLET | Refills: 3 | Status: SHIPPED | OUTPATIENT
Start: 2022-10-05

## 2022-10-05 ASSESSMENT — ENCOUNTER SYMPTOMS
EYE ITCHING: 1
EYE REDNESS: 1
SORE THROAT: 1
RESPIRATORY NEGATIVE: 1
GASTROINTESTINAL NEGATIVE: 1
BACK PAIN: 1

## 2022-10-05 NOTE — PROGRESS NOTES
Chief Complaint   Patient presents with    Back Pain       Have you seen any other physician or provider since your last visit yes - Oleg Salcido, pain consultant [facility]    Have you had any other diagnostic tests since your last visit? no    Have you changed or stopped any medications since your last visit? injection      Immunizations Administered       Name Date Dose Route    Influenza, FLUAD, (age 72 y+), Adjuvanted, 0.5mL 10/5/2022 0.5 mL Intramuscular    Site: Deltoid- Left    Lot: [id number]    NDC: 25891-099-92            Patient tolerated injection well. Patient advised to wait 20 minutes in the office following the injection. No signs/symptoms of reaction noted after 20 minutes. Vaccine Information Sheet, \"Influenza - Inactivated\"  given to Ilene Major, or parent/legal guardian of  Ilene Major and verbalized understanding. Patient responses:    Have you ever had a reaction to a flu vaccine? No  Do you have any current illness? No  Have you ever had Guillian [place] Syndrome? No  Do you have a serious allergy to any of the follow: Neomycin, Polymyxin, Thimerosal, eggs or egg products? No    Flu vaccine given per order. Please see immunization tab. Risks and benefits explained. Current VIS given. Patient tolerated injection well. Patient advised to wait 20 minutes in the office following the injection. No signs/symptoms of reaction noted after 20 minutes.

## 2022-10-05 NOTE — PROGRESS NOTES
SUBJECTIVE:    Patient ID: Shawn Bliss is a 79 y.o. female. Chief Complaint   Patient presents with    Back Pain       HPI: office visit  Office today in follow-up of her back pain. She is doing some better after some physical therapy. She is feeling like things are slowly returning to closer to normal.  She still struggling with some of her functional daily issues but things are better. She is getting some relief with the pain medicine. She was very frustrated with the neurosurgeon but feels like at this point that things are going to return to close to normal.  She would like to lose some weight. She like to be able to be more active. She denies any recent falls or injuries. She is not having any chest pain or shortness of breath. Having some allergy type symptoms. Some congestion and sneezing. She has had some nasal drainage. She is having good blood pressure readings at home. She has not had any significant medication problems. She is having some pedal edema. Review of Systems   Constitutional:  Positive for fatigue. HENT:  Positive for congestion, postnasal drip and sore throat. Eyes:  Positive for redness and itching. Respiratory: Negative. Cardiovascular: Negative. Gastrointestinal: Negative. Musculoskeletal:  Positive for arthralgias, back pain, gait problem and myalgias. Skin:  Positive for rash. All other systems reviewed and are negative. OBJECTIVE:  /70   Pulse 78   Temp 98.1 °F (36.7 °C)   Resp 18   Ht 5' 4\" (1.626 m)   Wt 234 lb (106.1 kg)   SpO2 98% Comment: ra  BMI 40.17 kg/m²    Wt Readings from Last 3 Encounters:   10/05/22 234 lb (106.1 kg)   09/06/22 233 lb (105.7 kg)   08/31/22 225 lb (102.1 kg)     BP Readings from Last 3 Encounters:   10/05/22 134/70   09/06/22 130/76   08/31/22 136/78      Pulse Readings from Last 3 Encounters:   10/05/22 78   09/06/22 86   08/31/22 78     Body mass index is 40.17 kg/m².    Resp Readings from Last 3 Encounters:   10/05/22 18   09/06/22 18   08/31/22 18     Past medical, surgical, family and social history were reviewed and updated with the patient. Physical Exam  Vitals and nursing note reviewed. Constitutional:       General: She is not in acute distress. Appearance: Normal appearance. She is well-developed and normal weight. She is not ill-appearing or toxic-appearing. HENT:      Head: Normocephalic and atraumatic. Right Ear: Hearing, tympanic membrane, ear canal and external ear normal.      Left Ear: Hearing, tympanic membrane, ear canal and external ear normal.      Nose: Nose normal.      Mouth/Throat:      Lips: Pink. Mouth: Mucous membranes are moist.      Tongue: No lesions. Tongue does not deviate from midline. Palate: No mass and lesions. Pharynx: No pharyngeal swelling, oropharyngeal exudate or uvula swelling. Tonsils: No tonsillar exudate or tonsillar abscesses. Eyes:      Conjunctiva/sclera: Conjunctivae normal.      Pupils: Pupils are equal, round, and reactive to light. Neck:      Thyroid: Thyromegaly present. No thyroid mass or thyroid tenderness. Trachea: Trachea normal.   Cardiovascular:      Rate and Rhythm: Normal rate and regular rhythm. Heart sounds: Normal heart sounds, S1 normal and S2 normal. No murmur heard. No friction rub. No gallop. Pulmonary:      Effort: Pulmonary effort is normal.      Breath sounds: Normal breath sounds. No wheezing, rhonchi or rales. Musculoskeletal:      Cervical back: Full passive range of motion without pain, normal range of motion and neck supple. Lumbar back: Spasms, tenderness and bony tenderness present. Decreased range of motion. Lymphadenopathy:      Cervical: No cervical adenopathy. Neurological:      Mental Status: She is alert and oriented to person, place, and time.    Psychiatric:         Attention and Perception: Attention and perception normal.         Mood and Affect: Mood is anxious. Speech: Speech normal.         Behavior: Behavior normal. Behavior is cooperative. Thought Content:  Thought content normal.         Cognition and Memory: Cognition and memory normal.         Judgment: Judgment normal.        Results in Past 30 Days  Result Component Current Result Ref Range Previous Result Ref Range   Albumin/Globulin Ratio 1.7 (10/10/2022) 0.8 - 2.0 Not in Time Range    Albumin 4.7 (10/10/2022) 3.4 - 4.8 g/dL Not in Time Range    Alkaline Phosphatase 77 (10/10/2022) 25 - 100 U/L Not in Time Range    ALT 31 (10/10/2022) 4 - 36 U/L Not in Time Range    AST 25 (10/10/2022) 8 - 33 U/L Not in Time Range    BUN 26 (H) (10/10/2022) 6 - 20 mg/dL Not in Time Range    Calcium 9.9 (10/10/2022) 8.5 - 10.5 mg/dL Not in Time Range    Chloride 97 (L) (10/10/2022) 98 - 107 mmol/L Not in Time Range    CO2 27 (10/10/2022) 20 - 30 mmol/L Not in Time Range    Creatinine 0.8 (10/10/2022) 0.4 - 1.2 mg/dL Not in Time Range    GFR  >59 (10/10/2022) >59 Not in Time Range    GFR Non- >60 (10/10/2022) >59 Not in Time Range    Globulin 2.7 (10/10/2022) Not Established g/dL Not in Time Range    Glucose 84 (10/10/2022) 74 - 106 mg/dL Not in Time Range    Potassium 4.1 (10/10/2022) 3.4 - 5.1 mmol/L Not in Time Range    Sodium 138 (10/10/2022) 136 - 145 mmol/L Not in Time Range    Total Bilirubin 0.4 (10/10/2022) 0.3 - 1.2 mg/dL Not in Time Range    Total Protein 7.4 (10/10/2022) 6.4 - 8.3 g/dL Not in Time Range      Hemoglobin A1C (%)   Date Value   07/16/2020 5.3     Microscopic Examination (no units)   Date Value   12/28/2017 Not Indicated     LDL Calculated (mg/dL)   Date Value   02/02/2021 109       Lab Results   Component Value Date/Time    WBC 6.8 10/10/2022 09:30 AM    NEUTROABS 4.1 10/10/2022 09:30 AM    HGB 14.2 10/10/2022 09:30 AM    HCT 43.1 10/10/2022 09:30 AM    MCV 85.7 10/10/2022 09:30 AM     10/10/2022 09:30 AM     Lab Results   Component Value Date TSH 1.17 08/09/2021       ASSESSMENT/PLAN    Diagnosis Orders   1. Need for influenza vaccination  Influenza, FLUAD, (age 72 y+), IM, Preservative Free, 0.5 mL      2. Seasonal allergies  desloratadine (CLARINEX) 5 MG tablet      3. Essential hypertension  hydroCHLOROthiazide (HYDRODIURIL) 25 MG tablet          Orders Placed This Encounter   Medications    desloratadine (CLARINEX) 5 MG tablet     Sig: take 1 tablet by mouth once daily     Dispense:  90 tablet     Refill:  3    hydroCHLOROthiazide (HYDRODIURIL) 25 MG tablet     Sig: Take 1 tablet by mouth daily     Dispense:  90 tablet     Refill:  3        Medications Discontinued During This Encounter   Medication Reason    silver sulfADIAZINE (SILVADENE) 1 % cream LIST CLEANUP    vitamin B-12 (CYANOCOBALAMIN) 1000 MCG tablet LIST CLEANUP    hydroCHLOROthiazide (HYDRODIURIL) 25 MG tablet REORDER    desloratadine (CLARINEX) 5 MG tablet REORDER       Controlled Substances Monitoring:      Please note: This chart was generated using Dragon dictation software. Although every effort was made to ensure the accuracy of this automated transcription, some errors in transcription may have occurred.

## 2022-10-06 ENCOUNTER — TELEPHONE (OUTPATIENT)
Dept: FAMILY MEDICINE CLINIC | Age: 67
End: 2022-10-06

## 2022-10-06 NOTE — TELEPHONE ENCOUNTER
Patient was wondering if you could put in a referral for physical therapy? She would like to go to [facility].

## 2022-10-07 DIAGNOSIS — M54.50 ACUTE EXACERBATION OF CHRONIC LOW BACK PAIN: Primary | ICD-10-CM

## 2022-10-07 DIAGNOSIS — G89.29 ACUTE EXACERBATION OF CHRONIC LOW BACK PAIN: Primary | ICD-10-CM

## 2022-10-08 NOTE — PROGRESS NOTES
SUBJECTIVE:    Patient ID: Feng Messina is a 79 y.o. female. No chief complaint on file. HPI:    Patient's medications,allergies, past medical, surgical, social and family histories were reviewed and updated as appropriate. .  Current Outpatient Medications on File Prior to Visit   Medication Sig Dispense Refill    Alpha-Lipoic Acid 200 MG CAPS Take 400 mg by mouth 3 times daily      Dietary Management Product (RHEUMATE) CAPS Take 1 capsule by mouth daily      Gel Base GEL 2 g by NOT APPLICABLE route 4 times daily      pyridoxine (B-6) 100 MG tablet Take 100 mg by mouth daily      desloratadine (CLARINEX) 5 MG tablet take 1 tablet by mouth once daily 90 tablet 3    hydroCHLOROthiazide (HYDRODIURIL) 25 MG tablet Take 1 tablet by mouth daily 90 tablet 3    HYDROcodone-acetaminophen (NORCO)  MG per tablet Take 1 tablet by mouth every 6 hours as needed for Pain for up to 30 days.  Intended supply: 30 days 120 tablet 0    clotrimazole-betamethasone (LOTRISONE) 1-0.05 % cream APPLY CREAM TOPICALLY TO RIGHT EAR TWICE DAILY      estradiol (ESTRACE) 1 MG tablet Take 1 tablet by mouth daily 63 tablet 3    zolpidem (AMBIEN) 10 MG tablet TAKE 1 TABLET BY MOUTH EVERY NIGHT AS NEEDED FOR SLEEP 30 tablet 5    diclofenac sodium (VOLTAREN) 1 % GEL Apply 4 g topically 4 times daily 100 g 0    losartan (COZAAR) 50 MG tablet Take 1 tablet by mouth daily 90 tablet 3    doxycycline hyclate (VIBRAMYCIN) 100 MG capsule TAKE ONE CAPSULE BY MOUTH TWICE DAILY FOR 8 WEEKS      metroNIDAZOLE (METROCREAM) 0.75 % cream APPLY EXTERNALLY TO THE AFFECTED AREA EVERY DAY      RHOFADE 1 % CREA Apply to face daily      levothyroxine (SYNTHROID) 50 MCG tablet Take 50 mcg by mouth daily      DULoxetine (CYMBALTA) 60 MG extended release capsule Take 60 mg by mouth daily      pregabalin (LYRICA) 75 MG capsule take 1 capsule by mouth twice a day  0    Ascorbic Acid (VITAMIN C) 250 MG tablet Take 250 mg by mouth daily       nabumetone (RELAFEN) 750 MG tablet Take 750 mg by mouth 2 times daily       Omega-3 Fatty Acids (FISH OIL) 1000 MG CAPS Take 1,200 mg by mouth daily       vitamin D (ERGOCALCIFEROL) 99783 units CAPS capsule Take by mouth   0    fluticasone (FLONASE) 50 MCG/ACT nasal spray 1 spray by Nasal route daily 1 Bottle 3    Multiple Vitamins-Minerals (MULTIVITAL PO) Take 1 tablet by mouth daily        No current facility-administered medications on file prior to visit. Review of Systems    OBJECTIVE:  There were no vitals taken for this visit. Physical Exam    No results found for requested labs within last 30 days. Hemoglobin A1C (%)   Date Value   07/16/2020 5.3     Microscopic Examination (no units)   Date Value   12/28/2017 Not Indicated     LDL Calculated (mg/dL)   Date Value   02/02/2021 109           Lab Results   Component Value Date    TSH 1.17 08/09/2021         ASSESSMENT/PLAN:     There are no diagnoses linked to this encounter. There are no discontinued medications.

## 2022-10-10 ENCOUNTER — HOSPITAL ENCOUNTER (OUTPATIENT)
Facility: HOSPITAL | Age: 67
Discharge: HOME | End: 2022-10-10
Payer: MEDICARE

## 2022-10-10 LAB
A/G RATIO: 1.7 (ref 0.8–2)
ALBUMIN SERPL-MCNC: 4.7 G/DL (ref 3.4–4.8)
ALP BLD-CCNC: 77 U/L (ref 25–100)
ALT SERPL-CCNC: 31 U/L (ref 4–36)
ANION GAP SERPL CALCULATED.3IONS-SCNC: 14 MMOL/L (ref 3–16)
AST SERPL-CCNC: 25 U/L (ref 8–33)
BASOPHILS ABSOLUTE: 0 K/UL (ref 0–0.1)
BASOPHILS RELATIVE PERCENT: 0.6 %
BILIRUB SERPL-MCNC: 0.4 MG/DL (ref 0.3–1.2)
BUN BLDV-MCNC: 26 MG/DL (ref 6–20)
CALCIUM SERPL-MCNC: 9.9 MG/DL (ref 8.5–10.5)
CHLORIDE BLD-SCNC: 97 MMOL/L (ref 98–107)
CO2: 27 MMOL/L (ref 20–30)
CREAT SERPL-MCNC: 0.8 MG/DL (ref 0.4–1.2)
EOSINOPHILS ABSOLUTE: 0.1 K/UL (ref 0–0.4)
EOSINOPHILS RELATIVE PERCENT: 1.3 %
GFR AFRICAN AMERICAN: >59
GFR NON-AFRICAN AMERICAN: >60
GLOBULIN: 2.7 G/DL
GLUCOSE BLD-MCNC: 84 MG/DL (ref 74–106)
HCT VFR BLD CALC: 43.1 % (ref 37–47)
HEMOGLOBIN: 14.2 G/DL (ref 11.5–16.5)
IMMATURE GRANULOCYTES #: 0 K/UL
IMMATURE GRANULOCYTES %: 0.3 % (ref 0–5)
LYMPHOCYTES ABSOLUTE: 2 K/UL (ref 1.5–4)
LYMPHOCYTES RELATIVE PERCENT: 29.3 %
MCH RBC QN AUTO: 28.2 PG (ref 27–32)
MCHC RBC AUTO-ENTMCNC: 32.9 G/DL (ref 31–35)
MCV RBC AUTO: 85.7 FL (ref 80–100)
MONOCYTES ABSOLUTE: 0.6 K/UL (ref 0.2–0.8)
MONOCYTES RELATIVE PERCENT: 8.7 %
NEUTROPHILS ABSOLUTE: 4.1 K/UL (ref 2–7.5)
NEUTROPHILS RELATIVE PERCENT: 59.8 %
PDW BLD-RTO: 14.3 % (ref 11–16)
PLATELET # BLD: 205 K/UL (ref 150–400)
PMV BLD AUTO: 10.5 FL (ref 6–10)
POTASSIUM SERPL-SCNC: 4.1 MMOL/L (ref 3.4–5.1)
RBC # BLD: 5.03 M/UL (ref 3.8–5.8)
SODIUM BLD-SCNC: 138 MMOL/L (ref 136–145)
TOTAL PROTEIN: 7.4 G/DL (ref 6.4–8.3)
WBC # BLD: 6.8 K/UL (ref 4–11)

## 2022-10-10 PROCEDURE — 85025 COMPLETE CBC W/AUTO DIFF WBC: CPT

## 2022-10-10 PROCEDURE — 36415 COLL VENOUS BLD VENIPUNCTURE: CPT

## 2022-10-10 PROCEDURE — 80053 COMPREHEN METABOLIC PANEL: CPT

## 2022-10-12 DIAGNOSIS — G47.00 INSOMNIA, UNSPECIFIED TYPE: ICD-10-CM

## 2022-10-13 ENCOUNTER — TELEPHONE (OUTPATIENT)
Dept: FAMILY MEDICINE CLINIC | Age: 67
End: 2022-10-13

## 2022-10-13 RX ORDER — ZOLPIDEM TARTRATE 10 MG/1
10 TABLET ORAL NIGHTLY PRN
Qty: 30 TABLET | Refills: 2 | Status: SHIPPED | OUTPATIENT
Start: 2022-10-13 | End: 2023-01-13

## 2022-10-13 NOTE — TELEPHONE ENCOUNTER
Patient called and stated that she is leaving for vacation on Sunday and her Ambien isn't due until Wednesday. She was wondering if we could call the pharmacy and let them know it was ok to fill on Sunday.

## 2022-10-24 ENCOUNTER — HOSPITAL ENCOUNTER (OUTPATIENT)
Dept: MAMMOGRAPHY | Facility: HOSPITAL | Age: 67
Discharge: HOME | End: 2022-10-24
Admitting: FAMILY MEDICINE

## 2022-10-24 DIAGNOSIS — Z12.31 VISIT FOR SCREENING MAMMOGRAM: ICD-10-CM

## 2022-10-24 PROCEDURE — 77067 SCR MAMMO BI INCL CAD: CPT | Performed by: RADIOLOGY

## 2022-10-24 PROCEDURE — 77063 BREAST TOMOSYNTHESIS BI: CPT

## 2022-10-24 PROCEDURE — 77063 BREAST TOMOSYNTHESIS BI: CPT | Performed by: RADIOLOGY

## 2022-10-24 PROCEDURE — 77067 SCR MAMMO BI INCL CAD: CPT

## 2022-10-25 ENCOUNTER — HOSPITAL ENCOUNTER (OUTPATIENT)
Dept: MRI IMAGING | Facility: HOSPITAL | Age: 67
End: 2022-10-25

## 2022-10-25 ENCOUNTER — HOSPITAL ENCOUNTER (OUTPATIENT)
Dept: MRI IMAGING | Facility: HOSPITAL | Age: 67
Discharge: HOME | End: 2022-10-25
Admitting: ANESTHESIOLOGY

## 2022-10-25 DIAGNOSIS — Z01.818 PREOPERATIVE EXAMINATION: ICD-10-CM

## 2022-10-25 PROCEDURE — 72146 MRI CHEST SPINE W/O DYE: CPT

## 2022-10-27 DIAGNOSIS — M79.7 FIBROMYALGIA: ICD-10-CM

## 2022-10-27 DIAGNOSIS — G63 NEUROPATHY DUE TO INFECTION: ICD-10-CM

## 2022-10-27 DIAGNOSIS — B99.9 NEUROPATHY DUE TO INFECTION: ICD-10-CM

## 2022-10-27 DIAGNOSIS — M48.061 SPINAL STENOSIS OF LUMBAR REGION WITHOUT NEUROGENIC CLAUDICATION: ICD-10-CM

## 2022-10-27 NOTE — TELEPHONE ENCOUNTER
Caller: KARLA Bauman call back number: [phone]    Requested Prescriptions: PREGABALIN 75 MG   Requested Prescriptions      No prescriptions requested or ordered in this encounter        Pharm[facility] where request should be sent: [facility] - [place], [place] - [facility] [address] - [phone]  - [phone] FX  [phone]     Additional details provided by patient:  PT WOULD LIKE TO TALK TO YOU ABOUT INCREASING  RX POSSIBLE? SHE IS HAVING PAIN IN  LEGS AND HANDS.   IF YOU THINK YOU MIGHT  INCREASE RX  CAN YOU SEND  OLD RX INTO MAIL IN  FOR ONLY ONE MONTH (THEY USUALLY DO 3 MONTH SUPPLY)   UNTIL YOU DISCUSS AT  NOV 17.22  APPT . OR CALL PT TO DISCUSS.  SHE DON'T WANT TO GET 3 MONTHS WORTH OF RX AT 75 ML AND THEN YOU INCREASE.      Does the patient have less than a 3 day supply:  [] Yes  [x] No    PLEASE ADVISE.     Stevie Smith Rep   10/27/22 15:29 EDT

## 2022-10-28 ENCOUNTER — OFFICE VISIT (OUTPATIENT)
Dept: NEUROSURGERY | Facility: CLINIC | Age: 67
End: 2022-10-28

## 2022-10-28 VITALS — TEMPERATURE: 97.7 F | WEIGHT: 234 LBS | BODY MASS INDEX: 39.95 KG/M2 | HEIGHT: 64 IN

## 2022-10-28 DIAGNOSIS — M53.2X6 SPINAL INSTABILITY OF LUMBAR REGION: ICD-10-CM

## 2022-10-28 DIAGNOSIS — M48.062 SPINAL STENOSIS OF LUMBAR REGION WITH NEUROGENIC CLAUDICATION: Primary | ICD-10-CM

## 2022-10-28 DIAGNOSIS — M43.16 SPONDYLOLISTHESIS OF LUMBAR REGION: ICD-10-CM

## 2022-10-28 PROCEDURE — 99214 OFFICE O/P EST MOD 30 MIN: CPT | Performed by: NEUROLOGICAL SURGERY

## 2022-10-28 RX ORDER — DOXYCYCLINE HYCLATE 20 MG
1 TABLET ORAL EVERY 12 HOURS
COMMUNITY
End: 2022-10-28 | Stop reason: SDUPTHER

## 2022-10-28 RX ORDER — PREGABALIN 75 MG/1
75 CAPSULE ORAL 3 TIMES DAILY
Qty: 90 CAPSULE | Refills: 0 | Status: SHIPPED | OUTPATIENT
Start: 2022-10-28 | End: 2022-11-17 | Stop reason: SDUPTHER

## 2022-10-28 RX ORDER — NABUMETONE 750 MG/1
1 TABLET, FILM COATED ORAL EVERY 12 HOURS
COMMUNITY
Start: 2022-10-13 | End: 2022-10-28 | Stop reason: SDUPTHER

## 2022-10-28 NOTE — PROGRESS NOTES
Patient: Alison Benitez  : 1955    Primary Care Provider: Kacy Huertas MD    Requesting Provider: As above        History    Chief Complaint: Low back and bilateral hip pain.    History of Present Illness: Ms. Benitez is a 67-year-old woman that I saw last month.  I had seen her the August before as well.  She had thigh pain with some walking and standing intolerance.  She had seen my former colleague Dr. Billings several years prior to that.  She was found to have mechanical low back pain as well as some degree of neurogenic claudication.  She complains of pain in her back that involves her hips at this point.  It bothers her in multiple positions.  If she can remain completely immobile then she feels better.  She had several epidural injections performed and felt great for a while.  That has essentially worn off.  She denies any bowel or bladder dysfunction.    Review of Systems   Constitutional: Positive for activity change. Negative for appetite change, chills, diaphoresis, fatigue, fever and unexpected weight change.   HENT: Negative for congestion, dental problem, drooling, ear discharge, ear pain, facial swelling, hearing loss, mouth sores, nosebleeds, postnasal drip, rhinorrhea, sinus pressure, sneezing, sore throat, tinnitus, trouble swallowing and voice change.    Eyes: Negative for photophobia, pain, discharge, redness, itching and visual disturbance.   Respiratory: Negative for apnea, cough, choking, chest tightness, shortness of breath, wheezing and stridor.    Cardiovascular: Negative for chest pain, palpitations and leg swelling.   Gastrointestinal: Negative for abdominal distention, abdominal pain, anal bleeding, blood in stool, constipation, diarrhea, nausea, rectal pain and vomiting.   Endocrine: Negative for cold intolerance, heat intolerance, polydipsia, polyphagia and polyuria.   Genitourinary: Negative for decreased urine volume, difficulty urinating, dysuria, enuresis, flank  "pain, frequency, genital sores, hematuria and urgency.   Musculoskeletal: Positive for arthralgias, back pain and myalgias. Negative for gait problem, joint swelling, neck pain and neck stiffness.   Skin: Negative for color change, pallor, rash and wound.   Allergic/Immunologic: Negative for environmental allergies, food allergies and immunocompromised state.   Neurological: Positive for dizziness and light-headedness. Negative for tremors, seizures, syncope, facial asymmetry, speech difficulty, weakness, numbness and headaches.   Hematological: Negative for adenopathy. Does not bruise/bleed easily.   Psychiatric/Behavioral: Negative for agitation, behavioral problems, confusion, decreased concentration, dysphoric mood, hallucinations, self-injury, sleep disturbance and suicidal ideas. The patient is not nervous/anxious and is not hyperactive.    All other systems reviewed and are negative.      The patient's past medical history, past surgical history, family history, and social history have been reviewed at length in the electronic medical record.      Physical Exam:   Temp 97.7 °F (36.5 °C) (Infrared)   Ht 162.6 cm (64.02\")   Wt 106 kg (234 lb)   BMI 40.15 kg/m²   Straight leg raising is negative.  Reflexes are difficult to elicit in her lower extremities.  There is no clonus at the ankles.    Medical Decision Making    Data Review:   (All imaging studies were personally reviewed unless stated otherwise)  MRI of the lumbar spine dated 8/19/2022 is reviewed.  I looked at the films and of asked Dr. Avendano to look at them as well.  We both feel that she has significant discogenic change at L3-4.  Despite the radiology interpretation I think it unlikely that this represents discitis.  Endplates are well preserved.  She has generous stenosis at L3-4 with some joint effusions.  Generous stenosis at L4-5 with a sizable left joint effusion is observed.  She has some degree of deformity in the coronal plane as " well.    Flexion-extension upright lateral lumbar spine x-rays demonstrate a centimeter of offset on the upright imaging.  I do not see change from flexion to extension but on the recumbent MRI films there was just a trace offset.  This is clear evidence of instability.    Thoracic MRI study demonstrates diffuse degenerative change with numerous disc bulges.  There is moderate stenosis at the T11-12 level without signal change within the cord.    Diagnosis:   1.  Lumbar stenosis with some degree of neurogenic claudication.  2.  Lumbar spondylolisthesis with instability.  3.  Mechanical low back pain.    Treatment Options:   If the patient is struggling then I would pursue decompression with fusion and stabilization from L3-5.  This would help with her symptoms although it may not eradicate them some of which are mechanical in nature.  I discussed what the surgery would entail as well as the potential risks, complications, and limitations.  The patient's daughter was present via speaker phone.  The patient is going to consider her options.  If she would like to pursue surgery then she will contact my office.  Otherwise treatment will remain symptomatic.       Diagnosis Plan   1. Spinal stenosis of lumbar region with neurogenic claudication        2. Spondylolisthesis of lumbar region        3. Spinal instability of lumbar region            Scribed for Iván Stanley MD by RICHARD Brandt 10/28/2022 09:32 EDT      I, Dr. Stanley, personally performed the services described in the documentation, as scribed in my presence, and it is both accurate and complete.

## 2022-10-28 NOTE — TELEPHONE ENCOUNTER
I sent in a 30 day refill on the pregabalin until her follow up and we can discuss possible dose changes. Thanks, ELDON Kong

## 2022-11-14 ENCOUNTER — TELEPHONE (OUTPATIENT)
Dept: PAIN MEDICINE | Facility: CLINIC | Age: 67
End: 2022-11-14

## 2022-11-14 NOTE — TELEPHONE ENCOUNTER
Caller: Alison Benitez    Relationship to patient: Self    Best call back number:[phone]    Patient is needing: CALLBACK; PATIENT WANTS TO SEE OR SPEAK WITH DR PECK   PATIENT WANTS TO SEE DR PECK AND DOES NOT WANT TO WAIT UNTIL MARCH,2023      UNABLE TO WARM TRANSFER

## 2022-11-17 ENCOUNTER — OFFICE VISIT (OUTPATIENT)
Dept: NEUROLOGY | Facility: CLINIC | Age: 67
End: 2022-11-17

## 2022-11-17 VITALS
HEART RATE: 89 BPM | OXYGEN SATURATION: 98 % | BODY MASS INDEX: 40.12 KG/M2 | DIASTOLIC BLOOD PRESSURE: 80 MMHG | WEIGHT: 235 LBS | SYSTOLIC BLOOD PRESSURE: 136 MMHG | HEIGHT: 64 IN

## 2022-11-17 DIAGNOSIS — M51.36 DDD (DEGENERATIVE DISC DISEASE), LUMBAR: ICD-10-CM

## 2022-11-17 DIAGNOSIS — M48.062 LUMBAR STENOSIS WITH NEUROGENIC CLAUDICATION: ICD-10-CM

## 2022-11-17 DIAGNOSIS — M79.7 FIBROMYALGIA: Primary | ICD-10-CM

## 2022-11-17 DIAGNOSIS — B99.9 NEUROPATHY DUE TO INFECTION: ICD-10-CM

## 2022-11-17 DIAGNOSIS — G63 NEUROPATHY DUE TO INFECTION: ICD-10-CM

## 2022-11-17 PROCEDURE — 99215 OFFICE O/P EST HI 40 MIN: CPT | Performed by: NURSE PRACTITIONER

## 2022-11-17 RX ORDER — PREGABALIN 100 MG/1
100 CAPSULE ORAL 3 TIMES DAILY
Qty: 270 CAPSULE | Refills: 1 | Status: SHIPPED | OUTPATIENT
Start: 2022-11-17

## 2022-11-17 RX ORDER — DULOXETIN HYDROCHLORIDE 60 MG/1
60 CAPSULE, DELAYED RELEASE ORAL DAILY
Qty: 90 CAPSULE | Refills: 3 | Status: SHIPPED | OUTPATIENT
Start: 2022-11-17

## 2022-11-17 RX ORDER — BACLOFEN 10 MG/1
10 TABLET ORAL 2 TIMES DAILY PRN
Qty: 60 TABLET | Refills: 5 | Status: SHIPPED | OUTPATIENT
Start: 2022-11-17 | End: 2022-11-17 | Stop reason: SDUPTHER

## 2022-11-17 RX ORDER — DULOXETIN HYDROCHLORIDE 60 MG/1
60 CAPSULE, DELAYED RELEASE ORAL DAILY
Qty: 90 CAPSULE | Refills: 3 | Status: SHIPPED | OUTPATIENT
Start: 2022-11-17 | End: 2022-11-17

## 2022-11-17 RX ORDER — BACLOFEN 10 MG/1
10 TABLET ORAL 2 TIMES DAILY PRN
Qty: 60 TABLET | Refills: 5 | Status: SHIPPED | OUTPATIENT
Start: 2022-11-17 | End: 2023-03-07

## 2022-11-17 RX ORDER — PREGABALIN 100 MG/1
100 CAPSULE ORAL 3 TIMES DAILY
Qty: 90 CAPSULE | Refills: 5 | Status: SHIPPED | OUTPATIENT
Start: 2022-11-17 | End: 2022-11-17

## 2022-11-17 NOTE — TELEPHONE ENCOUNTER
Attempted to reach patient: the follow up is from injection, macy does not have availability as he only sees patients tuesdays and thursdays and has limited spots. Pt requested to keep appt with Coco HOFFMANN okay to READ.    TAHIRA** if still adament to r/s please transfer to clinic.

## 2022-11-17 NOTE — PROGRESS NOTES
Subjective:     Patient ID: Alison Benitez is a 67 y.o. female.    CC:   Chief Complaint   Patient presents with   • Pain     Both legs, especially mornings   • Peripheral Neuropathy       HPI:   History of Present Illness   Today 11/17/2022-  This is a pleasant 67-year-old female who presents for 6-month neurology follow-up on neuropathy post infection of bilateral lower extremities since 01/2019 after being diagnosed with mononucleosis. She also has confirmed fibromyalgia. She is currently taking pregabalin 75 mg 3 times a day, along with duloxetine 60 mg daily. She is following with [facility], and has also been referred to pain management for injections. She is here for follow-up and refills on her medications. She is accompanied by her daughter during today's visit.     Today, she reports she has been doing ok overall, but notes she has multiple things going on. She confirms she has been seeing neurosurgery, as well as Dr. Moris Ellis, pain management. She states Dr. Ellis has begun treatments on her since 05/2022, and she notes she has started taking medicine, using a compounded cream he prescribed, and taking 4 or 6 vitamin supplements per day. She reports the prilocaine and lidocaine compounded cream she has been given is very helpful, but notes she does not use it often due to the cost. She confirms she has received 4 steroid injections in her back, but states the effects of the injections only lasted for approximately 2 weeks. At this time, she is unsure if she will be receiving additional steroid injections, but notes she has an upcoming appointment with Dr. Ellis's clinic, [facility], on 12/08/2022. She states she has not tried everything at this time apart from 1 epidural. She confirms her low back issues have worsened over time. She is not on a muscle relaxer at this time. She states when she first met with Dr. Bibiana Khan, she tried 3 different muscle relaxers, but did not  "like the way they made her feel. She is unable to recall the muscle relaxers she has tried, but notes she would be willing to try one again. She has tried some physical therapy and needs to reschedule again with them.     She reports she has been experiencing a pulling sensation in the back of her legs, and notes it feels as though the nerves or muscles in her toes are \"coming up.\" She states her physical therapist will massage her legs. She describes the pain as a \"nerve spasm\" that takes her breath away, and will occasionally make her cry out. She was seen by Dr. Iván Stanley, neurosurgery, on 10/28/2022. She states Dr. Stanley recommended she have surgery, noting \"L3 to L5 would be a decompression fusion and stabilization. It would eradicate some symptoms, but not all.\" She has had lumbar and thoracic spine imaging completed by neurosurgery and Dr. Ellis. She denies experiencing weakness. She confirms the sensation she experiences is a spasming or tightness of the muscles. She reports she will often groan due to the sensation, but notes it does not occur all day long. She states Dr. hKan recommended she increase her Lyrica dosage to 100 mg. She notes she has been taking the same medication for her arthritis for many years, and was unsure if she should change this. She reports she discussed this with Dr. Khan, but she did not believe she needed to change her medication at that time. She states when she first met Dr. Khan, her arthritis was not as severe as it is currently. She states she is unsure if the pain she is experiencing is nerve pain or muscle pain. She confirms she experiences numbness, tingling, and burning in both legs. She reports the pain comes from her bilateral feet and moves upward. She confirms she has been taking her pregabalin (Lyrica) 3 times per day. She reports her symptoms worsen when she is not sitting or lying down. She notes she is currently experiencing pain in the back of her right " "leg. She states if she gets up in the morning, gets her breakfast, and goes back to bed, she is okay. She notes Dr. Stanley previously discussed potentially placing a pain pump, but during her most recent visit he recommended surgery instead. She reports she is not ready for surgery at this time as she is moving in 01/2023, but notes she is unsure if she will ever be ready for surgery. She previously had a nerve and muscle study completed in 2019 which showed her neuropathy was mild. She reports she \"needs to cope with her symptoms, and does necessarily need to fix them\"; she notes if she was 35 years old, she would need a fix. She states she is unsure if her thoughts on surgery will change in the spring or summer of 2023. She states she has hidden the extent of her symptoms for a long time. She reports she can occasionally feel a \"ball\" in her back before she goes to bed. Pain is moderate to severe with any movement, standing or walking and only relieved by stillness and rest. Denies urinary or bowel incontinence, hesitancy or perineal numbness and denies weakness. Pain is mild to moderate in low back and both legs, constant, deep, aching, but becomes severe at times.    Her daughter reports her mother's low back issues have severely worsened since 02/2022. She notes approximately 1 year ago, they were in [place], and she was able to walk around and only occasionally needed to stay at the hotel or rest. Today, she reports she is unable to walk through a [employer] store without resting due to radicular low back pain symptoms. She notes over the summer, there were days where her mother could not leave her bedroom due to her low back issues that also go into her legs. Her daughter states her quality of life has \"plummeted.\" Her daughter reports her mother has also seen an orthopedic foot surgeon regarding her feet. Her daughter states the foot doctor they saw recommended she wear compression socks, but she states she " "cannot wear them because it hurts her legs. Her daughter notes she often wears tennis shoes.     She reports prior to starting nabumetone, she had 3 different arthritis medications. She confirms the nabumetone works well for her.    She reports she has been going to physical therapy at [facility] in [place], [place]. She notes she has been busy, so she has not been to physical therapy during the month of November, but she is hoping to restart this in 12/2022.     She states she experienced swelling in her legs after she had COVID-19, but does not have swelling currently. She reports she is going to be moving to a 1 level home in 01/2023, and will be packing up her current home herself. She states she would like to redo a kitchen table and chairs, and her daughter notes when she does physical activities like this, she will bend over instead of squat. She reports she did not sleep well last night. She states if she goes to bed and rests at 06:00 p.m. until she falls asleep, she will have a better day the following day. She notes she has been working on moving as well, but has noted the more rest she gets, the better the following day will be. She notes she is \"out of it\" today.    Prior labs & workup: Prior labs 2019-CK level of 174.  Immunofixation and protein electrophoresis within normal limits.  Anti-hue and anti-Otilia antibodies negative.  Methylmalonic acid normal at 155.  Vitamin B12 951 and folate greater than 20.  Lyme antibodies negative.  Mono test negative.  ASO titer was elevated at 200. Hemoglobin A1c was 4.9%.  She was treated with high dose steroids and physical therapy. 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 repeat MRI of the lumbar spine on 6/7/2021 and this did show some high-grade and moderate stenosis from L3-L4 and L4-L5.     The patient was diagnosed with fibromyalgia in 11/2021. She is still seeing Dr. Bibiana Khan-allison " also treats her for multi-site Osteoporosis.Dr. Bibiana Kahn prescribes nabumetone 750 mg twice daily.     Previously took gabapentin which was not helpful. Tried 3 muscle relaxers in the past which she stopped taking due to not liking the side effects but cannot recall the names. This was with the arthritis specialist.    Most recent neuroimaging has included an MRI of the lumbar spine without contrast on 2022 showing multilevel degenerative disc changes with L3-L5 moderate narrowing.  This has been reviewed by neurosurgery.  MRI of the thoracic spine without contrast on 10/25/2022 shows multilevel degenerative disc changes with central spinal canal stenosis most severe at T11-T12.  Imaging has been reviewed by pain management as well as neurosurgery with [facility].    The following portions of the patient's history were reviewed and updated as appropriate: allergies, current medications, past family history, past medical history, past social history, past surgical history and problem list.    Past Medical History:   Diagnosis Date   • Arthritis    • Bursitis    • Claustrophobia    • CTS (carpal tunnel syndrome)     bilateral    • Fibromyalgia    • Hemorrhoids    • Hypertension    • Low back pain    • Lumbosacral disc disease    • Neuropathy    • Osteoarthritis    • SBE (subacute bacterial endocarditis)    • Tendinitis of knee    • Thyroid disorder    • Thyroid nodule    •  (vaginal birth after )        Past Surgical History:   Procedure Laterality Date   • ABDOMINAL HYSTERECTOMY     •  SECTION     •  SECTION     • COLONOSCOPY     • CYST REMOVAL N/A 2022   • DILATATION AND CURETTAGE     • HYSTERECTOMY N/A        • MANDIBLE FRACTURE SURGERY     • OOPHORECTOMY Bilateral    • TUBAL ABDOMINAL LIGATION     • TUBAL ABDOMINAL LIGATION         Social History     Socioeconomic History   • Marital status: Single   Tobacco Use   • Smoking status: Never   • Smokeless tobacco: Never    Vaping Use   • Vaping Use: Never used   Substance and Sexual Activity   • Alcohol use: No   • Drug use: No   • Sexual activity: Not Currently       Family History   Problem Relation Age of Onset   • Arthritis Mother    • Stroke Mother    • Glaucoma Mother    • Hypertension Mother    • Heart disease Mother    • Heart failure Mother    • Heart disease Father    • Lung disease Father    • Hypertension Brother    • Breast cancer Neg Hx    • Ovarian cancer Neg Hx           Current Outpatient Medications:   •  Alpha Lipoic Acid 200 MG capsule, Take 400 mg by mouth 3 (Three) Times a Day., Disp: 180 capsule, Rfl: 5  •  ascorbic acid (VITAMIN C) 1000 MG tablet, Take 1,000 mg by mouth Daily., Disp: , Rfl:   •  baclofen (LIORESAL) 10 MG tablet, Take 1 tablet by mouth 2 (Two) Times a Day As Needed for Muscle Spasms., Disp: 60 tablet, Rfl: 5  •  Cholecalciferol (Vitamin D) 50 MCG (2000 UT) tablet, Take 2,000 Units by mouth Daily., Disp: , Rfl:   •  cholecalciferol (VITAMIN D3) 25 MCG (1000 UT) tablet, Take 1 tablet by mouth Daily., Disp: , Rfl:   •  clotrimazole-betamethasone (LOTRISONE) 1-0.05 % cream, APPLY CREAM TOPICALLY TO RIGHT EAR TWICE DAILY, Disp: , Rfl:   •  Cyanocobalamin 3000 MCG capsule, Take 3,000 mg by mouth Daily., Disp: , Rfl:   •  desloratadine (CLARINEX) 5 MG tablet, Take 5 mg by mouth Daily., Disp: , Rfl:   •  Diclofenac Sodium (VOLTAREN) 1 % gel gel, , Disp: , Rfl:   •  Dietary Management Product (Rheumate) capsule, Take 1 capsule by mouth Daily., Disp: 90 capsule, Rfl: 1  •  doxycycline (PERIOSTAT) 20 MG tablet, , Disp: , Rfl:   •  DULoxetine (CYMBALTA) 60 MG capsule, Take 1 capsule by mouth Daily., Disp: 90 capsule, Rfl: 3  •  estradiol (ESTRACE) 1 MG tablet, Take 1 mg by mouth Daily., Disp: , Rfl:   •  Gel Base gel, 2 g 4 (Four) Times a Day. prilocaine 2%, lidocaine 10%, imipramine 3%, capsaicin 0.001% and mannitol 20%, Disp: 240 g, Rfl: 5  •  hydrochlorothiazide (HYDRODIURIL) 25 MG tablet, , Disp: ,  Rfl:   •  HYDROcodone-acetaminophen (NORCO)  MG per tablet, Doesn't take on a regular basis, Disp: , Rfl:   •  levothyroxine (Synthroid) 50 MCG tablet, Take 1 tablet by mouth Daily for 180 days., Disp: 90 tablet, Rfl: 1  •  losartan (COZAAR) 50 MG tablet, Take 1 tablet by mouth Daily., Disp: , Rfl:   •  MULTIPLE VITAMINS-MINERALS ER PO, Take 1 tablet by mouth Daily., Disp: , Rfl:   •  multivitamin with minerals tablet tablet, Take 1 tablet by mouth Daily., Disp: , Rfl:   •  nabumetone (RELAFEN) 750 MG tablet, Take 750 mg by mouth 2 (Two) Times a Day., Disp: , Rfl:   •  Omega-3 Fatty Acids (FISH OIL) 1000 MG capsule capsule, Take 1,200 mg by mouth Daily With Breakfast., Disp: , Rfl:   •  pregabalin (LYRICA) 100 MG capsule, Take 1 capsule by mouth 3 (Three) Times a Day., Disp: 270 capsule, Rfl: 1  •  RHOFADE 1 % cream, , Disp: , Rfl: 6  •  vitamin B-6 (PYRIDOXINE) 100 MG tablet, Take 1 tablet by mouth Daily., Disp: 30 tablet, Rfl: 0  •  zolpidem (AMBIEN) 10 MG tablet, Take 1 tablet by mouth., Disp: , Rfl:      Review of Systems   Constitutional: Negative for chills, fatigue, fever and unexpected weight change.   HENT: Negative for ear pain, hearing loss, nosebleeds, rhinorrhea and sore throat.    Eyes: Negative for photophobia, pain, discharge, itching and visual disturbance.   Respiratory: Negative for cough, chest tightness, shortness of breath and wheezing.    Cardiovascular: Negative for chest pain, palpitations and leg swelling.   Gastrointestinal: Negative for abdominal pain, blood in stool, constipation, diarrhea, nausea and vomiting.   Genitourinary: Negative for dysuria, frequency, hematuria and urgency.   Musculoskeletal: Positive for arthralgias, back pain, gait problem and myalgias. Negative for joint swelling, neck pain and neck stiffness.        Spasms in legs and low back   Skin: Negative for rash and wound.   Allergic/Immunologic: Negative for environmental allergies and food allergies.  "  Neurological: Negative for dizziness, tremors, seizures, syncope, speech difficulty, weakness, light-headedness, numbness and headaches.   Hematological: Negative for adenopathy. Does not bruise/bleed easily.   Psychiatric/Behavioral: Positive for sleep disturbance (due to pain). Negative for agitation, confusion, decreased concentration, hallucinations and suicidal ideas. The patient is nervous/anxious.    All other systems reviewed and are negative.       Objective:  /80   Pulse 89   Ht 162.6 cm (64\")   Wt 107 kg (235 lb)   SpO2 98%   BMI 40.34 kg/m²     Neurologic Exam     Mental Status   Oriented to person, place, and time.   Speech: speech is normal   Level of consciousness: alert    Cranial Nerves   Cranial nerves II through XII intact.     Motor Exam   Muscle bulk: normal  Overall muscle tone: normal    Strength   Strength 5/5 throughout.     Sensory Exam   Right leg vibration: decreased from ankle  Left leg vibration: decreased from ankle    Gait, Coordination, and Reflexes     Gait  Gait: (antalgic)    Coordination   Finger to nose coordination: normal    Tremor   Resting tremor: absent  Intention tremor: absent  Action tremor: absent    Reflexes   Right brachioradialis: 2+  Left brachioradialis: 2+  Right biceps: 2+  Left biceps: 2+  Right patellar: 1+  Left patellar: 1+  Right achilles: 1+  Left achilles: 1+  Right : 2+  Right plantar: normal  Left plantar: normal  Right ankle clonus: absent  Left ankle clonus: absent      Physical Exam  Constitutional:       Appearance: Normal appearance. She is obese.      Comments: BMI 40.3   Musculoskeletal:      Thoracic back: Spasms, tenderness and bony tenderness present. No swelling, edema, deformity, signs of trauma or lacerations. Decreased range of motion.      Lumbar back: Spasms, tenderness and bony tenderness present. No swelling, edema, deformity, signs of trauma or lacerations. Decreased range of motion. Negative right straight leg raise " test and negative left straight leg raise test.      Comments:   Generalized arthritis of multiple sites, Limited ROM of multiple joints, no erythema.    Neurological:      Mental Status: She is alert and oriented to person, place, and time.      Cranial Nerves: Cranial nerves 2-12 are intact.      Motor: Motor strength is normal.      Coordination: Finger-Nose-Finger Test normal.      Deep Tendon Reflexes:      Reflex Scores:       Bicep reflexes are 2+ on the right side and 2+ on the left side.       Brachioradialis reflexes are 2+ on the right side and 2+ on the left side.       Patellar reflexes are 1+ on the right side and 1+ on the left side.       Achilles reflexes are 1+ on the right side and 1+ on the left side.  Psychiatric:         Mood and Affect: Mood is anxious.         Speech: Speech normal.         Behavior: Behavior normal.         Thought Content: Thought content normal.         Cognition and Memory: Cognition and memory normal.         Judgment: Judgment normal.     She has recently had labs on 09/06/2022 showing:  CRP 10.2 mg/L.  CBC with auto differential essentially normal.   Sed rate normal at 6 mm/Hr.  Comprehensive metabolic panel okay overall.    Assessment/Plan:       Diagnoses and all orders for this visit:    1. Fibromyalgia (Primary)  -     Discontinue: DULoxetine (CYMBALTA) 60 MG capsule; Take 1 capsule by mouth Daily.  Dispense: 90 capsule; Refill: 3  -     Discontinue: pregabalin (LYRICA) 100 MG capsule; Take 1 capsule by mouth 3 (Three) Times a Day.  Dispense: 90 capsule; Refill: 5  -     DULoxetine (CYMBALTA) 60 MG capsule; Take 1 capsule by mouth Daily.  Dispense: 90 capsule; Refill: 3  -     pregabalin (LYRICA) 100 MG capsule; Take 1 capsule by mouth 3 (Three) Times a Day.  Dispense: 270 capsule; Refill: 1    2. Lumbar stenosis with neurogenic claudication  Comments:  continue with pain management  Orders:  -     Discontinue: pregabalin (LYRICA) 100 MG capsule; Take 1 capsule by  mouth 3 (Three) Times a Day.  Dispense: 90 capsule; Refill: 5  -     Discontinue: baclofen (LIORESAL) 10 MG tablet; Take 1 tablet by mouth 2 (Two) Times a Day As Needed for Muscle Spasms.  Dispense: 60 tablet; Refill: 5  -     pregabalin (LYRICA) 100 MG capsule; Take 1 capsule by mouth 3 (Three) Times a Day.  Dispense: 270 capsule; Refill: 1  -     baclofen (LIORESAL) 10 MG tablet; Take 1 tablet by mouth 2 (Two) Times a Day As Needed for Muscle Spasms.  Dispense: 60 tablet; Refill: 5    3. 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  Dispense: 90 capsule; Refill: 3  -     Discontinue: pregabalin (LYRICA) 100 MG capsule; Take 1 capsule by mouth 3 (Three) Times a Day.  Dispense: 90 capsule; Refill: 5  -     DULoxetine (CYMBALTA) 60 MG capsule; Take 1 capsule by mouth Daily.  Dispense: 90 capsule; Refill: 3  -     pregabalin (LYRICA) 100 MG capsule; Take 1 capsule by mouth 3 (Three) Times a Day.  Dispense: 270 capsule; Refill: 1         She is really having a challenging time with her muscle spasms, her low back pain, her generalized pain, and her arthritis. We are going to increase the pregabalin to 100 mg 3 times a day, and she is to watch for swelling and weight gain. We are also going to try some low dose baclofen as needed for the muscle spasms she is experiencing. She is going to continue with pain management provider. She is going to continue her supplements. For now, she is really not interested in having surgery. We have discussed in great detail the signs and symptoms of cauda equina syndrome and when to present to the hospital for further evaluation. She does verbalize understanding of this.    In regards to her other symptoms, she is encouraged to follow up with the surgeon if she does desire surgery. She does have great family support. I have  encouraged her to also follow up with her arthritis specialist. She will call us with any additional questions or concerns. She is greatly appreciative of today's visit. She is not interested in having another nerve and muscle study. Encouraged her to restart physical therapy when her schedule allows.    Total time of visit today was 40 minutes, which included obtaining additional history from the patient and her family, discussing treatment, findings, and recommendations. She is going to restart physical therapy, also reviewing neurosurgery and pain management notes, completing exam, and discussing findings and recommendations moving forward in detail.    Reviewed medications, potential side effects and signs and symptoms to report. Discussed risk versus benefits of treatment plan with patient and/or family-including medications, labs and radiology that may be ordered. Addressed questions and concerns during visit. Patient and/or family verbalized understanding and agree with plan.    AS THE PROVIDER, I PERSONALLY WORE PPE DURING ENTIRE FACE TO FACE ENCOUNTER IN CLINIC WITH THE PATIENT. PATIENT ALSO WORE PPE DURING ENTIRE FACE TO FACE ENCOUNTER EXCEPT FOR A MAX OF 30 SECONDS DURING NEUROLOGICAL EVALUATION OF CRANIAL NERVES AND THEN MASK WAS PLACED BACK OVER PATIENT FACE FOR REMAINDER OF VISIT. I WASHED MY HANDS BEFORE AND AFTER VISIT.    As part of this patient's treatment plan I am prescribing controlled substances. The patient has been made aware of appropriate use of such medications, including potential risk of somnolence, limited ability to drive and/or work safely, and potential for dependence or overdose. It has also been made clear that these medications are for use by the patient only, without concomitant use of alcohol or other substances unless prescribed. Keep out of reach of children.  Ethan report has been reviewed. If this is going to be prescribed long term, [facility] Controlled Substance Agreement  Contract has also been read and signed by patient and myself.    During this visit the following were done:  Labs Reviewed [x]    Labs Ordered []    Radiology Reports Reviewed [x]    Radiology Ordered []    PCP Records Reviewed []    Referring Provider Records Reviewed []    ER Records Reviewed []    Hospital Records Reviewed []    History Obtained From Family [x]    Radiology Images Reviewed []    Other Reviewed [x]    Records Requested []      Transcribed from ambient dictation for [facility] by Jing Topete.  11/17/22   14:00 EST    Patient or patient representative verbalized consent to the visit recording.  I have personally performed the services described in this document as transcribed by the above individual, and it is both accurate and complete.  [facility]  11/17/2022  14:19 EST

## 2022-11-21 DIAGNOSIS — E03.9 ACQUIRED HYPOTHYROIDISM: ICD-10-CM

## 2022-11-21 RX ORDER — LEVOTHYROXINE SODIUM 0.05 MG/1
50 TABLET ORAL DAILY
Qty: 90 TABLET | Refills: 0 | Status: SHIPPED | OUTPATIENT
Start: 2022-11-21 | End: 2023-03-07

## 2022-11-21 NOTE — TELEPHONE ENCOUNTER
Pt called. Please send 3 month supply of Synthroid 50mcg daily      To [facility] in [place]:    Phone: [phone] Fax: [phone]    Call patient at:    [phone]

## 2022-11-22 ENCOUNTER — HOSPITAL ENCOUNTER (OUTPATIENT)
Dept: PHYSICAL THERAPY | Facility: HOSPITAL | Age: 67
Setting detail: THERAPIES SERIES
Discharge: HOME | End: 2022-11-22
Payer: MEDICARE

## 2022-11-22 DIAGNOSIS — M48.062 LUMBAR STENOSIS WITH NEUROGENIC CLAUDICATION: ICD-10-CM

## 2022-11-22 PROCEDURE — 97161 PT EVAL LOW COMPLEX 20 MIN: CPT

## 2022-11-22 PROCEDURE — 97530 THERAPEUTIC ACTIVITIES: CPT

## 2022-11-22 RX ORDER — ME-TETRAHYDROFOLATE/B12/HRB236 1-1-500 MG
1 CAPSULE ORAL DAILY
Qty: 90 CAPSULE | Refills: 0 | Status: SHIPPED | OUTPATIENT
Start: 2022-11-22 | End: 2023-03-13 | Stop reason: SDUPTHER

## 2022-11-22 NOTE — PROGRESS NOTES
Physical Therapy: Initial Evaluation    Patient: Mila Robert (73 y.o. female)   Examination Date:   Plan of Care Certification Period: 2022 to 23      :  1955 ;    Confirmed: Yes MRN: [id number]  CSN: [id number]   Insurance: Payor: Zahra Hays / Plan: Suri Campos ESSENTIAL/PLUS / Product Type: *No Product type* /   Insurance ID: [id number] - (Medicare Managed) Secondary Insurance (if applicable):    Referring Physician: Alex Downing MD   PCP: Domonique Brewer MD Visits to Date/Visits Approved:   /      No Show/Cancelled Appts:   /       Medical Diagnosis: Spondylolisthesis, lumbar region [M43.16]  Spinal stenosis, lumbar region with neurogenic claudication [M48.062]  Other intervertebral disc degeneration, 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.36]  Dorsalgia, unspecified [M54.9]  Other congenital malformations of spine, 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]    Treatment Diagnosis: Spinal stenosis of lumbar region with neurogenic claudication     PERTINENT MEDICAL HISTORY           Medical History:     Past Medical History:   Diagnosis Date    Arthritis      Surgical History:   Past Surgical History:   Procedure Laterality Date     SECTION      x 2    COLONOSCOPY  2010    normal findings    COLONOSCOPY N/A 2020    COLORECTAL CANCER SCREENING, NOT HIGH RISK performed by Ankita Washburn MD at [address] [place] [address] (CERVIX STATUS UNKNOWN)           SUBJECTIVE EXAMINATION       Subjective History:    Subjective: Pt presents with complaints of chronic back and LE pain. Pt has been treated at this facility in the past and recently for similar complaints. She reports that she is unable to walk for long distances. She also has pain with prolonged sitting. She states her pain is worse in the mornings and takes a few hours to be able to move with greater ease.   Pt's chief goals are to be able to move from floor to standing with greater ease, as she enjoys playing with her grandchildren. She also wants to be able to lift items and do household chores with improved body mechanics. She notes that she has a prescription compound cream that she uses occasionally that seems to help as well as her oral medications. She reports having pain at her posterior L hip with walking and has \"achiness\" in her B legs with walking and has reports of B LE numbness / tingling. Functional Status       Social History:  Social History  Lives With: Alone  Type of Home: Apartment (moving into a new home in the next 1-2 months)      OBJECTIVE EXAMINATION     Review of Systems:  Overall Orientation Status: Within Normal Limits    Observations:  General Observations  Description: Standing posture: mild forward trunk lean, wide base of support. Gait: R lateral shift, R hip ER during swing phase. Pt notes having pain at posterior L hip during ambulation. Left AROM  Right AROM         AROM LLE (degrees)  L Hip External Rotation 0-45: 0-30 deg  L Hip Internal Rotation 0-45: 0-20 deg    AROM RLE (degrees)  R Hip External Rotation 0-45: 0-37 deg  R Hip Internal Rotation 0-45: 0-21 deg     Left PROM  Right PROM                    Left Strength  Right Strength         Strength LLE  L Hip Flexion: 4/5  L Hip ABduction: 3+/5  L Hip Internal Rotation: 4+/5  L Hip External Rotation: 4+/5  L Knee Flexion: 5/5  L Knee Extension: 5/5  L Ankle Dorsiflexion: 5/5  L Ankle Inversion: 5/5  L Ankle Eversion: 4+/5    Strength RLE  Strength RLE: Exception  R Hip Flexion: 4/5 (w/ pain)  R Hip ABduction: 4-/5  R Hip Internal Rotation: 4/5  R Hip External Rotation: 4-/5 (w/ pain)  R Knee Flexion: 5/5  R Knee Extension: 5/5  R Ankle Dorsiflexion: 5/5  R Ankle Inversion: 4+/5  R Ankle Eversion: 4+/5       Special Tests:   Special Tests: Back Index: 56%    Additional Finding(s) (if applicable): Other: Standing to floor and floor to standing transfers were observed using a padded floor mat.   Pt moves standing to floor with good form, going to tall kneeling, then to side sit. Upon moving floor to standing, she goes to side sitting, to quadriped, then to [employer] dog\" position with wide base of support and walks her hands up her legs to go to upright standing. Pt was then educated to move from floor to standing going from side sit, to quadriped, to tall kneeling, and then to standing, using a chair as needed for support. Pt had difficulty moving from tall kneeling to standing due to hip weakness, but improved with assist of a chair to hold to. Pt was also educated in proper body mechanics lifting an object from knee to waist level. Left AROM  Right AROM      AROM LLE (degrees)  L Hip External Rotation 0-45: 0-30 deg  L Hip Internal Rotation 0-45: 0-20 deg AROM RLE (degrees)  R Hip External Rotation 0-45: 0-37 deg  R Hip Internal Rotation 0-45: 0-21 deg       Left Strength  Right Strength         Strength LLE  L Hip Flexion: 4/5  L Hip ABduction: 3+/5  L Hip Internal Rotation: 4+/5  L Hip External Rotation: 4+/5  L Knee Flexion: 5/5  L Knee Extension: 5/5  L Ankle Dorsiflexion: 5/5  L Ankle Inversion: 5/5  L Ankle Eversion: 4+/5    Strength RLE  Strength RLE: Exception  R Hip Flexion: 4/5 (w/ pain)  R Hip ABduction: 4-/5  R Hip Internal Rotation: 4/5  R Hip External Rotation: 4-/5 (w/ pain)  R Knee Flexion: 5/5  R Knee Extension: 5/5  R Ankle Dorsiflexion: 5/5  R Ankle Inversion: 4+/5  R Ankle Eversion: 4+/5          ASSESSMENT     Impression: Assessment: Pt will benefit from skilled PT to address lumbar and lower extremity deficits due to spinal stenosis in order to restore optimal functional level. She will also benefit from education on proper posture, body mechanics, and transfers to protect lumbar structures.     Body Structures, Functions, Activity Limitations Requiring Skilled Therapeutic Intervention: Decreased ROM, Decreased strength, Decreased high-level IADLs, Decreased posture, Increased pain, Decreased endurance    Statement of Medical Necessity: Physical Therapy is both indicated and medically necessary as outlined in the POC to increase the likelihood of meeting the functionally related goals stated below. Patient's Activity Tolerance: Patient tolerated evaluation without incident, Patient tolerated treatment well      Patient's rehabilitation potential/prognosis is considered to be: Good          GOALS   Patient Goal(s):    Short Term Goals Completed by 3 weeks Goal Status   Pt to be I with HEP     Pt to be educated in posture correction techniques and self correct with verbal cues. Pt to demonstrate a 1/2 grade increase in LLE strength where limited. Pt to perform daily activities with average pain of 5/10 or less. Long Term Goals Completed by 6 weeks Goal Status   Pt to demonstrate 4+/5 B hip strength grossly. Back Index score to improve to 25% or less indicating improved function. Pt to be able to stand to cook / wash dishes for 30 minutes or greater without having to sit due to pain.      Pt to be able to move floor to standing with ease using proper form to protect her lumbar spine: side sit -> quadriped -> 1/2 kneeling -> standing                                              TREATMENT PLAN       Requires PT Follow-Up: Yes    Pt. actively involved in establishing Plan of Care and Goals: Yes  Patient/ Caregiver education and instruction:       Body mechanics / transfers         Treatment may include any combination of the following: Strengthening, ROM, Manual Therapy - Soft Tissue Mobilization, Neuromuscular re-education, Pain management, Home exercise program, Patient/Caregiver education & training, Therapeutic activities, Integrated dry needling, Modalities     Frequency / Duration:  Patient to be seen 1-2x/week for 6-8 weeks weeks      Eval Complexity:    Decision Making: Low Complexity    PT Treatment Completed:  Exercises:      Treatment Reasoning    Exercise 1: Education in proper body mechanics for lifting knee to waist height. Exercise 2: Education on moving from floor to standing safely. Plan to add next visit:  Nustep: L5 x 6-8' (warm up)  Hip abd / add (BTB / ball) 3x10  Standing hip flex / abd / ER (open / close gait) 2x10 B  Mini squats 3x10  Hip ER / IR w/ t-band: OTB 3x10 B      Safe transfers / lifting techniques                         Therapist Signature: Trevon Montes, PT    Date: 89/31/6337     I certify that the above Therapy Services are being furnished while the patient is under my care. I agree with the treatment plan and certify that this therapy is necessary. Physician's Signature:  ___________________________   Date:_______                                                                   Yassine Hernandez MD        Physician Comments: _______________________________________________    Please sign and return to [address] [place] [address]. Please fax to the location listed below.  [facility] YOU for this referral!    [facility]  [address] [facility] [address]  Dept: [address] [place] [address] Drive: [phone]

## 2022-11-28 ENCOUNTER — HOSPITAL ENCOUNTER (OUTPATIENT)
Dept: PHYSICAL THERAPY | Facility: HOSPITAL | Age: 67
Setting detail: THERAPIES SERIES
End: 2022-11-28
Payer: MEDICARE

## 2022-11-30 ENCOUNTER — TELEPHONE (OUTPATIENT)
Dept: ENDOCRINOLOGY | Facility: CLINIC | Age: 67
End: 2022-11-30

## 2022-11-30 DIAGNOSIS — E03.9 ACQUIRED HYPOTHYROIDISM: Primary | ICD-10-CM

## 2022-12-01 ENCOUNTER — HOSPITAL ENCOUNTER (OUTPATIENT)
Dept: PHYSICAL THERAPY | Facility: HOSPITAL | Age: 67
Setting detail: THERAPIES SERIES
Discharge: HOME | End: 2022-12-01
Payer: MEDICARE

## 2022-12-01 PROCEDURE — 97530 THERAPEUTIC ACTIVITIES: CPT

## 2022-12-01 PROCEDURE — 97110 THERAPEUTIC EXERCISES: CPT

## 2022-12-01 NOTE — FLOWSHEET NOTE
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 She notes she had to cancel her PT appt Monday due to soreness. She reports feeling better today with less soreness / pain. She doesn't feel she significantly injured herself.       Objective:  Observation:   Test measurements:    Palpation:    Exercises:  Exercise Resistance/Repetitions Other comments   Nustep L5 x 8.5 min 1   Hip abd / add BTB / ball 3x10 1   Standing hip open / close gait 2x10 B 1   Standing hip ext / abd 2x10 B 1   Mini squats  3x10 1   Hip ER / IR w/ t-band OTB 3x10 B 1                       Education in activity modification for home  1   Education in moving supine to sit with back protection  1     Other Therapeutic Activities:      Manual Treatments:      Modalities:        Timed Code Treatment Minutes:  70      Total Treatment Minutes:  75    Treatment/Activity Tolerance:  [x] Patient tolerated treatment well [] Patient limited by fatigue  [] Patient limited by pain  [] Patient limited by other medical complications  [x] Other: Pt progressed through activity with good tolerance. Time was spent for pt education on HEP to perform first thing in the am to improve mobility upon waking as she reports she has difficulty in the mornings due to back and leg pain. Pt was also educated in how to transition from supine to sit with improved mechanics using a log roll type maneuver.       Pain after treatment:      \"better\"/10    Prognosis: [x] Good [] Fair  [] Poor    Patient Requires Follow-up: [x] Yes  [] No    Plan:   [x] Continue per plan of care [] Alter current plan (see comments)  [] Plan of care initiated [] Hold pending MD visit [] Discharge    Plan for Next Session:        Electronically signed by:  Lam Flores PT

## 2022-12-05 ENCOUNTER — APPOINTMENT (OUTPATIENT)
Dept: PHYSICAL THERAPY | Facility: HOSPITAL | Age: 67
End: 2022-12-05
Payer: MEDICARE

## 2022-12-08 ENCOUNTER — OFFICE VISIT (OUTPATIENT)
Dept: PAIN MEDICINE | Facility: CLINIC | Age: 67
End: 2022-12-08

## 2022-12-08 DIAGNOSIS — M48.062 LUMBAR STENOSIS WITH NEUROGENIC CLAUDICATION: Primary | ICD-10-CM

## 2022-12-08 DIAGNOSIS — M47.816 SPONDYLOSIS OF LUMBAR REGION WITHOUT MYELOPATHY OR RADICULOPATHY: ICD-10-CM

## 2022-12-08 DIAGNOSIS — F41.1 GAD (GENERALIZED ANXIETY DISORDER): ICD-10-CM

## 2022-12-08 DIAGNOSIS — R26.9 GAIT DISTURBANCE: ICD-10-CM

## 2022-12-08 DIAGNOSIS — M51.36 DDD (DEGENERATIVE DISC DISEASE), LUMBAR: ICD-10-CM

## 2022-12-08 DIAGNOSIS — G62.9 NEUROPATHY: ICD-10-CM

## 2022-12-08 DIAGNOSIS — M54.51 VERTEBROGENIC LOW BACK PAIN: ICD-10-CM

## 2022-12-08 DIAGNOSIS — M79.7 FIBROMYALGIA: ICD-10-CM

## 2022-12-08 DIAGNOSIS — R53.81 PHYSICAL DECONDITIONING: ICD-10-CM

## 2022-12-08 DIAGNOSIS — M48.062 LUMBAR STENOSIS WITH NEUROGENIC CLAUDICATION: ICD-10-CM

## 2022-12-08 PROCEDURE — 99443 PR PHYS/QHP TELEPHONE EVALUATION 21-30 MIN: CPT | Performed by: NURSE PRACTITIONER

## 2022-12-08 NOTE — PROGRESS NOTES
"Type of Service: Consult via telemedicine  Basis for telemedicine: COVID-19 pandemic/federally declared state of public health emergency  Mode of transmission: Telephone (patient's preference).  Telemedicine Provider: ELDON Isaac  Location of Physician: Office   Patient location: Home   You have chosen to receive care through a telephone visit today. Do you consent to use a telephone visit for your medical care today?   Yes       Chief Complaint: \"Pain in my lower back and legs.\"        History of Present Illness:   Patient: Ms. Alison Benitez, 67 y.o. female originally referred by Dr. Iván Stanley in consultation for chronic intractable lower back and left hip pain.  Patient reports a longstanding history of chronic lower back and left hip pain, which began without incident.  She most recently seen neurosurgery, Dr. Iván Stanley with an increase of her overall symptoms.  MRI of the lumbar spine without contrast on 8/19/2022 revealed significant endplate and discogenic changes at L3-L4 which could represent discitis, although the endplates are still preserved.  There is severe spinal stenosis at L3-L4 with bilateral facet joint effusions.  Dr. Stanley found the patient not to be a surgical candidate at that time, he felt her low back pain was more mechanical.  She also has a known history of polyneuropathy, with confirmed electrodiagnostic studies revealing sensorimotor neuropathy.  Patient was last seen on October 3, 2022, when she underwent diagnostic and therapeutic left L3-L4 and left L4-L5 transforaminal epidural steroid injections, from which she reports experiencing almost complete pain relief and functional improvement lasting 2 weeks.  Upon further conversation, she continued with complete resolution of her left leg pain until about 2 weeks ago.  She has now been experiencing symptoms radiating into her right leg primarily the hips, and lateral and anterior aspects of her thighs.  She no longer " is experiencing symptoms into her shin.  She also does continue with symptoms of mechanical lower back pain.  She did begin physical therapy thereafter.  She underwent follow-up consultation with Dr. Stanley on 10/28/2022, from which she reported significant improvement from epidural, although at that point it had began to wane.  Due to patient's continued symptoms, Dr. Stanley recommended decompression with fusion and stabilization at L3-L5.  She requests consultation today for postprocedure follow-up and evaluation.  Pain Description: Constant lower back pain with intermittent exacerbation, described as aching, burning, dull, sharp, shooting, stabbing and throbbing sensation.   Radiation of Pain: The pain radiates into the posterior aspect of the hips, and lateral and anterior aspect of her thighs.  Pain intensity today: 6/10  Average pain intensity last week: 7/10  Pain intensity ranges from: 5/10 to 10/10  Aggravating factors: Pain increases with bending, twisting, standing, walking. Patient describes neurogenic claudication. Patient does not use a cane or walker  Alleviating factors: Pain decreases with sitting down, lying down on her side  Associated Symptoms:   Patient reports pain (knees down), numbness, and weakness in the lower extremities.   Patient denies any new bladder or bowel problems.   Patient reports difficulties with her balance and reports recent falls.   Pain interferes with ADLs, general activities (ability to walk, stand, transition from different positions), and affects patient's quality of life      Review of previous therapies and additional medical records:  Alison Benitez has already failed the following measures, including:   Conservative Measures: Oral analgesics, opioids, topical analgesics, ice, heat, physical therapy   Interventional Measures: GTB injections at [facility] last 3 months ago  10/03/2022: DxTx left L3-L4 and left L4-L5 transforaminal epidural steroid  "injections  Surgical Measures: No history of previous lumbar spine or hip surgery    [facility] psychological evaluation on 11/18/2022 with Dr. Dougie Khan, per note: \"From a psychological perspective, patient is considered to be an appropriate candidate for spinal cord stim at this time.\"  Alison Benitez underwent neurosurgical consultation with Dr. Iván Stanley on 10/28/2022, and was found to be a surgical candidate for decompression and fusion and stabilization at L3-L5.  Alison Solano Emmanuel presents with significant comorbidities including Claustrophobia, CTS (carpal tunnel syndrome), Fibromyalgia, Hypertension, Neuropathy, hypothyroidism   In terms of current analgesics, Alison Solano Emmanuel takes: nabumetone, Norco, diclofenac gel, pregabalin, duloxetine. Patient also takes zolpidem   I have reviewed Ethan Report consistent with medication reconciliation.  SOAPP: Low Risk     Global Pain Scale 09-15  2022          Pain 20          Feelings 8          Clinical outcomes 19          Activities 25          GPS Total: 72            The [place] Back Pain Disability Scale  DATE 09-15  2022          Sleep through the night 0          Turn over in bed 0          Get out of bed 2          Make your bed 4          Put on socks (pantyhose) 0          Ride in a car 0          Sit in a chair for several hours 0          Stand up for 20-30 minutes 5          Climb one flight of stairs 5          Walk a few blocks (200-300 yards)  5          Walk several miles 5          Run one block (about 50 yards) 5          Take food out of the refrigerator 4          Reach up to high shelves 4          Move a chair 4          Pull or push heavy doors 4          Bend over to clean the bathtub 4          Throw a ball 5          Carry two bags of groceries 5          Lift and carry a heavy suitcase 5          Total score 72            Review of New Diagnostic Studies:  MRI of the thoracic spine without contrast 10/26/2022: Grade 1 " spondylolisthesis of T1 on T2.  Moderate multilevel degenerative disc disease with disc bulges.  There is moderate degrees of central spinal canal stenosis at multiple levels ranging from T3-T12.  There is severe central spinal stenosis at T11-T12.  Spinal cord appears normal in signal and caliber throughout.    Review of Diagnostic Studies:   MRI of the lumbar spine without contrast 8/19/2022:  marrow edema of the adjacent endplates at L3-4 with mild T2 hyperintensity/edema in the disc.  Associated T2 hyperintense edema of the left psoas muscle.  No evidence of paravertebral fluid collection.  Grade 1 anterolisthesis of L3 on L4 and L4-L5.  Vertebral body heights are normal.    T12-L1, L1-L2: Disc bulge, facet hypertrophy.  No significant canal or foraminal stenosis  L2-L3: Left paracentral disc protrusion superimposed on disc bulge.  Facet arthrosis.  Ligamentum flavum hypertrophy.  Mild canal stenosis.  Moderate to severe left lateral recess stenosis with probable impingement of the transversing left L3 nerve root.  Moderate left and mild right neuroforaminal stenosis.  Findings are concerning for early osteomyelitis/discitis but may also relate to moderate type I reactive endplate changes.  L3-L4: Central disc protrusion, facet hypertrophy, ligamentum flavum hypertrophy contributing to severe canal stenosis.  Moderate to severe left and mild right neuroforaminal stenosis.  Small facet joint effusions.  L4-L5: Disc bulge, facet hypertrophy, ligamentum flavum hypertrophy contributing to moderate canal stenosis.  Bilateral facet joint effusions.  Moderate to severe right and mild left neuroforaminal stenosis  L5-S1: Central disc protrusion abutting the ventral thecal sac and descending S1 nerve roots.  Mild bilateral foraminal stenosis  Left hip x-rays 9/12/2022: There is some enthesopathy at both greater trochanters and ischial tuberosities.  Mild degenerative changes  AP pelvis, AP lateral left hip, ordered for  pain, shows some dorsal acetabular osteophytes, joint space still reasonable both hips, some enthesopathy both greater trochanters and ischial tuberosities, no fractures, no comparison  Lumbar spine x-rays 2022:  multilevel degenerative disc disease and facet hypertrophy.  Grade 1 anterolisthesis of L4 on L5    Review of Systems   Constitutional: Positive for appetite change.   Eyes: Positive for redness and itching.   Endocrine: Positive for cold intolerance and heat intolerance.   Musculoskeletal: Positive for back pain, joint swelling, myalgias and neck stiffness.   Neurological: Positive for dizziness and light-headedness.   All other systems reviewed and are negative.        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)   • History of infectious mononucleosis   • Physical deconditioning   • Bilateral leg pain   • DDD (degenerative disc disease), lumbar   • Lumbar stenosis with neurogenic claudication   • Hormone replacement therapy (HRT)   • Menopausal symptoms   • Acquired hypothyroidism   • Nontoxic multinodular goiter   • Postmenopausal   • Yeast vaginitis   • Fibromyalgia   • Osteoarthritis   • Lumbosacral disc disease   • Vertebrogenic low back pain   • Bursitis   • Adolescent idiopathic scoliosis of lumbar region   • Greater trochanteric bursitis of left hip   • Gait disturbance   • Other insomnia   • JACKY (generalized anxiety disorder)       Past Medical History:   Diagnosis Date   • Arthritis    • Bursitis    • Claustrophobia    • CTS (carpal tunnel syndrome)     bilateral    • Fibromyalgia    • Hemorrhoids    • Hypertension    • Low back pain    • Lumbosacral disc disease    • Neuropathy    • Osteoarthritis    • SBE (subacute bacterial endocarditis)    • Tendinitis of knee    • Thyroid disorder    • Thyroid nodule    •  (vaginal birth after )          Past Surgical History:   Procedure Laterality Date   • ABDOMINAL HYSTERECTOMY     •  SECTION      •  SECTION     • COLONOSCOPY     • CYST REMOVAL N/A 2022   • DILATATION AND CURETTAGE     • HYSTERECTOMY N/A        • MANDIBLE FRACTURE SURGERY     • OOPHORECTOMY Bilateral    • TUBAL ABDOMINAL LIGATION     • TUBAL ABDOMINAL LIGATION           Family History   Problem Relation Age of Onset   • Arthritis Mother    • Stroke Mother    • Glaucoma Mother    • Hypertension Mother    • Heart disease Mother    • Heart failure Mother    • Heart disease Father    • Lung disease Father    • Hypertension Brother    • Breast cancer Neg Hx    • Ovarian cancer Neg Hx          Social History     Socioeconomic History   • Marital status: Single   Tobacco Use   • Smoking status: Never   • Smokeless tobacco: Never   Vaping Use   • Vaping Use: Never used   Substance and Sexual Activity   • Alcohol use: No   • Drug use: No   • Sexual activity: Not Currently           Current Outpatient Medications:   •  Alpha Lipoic Acid 200 MG capsule, Take 400 mg by mouth 3 (Three) Times a Day., Disp: 180 capsule, Rfl: 5  •  ascorbic acid (VITAMIN C) 1000 MG tablet, Take 1,000 mg by mouth Daily., Disp: , Rfl:   •  baclofen (LIORESAL) 10 MG tablet, Take 1 tablet by mouth 2 (Two) Times a Day As Needed for Muscle Spasms., Disp: 60 tablet, Rfl: 5  •  Cholecalciferol (Vitamin D) 50 MCG (2000 UT) tablet, Take 2,000 Units by mouth Daily., Disp: , Rfl:   •  cholecalciferol (VITAMIN D3) 25 MCG (1000 UT) tablet, Take 1 tablet by mouth Daily., Disp: , Rfl:   •  clotrimazole-betamethasone (LOTRISONE) 1-0.05 % cream, APPLY CREAM TOPICALLY TO RIGHT EAR TWICE DAILY, Disp: , Rfl:   •  Cyanocobalamin 3000 MCG capsule, Take 3,000 mg by mouth Daily., Disp: , Rfl:   •  desloratadine (CLARINEX) 5 MG tablet, Take 5 mg by mouth Daily., Disp: , Rfl:   •  Diclofenac Sodium (VOLTAREN) 1 % gel gel, , Disp: , Rfl:   •  Dietary Management Product (Rheumate) capsule, Take 1 capsule by mouth Daily., Disp: 90 capsule, Rfl: 0  •  doxycycline (PERIOSTAT) 20 MG tablet,  , Disp: , Rfl:   •  DULoxetine (CYMBALTA) 60 MG capsule, Take 1 capsule by mouth Daily., Disp: 90 capsule, Rfl: 3  •  estradiol (ESTRACE) 1 MG tablet, Take 1 mg by mouth Daily., Disp: , Rfl:   •  Gel Base gel, 2 g 4 (Four) Times a Day. prilocaine 2%, lidocaine 10%, imipramine 3%, capsaicin 0.001% and mannitol 20%, Disp: 240 g, Rfl: 5  •  hydrochlorothiazide (HYDRODIURIL) 25 MG tablet, , Disp: , Rfl:   •  HYDROcodone-acetaminophen (NORCO)  MG per tablet, Doesn't take on a regular basis, Disp: , Rfl:   •  levothyroxine (Synthroid) 50 MCG tablet, Take 1 tablet by mouth Daily for 90 days., Disp: 90 tablet, Rfl: 0  •  losartan (COZAAR) 50 MG tablet, Take 1 tablet by mouth Daily., Disp: , Rfl:   •  MULTIPLE VITAMINS-MINERALS ER PO, Take 1 tablet by mouth Daily., Disp: , Rfl:   •  multivitamin with minerals tablet tablet, Take 1 tablet by mouth Daily., Disp: , Rfl:   •  nabumetone (RELAFEN) 750 MG tablet, Take 750 mg by mouth 2 (Two) Times a Day., Disp: , Rfl:   •  Omega-3 Fatty Acids (FISH OIL) 1000 MG capsule capsule, Take 1,200 mg by mouth Daily With Breakfast., Disp: , Rfl:   •  pregabalin (LYRICA) 100 MG capsule, Take 1 capsule by mouth 3 (Three) Times a Day., Disp: 270 capsule, Rfl: 1  •  RHOFADE 1 % cream, , Disp: , Rfl: 6  •  vitamin B-6 (PYRIDOXINE) 100 MG tablet, Take 1 tablet by mouth Daily., Disp: 30 tablet, Rfl: 0  •  zolpidem (AMBIEN) 10 MG tablet, Take 1 tablet by mouth., Disp: , Rfl:       No Known Allergies      There were no vitals taken for this visit.     Due to the constraints of the telemedicine format, no physical exam was performed      Physical Exam: (Previous PE from last office visit)  Constitutional: Patient appears well-developed, well-nourished, well-hydrated, appears younger than stated age  HEENT: Head: Normocephalic and atraumatic  Eyes: Conjunctivae and lids are normal  Pupils: Equal, round, reactive to light  Peripheral vascular exam: Posterior tibialis: right 2+ and left 2+.  Dorsalis pedis: right 2+ and left 2+. No edema. Presence of varicose veins.  Musculoskeletal   Gait and station: Gait evaluation demonstrated shuffling   Lumbar spine: Passive and active range of motion are limited secondary to pain. Extension, lateral flexion, rotation of the lumbar spine increased and reproduced pain. Lumbar facet joint loading maneuvers are positive.  Eddie test, Gaenslen's test, thigh thrust test, SI compression test, Yeoman's test are negative   Piriformis maneuvers are negative   Palpation of the bilateral ischial tuberosities, unrevealing   Hip joints: The range of motion of the hip joints is almost full and without pain   Palpation of the bilateral greater trochanter, unrevealing   Examination of the Iliotibial band: unrevealing   Neurological:   Patient is alert and oriented to person, place, and time.   Speech: Normal.   Cortical function: Normal mental status.   Reflex Scores:  Right patellar: 0+  Left patellar: 0+  Right Achilles: 0+  Left Achilles: 0+  Motor strength: 5/5  Motor Tone: Normal  Involuntary movements: None.   Superficial/Primitive Reflexes: Primitive reflexes were absent.   Right Rosales: Absent  Left Rosales: Absent  Right ankle clonus: Absent  Left ankle clonus: Absent   Babinsky: Absent  Long tract signs: Negative. Straight leg raising test: Negative. Femoral stretch sign: Negative.   Sensory exam: Intact to light touch, intact pain and temperature sensation, intact vibration sensation and normal proprioception.   Coordination: Normal finger to nose and heel to shin. Normal balance and negative Romberg's sign   Skin and subcutaneous tissue: Skin is warm and intact. No rash noted. No cyanosis.   Psychiatric: Judgment and insight: Normal. Recent and remote memory: Intact. Mood and affect: Normal.     ASSESSMENT:   1. Lumbar stenosis with neurogenic claudication    2. Spondylosis of lumbar region without myelopathy or radiculopathy    3. DDD (degenerative disc disease),  lumbar    4. Vertebrogenic low back pain    5. Fibromyalgia    6. Neuropathy    7. JACKY (generalized anxiety disorder)    8. Physical deconditioning    9. Gait disturbance    10. BMI 40.0-44.9, adult ([facility])          PLAN/MEDICAL DECISION MAKING:  Ms. Alison Benitez, 67 y.o. female reports a longstanding history of chronic lower back and left hip pain, which began without incident.  She most recently seen neurosurgery, Dr. Iván Stanley with an increase of her overall symptoms.  MRI of the lumbar spine without contrast on 8/19/2022 revealed significant endplate and discogenic changes at L3-L4 which could represent discitis, although the endplates are still preserved.  There is severe spinal stenosis at L3-L4 with bilateral facet joint effusions.  Dr. Stanley found the patient not to be a surgical candidate at that time, he felt her low back pain was more mechanical.  Recent thoracic MRI revealed multilevel degenerative disc disease with multiple levels of central spinal canal stenosis with severe stenosis at T11-12.  She also has a known history of polyneuropathy, with confirmed electrodiagnostic studies revealing sensorimotor neuropathy.   Patient was last seen on October 3, 2022, when she underwent diagnostic and therapeutic left L3-L4 and left L4-L5 transforaminal epidural steroid injections, from which she reports experiencing almost complete pain relief and functional improvement lasting 2 weeks.  Upon further conversation, she continued with complete resolution of her left leg pain until about 2 weeks ago.  She has now been experiencing symptoms radiating into her right leg primarily the hips, and lateral and anterior aspects of her thighs.  She no longer is experiencing symptoms into her shin.  She also does continue with symptoms of mechanical lower back pain.  She underwent follow-up consultation with Dr. Stanley on 10/28/2022, from which she reported significant improvement from epidural, although at that  point it had began to wane.  Due to patient's continued symptoms, Dr. Stanley recommended decompression with fusion and stabilization at L3-L5.  Patient is not quite ready for surgery.  Patient has failed to obtain pain relief with conservative measures for more than 6 months including oral analgesics (Lyrica, Norco, etc), physical therapy, to name a few.  I have reviewed all available patient's medical records as well as previous therapies as referenced above. I had a lengthy conversation with Ms. Alison Solano Emmanuel regarding her chronic pain condition and potential therapeutic options including risks, benefits, alternative therapies, to name a few.  Therefore, I have proposed the following plan:  1. Interventional pain management measures: Patient will be scheduled for diagnostic and therapeutic bilateral L3-L4 transforaminal epidural steroid injections. We may repeat epidural steroid injections  depending on patient's outcome.  As per current guidelines, epidurals will be limited to a maximum of 4 sessions per spinal region in a rolling twelve (12) month period. Continuation of epidural steroid injections over 12 months would only be considered under the following provisions;  • Patient is a high-risk surgical candidate, or the patient does not desire surgery, or recurrence of pain in the same location relieved with ESIs for at least three months and epidural provides at least 50% sustained improvement of pain and/or 50% objective improvement in function (using same scale as baseline)  • Pain is severe enough to cause a significant degree of functional disability or vocational disability  • The primary care provider will be notified regarding continuation of procedures and repeat steroid use   In addition, there are other potential treatments for her spinal stenosis including a mild procedure, or eventually a spinal cord stimulator trial.  She also presents with significant mechanical lower back pain with evidence  of severe facet arthropathy with lumbar facet joint effusions from L3-L4 through L5-S1.  I have discussed with her and her daughter if her radicular symptoms resolved, we can proceed with lumbar medial branch blocks and eventually lumbar rhizotomy.  If her radicular symptoms resolve, then, we could address this condition.   3. Pharmacological measures: Reviewed and discussed;   A. Patient takes nabumetone, Norco, diclofenac gel, pregabalin,duloxetine. Patient also takes zolpidem.  B. Continue Rheumate one tablet daily  C. Continue alpha lipoid acid 0327-2825 mg per day divided into 3 doses  D. Continue prilocaine 2%, lidocaine 10%, imipramine 3%, capsaicin 0.001% and mannitol 20%  cream, apply 1 to 2 grams of cream to the affected areas every 4 to 6 hours as needed  4. Long-term rehabilitation efforts:  A. The patient has a history of falls. I did complete a risk assessment for falls. Fall precautions: Patient has been instructed regarding universal fall precautions, such as;   • Using gait aids a cane or a rolling walker at the appropriate height at all times for ambulation   • Removing all area rugs and coffee tables to create a safe environment at home  • Ensure clean, dry floors  • Wearing supportive footwear and properly fitting clothing  • Ensure bed/chair is appropriate height and patient's feet can touch the floor  • Using a shower transfer bench  • Using walk-in shower and having shower safety bars installed  • Ensure proper lighting, minimize glare  • Have nightlights operational and in use  • Participation in an exercise program for gait training, balance training and strength  • Avoid carrying laundry up and down steps  • Ensure proper compliance and organization of medications to avoid errors   • Avoid use of over the counter sedatives and alcohol consumption  • Ensure easy access to call bell, glasses, TV control, telephone  • Ensure glasses/hearing aids are in use or close by (on top of night  table)  B. Continue a comprehensive physical therapy program for water therapy, therapeutic exercise, core strengthening, gait and balance training, neurodynamics, E-STIM, myofascial release, cupping, dry needling, home exercises  C. Start an exercise program such as chair yoga and water therapy  D. Contrast therapy: Apply ice-packs for 15-20 minutes, followed by heating pads for 15-20 minutes to affected area   E. Patient's Body mass index is 40.75 kg/m². Patient counseled on the importance of weight loss to help with overall health and pain control.   5. The patient and her family have been instructed to contact my office with any questions or difficulties. The patient understands the plan and agrees to proceed accordingly.    A phone visit was used to complete this visit. Total time of discussion was 25 minutes.       Pain Medications             baclofen (LIORESAL) 10 MG tablet Take 1 tablet by mouth 2 (Two) Times a Day As Needed for Muscle Spasms.    DULoxetine (CYMBALTA) 60 MG capsule Take 1 capsule by mouth Daily.    HYDROcodone-acetaminophen (NORCO)  MG per tablet Doesn't take on a regular basis    nabumetone (RELAFEN) 750 MG tablet Take 750 mg by mouth 2 (Two) Times a Day.    pregabalin (LYRICA) 100 MG capsule Take 1 capsule by mouth 3 (Three) Times a Day.         Please note that portions of this note were completed with a voice recognition program.     ELDON Hawkins    Patient Care Team:  Kacy Huertas MD as PCP - General (Family Medicine)  Anatoliy Everett MD as Consulting Physician (Anesthesiology)  Abdifatah Jordan MD as Consulting Physician (Endocrinology)  Kimber Correa APRN as Nurse Practitioner (Nurse Practitioner)  Moris Ellis MD as Consulting Physician (Pain Medicine)  Sussy El APRN as Nurse Practitioner (Nurse Practitioner)     No orders of the defined types were placed in this encounter.        Future Appointments   Date Time Provider Department  [place]   12/8/2022  1:00 PM Sussy El APRN [facility]   12/23/2022  3:15 PM Abdifatah Jordan MD [facility]   5/26/2023 11:00 AM Kimber Correa APRN [facility]

## 2022-12-15 ENCOUNTER — TELEPHONE (OUTPATIENT)
Dept: FAMILY MEDICINE CLINIC | Age: 67
End: 2022-12-15

## 2022-12-15 ENCOUNTER — HOSPITAL ENCOUNTER (OUTPATIENT)
Dept: PHYSICAL THERAPY | Facility: HOSPITAL | Age: 67
Setting detail: THERAPIES SERIES
Discharge: HOME | End: 2022-12-15
Payer: MEDICARE

## 2022-12-15 PROCEDURE — 97110 THERAPEUTIC EXERCISES: CPT

## 2022-12-15 PROCEDURE — 97140 MANUAL THERAPY 1/> REGIONS: CPT

## 2022-12-15 NOTE — TELEPHONE ENCOUNTER
Patient called and just wanted you to disregard this message because she is going to see her eye doctor today.

## 2022-12-15 NOTE — FLOWSHEET NOTE
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 She reports that she is scheduled to have B epidural injection next week.        Objective:  Observation:   Test measurements:    Palpation:    Exercises:  Exercise Resistance/Repetitions Other comments   Nustep L5 x 8.5 min 15   Hip abd / add BTB / ball 3x10 15   Standing hip open / close gait 2x10 B 15   Standing hip ext / abd 2x10 B 15   Mini squats  3x10 15   Hip ER / IR w/ t-band OTB 3x10 B 15                       Education in activity modification for home  1   Education in moving supine to sit with back protection  1     Other Therapeutic Activities:      Manual Treatments:  B ITB rolling / STM / manual stretch x 15'     Modalities:        Timed Code Treatment Minutes:  45      Total Treatment Minutes:  50    Treatment/Activity Tolerance:  [x] Patient tolerated treatment well [] Patient limited by fatigue  [] Patient limited by pain  [] Patient limited by other medical complications  [x] Other: Pt progressed through activity well with better tolerance. She reported having B ITB / HS pain therefore manual therapy was performed with ITB rolling and manual stretching with good tolerance and response noted.   Pt notes she is using the techniques taught by PT in the past visits for moving supine to sit and stretching upon waking in the am.      Pain after treatment:      \"better\"/10    Prognosis: [x] Good [] Fair  [] Poor    Patient Requires Follow-up: [x] Yes  [] No    Plan:   [x] Continue per plan of care [] Alter current plan (see comments)  [] Plan of care initiated [] Hold pending MD visit [] Discharge    Plan for Next Session:        Electronically signed by:  Jarad Bateman PT

## 2022-12-15 NOTE — TELEPHONE ENCOUNTER
Patient called and stated that her granddaughter has viral conjunctivitis and she kept her yesterday and now her eyes are really red. She was wondering if you could send her something in for it?

## 2022-12-19 ENCOUNTER — HOSPITAL ENCOUNTER (OUTPATIENT)
Dept: PHYSICAL THERAPY | Facility: HOSPITAL | Age: 67
Setting detail: THERAPIES SERIES
Discharge: HOME | End: 2022-12-19
Payer: MEDICARE

## 2022-12-19 PROCEDURE — 97140 MANUAL THERAPY 1/> REGIONS: CPT

## 2022-12-19 PROCEDURE — 97110 THERAPEUTIC EXERCISES: CPT

## 2022-12-19 NOTE — FLOWSHEET NOTE
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 She notes that she is having less ITB soreness after last PT visit.        Objective:  Observation:   Test measurements:    Palpation:    Exercises:  Exercise Resistance/Repetitions Other comments   Nustep L5 x 8.5 min 19   Hip abd / add BTB / ball 3x10 19   Standing hip open / close gait 2x10 B 19   Standing hip ext / abd 2x10 B 19   Mini squats  3x10 19   Hip ER / IR w/ t-band OTB 3x10 B 19                       Education in activity modification for home  1   Education in moving supine to sit with back protection  1     Other Therapeutic Activities:      Manual Treatments:  B ITB rolling / STM / manual stretch x 15'     Modalities:        Timed Code Treatment Minutes:  65      Total Treatment Minutes:  72    Treatment/Activity Tolerance:  [x] Patient tolerated treatment well [] Patient limited by fatigue  [] Patient limited by pain  [] Patient limited by other medical complications  [x] Other: Pt progressed through exercises with greater ease noted. She also had less tenderness at B ITB, although RLE demonstrated trigger points present. Pt is scheduled for lumbar injections on Wednesday; she states she will contact PT next week if MD suggests further PT. Pain after treatment:      \"better\"/10    Prognosis: [x] Good [] Fair  [] Poor    Patient Requires Follow-up: [x] Yes  [] No    Plan:   [x] Continue per plan of care [] Alter current plan (see comments)  [] Plan of care initiated [] Hold pending MD visit [] Discharge    Plan for Next Session:      Short Term Goals Completed by 3 weeks Goal Status   Pt to be I with HEP MET   Pt to be educated in posture correction techniques and self correct with verbal cues. MET   Pt to demonstrate a 1/2 grade increase in LLE strength where limited. Pt to perform daily activities with average pain of 5/10 or less. Partially met              Long Term Goals Completed by 6 weeks Goal Status   Pt to demonstrate 4+/5 B hip strength grossly. Back Index score to improve to 25% or less indicating improved function. Pt to be able to stand to cook / wash dishes for 30 minutes or greater without having to sit due to pain.     Pt to be able to move floor to standing with ease using proper form to protect her lumbar spine: side sit -> quadriped -> 1/2 kneeling -> standing          Electronically signed by:  Kennedy Haque, PT

## 2022-12-21 ENCOUNTER — OUTSIDE FACILITY SERVICE (OUTPATIENT)
Dept: PAIN MEDICINE | Facility: CLINIC | Age: 67
End: 2022-12-21

## 2022-12-21 PROCEDURE — 99152 MOD SED SAME PHYS/QHP 5/>YRS: CPT | Performed by: ANESTHESIOLOGY

## 2022-12-21 PROCEDURE — 64483 NJX AA&/STRD TFRM EPI L/S 1: CPT | Performed by: ANESTHESIOLOGY

## 2022-12-22 ENCOUNTER — TELEPHONE (OUTPATIENT)
Dept: PAIN MEDICINE | Facility: CLINIC | Age: 67
End: 2022-12-22

## 2022-12-22 DIAGNOSIS — E04.1 THYROID NODULE: Primary | ICD-10-CM

## 2022-12-22 NOTE — TELEPHONE ENCOUNTER
LVM for pt regarding yesterday’s procedure with Dr. Ellis and how they are feeling.  Advised of follow up appointment, and to call the office as needed.

## 2023-01-05 ENCOUNTER — OFFICE VISIT (OUTPATIENT)
Dept: FAMILY MEDICINE CLINIC | Age: 68
End: 2023-01-05
Payer: MEDICARE

## 2023-01-05 ENCOUNTER — HOSPITAL ENCOUNTER (OUTPATIENT)
Facility: HOSPITAL | Age: 68
Discharge: HOME | End: 2023-01-05
Payer: MEDICARE

## 2023-01-05 VITALS
WEIGHT: 232 LBS | BODY MASS INDEX: 39.61 KG/M2 | SYSTOLIC BLOOD PRESSURE: 138 MMHG | HEIGHT: 64 IN | RESPIRATION RATE: 18 BRPM | DIASTOLIC BLOOD PRESSURE: 74 MMHG | HEART RATE: 86 BPM | TEMPERATURE: 98 F | OXYGEN SATURATION: 98 %

## 2023-01-05 DIAGNOSIS — G47.00 INSOMNIA, UNSPECIFIED TYPE: ICD-10-CM

## 2023-01-05 DIAGNOSIS — M54.50 CHRONIC LOW BACK PAIN, UNSPECIFIED BACK PAIN LATERALITY, UNSPECIFIED WHETHER SCIATICA PRESENT: ICD-10-CM

## 2023-01-05 DIAGNOSIS — I10 ESSENTIAL HYPERTENSION: ICD-10-CM

## 2023-01-05 DIAGNOSIS — M20.41 HAMMER TOE OF SECOND TOE OF RIGHT FOOT: ICD-10-CM

## 2023-01-05 DIAGNOSIS — M25.552 BILATERAL HIP PAIN: ICD-10-CM

## 2023-01-05 DIAGNOSIS — G89.29 CHRONIC LOW BACK PAIN, UNSPECIFIED BACK PAIN LATERALITY, UNSPECIFIED WHETHER SCIATICA PRESENT: ICD-10-CM

## 2023-01-05 DIAGNOSIS — R53.83 FATIGUE, UNSPECIFIED TYPE: ICD-10-CM

## 2023-01-05 DIAGNOSIS — E04.1 THYROID NODULE: ICD-10-CM

## 2023-01-05 DIAGNOSIS — M21.611 BUNION OF GREAT TOE OF RIGHT FOOT: Primary | ICD-10-CM

## 2023-01-05 DIAGNOSIS — Z78.0 POSTMENOPAUSAL: ICD-10-CM

## 2023-01-05 DIAGNOSIS — M25.551 BILATERAL HIP PAIN: ICD-10-CM

## 2023-01-05 LAB
A/G RATIO: 2 (ref 0.8–2)
ALBUMIN SERPL-MCNC: 4.6 G/DL (ref 3.4–4.8)
ALP BLD-CCNC: 86 U/L (ref 25–100)
ALT SERPL-CCNC: 67 U/L (ref 4–36)
ANION GAP SERPL CALCULATED.3IONS-SCNC: 9 MMOL/L (ref 3–16)
AST SERPL-CCNC: 41 U/L (ref 8–33)
BILIRUB SERPL-MCNC: 0.4 MG/DL (ref 0.3–1.2)
BUN BLDV-MCNC: 20 MG/DL (ref 6–20)
CALCIUM SERPL-MCNC: 10.1 MG/DL (ref 8.5–10.5)
CHLORIDE BLD-SCNC: 98 MMOL/L (ref 98–107)
CHOLESTEROL, TOTAL: 220 MG/DL (ref 0–200)
CO2: 30 MMOL/L (ref 20–30)
CREAT SERPL-MCNC: 0.8 MG/DL (ref 0.4–1.2)
FOLATE: >20 NG/ML
GFR SERPL CREATININE-BSD FRML MDRD: >60 ML/MIN/{1.73_M2}
GLOBULIN: 2.3 G/DL
GLUCOSE BLD-MCNC: 81 MG/DL (ref 74–106)
HCT VFR BLD CALC: 41 % (ref 37–47)
HDLC SERPL-MCNC: 55 MG/DL (ref 40–60)
HEMOGLOBIN: 13.6 G/DL (ref 11.5–16.5)
LDL CHOLESTEROL CALCULATED: 117 MG/DL
MCH RBC QN AUTO: 29.2 PG (ref 27–32)
MCHC RBC AUTO-ENTMCNC: 33.2 G/DL (ref 31–35)
MCV RBC AUTO: 88 FL (ref 80–100)
PDW BLD-RTO: 13.9 % (ref 11–16)
PLATELET # BLD: 218 K/UL (ref 150–400)
PMV BLD AUTO: 11.6 FL (ref 6–10)
POTASSIUM SERPL-SCNC: 3.7 MMOL/L (ref 3.4–5.1)
RBC # BLD: 4.66 M/UL (ref 3.8–5.8)
SODIUM BLD-SCNC: 137 MMOL/L (ref 136–145)
T4 FREE: 1.45 NG/DL (ref 0.89–1.76)
TOTAL PROTEIN: 6.9 G/DL (ref 6.4–8.3)
TRIGL SERPL-MCNC: 241 MG/DL (ref 0–249)
TSH SERPL DL<=0.05 MIU/L-ACNC: 2 UIU/ML (ref 0.27–4.2)
VITAMIN B-12: 973 PG/ML (ref 211–911)
VLDLC SERPL CALC-MCNC: 48 MG/DL
WBC # BLD: 6.4 K/UL (ref 4–11)

## 2023-01-05 PROCEDURE — 36415 COLL VENOUS BLD VENIPUNCTURE: CPT

## 2023-01-05 PROCEDURE — 3078F DIAST BP <80 MM HG: CPT | Performed by: FAMILY MEDICINE

## 2023-01-05 PROCEDURE — 80061 LIPID PANEL: CPT

## 2023-01-05 PROCEDURE — 85027 COMPLETE CBC AUTOMATED: CPT

## 2023-01-05 PROCEDURE — 99214 OFFICE O/P EST MOD 30 MIN: CPT | Performed by: FAMILY MEDICINE

## 2023-01-05 PROCEDURE — 82746 ASSAY OF FOLIC ACID SERUM: CPT

## 2023-01-05 PROCEDURE — 3074F SYST BP LT 130 MM HG: CPT | Performed by: FAMILY MEDICINE

## 2023-01-05 PROCEDURE — 80053 COMPREHEN METABOLIC PANEL: CPT

## 2023-01-05 PROCEDURE — 1123F ACP DISCUSS/DSCN MKR DOCD: CPT | Performed by: FAMILY MEDICINE

## 2023-01-05 PROCEDURE — 84443 ASSAY THYROID STIM HORMONE: CPT

## 2023-01-05 PROCEDURE — 84439 ASSAY OF FREE THYROXINE: CPT

## 2023-01-05 PROCEDURE — 82607 VITAMIN B-12: CPT

## 2023-01-05 RX ORDER — LOSARTAN POTASSIUM 50 MG/1
50 TABLET ORAL DAILY
Qty: 90 TABLET | Refills: 3 | Status: SHIPPED | OUTPATIENT
Start: 2023-01-05

## 2023-01-05 ASSESSMENT — PATIENT HEALTH QUESTIONNAIRE - PHQ9
1. LITTLE INTEREST OR PLEASURE IN DOING THINGS: 0
SUM OF ALL RESPONSES TO PHQ QUESTIONS 1-9: 0
2. FEELING DOWN, DEPRESSED OR HOPELESS: 0
SUM OF ALL RESPONSES TO PHQ9 QUESTIONS 1 & 2: 0
SUM OF ALL RESPONSES TO PHQ QUESTIONS 1-9: 0

## 2023-01-05 ASSESSMENT — ENCOUNTER SYMPTOMS
BACK PAIN: 1
EYE ITCHING: 1
EYE REDNESS: 1
SORE THROAT: 1
GASTROINTESTINAL NEGATIVE: 1
RESPIRATORY NEGATIVE: 1

## 2023-01-05 NOTE — PROGRESS NOTES
Chief Complaint   Patient presents with    Lower Back Pain     chronic       Have you seen any other physician or provider since your last visit yes - neuro, pain dr    Have you had any other diagnostic tests since your last visit? no    Have you changed or stopped any medications since your last visit? no

## 2023-01-05 NOTE — PROGRESS NOTES
SUBJECTIVE:    Patient ID: Alice Newton is a 79 y.o. female. Chief Complaint   Patient presents with    Lower Back Pain     chronic       HPI: office visit  She has had a real bad spell with her back. She says she got down with it. She says she did a visit with dr Ericka acuna. She has had some improvement in her left leg pain with some injections. She did do her physical therapy. She says that has helped some. She is doing home exercises. She is not having any high blood pressures. She is not having any chest pain or increase in shortness of breath. She is trying to use her right leg more to help her move. She is having some significant pain in her right foot. She is got a hammertoe of her second toe. She is got a bunion on her right great toe. She says it is causing her pain when she is trying to walk. She is try different shoes. She says really it is uncomfortable to wear shoe. She is nervous about the thought of doing anything at all for it. She is going to try to wear a corn pad. Her medications otherwise seem to be working pretty well. She is sleeping a little better. Still has quite a bit of anxiety. Review of Systems   Constitutional:  Positive for fatigue. HENT:  Positive for congestion, postnasal drip and sore throat. Eyes:  Positive for redness and itching. Respiratory: Negative. Cardiovascular: Negative. Gastrointestinal: Negative. Musculoskeletal:  Positive for arthralgias, back pain, gait problem and myalgias. Skin:  Positive for rash. All other systems reviewed and are negative.      OBJECTIVE:  /74   Pulse 86   Temp 98 °F (36.7 °C)   Resp 18   Ht 5' 4\" (1.626 m)   Wt 232 lb (105.2 kg)   SpO2 98% Comment: ra  BMI 39.82 kg/m²    Wt Readings from Last 3 Encounters:   01/05/23 232 lb (105.2 kg)   10/05/22 234 lb (106.1 kg)   09/06/22 233 lb (105.7 kg)     BP Readings from Last 3 Encounters:   01/05/23 138/74   10/05/22 134/70   09/06/22 130/76 Pulse Readings from Last 3 Encounters:   01/05/23 86   10/05/22 78   09/06/22 86     Body mass index is 39.82 kg/m². Resp Readings from Last 3 Encounters:   01/05/23 18   10/05/22 18   09/06/22 18     Past medical, surgical, family and social history were reviewed and updated with the patient. Physical Exam  Vitals and nursing note reviewed. Constitutional:       General: She is not in acute distress. Appearance: Normal appearance. She is well-developed and normal weight. She is not ill-appearing or toxic-appearing. HENT:      Head: Normocephalic and atraumatic. Right Ear: Hearing, tympanic membrane, ear canal and external ear normal.      Left Ear: Hearing, tympanic membrane, ear canal and external ear normal.      Nose: Nose normal.      Mouth/Throat:      Lips: Pink. Mouth: Mucous membranes are moist.      Tongue: No lesions. Tongue does not deviate from midline. Palate: No mass and lesions. Pharynx: No pharyngeal swelling, oropharyngeal exudate or uvula swelling. Tonsils: No tonsillar exudate or tonsillar abscesses. Eyes:      Conjunctiva/sclera: Conjunctivae normal.      Pupils: Pupils are equal, round, and reactive to light. Neck:      Thyroid: Thyromegaly present. No thyroid mass or thyroid tenderness. Trachea: Trachea normal.   Cardiovascular:      Rate and Rhythm: Normal rate and regular rhythm. Heart sounds: Normal heart sounds, S1 normal and S2 normal. No murmur heard. No friction rub. No gallop. Pulmonary:      Effort: Pulmonary effort is normal.      Breath sounds: Normal breath sounds. No wheezing, rhonchi or rales. Musculoskeletal:      Cervical back: Full passive range of motion without pain, normal range of motion and neck supple. Lumbar back: Spasms, tenderness and bony tenderness present. Decreased range of motion. Right foot: Decreased range of motion. Deformity and bunion present.    Feet:      Comments: Marsha noted of the right second toe  Lymphadenopathy:      Cervical: No cervical adenopathy. Neurological:      Mental Status: She is alert and oriented to person, place, and time. Psychiatric:         Attention and Perception: Attention and perception normal.         Mood and Affect: Mood is anxious. Speech: Speech normal.         Behavior: Behavior normal. Behavior is cooperative. Thought Content:  Thought content normal.         Cognition and Memory: Cognition and memory normal.         Judgment: Judgment normal.        Results in Past 30 Days  Result Component Current Result Ref Range Previous Result Ref Range   Albumin/Globulin Ratio 2.0 (1/5/2023) 0.8 - 2.0 Not in Time Range    Albumin 4.6 (1/5/2023) 3.4 - 4.8 g/dL Not in Time Range    Alkaline Phosphatase 86 (1/5/2023) 25 - 100 U/L Not in Time Range    ALT 67 (H) (1/5/2023) 4 - 36 U/L Not in Time Range    AST 41 (H) (1/5/2023) 8 - 33 U/L Not in Time Range    BUN 20 (1/5/2023) 6 - 20 mg/dL Not in Time Range    Calcium 10.1 (1/5/2023) 8.5 - 10.5 mg/dL Not in Time Range    Chloride 98 (1/5/2023) 98 - 107 mmol/L Not in Time Range    CO2 30 (1/5/2023) 20 - 30 mmol/L Not in Time Range    Creatinine 0.8 (1/5/2023) 0.4 - 1.2 mg/dL Not in Time Range    Est, Glom Filt Rate >60 (1/5/2023) >59 Not in Time Range    Globulin 2.3 (1/5/2023) Not Established g/dL Not in Time Range    Glucose 81 (1/5/2023) 74 - 106 mg/dL Not in Time Range    Potassium 3.7 (1/5/2023) 3.4 - 5.1 mmol/L Not in Time Range    Sodium 137 (1/5/2023) 136 - 145 mmol/L Not in Time Range    Total Bilirubin 0.4 (1/5/2023) 0.3 - 1.2 mg/dL Not in Time Range    Total Protein 6.9 (1/5/2023) 6.4 - 8.3 g/dL Not in Time Range      Hemoglobin A1C (%)   Date Value   07/16/2020 5.3     Microscopic Examination (no units)   Date Value   12/28/2017 Not Indicated     LDL Calculated (mg/dL)   Date Value   01/05/2023 117       Lab Results   Component Value Date/Time    WBC 6.4 01/05/2023 11:00 AM    NEUTROABS 4.1 10/10/2022 09:30 AM    HGB 13.6 01/05/2023 11:00 AM    HCT 41.0 01/05/2023 11:00 AM    MCV 88.0 01/05/2023 11:00 AM     01/05/2023 11:00 AM     Lab Results   Component Value Date    TSH 2.00 01/05/2023       ASSESSMENT/PLAN    Diagnosis Orders   1. Bunion of great toe of right foot  Seems to be getting bigger. I encouraged her she does need to consider seeing orthopedics about this. She is going to try some different shoes and bunion pad      2. Insomnia, unspecified type  Was to be doing a little bit better. 3. Hammer toe of second toe of right foot  And related to her arthritis. Orthopedics may have to look at it when they look at the bunion      4. Postmenopausal        5. Thyroid nodule  US THYROID    TSH    T4, Free      6. Bilateral hip pain  External Referral To Orthopedic Surgery      7. Essential hypertension  losartan (COZAAR) 50 MG tablet    Comprehensive Metabolic Panel    LIPID PANEL    CBC      8. Chronic low back pain, unspecified back pain laterality, unspecified whether sciatica present  Continues to do physical therapy seems to be a little better than she was. 9. Fatigue, unspecified type  CBC    Vitamin B12 & Folate          Orders Placed This Encounter   Medications    losartan (COZAAR) 50 MG tablet     Sig: Take 1 tablet by mouth daily     Dispense:  90 tablet     Refill:  3          Medications Discontinued During This Encounter   Medication Reason    doxycycline hyclate (VIBRAMYCIN) 100 MG capsule LIST CLEANUP    losartan (COZAAR) 50 MG tablet REORDER       Controlled Substances Monitoring:      Please note: This chart was generated using Dragon dictation software. Although every effort was made to ensure the accuracy of this automated transcription, some errors in transcription may have occurred.

## 2023-01-06 ENCOUNTER — HOSPITAL ENCOUNTER (OUTPATIENT)
Dept: ULTRASOUND IMAGING | Facility: HOSPITAL | Age: 68
Discharge: HOME | End: 2023-01-06
Payer: MEDICARE

## 2023-01-06 ENCOUNTER — HOSPITAL ENCOUNTER (OUTPATIENT)
Facility: HOSPITAL | Age: 68
Discharge: HOME | End: 2023-01-06
Payer: MEDICARE

## 2023-01-06 DIAGNOSIS — E04.1 THYROID NODULE: ICD-10-CM

## 2023-01-06 PROCEDURE — 84480 ASSAY TRIIODOTHYRONINE (T3): CPT

## 2023-01-06 PROCEDURE — 76536 US EXAM OF HEAD AND NECK: CPT

## 2023-01-06 PROCEDURE — 36415 COLL VENOUS BLD VENIPUNCTURE: CPT

## 2023-01-07 LAB — T3 TOTAL: 0.88 NG/ML (ref 0.8–2)

## 2023-01-09 DIAGNOSIS — R79.89 ELEVATED LIVER FUNCTION TESTS: Primary | ICD-10-CM

## 2023-01-25 ENCOUNTER — TELEPHONE (OUTPATIENT)
Dept: FAMILY MEDICINE CLINIC | Age: 68
End: 2023-01-25

## 2023-01-25 NOTE — TELEPHONE ENCOUNTER
Patient called and is wondering if you could put in a order for physical therapy. She stated that she has been experiencing a lot of pain and having trouble walking.

## 2023-01-27 DIAGNOSIS — G89.29 CHRONIC LOW BACK PAIN, UNSPECIFIED BACK PAIN LATERALITY, UNSPECIFIED WHETHER SCIATICA PRESENT: Primary | ICD-10-CM

## 2023-01-27 DIAGNOSIS — M54.50 CHRONIC LOW BACK PAIN, UNSPECIFIED BACK PAIN LATERALITY, UNSPECIFIED WHETHER SCIATICA PRESENT: Primary | ICD-10-CM

## 2023-02-13 ENCOUNTER — HOSPITAL ENCOUNTER (OUTPATIENT)
Dept: PHYSICAL THERAPY | Facility: HOSPITAL | Age: 68
Setting detail: THERAPIES SERIES
Discharge: HOME | End: 2023-02-13
Payer: MEDICARE

## 2023-02-13 ENCOUNTER — TELEPHONE (OUTPATIENT)
Dept: PAIN MEDICINE | Facility: CLINIC | Age: 68
End: 2023-02-13
Payer: MEDICARE

## 2023-02-13 PROCEDURE — 97110 THERAPEUTIC EXERCISES: CPT

## 2023-02-13 PROCEDURE — 97140 MANUAL THERAPY 1/> REGIONS: CPT

## 2023-02-13 PROCEDURE — 97161 PT EVAL LOW COMPLEX 20 MIN: CPT

## 2023-02-13 ASSESSMENT — PAIN SCALES - GENERAL: PAINLEVEL_OUTOF10: 7

## 2023-02-13 NOTE — TELEPHONE ENCOUNTER
"Patient called because she said she is having severe pain in her right buttock/leg.    She says the episodes come on suddenly, and described them as a \"sharp, electrical\" pain that starts in her right\" butt cheek, runs down the front of her right leg to her ankle\". She states the pains are completely debilitating when they occur, and that she has a difficult time standing when they occur.She says the episodes last about 15 minutes, and then if she lays down for a while she can \"get back up\".     She also states she has been having generalized soreness over much of her body. She states she is not sure if this is her fibromyalgia flaring up, or if this is related to her pain episodes in her leg.     She said her pain level during her leg episodes are a \"12\" and her general pain level is a 5, due to her body soreness. She said her pain issues started around the 1st of February.    She wanted to see if she could be seen earlier than March 7, but I advised her that there may not be any appointment slots available. She also wanted to know if there is anything she can do in the meantime for her pain.   "

## 2023-02-13 NOTE — TELEPHONE ENCOUNTER
I called the patient and informed her that Sussy said she is probably not eligible for another injection, and she needs to schedule a follow up with Dr. Samuel. She voiced understanding, and stated she would also try to work with her physical therapist.

## 2023-02-14 RX ORDER — LEVOTHYROXINE SODIUM 0.05 MG/1
50 TABLET ORAL DAILY
Qty: 90 TABLET | Refills: 1 | Status: SHIPPED | OUTPATIENT
Start: 2023-02-14 | End: 2024-12-10

## 2023-02-14 NOTE — PROGRESS NOTES
Physical Therapy: Initial Evaluation    Patient: Adrian Adam (22 y.o. female)   Examination Date:   Plan of Care Certification Period: 2023 to 23      :  1955 ;    Confirmed: Yes MRN: [id number]  CSN: [id number]   Insurance: Payor: Harsha Dang / Plan: Cloria Goltz ESSENTIAL/PLUS / Product Type: *No Product type* /   Insurance ID: [id number] - (Medicare Managed) Secondary Insurance (if applicable):    Referring Physician:  Juliana Moya MD   PCP: Juliana Moya MD Visits to Date/Visits Approved:   /      No Show/Cancelled Appts:   /       Medical Diagnosis: Spondylolisthesis, lumbar region [M43.16]  Spinal stenosis, lumbar region with neurogenic claudication [M48.062]  Other intervertebral disc degeneration, 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.36]  Dorsalgia, unspecified [M54.9]  Other congenital malformations of spine, 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] Chonic low back pain  Treatment Diagnosis: Chronic low back pain, recent exacerbation     PERTINENT MEDICAL HISTORY   Patient Assessed for Rehabilitation Services: Yes       Medical History: Chart Reviewed: Yes   Past Medical History:   Diagnosis Date    Arthritis      Surgical History:   Past Surgical History:   Procedure Laterality Date     SECTION      x 2    COLONOSCOPY  2010    normal findings    COLONOSCOPY N/A 2020    COLORECTAL CANCER SCREENING, NOT HIGH RISK performed by Emilia Durán MD at [address] [place] [address] (CERVIX STATUS UNKNOWN)            SUBJECTIVE EXAMINATION       Subjective History:    Subjective: Pt presents with reports of recent exacerbation of back and leg pain. She notes that she has moved into her new house over the past month and had exacerbation of pain about 3 weeks ago. She notes the past week has been the worst and has had to use a walker occasionally at home.   She notes that she is having B LE pain, R>L, and has been having shooting pain in her legs as well as cramping / spasm in her L lower leg. She contacted her pcp and was referred to PT. Additional Pertinent Hx (if applicable):            Learning/Language:       Pain Screening   Pain Screening  Patient Currently in Pain: Yes  Pain Assessment: 0-10  Pain Level: 7  Worst Pain Level: 10 (walking in to clinic today was 10/10)    Functional Status         Social History:   Lives alone, daughter's home is close by. Has been using a walker as needed at home the past week due to pain. OBJECTIVE EXAMINATION     Review of Systems:  Overall Orientation Status: Within Normal Limits      Observations:  General Observations  Description: Standing posture: mild forward trunk lean, wide base of support. Gait: R lateral shift, R hip ER during swing phase. Upon observing pt ambulate into PT clinic, pt had to stop and rest 2x due to pain in her hips. Antalgic gait noted. Palpation:   Lumbar Spine Palpation: muscle guarding at B paraspinals and R glut med region. Left Strength  Right Strength         Strength LLE  L Hip Flexion: 3/5  L Hip Internal Rotation: 4+/5  L Hip External Rotation: 4/5  L Knee Flexion: 5/5  L Knee Extension: 5/5  L Ankle Dorsiflexion: 5/5    Strength RLE  R Hip Flexion: 3+/5  R Hip Internal Rotation: 4+/5  R Hip External Rotation: 4+/5  R Knee Flexion: 5/5  R Knee Extension: 5/5  R Ankle Dorsiflexion: 5/5       Special Tests:   Special Tests Lumbar Spine  Lumbar Special Tests: Performed (Back Index: 52%)  Slump Test: R (+), L (+)  Other:  (PA glides negative for pain reproduction gross lumbar spine)       ASSESSMENT     Impression: Assessment: Pt will benefit from skilled PT to address deficits consistent with low back pain with B radicular symptoms due to recent exacerbation. Pt demonstrates difficulty with short community ambulation due to pain. She also exhibits B hip weakness and gait abnormalities. Pt was educated in an initial HEP today and responded well to manual therapy and moist heat.   Pt was also educated in use of a single point cane for community ambulation to decrease pain and improve balance / gait mechanics. Body Structures, Functions, Activity Limitations Requiring Skilled Therapeutic Intervention: Decreased ROM, Decreased strength, Decreased high-level IADLs, Decreased posture, Increased pain, Decreased endurance, Decreased functional mobility     Statement of Medical Necessity: Physical Therapy is both indicated and medically necessary as outlined in the POC to increase the likelihood of meeting the functionally related goals stated below. Patient's Activity Tolerance:        Patient's rehabilitation potential/prognosis is considered to be: Good    Factors which may impact rehabilitation potential include:          GOALS   Patient Goal(s):    Short Term Goals Completed by 3 weeks Goal Status   Pt to be I with HEP     Pt to be educated in posture correction techniques and self correct with verbal cues. Pt to demonstrate a 1/2 grade increase in LLE strength where limited. Pt to perform daily activities with average pain of 5/10 or less. Pt to ambulate short community distances (1000 feet or greater) without having to stop due to pain. Long Term Goals Completed by 6 weeks Goal Status   Pt to demonstrate 4+/5 or greater B hip strength grossly. Back Index score to improve to 25% or less indicating improved function. Pt to be able to stand to cook / wash dishes for 30 minutes or greater without having to sit due to pain. Pt to be able to ambulate community distances without restriction with use of a single point cane as needed to decrease LE and lumbar pressure.                                               TREATMENT PLAN       Requires PT Follow-Up: Yes    Pt. actively involved in establishing Plan of Care and Goals: Yes    Patient/ Caregiver education and instruction:       HEP        Treatment may include any combination of the following: Current Treatment Recommendations: Strengthening, ROM, Functional mobility training, Endurance training, Pain management, Manual, Neuromuscular re-education, Gait training, Home exercise program, Patient/Caregiver education & training, Equipment evaluation, education, & procurement, Modalities, Dry needling, Therapeutic activities  Modalities: Heat/Cold     Frequency / Duration:  Patient to be seen 1-2x/week for 6-8 weeks weeks      Eval Complexity:    Decision Making: Low Complexity    PT Treatment Completed:  Exercises:      Treatment Reasoning    Exercise 1: Piriformis / figure 4 streth 5x20\"  Exercise 2: Supine active HS stretch 5x20\"  Exercise 3: Clam shells 3x10 B     Advance therex / HEP next visit                       Manual Therapy: (CPT 01.39.27.97.60) Treatment Reasoning     Soft Tissue Mobilizaton: STM lumbar spine B, RLE SAD, R LE glut med / ITB rolling x 15' Limitations addressed: Tissue extensibility, Painful spasm                    Modalities  Moist Heat: (CPT 84401) Treatment Reasoning    Patient Position: Supine hook-lying  Moist heat location: Low back  Post treatment skin assessment: Intact Limitations addressed: Pain modulation, Tissue extensibility                         Therapist Signature: Marcy Soriano, PT    Date: 1/40/4078     I certify that the above Therapy Services are being furnished while the patient is under my care. I agree with the treatment plan and certify that this therapy is necessary. Physician's Signature:  ___________________________   Date:_______                                                                   Jolene Maria MD        Physician Comments: _______________________________________________    Please sign and return to [address] [place] [address]. Please fax to the location listed below.  [facility] YOU for this referral!    [facility] PHYSICAL THERAPY  [address] [place] [address]  Dept: [address] [place] [address] Drive: [phone]

## 2023-02-15 DIAGNOSIS — G47.00 INSOMNIA, UNSPECIFIED TYPE: ICD-10-CM

## 2023-02-15 RX ORDER — ZOLPIDEM TARTRATE 10 MG/1
TABLET ORAL
Qty: 30 TABLET | Refills: 2 | Status: SHIPPED | OUTPATIENT
Start: 2023-02-15 | End: 2023-05-15

## 2023-02-15 NOTE — TELEPHONE ENCOUNTER
Refill request received from pharmacy      Next Office Visit Date:  Future Appointments   Date Time Provider Jessie Kessler   2/16/2023  0:29 PM Rosetta Dumont, PT [facility] [place] [facility]   2/21/2023  9:45 AM Dougie Peres MD [facility] [place] [facility]   4/5/2023 10:30 AM Dougie Peres MD [address]   7/25/2023  2:00 PM Dougie Peres MD [address] - Please review via [address]

## 2023-02-16 ENCOUNTER — APPOINTMENT (OUTPATIENT)
Dept: PHYSICAL THERAPY | Facility: HOSPITAL | Age: 68
End: 2023-02-16
Payer: MEDICARE

## 2023-02-20 ENCOUNTER — APPOINTMENT (OUTPATIENT)
Dept: PHYSICAL THERAPY | Facility: HOSPITAL | Age: 68
End: 2023-02-20
Payer: MEDICARE

## 2023-02-21 ENCOUNTER — HOSPITAL ENCOUNTER (OUTPATIENT)
Facility: HOSPITAL | Age: 68
Discharge: HOME | End: 2023-02-21
Payer: MEDICARE

## 2023-02-21 ENCOUNTER — OFFICE VISIT (OUTPATIENT)
Dept: FAMILY MEDICINE CLINIC | Age: 68
End: 2023-02-21

## 2023-02-21 ENCOUNTER — HOSPITAL ENCOUNTER (OUTPATIENT)
Dept: GENERAL RADIOLOGY | Facility: HOSPITAL | Age: 68
Discharge: HOME | End: 2023-02-21
Payer: MEDICARE

## 2023-02-21 VITALS
HEART RATE: 73 BPM | SYSTOLIC BLOOD PRESSURE: 138 MMHG | TEMPERATURE: 97.7 F | BODY MASS INDEX: 39.44 KG/M2 | DIASTOLIC BLOOD PRESSURE: 78 MMHG | RESPIRATION RATE: 18 BRPM | HEIGHT: 64 IN | OXYGEN SATURATION: 97 % | WEIGHT: 231 LBS

## 2023-02-21 DIAGNOSIS — R74.8 ELEVATED LIVER ENZYMES: ICD-10-CM

## 2023-02-21 DIAGNOSIS — M25.551 RIGHT HIP PAIN: ICD-10-CM

## 2023-02-21 DIAGNOSIS — I10 ESSENTIAL HYPERTENSION: ICD-10-CM

## 2023-02-21 DIAGNOSIS — M54.41 ACUTE RIGHT-SIDED LOW BACK PAIN WITH RIGHT-SIDED SCIATICA: Primary | ICD-10-CM

## 2023-02-21 LAB
A/G RATIO: 1.8 (ref 0.8–2)
ALBUMIN SERPL-MCNC: 4.6 G/DL (ref 3.4–4.8)
ALP BLD-CCNC: 78 U/L (ref 25–100)
ALT SERPL-CCNC: 39 U/L (ref 4–36)
ANION GAP SERPL CALCULATED.3IONS-SCNC: 13 MMOL/L (ref 3–16)
AST SERPL-CCNC: 32 U/L (ref 8–33)
BILIRUB SERPL-MCNC: 0.6 MG/DL (ref 0.3–1.2)
BUN BLDV-MCNC: 20 MG/DL (ref 6–20)
CALCIUM SERPL-MCNC: 10.4 MG/DL (ref 8.5–10.5)
CHLORIDE BLD-SCNC: 96 MMOL/L (ref 98–107)
CO2: 27 MMOL/L (ref 20–30)
CREAT SERPL-MCNC: 0.8 MG/DL (ref 0.4–1.2)
GFR SERPL CREATININE-BSD FRML MDRD: >60 ML/MIN/{1.73_M2}
GLOBULIN: 2.6 G/DL
GLUCOSE BLD-MCNC: 86 MG/DL (ref 74–106)
POTASSIUM SERPL-SCNC: 4 MMOL/L (ref 3.4–5.1)
SODIUM BLD-SCNC: 136 MMOL/L (ref 136–145)
TOTAL PROTEIN: 7.2 G/DL (ref 6.4–8.3)

## 2023-02-21 PROCEDURE — 73502 X-RAY EXAM HIP UNI 2-3 VIEWS: CPT

## 2023-02-21 PROCEDURE — 80053 COMPREHEN METABOLIC PANEL: CPT

## 2023-02-21 PROCEDURE — 36415 COLL VENOUS BLD VENIPUNCTURE: CPT

## 2023-02-21 PROCEDURE — 80074 ACUTE HEPATITIS PANEL: CPT

## 2023-02-21 RX ORDER — BIOTIN 1 MG
1 TABLET ORAL DAILY
COMMUNITY

## 2023-02-21 RX ORDER — PREGABALIN 100 MG/1
1 CAPSULE ORAL 3 TIMES DAILY
COMMUNITY
Start: 2023-02-06

## 2023-02-21 RX ORDER — KETOROLAC TROMETHAMINE 30 MG/ML
60 INJECTION, SOLUTION INTRAMUSCULAR; INTRAVENOUS ONCE
Status: COMPLETED | OUTPATIENT
Start: 2023-02-21 | End: 2023-02-21

## 2023-02-21 RX ORDER — METHYLPREDNISOLONE SODIUM SUCCINATE 125 MG/2ML
125 INJECTION, POWDER, LYOPHILIZED, FOR SOLUTION INTRAMUSCULAR; INTRAVENOUS ONCE
Status: COMPLETED | OUTPATIENT
Start: 2023-02-21 | End: 2023-02-21

## 2023-02-21 RX ORDER — PREDNISONE 10 MG/1
TABLET ORAL
Qty: 42 TABLET | Refills: 0 | Status: SHIPPED | OUTPATIENT
Start: 2023-02-21

## 2023-02-21 RX ADMIN — KETOROLAC TROMETHAMINE 60 MG: 30 INJECTION, SOLUTION INTRAMUSCULAR; INTRAVENOUS at 12:07

## 2023-02-21 RX ADMIN — METHYLPREDNISOLONE SODIUM SUCCINATE 125 MG: 125 INJECTION, POWDER, LYOPHILIZED, FOR SOLUTION INTRAMUSCULAR; INTRAVENOUS at 12:08

## 2023-02-21 ASSESSMENT — PATIENT HEALTH QUESTIONNAIRE - PHQ9
2. FEELING DOWN, DEPRESSED OR HOPELESS: 0
1. LITTLE INTEREST OR PLEASURE IN DOING THINGS: 0
SUM OF ALL RESPONSES TO PHQ QUESTIONS 1-9: 0
SUM OF ALL RESPONSES TO PHQ QUESTIONS 1-9: 0
SUM OF ALL RESPONSES TO PHQ9 QUESTIONS 1 & 2: 0
SUM OF ALL RESPONSES TO PHQ QUESTIONS 1-9: 0
SUM OF ALL RESPONSES TO PHQ QUESTIONS 1-9: 0

## 2023-02-21 NOTE — PROGRESS NOTES
Administrations This Visit       ketorolac (TORADOL) injection 60 mg       Admin Date  02/21/2023  12:07 Action  Given Dose  60 mg Route  IntraMUSCular Site  Dorsogluteal Right Administered By  Natasha Arredondo RN    Ordering Provider: Yisel Valentin MD    Kathy [address]: [id number]    Lot#: [id number]    : [address] [facility]    Patient Supplied?: No              methylPREDNISolone sodium (SOLU-MEDROL) injection 125 mg       Admin Date  02/21/2023  12:08 Action  Given Dose  125 mg Route  IntraMUSCular Site  Dorsogluteal Left Administered By  Natasha Arredondo RN    Ordering Provider: Yisel Valentin MD    NDC: 3868-8807-56    Lot#:     : [id number] [facility]. Patient Supplied?: No                  Patient tolerated injection well. Patient advised to wait 20 minutes in the office following the injection. No signs/symptoms of reaction noted after 20 minutes.

## 2023-02-21 NOTE — PROGRESS NOTES
SUBJECTIVE:    Patient ID: Erica Grande is a 79 y.o. female. Chief Complaint   Patient presents with    Back Pain     \"The last 2 weeks have been unbearable pain\" sitting for long periods makes it worse     Leg Pain     Bilateral R>L \"pain is shooting down from my back\"        HPI: office visit  Back pain. She is having a tremendous amount of pain down her right leg. She says that she is shooting and constant. She cannot seem to get any relief. She is very frustrated. She is concerned that she is got something going on in her right hip because the way the pain is going down her right leg. She says that she cannot do anything she needs to do around the house. She has not had any noted falls or injuries. She is not sure why this is happening. She is not having any bowel or bladder incontinence. She is definitely willing to go back to some physical therapy. She has tried everything she can think of over-the-counter. She has used heat ice and other modalities that have worked in the past.  Nothing seems to be making it better. Review of Systems   Constitutional:  Positive for fatigue. Respiratory: Negative. Cardiovascular: Negative. Gastrointestinal: Negative. Musculoskeletal:  Positive for arthralgias, back pain, gait problem and myalgias. Skin:  Positive for rash. All other systems reviewed and are negative. OBJECTIVE:  /78   Pulse 73   Temp 97.7 °F (36.5 °C) (Infrared)   Resp 18   Ht 5' 4\" (1.626 m)   Wt 231 lb (104.8 kg)   SpO2 97% Comment: room air  BMI 39.65 kg/m²    Wt Readings from Last 3 Encounters:   02/21/23 231 lb (104.8 kg)   01/05/23 232 lb (105.2 kg)   10/05/22 234 lb (106.1 kg)     BP Readings from Last 3 Encounters:   02/21/23 138/78   01/05/23 138/74   10/05/22 134/70      Pulse Readings from Last 3 Encounters:   02/21/23 73   01/05/23 86   10/05/22 78     Body mass index is 39.65 kg/m².    Resp Readings from Last 3 Encounters:   02/21/23 18   01/05/23 18   10/05/22 18     Past medical, surgical, family and social history were reviewed and updated with the patient. Physical Exam  Vitals and nursing note reviewed. Constitutional:       General: She is not in acute distress. Appearance: Normal appearance. She is well-developed and normal weight. She is not ill-appearing or toxic-appearing. HENT:      Head: Normocephalic and atraumatic. Right Ear: Hearing, tympanic membrane, ear canal and external ear normal.      Left Ear: Hearing, tympanic membrane, ear canal and external ear normal.      Nose: Nose normal.      Mouth/Throat:      Lips: Pink. Mouth: Mucous membranes are moist.      Tongue: No lesions. Tongue does not deviate from midline. Palate: No mass and lesions. Pharynx: No pharyngeal swelling, oropharyngeal exudate or uvula swelling. Tonsils: No tonsillar exudate or tonsillar abscesses. Eyes:      Conjunctiva/sclera: Conjunctivae normal.      Pupils: Pupils are equal, round, and reactive to light. Neck:      Thyroid: Thyromegaly present. No thyroid mass or thyroid tenderness. Trachea: Trachea normal.   Cardiovascular:      Rate and Rhythm: Normal rate and regular rhythm. Heart sounds: Normal heart sounds, S1 normal and S2 normal. No murmur heard. No friction rub. No gallop. Pulmonary:      Effort: Pulmonary effort is normal.      Breath sounds: Normal breath sounds. No wheezing, rhonchi or rales. Musculoskeletal:      Cervical back: Full passive range of motion without pain, normal range of motion and neck supple. Lumbar back: Spasms, tenderness and bony tenderness present. Decreased range of motion. Right foot: Decreased range of motion. Deformity and bunion present. Feet:      Comments: Hammertoe noted of the right second toe  Lymphadenopathy:      Cervical: No cervical adenopathy. Neurological:      Mental Status: She is alert and oriented to person, place, and time.    Psychiatric: Attention and Perception: Attention and perception normal.         Mood and Affect: Mood is anxious. Speech: Speech normal.         Behavior: Behavior normal. Behavior is cooperative. Thought Content:  Thought content normal.         Cognition and Memory: Cognition and memory normal.         Judgment: Judgment normal.        Results in Past 30 Days  Result Component Current Result Ref Range Previous Result Ref Range   Albumin/Globulin Ratio 1.8 (2/21/2023) 0.8 - 2.0 Not in Time Range    Albumin 4.6 (2/21/2023) 3.4 - 4.8 g/dL Not in Time Range    Alkaline Phosphatase 78 (2/21/2023) 25 - 100 U/L Not in Time Range    ALT 39 (H) (2/21/2023) 4 - 36 U/L Not in Time Range    AST 32 (2/21/2023) 8 - 33 U/L Not in Time Range    BUN 20 (2/21/2023) 6 - 20 mg/dL Not in Time Range    Calcium 10.4 (2/21/2023) 8.5 - 10.5 mg/dL Not in Time Range    Chloride 96 (L) (2/21/2023) 98 - 107 mmol/L Not in Time Range    CO2 27 (2/21/2023) 20 - 30 mmol/L Not in Time Range    Creatinine 0.8 (2/21/2023) 0.4 - 1.2 mg/dL Not in Time Range    Est, Glom Filt Rate >60 (2/21/2023) >59 Not in Time Range    Globulin 2.6 (2/21/2023) Not Established g/dL Not in Time Range    Glucose 86 (2/21/2023) 74 - 106 mg/dL Not in Time Range    Potassium 4.0 (2/21/2023) 3.4 - 5.1 mmol/L Not in Time Range    Sodium 136 (2/21/2023) 136 - 145 mmol/L Not in Time Range    Total Bilirubin 0.6 (2/21/2023) 0.3 - 1.2 mg/dL Not in Time Range    Total Protein 7.2 (2/21/2023) 6.4 - 8.3 g/dL Not in Time Range      Hemoglobin A1C (%)   Date Value   07/16/2020 5.3     Microscopic Examination (no units)   Date Value   12/28/2017 Not Indicated     LDL Calculated (mg/dL)   Date Value   01/05/2023 117       Lab Results   Component Value Date/Time    WBC 6.4 01/05/2023 11:00 AM    NEUTROABS 4.1 10/10/2022 09:30 AM    HGB 13.6 01/05/2023 11:00 AM    HCT 41.0 01/05/2023 11:00 AM    MCV 88.0 01/05/2023 11:00 AM     01/05/2023 11:00 AM     Lab Results Component Value Date    TSH 2.00 01/05/2023       ASSESSMENT/PLAN    Diagnosis Orders   1. Acute right-sided low back pain with right-sided sciatica  pregabalin (LYRICA) 100 MG capsule    ketorolac (TORADOL) injection 60 mg    methylPREDNISolone sodium (SOLU-MEDROL) injection 125 mg    predniSONE (DELTASONE) 10 MG tablet    MRI Lumbar Spine W WO Contrast    DME Order for Patient Lift for as OP      2. Right hip pain  XR HIP 2-3 VW W PELVIS RIGHT    predniSONE (DELTASONE) 10 MG tablet    DME Order for Patient Lift for as OP      3. Elevated liver enzymes  Comprehensive Metabolic Panel    Hepatitis Panel, Acute      4. Essential hypertension            Orders Placed This Encounter   Medications    ketorolac (TORADOL) injection 60 mg    methylPREDNISolone sodium (SOLU-MEDROL) injection 125 mg    predniSONE (DELTASONE) 10 MG tablet     Sig: 10 day taper dose take with food     Dispense:  42 tablet     Refill:  0        Medications Discontinued During This Encounter   Medication Reason    fluticasone (FLONASE) 50 MCG/ACT nasal spray Therapy completed    pregabalin (LYRICA) 75 MG capsule DOSE ADJUSTMENT       Controlled Substances Monitoring:      Please note: This chart was generated using Dragon dictation software. Although every effort was made to ensure the accuracy of this automated transcription, some errors in transcription may have occurred.

## 2023-02-22 LAB
HAV IGM SER IA-ACNC: NORMAL
HEPATITIS B CORE IGM ANTIBODY: NORMAL
HEPATITIS B SURFACE ANTIGEN INTERPRETATION: NORMAL
HEPATITIS C ANTIBODY INTERPRETATION: NORMAL

## 2023-02-23 ENCOUNTER — APPOINTMENT (OUTPATIENT)
Dept: PHYSICAL THERAPY | Facility: HOSPITAL | Age: 68
End: 2023-02-23
Payer: MEDICARE

## 2023-02-28 ENCOUNTER — HOSPITAL ENCOUNTER (OUTPATIENT)
Dept: PHYSICAL THERAPY | Facility: HOSPITAL | Age: 68
Setting detail: THERAPIES SERIES
Discharge: HOME | End: 2023-02-28
Payer: MEDICARE

## 2023-02-28 PROCEDURE — 97110 THERAPEUTIC EXERCISES: CPT

## 2023-02-28 PROCEDURE — 97140 MANUAL THERAPY 1/> REGIONS: CPT

## 2023-02-28 NOTE — FLOWSHEET NOTE
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 She states that she continued to have difficulty ambulating even after the evaluation and saw her pcp for a follow up. She reports her pcp issued B lower lumbar injections and placed her on a steroid due to her exacerbation of pain and notes her pain levels are much improved. She has a follow up with a back specialist next week and an ortho consult in the near future. She is now able to ambulate without need of an assistive device but uses caution.      Objective:  Observation:   Test measurements:    Palpation:    Exercises:  Exercise Resistance/Repetitions Other comments   Nustep L5 x 8' 28   Piriformis / figure 4 stretch 5x20\" B 28   Supine active HS stretch 5x20\" B 28   Clam shells 3x10 B 28                                             Other Therapeutic Activities:      Manual Treatments:  B lumbar paraspinal STM, gentle PA glides, B inferior pelvic distraction x 25'    Modalities:        Timed Code Treatment Minutes:  50      Total Treatment Minutes:  62    Treatment/Activity Tolerance:  [x] Patient tolerated treatment well [] Patient limited by fatigue  [] Patient limited by pain  [] Patient limited by other medical complications  [x] Other: Pt demonstrated decrease in lower lumbar muscle guarded today as compared to IE and responded well to manual therapy with reports of decrease in pain.  Pt ambulated into PT clinic today with improved mobility as compared to IE, but continues to demonstrate some gait abnormalities with increase in hip and lumbar rotation.  She reports that with the steroid she is taking currently that she can tolerate activity at home better.   She continues to demonstrate cautious mobility due to recent exacerbation of pain but overall is improving.      Pain after treatment:      0-1/10 \"better\"    Prognosis: [x] Good [] Fair  [] Poor    Patient Requires Follow-up: [x] Yes  [] No    Plan:   [x] Continue per plan of care [] Alter current plan (see comments)  [] Plan of care initiated [] Hold pending MD visit [] Discharge    Plan for Next Session:        Electronically signed by:  Karl Hinojosa PT

## 2023-03-01 ENCOUNTER — TELEPHONE (OUTPATIENT)
Dept: FAMILY MEDICINE CLINIC | Age: 68
End: 2023-03-01

## 2023-03-01 DIAGNOSIS — M25.551 RIGHT HIP PAIN: ICD-10-CM

## 2023-03-01 DIAGNOSIS — M54.41 ACUTE RIGHT-SIDED LOW BACK PAIN WITH RIGHT-SIDED SCIATICA: Primary | ICD-10-CM

## 2023-03-01 NOTE — TELEPHONE ENCOUNTER
Order for lift chair along with 2/21/2023 office visit note faxed to [facility] [phone]

## 2023-03-02 ENCOUNTER — HOSPITAL ENCOUNTER (OUTPATIENT)
Dept: PHYSICAL THERAPY | Facility: HOSPITAL | Age: 68
Setting detail: THERAPIES SERIES
Discharge: HOME | End: 2023-03-02
Payer: MEDICARE

## 2023-03-02 PROCEDURE — 97110 THERAPEUTIC EXERCISES: CPT

## 2023-03-02 PROCEDURE — 97140 MANUAL THERAPY 1/> REGIONS: CPT

## 2023-03-02 NOTE — FLOWSHEET NOTE
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 She also notes she has been able to tolerate walking better for longer distances. She notes she went to the [facility] and had a consult on footwear and bought a new pair of shoes with inserts that seem to be helping, as well.       Objective:  Observation:   Test measurements:    Palpation:    Exercises:  Exercise Resistance/Repetitions Other comments   Nustep L5 x 8' 2   Piriformis / figure 4 stretch 5x20\" B 2   Supine active HS stretch 5x20\" B 2   Clam shells 3x10 B 2   Bent knee fall out 3x10 B 2   Hip abd w/ t-band BTB 3x10 2   Hip IR / ER w/ t-band GTB 3x10 2                              Other Therapeutic Activities:      Manual Treatments:  B lumbar paraspinal STM, gentle PA glides, R inferior pelvic distraction x 25'    Modalities:        Timed Code Treatment Minutes:  65      Total Treatment Minutes:  71    Treatment/Activity Tolerance:  [x] Patient tolerated treatment well [] Patient limited by fatigue  [] Patient limited by pain  [] Patient limited by other medical complications  [x] Other: Therex was advanced today for hip strengthening with good tolerance noted. She demonstrates guarding at B lumbar paraspinals but improved as compared to last visit. She is responding well as she can ambulate short community distances without needing to stop due to pain. She continues to exhibit gait deviations due to hip weakness.       Pain after treatment:      0-1/10 \"better\"    Prognosis: [x] Good [] Fair  [] Poor    Patient Requires Follow-up: [x] Yes  [] No    Plan:   [x] Continue per plan of care [] Alter current plan (see comments)  [] Plan of care initiated [] Hold pending MD visit [] Discharge    Plan for Next Session:        Electronically signed by:  Nhan Quinonez PT

## 2023-03-06 ENCOUNTER — HOSPITAL ENCOUNTER (OUTPATIENT)
Dept: MRI IMAGING | Facility: HOSPITAL | Age: 68
Discharge: HOME | End: 2023-03-06
Payer: MEDICARE

## 2023-03-06 ENCOUNTER — TELEPHONE (OUTPATIENT)
Dept: PAIN MEDICINE | Facility: CLINIC | Age: 68
End: 2023-03-06
Payer: MEDICARE

## 2023-03-06 ENCOUNTER — APPOINTMENT (OUTPATIENT)
Dept: PHYSICAL THERAPY | Facility: HOSPITAL | Age: 68
End: 2023-03-06
Payer: MEDICARE

## 2023-03-06 DIAGNOSIS — M54.41 ACUTE RIGHT-SIDED LOW BACK PAIN WITH RIGHT-SIDED SCIATICA: ICD-10-CM

## 2023-03-06 PROCEDURE — 6360000004 HC RX CONTRAST MEDICATION: Performed by: FAMILY MEDICINE

## 2023-03-06 PROCEDURE — 72158 MRI LUMBAR SPINE W/O & W/DYE: CPT

## 2023-03-06 PROCEDURE — A9579 GAD-BASE MR CONTRAST NOS,1ML: HCPCS | Performed by: FAMILY MEDICINE

## 2023-03-06 RX ADMIN — GADOTERIDOL 20 ML: 279.3 INJECTION, SOLUTION INTRAVENOUS at 14:15

## 2023-03-06 NOTE — TELEPHONE ENCOUNTER
LVM for patient to return call and speak with office as provider is unavailable for her appointment time and there are specific slots that patients are being scheduled into.   HUB SEE ABOVE- TRANSFER TO OFFICE OR SEND A MESSAGE AND OFFICE WILL CALL PATIENT BACK AS THERE ARE SPECIFIC SLOTS TO MOVE PATIENTS TO PER THE PROVIDER

## 2023-03-07 ENCOUNTER — OFFICE VISIT (OUTPATIENT)
Dept: PAIN MEDICINE | Facility: CLINIC | Age: 68
End: 2023-03-07
Payer: MEDICARE

## 2023-03-07 VITALS
DIASTOLIC BLOOD PRESSURE: 90 MMHG | HEIGHT: 64 IN | HEART RATE: 66 BPM | SYSTOLIC BLOOD PRESSURE: 160 MMHG | OXYGEN SATURATION: 99 % | RESPIRATION RATE: 16 BRPM | BODY MASS INDEX: 40.34 KG/M2

## 2023-03-07 DIAGNOSIS — M79.7 FIBROMYALGIA: ICD-10-CM

## 2023-03-07 DIAGNOSIS — G62.9 NEUROPATHY: ICD-10-CM

## 2023-03-07 DIAGNOSIS — R53.81 PHYSICAL DECONDITIONING: ICD-10-CM

## 2023-03-07 DIAGNOSIS — M47.816 SPONDYLOSIS OF LUMBAR REGION WITHOUT MYELOPATHY OR RADICULOPATHY: ICD-10-CM

## 2023-03-07 DIAGNOSIS — M48.062 LUMBAR STENOSIS WITH NEUROGENIC CLAUDICATION: Primary | ICD-10-CM

## 2023-03-07 DIAGNOSIS — R26.9 GAIT DISTURBANCE: ICD-10-CM

## 2023-03-07 DIAGNOSIS — M48.062 LUMBAR STENOSIS WITH NEUROGENIC CLAUDICATION: ICD-10-CM

## 2023-03-07 DIAGNOSIS — F41.1 GAD (GENERALIZED ANXIETY DISORDER): ICD-10-CM

## 2023-03-07 DIAGNOSIS — M51.36 DDD (DEGENERATIVE DISC DISEASE), LUMBAR: ICD-10-CM

## 2023-03-07 PROCEDURE — 99213 OFFICE O/P EST LOW 20 MIN: CPT | Performed by: NURSE PRACTITIONER

## 2023-03-07 NOTE — PROGRESS NOTES
"Chief Complaint: \"Lower back and leg pain RT>LT.\"        History of Present Illness:   Patient: Ms. Alison Benitez, 67 y.o. female originally referred by Dr. Iván Stanley in consultation for chronic intractable lower back and left hip pain.  Patient reports a longstanding history of chronic lower back and left hip pain, which began without incident.  She most recently seen neurosurgery, Dr. Iván Stanley with an increase of her overall symptoms.  MRI of the lumbar spine without contrast on 8/19/2022 revealed significant endplate and discogenic changes at L3-L4 which could represent discitis, although the endplates are still preserved.  There is severe spinal stenosis at L3-L4 with bilateral facet joint effusions.  Dr. Stanley found the patient not to be a surgical candidate at that time, he felt her low back pain was more mechanical.  She also has a known history of polyneuropathy, with confirmed electrodiagnostic studies revealing sensorimotor neuropathy.  We last saw her on December 21, 2022, when she underwent diagnostic and therapeutic bilateral L3-L4 transforaminal epidural steroid injections, from which she reports experiencing almost complete relief lasting 2 weeks, she continues to not experience any symptoms in her left lower extremity.  She does continue to experience right lower extremity symptoms, but feel more as a muscle ache rather than shooting pain. Prior to that on October 3, 2022, she underwent diagnostic and therapeutic left L3-L4 and left L4-L5 transforaminal epidural steroid injections, from which she reported experiencing almost complete pain relief and functional improvement lasting 2 weeks, and continued with complete resolution of her left leg pain for an additional 2 months. She also does continue with symptoms of mechanical lower back pain.  She did begin physical therapy thereafter.  She underwent follow-up consultation with Dr. Stanley on 10/28/2022, from which she reported " "significant improvement from epidural, although at that point it had began to wane.  Due to patient's continued symptoms, Dr. Stanley recommended decompression with fusion and stabilization at L3-L5.  She returns today for postprocedure follow-up and evaluation.  Pain Description: Constant lower back pain with intermittent exacerbation, described as aching, burning, dull, sharp, shooting, stabbing and throbbing sensation.   Radiation of Pain: The pain radiates into the posterior aspect of the hips, and lateral and anterior aspect of her thighs RT>LT.  Pain intensity today: 9/10  Average pain intensity last week: 7/10  Pain intensity ranges from: 5/10 to 10/10  Aggravating factors: Pain increases with bending, twisting, standing, walking. Patient describes neurogenic claudication.   Alleviating factors: Pain decreases with sitting down, lying down on her side  Associated Symptoms:   Patient reports pain (knees down), numbness, and weakness in the lower extremities.   Patient denies any new bladder or bowel problems.   Patient reports difficulties with her balance and reports recent falls.   Pain interferes with ADLs, general activities (ability to walk, stand, transition from different positions), and affects patient's quality of life      Review of previous therapies and additional medical records:  Alison May Emmanuel has already failed the following measures, including:   Conservative Measures: Oral analgesics, opioids, topical analgesics, ice, heat, physical therapy   Interventional Measures: GTB injections at [facility] last 3 months ago  10/03/2022: DxTx left L3-L4 and left L4-L5 transforaminal epidural steroid injections  12/21/2022: DxTx bilateral L3-L4 transforaminal epidural steroid injections  Surgical Measures: No history of previous lumbar spine or hip surgery    P psychological evaluation on 11/18/2022 with Dr. Dougie Khan, per note: \"From a psychological perspective, patient is considered to be " "an appropriate candidate for spinal cord stim at this time.\"  Alison Benitez underwent neurosurgical consultation with Dr. Iván Stanley on 10/28/2022, and was found to be a surgical candidate for decompression and fusion and stabilization at L3-L5.  Alison Solano Emmanuel presents with significant comorbidities including Claustrophobia, CTS (carpal tunnel syndrome), Fibromyalgia, Hypertension, Neuropathy, hypothyroidism   In terms of current analgesics, Alison Solano Emmanuel takes: nabumetone, Norco, diclofenac gel, pregabalin, duloxetine. Patient also takes zolpidem   I have reviewed Ethan Report consistent with medication reconciliation.  SOAPP: Low Risk     Global Pain Scale 09-15  2022          Pain 20          Feelings 8          Clinical outcomes 19          Activities 25          GPS Total: 72            The [place] Back Pain Disability Scale  DATE 09-15  2022 03-07  2023         Sleep through the night 0 3         Turn over in bed 0 3         Get out of bed 2 2         Make your bed 4 1         Put on socks (pantyhose) 0 1         Ride in a car 0 2         Sit in a chair for several hours 0 3         Stand up for 20-30 minutes 5 2         Climb one flight of stairs 5 1         Walk a few blocks (200-300 yards)  5 4         Walk several miles 5 4         Run one block (about 50 yards) 5 5         Take food out of the refrigerator 4 1         Reach up to high shelves 4          Move a chair 4 2         Pull or push heavy doors 4 1         Bend over to clean the bathtub 4 1         Throw a ball 5 3         Carry two bags of groceries 5 3         Lift and carry a heavy suitcase 5 5         Total score 72 50           Review of New Diagnostic Studies:  MRI of the lumbar spine without contrast 3/6/2023: Per radiology report.  Patient did not bring disc.  Multilevel lumbar spondylosis with facet hypertrophy, progressed from prior study.  Congenital short pedicles which contribute to thecal sac narrowing.  Grade 1 " anterior listhesis of L2-L3, and a grade 1 anterior listhesis of L4 on L5.  Modic type II degenerative endplate changes at L3-L4.  L1-L2: Mild bilateral facet hypertrophy.  L2-L3: Left paracentral disc protrusion with endplate spurring, and bilateral facet hypertrophy.  There is mild central spinal canal stenosis with moderate left L2 neuroforaminal stenosis.  L3-L4: Moderate disc bulge with endplate spurring, greater to the right.  There is moderate bilateral facet hypertrophy and ligamentum flavum thickening with bilateral facet effusions.  Now severe central spinal canal stenosis and moderate bilateral neuroforaminal stenosis.  L4-L5: Disc bulge with endplate spurring, and severe right and moderate left facet hypertrophy with a left facet joint effusion.  Moderate central spinal canal stenosis with moderate to severe right and mild left neuroforaminal stenosis.  L5-S1: Mild disc bulge with moderate bilateral facet hypertrophy producing mild central spinal canal stenosis.      Review of Diagnostic Studies:   MRI of the thoracic spine without contrast 10/26/2022: Grade 1 spondylolisthesis of T1 on T2.  Moderate multilevel degenerative disc disease with disc bulges.  There is moderate degrees of central spinal canal stenosis at multiple levels ranging from T3-T12.  There is severe central spinal stenosis at T11-T12.  Spinal cord appears normal in signal and caliber throughout.  MRI of the lumbar spine without contrast 8/19/2022:  marrow edema of the adjacent endplates at L3-4 with mild T2 hyperintensity/edema in the disc.  Associated T2 hyperintense edema of the left psoas muscle.  No evidence of paravertebral fluid collection.  Grade 1 anterolisthesis of L3 on L4 and L4-L5.  Vertebral body heights are normal.    T12-L1, L1-L2: Disc bulge, facet hypertrophy.  No significant canal or foraminal stenosis  L2-L3: Left paracentral disc protrusion superimposed on disc bulge.  Facet arthrosis.  Ligamentum flavum  hypertrophy.  Mild canal stenosis.  Moderate to severe left lateral recess stenosis with probable impingement of the transversing left L3 nerve root.  Moderate left and mild right neuroforaminal stenosis.  Findings are concerning for early osteomyelitis/discitis but may also relate to moderate type I reactive endplate changes.  L3-L4: Central disc protrusion, facet hypertrophy, ligamentum flavum hypertrophy contributing to severe canal stenosis.  Moderate to severe left and mild right neuroforaminal stenosis.  Small facet joint effusions.  L4-L5: Disc bulge, facet hypertrophy, ligamentum flavum hypertrophy contributing to moderate canal stenosis.  Bilateral facet joint effusions.  Moderate to severe right and mild left neuroforaminal stenosis  L5-S1: Central disc protrusion abutting the ventral thecal sac and descending S1 nerve roots.  Mild bilateral foraminal stenosis  Left hip x-rays 9/12/2022: There is some enthesopathy at both greater trochanters and ischial tuberosities.  Mild degenerative changes  AP pelvis, AP lateral left hip, ordered for pain, shows some dorsal acetabular osteophytes, joint space still reasonable both hips, some enthesopathy both greater trochanters and ischial tuberosities, no fractures, no comparison  Lumbar spine x-rays 8/19/2022:  multilevel degenerative disc disease and facet hypertrophy.  Grade 1 anterolisthesis of L4 on L5    Review of Systems   Constitutional: Positive for appetite change.   Eyes: Positive for redness and itching.   Endocrine: Positive for cold intolerance and heat intolerance.   Musculoskeletal: Positive for back pain, joint swelling, myalgias and neck stiffness.   Neurological: Positive for dizziness and light-headedness.   All other systems reviewed and are negative.        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)   • History of infectious mononucleosis   • Physical deconditioning   • Bilateral leg pain   • DDD (degenerative  disc disease), lumbar   • Lumbar stenosis with neurogenic claudication   • Hormone replacement therapy (HRT)   • Menopausal symptoms   • Acquired hypothyroidism   • Nontoxic multinodular goiter   • Postmenopausal   • Yeast vaginitis   • Fibromyalgia   • Osteoarthritis   • Lumbosacral disc disease   • Vertebrogenic low back pain   • Bursitis   • Adolescent idiopathic scoliosis of lumbar region   • Greater trochanteric bursitis of left hip   • Gait disturbance   • Other insomnia   • JACKY (generalized anxiety disorder)       Past Medical History:   Diagnosis Date   • Arthritis    • Bursitis    • Claustrophobia    • CTS (carpal tunnel syndrome)     bilateral    • Fibromyalgia    • Hemorrhoids    • Hypertension    • Low back pain    • Lumbosacral disc disease    • Neuropathy    • Osteoarthritis    • SBE (subacute bacterial endocarditis)    • Tendinitis of knee    • Thyroid disorder    • Thyroid nodule    •  (vaginal birth after )          Past Surgical History:   Procedure Laterality Date   • ABDOMINAL HYSTERECTOMY     •  SECTION     •  SECTION     • COLONOSCOPY     • CYST REMOVAL N/A 2022   • DILATATION AND CURETTAGE     • HYSTERECTOMY N/A        • MANDIBLE FRACTURE SURGERY     • OOPHORECTOMY Bilateral    • TUBAL ABDOMINAL LIGATION     • TUBAL ABDOMINAL LIGATION           Family History   Problem Relation Age of Onset   • Arthritis Mother    • Stroke Mother    • Glaucoma Mother    • Hypertension Mother    • Heart disease Mother    • Heart failure Mother    • Heart disease Father    • Lung disease Father    • Hypertension Brother    • Breast cancer Neg Hx    • Ovarian cancer Neg Hx          Social History     Socioeconomic History   • Marital status: Single   Tobacco Use   • Smoking status: Never   • Smokeless tobacco: Never   Vaping Use   • Vaping Use: Never used   Substance and Sexual Activity   • Alcohol use: No   • Drug use: No   • Sexual activity: Not Currently           Current  Outpatient Medications:   •  Alpha Lipoic Acid 200 MG capsule, Take 400 mg by mouth 3 (Three) Times a Day., Disp: 180 capsule, Rfl: 5  •  ascorbic acid (VITAMIN C) 1000 MG tablet, Take 1 tablet by mouth Daily., Disp: , Rfl:   •  Cholecalciferol (Vitamin D) 50 MCG (2000 UT) tablet, Take 2,000 Units by mouth Daily., Disp: , Rfl:   •  cholecalciferol (VITAMIN D3) 25 MCG (1000 UT) tablet, Take 1 tablet by mouth Daily., Disp: , Rfl:   •  Cyanocobalamin 3000 MCG capsule, Take 3,000 mg by mouth Daily., Disp: , Rfl:   •  desloratadine (CLARINEX) 5 MG tablet, Take 1 tablet by mouth Daily., Disp: , Rfl:   •  Diclofenac Sodium (VOLTAREN) 1 % gel gel, , Disp: , Rfl:   •  Dietary Management Product (Rheumate) capsule, Take 1 capsule by mouth Daily., Disp: 90 capsule, Rfl: 0  •  doxycycline (PERIOSTAT) 20 MG tablet, , Disp: , Rfl:   •  DULoxetine (CYMBALTA) 60 MG capsule, Take 1 capsule by mouth Daily., Disp: 90 capsule, Rfl: 3  •  estradiol (ESTRACE) 1 MG tablet, Take 1 tablet by mouth Daily., Disp: , Rfl:   •  hydrochlorothiazide (HYDRODIURIL) 25 MG tablet, , Disp: , Rfl:   •  HYDROcodone-acetaminophen (NORCO)  MG per tablet, Doesn't take on a regular basis, Disp: , Rfl:   •  levothyroxine (Synthroid) 50 MCG tablet, Take 1 tablet by mouth Daily for 90 days., Disp: 90 tablet, Rfl: 0  •  losartan (COZAAR) 50 MG tablet, Take 1 tablet by mouth Daily., Disp: , Rfl:   •  MULTIPLE VITAMINS-MINERALS ER PO, Take 1 tablet by mouth Daily., Disp: , Rfl:   •  nabumetone (RELAFEN) 750 MG tablet, Take 1 tablet by mouth 2 (Two) Times a Day., Disp: , Rfl:   •  Omega-3 Fatty Acids (FISH OIL) 1000 MG capsule capsule, Take 1,200 mg by mouth Daily With Breakfast., Disp: , Rfl:   •  pregabalin (LYRICA) 100 MG capsule, Take 1 capsule by mouth 3 (Three) Times a Day., Disp: 270 capsule, Rfl: 1  •  vitamin B-6 (PYRIDOXINE) 100 MG tablet, Take 1 tablet by mouth Daily., Disp: 30 tablet, Rfl: 0  •  zolpidem (AMBIEN) 10 MG tablet, Take 1 tablet by  "mouth., Disp: , Rfl:   •  multivitamin with minerals tablet tablet, Take 1 tablet by mouth Daily., Disp: , Rfl:       No Known Allergies      /90   Pulse 66   Resp 16   Ht 162.6 cm (64\")   SpO2 99%   BMI 40.34 kg/m²         Physical Exam:   Constitutional: Patient appears well-developed, well-nourished, well-hydrated, appears younger than stated age  HEENT: Head: Normocephalic and atraumatic  Eyes: Conjunctivae and lids are normal  Pupils: Equal, round, reactive to light  Peripheral vascular exam: Posterior tibialis: right 2+ and left 2+. Dorsalis pedis: right 2+ and left 2+. No edema. Presence of varicose veins.  Musculoskeletal   Gait and station: Gait evaluation demonstrated shuffling and antalgia  Lumbar spine: Passive and active range of motion are limited secondary to pain. Extension, lateral flexion, rotation of the lumbar spine increased and reproduced pain. Lumbar facet joint loading maneuvers are positive.  Eddie test, Gaenslen's test, thigh thrust test, SI compression test, Yeoman's test are negative   Piriformis maneuvers are negative   Palpation of the bilateral ischial tuberosities, unrevealing   Hip joints: The range of motion of the hip joints is almost full and without pain   Palpation of the bilateral greater trochanter, reveals tenderness bilaterally  Examination of the Iliotibial band: unrevealing   Neurological:   Patient is alert and oriented to person, place, and time.   Speech: Normal.   Cortical function: Normal mental status.   Reflex Scores:  Right patellar: 0+  Left patellar: 0+  Right Achilles: 0+  Left Achilles: 0+  Motor strength: 5/5  Motor Tone: Normal  Involuntary movements: None.   Superficial/Primitive Reflexes: Primitive reflexes were absent.   Right Rosales: Absent  Left Rosales: Absent  Right ankle clonus: Absent  Left ankle clonus: Absent   Babinsky: Absent  Long tract signs: Negative. Straight leg raising test: positive on the right. Femoral stretch sign: " Negative.   Sensory exam: Intact to light touch, intact pain and temperature sensation, intact vibration sensation and normal proprioception.   Coordination: Normal finger to nose and heel to shin. Normal balance and negative Romberg's sign   Skin and subcutaneous tissue: Skin is warm and intact. No rash noted. No cyanosis.   Psychiatric: Judgment and insight: Normal. Recent and remote memory: Intact. Mood and affect: Normal.     ASSESSMENT:   1. Lumbar stenosis with neurogenic claudication    2. Spondylosis of lumbar region without myelopathy or radiculopathy    3. DDD (degenerative disc disease), lumbar    4. Fibromyalgia    5. Neuropathy    6. JACKY (generalized anxiety disorder)    7. Physical deconditioning    8. Gait disturbance          PLAN/MEDICAL DECISION MAKING:  Ms. Alison Benitez, 67 y.o. female reports a longstanding history of chronic lower back and left hip pain, which began without incident.  She most recently seen neurosurgery, Dr. Iván Stanley with an increase of her overall symptoms.  MRI of the lumbar spine without contrast on 8/19/2022 revealed significant endplate and discogenic changes at L3-L4 which could represent discitis, although the endplates are still preserved.  There is severe spinal stenosis at L3-L4 with bilateral facet joint effusions.  Dr. Stanley found the patient not to be a surgical candidate at that time, he felt her low back pain was more mechanical.  Recent thoracic MRI revealed multilevel degenerative disc disease with multiple levels of central spinal canal stenosis with severe stenosis at T11-12.  She also has a known history of polyneuropathy, with confirmed electrodiagnostic studies revealing sensorimotor neuropathy.  She also does continue with symptoms of mechanical lower back pain but continues to experience bilateral leg symptoms.  She underwent follow-up consultation with Dr. Stanley on 10/28/2022, from which she reported significant improvement from epidural,  although at that point it had began to wane.  Due to patient's continued symptoms, Dr. Stanley recommended decompression with fusion and stabilization at L3-L5.  Patient is not quite ready for surgery.  I have discussed with her due to her extensive findings in her lumbar spine, if she does not desire to undergo surgical intervention, a spinal cord stimulator would be an appropriate option for her.  She would like to try 1 more epidural, and see how she fares thereafter, and if no substantial benefit, we may proceed with a spinal cord stimulator trial.  Patient has failed to obtain pain relief with conservative measures for more than 6 months including oral analgesics (Lyrica, Norco, etc), physical therapy, to name a few.  I have reviewed all available patient's medical records as well as previous therapies as referenced above. I had a lengthy conversation with Ms. Alison Solano Emmanuel regarding her chronic pain condition and potential therapeutic options including risks, benefits, alternative therapies, to name a few.  Therefore, I have proposed the following plan:  1. Interventional pain management measures: Patient will be scheduled for bilateral L3-L4 transforaminal epidural steroid injections.  If patient does not achieve substantial relief, there are other potential treatments for her spinal stenosis including a mild procedure, or eventually a spinal cord stimulator trial.  She is not quite interested in surgical intervention.  And patient has severe degenerative findings in her lumbar spine.  I have discussed with her and her daughter, a potential spinal cord stimulator trial, as long-term management.    2. Pharmacological measures: Reviewed and discussed;   A. Patient takes nabumetone, Norco, diclofenac gel, pregabalin,duloxetine. Patient also takes zolpidem.  B. Continue Rheumate one tablet daily  C. Continue alpha lipoid acid 2166-5423 mg per day divided into 3 doses  3. Long-term rehabilitation efforts:  A.  The patient has a history of falls. I did complete a risk assessment for falls. Fall precautions: Patient has been instructed regarding universal fall precautions, such as;   • Using gait aids a cane or a rolling walker at the appropriate height at all times for ambulation   • Removing all area rugs and coffee tables to create a safe environment at home  • Ensure clean, dry floors  • Wearing supportive footwear and properly fitting clothing  • Ensure bed/chair is appropriate height and patient's feet can touch the floor  • Using a shower transfer bench  • Using walk-in shower and having shower safety bars installed  • Ensure proper lighting, minimize glare  • Have nightlights operational and in use  • Participation in an exercise program for gait training, balance training and strength  • Avoid carrying laundry up and down steps  • Ensure proper compliance and organization of medications to avoid errors   • Avoid use of over the counter sedatives and alcohol consumption  • Ensure easy access to call bell, glasses, TV control, telephone  • Ensure glasses/hearing aids are in use or close by (on top of night table)  B.  Patient will start a comprehensive physical therapy program for water therapy, therapeutic exercise, core strengthening, gait and balance training, neurodynamics, E-STIM, myofascial release, cupping, dry needling, home exercises  C. Start an exercise program such as chair yoga and water therapy  D. Contrast therapy: Apply ice-packs for 15-20 minutes, followed by heating pads for 15-20 minutes to affected area   E. Patient's Body mass index is 40.75 kg/m². Patient counseled on the importance of weight loss to help with overall health and pain control.   4. The patient has been instructed to contact my office with any questions or difficulties. The patient understands the plan and agrees to proceed accordingly.           Pain Medications             DULoxetine (CYMBALTA) 60 MG capsule Take 1 capsule by  mouth Daily.    HYDROcodone-acetaminophen (NORCO)  MG per tablet Doesn't take on a regular basis    nabumetone (RELAFEN) 750 MG tablet Take 1 tablet by mouth 2 (Two) Times a Day.    pregabalin (LYRICA) 100 MG capsule Take 1 capsule by mouth 3 (Three) Times a Day.         Please note that portions of this note were completed with a voice recognition program.     ELDON Hawkins    Patient Care Team:  Kacy Huertas MD as PCP - General (Family Medicine)  Anatoliy Everett MD as Consulting Physician (Anesthesiology)  Abdifatah Jordan MD as Consulting Physician (Endocrinology)  Kimber Correa APRN as Nurse Practitioner (Nurse Practitioner)  Moris Ellis MD as Consulting Physician (Pain Medicine)  Sussy El APRN as Nurse Practitioner (Nurse Practitioner)     No orders of the defined types were placed in this encounter.        Future Appointments   Date Time Provider Department Center   3/16/2023 10:00 AM Maribell Conroy PA-C [facility]   5/26/2023 11:00 AM Kimber Correa APRN [facility]

## 2023-03-09 ENCOUNTER — OFFICE VISIT (OUTPATIENT)
Dept: FAMILY MEDICINE CLINIC | Age: 68
End: 2023-03-09
Payer: MEDICARE

## 2023-03-09 VITALS
RESPIRATION RATE: 18 BRPM | TEMPERATURE: 97.9 F | SYSTOLIC BLOOD PRESSURE: 118 MMHG | HEART RATE: 94 BPM | BODY MASS INDEX: 39.4 KG/M2 | HEIGHT: 64 IN | DIASTOLIC BLOOD PRESSURE: 80 MMHG | OXYGEN SATURATION: 97 % | WEIGHT: 230.8 LBS

## 2023-03-09 DIAGNOSIS — M54.41 ACUTE RIGHT-SIDED LOW BACK PAIN WITH RIGHT-SIDED SCIATICA: Primary | ICD-10-CM

## 2023-03-09 DIAGNOSIS — I10 ESSENTIAL HYPERTENSION: ICD-10-CM

## 2023-03-09 DIAGNOSIS — M25.551 RIGHT HIP PAIN: ICD-10-CM

## 2023-03-09 PROCEDURE — 3078F DIAST BP <80 MM HG: CPT | Performed by: FAMILY MEDICINE

## 2023-03-09 PROCEDURE — 3074F SYST BP LT 130 MM HG: CPT | Performed by: FAMILY MEDICINE

## 2023-03-09 PROCEDURE — 1123F ACP DISCUSS/DSCN MKR DOCD: CPT | Performed by: FAMILY MEDICINE

## 2023-03-09 PROCEDURE — 99213 OFFICE O/P EST LOW 20 MIN: CPT | Performed by: FAMILY MEDICINE

## 2023-03-09 ASSESSMENT — ENCOUNTER SYMPTOMS
GASTROINTESTINAL NEGATIVE: 1
RESPIRATORY NEGATIVE: 1
SORE THROAT: 1
EYE ITCHING: 1
BACK PAIN: 1
EYE REDNESS: 1

## 2023-03-09 NOTE — PROGRESS NOTES
Chief Complaint   Patient presents with    Back Pain     Had recent MRI        Have you seen any other physician or provider since your last visit yes - Pain Management     Have you had any other diagnostic tests since your last visit?  yes - MRI     Have you changed or stopped any medications since your last visit? no

## 2023-03-09 NOTE — PROGRESS NOTES
SUBJECTIVE:    Patient ID: Chhaya Dey is a 79 y.o. female. Chief Complaint   Patient presents with    Back Pain     Had recent MRI        HPI: office visit  She is in the office today in follow-up of her back pain. She has recently had an MRI. She is going to go see Dr. Paulina Sommers again. She says she is just struggling trying to figure out what she can do. She is worried about having any further surgeries but at the same time she is really struggled with her pain. She says she does have some decent days. She is going to physical therapy. She is doing all the things that anybody has told her that she should be doing. She is trying not to take pain medicine. She is tearful in the office today. She feels like she is frustrated with her functional ability. Review of Systems   Constitutional:  Positive for fatigue. HENT:  Positive for congestion, postnasal drip and sore throat. Eyes:  Positive for redness and itching. Respiratory: Negative. Cardiovascular: Negative. Gastrointestinal: Negative. Musculoskeletal:  Positive for arthralgias, back pain, gait problem and myalgias. Skin:  Positive for rash. All other systems reviewed and are negative. OBJECTIVE:  /80   Pulse 94   Temp 97.9 °F (36.6 °C) (Infrared)   Resp 18   Ht 5' 4\" (1.626 m)   Wt 230 lb 12.8 oz (104.7 kg)   SpO2 97% Comment: RA  BMI 39.62 kg/m²    Wt Readings from Last 3 Encounters:   03/09/23 230 lb 12.8 oz (104.7 kg)   03/06/23 220 lb (99.8 kg)   02/21/23 231 lb (104.8 kg)     BP Readings from Last 3 Encounters:   03/09/23 118/80   02/21/23 138/78   01/05/23 138/74      Pulse Readings from Last 3 Encounters:   03/09/23 94   02/21/23 73   01/05/23 86     Body mass index is 39.62 kg/m². Resp Readings from Last 3 Encounters:   03/09/23 18   02/21/23 18   01/05/23 18     Past medical, surgical, family and social history were reviewed and updated with the patient.      Physical Exam  Vitals and nursing note reviewed.   Constitutional:       General: She is not in acute distress.     Appearance: Normal appearance. She is well-developed and normal weight. She is not ill-appearing or toxic-appearing.   HENT:      Head: Normocephalic and atraumatic.      Right Ear: Hearing, tympanic membrane, ear canal and external ear normal.      Left Ear: Hearing, tympanic membrane, ear canal and external ear normal.      Nose: Nose normal.      Mouth/Throat:      Lips: Pink.      Mouth: Mucous membranes are moist.      Tongue: No lesions. Tongue does not deviate from midline.      Palate: No mass and lesions.      Pharynx: No pharyngeal swelling, oropharyngeal exudate or uvula swelling.      Tonsils: No tonsillar exudate or tonsillar abscesses.   Eyes:      Conjunctiva/sclera: Conjunctivae normal.      Pupils: Pupils are equal, round, and reactive to light.   Neck:      Thyroid: Thyromegaly present. No thyroid mass or thyroid tenderness.      Trachea: Trachea normal.   Cardiovascular:      Rate and Rhythm: Normal rate and regular rhythm.      Heart sounds: Normal heart sounds, S1 normal and S2 normal. No murmur heard.    No friction rub. No gallop.   Pulmonary:      Effort: Pulmonary effort is normal.      Breath sounds: Normal breath sounds. No wheezing, rhonchi or rales.   Musculoskeletal:      Cervical back: Full passive range of motion without pain, normal range of motion and neck supple.      Lumbar back: Spasms, tenderness and bony tenderness present. Decreased range of motion.      Right foot: Decreased range of motion. Deformity and bunion present.   Feet:      Comments: Hammertoe noted of the right second toe  Lymphadenopathy:      Cervical: No cervical adenopathy.   Neurological:      Mental Status: She is alert and oriented to person, place, and time.   Psychiatric:         Attention and Perception: Attention and perception normal.         Mood and Affect: Mood is anxious.         Speech: Speech normal.         Behavior:  Behavior normal. Behavior is cooperative.         Thought Content: Thought content normal.         Cognition and Memory: Cognition and memory normal.         Judgment: Judgment normal.        Results in Past 30 Days  Result Component Current Result Ref Range Previous Result Ref Range   Albumin/Globulin Ratio 1.8 (2/21/2023) 0.8 - 2.0 Not in Time Range    Albumin 4.6 (2/21/2023) 3.4 - 4.8 g/dL Not in Time Range    Alkaline Phosphatase 78 (2/21/2023) 25 - 100 U/L Not in Time Range    ALT 39 (H) (2/21/2023) 4 - 36 U/L Not in Time Range    AST 32 (2/21/2023) 8 - 33 U/L Not in Time Range    BUN 20 (2/21/2023) 6 - 20 mg/dL Not in Time Range    Calcium 10.4 (2/21/2023) 8.5 - 10.5 mg/dL Not in Time Range    Chloride 96 (L) (2/21/2023) 98 - 107 mmol/L Not in Time Range    CO2 27 (2/21/2023) 20 - 30 mmol/L Not in Time Range    Creatinine 0.8 (2/21/2023) 0.4 - 1.2 mg/dL Not in Time Range    Est, Glom Filt Rate >60 (2/21/2023) >59 Not in Time Range    Globulin 2.6 (2/21/2023) Not Established g/dL Not in Time Range    Glucose 86 (2/21/2023) 74 - 106 mg/dL Not in Time Range    Potassium 4.0 (2/21/2023) 3.4 - 5.1 mmol/L Not in Time Range    Sodium 136 (2/21/2023) 136 - 145 mmol/L Not in Time Range    Total Bilirubin 0.6 (2/21/2023) 0.3 - 1.2 mg/dL Not in Time Range    Total Protein 7.2 (2/21/2023) 6.4 - 8.3 g/dL Not in Time Range      Hemoglobin A1C (%)   Date Value   07/16/2020 5.3     Microscopic Examination (no units)   Date Value   12/28/2017 Not Indicated     LDL Calculated (mg/dL)   Date Value   01/05/2023 117       Lab Results   Component Value Date/Time    WBC 6.4 01/05/2023 11:00 AM    NEUTROABS 4.1 10/10/2022 09:30 AM    HGB 13.6 01/05/2023 11:00 AM    HCT 41.0 01/05/2023 11:00 AM    MCV 88.0 01/05/2023 11:00 AM     01/05/2023 11:00 AM     Lab Results   Component Value Date    TSH 2.00 01/05/2023       ASSESSMENT/PLAN    Diagnosis Orders   1. Acute right-sided low back pain with right-sided sciatica        2.  Essential hypertension        3. Right hip pain            No orders of the defined types were placed in this encounter. There are no discontinued medications. Controlled Substances Monitoring:      Please note: This chart was generated using Dragon dictation software. Although every effort was made to ensure the accuracy of this automated transcription, some errors in transcription may have occurred.

## 2023-03-13 DIAGNOSIS — M48.062 LUMBAR STENOSIS WITH NEUROGENIC CLAUDICATION: ICD-10-CM

## 2023-03-13 RX ORDER — ME-TETRAHYDROFOLATE/B12/HRB236 1-1-500 MG
1 CAPSULE ORAL DAILY
Qty: 90 CAPSULE | Refills: 1 | Status: SHIPPED | OUTPATIENT
Start: 2023-03-13

## 2023-03-14 ENCOUNTER — HOSPITAL ENCOUNTER (OUTPATIENT)
Dept: PHYSICAL THERAPY | Facility: HOSPITAL | Age: 68
Setting detail: THERAPIES SERIES
Discharge: HOME | End: 2023-03-14
Payer: MEDICARE

## 2023-03-14 PROCEDURE — 97110 THERAPEUTIC EXERCISES: CPT

## 2023-03-14 PROCEDURE — 97140 MANUAL THERAPY 1/> REGIONS: CPT

## 2023-03-14 NOTE — FLOWSHEET NOTE
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 MEDICARE / Plan: Roberto Lind ESSENTIAL/PLUS / Product Type: *No Product type* /   Physician Information:  Kishore Partida MD  Plan of care signed (Y/N):    Visit# / total visits:     4 /    G-Code (if applicable):      Date / Visit # G-Code Applied:         Progress Note: []  Yes  [x]  No  Next due by: Visit #10       Pain level:  8/10    Subjective: Pt reports she has been having increase in R leg pain. She notes she has upcoming appointments next week with Dr Chandana Molina office, Dr. Marta Messina office, and pain doctor for lumbar injections. She reports she had a MRI with contrast last week.       Objective:  Observation:   Test measurements:    Palpation:    Exercises:  Exercise Resistance/Repetitions Other comments   Nustep L5 x 8' 14   Piriformis / figure 4 stretch 5x20\" B 14   Supine active HS stretch 5x20\" B 14   Clam shells 3x10 B 14   Bent knee fall out 3x10 B 14   Hip abd w/ t-band BTB 3x10 14   Hip IR / ER w/ t-band GTB 3x10 14   LTR x30 14                         Other Therapeutic Activities:      Manual Treatments:  R LE SAD / LAD, ITB rolling x 20'    Modalities:  MH to lumbar spine x 15 in in supine hook lying       Timed Code Treatment Minutes:  50      Total Treatment Minutes:  68    Treatment/Activity Tolerance:  [x] Patient tolerated treatment well [] Patient limited by fatigue  [] Patient limited by pain  [] Patient limited by other medical complications  [x] Other: Pt responded well to treatment with decrease in pain from 8/10 to 5/10. MH was applied at end of session to increase in pain after therex. Sit to stand activity was added to encourage hip strengthening with good LE form. Pt demonstrates antalgic gait with cautious RLE weightbearing.       Pain after treatment:      5/10 \"better\"    Prognosis: [x] Good [] Fair  [] Poor    Patient Requires Follow-up: [x] Yes  [] No    Plan:   [x] Continue per plan of care [] Alter current plan (see comments)  [] Plan of care initiated [] Hold pending MD visit [] Discharge    Plan for Next Session:        Electronically signed by:  Arash Han PT

## 2023-03-16 ENCOUNTER — OFFICE VISIT (OUTPATIENT)
Dept: NEUROSURGERY | Facility: CLINIC | Age: 68
End: 2023-03-16
Payer: MEDICARE

## 2023-03-16 VITALS
BODY MASS INDEX: 40.46 KG/M2 | WEIGHT: 237 LBS | TEMPERATURE: 97.1 F | SYSTOLIC BLOOD PRESSURE: 162 MMHG | HEIGHT: 64 IN | DIASTOLIC BLOOD PRESSURE: 98 MMHG

## 2023-03-16 DIAGNOSIS — M48.062 SPINAL STENOSIS OF LUMBAR REGION WITH NEUROGENIC CLAUDICATION: Primary | ICD-10-CM

## 2023-03-16 DIAGNOSIS — M54.9 MECHANICAL BACK PAIN: ICD-10-CM

## 2023-03-16 DIAGNOSIS — R53.81 PHYSICAL DECONDITIONING: ICD-10-CM

## 2023-03-16 DIAGNOSIS — M51.36 DDD (DEGENERATIVE DISC DISEASE), LUMBAR: ICD-10-CM

## 2023-03-16 DIAGNOSIS — R26.9 GAIT DISTURBANCE: ICD-10-CM

## 2023-03-16 DIAGNOSIS — M53.2X6 SPINAL INSTABILITY OF LUMBAR REGION: ICD-10-CM

## 2023-03-16 DIAGNOSIS — M51.9 LUMBOSACRAL DISC DISEASE: ICD-10-CM

## 2023-03-16 DIAGNOSIS — M43.16 SPONDYLOLISTHESIS OF LUMBAR REGION: ICD-10-CM

## 2023-03-16 DIAGNOSIS — M47.26 OSTEOARTHRITIS OF SPINE WITH RADICULOPATHY, LUMBAR REGION: ICD-10-CM

## 2023-03-16 NOTE — PROGRESS NOTES
Alison Benitez   1955   [id number]       2023     Chief Complaint   Patient presents with   • Low Back & Right Leg Pain        HPI   This 68 yo female presents with back, left hip and leg pain. She has known DDD and osteoarthritis for several years but her symptoms have progressively gotten worse. She had CHIVO in the past, last injection in Dec. 2022 with good results but her symptoms returned. Walking, sitting and standing makes the back and leg pain worse. It has gotten so severe that it is affecting her daily activities and she presents today with her daughter because she is very concerned with her being able to function safely. In the past the patient was not wanting surgery but not with her progression in pain and dysfunction she is wanting to discuss her options. Insurance has denied further injections. We have new studies to review that were ordered by her PCP.    Chronic Illnesses:  Arthritis  Chronic back pain  Past Medical History:  No date: Arthritis  No date: Bursitis  No date: Claustrophobia  No date: CTS (carpal tunnel syndrome)      Comment:  bilateral   No date: Fibromyalgia  No date: Hemorrhoids  No date: Hypertension  No date: Low back pain  No date: Lumbosacral disc disease  No date: Neuropathy  No date: Osteoarthritis  No date: SBE (subacute bacterial endocarditis)  No date: Tendinitis of knee  No date: Thyroid disorder  No date: Thyroid nodule  No date:  (vaginal birth after )     Past Surgical History:   Procedure Laterality Date   • ABDOMINAL HYSTERECTOMY     •  SECTION     •  SECTION     • COLONOSCOPY     • CYST REMOVAL N/A 2022   • DILATATION AND CURETTAGE     • HYSTERECTOMY N/A        • MANDIBLE FRACTURE SURGERY     • OOPHORECTOMY Bilateral    • TUBAL ABDOMINAL LIGATION     • TUBAL ABDOMINAL LIGATION          No Known Allergies       Current Outpatient Medications:   •  Alpha Lipoic Acid 200 MG capsule, Take 400 mg by mouth 3 (Three) Times a  Day., Disp: 180 capsule, Rfl: 5  •  ascorbic acid (VITAMIN C) 1000 MG tablet, Take 1 tablet by mouth Daily., Disp: , Rfl:   •  Cholecalciferol (Vitamin D) 50 MCG (2000 UT) tablet, Take 2,000 Units by mouth Daily., Disp: , Rfl:   •  cholecalciferol (VITAMIN D3) 25 MCG (1000 UT) tablet, Take 1 tablet by mouth Daily., Disp: , Rfl:   •  desloratadine (CLARINEX) 5 MG tablet, Take 1 tablet by mouth Daily., Disp: , Rfl:   •  Diclofenac Sodium (VOLTAREN) 1 % gel gel, , Disp: , Rfl:   •  Dietary Management Product (Rheumate) capsule, Take 1 capsule by mouth Daily., Disp: 90 capsule, Rfl: 1  •  doxycycline (PERIOSTAT) 20 MG tablet, , Disp: , Rfl:   •  DULoxetine (CYMBALTA) 60 MG capsule, Take 1 capsule by mouth Daily., Disp: 90 capsule, Rfl: 3  •  estradiol (ESTRACE) 1 MG tablet, Take 1 tablet by mouth Daily., Disp: , Rfl:   •  hydrochlorothiazide (HYDRODIURIL) 25 MG tablet, , Disp: , Rfl:   •  HYDROcodone-acetaminophen (NORCO)  MG per tablet, Doesn't take on a regular basis, Disp: , Rfl:   •  losartan (COZAAR) 50 MG tablet, Take 1 tablet by mouth Daily., Disp: , Rfl:   •  MULTIPLE VITAMINS-MINERALS ER PO, Take 1 tablet by mouth Daily., Disp: , Rfl:   •  multivitamin with minerals tablet tablet, Take 1 tablet by mouth Daily., Disp: , Rfl:   •  nabumetone (RELAFEN) 750 MG tablet, Take 1 tablet by mouth 2 (Two) Times a Day., Disp: , Rfl:   •  Omega-3 Fatty Acids (FISH OIL) 1000 MG capsule capsule, Take 1,200 mg by mouth Daily With Breakfast., Disp: , Rfl:   •  pregabalin (LYRICA) 100 MG capsule, Take 1 capsule by mouth 3 (Three) Times a Day., Disp: 270 capsule, Rfl: 1  •  vitamin B-6 (PYRIDOXINE) 100 MG tablet, Take 1 tablet by mouth Daily., Disp: 30 tablet, Rfl: 0  •  zolpidem (AMBIEN) 10 MG tablet, Take 1 tablet by mouth., Disp: , Rfl:   •  levothyroxine (Synthroid) 50 MCG tablet, Take 1 tablet by mouth Daily for 90 days., Disp: 90 tablet, Rfl: 0     Social History     Socioeconomic History   • Marital status: Single    Tobacco Use   • Smoking status: Never   • Smokeless tobacco: Never   Vaping Use   • Vaping Use: Never used   Substance and Sexual Activity   • Alcohol use: No   • Drug use: No   • Sexual activity: Not Currently        family history includes Arthritis in her mother; Glaucoma in her mother; Heart disease in her father and mother; Heart failure in her mother; Hypertension in her brother and mother; Lung disease in her father; Stroke in her mother.     Social History    Tobacco Use      Smoking status: Never      Smokeless tobacco: Never       Gait & Balance Assessment :  Risk assessment for falls. Fall precautions.  universal fall precautions, such as;   • Using gait aids a cane, walker at the appropriate height at all times for ambulation or if necessary a wheelchair  • Removing all area rugs and coffee tables to create a safe environment at home  • Ensure clean, dry floors  • Wearing supportive footwear and properly fitting clothing  • Ensure bed/chair is appropriate height and patient's feet can touch the floor  • Using a shower transfer bench  • Using walk-in shower and having shower safety bars installed  • Ensure proper lighting, minimize glare  • Have nightlights operational and in use  • Participation in an exercise program for gait training, balance training and strength  • Avoid carrying laundry up and down steps  • Ensure proper compliance and organization of medications to avoid errors   • Avoid use of over the counter sedatives and alcohol consumption  • Ensure easy access to call bell, glasses, TV control, telephone  • Ensure glasses/hearing aids are in use or close by (on top of night table)     Body mass index is 40.68 kg/m².   Class 3 Severe Obesity (BMI >=40). Obesity-related health conditions include the following: osteoarthritis. Obesity is unchanged. BMI is is above average; BMI management plan is completed. We have discussed that weight loss would be beneficial for her back pain.     /98  "(BP Location: Right arm, Patient Position: Sitting, Cuff Size: Adult)   Temp 97.1 °F (36.2 °C)   Ht 162.6 cm (64\")   Wt 108 kg (237 lb)   BMI 40.68 kg/m²    Physical Examination:  HEENT-wnl  Lungs-No wheezing or SOB      Neurologic Exam   Patient is alert and oriented.  Pupils are equal and reactive.  Visual fields are full.  EOMI without nystagmus.    Gait is slow and steady with a small stride. She is very slow to stand before she is able to take a step.  Reflexes not elicited.  No focal weakness in the legs but testing causes pain.  SLR is + on the right.  Internal hip rotation is painful.  Palpation of the paraspinal musculature and hip is tender.    Radiological Data Review:  For my review today is a lumbar MRI that I have compared to previous scan from August 2022. Her studies do show progression of disease. There is an anterolisthesis at L3-4 and L4-5 with retrolisthesis at L2-3 and progression of Modic changes at L3-4 with loss of disc space height at L2-3 and L3-4.    Assessment and Plan:  Diagnoses and all orders for this visit:    1. Spinal stenosis of lumbar region with neurogenic claudication (Primary)  -     XR Spine Lumbar AP & Lateral With Flex & Ext; Future  -     EMG & Nerve Conduction Test; Future    2. Spondylolisthesis of lumbar region  -     XR Spine Lumbar AP & Lateral With Flex & Ext; Future  -     EMG & Nerve Conduction Test; Future    3. Spinal instability of lumbar region  -     XR Spine Lumbar AP & Lateral With Flex & Ext; Future  -     EMG & Nerve Conduction Test; Future    4. Mechanical back pain  -     XR Spine Lumbar AP & Lateral With Flex & Ext; Future  -     EMG & Nerve Conduction Test; Future    5. Physical deconditioning    6. Gait disturbance    7. Lumbosacral disc disease    8. DDD (degenerative disc disease), lumbar    9. Osteoarthritis of spine with radiculopathy, lumbar region       This 69 yo female has known severe and progressive DDD with stenosis, loss of disc space " height, and spondylolisthesis. She has been to PM with treatments of injections and medications but her osteoarthritis has progressed that now is affecting her abilities to carry out daily activities. Due to worsening pain in the back and legs she has times that she is not ambulatory without assistance. She daughter is very concerned for her safety in living alone. She has wanted to avoid surgery in the past but comes in now with symptoms that are not manageable. She is ready to discuss other options to include surgery. She requires xrays and nerve studies to complete her workup to discuss surgical interventions. I have talked at length with the patient and daughter and we have discussed options for surgery but to give her the exact surgical requirements she will need new studies, I have answered all the current questions and she knows there will be more at her next visit.    NOEL Mandujano    Total time spent in preparing to see patient with review of past notes and diagnostic studies.  I have personally examined the patient and interpreted the previous and new studies, I have counseled and educated the patient and daughter on the previous and current studies, coordinating care, Ordering and explaining  Testing and documenting in the EMR, 60 min.    PCP:  Kacy Huertas MD

## 2023-03-17 ENCOUNTER — HOSPITAL ENCOUNTER (OUTPATIENT)
Dept: PHYSICAL THERAPY | Facility: HOSPITAL | Age: 68
Setting detail: THERAPIES SERIES
Discharge: HOME | End: 2023-03-17
Payer: MEDICARE

## 2023-03-17 PROCEDURE — 97140 MANUAL THERAPY 1/> REGIONS: CPT

## 2023-03-17 PROCEDURE — 97110 THERAPEUTIC EXERCISES: CPT

## 2023-03-17 NOTE — FLOWSHEET NOTE
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 She is having pain / difficulty with ambulation and has not slept well the past 2 nights. She notes that she saw Dr. Basil Samuel assistant yesterday for a follow up and treatment options were discussed including surgery. She reports she is being scheduled for a x-ray with flexion / extension and for a nerve conduction test.       Objective:  Observation:   Test measurements:  Back Index: 84%. Pt stands in approximately 20 deg flexion, can extend to approx 10 deg flexion position. From the resting stance of 20 deg flexion, she can flex another 30 deg, but is limited and cautious due to pain. Pt notes LE numbness / tingling with standing.   Palpation:    Exercises:  Exercise Resistance/Repetitions Other comments   Nustep L5 x 8' 14   Piriformis / figure 4 stretch 5x20\" B 14   Supine active HS stretch 5x20\" B  on  17   Clam shells 3x10 B 14   Bent knee fall out 3x10 B on [facility]   Hip abd w/ t-band BTB 3x10 14   Hip IR / ER w/ t-band GTB 3x10 14   LTR x30 14        SKTC 5x10\" on MH 17               Other Therapeutic Activities:      Manual Treatments:  R LE inferior pelvic distraction, STM lumbar paraspinals, rolling to R gluteal region x 25'    Modalities:  MH to lumbar spine x 15 in in supine hook lying with therex performed while on MH. Timed Code Treatment Minutes:  55      Total Treatment Minutes:  77    Treatment/Activity Tolerance:  [x] Patient tolerated treatment well [] Patient limited by fatigue  [] Patient limited by pain  [] Patient limited by other medical complications  [x] Other: Pt presents today with an exacerbation of lumbar and RLE pain. Treatment today consisted of lumbar STM and R inferior pelvic distraction in L side lying position follow by gentle flexion based exercises in supine while on moist heat. Pt responded well with pain reducing from 8/10 to 6/10. After treatment she could  10 deg resting flexion as opposed to 20 deg pre-treatment. She demonstrates gait deviations including decreased hip ext, increased hip ER / circumduction, and wide base of support. She will benefit from skilled PT to continue to address lumbar ROM and muscle tension, gait mechanics, core strength, and pain modulation. Pain after treatment:      6/10     Prognosis: [x] Good [] Fair  [] Poor    Patient Requires Follow-up: [x] Yes  [] No    Plan:   [x] Continue per plan of care [] Alter current plan (see comments)  [] Plan of care initiated [] Hold pending MD visit [] Discharge    Plan for Next Session:        Electronically signed by:  Ricco Dunn PT      Certification of Medical Necessity: It will be understood that this treatment plan is certified medically necessary by the documenting therapist and referring physician mentioned in this report.   Unless the physician indicated otherwise through written correspondence with our office, all further referrals will act as certification of medical necessity on this treatment plan.      Thank you for this referral.  If you have questions regarding this plan of care, please call [phone].          Revisions to this plan (optional):                     Please sign and return this plan to:   FAX: [phone]      Signature:                                 Date:

## 2023-03-20 ENCOUNTER — TELEPHONE (OUTPATIENT)
Dept: NEUROSURGERY | Facility: CLINIC | Age: 68
End: 2023-03-20
Payer: MEDICARE

## 2023-03-20 ENCOUNTER — HOSPITAL ENCOUNTER (OUTPATIENT)
Dept: PHYSICAL THERAPY | Facility: HOSPITAL | Age: 68
Setting detail: THERAPIES SERIES
Discharge: HOME | End: 2023-03-20
Payer: MEDICARE

## 2023-03-20 ENCOUNTER — TELEPHONE (OUTPATIENT)
Dept: PAIN MEDICINE | Facility: CLINIC | Age: 68
End: 2023-03-20

## 2023-03-20 PROCEDURE — 97140 MANUAL THERAPY 1/> REGIONS: CPT

## 2023-03-20 PROCEDURE — 97110 THERAPEUTIC EXERCISES: CPT

## 2023-03-20 NOTE — TELEPHONE ENCOUNTER
Provider:  Marycarmen  Surgery/Procedure:  EMG/NCT  Surgery/Procedure Date:  11-  Last visit:   3-16-23  Next visit: 4-     Reason for call:  TYLOR TO MAURICE Hi the patient called this morning and left a VM wanting to thank you for trying to get her injections to help her with her pain, even though her insurance would not pay. She will see Maribell 4-24-23.  She was very thankful for you.

## 2023-03-20 NOTE — TELEPHONE ENCOUNTER
Provider: OCTAVIA  Caller: KARLA  Relationship to Patient: SELF  Pharmacy:   Phone Number: [phone]  Reason for Call: PT CALLING TO THANK YOU DEEPLY FOR HELPING HER GET THE INJECTIONS SET UP AND UNFORTUNATELY SINCE HER INS WILL NOT COVER IT SHE CAN NOT PROCEED.

## 2023-03-20 NOTE — FLOWSHEET NOTE
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 She states her pain currently is high, \"\"/10. Objective:  Observation:   Test measurements:  Back Index: 84%. Pt stands in approximately 20 deg flexion, can extend to approx 10 deg flexion position. From the resting stance of 20 deg flexion, she can flex another 30 deg, but is limited and cautious due to pain. Pt notes LE numbness / tingling with standing.   Palpation:    Exercises:  Exercise Resistance/Repetitions Date Performed   Nustep L5 x 8' 14   Piriformis / figure 4 stretch 5x20\" B 14   Supine active HS stretch 5x20\" B  on  20   Clam shells 3x10 B 14   Bent knee fall out 3x10 B on  17   Hip abd w/ t-band BTB 3x10 14   Hip IR / ER w/ t-band GTB 3x10 14   LTR x30 14        SKTC 5x10\" on MH 20               Other Therapeutic Activities:      Manual Treatments:  R LE inferior pelvic distraction, STM lumbar

## 2023-03-21 ENCOUNTER — TELEPHONE (OUTPATIENT)
Dept: FAMILY MEDICINE CLINIC | Age: 68
End: 2023-03-21

## 2023-03-21 DIAGNOSIS — M54.41 ACUTE RIGHT-SIDED LOW BACK PAIN WITH RIGHT-SIDED SCIATICA: Primary | ICD-10-CM

## 2023-03-21 NOTE — TELEPHONE ENCOUNTER
Placed referral but it looks like she was referred to [facility] in Jan 2023.

## 2023-03-23 ENCOUNTER — HOSPITAL ENCOUNTER (OUTPATIENT)
Dept: PHYSICAL THERAPY | Facility: HOSPITAL | Age: 68
Setting detail: THERAPIES SERIES
Discharge: HOME | End: 2023-03-23
Payer: MEDICARE

## 2023-03-23 PROCEDURE — 97140 MANUAL THERAPY 1/> REGIONS: CPT

## 2023-03-23 PROCEDURE — 97110 THERAPEUTIC EXERCISES: CPT

## 2023-03-23 NOTE — FLOWSHEET NOTE
/ ER w/ t-band GTB 3x10    LTR x30 23   Sidelying hip abd - R X5 - added to HEP, increase to 2x10 next visit 21   [address] 5x10\" on [address] 23               Other Therapeutic Activities:      Manual Treatments:  R LE inferior pelvic distraction, STM glut med region, rolling to R gluteal  and ITB region x 18'    Modalities:  MH to lumbar spine x 15 in in supine hook lying x 15'. Timed Code Treatment Minutes:  75      Total Treatment Minutes:  95    Treatment/Activity Tolerance:  [x] Patient tolerated treatment well [] Patient limited by fatigue  [] Patient limited by pain  [] Patient limited by other medical complications  [x] Other: Pt presents to PT today with reports of less pain and demonstrates less signs of antalgia with gait, although she continues to exhibit gait deficits of increased R hip ER and lack of full hip ext in stance phase. She demonstrates R hip weakness with abd strength of 3+/5. She was able to tolerate therex much better today and responded well to manual therapy of STM / rolling to R glut med and ITB. She will continue to benefit from skilled PT to further address deficits and advance hip and core strengthening. Pain after treatment:      4/10     Prognosis: [x] Good [] Fair  [] Poor    Patient Requires Follow-up: [x] Yes  [] No    Plan:   [x] Continue per plan of care [] Alter current plan (see comments)  [] Plan of care initiated [] Hold pending MD visit [] Discharge    Plan for Next Session:      Short Term Goals Completed by 3 weeks Goal Status   Pt to be I with HEP MET   Pt to be educated in posture correction techniques and self correct with verbal cues. ongoing   Pt to demonstrate a 1/2 grade increase in LLE strength where limited. Pt to perform daily activities with average pain of 5/10 or less. Pt to ambulate short community distances (1000 feet or greater) without having to stop due to pain.               Long Term Goals Completed by 6 weeks Goal Status   Pt to demonstrate

## 2023-03-28 ENCOUNTER — HOSPITAL ENCOUNTER (OUTPATIENT)
Dept: PHYSICAL THERAPY | Facility: HOSPITAL | Age: 68
Setting detail: THERAPIES SERIES
Discharge: HOME | End: 2023-03-28
Payer: MEDICARE

## 2023-03-28 PROCEDURE — 97110 THERAPEUTIC EXERCISES: CPT

## 2023-03-28 PROCEDURE — 97140 MANUAL THERAPY 1/> REGIONS: CPT

## 2023-03-28 NOTE — FLOWSHEET NOTE
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 She is unsure if her fibromyalgia may have been involved. She notes having pain in her low back and lateral thighs today. Objective:  Observation:   Test measurements:  Back Index: 84%. Pt stands in approximately 20 deg flexion, can extend to approx 10 deg flexion position. Pt notes LE numbness / tingling with standing.   MMT: R hip abd: 3+/5  Palpation:  Grade II tenderness R gluteus medius, proximal to mid ITB, GT region    Exercises:  Exercise Resistance/Repetitions Date Performed   Nustep L5 x 8' 28   Piriformis / figure 4 stretch 5x20\" B 28   Supine active HS stretch 5x20\" B  on  28   Clam shells 3x10 B 28   Bent knee fall out 3x10 B on  28   Hip abd w/ t-band BTB 3x10 28   Hip IR / ER w/ t-band GTB 3x10    LTR x30 28   Sidelying hip abd - R X5 - added to HEP, increase to 2x10 next visit 23   SKTC 5x10\"  28

## 2023-03-30 ENCOUNTER — HOSPITAL ENCOUNTER (OUTPATIENT)
Dept: PHYSICAL THERAPY | Facility: HOSPITAL | Age: 68
Setting detail: THERAPIES SERIES
Discharge: HOME | End: 2023-03-30
Payer: MEDICARE

## 2023-03-30 PROCEDURE — 97110 THERAPEUTIC EXERCISES: CPT

## 2023-03-30 PROCEDURE — 97140 MANUAL THERAPY 1/> REGIONS: CPT

## 2023-03-30 NOTE — FLOWSHEET NOTE
Pt to be able to stand to cook / wash dishes for 30 minutes or greater without having to sit due to pain. Pt to be able to ambulate community distances without restriction with use of a single point cane as needed to decrease LE and lumbar pressure.                Electronically signed by:  Michael Nash, PT

## 2023-04-03 ENCOUNTER — HOSPITAL ENCOUNTER (OUTPATIENT)
Facility: HOSPITAL | Age: 68
Discharge: HOME | End: 2023-04-03
Payer: MEDICARE

## 2023-04-03 ENCOUNTER — HOSPITAL ENCOUNTER (OUTPATIENT)
Dept: GENERAL RADIOLOGY | Facility: HOSPITAL | Age: 68
Discharge: HOME | End: 2023-04-03
Payer: MEDICARE

## 2023-04-03 ENCOUNTER — HOSPITAL ENCOUNTER (OUTPATIENT)
Dept: MRI IMAGING | Facility: HOSPITAL | Age: 68
End: 2023-04-03
Payer: MEDICARE

## 2023-04-03 DIAGNOSIS — M53.2X6 LUMBAR SPINE INSTABILITY: ICD-10-CM

## 2023-04-03 DIAGNOSIS — M43.16 ANTEROLISTHESIS OF LUMBAR SPINE: ICD-10-CM

## 2023-04-03 DIAGNOSIS — M54.9 DORSALGIA: ICD-10-CM

## 2023-04-03 LAB
ALBUMIN SERPL-MCNC: 4.7 G/DL (ref 3.4–4.8)
ALBUMIN/GLOB SERPL: 1.7 {RATIO} (ref 0.8–2)
ALP SERPL-CCNC: 78 U/L (ref 25–100)
ALT SERPL-CCNC: 31 U/L (ref 4–36)
ANION GAP SERPL CALCULATED.3IONS-SCNC: 15 MMOL/L (ref 3–16)
AST SERPL-CCNC: 27 U/L (ref 8–33)
BASOPHILS # BLD: 0 K/UL (ref 0–0.1)
BASOPHILS NFR BLD: 0.5 %
BILIRUB SERPL-MCNC: 0.4 MG/DL (ref 0.3–1.2)
BUN SERPL-MCNC: 26 MG/DL (ref 6–20)
CALCIUM SERPL-MCNC: 10.9 MG/DL (ref 8.5–10.5)
CHLORIDE SERPL-SCNC: 101 MMOL/L (ref 98–107)
CO2 SERPL-SCNC: 25 MMOL/L (ref 20–30)
CREAT SERPL-MCNC: 0.8 MG/DL (ref 0.4–1.2)
EOSINOPHIL # BLD: 0.1 K/UL (ref 0–0.4)
EOSINOPHIL NFR BLD: 1.1 %
ERYTHROCYTE [DISTWIDTH] IN BLOOD BY AUTOMATED COUNT: 14.3 % (ref 11–16)
GFR SERPLBLD CREATININE-BSD FMLA CKD-EPI: >60 ML/MIN/{1.73_M2}
GLOBULIN SER CALC-MCNC: 2.7 G/DL
GLUCOSE SERPL-MCNC: 102 MG/DL (ref 74–106)
HCT VFR BLD AUTO: 42.7 % (ref 37–47)
HGB BLD-MCNC: 14.6 G/DL (ref 11.5–16.5)
IMM GRANULOCYTES # BLD: 0 K/UL
IMM GRANULOCYTES NFR BLD: 0.3 % (ref 0–5)
LYMPHOCYTES # BLD: 1.5 K/UL (ref 1.5–4)
LYMPHOCYTES NFR BLD: 24.6 %
MCH RBC QN AUTO: 29.7 PG (ref 27–32)
MCHC RBC AUTO-ENTMCNC: 34.2 G/DL (ref 31–35)
MCV RBC AUTO: 86.8 FL (ref 80–100)
MONOCYTES # BLD: 0.6 K/UL (ref 0.2–0.8)
MONOCYTES NFR BLD: 9.5 %
NEUTROPHILS # BLD: 3.9 K/UL (ref 2–7.5)
NEUTS SEG NFR BLD: 64 %
PLATELET # BLD AUTO: 291 K/UL (ref 150–400)
PMV BLD AUTO: 11 FL (ref 6–10)
POTASSIUM SERPL-SCNC: 3.6 MMOL/L (ref 3.4–5.1)
PROT SERPL-MCNC: 7.4 G/DL (ref 6.4–8.3)
RBC # BLD AUTO: 4.92 M/UL (ref 3.8–5.8)
SODIUM SERPL-SCNC: 141 MMOL/L (ref 136–145)
WBC # BLD AUTO: 6.1 K/UL (ref 4–11)

## 2023-04-03 PROCEDURE — 72110 X-RAY EXAM L-2 SPINE 4/>VWS: CPT

## 2023-04-03 PROCEDURE — 85025 COMPLETE CBC W/AUTO DIFF WBC: CPT

## 2023-04-03 PROCEDURE — 36415 COLL VENOUS BLD VENIPUNCTURE: CPT

## 2023-04-03 PROCEDURE — 80053 COMPREHEN METABOLIC PANEL: CPT

## 2023-04-04 ENCOUNTER — HOSPITAL ENCOUNTER (OUTPATIENT)
Dept: PHYSICAL THERAPY | Facility: HOSPITAL | Age: 68
Setting detail: THERAPIES SERIES
Discharge: HOME | End: 2023-04-04
Payer: MEDICARE

## 2023-04-04 PROCEDURE — 97110 THERAPEUTIC EXERCISES: CPT

## 2023-04-04 PROCEDURE — 97140 MANUAL THERAPY 1/> REGIONS: CPT

## 2023-04-04 NOTE — FLOWSHEET NOTE
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 Objective:  Observation:   Test measurements:  Back Index: 64% (84% at last re-assess) . Pt stands in mild lumbar flexion, can extend to neutral. Lumbar flexion AROM: 0-20 deg.   MMT: R hip abd: 3+/5  Palpation:  Grade II-III tenderness R gluteus medius, proximal to mid ITB, GT region    Exercises:  Exercise Resistance/Repetitions Date Performed   Nustep L5 x 11' 4   Piriformis / figure 4 stretch 5x20\" B 4   Supine active HS stretch 5x20\" B  on MH 4   Clam shells 3x10 B 4   Bent knee fall out 3x10 B on MH 4   Hip abd w/ t-band BTB 3x10 4   Hip IR / ER w/ t-band GTB 3x10 4   LTR x30 4   Sidelying hip abd - R 2x10 next visit 4   SKTC 5x10\"  4   Bridges x10 4          Other Therapeutic Activities:      Manual Treatments:  R LE inferior pelvic distraction, STM glut med region, rolling to R

## 2023-04-05 RX ORDER — CEFDINIR 300 MG/1
300 CAPSULE ORAL 2 TIMES DAILY
Qty: 20 CAPSULE | Refills: 0 | OUTPATIENT
Start: 2023-04-05 | End: 2023-04-15

## 2023-04-06 ENCOUNTER — HOSPITAL ENCOUNTER (OUTPATIENT)
Dept: PHYSICAL THERAPY | Facility: HOSPITAL | Age: 68
Setting detail: THERAPIES SERIES
Discharge: HOME | End: 2023-04-06
Payer: MEDICARE

## 2023-04-06 PROCEDURE — 97140 MANUAL THERAPY 1/> REGIONS: CPT

## 2023-04-06 PROCEDURE — 97110 THERAPEUTIC EXERCISES: CPT

## 2023-04-06 NOTE — FLOWSHEET NOTE
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 Objective:  Observation:   Test measurements:  Back Index: 64% (84% at last re-assess) . Pt stands in mild lumbar flexion, can extend to neutral. Lumbar flexion AROM: 0-20 deg.   MMT: R hip abd: 3+/5  Palpation:  Grade II-III tenderness R gluteus medius, proximal to mid ITB, GT region    Exercises:  Exercise Resistance/Repetitions Date Performed   Nustep L5 x 11' 4   Piriformis / figure 4 stretch 5x20\" B 4   Supine active HS stretch 5x20\" B  on  4   Clam shells 3x10 B 4   Bent knee fall out 3x10 B on MH 4   Hip abd w/ t-band BTB 3x10 4   Hip IR / ER w/ t-band GTB 3x10 4   LTR x30 4   Sidelying hip abd - R 2x10 next visit 4   SKTC 5x10\"  4   Bridges x10 4          Other Therapeutic Activities:      Manual Treatments:  R LE inferior pelvic distraction, STM glut med region, rolling to R gluteal  and ITB

## 2023-04-17 ENCOUNTER — HOSPITAL ENCOUNTER (OUTPATIENT)
Dept: GENERAL RADIOLOGY | Facility: HOSPITAL | Age: 68
Discharge: HOME | End: 2023-04-17
Payer: MEDICARE

## 2023-04-17 DIAGNOSIS — Z78.0 POST-MENOPAUSAL: ICD-10-CM

## 2023-04-17 DIAGNOSIS — Z78.0 POST-MENOPAUSAL: Primary | ICD-10-CM

## 2023-04-17 PROCEDURE — 77080 DXA BONE DENSITY AXIAL: CPT

## 2023-04-18 ENCOUNTER — HOSPITAL ENCOUNTER (OUTPATIENT)
Dept: PHYSICAL THERAPY | Facility: HOSPITAL | Age: 68
Setting detail: THERAPIES SERIES
Discharge: HOME | End: 2023-04-18
Payer: MEDICARE

## 2023-04-18 PROCEDURE — 97140 MANUAL THERAPY 1/> REGIONS: CPT

## 2023-04-18 PROCEDURE — 97110 THERAPEUTIC EXERCISES: CPT

## 2023-04-18 NOTE — FLOWSHEET NOTE
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 She is walking with less pain and has only had a couple of episodes of sharp sudden pain over the past week. Objective:  Observation:   Test measurements:  Back Index: 56% (64% at last re-assess) .   Pt is able to  upright position without forward lean  Palpation:  Grade I-II tenderness R gluteus medius, proximal to mid ITB, GT region    Exercises:  Exercise Resistance/Repetitions Date Performed   Nustep L5 x 11' 18   Piriformis / figure 4 stretch 5x20\" B 18   Supine active HS stretch 5x20\" B  on MH 18   Clam shells 3x10 B 18   Bent knee fall out 3x10 B on MH 18   Hip abd w/ t-band BTB 3x10 18   Hip IR / ER w/ t-band GTB 3x10 18   LTR x30 18   Sidelying hip abd - R 2x10 next visit 18   SKTC 5x10\"  18   Bridges x10 18          Other Therapeutic Activities:      Manual Treatments:  R LE inferior

## 2023-04-20 ENCOUNTER — APPOINTMENT (OUTPATIENT)
Dept: PHYSICAL THERAPY | Facility: HOSPITAL | Age: 68
End: 2023-04-20
Payer: MEDICARE

## 2023-04-24 ENCOUNTER — OFFICE VISIT (OUTPATIENT)
Dept: NEUROSURGERY | Facility: CLINIC | Age: 68
End: 2023-04-24
Payer: MEDICARE

## 2023-04-24 VITALS
BODY MASS INDEX: 40.8 KG/M2 | TEMPERATURE: 98 F | SYSTOLIC BLOOD PRESSURE: 160 MMHG | WEIGHT: 239 LBS | DIASTOLIC BLOOD PRESSURE: 80 MMHG | HEIGHT: 64 IN

## 2023-04-24 DIAGNOSIS — M51.9 LUMBOSACRAL DISC DISEASE: ICD-10-CM

## 2023-04-24 DIAGNOSIS — M43.16 SPONDYLOLISTHESIS OF LUMBAR REGION: ICD-10-CM

## 2023-04-24 DIAGNOSIS — M54.9 MECHANICAL BACK PAIN: ICD-10-CM

## 2023-04-24 DIAGNOSIS — R53.81 PHYSICAL DECONDITIONING: ICD-10-CM

## 2023-04-24 DIAGNOSIS — M47.26 OSTEOARTHRITIS OF SPINE WITH RADICULOPATHY, LUMBAR REGION: Primary | ICD-10-CM

## 2023-04-24 DIAGNOSIS — R26.9 GAIT DISTURBANCE: ICD-10-CM

## 2023-04-24 DIAGNOSIS — M53.2X6 SPINAL INSTABILITY OF LUMBAR REGION: ICD-10-CM

## 2023-04-24 DIAGNOSIS — M48.062 SPINAL STENOSIS OF LUMBAR REGION WITH NEUROGENIC CLAUDICATION: ICD-10-CM

## 2023-04-24 DIAGNOSIS — M51.36 DDD (DEGENERATIVE DISC DISEASE), LUMBAR: ICD-10-CM

## 2023-04-24 PROCEDURE — 99214 OFFICE O/P EST MOD 30 MIN: CPT | Performed by: PHYSICIAN ASSISTANT

## 2023-04-24 NOTE — PROGRESS NOTES
Alison Benitez   1955   [id number]       2023     Chief Complaint   Patient presents with   • Low Back Pain   Bilateral buttocks pain, bilateral posterior thigh pain.    HPI   This 68 yo female presents with back, left hip and leg pain. She has known DDD and osteoarthritis for several years but her symptoms have progressively gotten worse. She had CHIVO in the past, last injection in Dec. 2022 with good results for right leg symptoms but her symptoms have returned and worse in the lower back into both buttocks and down the back of both thighs.  At night she gets pain in the shins. Walking, sitting and standing makes the back and leg pain worse. It has gotten so severe that it is affecting her daily activities and she presents today with her daughter because she is very concerned with her being able to function safely.  She has recently been to the [employer] and has new shoes with arch supports, she feels she is walking more upright instead of flex forward.  We have new studies to review today.    Chronic Illnesses:  Degenerative disc disease  Chronic low back pain  Past Medical History:  No date: Arthritis  No date: Bursitis  No date: Claustrophobia  No date: CTS (carpal tunnel syndrome)      Comment:  bilateral   No date: Fibromyalgia  No date: Hemorrhoids  No date: Hypertension  No date: Low back pain  No date: Lumbosacral disc disease  No date: Neuropathy  No date: Osteoarthritis  No date: SBE (subacute bacterial endocarditis)  No date: Tendinitis of knee  No date: Thyroid disorder  No date: Thyroid nodule  No date:  (vaginal birth after )     Past Surgical History:   Procedure Laterality Date   • ABDOMINAL HYSTERECTOMY     •  SECTION     •  SECTION     • COLONOSCOPY     • CYST REMOVAL N/A 2022   • DILATATION AND CURETTAGE     • HYSTERECTOMY N/A        • MANDIBLE FRACTURE SURGERY     • OOPHORECTOMY Bilateral    • TUBAL ABDOMINAL LIGATION     • TUBAL ABDOMINAL  LIGATION          No Known Allergies       Current Outpatient Medications:   •  Alpha Lipoic Acid 200 MG capsule, Take 400 mg by mouth 3 (Three) Times a Day., Disp: 180 capsule, Rfl: 5  •  ascorbic acid (VITAMIN C) 1000 MG tablet, Take 1 tablet by mouth Daily., Disp: , Rfl:   •  Cholecalciferol (Vitamin D) 50 MCG (2000 UT) tablet, Take 2,000 Units by mouth Daily., Disp: , Rfl:   •  cholecalciferol (VITAMIN D3) 25 MCG (1000 UT) tablet, Take 1 tablet by mouth Daily., Disp: , Rfl:   •  desloratadine (CLARINEX) 5 MG tablet, Take 1 tablet by mouth Daily., Disp: , Rfl:   •  Diclofenac Sodium (VOLTAREN) 1 % gel gel, , Disp: , Rfl:   •  Dietary Management Product (Rheumate) capsule, Take 1 capsule by mouth Daily., Disp: 90 capsule, Rfl: 1  •  doxycycline (PERIOSTAT) 20 MG tablet, , Disp: , Rfl:   •  DULoxetine (CYMBALTA) 60 MG capsule, Take 1 capsule by mouth Daily., Disp: 90 capsule, Rfl: 3  •  estradiol (ESTRACE) 1 MG tablet, Take 1 tablet by mouth Daily., Disp: , Rfl:   •  hydrochlorothiazide (HYDRODIURIL) 25 MG tablet, , Disp: , Rfl:   •  HYDROcodone-acetaminophen (NORCO)  MG per tablet, Doesn't take on a regular basis, Disp: , Rfl:   •  losartan (COZAAR) 50 MG tablet, Take 1 tablet by mouth Daily., Disp: , Rfl:   •  MULTIPLE VITAMINS-MINERALS ER PO, Take 1 tablet by mouth Daily., Disp: , Rfl:   •  multivitamin with minerals tablet tablet, Take 1 tablet by mouth Daily., Disp: , Rfl:   •  nabumetone (RELAFEN) 750 MG tablet, Take 1 tablet by mouth 2 (Two) Times a Day., Disp: , Rfl:   •  Omega-3 Fatty Acids (FISH OIL) 1000 MG capsule capsule, Take 1,200 mg by mouth Daily With Breakfast., Disp: , Rfl:   •  pregabalin (LYRICA) 100 MG capsule, Take 1 capsule by mouth 3 (Three) Times a Day., Disp: 270 capsule, Rfl: 1  •  vitamin B-6 (PYRIDOXINE) 100 MG tablet, Take 1 tablet by mouth Daily., Disp: 30 tablet, Rfl: 0  •  zolpidem (AMBIEN) 10 MG tablet, Take 1 tablet by mouth., Disp: , Rfl:   •  levothyroxine (Synthroid) 50  MCG tablet, Take 1 tablet by mouth Daily for 90 days., Disp: 90 tablet, Rfl: 0     Social History     Socioeconomic History   • Marital status: Single   Tobacco Use   • Smoking status: Never   • Smokeless tobacco: Never   Vaping Use   • Vaping Use: Never used   Substance and Sexual Activity   • Alcohol use: No   • Drug use: No   • Sexual activity: Not Currently        family history includes Arthritis in her mother; Glaucoma in her mother; Heart disease in her father and mother; Heart failure in her mother; Hypertension in her brother and mother; Lung disease in her father; Stroke in her mother.     Social History    Tobacco Use      Smoking status: Never      Smokeless tobacco: Never       Gait & Balance Assessment :  Risk assessment for falls. Fall precautions.  universal fall precautions, such as;   • Using gait aids a cane, walker at the appropriate height at all times for ambulation or if necessary a wheelchair  • Removing all area rugs and coffee tables to create a safe environment at home  • Ensure clean, dry floors  • Wearing supportive footwear and properly fitting clothing  • Ensure bed/chair is appropriate height and patient's feet can touch the floor  • Using a shower transfer bench  • Using walk-in shower and having shower safety bars installed  • Ensure proper lighting, minimize glare  • Have nightlights operational and in use  • Participation in an exercise program for gait training, balance training and strength  • Avoid carrying laundry up and down steps  • Ensure proper compliance and organization of medications to avoid errors   • Avoid use of over the counter sedatives and alcohol consumption  • Ensure easy access to call bell, glasses, TV control, telephone  • Ensure glasses/hearing aids are in use or close by (on top of night table)     Body mass index is 41.02 kg/m².   Class 3 Severe Obesity (BMI >=40). Obesity-related health conditions include the following: osteoarthritis. Obesity is  "unchanged. BMI is is above average; BMI management plan is completed.  We discussed that any weight loss would be beneficial.       /80 (BP Location: Right arm, Patient Position: Sitting, Cuff Size: Adult)   Temp 98 °F (36.7 °C)   Ht 162.6 cm (64\")   Wt 108 kg (239 lb)   BMI 41.02 kg/m²    Physical Examination:  HEENT-wnl  Lungs-No wheezing or SOB      Neurologic Exam   Patient is alert and oriented.  Pupils are equal and reactive.  Visual fields are full.  EOMI without nystagmus.    Gait is more upright and steady today than 3 weeks ago, her balance seems to be a bit better today  Reflexes were not elicited  No focal weakness to direct testing in the lower extremities      Radiological Data Review:  I have personally reviewed a lumbar MRI showing stenosis at L3-4 and degenerative disc disease at L4-5 with right foraminal narrowing.  Flexion-extension x-rays show a listhesis at L4-5, She has an offset with a tilt to the left at L2-3.  X-rays show significant loss of disc space height at L1-2, L2-3 L3-4 and degenerative changes at L4-5 and L5-S1.    Nerve studies revealed peripheral neuropathy and chronic L5-S1.      Assessment and Plan:  Diagnoses and all orders for this visit:    1. Osteoarthritis of spine with radiculopathy, lumbar region (Primary)  -     Ambulatory Referral to Pain Management    2. Spinal stenosis of lumbar region with neurogenic claudication  -     Ambulatory Referral to Pain Management    3. Spondylolisthesis of lumbar region  -     Ambulatory Referral to Pain Management    4. Spinal instability of lumbar region  -     Ambulatory Referral to Pain Management    5. Mechanical back pain  -     Ambulatory Referral to Pain Management    6. Physical deconditioning  -     Ambulatory Referral to Pain Management    7. Gait disturbance  -     Ambulatory Referral to Pain Management    8. Lumbosacral disc disease  -     Ambulatory Referral to Pain Management    9. DDD (degenerative disc disease), " lumbar  -     Ambulatory Referral to Pain Management         I spent 30 minutes caring for Ms. Benitez on 4/24/2023.  This time includes activities preparing for the visit, reviewing test, reviewing history and performing an appropriate examination.  Discussing with the patient and daughter, while reviewing and independently interpreting the diagnostic studies, communicating that information to the patient along with discussing care coordination and referrals to pain management and documenting information in the medical records.    Any copied data from previous notes included in the (1) HPI, (2) PE, (3) MDM and/or assessment and plan has been reviewed and is accurate as of this date.  Maribell Conroy, PAC      PCP:  Kacy Huertas MD

## 2023-04-25 ENCOUNTER — HOSPITAL ENCOUNTER (OUTPATIENT)
Dept: PHYSICAL THERAPY | Facility: HOSPITAL | Age: 68
Setting detail: THERAPIES SERIES
Discharge: HOME | End: 2023-04-25
Payer: MEDICARE

## 2023-04-25 PROCEDURE — 97110 THERAPEUTIC EXERCISES: CPT

## 2023-04-25 PROCEDURE — 97140 MANUAL THERAPY 1/> REGIONS: CPT

## 2023-04-25 NOTE — FLOWSHEET NOTE
Standing marching 5x30\" Next visit     Other Therapeutic Activities:      Manual Treatments:  R LE inferior pelvic distraction, STM glut med region, rolling to R gluteal  and ITB region x 15'    Modalities:  MH to lumbar spine x 15 in in supine hook lying x 15'. Timed Code Treatment Minutes:  58      Total Treatment Minutes:  77    Treatment/Activity Tolerance:  [x] Patient tolerated treatment well [] Patient limited by fatigue  [] Patient limited by pain  [] Patient limited by other medical complications  [x] Other: Pt progressed through activity well today with minimal rest breaks required. Plan to increase activity to focus on muscular endurance to help with standing and walking tolerance. Pain after treatment:      0/10     Prognosis: [x] Good [] Fair  [] Poor    Patient Requires Follow-up: [x] Yes  [] No    Plan:   [x] Continue per plan of care [] Alter current plan (see comments)  [] Plan of care initiated [] Hold pending MD visit [] Discharge    Plan for Next Session:      Short Term Goals Completed by 3 weeks Goal Status   Pt to be I with HEP MET   Pt to be educated in posture correction techniques and self correct with verbal cues. MET   Pt to demonstrate a 1/2 grade increase in LLE strength where limited. ongoing   Pt to perform daily activities with average pain of 5/10 or less. Partially met   Pt to ambulate short community distances (1000 feet or greater) without having to stop due to pain. MET             Long Term Goals Completed by 6 weeks Goal Status   Pt to demonstrate 4+/5 or greater B hip strength grossly. Back Index score to improve to 25% or less indicating improved function. Pt to be able to stand to cook / wash dishes for 30 minutes or greater without having to sit due to pain. Pt to be able to ambulate community distances without restriction with use of a single point cane as needed to decrease LE and lumbar pressure.                Electronically signed by:  Abram Watkins

## 2023-04-27 ENCOUNTER — HOSPITAL ENCOUNTER (OUTPATIENT)
Dept: PHYSICAL THERAPY | Facility: HOSPITAL | Age: 68
Setting detail: THERAPIES SERIES
Discharge: HOME | End: 2023-04-27
Payer: MEDICARE

## 2023-04-27 PROCEDURE — 97140 MANUAL THERAPY 1/> REGIONS: CPT

## 2023-04-27 PROCEDURE — 97110 THERAPEUTIC EXERCISES: CPT

## 2023-04-27 NOTE — FLOWSHEET NOTE
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, order 3/21/23)  Plan of care signed (Y/N):    Visit# / total visits:     13 /    G-Code (if applicable):      Date / Visit # G-Code Applied:         Progress Note: []  Yes  [x]  No  Next due by: 23      Pain level:  2/10    Subjective:  Pt reports she has seen a good improvement in her hip pain with activity and has not been having lower leg pain lately. Objective:  Observation:   Test measurements:  Back Index: 56% (64% at last re-assess) .   Pt is able to  upright position without forward lean  Palpation:  Grade I-II tenderness R gluteus medius, proximal to mid ITB, GT region    Exercises:  Exercise Resistance/Repetitions Date Performed   Nustep L5 x 11' 27   Piriformis / figure 4 stretch 5x20\" B 27   Supine active HS stretch 5x20\" B  on  27   Clam shells 3x10 B 27   Bent knee fall out 3x10 B on MH 27   Hip abd w/ t-band BTB 3x10 27   Hip IR / ER w/ t-band GTB 3x10 27   LTR x30 27   Sidelying hip abd - R 2x10  27   SKTC 5x10\"  27   Bridges x10 27   Standing marching 5x30\" (only tolerated 3) 27   Side stepping 2 laps Next visit    Standing hip abd 3x10 27          Other Therapeutic Activities:      Manual
Other

## 2023-05-02 ENCOUNTER — HOSPITAL ENCOUNTER (OUTPATIENT)
Dept: PHYSICAL THERAPY | Facility: HOSPITAL | Age: 68
Setting detail: THERAPIES SERIES
Discharge: HOME | End: 2023-05-02
Payer: MEDICARE

## 2023-05-02 PROCEDURE — 97140 MANUAL THERAPY 1/> REGIONS: CPT

## 2023-05-02 PROCEDURE — 97110 THERAPEUTIC EXERCISES: CPT

## 2023-05-02 NOTE — FLOWSHEET NOTE
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 She notes she has been able to do housework recently with better tolerance. She has a consult with another neurosurgeon this week. Objective:  Observation:   Test measurements:  Back Index: 56% (64% at last re-assess) .   Pt is able to  upright position without forward lean  Palpation:  Grade I-II tenderness R gluteus medius, proximal to mid ITB, GT region    Exercises:  Exercise Resistance/Repetitions Date Performed   Nustep L5 x 11' 2   Piriformis / figure 4 stretch 5x20\" B 2   Supine active HS stretch 5x20\" B  on MH 2   Clam shells 3x10 B 2   Bent knee fall out 3x10 B on MH 2   Hip abd w/ t-band BTB 3x10 2   Hip IR / ER w/ t-band GTB 3x10 2   LTR x30 2   Sidelying hip abd - R 2x10  2   SKTC 5x10\"  2   Bridges x10 2   Standing marching 5x30\" (only tolerated 3) 2   Side stepping 3 laps 2   Standing hip

## 2023-05-03 ENCOUNTER — TELEPHONE (OUTPATIENT)
Dept: FAMILY MEDICINE CLINIC | Age: 68
End: 2023-05-03

## 2023-05-03 NOTE — TELEPHONE ENCOUNTER
Patient called and is wondering if you could recommend her to have tens unit therapy at physical therapy. She said her insurance said someone would have to recommend it for them to do it.

## 2023-05-03 NOTE — TELEPHONE ENCOUNTER
I will order a tens unit. I believe pt at the hospital will be able to see that.   If not please fax there order to them at [facility]

## 2023-05-04 ENCOUNTER — OFFICE VISIT (OUTPATIENT)
Dept: PAIN MEDICINE | Facility: CLINIC | Age: 68
End: 2023-05-04
Payer: MEDICARE

## 2023-05-04 ENCOUNTER — HOSPITAL ENCOUNTER (OUTPATIENT)
Dept: PHYSICAL THERAPY | Facility: HOSPITAL | Age: 68
Setting detail: THERAPIES SERIES
Discharge: HOME | End: 2023-05-04
Payer: MEDICARE

## 2023-05-04 ENCOUNTER — TELEPHONE (OUTPATIENT)
Dept: FAMILY MEDICINE CLINIC | Age: 68
End: 2023-05-04

## 2023-05-04 DIAGNOSIS — M48.062 LUMBAR STENOSIS WITH NEUROGENIC CLAUDICATION: Primary | ICD-10-CM

## 2023-05-04 DIAGNOSIS — M54.16 LUMBAR RADICULOPATHY: ICD-10-CM

## 2023-05-04 DIAGNOSIS — R53.81 PHYSICAL DECONDITIONING: ICD-10-CM

## 2023-05-04 DIAGNOSIS — M79.7 FIBROMYALGIA: ICD-10-CM

## 2023-05-04 DIAGNOSIS — M51.36 DDD (DEGENERATIVE DISC DISEASE), LUMBAR: ICD-10-CM

## 2023-05-04 DIAGNOSIS — F41.1 GAD (GENERALIZED ANXIETY DISORDER): ICD-10-CM

## 2023-05-04 DIAGNOSIS — M48.062 LUMBAR STENOSIS WITH NEUROGENIC CLAUDICATION: ICD-10-CM

## 2023-05-04 DIAGNOSIS — M47.816 SPONDYLOSIS OF LUMBAR REGION WITHOUT MYELOPATHY OR RADICULOPATHY: ICD-10-CM

## 2023-05-04 PROBLEM — M43.16 SPONDYLOLISTHESIS OF LUMBAR REGION: Status: ACTIVE | Noted: 2023-04-24

## 2023-05-04 PROCEDURE — 1160F RVW MEDS BY RX/DR IN RCRD: CPT | Performed by: NURSE PRACTITIONER

## 2023-05-04 PROCEDURE — 1159F MED LIST DOCD IN RCRD: CPT | Performed by: NURSE PRACTITIONER

## 2023-05-04 PROCEDURE — 1125F AMNT PAIN NOTED PAIN PRSNT: CPT | Performed by: NURSE PRACTITIONER

## 2023-05-04 PROCEDURE — 99442 PR PHYS/QHP TELEPHONE EVALUATION 11-20 MIN: CPT | Performed by: NURSE PRACTITIONER

## 2023-05-04 PROCEDURE — 97140 MANUAL THERAPY 1/> REGIONS: CPT

## 2023-05-04 PROCEDURE — 97110 THERAPEUTIC EXERCISES: CPT

## 2023-05-04 NOTE — PROGRESS NOTES
"Type of Service: Consult via telemedicine  Mode of transmission: Telephone (patient's preference).  Telemedicine Provider: ELDON Isaac  Location of Physician: Office   Patient location: Home   You have chosen to receive care through a telephone visit today. Do you consent to use a telephone visit for your medical care today?   Yes     Chief Complaint: \"Lower back and leg pain.\"        History of Present Illness:   Patient: Ms. Alison Benitez, 68 y.o. female originally referred by Dr. Iván Stanley in consultation for chronic intractable lower back and left hip pain.  Patient reports a longstanding history of chronic lower back and left hip pain, which began without incident.  I last saw her in follow-up consultation on March 7, 2023, for evaluation of undergoing a recent diagnostic and therapeutic bilateral L3-L4 transforaminal epidural steroid injection performed on December 21, 2022, from which she reported experiencing complete relief of her overall pain lasting 2 weeks, with almost complete resolution of her left lower extremity pain that was ongoing.  She additionally underwent a diagnostic and therapeutic left L3-L4 and left L4-L5 transforaminal epidural steroid injection performed on October 3, 2022, which provided her with almost complete resolution of her pain lasting 2 months.  She has been seen by Dr. Iván Stanley, and was found not to be a surgical candidate at that time, as patient was continuing to benefit from conservative measures, although, if she continued to struggle he recommended the possibility of Dr. Stanley recommended decompression with fusion and stabilization at L3-L5.  More recently, her symptoms began to progress and she was sent for follow-up by neurosurgery.  She underwent a new MRI of the lumbar spine performed which continued to demonstrate multilevel disc desiccation with endplate spurring and Modic 2 endplate changes at L3-L4, which has progressed.  There is multilevel " disc bulges with facet hypertrophy.  At L3-L4 there is a moderate disc bulge with endplate spurring with facet hypertrophy and ligamentum flavum thickening with bilateral facet effusions, which has progressed from prior MRI with severe central spinal stenosis and moderate bilateral neuroforaminal stenosis.  At L4-L5 there is a disc bulge with endplate spurring, severe right and moderate left facet hypertrophy with a left facet joint effusion and moderate central spinal stenosis with moderate to severe right and mild left neuroforaminal stenosis.  There was previous suspicions of discitis at L3-L4 due to significant endplate and discogenic changes, although work-up was negative.  She underwent recent electrodiagnostic studies of the bilateral lower extremities which demonstrated mild to moderate chronic bilateral L5-S1 radiculopathies with mild peripheral neuropathy.  At her recent follow-up with Frances Conroy PA-C with neurosurgery on 4/24/2023, she reports significant improvement in her right lower extremity symptoms, although she continues to struggle with left lower extremity pain.  She has been participating in physical therapy with some improvements.  It has been requested by neurosurgery a selective left L3-L4 epidural steroid injection, due to patient's MRI findings and continued symptoms.    Pain Description: Constant lower back and left leg pain with intermittent exacerbation, described as aching, burning, dull, sharp, shooting, stabbing and throbbing sensation.   Radiation of Pain: The pain radiates into the posterior aspect of the hips, bilateral gluteal region and lateral and anterior aspect of her thighs LT>RT.  Pain intensity today: 9/10  Average pain intensity last week: 7/10  Pain intensity ranges from: 5/10 to 10/10  Aggravating factors: Pain increases with bending, twisting, standing, walking (5-10 minutes). Patient describes neurogenic claudication.   Alleviating factors: Pain decreases with  "sitting down, lying down on her side  Associated Symptoms:   Patient reports pain (knees down), numbness, and weakness in the lower extremities.   Patient denies any new bladder or bowel problems.   Patient reports difficulties with her balance and reports recent falls.   Pain interferes with ADLs, general activities (ability to walk, stand, transition from different positions), and affects patient's quality of life      Review of previous therapies and additional medical records:  Alison Bneitez has already failed the following measures, including:   Conservative Measures: Oral analgesics, opioids, topical analgesics, ice, heat, physical therapy   Interventional Measures: GTB injections at [facility] last 3 months ago  10/03/2022: DxTx left L3-L4 and left L4-L5 transforaminal epidural steroid injections  12/21/2022: DxTx bilateral L3-L4 transforaminal epidural steroid injections  Surgical Measures: No history of previous lumbar spine or hip surgery    [facility] psychological evaluation on 11/18/2022 with Dr. Dougie Khan, per note: \"From a psychological perspective, patient is considered to be an appropriate candidate for spinal cord stim at this time.\"  Alison Benitez underwent neurosurgical consultation with Dr. Iván Stanley on 10/28/2022, and was found to be a surgical candidate for decompression and fusion and stabilization at L3-L5.  Alison Benitez presents with significant comorbidities including Claustrophobia, CTS (carpal tunnel syndrome), Fibromyalgia, Hypertension, Neuropathy, hypothyroidism   In terms of current analgesics, Alison Benitez takes: nabumetone, Norco, diclofenac gel, pregabalin, duloxetine. Patient also takes zolpidem   I have reviewed Ethan Report consistent with medication reconciliation.  SOAPP: Low Risk     PHQ-2 Depression Screening  Little interest or pleasure in doing things? 0-->not at all   Feeling down, depressed, or hopeless? 0-->not at all   PHQ-2 Total Score 0 "         Global Pain Scale 09-15  2022          Pain 20          Feelings 8          Clinical outcomes 19          Activities 25          GPS Total: 72            The [place] Back Pain Disability Scale  DATE 09-15  2022 03-07  2023         Sleep through the night 0 3         Turn over in bed 0 3         Get out of bed 2 2         Make your bed 4 1         Put on socks (pantyhose) 0 1         Ride in a car 0 2         Sit in a chair for several hours 0 3         Stand up for 20-30 minutes 5 2         Climb one flight of stairs 5 1         Walk a few blocks (200-300 yards)  5 4         Walk several miles 5 4         Run one block (about 50 yards) 5 5         Take food out of the refrigerator 4 1         Reach up to high shelves 4          Move a chair 4 2         Pull or push heavy doors 4 1         Bend over to clean the bathtub 4 1         Throw a ball 5 3         Carry two bags of groceries 5 3         Lift and carry a heavy suitcase 5 5         Total score 72 50           Review of New Diagnostic Studies:  MRI of the lumbar spine without contrast 3/6/2023: Per radiology report.  Patient did not bring disc.  Multilevel lumbar spondylosis with facet hypertrophy, progressed from prior study.  Congenital short pedicles which contribute to thecal sac narrowing.  Grade 1 anterior listhesis of L2-L3, and a grade 1 anterior listhesis of L4 on L5.  Modic type II degenerative endplate changes at L3-L4.  L1-L2: Mild bilateral facet hypertrophy.  L2-L3: Left paracentral disc protrusion with endplate spurring, and bilateral facet hypertrophy.  There is mild central spinal canal stenosis with moderate left L2 neuroforaminal stenosis.  L3-L4: Moderate disc bulge with endplate spurring, greater to the right.  There is moderate bilateral facet hypertrophy and ligamentum flavum thickening with bilateral facet effusions.  Now severe central spinal canal stenosis and moderate bilateral neuroforaminal stenosis.  L4-L5: Disc bulge with  endplate spurring, and severe right and moderate left facet hypertrophy with a left facet joint effusion.  Moderate central spinal canal stenosis with moderate to severe right and mild left neuroforaminal stenosis.  L5-S1: Mild disc bulge with moderate bilateral facet hypertrophy producing mild central spinal canal stenosis.  EMG/NCV of the bilateral lower extremities by Dr. Eddie Brandon on 4/24/2023: Mild to moderate chronic L5-S1 radiculopathies and mild peripheral neuropathy  Lumbar spine x-rays with flexion-extension views 4/3/2023: Grade 1 anterolisthesis of L5 on S1 with instability.  Anterior listhesis of L3 without instability.  Moderate to severe endplate degenerative changes most significant at L2-L3 and L3-L4.    Review of Diagnostic Studies:   MRI of the thoracic spine without contrast 10/26/2022: Grade 1 spondylolisthesis of T1 on T2.  Moderate multilevel degenerative disc disease with disc bulges.  There is moderate degrees of central spinal canal stenosis at multiple levels ranging from T3-T12.  There is severe central spinal stenosis at T11-T12.  Spinal cord appears normal in signal and caliber throughout.  MRI of the lumbar spine without contrast 8/19/2022:  marrow edema of the adjacent endplates at L3-4 with mild T2 hyperintensity/edema in the disc.  Associated T2 hyperintense edema of the left psoas muscle.  No evidence of paravertebral fluid collection.  Grade 1 anterolisthesis of L3 on L4 and L4-L5.  Vertebral body heights are normal.    T12-L1, L1-L2: Disc bulge, facet hypertrophy.  No significant canal or foraminal stenosis  L2-L3: Left paracentral disc protrusion superimposed on disc bulge.  Facet arthrosis.  Ligamentum flavum hypertrophy.  Mild canal stenosis.  Moderate to severe left lateral recess stenosis with probable impingement of the transversing left L3 nerve root.  Moderate left and mild right neuroforaminal stenosis.  Findings are concerning for early osteomyelitis/discitis but  may also relate to moderate type I reactive endplate changes.  L3-L4: Central disc protrusion, facet hypertrophy, ligamentum flavum hypertrophy contributing to severe canal stenosis.  Moderate to severe left and mild right neuroforaminal stenosis.  Small facet joint effusions.  L4-L5: Disc bulge, facet hypertrophy, ligamentum flavum hypertrophy contributing to moderate canal stenosis.  Bilateral facet joint effusions.  Moderate to severe right and mild left neuroforaminal stenosis  L5-S1: Central disc protrusion abutting the ventral thecal sac and descending S1 nerve roots.  Mild bilateral foraminal stenosis  Left hip x-rays 9/12/2022: There is some enthesopathy at both greater trochanters and ischial tuberosities.  Mild degenerative changes  AP pelvis, AP lateral left hip, ordered for pain, shows some dorsal acetabular osteophytes, joint space still reasonable both hips, some enthesopathy both greater trochanters and ischial tuberosities, no fractures, no comparison  Lumbar spine x-rays 8/19/2022:  multilevel degenerative disc disease and facet hypertrophy.  Grade 1 anterolisthesis of L4 on L5    Review of Systems   Constitutional: Positive for appetite change.   Eyes: Positive for redness and itching.   Endocrine: Positive for cold intolerance and heat intolerance.   Musculoskeletal: Positive for back pain, joint swelling, myalgias and neck stiffness.   Neurological: Positive for dizziness and light-headedness.   All other systems reviewed and are negative.        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)   • History of infectious mononucleosis   • Physical deconditioning   • Bilateral leg pain   • DDD (degenerative disc disease), lumbar   • Spinal stenosis of lumbar region with neurogenic claudication   • Hormone replacement therapy (HRT)   • Menopausal symptoms   • Acquired hypothyroidism   • Nontoxic multinodular goiter   • Postmenopausal   • Yeast vaginitis   • Fibromyalgia    • Osteoarthritis   • Lumbosacral disc disease   • Mechanical back pain   • Bursitis   • Adolescent idiopathic scoliosis of lumbar region   • Greater trochanteric bursitis of left hip   • Gait disturbance   • Other insomnia   • JACKY (generalized anxiety disorder)   • Spinal instability of lumbar region   • Spondylolisthesis of lumbar region       Past Medical History:   Diagnosis Date   • Arthritis    • Bursitis    • Claustrophobia    • CTS (carpal tunnel syndrome)     bilateral    • Fibromyalgia    • Hemorrhoids    • Hypertension    • Low back pain    • Lumbosacral disc disease    • Neuropathy    • Osteoarthritis    • SBE (subacute bacterial endocarditis)    • Tendinitis of knee    • Thyroid disorder    • Thyroid nodule    •  (vaginal birth after )          Past Surgical History:   Procedure Laterality Date   • ABDOMINAL HYSTERECTOMY     •  SECTION     •  SECTION     • COLONOSCOPY     • CYST REMOVAL N/A 2022   • DILATATION AND CURETTAGE     • HYSTERECTOMY N/A        • MANDIBLE FRACTURE SURGERY     • OOPHORECTOMY Bilateral    • TUBAL ABDOMINAL LIGATION     • TUBAL ABDOMINAL LIGATION           Family History   Problem Relation Age of Onset   • Arthritis Mother    • Stroke Mother    • Glaucoma Mother    • Hypertension Mother    • Heart disease Mother    • Heart failure Mother    • Heart disease Father    • Lung disease Father    • Hypertension Brother    • Breast cancer Neg Hx    • Ovarian cancer Neg Hx          Social History     Socioeconomic History   • Marital status: Single   Tobacco Use   • Smoking status: Never   • Smokeless tobacco: Never   Vaping Use   • Vaping Use: Never used   Substance and Sexual Activity   • Alcohol use: No   • Drug use: No   • Sexual activity: Not Currently           Current Outpatient Medications:   •  Alpha Lipoic Acid 200 MG capsule, Take 400 mg by mouth 3 (Three) Times a Day., Disp: 180 capsule, Rfl: 5  •  ascorbic acid (VITAMIN C) 1000 MG tablet,  Take 1 tablet by mouth Daily., Disp: , Rfl:   •  Cholecalciferol (Vitamin D) 50 MCG (2000 UT) tablet, Take 2,000 Units by mouth Daily., Disp: , Rfl:   •  cholecalciferol (VITAMIN D3) 25 MCG (1000 UT) tablet, Take 1 tablet by mouth Daily., Disp: , Rfl:   •  desloratadine (CLARINEX) 5 MG tablet, Take 1 tablet by mouth Daily., Disp: , Rfl:   •  Diclofenac Sodium (VOLTAREN) 1 % gel gel, , Disp: , Rfl:   •  Dietary Management Product (Rheumate) capsule, Take 1 capsule by mouth Daily., Disp: 90 capsule, Rfl: 1  •  doxycycline (PERIOSTAT) 20 MG tablet, , Disp: , Rfl:   •  DULoxetine (CYMBALTA) 60 MG capsule, Take 1 capsule by mouth Daily., Disp: 90 capsule, Rfl: 3  •  estradiol (ESTRACE) 1 MG tablet, Take 1 tablet by mouth Daily., Disp: , Rfl:   •  hydrochlorothiazide (HYDRODIURIL) 25 MG tablet, , Disp: , Rfl:   •  HYDROcodone-acetaminophen (NORCO)  MG per tablet, Doesn't take on a regular basis, Disp: , Rfl:   •  levothyroxine (Synthroid) 50 MCG tablet, Take 1 tablet by mouth Daily for 90 days., Disp: 90 tablet, Rfl: 0  •  losartan (COZAAR) 50 MG tablet, Take 1 tablet by mouth Daily., Disp: , Rfl:   •  MULTIPLE VITAMINS-MINERALS ER PO, Take 1 tablet by mouth Daily., Disp: , Rfl:   •  multivitamin with minerals tablet tablet, Take 1 tablet by mouth Daily., Disp: , Rfl:   •  nabumetone (RELAFEN) 750 MG tablet, Take 1 tablet by mouth 2 (Two) Times a Day., Disp: , Rfl:   •  Omega-3 Fatty Acids (FISH OIL) 1000 MG capsule capsule, Take 1,200 mg by mouth Daily With Breakfast., Disp: , Rfl:   •  pregabalin (LYRICA) 100 MG capsule, Take 1 capsule by mouth 3 (Three) Times a Day., Disp: 270 capsule, Rfl: 1  •  vitamin B-6 (PYRIDOXINE) 100 MG tablet, Take 1 tablet by mouth Daily., Disp: 30 tablet, Rfl: 0  •  zolpidem (AMBIEN) 10 MG tablet, Take 1 tablet by mouth., Disp: , Rfl:       No Known Allergies      There were no vitals taken for this visit.     Due to the constraints of the telemedicine format, no physical exam was  performed      Physical Exam:  (Previous PE from last office visit)  Constitutional: Patient appears well-developed, well-nourished, well-hydrated, appears younger than stated age  HEENT: Head: Normocephalic and atraumatic  Eyes: Conjunctivae and lids are normal  Pupils: Equal, round, reactive to light  Peripheral vascular exam: Posterior tibialis: right 2+ and left 2+. Dorsalis pedis: right 2+ and left 2+. No edema. Presence of varicose veins.  Musculoskeletal   Gait and station: Gait evaluation demonstrated shuffling and antalgia  Lumbar spine: Passive and active range of motion are limited secondary to pain. Extension, lateral flexion, rotation of the lumbar spine increased and reproduced pain. Lumbar facet joint loading maneuvers are positive.  Eddie test, Gaenslen's test, thigh thrust test, SI compression test, Yeoman's test are negative   Piriformis maneuvers are negative   Palpation of the bilateral ischial tuberosities, unrevealing   Hip joints: The range of motion of the hip joints is almost full and without pain   Palpation of the bilateral greater trochanter, reveals tenderness bilaterally  Examination of the Iliotibial band: unrevealing   Neurological:   Patient is alert and oriented to person, place, and time.   Speech: Normal.   Cortical function: Normal mental status.   Reflex Scores:  Right patellar: 0+  Left patellar: 0+  Right Achilles: 0+  Left Achilles: 0+  Motor strength: 5/5  Motor Tone: Normal  Involuntary movements: None.   Superficial/Primitive Reflexes: Primitive reflexes were absent.   Right Rosales: Absent  Left Rosales: Absent  Right ankle clonus: Absent  Left ankle clonus: Absent   Babinsky: Absent  Long tract signs: Negative. Straight leg raising test: positive on the right. Femoral stretch sign: Negative.   Sensory exam: Intact to light touch, intact pain and temperature sensation, intact vibration sensation and normal proprioception.   Coordination: Normal finger to nose and heel  to shin. Normal balance and negative Romberg's sign   Skin and subcutaneous tissue: Skin is warm and intact. No rash noted. No cyanosis.   Psychiatric: Judgment and insight: Normal. Recent and remote memory: Intact. Mood and affect: Normal.     ASSESSMENT:   1. Lumbar stenosis with neurogenic claudication    2. Lumbar radiculopathy    3. DDD (degenerative disc disease), lumbar    4. Spondylosis of lumbar region without myelopathy or radiculopathy    5. Fibromyalgia    6. Physical deconditioning    7. JACKY (generalized anxiety disorder)    8. BMI 40.0-44.9, adult          PLAN/MEDICAL DECISION MAKING:  Ms. Alison Benitez, 68 y.o. female reports a longstanding history of chronic lower back and left hip pain.  I last saw her in follow-up consultation on March 7, 2023, for evaluation of undergoing a recent diagnostic and therapeutic bilateral L3-L4 transforaminal epidural steroid injection performed on December 21, 2022, from which she reported experiencing complete relief of her overall pain lasting 2 weeks, with almost complete resolution of her left lower extremity pain that was ongoing.  She additionally underwent a diagnostic and therapeutic left L3-L4 and left L4-L5 transforaminal epidural steroid injection performed on October 3, 2022, which provided her with almost complete resolution of her pain lasting 2 months.  She has been seen by Dr. Iván Stanley, and was found not to be a surgical candidate at that time, as patient was continuing to benefit from conservative measures, although, if she continued to struggle he recommended the possibility of Dr. Stanley recommended decompression with fusion and stabilization at L3-L5.  More recently, her symptoms began to progress and she was sent for follow-up by neurosurgery.  She underwent a new MRI of the lumbar spine performed which continued to demonstrate multilevel disc desiccation with endplate spurring and Modic 2 endplate changes at L3-L4, which has  progressed.  There is multilevel disc bulges with facet hypertrophy.  At L3-L4 there is a moderate disc bulge with endplate spurring with facet hypertrophy and ligamentum flavum thickening with bilateral facet effusions, which has progressed from prior MRI with severe central spinal stenosis and moderate bilateral neuroforaminal stenosis.  At L4-L5 there is a disc bulge with endplate spurring, severe right and moderate left facet hypertrophy with a left facet joint effusion and moderate central spinal stenosis with moderate to severe right and mild left neuroforaminal stenosis.  There was previous suspicions of discitis at L3-L4 due to significant endplate and discogenic changes, although work-up was negative.  She underwent recent electrodiagnostic studies of the bilateral lower extremities which demonstrated mild to moderate chronic bilateral L5-S1 radiculopathies with mild peripheral neuropathy.  At her recent follow-up with Frances Conroy PA-C with neurosurgery on 4/24/2023, she reports significant improvement in her right lower extremity symptoms, although she continues to struggle with left lower extremity pain.  She has been participating in physical therapy with some improvements.  It has been requested by neurosurgery a selective left L3-L4 epidural steroid injection, due to patient's MRI findings and continued symptoms. She also does continue with symptoms of mechanical lower back pain but continues to experience radicular pain associated with neurogenic claudication.  Patient has failed to obtain pain relief with conservative measures including oral analgesics (Lyrica, Norco, etc), physical therapy (ongoing), to name a few.  I have reviewed all available patient's medical records as well as previous therapies as referenced above. I had a lengthy conversation with MsJones Alison Xiomara Benitez regarding her chronic pain condition and potential therapeutic options including risks, benefits, alternative therapies,  to name a few.  Therefore, I have proposed the following plan:  1. Interventional pain management measures: As requested by neurosurgery, patient will be scheduled for diagnostic and therapeutic left L3-L4 and left L4-L5 transforaminal epidural steroid injections.  She will follow-up with neurosurgery thereafter.  2. Pharmacological measures: Reviewed and discussed;   A. Patient takes nabumetone, Norco, diclofenac gel, pregabalin,duloxetine. Patient also takes zolpidem.  B. Continue Rheumate one tablet daily  C. Continue alpha lipoid acid 5022-8525 mg per day divided into 3 doses  3. Long-term rehabilitation efforts:  A. The patient has a history of falls. I did complete a risk assessment for falls. Fall precautions: Patient has been instructed regarding universal fall precautions, such as;   • Using gait aids a cane or a rolling walker at the appropriate height at all times for ambulation   • Removing all area rugs and coffee tables to create a safe environment at home  • Ensure clean, dry floors  • Wearing supportive footwear and properly fitting clothing  • Ensure bed/chair is appropriate height and patient's feet can touch the floor  • Using a shower transfer bench  • Using walk-in shower and having shower safety bars installed  • Ensure proper lighting, minimize glare  • Have nightlights operational and in use  • Participation in an exercise program for gait training, balance training and strength  • Avoid carrying laundry up and down steps  • Ensure proper compliance and organization of medications to avoid errors   • Avoid use of over the counter sedatives and alcohol consumption  • Ensure easy access to call bell, glasses, TV control, telephone  • Ensure glasses/hearing aids are in use or close by (on top of night table)  B.  Patient will continur a comprehensive physical therapy program for water therapy, therapeutic exercise, core strengthening, gait and balance training, neurodynamics, E-STIM, myofascial  release, cupping, dry needling, home exercises  C. Start an exercise program such as chair yoga and water therapy  D. Contrast therapy: Apply ice-packs for 15-20 minutes, followed by heating pads for 15-20 minutes to affected area   E. Patient's Body mass index is 40.75 kg/m². Patient counseled on the importance of weight loss to help with overall health and pain control.   4. The patient has been instructed to contact my office with any questions or difficulties. The patient understands the plan and agrees to proceed accordingly.     A phone visit was used to complete this visit. Total time of discussion was 20 minutes.         Pain Medications             DULoxetine (CYMBALTA) 60 MG capsule Take 1 capsule by mouth Daily.    HYDROcodone-acetaminophen (NORCO)  MG per tablet Doesn't take on a regular basis    nabumetone (RELAFEN) 750 MG tablet Take 1 tablet by mouth 2 (Two) Times a Day.    pregabalin (LYRICA) 100 MG capsule Take 1 capsule by mouth 3 (Three) Times a Day.         Please note that portions of this note were completed with a voice recognition program.     ELDON Hawkins    Patient Care Team:  Kacy Huertas MD as PCP - General (Family Medicine)  Anatoliy Everett MD as Consulting Physician (Anesthesiology)  Abdifatah Jordan MD as Consulting Physician (Endocrinology)  Kimber Correa APRN as Nurse Practitioner (Nurse Practitioner)  Moris Ellis MD as Consulting Physician (Pain Medicine)  Sussy El APRN as Nurse Practitioner (Nurse Practitioner)     No orders of the defined types were placed in this encounter.        Future Appointments   Date Time Provider Department Center   5/4/2023  8:30 AM Sussy El APRN [facility]   5/26/2023 11:00 AM Kimber Correa APRN [facility]   8/2/2023  2:45 PM Abdifatah Jordan MD [facility]

## 2023-05-04 NOTE — FLOWSHEET NOTE
address hip and core strength, improving gait and standing tolerance, and restoring optimal functional level. Pain after treatment:      0/10     Prognosis: [x] Good [] Fair  [] Poor    Patient Requires Follow-up: [x] Yes  [] No    Plan:   [x] Continue per plan of care [] Alter current plan (see comments)  [] Plan of care initiated [] Hold pending MD visit [] Discharge    Plan for Next Session:      Short Term Goals Completed by 3 weeks Goal Status   Pt to be I with HEP MET   Pt to be educated in posture correction techniques and self correct with verbal cues. MET   Pt to demonstrate a 1/2 grade increase in LLE strength where limited. ongoing   Pt to perform daily activities with average pain of 5/10 or less. Partially met   Pt to ambulate short community distances (1000 feet or greater) without having to stop due to pain. MET             Long Term Goals Completed by 6 weeks Goal Status   Pt to demonstrate 4+/5 or greater B hip strength grossly. Met except abd   Back Index score to improve to 25% or less indicating improved function. Pt to be able to stand to cook / wash dishes for 30 minutes or greater without having to sit due to pain. Partially met   Pt to be able to ambulate community distances without restriction with use of a single point cane as needed to decrease LE and lumbar pressure. MET              Electronically signed by:  Juan Abarca PT      Certification of Medical Necessity: It will be understood that this treatment plan is certified medically necessary by the documenting therapist and referring physician mentioned in this report. Unless the physician indicated otherwise through written correspondence with our office, all further referrals will act as certification of medical necessity on this treatment plan. Thank you for this referral.  If you have questions regarding this plan of care, please call [phone].           Revisions to this plan (optional):

## 2023-05-04 NOTE — TELEPHONE ENCOUNTER
Tens Unit order faxed to [facility]     Patient notified that she needs to pick it up there.  Insurance does not cover and it's cash only 40-45 dollars - patient verbalized understanding

## 2023-05-08 ENCOUNTER — HOSPITAL ENCOUNTER (OUTPATIENT)
Dept: PHYSICAL THERAPY | Facility: HOSPITAL | Age: 68
Setting detail: THERAPIES SERIES
Discharge: HOME | End: 2023-05-08
Payer: MEDICARE

## 2023-05-08 PROCEDURE — 97140 MANUAL THERAPY 1/> REGIONS: CPT

## 2023-05-08 PROCEDURE — 97110 THERAPEUTIC EXERCISES: CPT

## 2023-05-08 PROCEDURE — 97530 THERAPEUTIC ACTIVITIES: CPT

## 2023-05-08 NOTE — FLOWSHEET NOTE
education on picking up objects from floor safely. Manual Treatments:  R LE inferior pelvic distraction, STM glut med region, rolling to R gluteal  and B ITB region, SAD B, hip ER / IR PROM  x 15'    Modalities:  MH to lumbar spine x 15 in in supine hook lying x 15'. - not performed today      Timed Code Treatment Minutes:  65      Total Treatment Minutes:  72    Treatment/Activity Tolerance:  [x] Patient tolerated treatment well [] Patient limited by fatigue  [] Patient limited by pain  [] Patient limited by other medical complications  [x] Other: Pt progressed through activity well today. Laying down exercises were not performed today, focusing on standing strengthening activities and functional transfers as pt wanted to review safe methods for picking up objects from floor and moving from floor to standing. Pain after treatment:      0/10     Prognosis: [x] Good [] Fair  [] Poor    Patient Requires Follow-up: [x] Yes  [] No    Plan:   [x] Continue per plan of care [] Alter current plan (see comments)  [] Plan of care initiated [] Hold pending MD visit [] Discharge    Plan for Next Session:      Short Term Goals Completed by 3 weeks Goal Status   Pt to be I with HEP MET   Pt to be educated in posture correction techniques and self correct with verbal cues. MET   Pt to demonstrate a 1/2 grade increase in LLE strength where limited. ongoing   Pt to perform daily activities with average pain of 5/10 or less. Partially met   Pt to ambulate short community distances (1000 feet or greater) without having to stop due to pain. MET             Long Term Goals Completed by 6 weeks Goal Status   Pt to demonstrate 4+/5 or greater B hip strength grossly. Met except abd   Back Index score to improve to 25% or less indicating improved function. Pt to be able to stand to cook / wash dishes for 30 minutes or greater without having to sit due to pain.  Partially met   Pt to be able to ambulate community distances without

## 2023-05-11 ENCOUNTER — APPOINTMENT (OUTPATIENT)
Dept: PHYSICAL THERAPY | Facility: HOSPITAL | Age: 68
End: 2023-05-11
Payer: MEDICARE

## 2023-05-12 ENCOUNTER — TELEPHONE (OUTPATIENT)
Dept: PAIN MEDICINE | Facility: CLINIC | Age: 68
End: 2023-05-12
Payer: MEDICARE

## 2023-05-12 NOTE — TELEPHONE ENCOUNTER
The patient called wanting to know whether her insurance approved her procedure for Monday 5/15/2023. I informed her we were unable to get her apporoved for Monday, and moved her appointment to Wednesday 5/17/2023. I let her know that if for some reason, we are unable to get her approved, we will call her and let her know. Patient voiced understanding.

## 2023-05-16 DIAGNOSIS — G47.00 INSOMNIA, UNSPECIFIED TYPE: ICD-10-CM

## 2023-05-16 RX ORDER — ZOLPIDEM TARTRATE 10 MG/1
TABLET ORAL
Qty: 30 TABLET | Refills: 2 | Status: SHIPPED | OUTPATIENT
Start: 2023-05-16 | End: 2023-08-13

## 2023-05-16 NOTE — TELEPHONE ENCOUNTER
Refill request received from pharmacy      Next Office Visit Date:  Future Appointments   Date Time Provider Jessie Gaonai   7/6/2023 10:30 AM Raegan Cespedes MD [address]   7/25/2023  2:00 PM Raegan Cespedes MD [facility] [address] - Please review via [phone]

## 2023-05-22 ENCOUNTER — OUTSIDE FACILITY SERVICE (OUTPATIENT)
Dept: PAIN MEDICINE | Facility: CLINIC | Age: 68
End: 2023-05-22
Payer: MEDICARE

## 2023-05-22 PROCEDURE — 99152 MOD SED SAME PHYS/QHP 5/>YRS: CPT | Performed by: ANESTHESIOLOGY

## 2023-05-22 PROCEDURE — 64483 NJX AA&/STRD TFRM EPI L/S 1: CPT | Performed by: ANESTHESIOLOGY

## 2023-05-22 PROCEDURE — 64484 NJX AA&/STRD TFRM EPI L/S EA: CPT | Performed by: ANESTHESIOLOGY

## 2023-05-23 ENCOUNTER — TELEPHONE (OUTPATIENT)
Dept: PAIN MEDICINE | Facility: CLINIC | Age: 68
End: 2023-05-23
Payer: MEDICARE

## 2023-05-23 NOTE — TELEPHONE ENCOUNTER
FOLLOW-UP CALL AFTER PROCEDURE  I spoke with Alison Solano Emmanuel regarding how they're feeling after yesterday's procedure with Dr. Ellis. Patient reports that she is doing well.   Pt underwent a diagnostic procedure (see procedure note for details) on 5/22/2023 . Patient reported 100% pain relief at the time of after procedure exam with Dr. Ellis. In addition, patient experienced significant functional improvement (performing activities without experiencing pain in comparison with examination prior to the procedure that reproduced pain with those activities).   Pain level before procedure: 7/10   Pain level after procedure: 0/10  Patient reports that the pain relief lasted fo hours. Today, Alison May Emmanuel reports 100% ongoing pain relief pain (pain level 0-1/10).    Patient denies side effects or complications  Patient does not have any questions or concerns at this time.  Advised pt of next follow up with ELDON Isaac: Advised to follow up with NSA and us as needed.

## 2023-05-26 ENCOUNTER — LAB (OUTPATIENT)
Dept: LAB | Facility: HOSPITAL | Age: 68
End: 2023-05-26
Payer: MEDICARE

## 2023-05-26 ENCOUNTER — OFFICE VISIT (OUTPATIENT)
Dept: NEUROLOGY | Facility: CLINIC | Age: 68
End: 2023-05-26
Payer: MEDICARE

## 2023-05-26 VITALS
DIASTOLIC BLOOD PRESSURE: 80 MMHG | WEIGHT: 238 LBS | HEART RATE: 86 BPM | OXYGEN SATURATION: 98 % | SYSTOLIC BLOOD PRESSURE: 122 MMHG | HEIGHT: 64 IN | BODY MASS INDEX: 40.63 KG/M2

## 2023-05-26 DIAGNOSIS — B99.9 NEUROPATHY DUE TO INFECTION: ICD-10-CM

## 2023-05-26 DIAGNOSIS — H53.9 VISION CHANGES: ICD-10-CM

## 2023-05-26 DIAGNOSIS — H02.401 PTOSIS, RIGHT EYELID: ICD-10-CM

## 2023-05-26 DIAGNOSIS — M79.7 FIBROMYALGIA: ICD-10-CM

## 2023-05-26 DIAGNOSIS — M48.061 SPINAL STENOSIS OF LUMBAR REGION WITHOUT NEUROGENIC CLAUDICATION: Primary | ICD-10-CM

## 2023-05-26 DIAGNOSIS — G63 NEUROPATHY DUE TO INFECTION: ICD-10-CM

## 2023-05-26 PROBLEM — H91.93 BILATERAL HEARING LOSS: Status: ACTIVE | Noted: 2022-08-09

## 2023-05-26 PROCEDURE — 83519 RIA NONANTIBODY: CPT

## 2023-05-26 PROCEDURE — 86362 MOG-IGG1 ANTB CBA EACH: CPT

## 2023-05-26 PROCEDURE — 36415 COLL VENOUS BLD VENIPUNCTURE: CPT

## 2023-05-26 PROCEDURE — 86053 AQAPRN-4 ANTB FLO CYTMTRY EA: CPT

## 2023-05-26 RX ORDER — DULOXETIN HYDROCHLORIDE 60 MG/1
60 CAPSULE, DELAYED RELEASE ORAL DAILY
Qty: 90 CAPSULE | Refills: 3 | Status: SHIPPED | OUTPATIENT
Start: 2023-05-26

## 2023-05-26 RX ORDER — PREGABALIN 75 MG/1
75 CAPSULE ORAL 3 TIMES DAILY
Qty: 270 CAPSULE | Refills: 1 | Status: SHIPPED | OUTPATIENT
Start: 2023-05-26

## 2023-05-26 NOTE — LETTER
May 26, 2023     Kacy Huertas MD  [address] [facility] [address]    Patient: Alison Benitez   YOB: 1955   Date of Visit: 5/26/2023       Dear Kacy Huertas MD    Alison Benitez was in my office today. Below is a copy of my note.    If you have questions, please do not hesitate to call me. I look forward to following Alison along with you.         Sincerely,        ELDON Rosas        CC: Rochelle Temple, MD Sussy Hinton APRN Luis A Vascello, MD Ryan C Carter Cassidy, MD Patrick K Leung, MD Debbie A Croucher, PA-C    Subjective:     Patient ID: Alison Benitez is a 68 y.o. female.    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 We last saw her on 11/17/2022. She has had neuropathy post-infection of bilateral lower extremities since 01/2019 after being diagnosed with mononucleosis and a positive ASO titer.     She has been followed by [facility] as well as pain management for her low back symptoms.     At her last visit in clinic, we continued her duloxetine 60 mg daily and her pregabalin 100 mg 3 times per day.     Her most recent set of labs were at an outlying facility on 02/21/2023. Comprehensive metabolic panel chloride 96, ALT 39, otherwise unremarkable. Hepatitis panel was non reactive on 04/03/2023 at an outlying facility. Comprehensive metabolic panel with BUN 26, calcium 10.9, otherwise unremarkable. CBC with auto differential essentially within normal limits.     She recently saw [facility], Maribell Conroy PA-C, on 04/24/2023 for worsening symptoms of low back  "pain, hip, and leg pain on the left side. She has been recommended for surgery but has been hesitant to move forward. They referred her back to pain management for additional treatment options. She did undergo a procedure with Dr. Ellis on 05/22/2023.     She is here for follow-up and refills on her medications today.     It also appears she underwent an updated EMG and NCVs of her lower extremities by Dr. Eddie Brandon, neurology in [place], [place] on 04/24/2023. This was ordered by neurosurgery that showed mild to moderate chronic bilateral L5-S1 radiculopathy and mild underlying peripheral neuropathy. This is per record review.    She reports she is doing much better. Her lower back pain improved since the procedure with Dr. lElis on 05/24/2023. She states Dr. Ellis told her it would take approximately 1 week for the injections to be fully effective. She states she has had 3 sets of injections including one in her low back this week. She states she has been inactive and has gained weight. She is trying to walk and exercise, which she notes she did twice yesterday. She states she is going to physical therapy and has an appointment next week.    She states her neuropathy symptoms, pain, and fibromyalgia are stable. She states Dr. Brandon did not seem to think the results were worrisome, so she wants to decrease her pregabalin. She had been on 75mg 3 times a day, but last visit she was having a lot more pain and we increased this to 100mg 3 times a day. She states Dr. Brandon kept asking her what was causing the neuropathy.     She saw an orthopedic surgeon, Dr. Wang Bailey, \"to check on her hips\". She states he recommended the same surgery as Dr. Stanley with Neurosurgery.    She states her right eye is bothering her. She notes her left eye is irritated as well, but not as severe as her right eye. She went to the optometrist, Dr. Rochelle Temple, and she was told she had an ulcer in her right eye. She states " "Dr. Temple told her she also has a sun spot in the inner lid of her right eye from not wearing sunglasses when she was a child. She states she was using dry eye drops, and then was prescribed a steroid to take for 1 week and then was told to keep using the dry eye drops, but her eye has gotten worse. She states her right eyelid is drooping and making it hard for her to keep it open. She does report blurred vision. She states she can only use her left eye to focus or read something. She states she is going to see Dr. Temple again this week. She denies any weakness or shortness of breath. She states she has a \"bad ear\" with hearing loss. She also notes she was told she did not need glasses but could get some for driving. She has possibly had double vision but none noted today.    She states she had a fall last summer, in 2022, but it was due to her back pain.    Prior Neurological workup and history: Prior labs 2019-CK level of 174.  Immunofixation and protein electrophoresis within normal limits.  Anti-hue and anti-Otilia antibodies negative.  Methylmalonic acid normal at 155.  Vitamin B12 951 and folate greater than 20.  Lyme antibodies negative.  Mono test negative.  ASO titer was elevated at 200. Hemoglobin A1c was 4.9%.  She was treated with high dose steroids and physical therapy. 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 repeat MRI of the lumbar spine on 6/7/2021 and this did show some high-grade and moderate stenosis from L3-L4 and L4-L5.      The patient was diagnosed with fibromyalgia in 11/2021. She is still seeing Dr. Bibiana Khan-she also treats her for multi-site Osteoporosis.Dr. Bibiana Khan prescribes nabumetone 750 mg twice daily.      Previously took gabapentin which was not helpful. Tried 3 muscle relaxer's in the past which she stopped taking due to not liking the side effects but cannot recall the names. This was with the arthritis " "specialist.     MRI of the lumbar spine without contrast on 8/19/2022 showing multilevel degenerative disc changes with L3-L5 moderate narrowing.  This has been reviewed by neurosurgery.  MRI of the thoracic spine without contrast on 10/25/2022 shows multilevel degenerative disc changes with central spinal canal stenosis most severe at T11-T12.  Imaging has been reviewed by pain management as well as neurosurgery with [facility].    Followed long term in our clinic for neuropathy post infection of bilateral lower extremities since 01/2019 after being diagnosed with mononucleosis and positive ASO titer versus idiopathic, otherwise, unremarkable workup. She also has confirmed fibromyalgia. Chronic low back pain with lumbar spinal stenosis.     She was seen by Dr. Iván Stanley, neurosurgery, on 10/28/2022. She states Dr. Stanley recommended she have surgery, noting \"L3 to L5 would be a decompression fusion and stabilization. It would eradicate some symptoms, but not all.\" She has had lumbar and thoracic spine imaging completed by neurosurgery and Dr. Ellis. She denies experiencing weakness. She confirms the sensation she experiences is a spasming or tightness of the muscles. She confirms she experiences numbness, tingling, and burning in both legs. She reports the pain comes from her bilateral feet and moves upward. She previously had a nerve and muscle study completed in 2019 which showed her neuropathy was mild. Pain is mild to moderate most recently with any movement, standing or walking and only relieved by stillness and rest. Denies urinary or bowel incontinence, hesitancy or perineal numbness and denies weakness. Pain is mild to moderate in low back and both legs, constant, deep, aching, but previously severe and worsening.She has completed physical therapy extensively. She has been more sedentary due to impaired mobility from her severe back pain and has gained weight.    On duloxetine and " pregabalin.  Gabapentin was not helpful even in high doses.    The following portions of the patient's history were reviewed and updated as appropriate: allergies, current medications, past family history, past medical history, past social history, past surgical history and problem list.    Past Medical History:   Diagnosis Date   • Arthritis    • Bursitis    • Claustrophobia    • CTS (carpal tunnel syndrome)     bilateral    • Fibromyalgia    • Hemorrhoids    • Hypertension    • Low back pain    • Lumbosacral disc disease    • Neuropathy    • Osteoarthritis    • SBE (subacute bacterial endocarditis)    • Tendinitis of knee    • Thyroid disorder    • Thyroid nodule    •  (vaginal birth after )        Past Surgical History:   Procedure Laterality Date   • ABDOMINAL HYSTERECTOMY     •  SECTION     • COLONOSCOPY     • CYST REMOVAL N/A 2022   • DILATATION AND CURETTAGE     • HYSTERECTOMY N/A        • MANDIBLE FRACTURE SURGERY     • OOPHORECTOMY Bilateral    • TUBAL ABDOMINAL LIGATION         Social History     Socioeconomic History   • Marital status: Single   Tobacco Use   • Smoking status: Never   • Smokeless tobacco: Never   Vaping Use   • Vaping Use: Never used   Substance and Sexual Activity   • Alcohol use: No   • Drug use: No   • Sexual activity: Not Currently       Family History   Problem Relation Age of Onset   • Arthritis Mother    • Stroke Mother    • Glaucoma Mother    • Hypertension Mother    • Heart disease Mother    • Heart failure Mother    • Heart disease Father    • Lung disease Father    • Hypertension Brother    • Breast cancer Neg Hx    • Ovarian cancer Neg Hx           Current Outpatient Medications:   •  Alpha Lipoic Acid 200 MG capsule, Take 400 mg by mouth 3 (Three) Times a Day., Disp: 180 capsule, Rfl: 5  •  ascorbic acid (VITAMIN C) 1000 MG tablet, Take 1 tablet by mouth Daily., Disp: , Rfl:   •  Cholecalciferol (Vitamin D) 50 MCG (2000 UT) tablet, Take 2,000 Units  by mouth Daily., Disp: , Rfl:   •  cholecalciferol (VITAMIN D3) 25 MCG (1000 UT) tablet, Take 1 tablet by mouth Daily., Disp: , Rfl:   •  desloratadine (CLARINEX) 5 MG tablet, Take 1 tablet by mouth Daily., Disp: , Rfl:   •  Dietary Management Product (Rheumate) capsule, Take 1 capsule by mouth Daily., Disp: 90 capsule, Rfl: 1  •  doxycycline (PERIOSTAT) 20 MG tablet, , Disp: , Rfl:   •  DULoxetine (CYMBALTA) 60 MG capsule, Take 1 capsule by mouth Daily., Disp: 90 capsule, Rfl: 3  •  estradiol (ESTRACE) 1 MG tablet, Take 1 tablet by mouth Daily., Disp: , Rfl:   •  hydrochlorothiazide (HYDRODIURIL) 25 MG tablet, , Disp: , Rfl:   •  HYDROcodone-acetaminophen (NORCO)  MG per tablet, Doesn't take on a regular basis, Disp: , Rfl:   •  levothyroxine (Synthroid) 50 MCG tablet, Take 1 tablet by mouth Daily for 90 days., Disp: 90 tablet, Rfl: 0  •  MULTIPLE VITAMINS-MINERALS ER PO, Take 1 tablet by mouth Daily., Disp: , Rfl:   •  multivitamin with minerals tablet tablet, Take 1 tablet by mouth Daily., Disp: , Rfl:   •  nabumetone (RELAFEN) 750 MG tablet, Take 1 tablet by mouth 2 (Two) Times a Day., Disp: , Rfl:   •  Omega-3 Fatty Acids (FISH OIL) 1000 MG capsule capsule, Take 1,200 mg by mouth Daily With Breakfast., Disp: , Rfl:   •  pregabalin (LYRICA) 75 MG capsule, Take 1 capsule by mouth 3 (Three) Times a Day., Disp: 270 capsule, Rfl: 1  •  vitamin B-6 (PYRIDOXINE) 100 MG tablet, Take 1 tablet by mouth Daily., Disp: 30 tablet, Rfl: 0  •  zolpidem (AMBIEN) 10 MG tablet, Take 1 tablet by mouth., Disp: , Rfl:      Review of Systems   Constitutional: Negative for chills, fatigue, fever and unexpected weight change.   HENT: Positive for hearing loss. Negative for ear pain, nosebleeds, rhinorrhea and sore throat.    Eyes: Positive for redness and visual disturbance. Negative for photophobia, pain, discharge and itching.   Respiratory: Negative for cough, chest tightness, shortness of breath and wheezing.   "  Cardiovascular: Negative for chest pain, palpitations and leg swelling.   Gastrointestinal: Negative for abdominal pain, blood in stool, constipation, diarrhea, nausea and vomiting.   Genitourinary: Negative for dysuria, frequency, hematuria and urgency.   Musculoskeletal: Positive for arthralgias, back pain and gait problem. Negative for joint swelling, myalgias, neck pain and neck stiffness.   Skin: Negative for rash and wound.   Allergic/Immunologic: Negative for environmental allergies and food allergies.   Neurological: Negative for dizziness, tremors, seizures, syncope, speech difficulty, weakness, light-headedness, numbness and headaches.   Hematological: Negative for adenopathy. Does not bruise/bleed easily.   Psychiatric/Behavioral: Negative for agitation, confusion, decreased concentration, hallucinations, sleep disturbance and suicidal ideas. The patient is not nervous/anxious.    All other systems reviewed and are negative.       Objective:  /80   Pulse 86   Ht 162.6 cm (64\")   Wt 108 kg (238 lb)   SpO2 98%   BMI 40.85 kg/m²     Neurologic Exam     Mental Status   Oriented to person, place, and time.   Speech: speech is normal   Level of consciousness: alert    Cranial Nerves     CN II   Visual acuity: decreased    CN III, IV, VI   Pupils are equal, round, and reactive to light.  Extraocular motions are normal.   Right pupil: Size: 5 mm. Shape: regular. Reactivity: brisk. Consensual response: intact. Accommodation: intact.   Left pupil: Size: 5 mm. Shape: regular. Reactivity: brisk. Consensual response: intact. Accommodation: intact.   CN III: no CN III palsy  CN VI: no CN VI palsy  Nystagmus: none   Diplopia: none (none on exam)  Ophthalmoparesis: none  Upgaze: normal  Downgaze: normal    CN V   Facial sensation intact.     CN VII   Right facial weakness: right eyelid ptosis top 1/3 of pupil covered by eyelid.    CN VIII   Hearing: impaired    CN IX, X   CN IX normal.   CN X normal.     CN " XI   CN XI normal.     CN XII   CN XII normal.     Motor Exam   Muscle bulk: normal  Overall muscle tone: normal    Strength   Strength 5/5 throughout.     Sensory Exam   Right leg vibration: decreased from ankle  Left leg vibration: decreased from ankle    Gait, Coordination, and Reflexes     Gait  Gait: (antalgia, wide based, improved from last visit, no assistive device)    Coordination   Finger to nose coordination: normal    Tremor   Resting tremor: absent  Intention tremor: absent  Action tremor: absent    Reflexes   Right brachioradialis: 2+  Left brachioradialis: 2+  Right biceps: 2+  Left biceps: 2+  Right patellar: 1+  Left patellar: 1+  Right achilles: 1+  Left achilles: 1+  Right : 2+  Left : 2+  Very Decreased reflexes BLE chronic at baseline       Physical Exam  Constitutional:       Appearance: Normal appearance.   Eyes:      General: Vision grossly intact. Gaze aligned appropriately.         Right eye: No foreign body, discharge or hordeolum.         Left eye: No foreign body, discharge or hordeolum.      Extraocular Movements: EOM normal.      Conjunctiva/sclera:      Right eye: Right conjunctiva is injected. No exudate or hemorrhage.     Left eye: Left conjunctiva is injected. No exudate or hemorrhage.     Pupils: Pupils are equal, round, and reactive to light.      Visual Fields: Right eye visual fields normal and left eye visual fields normal.     Neurological:      Mental Status: She is alert and oriented to person, place, and time.      Motor: Motor strength is normal.      Coordination: Finger-Nose-Finger Test normal.      Deep Tendon Reflexes:      Reflex Scores:       Bicep reflexes are 2+ on the right side and 2+ on the left side.       Brachioradialis reflexes are 2+ on the right side and 2+ on the left side.       Patellar reflexes are 1+ on the right side and 1+ on the left side.       Achilles reflexes are 1+ on the right side and 1+ on the left side.  Psychiatric:         Mood  and Affect: Mood is anxious.         Speech: Speech normal.         Assessment/Plan:      Diagnoses and all orders for this visit:    1. Spinal stenosis of lumbar region without neurogenic claudication (Primary)  -     pregabalin (LYRICA) 75 MG capsule; Take 1 capsule by mouth 3 (Three) Times a Day.  Dispense: 270 capsule; Refill: 1    2. Fibromyalgia  -     pregabalin (LYRICA) 75 MG capsule; Take 1 capsule by mouth 3 (Three) Times a Day.  Dispense: 270 capsule; Refill: 1  -     DULoxetine (CYMBALTA) 60 MG capsule; Take 1 capsule by mouth Daily.  Dispense: 90 capsule; Refill: 3    3. 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  Dispense: 270 capsule; Refill: 1  -     DULoxetine (CYMBALTA) 60 MG capsule; Take 1 capsule by mouth Daily.  Dispense: 90 capsule; Refill: 3    4. Ptosis, right eyelid  -     Musk Antibody; Future  -     Acetylcholine Receptor Antibody Panel; Future  -     Neuromyelitis Optica (NMO) Auto Antibody, IgG; Future  -     Anti-Myelin Oligodendrocyte Glycoprotein (MOG), Serum; Future    5. Vision changes  -     Musk Antibody; Future  -     Acetylcholine Receptor Antibody Panel; Future  -     Neuromyelitis Optica (NMO) Auto Antibody, IgG; Future  -     Anti-Myelin Oligodendrocyte Glycoprotein (MOG), Serum; Future    On exam today, she has right eyelid ptosis, which is partially covering her right pupil, although the remainder of her exam is reassuring. She does have some blurred vision and some irritation of both eyes without any drainage. I have encouraged her to follow back up with Dr. Rochelle Temple, optometry, as her symptoms do not seem to be improving with the steroid eyedrops and the dry-eye eyedrops. We are going to get some labs today, although she does deny any weakness or shortness of breath. She occasionally feels  like her vision is a bit blurred and occasionally doubled although it is not doubled today. We will do labs to rule out myasthenia gravis. She is going to schedule follow-up with her eye specialist. She could also consider seeing oculoplastics, especially if all her labs are normal.     We also reviewed her labs from 2019, which were unremarkable with the exception of a positive ASO titer, confirming that her neuropathy was likely related to infection, and then there could be a component of being idiopathic.     She would like to decrease her pregabalin dosing. We are going to go back to 75 mg 3 times per day. She will continue her duloxetine, which she has taken long term and found beneficial.     The total time of visit today was 40 minutes. I printed off her after visit summary as well. I reviewed her records from EMG and NCVs, neurosurgery, orthopedic surgery, and pain management. We discussed her findings and recommendations and completed her exam all in detail.    She is hopeful that all the injections with pain management now that she has had 3 will keep her where she does not have to have surgery, but she does explain that surgery has been recommended for her if she does not improve. She feels that she is feeling much better overall with her last set of injections about 1 week ago. She feels encouraged overall.     We will follow up next available 01/2024 or sooner if needed. She verbalizes understanding and agrees with plan moving forward today.    Reviewed medications, potential side effects and signs and symptoms to report. Discussed risk versus benefits of treatment plan with patient and/or family-including medications, labs and radiology that may be ordered. Addressed questions and concerns during visit. Patient and/or family verbalized understanding and agree with plan.    As part of this patient's treatment plan I am prescribing controlled substances. The patient has been made aware of appropriate use  of such medications, including potential risk of somnolence, limited ability to drive and/or work safely, and potential for dependence or overdose. It has also been made clear that these medications are for use by the patient only, without concomitant use of alcohol or other substances unless prescribed. Keep out of reach of children.  Ethan report has been reviewed. If this is going to be prescribed long term, [facility] Controlled Substance Agreement Contract has also been read and signed by patient and myself.    During this visit the following were done:  Labs Reviewed [x]    Labs Ordered []    Radiology Reports Reviewed [x]    Radiology Ordered []    PCP Records Reviewed []    Referring Provider Records Reviewed []    ER Records Reviewed []    Hospital Records Reviewed []    History Obtained From Family []    Radiology Images Reviewed []    Other Reviewed [x] neurosurgery, pain management and orthopedics surgery notes reviewed   Records Requested []      Transcribed from ambient dictation for ELDON Rosas by Kristie Luna.  05/26/23   15:36 EDT    Patient or patient representative verbalized consent to the visit recording.  I have personally performed the services described in this document as transcribed by the above individual, and it is both accurate and complete.  ELDON Rosas  5/26/2023  17:33 EDT

## 2023-05-26 NOTE — PROGRESS NOTES
Subjective:     Patient ID: Alison Benitez is a 68 y.o. female.    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 We last saw her on 11/17/2022. She has had neuropathy post-infection of bilateral lower extremities since 01/2019 after being diagnosed with mononucleosis and a positive ASO titer.     She has been followed by [facility] as well as pain management for her low back symptoms.     At her last visit in clinic, we continued her duloxetine 60 mg daily and her pregabalin 100 mg 3 times per day.     Her most recent set of labs were at an outlying facility on 02/21/2023. Comprehensive metabolic panel chloride 96, ALT 39, otherwise unremarkable. Hepatitis panel was non reactive on 04/03/2023 at an outlying facility. Comprehensive metabolic panel with BUN 26, calcium 10.9, otherwise unremarkable. CBC with auto differential essentially within normal limits.     She recently saw [facility], Maribell Conroy PA-C, on 04/24/2023 for worsening symptoms of low back pain, hip, and leg pain on the left side. She has been recommended for surgery but has been hesitant to move forward. They referred her back to pain management for additional treatment options. She did undergo a procedure with Dr. Ellis on 05/22/2023.     She is here for follow-up and refills on her medications today.     It also appears she underwent an updated EMG and NCVs of her lower extremities by Dr. Eddie Brandon, neurology in [place], [place] on 04/24/2023. This was ordered by neurosurgery that showed mild to moderate chronic bilateral L5-S1 radiculopathy and mild underlying  "peripheral neuropathy. This is per record review.    She reports she is doing much better. Her lower back pain improved since the procedure with Dr. Ellis on 05/24/2023. She states Dr. Ellis told her it would take approximately 1 week for the injections to be fully effective. She states she has had 3 sets of injections including one in her low back this week. She states she has been inactive and has gained weight. She is trying to walk and exercise, which she notes she did twice yesterday. She states she is going to physical therapy and has an appointment next week.    She states her neuropathy symptoms, pain, and fibromyalgia are stable. She states Dr. Brandon did not seem to think the results were worrisome, so she wants to decrease her pregabalin. She had been on 75mg 3 times a day, but last visit she was having a lot more pain and we increased this to 100mg 3 times a day. She states Dr. Brandon kept asking her what was causing the neuropathy.     She saw an orthopedic surgeon, Dr. Wang Bailey, \"to check on her hips\". She states he recommended the same surgery as Dr. Stanley with Neurosurgery.    She states her right eye is bothering her. She notes her left eye is irritated as well, but not as severe as her right eye. She went to the optometrist, Dr. Rochelle Temple, and she was told she had an ulcer in her right eye. She states Dr. Temple told her she also has a sun spot in the inner lid of her right eye from not wearing sunglasses when she was a child. She states she was using dry eye drops, and then was prescribed a steroid to take for 1 week and then was told to keep using the dry eye drops, but her eye has gotten worse. She states her right eyelid is drooping and making it hard for her to keep it open. She does report blurred vision. She states she can only use her left eye to focus or read something. She states she is going to see Dr. Temple again this week. She denies any weakness or shortness of breath. " "She states she has a \"bad ear\" with hearing loss. She also notes she was told she did not need glasses but could get some for driving. She has possibly had double vision but none noted today.    She states she had a fall last summer, in 2022, but it was due to her back pain.    Prior Neurological workup and history: Prior labs 2019-CK level of 174.  Immunofixation and protein electrophoresis within normal limits.  Anti-hue and anti-Otilia antibodies negative.  Methylmalonic acid normal at 155.  Vitamin B12 951 and folate greater than 20.  Lyme antibodies negative.  Mono test negative.  ASO titer was elevated at 200. Hemoglobin A1c was 4.9%.  She was treated with high dose steroids and physical therapy. 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 repeat MRI of the lumbar spine on 6/7/2021 and this did show some high-grade and moderate stenosis from L3-L4 and L4-L5.      The patient was diagnosed with fibromyalgia in 11/2021. She is still seeing Dr. Bibiana Khan-she also treats her for multi-site Osteoporosis.Dr. Bibiana Khan prescribes nabumetone 750 mg twice daily.      Previously took gabapentin which was not helpful. Tried 3 muscle relaxer's in the past which she stopped taking due to not liking the side effects but cannot recall the names. This was with the arthritis specialist.     MRI of the lumbar spine without contrast on 8/19/2022 showing multilevel degenerative disc changes with L3-L5 moderate narrowing.  This has been reviewed by neurosurgery.  MRI of the thoracic spine without contrast on 10/25/2022 shows multilevel degenerative disc changes with central spinal canal stenosis most severe at T11-T12.  Imaging has been reviewed by pain management as well as neurosurgery with [facility].    Followed long term in our clinic for neuropathy post infection of bilateral lower extremities since 01/2019 after being diagnosed with mononucleosis and " "positive ASO titer versus idiopathic, otherwise, unremarkable workup. She also has confirmed fibromyalgia. Chronic low back pain with lumbar spinal stenosis.     She was seen by Dr. Iván Stanley, neurosurgery, on 10/28/2022. She states Dr. Stanley recommended she have surgery, noting \"L3 to L5 would be a decompression fusion and stabilization. It would eradicate some symptoms, but not all.\" She has had lumbar and thoracic spine imaging completed by neurosurgery and Dr. Ellis. She denies experiencing weakness. She confirms the sensation she experiences is a spasming or tightness of the muscles. She confirms she experiences numbness, tingling, and burning in both legs. She reports the pain comes from her bilateral feet and moves upward. She previously had a nerve and muscle study completed in 2019 which showed her neuropathy was mild. Pain is mild to moderate most recently with any movement, standing or walking and only relieved by stillness and rest. Denies urinary or bowel incontinence, hesitancy or perineal numbness and denies weakness. Pain is mild to moderate in low back and both legs, constant, deep, aching, but previously severe and worsening.She has completed physical therapy extensively. She has been more sedentary due to impaired mobility from her severe back pain and has gained weight.    On duloxetine and pregabalin.  Gabapentin was not helpful even in high doses.    The following portions of the patient's history were reviewed and updated as appropriate: allergies, current medications, past family history, past medical history, past social history, past surgical history and problem list.    Past Medical History:   Diagnosis Date   • Arthritis    • Bursitis    • Claustrophobia    • CTS (carpal tunnel syndrome)     bilateral    • Fibromyalgia    • Hemorrhoids    • Hypertension    • Low back pain    • Lumbosacral disc disease    • Neuropathy    • Osteoarthritis    • SBE (subacute bacterial endocarditis)  "   • Tendinitis of knee    • Thyroid disorder    • Thyroid nodule    •  (vaginal birth after )        Past Surgical History:   Procedure Laterality Date   • ABDOMINAL HYSTERECTOMY     •  SECTION     • COLONOSCOPY     • CYST REMOVAL N/A 2022   • DILATATION AND CURETTAGE     • HYSTERECTOMY N/A        • MANDIBLE FRACTURE SURGERY     • OOPHORECTOMY Bilateral    • TUBAL ABDOMINAL LIGATION         Social History     Socioeconomic History   • Marital status: Single   Tobacco Use   • Smoking status: Never   • Smokeless tobacco: Never   Vaping Use   • Vaping Use: Never used   Substance and Sexual Activity   • Alcohol use: No   • Drug use: No   • Sexual activity: Not Currently       Family History   Problem Relation Age of Onset   • Arthritis Mother    • Stroke Mother    • Glaucoma Mother    • Hypertension Mother    • Heart disease Mother    • Heart failure Mother    • Heart disease Father    • Lung disease Father    • Hypertension Brother    • Breast cancer Neg Hx    • Ovarian cancer Neg Hx           Current Outpatient Medications:   •  Alpha Lipoic Acid 200 MG capsule, Take 400 mg by mouth 3 (Three) Times a Day., Disp: 180 capsule, Rfl: 5  •  ascorbic acid (VITAMIN C) 1000 MG tablet, Take 1 tablet by mouth Daily., Disp: , Rfl:   •  Cholecalciferol (Vitamin D) 50 MCG (2000 UT) tablet, Take 2,000 Units by mouth Daily., Disp: , Rfl:   •  cholecalciferol (VITAMIN D3) 25 MCG (1000 UT) tablet, Take 1 tablet by mouth Daily., Disp: , Rfl:   •  desloratadine (CLARINEX) 5 MG tablet, Take 1 tablet by mouth Daily., Disp: , Rfl:   •  Dietary Management Product (Rheumate) capsule, Take 1 capsule by mouth Daily., Disp: 90 capsule, Rfl: 1  •  doxycycline (PERIOSTAT) 20 MG tablet, , Disp: , Rfl:   •  DULoxetine (CYMBALTA) 60 MG capsule, Take 1 capsule by mouth Daily., Disp: 90 capsule, Rfl: 3  •  estradiol (ESTRACE) 1 MG tablet, Take 1 tablet by mouth Daily., Disp: , Rfl:   •  hydrochlorothiazide (HYDRODIURIL) 25  MG tablet, , Disp: , Rfl:   •  HYDROcodone-acetaminophen (NORCO)  MG per tablet, Doesn't take on a regular basis, Disp: , Rfl:   •  levothyroxine (Synthroid) 50 MCG tablet, Take 1 tablet by mouth Daily for 90 days., Disp: 90 tablet, Rfl: 0  •  MULTIPLE VITAMINS-MINERALS ER PO, Take 1 tablet by mouth Daily., Disp: , Rfl:   •  multivitamin with minerals tablet tablet, Take 1 tablet by mouth Daily., Disp: , Rfl:   •  nabumetone (RELAFEN) 750 MG tablet, Take 1 tablet by mouth 2 (Two) Times a Day., Disp: , Rfl:   •  Omega-3 Fatty Acids (FISH OIL) 1000 MG capsule capsule, Take 1,200 mg by mouth Daily With Breakfast., Disp: , Rfl:   •  pregabalin (LYRICA) 75 MG capsule, Take 1 capsule by mouth 3 (Three) Times a Day., Disp: 270 capsule, Rfl: 1  •  vitamin B-6 (PYRIDOXINE) 100 MG tablet, Take 1 tablet by mouth Daily., Disp: 30 tablet, Rfl: 0  •  zolpidem (AMBIEN) 10 MG tablet, Take 1 tablet by mouth., Disp: , Rfl:      Review of Systems   Constitutional: Negative for chills, fatigue, fever and unexpected weight change.   HENT: Positive for hearing loss. Negative for ear pain, nosebleeds, rhinorrhea and sore throat.    Eyes: Positive for redness and visual disturbance. Negative for photophobia, pain, discharge and itching.   Respiratory: Negative for cough, chest tightness, shortness of breath and wheezing.    Cardiovascular: Negative for chest pain, palpitations and leg swelling.   Gastrointestinal: Negative for abdominal pain, blood in stool, constipation, diarrhea, nausea and vomiting.   Genitourinary: Negative for dysuria, frequency, hematuria and urgency.   Musculoskeletal: Positive for arthralgias, back pain and gait problem. Negative for joint swelling, myalgias, neck pain and neck stiffness.   Skin: Negative for rash and wound.   Allergic/Immunologic: Negative for environmental allergies and food allergies.   Neurological: Negative for dizziness, tremors, seizures, syncope, speech difficulty, weakness,  "light-headedness, numbness and headaches.   Hematological: Negative for adenopathy. Does not bruise/bleed easily.   Psychiatric/Behavioral: Negative for agitation, confusion, decreased concentration, hallucinations, sleep disturbance and suicidal ideas. The patient is not nervous/anxious.    All other systems reviewed and are negative.       Objective:  /80   Pulse 86   Ht 162.6 cm (64\")   Wt 108 kg (238 lb)   SpO2 98%   BMI 40.85 kg/m²     Neurologic Exam     Mental Status   Oriented to person, place, and time.   Speech: speech is normal   Level of consciousness: alert    Cranial Nerves     CN II   Visual acuity: decreased    CN III, IV, VI   Pupils are equal, round, and reactive to light.  Extraocular motions are normal.   Right pupil: Size: 5 mm. Shape: regular. Reactivity: brisk. Consensual response: intact. Accommodation: intact.   Left pupil: Size: 5 mm. Shape: regular. Reactivity: brisk. Consensual response: intact. Accommodation: intact.   CN III: no CN III palsy  CN VI: no CN VI palsy  Nystagmus: none   Diplopia: none (none on exam)  Ophthalmoparesis: none  Upgaze: normal  Downgaze: normal    CN V   Facial sensation intact.     CN VII   Right facial weakness: right eyelid ptosis top 1/3 of pupil covered by eyelid.    CN VIII   Hearing: impaired    CN IX, X   CN IX normal.   CN X normal.     CN XI   CN XI normal.     CN XII   CN XII normal.     Motor Exam   Muscle bulk: normal  Overall muscle tone: normal    Strength   Strength 5/5 throughout.     Sensory Exam   Right leg vibration: decreased from ankle  Left leg vibration: decreased from ankle    Gait, Coordination, and Reflexes     Gait  Gait: (antalgia, wide based, improved from last visit, no assistive device)    Coordination   Finger to nose coordination: normal    Tremor   Resting tremor: absent  Intention tremor: absent  Action tremor: absent    Reflexes   Right brachioradialis: 2+  Left brachioradialis: 2+  Right biceps: 2+  Left biceps: " 2+  Right patellar: 1+  Left patellar: 1+  Right achilles: 1+  Left achilles: 1+  Right : 2+  Left : 2+  Very Decreased reflexes BLE chronic at baseline       Physical Exam  Constitutional:       Appearance: Normal appearance.   Eyes:      General: Vision grossly intact. Gaze aligned appropriately.         Right eye: No foreign body, discharge or hordeolum.         Left eye: No foreign body, discharge or hordeolum.      Extraocular Movements: EOM normal.      Conjunctiva/sclera:      Right eye: Right conjunctiva is injected. No exudate or hemorrhage.     Left eye: Left conjunctiva is injected. No exudate or hemorrhage.     Pupils: Pupils are equal, round, and reactive to light.      Visual Fields: Right eye visual fields normal and left eye visual fields normal.     Neurological:      Mental Status: She is alert and oriented to person, place, and time.      Motor: Motor strength is normal.      Coordination: Finger-Nose-Finger Test normal.      Deep Tendon Reflexes:      Reflex Scores:       Bicep reflexes are 2+ on the right side and 2+ on the left side.       Brachioradialis reflexes are 2+ on the right side and 2+ on the left side.       Patellar reflexes are 1+ on the right side and 1+ on the left side.       Achilles reflexes are 1+ on the right side and 1+ on the left side.  Psychiatric:         Mood and Affect: Mood is anxious.         Speech: Speech normal.         Assessment/Plan:       Diagnoses and all orders for this visit:    1. Spinal stenosis of lumbar region without neurogenic claudication (Primary)  -     pregabalin (LYRICA) 75 MG capsule; Take 1 capsule by mouth 3 (Three) Times a Day.  Dispense: 270 capsule; Refill: 1    2. Fibromyalgia  -     pregabalin (LYRICA) 75 MG capsule; Take 1 capsule by mouth 3 (Three) Times a Day.  Dispense: 270 capsule; Refill: 1  -     DULoxetine (CYMBALTA) 60 MG capsule; Take 1 capsule by mouth Daily.  Dispense: 90 capsule; Refill: 3    3. 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  Dispense: 270 capsule; Refill: 1  -     DULoxetine (CYMBALTA) 60 MG capsule; Take 1 capsule by mouth Daily.  Dispense: 90 capsule; Refill: 3    4. Ptosis, right eyelid  -     Musk Antibody; Future  -     Acetylcholine Receptor Antibody Panel; Future  -     Neuromyelitis Optica (NMO) Auto Antibody, IgG; Future  -     Anti-Myelin Oligodendrocyte Glycoprotein (MOG), Serum; Future    5. Vision changes  -     Musk Antibody; Future  -     Acetylcholine Receptor Antibody Panel; Future  -     Neuromyelitis Optica (NMO) Auto Antibody, IgG; Future  -     Anti-Myelin Oligodendrocyte Glycoprotein (MOG), Serum; Future    On exam today, she has right eyelid ptosis, which is partially covering her right pupil, although the remainder of her exam is reassuring. She does have some blurred vision and some irritation of both eyes without any drainage. I have encouraged her to follow back up with Dr. Rochelle Temple, optometry, as her symptoms do not seem to be improving with the steroid eyedrops and the dry-eye eyedrops. We are going to get some labs today, although she does deny any weakness or shortness of breath. She occasionally feels like her vision is a bit blurred and occasionally doubled although it is not doubled today. We will do labs to rule out myasthenia gravis. She is going to schedule follow-up with her eye specialist. She could also consider seeing oculoplastics, especially if all her labs are normal.     We also reviewed her labs from 2019, which were unremarkable with the exception of a positive ASO titer, confirming that her neuropathy was likely related to infection, and then there could be a component of being idiopathic.     She would like to decrease her pregabalin dosing. We are going to go back to 75 mg 3 times  per day. She will continue her duloxetine, which she has taken long term and found beneficial.     The total time of visit today was 40 minutes. I printed off her after visit summary as well. I reviewed her records from EMG and NCVs, neurosurgery, orthopedic surgery, and pain management. We discussed her findings and recommendations and completed her exam all in detail.    She is hopeful that all the injections with pain management now that she has had 3 will keep her where she does not have to have surgery, but she does explain that surgery has been recommended for her if she does not improve. She feels that she is feeling much better overall with her last set of injections about 1 week ago. She feels encouraged overall.     We will follow up next available 01/2024 or sooner if needed. She verbalizes understanding and agrees with plan moving forward today.     Reviewed medications, potential side effects and signs and symptoms to report. Discussed risk versus benefits of treatment plan with patient and/or family-including medications, labs and radiology that may be ordered. Addressed questions and concerns during visit. Patient and/or family verbalized understanding and agree with plan.    As part of this patient's treatment plan I am prescribing controlled substances. The patient has been made aware of appropriate use of such medications, including potential risk of somnolence, limited ability to drive and/or work safely, and potential for dependence or overdose. It has also been made clear that these medications are for use by the patient only, without concomitant use of alcohol or other substances unless prescribed. Keep out of reach of children.  Ethan report has been reviewed. If this is going to be prescribed long term, [facility] Controlled Substance Agreement Contract has also been read and signed by patient and myself.    During this visit the following were done:  Labs Reviewed [x]    Labs Ordered []     Radiology Reports Reviewed [x]    Radiology Ordered []    PCP Records Reviewed []    Referring Provider Records Reviewed []    ER Records Reviewed []    Hospital Records Reviewed []    History Obtained From Family []    Radiology Images Reviewed []    Other Reviewed [x] neurosurgery, pain management and orthopedics surgery notes reviewed   Records Requested []      Transcribed from ambient dictation for ELDON Rosas by Kristie Luna.  05/26/23   15:36 EDT    Patient or patient representative verbalized consent to the visit recording.  I have personally performed the services described in this document as transcribed by the above individual, and it is both accurate and complete.  ELDON Rosas  5/26/2023  17:33 EDT

## 2023-05-30 LAB — MOG AB SER QL CBA IFA: NEGATIVE

## 2023-05-31 ENCOUNTER — TELEPHONE (OUTPATIENT)
Dept: NEUROLOGY | Facility: CLINIC | Age: 68
End: 2023-05-31

## 2023-05-31 NOTE — TELEPHONE ENCOUNTER
----- Message from ELDON Rosas sent at 5/31/2023  1:42 PM EDT -----  Please let Mrs. Benitez know that her labs so far are normal. As more labs return, we will keep her posted. Thanks, ELDON Kong

## 2023-06-02 LAB — MUSK AB SER IA-ACNC: <1 U/ML

## 2023-06-03 LAB — AQP4 H2O CHANNEL IGG SERPL QL: NEGATIVE

## 2023-06-05 DIAGNOSIS — M48.062 LUMBAR STENOSIS WITH NEUROGENIC CLAUDICATION: ICD-10-CM

## 2023-06-05 RX ORDER — ME-TETRAHYDROFOLATE/B12/HRB236 1-1-500 MG
CAPSULE ORAL
Qty: 90 CAPSULE | Refills: 0 | Status: SHIPPED | OUTPATIENT
Start: 2023-06-05

## 2023-06-13 LAB
ACHR BIND AB SER-SCNC: <0.03 NMOL/L (ref 0–0.24)
ACHR BLOCK AB SER-ACNC: 13 % (ref 0–25)
ACHR MOD AB SER QL FC: 2 % (ref 0–45)

## 2023-06-15 ENCOUNTER — TELEPHONE (OUTPATIENT)
Dept: NEUROLOGY | Facility: CLINIC | Age: 68
End: 2023-06-15
Payer: MEDICARE

## 2023-06-15 NOTE — TELEPHONE ENCOUNTER
----- Message from ELDON Rosas sent at 6/14/2023  9:52 AM EDT -----  Please let Mrs. Benitez know that her labs continue to return normal. She should follow up with her eye specialist in regards to her drooping eyelid. I do not suspect this is neurological. Thanks, ELDON Kong

## 2023-07-03 ENCOUNTER — HOSPITAL ENCOUNTER (OUTPATIENT)
Dept: PHYSICAL THERAPY | Facility: HOSPITAL | Age: 68
Setting detail: THERAPIES SERIES
Discharge: HOME | End: 2023-07-03
Payer: MEDICARE

## 2023-07-03 PROCEDURE — 97110 THERAPEUTIC EXERCISES: CPT

## 2023-07-03 PROCEDURE — 97161 PT EVAL LOW COMPLEX 20 MIN: CPT

## 2023-07-03 ASSESSMENT — PAIN SCALES - GENERAL: PAINLEVEL_OUTOF10: 2

## 2023-07-05 ENCOUNTER — HOSPITAL ENCOUNTER (OUTPATIENT)
Dept: PHYSICAL THERAPY | Facility: HOSPITAL | Age: 68
Setting detail: THERAPIES SERIES
Discharge: HOME | End: 2023-07-05
Payer: MEDICARE

## 2023-07-05 PROCEDURE — 97110 THERAPEUTIC EXERCISES: CPT

## 2023-07-05 PROCEDURE — 97140 MANUAL THERAPY 1/> REGIONS: CPT

## 2023-07-05 ASSESSMENT — PAIN SCALES - GENERAL: PAINLEVEL_OUTOF10: 2

## 2023-07-05 NOTE — PROGRESS NOTES
Physical Therapy: Initial Evaluation    Patient: Ashley Leggett (45 y.o. female)   Examination Date:   Plan of Care Certification Period: 7/3/2023 to 23      :  1955 ;    Confirmed: Yes MRN: [id number]  CSN: [id number]   Insurance: Payor: Iona Falling / Plan: Acosta Holguin ESSENTIAL/PLUS / Product Type: *No Product type* /   Insurance ID: [id number] - (Medicare Managed) Secondary Insurance (if applicable):    Referring Physician: ISA Olsen MD / JO-ANN Chandler   PCP: Primitivo Bello MD Visits to Date/Visits Approved:   /      No Show/Cancelled Appts:   /       Medical Diagnosis: Spondylolisthesis, lumbar region [M43.16]  Spinal stenosis, lumbar region with neurogenic claudication [M48.062]  Other intervertebral disc degeneration, 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.36]  Dorsalgia, unspecified [M54.9]  Other congenital malformations of spine, 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] Chonic low back pain, balance deficits  Treatment Diagnosis: Chronic low back pain, lumbar stenosis     PERTINENT MEDICAL HISTORY   Patient Assessed for Rehabilitation Services: Yes       Medical History: Chart Reviewed: Yes   Past Medical History:   Diagnosis Date    Arthritis      Surgical History:   Past Surgical History:   Procedure Laterality Date     SECTION      x 2    COLONOSCOPY  2010    normal findings    COLONOSCOPY N/A 2020    COLORECTAL CANCER SCREENING, NOT HIGH RISK performed by Jana Patrick MD at [address] [place] [address] (CERVIX STATUS UNKNOWN)            SUBJECTIVE EXAMINATION       Subjective History:    Subjective: Pt presents with reports of continued chronic low back pain due to stenosis but also has increase in balance deficits and has had 2-3 falls recently. She has had lumbar injections with her last ones in mid May with good results in improving her back pain. Her chief complaint is pain in her R>L posterior hip and sacral region.   She

## 2023-07-05 NOTE — FLOWSHEET NOTE
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 MEDICARE / Plan: Liat Eveline ESSENTIAL/PLUS / Product Type: *No Product type* /   Physician Information:  ISA Muse  Plan of care signed (Y/N):    Visit# / total visits:     2 /    G-Code (if applicable):      Date / Visit # G-Code Applied:         Progress Note: []  Yes  [x]  No  Next due by: Visit #10       Pain level:   2/10    Subjective: Pt reports having lower pain levels currently. She reports she was sitting by the pool yesterday and had to have help from her daughters to maneuver in the lounge chair due to pain.       Objective:  Observation:   Test measurements:    Palpation:    Exercises:  Exercise Resistance/Repetitions Date performed   Nustep L5 x 6' 5   Sitting lumbar ext against gravity 3x10 5   Seated mid rows GTB 3x10 5   Hip abd / add sitting BTB / ball 3x10 5   LAQ 2# 3x10 5   HS curls GTB 3x10 5   Standing hip flexor stretch 5x20\" 5                              Other Therapeutic Activities:      Manual Treatments:  B SAD, STM lumbar paraspinals in side lying position; 20' total    Modalities:  MH lumbar spine x 15'      Timed Code Treatment Minutes:  80      Total Treatment Minutes:  94    Treatment/Activity Tolerance:  [x] Patient tolerated treatment well [] Patient limited by fatigue  [] Patient limited by pain  [] Patient limited by other medical

## 2023-07-06 ENCOUNTER — TELEMEDICINE (OUTPATIENT)
Dept: FAMILY MEDICINE CLINIC | Age: 68
End: 2023-07-06
Payer: MEDICARE

## 2023-07-06 DIAGNOSIS — M54.50 CHRONIC LOW BACK PAIN, UNSPECIFIED BACK PAIN LATERALITY, UNSPECIFIED WHETHER SCIATICA PRESENT: Primary | ICD-10-CM

## 2023-07-06 DIAGNOSIS — G89.29 CHRONIC LOW BACK PAIN, UNSPECIFIED BACK PAIN LATERALITY, UNSPECIFIED WHETHER SCIATICA PRESENT: Primary | ICD-10-CM

## 2023-07-06 DIAGNOSIS — I10 ESSENTIAL HYPERTENSION: ICD-10-CM

## 2023-07-06 DIAGNOSIS — M25.551 RIGHT HIP PAIN: ICD-10-CM

## 2023-07-06 PROCEDURE — 1123F ACP DISCUSS/DSCN MKR DOCD: CPT | Performed by: FAMILY MEDICINE

## 2023-07-06 PROCEDURE — 99213 OFFICE O/P EST LOW 20 MIN: CPT | Performed by: FAMILY MEDICINE

## 2023-07-11 ENCOUNTER — HOSPITAL ENCOUNTER (OUTPATIENT)
Dept: PHYSICAL THERAPY | Facility: HOSPITAL | Age: 68
Setting detail: THERAPIES SERIES
Discharge: HOME | End: 2023-07-11
Payer: MEDICARE

## 2023-07-11 PROCEDURE — 97110 THERAPEUTIC EXERCISES: CPT

## 2023-07-11 PROCEDURE — 97140 MANUAL THERAPY 1/> REGIONS: CPT

## 2023-07-11 NOTE — FLOWSHEET NOTE
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 MEDICARE / Plan: [facility] ESSENTIAL/PLUS / Product Type: *No Product type* /   Physician Information:  Henrey Sicard, PA  Plan of care signed (Y/N):    Visit# / total visits:     3 /    G-Code (if applicable):      Date / Visit # G-Code Applied:         Progress Note: []  Yes  [x]  No  Next due by: Visit #10       Pain level:   \"Sore\"/10    Subjective: Pt reports she is not feeling as well today in general.  She notes she felt better after last PT visit.       Objective:  Observation:   Test measurements:    Palpation:    Exercises:  Exercise Resistance/Repetitions Date performed   Nustep L5 x 6' 11   Sitting lumbar ext against gravity 3x10 11   Seated mid rows GTB 3x10 11   Hip abd / add sitting BTB / ball 3x10 11   LAQ 2# 3x10 11   HS curls GTB 3x10 11   Standing hip flexor stretch 5x20\" 11                              Other Therapeutic Activities:      Manual Treatments:  B SAD, B HS / ITB stretch in supine, STM lumbar paraspinals in side lying position; 25' total    Modalities:  MH lower lumbar spine / gluteals / B HS x 15' in supine hooklying position       Timed Code Treatment Minutes:  70      Total Treatment Minutes:  87    Treatment/Activity Tolerance:  [x] Patient tolerated treatment well [] Patient limited by fatigue  [] Patient limited by pain  [] Patient limited by other

## 2023-07-13 ENCOUNTER — HOSPITAL ENCOUNTER (OUTPATIENT)
Dept: PHYSICAL THERAPY | Facility: HOSPITAL | Age: 68
Setting detail: THERAPIES SERIES
Discharge: HOME | End: 2023-07-13
Payer: MEDICARE

## 2023-07-13 PROCEDURE — 97110 THERAPEUTIC EXERCISES: CPT

## 2023-07-13 PROCEDURE — 97140 MANUAL THERAPY 1/> REGIONS: CPT

## 2023-07-13 NOTE — FLOWSHEET NOTE
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  She notes she felt better after last PT visit.       Objective:  Observation:   Test measurements:    Palpation:    Exercises:  Exercise Resistance/Repetitions Date performed   Nustep L5 x 6' 11   Sitting lumbar ext against gravity 3x10 11   Seated mid rows GTB 3x10 11   Hip abd / add sitting BTB / ball 3x10 11   LAQ 2# 3x10 11   HS curls GTB 3x10 11   Standing hip flexor stretch 5x20\" 11                              Other Therapeutic Activities:      Manual Treatments:  B SAD, B HS / ITB stretch in supine, STM lumbar paraspinals in side lying position; 25' total    Modalities:  MH lower lumbar spine / gluteals / B HS x 15' in supine hooklying position       Timed Code Treatment Minutes:  70      Total Treatment Minutes:  87    Treatment/Activity Tolerance:  [x] Patient tolerated treatment well [] Patient limited by fatigue  [] Patient limited by pain  [] Patient limited by other

## 2023-07-20 ENCOUNTER — HOSPITAL ENCOUNTER (OUTPATIENT)
Dept: PHYSICAL THERAPY | Facility: HOSPITAL | Age: 68
Setting detail: THERAPIES SERIES
Discharge: HOME | End: 2023-07-20
Payer: MEDICARE

## 2023-07-20 PROCEDURE — 97110 THERAPEUTIC EXERCISES: CPT

## 2023-07-20 PROCEDURE — 97140 MANUAL THERAPY 1/> REGIONS: CPT

## 2023-07-20 NOTE — FLOWSHEET NOTE
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        Objective:  Observation:   Test measurements:    Palpation:    Exercises:  Exercise Resistance/Repetitions Date performed   Nustep L5 x 6' 20   Sitting lumbar ext against gravity 3x10 20   Seated mid rows GTB 3x10 20   Hip abd / add sitting BTB / ball 3x10 20   LAQ 2# 3x10 20   HS curls GTB 3x10 20   Standing hip flexor stretch 5x20\" 20                              Other Therapeutic Activities:      Manual Treatments:  B SAD, B HS / ITB stretch in supine, STM lumbar paraspinals in side lying position; 25' total    Modalities:  MH lower lumbar spine / gluteals / B HS x 15' in supine hooklying position  - pt declined today      Timed Code Treatment Minutes:  55      Total Treatment Minutes:  59    Treatment/Activity Tolerance:  [x] Patient tolerated treatment well [] Patient limited by fatigue  [] Patient limited by pain  [] Patient limited by other medical complications  [x]

## 2023-07-24 SDOH — HEALTH STABILITY: PHYSICAL HEALTH: ON AVERAGE, HOW MANY MINUTES DO YOU ENGAGE IN EXERCISE AT THIS LEVEL?: 30 MIN

## 2023-07-24 SDOH — HEALTH STABILITY: PHYSICAL HEALTH: ON AVERAGE, HOW MANY DAYS PER WEEK DO YOU ENGAGE IN MODERATE TO STRENUOUS EXERCISE (LIKE A BRISK WALK)?: 5 DAYS

## 2023-07-24 ASSESSMENT — LIFESTYLE VARIABLES
HOW OFTEN DO YOU HAVE SIX OR MORE DRINKS ON ONE OCCASION: 1
HOW OFTEN DO YOU HAVE A DRINK CONTAINING ALCOHOL: NEVER
HOW OFTEN DO YOU HAVE A DRINK CONTAINING ALCOHOL: 1
HOW MANY STANDARD DRINKS CONTAINING ALCOHOL DO YOU HAVE ON A TYPICAL DAY: PATIENT DOES NOT DRINK
HOW MANY STANDARD DRINKS CONTAINING ALCOHOL DO YOU HAVE ON A TYPICAL DAY: 0

## 2023-07-24 ASSESSMENT — PATIENT HEALTH QUESTIONNAIRE - PHQ9
SUM OF ALL RESPONSES TO PHQ QUESTIONS 1-9: 2
1. LITTLE INTEREST OR PLEASURE IN DOING THINGS: 2
2. FEELING DOWN, DEPRESSED OR HOPELESS: 0
SUM OF ALL RESPONSES TO PHQ9 QUESTIONS 1 & 2: 2
SUM OF ALL RESPONSES TO PHQ QUESTIONS 1-9: 2

## 2023-07-25 ENCOUNTER — OFFICE VISIT (OUTPATIENT)
Dept: FAMILY MEDICINE CLINIC | Age: 68
End: 2023-07-25
Payer: MEDICARE

## 2023-07-25 VITALS
RESPIRATION RATE: 18 BRPM | SYSTOLIC BLOOD PRESSURE: 132 MMHG | WEIGHT: 251 LBS | BODY MASS INDEX: 42.85 KG/M2 | TEMPERATURE: 98.2 F | HEIGHT: 64 IN | HEART RATE: 111 BPM | OXYGEN SATURATION: 97 % | DIASTOLIC BLOOD PRESSURE: 60 MMHG

## 2023-07-25 DIAGNOSIS — Z00.00 MEDICARE ANNUAL WELLNESS VISIT, SUBSEQUENT: Primary | ICD-10-CM

## 2023-07-25 PROCEDURE — G0439 PPPS, SUBSEQ VISIT: HCPCS | Performed by: FAMILY MEDICINE

## 2023-07-25 PROCEDURE — 1123F ACP DISCUSS/DSCN MKR DOCD: CPT | Performed by: FAMILY MEDICINE

## 2023-07-25 RX ORDER — PREGABALIN 100 MG/1
100 CAPSULE ORAL 3 TIMES DAILY
COMMUNITY

## 2023-07-25 RX ORDER — ST. JOHN'S WORT 300 MG
CAPSULE ORAL 3 TIMES DAILY
COMMUNITY

## 2023-07-25 RX ORDER — CHLORAL HYDRATE 500 MG
CAPSULE ORAL DAILY
COMMUNITY

## 2023-07-25 RX ORDER — DIPHENHYDRAMINE HYDROCHLORIDE 25 MG/1
5 TABLET ORAL DAILY
COMMUNITY

## 2023-07-25 RX ORDER — DOXYCYCLINE HYCLATE 20 MG
20 TABLET ORAL 2 TIMES DAILY
COMMUNITY

## 2023-07-25 NOTE — PROGRESS NOTES
Chief Complaint   Patient presents with    Medicare AWV       Have you seen any other physician or provider since your last visit yes - eye doctor, ortho, sofy, retina specialist     Have you had any other diagnostic tests since your last visit?  yes - labs    Have you changed or stopped any medications since your last visit? no

## 2023-07-25 NOTE — PATIENT INSTRUCTIONS
[employer], [facility] disclaims any warranty or liability for your use of this information. Advance Directives: Care Instructions  Overview  An advance directive is a legal way to state your wishes at the end of your life. It tells your family and your doctor what to do if you can't say what you want. There are two main types of advance directives. You can change them any time your wishes change. Living will. This form tells your family and your doctor your wishes about life support and other treatment. The form is also called a declaration. Medical power of . This form lets you name a person to make treatment decisions for you when you can't speak for yourself. This person is called a health care agent (health care proxy, health care surrogate). The form is also called a durable power of  for health care. If you do not have an advance directive, decisions about your medical care may be made by a family member, or by a doctor or a  who doesn't know you. It may help to think of an advance directive as a gift to the people who care for you. If you have one, they won't have to make tough decisions by themselves. For more information, including forms for your state, see the [address] [place] [address] website (www.caringinfo.org/planning/advance-directives/). Follow-up care is a key part of your treatment and safety. Be sure to make and go to all appointments, and call your doctor if you are having problems. It's also a good idea to know your test results and keep a list of the medicines you take. What should you include in an advance directive? Many states have a unique advance directive form. (It may ask you to address specific issues.) Or you might use a universal form that's approved by many states. If your form doesn't tell you what to address, it may be hard to know what to include in your advance directive. Use the questions below to help you get started.   Who do you want to make

## 2023-07-27 ENCOUNTER — HOSPITAL ENCOUNTER (OUTPATIENT)
Dept: PHYSICAL THERAPY | Facility: HOSPITAL | Age: 68
Setting detail: THERAPIES SERIES
Discharge: HOME | End: 2023-07-27
Payer: MEDICARE

## 2023-07-27 ENCOUNTER — PATIENT MESSAGE (OUTPATIENT)
Dept: FAMILY MEDICINE CLINIC | Age: 68
End: 2023-07-27

## 2023-07-27 PROCEDURE — 97110 THERAPEUTIC EXERCISES: CPT

## 2023-07-27 PROCEDURE — 97140 MANUAL THERAPY 1/> REGIONS: CPT

## 2023-07-27 NOTE — FLOWSHEET NOTE
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 Pt states she was walking downhill and had to slow herself and felt her leg and back pain increase.         Objective:  Observation:   Test measurements:    Palpation:    Exercises:  Exercise Resistance/Repetitions Date performed   Nustep L5 x 6' 27   Sitting lumbar ext against gravity 3x10 20   Seated mid rows GTB 3x10 20   Hip abd / add sitting BTB / ball 3x10 27   LAQ 2# 3x10 27   HS curls GTB 3x10 27   Standing hip flexor stretch 5x20\" 20                              Other Therapeutic Activities:      Manual Treatments:  B SAD, STM lumbar paraspinals in side lying position; 25' total    Modalities:  MH lower lumbar spine / gluteals / B HS x 15' in supine hooklying position        Timed Code Treatment Minutes:  43      Total Treatment Minutes:  59    Treatment/Activity Tolerance:  [x] Patient tolerated treatment well [] Patient limited by

## 2023-07-28 NOTE — TELEPHONE ENCOUNTER
From: Sona Howard  To: Dr. Corina Galarza: 7/27/2023 12:57 PM EDT  Subject: dry eye specialist    would you please call him and see if you can get me an appointment earlier than November 7?    Dr Lidia Cordoba  [phone]    thank you

## 2023-07-30 ENCOUNTER — HOSPITAL ENCOUNTER (OUTPATIENT)
Facility: HOSPITAL | Age: 68
Discharge: HOME | End: 2023-07-30
Payer: MEDICARE

## 2023-07-30 LAB
ALBUMIN SERPL-MCNC: 4.3 G/DL (ref 3.4–4.8)
ALBUMIN/GLOB SERPL: 1.7 {RATIO} (ref 0.8–2)
ALP SERPL-CCNC: 79 U/L (ref 25–100)
ALT SERPL-CCNC: 30 U/L (ref 4–36)
ANION GAP SERPL CALCULATED.3IONS-SCNC: 13 MMOL/L (ref 3–16)
AST SERPL-CCNC: 29 U/L (ref 8–33)
BASOPHILS # BLD: 0 K/UL (ref 0–0.1)
BASOPHILS NFR BLD: 0.6 %
BILIRUB SERPL-MCNC: 0.3 MG/DL (ref 0.3–1.2)
BUN SERPL-MCNC: 29 MG/DL (ref 6–20)
CALCIUM SERPL-MCNC: 9.2 MG/DL (ref 8.5–10.5)
CHLORIDE SERPL-SCNC: 98 MMOL/L (ref 98–107)
CO2 SERPL-SCNC: 26 MMOL/L (ref 20–30)
CREAT SERPL-MCNC: 0.8 MG/DL (ref 0.4–1.2)
EOSINOPHIL # BLD: 0.1 K/UL (ref 0–0.4)
EOSINOPHIL NFR BLD: 1.8 %
ERYTHROCYTE [DISTWIDTH] IN BLOOD BY AUTOMATED COUNT: 13.9 % (ref 11–16)
GFR SERPLBLD CREATININE-BSD FMLA CKD-EPI: >60 ML/MIN/{1.73_M2}
GLOBULIN SER CALC-MCNC: 2.5 G/DL
GLUCOSE SERPL-MCNC: 103 MG/DL (ref 74–106)
HCT VFR BLD AUTO: 40.8 % (ref 37–47)
HGB BLD-MCNC: 13.6 G/DL (ref 11.5–16.5)
IMM GRANULOCYTES # BLD: 0 K/UL
IMM GRANULOCYTES NFR BLD: 0.3 % (ref 0–5)
LYMPHOCYTES # BLD: 2 K/UL (ref 1.5–4)
LYMPHOCYTES NFR BLD: 32.9 %
MCH RBC QN AUTO: 28.6 PG (ref 27–32)
MCHC RBC AUTO-ENTMCNC: 33.3 G/DL (ref 31–35)
MCV RBC AUTO: 85.7 FL (ref 80–100)
MONOCYTES # BLD: 0.8 K/UL (ref 0.2–0.8)
MONOCYTES NFR BLD: 12.4 %
NEUTROPHILS # BLD: 3.2 K/UL (ref 2–7.5)
NEUTS SEG NFR BLD: 52 %
PLATELET # BLD AUTO: 207 K/UL (ref 150–400)
PMV BLD AUTO: 10.3 FL (ref 6–10)
POTASSIUM SERPL-SCNC: 4 MMOL/L (ref 3.4–5.1)
PROT SERPL-MCNC: 6.8 G/DL (ref 6.4–8.3)
RBC # BLD AUTO: 4.76 M/UL (ref 3.8–5.8)
SODIUM SERPL-SCNC: 137 MMOL/L (ref 136–145)
WBC # BLD AUTO: 6.2 K/UL (ref 4–11)

## 2023-07-30 PROCEDURE — 36415 COLL VENOUS BLD VENIPUNCTURE: CPT

## 2023-07-30 PROCEDURE — 80053 COMPREHEN METABOLIC PANEL: CPT

## 2023-07-30 PROCEDURE — 85025 COMPLETE CBC W/AUTO DIFF WBC: CPT

## 2023-08-02 ENCOUNTER — OFFICE VISIT (OUTPATIENT)
Dept: ENDOCRINOLOGY | Facility: CLINIC | Age: 68
End: 2023-08-02
Payer: MEDICARE

## 2023-08-02 VITALS
BODY MASS INDEX: 42.68 KG/M2 | HEIGHT: 64 IN | OXYGEN SATURATION: 97 % | DIASTOLIC BLOOD PRESSURE: 78 MMHG | HEART RATE: 107 BPM | SYSTOLIC BLOOD PRESSURE: 120 MMHG | WEIGHT: 250 LBS

## 2023-08-02 DIAGNOSIS — E03.9 ACQUIRED HYPOTHYROIDISM: Primary | ICD-10-CM

## 2023-08-02 PROCEDURE — 99213 OFFICE O/P EST LOW 20 MIN: CPT | Performed by: INTERNAL MEDICINE

## 2023-08-02 PROCEDURE — 1159F MED LIST DOCD IN RCRD: CPT | Performed by: INTERNAL MEDICINE

## 2023-08-02 PROCEDURE — 84443 ASSAY THYROID STIM HORMONE: CPT | Performed by: INTERNAL MEDICINE

## 2023-08-02 PROCEDURE — 86376 MICROSOMAL ANTIBODY EACH: CPT | Performed by: INTERNAL MEDICINE

## 2023-08-02 PROCEDURE — 1160F RVW MEDS BY RX/DR IN RCRD: CPT | Performed by: INTERNAL MEDICINE

## 2023-08-03 ENCOUNTER — HOSPITAL ENCOUNTER (OUTPATIENT)
Dept: PHYSICAL THERAPY | Facility: HOSPITAL | Age: 68
Setting detail: THERAPIES SERIES
Discharge: HOME | End: 2023-08-03

## 2023-08-03 DIAGNOSIS — E03.9 ACQUIRED HYPOTHYROIDISM: ICD-10-CM

## 2023-08-03 LAB — TSH SERPL DL<=0.05 MIU/L-ACNC: 1.32 UIU/ML (ref 0.27–4.2)

## 2023-08-03 RX ORDER — LEVOTHYROXINE SODIUM 0.05 MG/1
50 TABLET ORAL DAILY
Qty: 90 TABLET | Refills: 3 | Status: SHIPPED | OUTPATIENT
Start: 2023-08-03

## 2023-08-04 LAB — THYROPEROXIDASE AB SERPL-ACNC: <9 IU/ML (ref 0–34)

## 2023-08-08 ENCOUNTER — HOSPITAL ENCOUNTER (OUTPATIENT)
Dept: PHYSICAL THERAPY | Facility: HOSPITAL | Age: 68
Setting detail: THERAPIES SERIES
Discharge: HOME | End: 2023-08-08
Payer: MEDICARE

## 2023-08-08 PROCEDURE — 97110 THERAPEUTIC EXERCISES: CPT

## 2023-08-08 PROCEDURE — 97140 MANUAL THERAPY 1/> REGIONS: CPT

## 2023-08-08 NOTE — FLOWSHEET NOTE
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 She reports that she is more concerned with her balance recently. She notes having a couple of recent falls and feels her gait plays a role in her unsteadiness. She feels her back pain has improved since starting PT overall. Objective:  Observation: gait deviations: increased trunk rotation, steppage gait, increased R hip ER / circumduction during swing phase   Test measurements:  LEFS: 35/80.   Johnson Balance score: 49/56  LLE MMT: hip ER: R:4+/5, L: 4+/5, hip IR: R: 4/5, L: 4+/5  Palpation:    Exercises:  Exercise Resistance/Repetitions Date performed   Nustep L5 x 6' 8   Sitting lumbar ext against gravity 3x10 8   Seated mid rows GTB 3x10 8   Hip abd / add sitting BTB / ball 3x10 8   LAQ 2# 3x10 8   HS curls GTB 3x10 8   Standing hip flexor stretch 5x20\" 8                              Other Therapeutic Activities:  Re-Assessment, balance testing

## 2023-08-14 DIAGNOSIS — G47.00 INSOMNIA, UNSPECIFIED TYPE: ICD-10-CM

## 2023-08-15 NOTE — TELEPHONE ENCOUNTER
Refill request received from pharmacy      Next Office Visit Date:  Future Appointments   Date Time Provider [address]   8/22/2023 56:36 AM Vlad Silverio, PT AN[facility]   10/5/2023 10:00 AM Shakir Moore MD [facility]   7/30/2024  2:30 PM Shakir Moore MD [facility] - Please review via [address] to JO-ANN Fuentes for approval.  Oral Payment currently out of office

## 2023-08-16 ENCOUNTER — APPOINTMENT (OUTPATIENT)
Dept: PHYSICAL THERAPY | Facility: HOSPITAL | Age: 68
End: 2023-08-16
Payer: MEDICARE

## 2023-08-16 RX ORDER — ZOLPIDEM TARTRATE 10 MG/1
TABLET ORAL
Qty: 30 TABLET | Refills: 2 | Status: SHIPPED | OUTPATIENT
Start: 2023-08-16 | End: 2023-11-13

## 2023-08-22 ENCOUNTER — HOSPITAL ENCOUNTER (OUTPATIENT)
Dept: PHYSICAL THERAPY | Facility: HOSPITAL | Age: 68
Setting detail: THERAPIES SERIES
Discharge: HOME | End: 2023-08-22
Payer: MEDICARE

## 2023-08-22 PROCEDURE — 97140 MANUAL THERAPY 1/> REGIONS: CPT

## 2023-08-22 PROCEDURE — 97110 THERAPEUTIC EXERCISES: CPT

## 2023-08-22 NOTE — FLOWSHEET NOTE
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 She notes that she did have a fall recently walking on her driveway up toward her house. Objective:  Observation: gait deviations: increased trunk rotation, steppage gait, increased R hip ER / circumduction during swing phase   Test measurements:  LEFS: 35/80.   Johnson Balance score: 49/56  LLE MMT: hip ER: R:4+/5, L: 4+/5, hip IR: R: 4/5, L: 4+/5  Palpation:    Exercises:  Exercise Resistance/Repetitions Date performed   Nustep L5 x 6' 22   Sitting lumbar ext against gravity 3x10 22   Seated mid rows GTB 3x10 22   Hip abd / add sitting BTB / ball 3x10 22   LAQ 2# 3x10 22   HS curls GTB 3x10 22   Standing hip flexor stretch 5x20\" 22   Airex narrow stance: EO / EC 5x20\" each Next visit   Tandem stance  5x20\" each Next visit                     Other Therapeutic Activities:      Manual Treatments:  B SAD, STM lumbar paraspinals in side lying

## 2023-09-27 ENCOUNTER — OFFICE VISIT (OUTPATIENT)
Dept: FAMILY MEDICINE CLINIC | Age: 68
End: 2023-09-27
Payer: MEDICARE

## 2023-09-27 VITALS
WEIGHT: 254.4 LBS | HEIGHT: 64 IN | DIASTOLIC BLOOD PRESSURE: 82 MMHG | BODY MASS INDEX: 43.43 KG/M2 | TEMPERATURE: 97.4 F | RESPIRATION RATE: 16 BRPM | OXYGEN SATURATION: 98 % | HEART RATE: 111 BPM | SYSTOLIC BLOOD PRESSURE: 126 MMHG

## 2023-09-27 DIAGNOSIS — G47.00 INSOMNIA, UNSPECIFIED TYPE: ICD-10-CM

## 2023-09-27 DIAGNOSIS — K59.00 CONSTIPATION, UNSPECIFIED CONSTIPATION TYPE: ICD-10-CM

## 2023-09-27 DIAGNOSIS — I10 ESSENTIAL HYPERTENSION: Primary | ICD-10-CM

## 2023-09-27 PROCEDURE — 99214 OFFICE O/P EST MOD 30 MIN: CPT | Performed by: FAMILY MEDICINE

## 2023-09-27 PROCEDURE — 1123F ACP DISCUSS/DSCN MKR DOCD: CPT | Performed by: FAMILY MEDICINE

## 2023-09-27 PROCEDURE — 3074F SYST BP LT 130 MM HG: CPT | Performed by: FAMILY MEDICINE

## 2023-09-27 PROCEDURE — 3078F DIAST BP <80 MM HG: CPT | Performed by: FAMILY MEDICINE

## 2023-09-27 RX ORDER — ZOLPIDEM TARTRATE 10 MG/1
10 TABLET ORAL NIGHTLY PRN
Qty: 30 TABLET | Refills: 5 | Status: SHIPPED | OUTPATIENT
Start: 2023-09-27 | End: 2024-03-25

## 2023-09-27 RX ORDER — DOCUSATE SODIUM 100 MG/1
100 CAPSULE, LIQUID FILLED ORAL 2 TIMES DAILY
Qty: 60 CAPSULE | Refills: 5 | Status: SHIPPED | OUTPATIENT
Start: 2023-09-27 | End: 2024-03-25

## 2023-09-27 ASSESSMENT — ENCOUNTER SYMPTOMS
BACK PAIN: 1
RESPIRATORY NEGATIVE: 1
EYE REDNESS: 1
GASTROINTESTINAL NEGATIVE: 1
EYE ITCHING: 1

## 2023-09-27 NOTE — PROGRESS NOTES
Chief Complaint   Patient presents with    Back Pain    Chronic Pain       Have you seen any other physician or provider since your last visit yes - Eye Specialist    Have you had any other diagnostic tests since your last visit?  yes     Have you changed or stopped any medications since your last visit? no
cooperative. Thought Content: Thought content normal.         Cognition and Memory: Cognition and memory normal.         Judgment: Judgment normal.          No results found for requested labs within last 30 days. Hemoglobin A1C (%)   Date Value   07/16/2020 5.3     LDL Calculated (mg/dL)   Date Value   01/05/2023 117       Lab Results   Component Value Date/Time    WBC 6.2 07/30/2023 09:18 AM    NEUTROABS 3.2 07/30/2023 09:18 AM    HGB 13.6 07/30/2023 09:18 AM    HCT 40.8 07/30/2023 09:18 AM    MCV 85.7 07/30/2023 09:18 AM     07/30/2023 09:18 AM     Lab Results   Component Value Date    TSH 2.00 01/05/2023       ASSESSMENT/PLAN    Diagnosis Orders   1. Essential hypertension        2. Insomnia, unspecified type  zolpidem (AMBIEN) 10 MG tablet      3. Constipation, unspecified constipation type  docusate sodium (COLACE) 100 MG capsule          Orders Placed This Encounter   Medications    zolpidem (AMBIEN) 10 MG tablet     Sig: Take 1 tablet by mouth nightly as needed for Sleep for up to 180 days. Dispense:  30 tablet     Refill:  5    docusate sodium (COLACE) 100 MG capsule     Sig: Take 1 capsule by mouth 2 times daily     Dispense:  60 capsule     Refill:  5        Medications Discontinued During This Encounter   Medication Reason    zolpidem (AMBIEN) 10 MG tablet REORDER       Controlled Substances Monitoring:      Please note: This chart was generated using Dragon dictation software. Although every effort was made to ensure the accuracy of this automated transcription, some errors in transcription may have occurred.

## 2023-12-01 ENCOUNTER — HOSPITAL ENCOUNTER (OUTPATIENT)
Facility: HOSPITAL | Age: 68
Discharge: HOME | End: 2023-12-01
Payer: MEDICARE

## 2023-12-01 ENCOUNTER — OFFICE VISIT (OUTPATIENT)
Dept: FAMILY MEDICINE CLINIC | Age: 68
End: 2023-12-01
Payer: MEDICARE

## 2023-12-01 VITALS
RESPIRATION RATE: 16 BRPM | TEMPERATURE: 97.9 F | DIASTOLIC BLOOD PRESSURE: 84 MMHG | SYSTOLIC BLOOD PRESSURE: 136 MMHG | WEIGHT: 251.4 LBS | OXYGEN SATURATION: 98 % | HEART RATE: 99 BPM | HEIGHT: 64 IN | BODY MASS INDEX: 42.92 KG/M2

## 2023-12-01 DIAGNOSIS — R53.83 FATIGUE, UNSPECIFIED TYPE: ICD-10-CM

## 2023-12-01 DIAGNOSIS — H02.403 PTOSIS OF BOTH EYELIDS: ICD-10-CM

## 2023-12-01 DIAGNOSIS — R23.2 HOT FLASHES: ICD-10-CM

## 2023-12-01 DIAGNOSIS — H04.129 DRY EYE: ICD-10-CM

## 2023-12-01 DIAGNOSIS — R74.8 ELEVATED LIVER ENZYMES: ICD-10-CM

## 2023-12-01 DIAGNOSIS — I10 ESSENTIAL HYPERTENSION: Primary | ICD-10-CM

## 2023-12-01 DIAGNOSIS — Z13.220 SCREENING, LIPID: ICD-10-CM

## 2023-12-01 DIAGNOSIS — E55.9 VITAMIN D DEFICIENCY: ICD-10-CM

## 2023-12-01 LAB
25(OH)D3 SERPL-MCNC: 66.4 NG/ML (ref 32–100)
ALBUMIN SERPL-MCNC: 4.6 G/DL (ref 3.4–4.8)
ALBUMIN/GLOB SERPL: 2.3 {RATIO} (ref 0.8–2)
ALP SERPL-CCNC: 81 U/L (ref 25–100)
ALT SERPL-CCNC: 29 U/L (ref 4–36)
ANION GAP SERPL CALCULATED.3IONS-SCNC: 12 MMOL/L (ref 3–16)
AST SERPL-CCNC: 23 U/L (ref 8–33)
BILIRUB SERPL-MCNC: 0.4 MG/DL (ref 0.3–1.2)
BUN SERPL-MCNC: 21 MG/DL (ref 6–20)
CALCIUM SERPL-MCNC: 10 MG/DL (ref 8.5–10.5)
CHLORIDE SERPL-SCNC: 98 MMOL/L (ref 98–107)
CHOLEST SERPL-MCNC: 223 MG/DL (ref 0–200)
CO2 SERPL-SCNC: 28 MMOL/L (ref 20–30)
CREAT SERPL-MCNC: 0.6 MG/DL (ref 0.4–1.2)
FSH SERPL-ACNC: 17.7 MIU/ML
GFR SERPLBLD CREATININE-BSD FMLA CKD-EPI: >60 ML/MIN/{1.73_M2}
GLOBULIN SER CALC-MCNC: 2 G/DL
GLUCOSE SERPL-MCNC: 84 MG/DL (ref 74–106)
HDLC SERPL-MCNC: 54 MG/DL (ref 40–60)
LDLC SERPL CALC-MCNC: 110 MG/DL
LH SERPL-ACNC: 11.3 MIU/ML
POTASSIUM SERPL-SCNC: 4.2 MMOL/L (ref 3.4–5.1)
PROT SERPL-MCNC: 6.6 G/DL (ref 6.4–8.3)
SODIUM SERPL-SCNC: 138 MMOL/L (ref 136–145)
T4 FREE SERPL-MCNC: 1.48 NG/DL (ref 0.89–1.76)
TRIGL SERPL-MCNC: 297 MG/DL (ref 0–249)
TSH SERPL DL<=0.005 MIU/L-ACNC: 1.16 UIU/ML (ref 0.27–4.2)
VLDLC SERPL CALC-MCNC: 59 MG/DL

## 2023-12-01 PROCEDURE — 84443 ASSAY THYROID STIM HORMONE: CPT

## 2023-12-01 PROCEDURE — 80053 COMPREHEN METABOLIC PANEL: CPT

## 2023-12-01 PROCEDURE — 83001 ASSAY OF GONADOTROPIN (FSH): CPT

## 2023-12-01 PROCEDURE — 83002 ASSAY OF GONADOTROPIN (LH): CPT

## 2023-12-01 PROCEDURE — 84439 ASSAY OF FREE THYROXINE: CPT

## 2023-12-01 PROCEDURE — 3079F DIAST BP 80-89 MM HG: CPT | Performed by: FAMILY MEDICINE

## 2023-12-01 PROCEDURE — 1123F ACP DISCUSS/DSCN MKR DOCD: CPT | Performed by: FAMILY MEDICINE

## 2023-12-01 PROCEDURE — 80061 LIPID PANEL: CPT

## 2023-12-01 PROCEDURE — 82306 VITAMIN D 25 HYDROXY: CPT

## 2023-12-01 PROCEDURE — 3075F SYST BP GE 130 - 139MM HG: CPT | Performed by: FAMILY MEDICINE

## 2023-12-01 PROCEDURE — 99214 OFFICE O/P EST MOD 30 MIN: CPT | Performed by: FAMILY MEDICINE

## 2023-12-01 PROCEDURE — 36415 COLL VENOUS BLD VENIPUNCTURE: CPT

## 2023-12-01 ASSESSMENT — ENCOUNTER SYMPTOMS
RESPIRATORY NEGATIVE: 1
EYE ITCHING: 1
GASTROINTESTINAL NEGATIVE: 1
BACK PAIN: 1
EYE REDNESS: 1

## 2023-12-01 NOTE — PROGRESS NOTES
Chief Complaint   Patient presents with    Eye Problem     Severe dry eyes       Have you seen any other physician or provider since your last visit yes - [facility]     Have you had any other diagnostic tests since your last visit?  yes - eye test    Have you changed or stopped any medications since your last visit? no

## 2023-12-14 ENCOUNTER — TRANSCRIBE ORDERS (OUTPATIENT)
Dept: ADMINISTRATIVE | Facility: HOSPITAL | Age: 68
End: 2023-12-14
Payer: MEDICARE

## 2023-12-14 DIAGNOSIS — Z12.31 VISIT FOR SCREENING MAMMOGRAM: Primary | ICD-10-CM

## 2023-12-15 ENCOUNTER — OFFICE VISIT (OUTPATIENT)
Dept: NEUROSURGERY | Facility: CLINIC | Age: 68
End: 2023-12-15
Payer: MEDICARE

## 2023-12-15 VITALS
DIASTOLIC BLOOD PRESSURE: 80 MMHG | WEIGHT: 247.6 LBS | SYSTOLIC BLOOD PRESSURE: 136 MMHG | HEIGHT: 64 IN | TEMPERATURE: 97.5 F | BODY MASS INDEX: 42.27 KG/M2

## 2023-12-15 DIAGNOSIS — M43.16 SPONDYLOLISTHESIS OF LUMBAR REGION: ICD-10-CM

## 2023-12-15 DIAGNOSIS — M48.062 SPINAL STENOSIS OF LUMBAR REGION WITH NEUROGENIC CLAUDICATION: Primary | ICD-10-CM

## 2023-12-15 DIAGNOSIS — M54.9 MECHANICAL BACK PAIN: ICD-10-CM

## 2023-12-15 DIAGNOSIS — M53.2X6 SPINAL INSTABILITY OF LUMBAR REGION: ICD-10-CM

## 2023-12-15 RX ORDER — FLUOCINONIDE TOPICAL SOLUTION USP, 0.05% 0.5 MG/ML
SOLUTION TOPICAL
COMMUNITY
Start: 2023-12-13

## 2023-12-15 RX ORDER — LOTILANER OPHTHALMIC SOLUTION 2.5 MG/ML
SOLUTION/ DROPS OPHTHALMIC
COMMUNITY
Start: 2023-10-17

## 2023-12-15 RX ORDER — PSEUDOEPHEDRINE HCL 30 MG
100 TABLET ORAL
COMMUNITY
Start: 2023-09-27 | End: 2024-03-25

## 2023-12-15 RX ORDER — LOSARTAN POTASSIUM 50 MG/1
TABLET ORAL
COMMUNITY
Start: 2023-08-25

## 2023-12-15 NOTE — PROGRESS NOTES
Patient: Alison Benitez  : 1955  Chart #: [id number]    Date of Service: 12/15/2023    CHIEF COMPLAINT: Low back and bilateral lower extremity pain    History of Present Illness Ms. Benitez is a 67-year-old woman who is known to Dr. Stanley service.  She has known lumbar stenosis and has been deemed a surgical candidate for decompression fusion and stabilization L3-5.  She complains of low back pain and pain that extends into her buttock.  Symptoms are pronounced with standing and walking.  Over the last year her activities have become very limited.  She had an epidural injection in May with 100% improvement in symptoms.  Pain has slowly come back.  She denies significant lower extremity complaints.  She has been treated with physical therapy lately she has just been very sedentary and is a little depressed.  Her daughter is here with her today.  Patient voices concerns about caring for herself after surgery.      Past Medical History:   Diagnosis Date    Arthritis     Bursitis     Claustrophobia     CTS (carpal tunnel syndrome)     bilateral     Deep vein thrombosis     Fibromyalgia     Hemorrhoids     Hypertension     Low back pain     Lumbosacral disc disease     Neuropathy     Osteoarthritis     SBE (subacute bacterial endocarditis)     Tendinitis of knee     Thoracic disc disorder     Thyroid disorder     Thyroid nodule      (vaginal birth after )          Current Outpatient Medications:     Alpha Lipoic Acid 200 MG capsule, Take 400 mg by mouth 3 (Three) Times a Day., Disp: 180 capsule, Rfl: 5    ascorbic acid (VITAMIN C) 1000 MG tablet, Take 1 tablet by mouth Daily., Disp: , Rfl:     Cholecalciferol (Vitamin D) 50 MCG (2000 UT) tablet, Take 1 tablet by mouth Daily., Disp: , Rfl:     cholecalciferol (VITAMIN D3) 25 MCG (1000 UT) tablet, Take 1 tablet by mouth Daily., Disp: , Rfl:     desloratadine (CLARINEX) 5 MG tablet, Take 1 tablet by mouth Daily., Disp: , Rfl:     Dietary Management  Product (Rheumate) capsule, Take one capsule by mouth once daily, Disp: 90 capsule, Rfl: 0    docusate sodium 100 MG capsule, Take 1 capsule by mouth., Disp: , Rfl:     doxycycline (PERIOSTAT) 20 MG tablet, , Disp: , Rfl:     DULoxetine (CYMBALTA) 60 MG capsule, Take 1 capsule by mouth Daily., Disp: 90 capsule, Rfl: 3    estradiol (ESTRACE) 1 MG tablet, Take 1 tablet by mouth Daily., Disp: , Rfl:     fluocinonide (LIDEX) 0.05 % external solution, , Disp: , Rfl:     hydrochlorothiazide (HYDRODIURIL) 25 MG tablet, , Disp: , Rfl:     levothyroxine (Synthroid) 50 MCG tablet, Take 1 tablet by mouth Daily., Disp: 90 tablet, Rfl: 3    losartan (COZAAR) 50 MG tablet, , Disp: , Rfl:     MULTIPLE VITAMINS-MINERALS ER PO, Take 1 tablet by mouth Daily., Disp: , Rfl:     nabumetone (RELAFEN) 750 MG tablet, Take 1 tablet by mouth 2 (Two) Times a Day., Disp: , Rfl:     Omega-3 Fatty Acids (FISH OIL) 1000 MG capsule capsule, Take 1,200 mg by mouth Daily With Breakfast., Disp: , Rfl:     pregabalin (LYRICA) 75 MG capsule, Take 1 capsule by mouth 3 (Three) Times a Day., Disp: 270 capsule, Rfl: 1    Xdemvy 0.25 % solution, , Disp: , Rfl:     zolpidem (AMBIEN) 10 MG tablet, Take 1 tablet by mouth., Disp: , Rfl:     Past Surgical History:   Procedure Laterality Date    ABDOMINAL HYSTERECTOMY       SECTION      COLONOSCOPY      CYST REMOVAL N/A 2022    DILATATION AND CURETTAGE      HYSTERECTOMY N/A         MANDIBLE FRACTURE SURGERY      OOPHORECTOMY Bilateral     TRIGGER POINT INJECTION      TUBAL ABDOMINAL LIGATION         Social History     Socioeconomic History    Marital status: Single   Tobacco Use    Smoking status: Never    Smokeless tobacco: Never   Vaping Use    Vaping Use: Never used   Substance and Sexual Activity    Alcohol use: Never    Drug use: Never    Sexual activity: Not Currently         Review of Systems   Constitutional:  Positive for activity change and fatigue. Negative for appetite change, chills,  diaphoresis, fever and unexpected weight change.   HENT:  Positive for ear discharge. Negative for congestion, dental problem, drooling, ear pain, facial swelling, hearing loss, mouth sores, nosebleeds, postnasal drip, rhinorrhea, sinus pressure, sinus pain, sneezing, sore throat, tinnitus, trouble swallowing and voice change.    Eyes:  Positive for redness. Negative for photophobia, pain, discharge, itching and visual disturbance.   Respiratory:  Negative for apnea, cough, choking, chest tightness, shortness of breath, wheezing and stridor.    Cardiovascular:  Negative for chest pain, palpitations and leg swelling.   Gastrointestinal:  Negative for abdominal distention, abdominal pain, anal bleeding, blood in stool, constipation, diarrhea, nausea, rectal pain and vomiting.   Endocrine: Negative for cold intolerance, heat intolerance, polydipsia, polyphagia and polyuria.   Genitourinary:  Negative for decreased urine volume, difficulty urinating, dyspareunia, dysuria, enuresis, flank pain, frequency, genital sores, hematuria, menstrual problem, pelvic pain, urgency, vaginal bleeding, vaginal discharge and vaginal pain.   Musculoskeletal:  Positive for back pain, gait problem and myalgias. Negative for arthralgias, joint swelling, neck pain and neck stiffness.   Skin:  Negative for color change, pallor, rash and wound.   Allergic/Immunologic: Negative for environmental allergies, food allergies and immunocompromised state.   Neurological:  Positive for dizziness and numbness. Negative for tremors, seizures, syncope, facial asymmetry, speech difficulty, weakness, light-headedness and headaches.   Hematological:  Negative for adenopathy. Does not bruise/bleed easily.   Psychiatric/Behavioral:  Negative for agitation, behavioral problems, confusion, decreased concentration, dysphoric mood, hallucinations, self-injury, sleep disturbance and suicidal ideas. The patient is not nervous/anxious and is not hyperactive.   "      Objective   Vital Signs: Blood pressure 136/80, temperature 97.5 °F (36.4 °C), temperature source Infrared, height 162.6 cm (64\"), weight 112 kg (247 lb 9.6 oz), not currently breastfeeding.  Physical Exam  Vitals and nursing note reviewed.   Constitutional:       General: She is not in acute distress.     Appearance: She is well-developed.   HENT:      Head: Normocephalic and atraumatic.   Psychiatric:         Behavior: Behavior normal.         Thought Content: Thought content normal.       Musculoskeletal:     Strength is intact in upper and lower extremities to direct testing.     Station and gait are normal.     Straight leg raising is negative.   Neurologic:     Muscle tone is normal throughout.     Coordination is intact.     Deep tendon reflexes: 2+ and symmetrical.     Sensation is intact to light touch throughout.     Patient is oriented to person, place, and time.         Independent review of radiographic imaging: MRI of the lumbar spine from March 2023 demonstrates generous stenosis at L3-4 with bilateral joint effusions.  There is also generous stenosis at L4-5.  Flexion-extension plain films demonstrates evidence of instability when compared with recumbent MRI.    Assessment & Plan   Diagnosis:  1.  Lumbar stenosis with neurogenic claudication  2.  Lumbar spondylolisthesis with instability  3.  Mechanical low back pain    Medical Decision Making: I do believe patient would benefit from decompression, fusion and stabilization L3-5.  She is still very leery of surgery and her aftercare.  I have discussed the general nature of the procedure as well as all the risks and limitations associated.  I have answered her questions in great detail and assured her. Her daughter was present for the conversation as well.  She would really like to try injections one more time which is reasonable.  If symptoms become intolerable after the injections and she wants to talk about surgery then we will order an MRI of " the lumbar spine without gadolinium and follow-up with me.        Diagnoses and all orders for this visit:    1. Spinal stenosis of lumbar region with neurogenic claudication (Primary)  -     Ambulatory Referral to Pain Management  -     Ambulatory Referral to Physical Therapy Evaluate and treat    2. Spondylolisthesis of lumbar region  -     Ambulatory Referral to Pain Management  -     Ambulatory Referral to Physical Therapy Evaluate and treat    3. Spinal instability of lumbar region  -     Ambulatory Referral to Pain Management  -     Ambulatory Referral to Physical Therapy Evaluate and treat    4. Mechanical back pain                    40 minutes was spent on face to face patient care explaining radiographic findings and interpretation, diagnoses, treatment options including surgery, and formulating a plan.  All questions were answered              Corinna Castellanos PA-C  Patient Care Team:  Kacy Huertas MD as PCP - General (Family Medicine)  Anatoliy Everett MD as Consulting Physician (Anesthesiology)  Abdifatah Jordan MD as Consulting Physician (Endocrinology)  Kimber Correa APRN as Nurse Practitioner (Nurse Practitioner)  Moris Ellis MD as Consulting Physician (Pain Medicine)  Sussy El APRN as Nurse Practitioner (Nurse Practitioner)

## 2024-01-03 DIAGNOSIS — M48.062 LUMBAR STENOSIS WITH NEUROGENIC CLAUDICATION: ICD-10-CM

## 2024-01-03 RX ORDER — ME-TETRAHYDROFOLATE/B12/HRB236 1-1-500 MG
CAPSULE ORAL
Qty: 90 CAPSULE | Refills: 1 | Status: SHIPPED | OUTPATIENT
Start: 2024-01-03

## 2024-01-05 ENCOUNTER — TELEMEDICINE (OUTPATIENT)
Dept: NEUROLOGY | Facility: CLINIC | Age: 69
End: 2024-01-05
Payer: MEDICARE

## 2024-01-05 DIAGNOSIS — M48.061 SPINAL STENOSIS OF LUMBAR REGION WITHOUT NEUROGENIC CLAUDICATION: ICD-10-CM

## 2024-01-05 DIAGNOSIS — H02.403 PTOSIS OF BOTH EYELIDS: Primary | ICD-10-CM

## 2024-01-05 DIAGNOSIS — M79.7 FIBROMYALGIA: ICD-10-CM

## 2024-01-05 DIAGNOSIS — G63 NEUROPATHY DUE TO INFECTION: ICD-10-CM

## 2024-01-05 DIAGNOSIS — B99.9 NEUROPATHY DUE TO INFECTION: ICD-10-CM

## 2024-01-05 PROCEDURE — 1160F RVW MEDS BY RX/DR IN RCRD: CPT | Performed by: NURSE PRACTITIONER

## 2024-01-05 PROCEDURE — 99214 OFFICE O/P EST MOD 30 MIN: CPT | Performed by: NURSE PRACTITIONER

## 2024-01-05 PROCEDURE — 1159F MED LIST DOCD IN RCRD: CPT | Performed by: NURSE PRACTITIONER

## 2024-01-05 RX ORDER — PREGABALIN 75 MG/1
75 CAPSULE ORAL 3 TIMES DAILY
Qty: 270 CAPSULE | Refills: 1 | Status: SHIPPED | OUTPATIENT
Start: 2024-01-05

## 2024-01-05 RX ORDER — DULOXETIN HYDROCHLORIDE 60 MG/1
60 CAPSULE, DELAYED RELEASE ORAL DAILY
Qty: 90 CAPSULE | Refills: 3 | Status: SHIPPED | OUTPATIENT
Start: 2024-01-05

## 2024-01-05 NOTE — LETTER
2024     Kacy Huertas MD  [address]  [facility] [address]    Patient: Alison Benitez   YOB: 1955   Date of Visit: 2024     Dear Kacy Huertas MD:       Thank you for referring Alison Benitez to me for evaluation. Below are the relevant portions of my assessment and plan of care.    If you have questions, please do not hesitate to call me. I look forward to following Alison along with you.         Sincerely,        ELDON Rosas        CC: OLLIE Chowdhury MD Amy L Jennings, APRN Paul M Karpecki, OD Steven P Kiefer, MD Lyle Christopher Myers, MD Kelly K. Cole, MD Mercer, Ashley Moore, APRN  [phone]  Sign when Signing Visit  Subjective:     Patient ID: Alison Benitez is a 68 y.o. female.    CC:   Chief Complaint   Patient presents with   • Peripheral Neuropathy   • Eye Problem       HPI:   History of Present Illness  This visit was completed face to face via VIDEO by Epic/TWILIO with verbal consent to treat obtained from patient and/or family.  Provider confirmed the patient's name and  at the start of visit. Patient and/or family confirms that they are located in [place] during visit and Provider is located in [facility]rology Clinic in [place], [place] during visit.    Today 2024-  This is a very pleasant 68-year-old female completing a video visit follow-up today as she has recently been exposed to COVID-19 infection and was showing some signs and symptoms of this. She was last seen in clinic on 2023.     She does follow with [facility] [place], operated by [facility].     She has been taking pregabalin 75 mg 3 times per day for spinal stenosis, fibromyalgia, and neuropathy, duloxetine 60 mg daily.     She was noted to have right eyelid ptosis and visual changes at her last visit in clinic. I did order labs at that time including Musk antibody, which was negative. Acetylcholine receptor antibody panel was negative.  "Neuromyelitis optica auto antibody IgG was negative and anti-myelin oligodendrocyte glycoprotein levels were also negative.     She is completing follow-up and needing refills on her medications today.    Today, she reports she is doing well. She confirms she has been seen by an eye specialist, Dr. Dowell. She also saw another provider at the retina center who advised her to see Dr. Dowell. She was using 2 to 4 eye drops before having placenta placed on her right eye. She went back a few days later and had this removed. She reports her dry eyes are under control. She states she is putting a \"tube\" of ointment in every eye morning, but it makes her eyes \"glassy looking,\" and she cannot see very well until it sinks in. She has a follow-up appointment in a couple of weeks and will meet another doctor who is going to perform a blepharoplasty. She was told to alternate putting tape on each eye, but she could not do this, so she will discuss this at her follow-up. She states she has ptosis of the left eye, but it is not as severe as the right.     In regard to her neuropathy, she states she is doing \"okay,\" and can tell a difference in the tips of her fingers.    She reports she has been having trouble with her buttocks and down her legs. She confirms she saw neurosurgery, Corinna Castellanos PA-C, on 12/15/2023. She is going to get 1 more injection with Dr. Ellis and then discuss surgery. She wanted to try injections and therapy before surgery, but as these are not working as well, she believes she is ready to pursue surgery. She states she is going to receive 1 more injection because she does not feel that she can get herself to therapy in the winter. She is planning to undergo surgery in spring of 2024.    She reports she has fallen several times. She notes her legs are strong, but when she starts to fall, her back is not able to keep her from falling.    She confirms the duloxetine is still working well. She would " like to readjust some of her medicine after her surgery. She states she talked to her primary care physician, and she was told to wait until surgery.    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 She has had neuropathy post-infection of bilateral lower extremities since 01/2019 after being diagnosed with mononucleosis and a positive ASO titer.      She has been followed by [facility] as well as pain management for her low back symptoms.     She underwent an updated EMG and NCVs of her lower extremities by Dr. Eddie Brandon, neurology in [place], [place] on 04/24/2023. This was ordered by neurosurgery that showed mild to moderate chronic bilateral L5-S1 radiculopathy and mild underlying peripheral neuropathy. This is per record review.    Prior labs 2019-CK level of 174.  Immunofixation and protein electrophoresis within normal limits.  Anti-hue and anti-Otilia antibodies negative.  Methylmalonic acid normal at 155.  Vitamin B12 951 and folate greater than 20.  Lyme antibodies negative.  Mono test negative.  ASO titer was elevated at 200. Hemoglobin A1c was 4.9%.  She was treated with high dose steroids and physical therapy. 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 repeat MRI of the lumbar spine on 6/7/2021 and this did show some high-grade and moderate stenosis from L3-L4 and L4-L5.      The patient was diagnosed with fibromyalgia in 11/2021. She is still seeing Dr. Bibiana Khan-she also treats her for multi-site Osteoporosis.Dr. Bibiana Khan prescribes nabumetone 750 mg twice daily.      Previously took gabapentin which was not helpful. Tried 3 muscle relaxer's in the past which she stopped taking due to not liking the side effects but cannot recall the names. This was with the arthritis specialist.    "  MRI of the lumbar spine without contrast on 8/19/2022 showing multilevel degenerative disc changes with L3-L5 moderate narrowing.  This has been reviewed by neurosurgery.  MRI of the thoracic spine without contrast on 10/25/2022 shows multilevel degenerative disc changes with central spinal canal stenosis most severe at T11-T12.  Imaging has been reviewed by pain management as well as neurosurgery with [facility].     Followed long term in our clinic for neuropathy post infection of bilateral lower extremities since 01/2019 after being diagnosed with mononucleosis and positive ASO titer versus idiopathic, otherwise, unremarkable workup. She also has confirmed fibromyalgia. Chronic low back pain with lumbar spinal stenosis.     She was seen by Dr. Iván Stanley, neurosurgery, on 10/28/2022. She states Dr. Stanley recommended she have surgery, noting \"L3 to L5 would be a decompression fusion and stabilization. It would eradicate some symptoms, but not all.\" She has had lumbar and thoracic spine imaging completed by neurosurgery and Dr. Ellis. She denies experiencing weakness. She confirms the sensation she experiences is a spasming or tightness of the muscles. She confirms she experiences numbness, tingling, and burning in both legs. She reports the pain comes from her bilateral feet and moves upward. She previously had a nerve and muscle study completed in 2019 which showed her neuropathy was mild. Pain is mild to moderate most recently with any movement, standing or walking and only relieved by stillness and rest. Denies urinary or bowel incontinence, hesitancy or perineal numbness and denies weakness. Pain is mild to moderate in low back and both legs, constant, deep, aching, but previously severe and worsening.She has completed physical therapy extensively. She has been more sedentary due to impaired mobility from her severe back pain and has gained weight.     On duloxetine and pregabalin.  Gabapentin was " not helpful even in high doses.    The following portions of the patient's history were reviewed and updated as appropriate: allergies, current medications, past family history, past medical history, past social history, past surgical history, and problem list.    Past Medical History:   Diagnosis Date   • Arthritis    • Bursitis    • Claustrophobia    • CTS (carpal tunnel syndrome)     bilateral    • Deep vein thrombosis    • Fibromyalgia    • Hemorrhoids    • Hypertension    • Low back pain    • Lumbosacral disc disease    • Neuropathy    • Osteoarthritis    • SBE (subacute bacterial endocarditis)    • Tendinitis of knee    • Thoracic disc disorder    • Thyroid disorder    • Thyroid nodule    •  (vaginal birth after )        Past Surgical History:   Procedure Laterality Date   • ABDOMINAL HYSTERECTOMY     •  SECTION     • COLONOSCOPY     • CYST REMOVAL N/A 2022   • DILATATION AND CURETTAGE     • HYSTERECTOMY N/A        • MANDIBLE FRACTURE SURGERY     • OOPHORECTOMY Bilateral    • TRIGGER POINT INJECTION     • TUBAL ABDOMINAL LIGATION         Social History     Socioeconomic History   • Marital status: Single   Tobacco Use   • Smoking status: Never   • Smokeless tobacco: Never   Vaping Use   • Vaping Use: Never used   Substance and Sexual Activity   • Alcohol use: Never   • Drug use: Never   • Sexual activity: Not Currently       Family History   Problem Relation Age of Onset   • Arthritis Mother    • Stroke Mother    • Glaucoma Mother    • Hypertension Mother    • Heart disease Mother    • Heart failure Mother    • COPD Mother    • Vision loss Mother    • Heart disease Father    • Lung disease Father    • Hearing loss Father    • Hypertension Brother    • Hearing loss Brother    • Thyroid disease Brother    • Hearing loss Sister    • Thyroid disease Sister    • Thyroid disease Sister    • Breast cancer Neg Hx    • Ovarian cancer Neg Hx           Current Outpatient Medications:   •   DULoxetine (CYMBALTA) 60 MG capsule, Take 1 capsule by mouth Daily., Disp: 90 capsule, Rfl: 3  •  pregabalin (LYRICA) 75 MG capsule, Take 1 capsule by mouth 3 (Three) Times a Day., Disp: 270 capsule, Rfl: 1  •  Alpha Lipoic Acid 200 MG capsule, Take 400 mg by mouth 3 (Three) Times a Day., Disp: 180 capsule, Rfl: 5  •  ascorbic acid (VITAMIN C) 1000 MG tablet, Take 1 tablet by mouth Daily., Disp: , Rfl:   •  Cholecalciferol (Vitamin D) 50 MCG (2000 UT) tablet, Take 1 tablet by mouth Daily., Disp: , Rfl:   •  cholecalciferol (VITAMIN D3) 25 MCG (1000 UT) tablet, Take 1 tablet by mouth Daily., Disp: , Rfl:   •  desloratadine (CLARINEX) 5 MG tablet, Take 1 tablet by mouth Daily., Disp: , Rfl:   •  Dietary Management Product (Rheumate) capsule, Take one capsule by mouth once daily, Disp: 90 capsule, Rfl: 1  •  docusate sodium 100 MG capsule, Take 1 capsule by mouth., Disp: , Rfl:   •  doxycycline (PERIOSTAT) 20 MG tablet, , Disp: , Rfl:   •  estradiol (ESTRACE) 1 MG tablet, Take 1 tablet by mouth Daily., Disp: , Rfl:   •  fluocinonide (LIDEX) 0.05 % external solution, , Disp: , Rfl:   •  hydrochlorothiazide (HYDRODIURIL) 25 MG tablet, , Disp: , Rfl:   •  levothyroxine (Synthroid) 50 MCG tablet, Take 1 tablet by mouth Daily., Disp: 90 tablet, Rfl: 3  •  losartan (COZAAR) 50 MG tablet, , Disp: , Rfl:   •  MULTIPLE VITAMINS-MINERALS ER PO, Take 1 tablet by mouth Daily., Disp: , Rfl:   •  nabumetone (RELAFEN) 750 MG tablet, Take 1 tablet by mouth 2 (Two) Times a Day., Disp: , Rfl:   •  Omega-3 Fatty Acids (FISH OIL) 1000 MG capsule capsule, Take 1,200 mg by mouth Daily With Breakfast., Disp: , Rfl:   •  Xdemvy 0.25 % solution, , Disp: , Rfl:   •  zolpidem (AMBIEN) 10 MG tablet, Take 1 tablet by mouth., Disp: , Rfl:      Review of Systems   Musculoskeletal:  Positive for back pain and gait problem.   Neurological:  Positive for numbness.   All other systems reviewed and are negative.       Objective:  There were no vitals  taken for this visit.  Not obtained d/t video visit    Neurologic Exam     Mental Status   Oriented to person, place, and time.   Speech: speech is normal   Level of consciousness: alert    Cranial Nerves     CN II   Visual acuity: decreased    CN III, IV, VI   Pupils are equal, round, and reactive to light.  Extraocular motions are normal.     CN VII   Facial expression full, symmetric.   Right facial weakness: marked right eyelid ptosis.  Left facial weakness: mild left eyelid ptosis.    CN VIII   Hearing: impaired      Physical Exam  Constitutional:       Appearance: Normal appearance.   Eyes:      Extraocular Movements: EOM normal.      Pupils: Pupils are equal, round, and reactive to light.   Neurological:      Mental Status: She is alert and oriented to person, place, and time.   Psychiatric:         Mood and Affect: Mood normal.         Speech: Speech normal.     RETA fully d/t video visit    Assessment/Plan:       Diagnoses and all orders for this visit:    1. Ptosis of both eyelids (Primary)  Comments:  continue with eye specialist-following with blepharoplasty, noted on left side too    2. Fibromyalgia  -     DULoxetine (CYMBALTA) 60 MG capsule; Take 1 capsule by mouth Daily.  Dispense: 90 capsule; Refill: 3  -     pregabalin (LYRICA) 75 MG capsule; Take 1 capsule by mouth 3 (Three) Times a Day.  Dispense: 270 capsule; Refill: 1    3. 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  Dispense: 90 capsule; Refill: 3  -     pregabalin (LYRICA) 75 MG capsule; Take 1 capsule by mouth 3 (Three) Times a Day.  Dispense: 270 capsule; Refill: 1    4. Spinal stenosis of lumbar region without neurogenic claudication  -     pregabalin (LYRICA) 75 MG capsule; Take 1 capsule by mouth 3 (Three) Times a Day.  Dispense: 270 capsule; Refill: 1         She has already  seen neurosurgery. She is planning to get surgery eventually, likely in the spring. She will get one more injection with pain management. We will continue the pregabalin as well as the duloxetine for now. Once she does have low back surgery, she would like to try to reevaluate and possibly wean off some of her medications. She and her PCP have recommended waiting on this until after surgical intervention. She will continue with her eye specialist as well. She will call us with any additional questions or concerns prior to follow up in clinic. She verbalizes understanding and agrees with plan moving forward today.    Reviewed medications, potential side effects and signs and symptoms to report. Discussed risk versus benefits of treatment plan with patient and/or family-including medications, labs and radiology that may be ordered. Addressed questions and concerns during visit. Patient and/or family verbalized understanding and agree with plan.    As part of this patient's treatment plan I am prescribing controlled substances. The patient has been made aware of appropriate use of such medications, including potential risk of somnolence, limited ability to drive and/or work safely, and potential for dependence or overdose. It has also been made clear that these medications are for use by the patient only, without concomitant use of alcohol or other substances unless prescribed. Keep out of reach of children.  Ethan report has been reviewed. If this is going to be prescribed long term, [facility] [place] has also been read and signed by patient and myself.    During this visit the following were done:  Labs Reviewed [x]    Labs Ordered []    Radiology Reports Reviewed [x]    Radiology Ordered []    PCP Records Reviewed []    Referring Provider Records Reviewed []    ER Records Reviewed []    Hospital Records Reviewed []    History Obtained From Family []    Radiology Images Reviewed []     Other Reviewed [x]  Neurosurgery notes, pain management notes  Records Requested []      Transcribed from ambient dictation for Kimber Kendall Madeline, ELDON by Jing Topete.  01/05/24   14:17 EST    Patient or patient representative verbalized consent to the visit recording.  I have personally performed the services described in this document as transcribed by the above individual, and it is both accurate and complete.  Kimber Thomsoner, ELDON  1/5/2024  18:11 EST       Note to patient: The 21st Century Cures Act makes medical notes like these available to patients in the interest of transparency. However, be advised this is a medical document. It is intended as peer to peer communication. It is written in medical language and may contain abbreviations or verbiage that are unfamiliar. It may appear blunt or direct. Medical documents are intended to carry relevant information, facts as evident, and the clinical opinion of the provider.

## 2024-01-05 NOTE — PROGRESS NOTES
"Subjective:     Patient ID: Alison Benitez is a 68 y.o. female.    CC:   Chief Complaint   Patient presents with    Peripheral Neuropathy    Eye Problem       HPI:   History of Present Illness  This visit was completed face to face via VIDEO by Epic/HAYLEY with verbal consent to treat obtained from patient and/or family.  Provider confirmed the patient's name and  at the start of visit. Patient and/or family confirms that they are located in [place] during visit and Provider is located in [facility] Clinic in [place], [place] during visit.    Today 2024-  This is a very pleasant 68-year-old female completing a video visit follow-up today as she has recently been exposed to COVID-19 infection and was showing some signs and symptoms of this. She was last seen in clinic on 2023.     She does follow with [facility].     She has been taking pregabalin 75 mg 3 times per day for spinal stenosis, fibromyalgia, and neuropathy, duloxetine 60 mg daily.     She was noted to have right eyelid ptosis and visual changes at her last visit in clinic. I did order labs at that time including Musk antibody, which was negative. Acetylcholine receptor antibody panel was negative. Neuromyelitis optica auto antibody IgG was negative and anti-myelin oligodendrocyte glycoprotein levels were also negative.     She is completing follow-up and needing refills on her medications today.    Today, she reports she is doing well. She confirms she has been seen by an eye specialist, Dr. Dowell. She also saw another provider at the retina center who advised her to see Dr. Dowell. She was using 2 to 4 eye drops before having placenta placed on her right eye. She went back a few days later and had this removed. She reports her dry eyes are under control. She states she is putting a \"tube\" of ointment in every eye morning, but it makes her eyes \"glassy looking,\" and she cannot see very well until it sinks in. She has a " "follow-up appointment in a couple of weeks and will meet another doctor who is going to perform a blepharoplasty. She was told to alternate putting tape on each eye, but she could not do this, so she will discuss this at her follow-up. She states she has ptosis of the left eye, but it is not as severe as the right.     In regard to her neuropathy, she states she is doing \"okay,\" and can tell a difference in the tips of her fingers.    She reports she has been having trouble with her buttocks and down her legs. She confirms she saw neurosurgery, Corinna Castellanos PA-C, on 12/15/2023. She is going to get 1 more injection with Dr. Ellis and then discuss surgery. She wanted to try injections and therapy before surgery, but as these are not working as well, she believes she is ready to pursue surgery. She states she is going to receive 1 more injection because she does not feel that she can get herself to therapy in the winter. She is planning to undergo surgery in spring of 2024.    She reports she has fallen several times. She notes her legs are strong, but when she starts to fall, her back is not able to keep her from falling.    She confirms the duloxetine is still working well. She would like to readjust some of her medicine after her surgery. She states she talked to her primary care physician, and she was told to wait until surgery.    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 She has had neuropathy post-infection of bilateral lower extremities since 01/2019 after being diagnosed with mononucleosis and a positive ASO titer.      She has been followed by [facility] as well as pain management for her low back symptoms.     She underwent an updated EMG and NCVs of her lower extremities by Dr. Eddie Brandon, neurology in [place], [place] on 04/24/2023. This was ordered by " neurosurgery that showed mild to moderate chronic bilateral L5-S1 radiculopathy and mild underlying peripheral neuropathy. This is per record review.    Prior labs 2019-CK level of 174.  Immunofixation and protein electrophoresis within normal limits.  Anti-hue and anti-Otilia antibodies negative.  Methylmalonic acid normal at 155.  Vitamin B12 951 and folate greater than 20.  Lyme antibodies negative.  Mono test negative.  ASO titer was elevated at 200. Hemoglobin A1c was 4.9%.  She was treated with high dose steroids and physical therapy. 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 repeat MRI of the lumbar spine on 6/7/2021 and this did show some high-grade and moderate stenosis from L3-L4 and L4-L5.      The patient was diagnosed with fibromyalgia in 11/2021. She is still seeing Dr. Bibiana Khan-she also treats her for multi-site Osteoporosis.Dr. Bibiana Khan prescribes nabumetone 750 mg twice daily.      Previously took gabapentin which was not helpful. Tried 3 muscle relaxer's in the past which she stopped taking due to not liking the side effects but cannot recall the names. This was with the arthritis specialist.     MRI of the lumbar spine without contrast on 8/19/2022 showing multilevel degenerative disc changes with L3-L5 moderate narrowing.  This has been reviewed by neurosurgery.  MRI of the thoracic spine without contrast on 10/25/2022 shows multilevel degenerative disc changes with central spinal canal stenosis most severe at T11-T12.  Imaging has been reviewed by pain management as well as neurosurgery with [facility].     Followed long term in our clinic for neuropathy post infection of bilateral lower extremities since 01/2019 after being diagnosed with mononucleosis and positive ASO titer versus idiopathic, otherwise, unremarkable workup. She also has confirmed fibromyalgia. Chronic low back pain with lumbar spinal stenosis.     She  "was seen by Dr. Iván Stanley, neurosurgery, on 10/28/2022. She states Dr. Stanley recommended she have surgery, noting \"L3 to L5 would be a decompression fusion and stabilization. It would eradicate some symptoms, but not all.\" She has had lumbar and thoracic spine imaging completed by neurosurgery and Dr. Ellis. She denies experiencing weakness. She confirms the sensation she experiences is a spasming or tightness of the muscles. She confirms she experiences numbness, tingling, and burning in both legs. She reports the pain comes from her bilateral feet and moves upward. She previously had a nerve and muscle study completed in 2019 which showed her neuropathy was mild. Pain is mild to moderate most recently with any movement, standing or walking and only relieved by stillness and rest. Denies urinary or bowel incontinence, hesitancy or perineal numbness and denies weakness. Pain is mild to moderate in low back and both legs, constant, deep, aching, but previously severe and worsening.She has completed physical therapy extensively. She has been more sedentary due to impaired mobility from her severe back pain and has gained weight.     On duloxetine and pregabalin.  Gabapentin was not helpful even in high doses.    The following portions of the patient's history were reviewed and updated as appropriate: allergies, current medications, past family history, past medical history, past social history, past surgical history, and problem list.    Past Medical History:   Diagnosis Date    Arthritis     Bursitis     Claustrophobia     CTS (carpal tunnel syndrome)     bilateral     Deep vein thrombosis     Fibromyalgia     Hemorrhoids     Hypertension     Low back pain     Lumbosacral disc disease     Neuropathy     Osteoarthritis     SBE (subacute bacterial endocarditis)     Tendinitis of knee     Thoracic disc disorder     Thyroid disorder     Thyroid nodule      (vaginal birth after )        Past Surgical " History:   Procedure Laterality Date    ABDOMINAL HYSTERECTOMY       SECTION      COLONOSCOPY      CYST REMOVAL N/A 2022    DILATATION AND CURETTAGE      HYSTERECTOMY N/A         MANDIBLE FRACTURE SURGERY      OOPHORECTOMY Bilateral     TRIGGER POINT INJECTION      TUBAL ABDOMINAL LIGATION         Social History     Socioeconomic History    Marital status: Single   Tobacco Use    Smoking status: Never    Smokeless tobacco: Never   Vaping Use    Vaping Use: Never used   Substance and Sexual Activity    Alcohol use: Never    Drug use: Never    Sexual activity: Not Currently       Family History   Problem Relation Age of Onset    Arthritis Mother     Stroke Mother     Glaucoma Mother     Hypertension Mother     Heart disease Mother     Heart failure Mother     COPD Mother     Vision loss Mother     Heart disease Father     Lung disease Father     Hearing loss Father     Hypertension Brother     Hearing loss Brother     Thyroid disease Brother     Hearing loss Sister     Thyroid disease Sister     Thyroid disease Sister     Breast cancer Neg Hx     Ovarian cancer Neg Hx           Current Outpatient Medications:     DULoxetine (CYMBALTA) 60 MG capsule, Take 1 capsule by mouth Daily., Disp: 90 capsule, Rfl: 3    pregabalin (LYRICA) 75 MG capsule, Take 1 capsule by mouth 3 (Three) Times a Day., Disp: 270 capsule, Rfl: 1    Alpha Lipoic Acid 200 MG capsule, Take 400 mg by mouth 3 (Three) Times a Day., Disp: 180 capsule, Rfl: 5    ascorbic acid (VITAMIN C) 1000 MG tablet, Take 1 tablet by mouth Daily., Disp: , Rfl:     Cholecalciferol (Vitamin D) 50 MCG (2000 UT) tablet, Take 1 tablet by mouth Daily., Disp: , Rfl:     cholecalciferol (VITAMIN D3) 25 MCG (1000 UT) tablet, Take 1 tablet by mouth Daily., Disp: , Rfl:     desloratadine (CLARINEX) 5 MG tablet, Take 1 tablet by mouth Daily., Disp: , Rfl:     Dietary Management Product (Rheumate) capsule, Take one capsule by mouth once daily, Disp: 90 capsule, Rfl:  1    docusate sodium 100 MG capsule, Take 1 capsule by mouth., Disp: , Rfl:     doxycycline (PERIOSTAT) 20 MG tablet, , Disp: , Rfl:     estradiol (ESTRACE) 1 MG tablet, Take 1 tablet by mouth Daily., Disp: , Rfl:     fluocinonide (LIDEX) 0.05 % external solution, , Disp: , Rfl:     hydrochlorothiazide (HYDRODIURIL) 25 MG tablet, , Disp: , Rfl:     levothyroxine (Synthroid) 50 MCG tablet, Take 1 tablet by mouth Daily., Disp: 90 tablet, Rfl: 3    losartan (COZAAR) 50 MG tablet, , Disp: , Rfl:     MULTIPLE VITAMINS-MINERALS ER PO, Take 1 tablet by mouth Daily., Disp: , Rfl:     nabumetone (RELAFEN) 750 MG tablet, Take 1 tablet by mouth 2 (Two) Times a Day., Disp: , Rfl:     Omega-3 Fatty Acids (FISH OIL) 1000 MG capsule capsule, Take 1,200 mg by mouth Daily With Breakfast., Disp: , Rfl:     Xdemvy 0.25 % solution, , Disp: , Rfl:     zolpidem (AMBIEN) 10 MG tablet, Take 1 tablet by mouth., Disp: , Rfl:      Review of Systems   Musculoskeletal:  Positive for back pain and gait problem.   Neurological:  Positive for numbness.   All other systems reviewed and are negative.       Objective:  There were no vitals taken for this visit.  Not obtained d/t video visit    Neurologic Exam     Mental Status   Oriented to person, place, and time.   Speech: speech is normal   Level of consciousness: alert    Cranial Nerves     CN II   Visual acuity: decreased    CN III, IV, VI   Pupils are equal, round, and reactive to light.  Extraocular motions are normal.     CN VII   Facial expression full, symmetric.   Right facial weakness: marked right eyelid ptosis.  Left facial weakness: mild left eyelid ptosis.    CN VIII   Hearing: impaired      Physical Exam  Constitutional:       Appearance: Normal appearance.   Eyes:      Extraocular Movements: EOM normal.      Pupils: Pupils are equal, round, and reactive to light.   Neurological:      Mental Status: She is alert and oriented to person, place, and time.   Psychiatric:         Mood and  Affect: Mood normal.         Speech: Speech normal.     RETA fully d/t video visit    Assessment/Plan:       Diagnoses and all orders for this visit:    1. Ptosis of both eyelids (Primary)  Comments:  continue with eye specialist-following with blepharoplasty, noted on left side too    2. Fibromyalgia  -     DULoxetine (CYMBALTA) 60 MG capsule; Take 1 capsule by mouth Daily.  Dispense: 90 capsule; Refill: 3  -     pregabalin (LYRICA) 75 MG capsule; Take 1 capsule by mouth 3 (Three) Times a Day.  Dispense: 270 capsule; Refill: 1    3. 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  Dispense: 90 capsule; Refill: 3  -     pregabalin (LYRICA) 75 MG capsule; Take 1 capsule by mouth 3 (Three) Times a Day.  Dispense: 270 capsule; Refill: 1    4. Spinal stenosis of lumbar region without neurogenic claudication  -     pregabalin (LYRICA) 75 MG capsule; Take 1 capsule by mouth 3 (Three) Times a Day.  Dispense: 270 capsule; Refill: 1         She has already seen neurosurgery. She is planning to get surgery eventually, likely in the spring. She will get one more injection with pain management. We will continue the pregabalin as well as the duloxetine for now. Once she does have low back surgery, she would like to try to reevaluate and possibly wean off some of her medications. She and her PCP have recommended waiting on this until after surgical intervention. She will continue with her eye specialist as well. She will call us with any additional questions or concerns prior to follow up in clinic. She verbalizes understanding and agrees with plan moving forward today.    Reviewed medications, potential side effects and signs and symptoms to report. Discussed risk versus benefits of treatment plan with patient and/or family-including medications, labs and radiology that may  be ordered. Addressed questions and concerns during visit. Patient and/or family verbalized understanding and agree with plan.    As part of this patient's treatment plan I am prescribing controlled substances. The patient has been made aware of appropriate use of such medications, including potential risk of somnolence, limited ability to drive and/or work safely, and potential for dependence or overdose. It has also been made clear that these medications are for use by the patient only, without concomitant use of alcohol or other substances unless prescribed. Keep out of reach of children.  Ethan report has been reviewed. If this is going to be prescribed long term, [employer] [facility] [employer] Controlled Substance Agreement Contract has also been read and signed by patient and myself.    During this visit the following were done:  Labs Reviewed [x]    Labs Ordered []    Radiology Reports Reviewed [x]    Radiology Ordered []    PCP Records Reviewed []    Referring Provider Records Reviewed []    ER Records Reviewed []    Hospital Records Reviewed []    History Obtained From Family []    Radiology Images Reviewed []    Other Reviewed [x]  Neurosurgery notes, pain management notes  Records Requested []      Transcribed from ambient dictation for ELDON Rosas by Jing Topete.  01/05/24   14:17 EST    Patient or patient representative verbalized consent to the visit recording.  I have personally performed the services described in this document as transcribed by the above individual, and it is both accurate and complete.  ELDON Rosas  1/5/2024  18:11 EST       Note to patient: The 21st Century Cures Act makes medical notes like these available to patients in the interest of transparency. However, be advised this is a medical document. It is intended as peer to peer communication. It is written in medical language and may contain abbreviations or verbiage that are unfamiliar. It may appear blunt or direct. Medical  documents are intended to carry relevant information, facts as evident, and the clinical opinion of the provider.

## 2024-01-10 ENCOUNTER — OFFICE VISIT (OUTPATIENT)
Dept: FAMILY MEDICINE CLINIC | Age: 69
End: 2024-01-10
Payer: MEDICARE

## 2024-01-10 VITALS
HEIGHT: 64 IN | OXYGEN SATURATION: 97 % | DIASTOLIC BLOOD PRESSURE: 82 MMHG | BODY MASS INDEX: 43.13 KG/M2 | RESPIRATION RATE: 16 BRPM | HEART RATE: 100 BPM | SYSTOLIC BLOOD PRESSURE: 128 MMHG | WEIGHT: 252.6 LBS | TEMPERATURE: 97.6 F

## 2024-01-10 DIAGNOSIS — I10 ESSENTIAL HYPERTENSION: ICD-10-CM

## 2024-01-10 DIAGNOSIS — R74.8 ELEVATED LIVER ENZYMES: ICD-10-CM

## 2024-01-10 DIAGNOSIS — R63.5 WEIGHT GAIN: Primary | ICD-10-CM

## 2024-01-10 PROCEDURE — 1123F ACP DISCUSS/DSCN MKR DOCD: CPT | Performed by: FAMILY MEDICINE

## 2024-01-10 PROCEDURE — 3079F DIAST BP 80-89 MM HG: CPT | Performed by: FAMILY MEDICINE

## 2024-01-10 PROCEDURE — 3074F SYST BP LT 130 MM HG: CPT | Performed by: FAMILY MEDICINE

## 2024-01-10 PROCEDURE — 99213 OFFICE O/P EST LOW 20 MIN: CPT | Performed by: FAMILY MEDICINE

## 2024-01-10 ASSESSMENT — PATIENT HEALTH QUESTIONNAIRE - PHQ9
2. FEELING DOWN, DEPRESSED OR HOPELESS: 0
SUM OF ALL RESPONSES TO PHQ QUESTIONS 1-9: 0
1. LITTLE INTEREST OR PLEASURE IN DOING THINGS: 0
SUM OF ALL RESPONSES TO PHQ QUESTIONS 1-9: 0
SUM OF ALL RESPONSES TO PHQ9 QUESTIONS 1 & 2: 0
SUM OF ALL RESPONSES TO PHQ QUESTIONS 1-9: 0
SUM OF ALL RESPONSES TO PHQ QUESTIONS 1-9: 0

## 2024-01-10 NOTE — PATIENT INSTRUCTIONS
Pre-Visit chart review completed. All lab and imaging orders reviewed for outstanding orders and none found. Referrals reviewed and none outstanding. All Health Maintenance requirements reviewed and noted for any that are due at upcoming visit. Any hospital admission documentation, ER documentation or consult notes scanned to chart since last visit have been reviewed and noted for provider to review during upcoming visit.     We are committed to providing you with the best care possible.   In order to help us achieve these goals please remember to bring all medications, herbal products, and over the counter supplements with you to each visit.     If your provider has ordered testing for you, please be sure to follow up with our office if you have not received results within 7 days after the testing took place.     *If you receive a survey after visiting one of our offices, please take time to share your experience concerning your physician office visit. These surveys are confidential and no health information about you is shared.  We are eager to improve for you and we are counting on your feedback to help make that happen.   Keep a list of your medicines with you. List all of the prescription medicines, nonprescription medicines, supplements, natural remedies, and vitamins that you take. Tell your healthcare providers who treat you about all of the products you are taking. Your provider can provide you with a form to keep track of them. Just ask.  Follow the directions that come with your medicine, including information about food or alcohol. Make sure you know how and when to take your medicine. Do not take more or less than you are supposed to take.  Keep all medicines out of the reach of children.  Store medicines according to the directions on the label.  Monitor yourself. Learn to know how your body reacts to your new medicine and keep track of how it makes you feel before attempting (If your provider has

## 2024-01-10 NOTE — PROGRESS NOTES
Chief Complaint   Patient presents with    Hypertension     Follow up       Have you seen any other physician or provider since your last visit yes - Eye doctor    Have you had any other diagnostic tests since your last visit? yes - patient stated just tests on her eyes.    Have you changed or stopped any medications since your last visit? no                
    Hemoglobin A1C (%)   Date Value   07/16/2020 5.3     LDL Calculated (mg/dL)   Date Value   12/01/2023 110       Lab Results   Component Value Date/Time    WBC 6.2 07/30/2023 09:18 AM    NEUTROABS 3.2 07/30/2023 09:18 AM    HGB 13.6 07/30/2023 09:18 AM    HCT 40.8 07/30/2023 09:18 AM    MCV 85.7 07/30/2023 09:18 AM     07/30/2023 09:18 AM     Lab Results   Component Value Date    TSH 1.16 12/01/2023       ASSESSMENT/PLAN    Diagnosis Orders   1. Weight gain        2. Essential hypertension        3. Elevated liver enzymes            No orders of the defined types were placed in this encounter.       There are no discontinued medications.    Controlled Substances Monitoring:      Please note: This chart was generated using Dragon dictation software. Although every effort was made to ensure the accuracy of this automated transcription, some errors in transcription may have occurred.

## 2024-01-11 ENCOUNTER — TELEPHONE (OUTPATIENT)
Dept: NEUROSURGERY | Facility: CLINIC | Age: 69
End: 2024-01-11
Payer: MEDICARE

## 2024-01-29 ENCOUNTER — OFFICE VISIT (OUTPATIENT)
Dept: PAIN MEDICINE | Facility: CLINIC | Age: 69
End: 2024-01-29
Payer: MEDICARE

## 2024-01-29 VITALS — HEIGHT: 64 IN | BODY MASS INDEX: 42.68 KG/M2 | WEIGHT: 250 LBS

## 2024-01-29 DIAGNOSIS — M79.7 FIBROMYALGIA: ICD-10-CM

## 2024-01-29 DIAGNOSIS — M51.36 DDD (DEGENERATIVE DISC DISEASE), LUMBAR: ICD-10-CM

## 2024-01-29 DIAGNOSIS — R53.81 PHYSICAL DECONDITIONING: ICD-10-CM

## 2024-01-29 DIAGNOSIS — M54.16 LUMBAR RADICULOPATHY: ICD-10-CM

## 2024-01-29 DIAGNOSIS — M48.062 LUMBAR STENOSIS WITH NEUROGENIC CLAUDICATION: ICD-10-CM

## 2024-01-29 DIAGNOSIS — F41.1 GAD (GENERALIZED ANXIETY DISORDER): ICD-10-CM

## 2024-01-29 DIAGNOSIS — M47.816 SPONDYLOSIS OF LUMBAR REGION WITHOUT MYELOPATHY OR RADICULOPATHY: ICD-10-CM

## 2024-01-29 PROCEDURE — 99214 OFFICE O/P EST MOD 30 MIN: CPT | Performed by: NURSE PRACTITIONER

## 2024-01-29 PROCEDURE — 1160F RVW MEDS BY RX/DR IN RCRD: CPT | Performed by: NURSE PRACTITIONER

## 2024-01-29 PROCEDURE — 1125F AMNT PAIN NOTED PAIN PRSNT: CPT | Performed by: NURSE PRACTITIONER

## 2024-01-29 PROCEDURE — 1159F MED LIST DOCD IN RCRD: CPT | Performed by: NURSE PRACTITIONER

## 2024-01-29 RX ORDER — SENNOSIDES 8.6 MG
650 CAPSULE ORAL
COMMUNITY

## 2024-01-29 NOTE — PROGRESS NOTES
"Chief Complaint: \"Lower back and leg pain.\"        History of Present Illness:   Patient: Ms. Alison Benitez, 68 y.o. female originally referred by Dr. Iván Stanley in consultation for chronic intractable lower back and left hip pain.  Patient reports a longstanding history of chronic lower back and left hip pain, which began without incident.  I last evaluated her through telemedicine on May 4, 2023.  She underwent diagnostic and therapeutic bilateral L3-L4 transforaminal epidural steroid injection performed on December 21, 2022, from which she reported experiencing complete relief of her overall pain lasting 2 weeks, with almost complete resolution of her left lower extremity pain.  She additionally underwent a diagnostic and therapeutic left L3-L4 and left L4-L5 transforaminal epidural steroid injection performed on October 3, 2022, which provided her with almost complete resolution of her pain lasting 2 months.  She has been seen by Dr. Iván Stanley, and was found not to be a surgical candidate at that time, as patient was continuing to benefit from conservative measures, although, if she continued to struggle he recommended the possibility of Dr. Stanley recommended decompression with fusion and stabilization at L3-L5.  Sometime thereafter her symptoms began to progress and she was sent for follow-up from neurosurgery.  MRI of the lumbar spine continued to demonstrate multilevel disc desiccation with endplate spurring and Modic 2 endplate changes at L3-L4, which has progressed.  There is multilevel disc bulges with facet hypertrophy.  At L3-L4 there is a moderate disc bulge with endplate spurring with facet hypertrophy and ligamentum flavum thickening with bilateral facet effusions, which has progressed from prior MRI with severe central spinal stenosis and moderate bilateral neuroforaminal stenosis.  At L4-L5 there is a disc bulge with endplate spurring, severe right and moderate left facet hypertrophy " with a left facet joint effusion and moderate central spinal stenosis with moderate to severe right and mild left neuroforaminal stenosis.  There was previous suspicions of discitis at L3-L4 due to significant endplate and discogenic changes, although work-up was negative.  She underwent electrodiagnostic studies of the bilateral lower extremities which demonstrated mild to moderate chronic bilateral L5-S1 radiculopathies with mild peripheral neuropathy.  At her follow-up with Frances Conroy PA-C with neurosurgery on 4/24/2023, she reported significant improvement in her right lower extremity symptoms, although she continued to struggle with left lower extremity pain.  Therefore, on May 22, 2023 she underwent a diagnostic and therapeutic left L3-L4 and left L4-L5 transforaminal epidural steroid injection, which she tells me provided her with almost complete resolution of her left lower extremity symptoms lasting almost 6 months.  She has been participating in physical therapy with some improvements.  At her last follow-up with neurosurgery on 12/15/2023, from which she continues to be deemed a surgical candidate for L3-L5 fusion and stabilization.  She continues to be quite leery of surgery and her aftercare.  It has been requested she trial 1 more set of epidural injections.  Pain Description: Constant lower back and left leg pain with intermittent exacerbation, described as aching, burning, dull, sharp, shooting, stabbing and throbbing sensation.   Radiation of Pain: The pain radiates into the posterior aspect of the hips, bilateral gluteal region and lateral and anterior aspect of her thighs LT>RT.  Pain intensity today: 5/10  Average pain intensity last week: 5/10  Pain intensity ranges from: 2/10 to 8/10  Aggravating factors: Pain increases with bending, twisting, standing, walking (5-10 minutes). Patient describes neurogenic claudication.   Alleviating factors: Pain decreases with sitting down, lying down on  "her side  Associated Symptoms:   Patient reports pain (knees down), numbness, and weakness in the lower extremities.   Patient denies any new bladder or bowel problems.   Patient reports difficulties with her balance and but denies recent falls.   Pain interferes with ADLs, general activities (ability to walk, stand, transition from different positions), and affects patient's quality of life      Review of previous therapies and additional medical records:  Alison Benitez has already failed the following measures, including:   Conservative Measures: Oral analgesics, opioids, topical analgesics, ice, heat, physical therapy   Interventional Measures: GTB injections at [facility] last 3 months ago  10/03/2022: DxTx left L3-L4 and left L4-L5 transforaminal epidural steroid injections  12/21/2022: DxTx bilateral L3-L4 transforaminal epidural steroid injections  05/22/2023: left L3-L4 and left L4-L5 transforaminal epidural steroid injections  Surgical Measures: No history of previous lumbar spine or hip surgery    P psychological evaluation on 11/18/2022 with Dr. Dougie Khan, per note: \"From a psychological perspective, patient is considered to be an appropriate candidate for spinal cord stim at this time.\"  Alison Benitez underwent neurosurgical consultation with Dr. Iván Stanley on 10/28/2022 and most recently with Corinna Castellanos PA-C on 12/15/2023, and was found to be a surgical candidate for decompression and fusion and stabilization at L3-L5.  Alison Benitez presents with significant comorbidities including Claustrophobia, CTS (carpal tunnel syndrome), Fibromyalgia, Hypertension, Neuropathy, hypothyroidism   In terms of current analgesics, Alison Benitez takes: nabumetone, Norco, diclofenac gel, pregabalin, duloxetine. Patient also takes zolpidem   I have reviewed Ethan Report consistent with medication reconciliation.  SOAPP: Low Risk     , .  Global Pain Scale 09-15  2022 01-29  2024      "    Pain 20 14         Feelings 8 0         Clinical outcomes 19 4         Activities 25 5         GPS Total: 72 22           The [place] Back Pain Disability Scale  DATE 09-15  2022 03-07  2023 01-29  2024        Sleep through the night 0 3 1        Turn over in bed 0 3 1        Get out of bed 2 2 2        Make your bed 4 1 1        Put on socks (pantyhose) 0 1 2        Ride in a car 0 2 2        Sit in a chair for several hours 0 3 1        Stand up for 20-30 minutes 5 2 5        Climb one flight of stairs 5 1 1        Walk a few blocks (200-300 yards)  5 4 5        Walk several miles 5 4 5        Run one block (about 50 yards) 5 5 5        Take food out of the refrigerator 4 1 1        Reach up to high shelves 4  5        Move a chair 4 2 2        Pull or push heavy doors 4 1 5        Bend over to clean the bathtub 4 1 4        Throw a ball 5 3 0        Carry two bags of groceries 5 3 4        Lift and carry a heavy suitcase 5 5 4        Total score 72 50 58          Review of Diagnostic Studies:  MRI of the lumbar spine without contrast 3/6/2023: Multilevel lumbar spondylosis with facet hypertrophy, progressed from prior study.  Congenital short pedicles which contribute to thecal sac narrowing.  Grade 1 anterior listhesis of L2-L3, and a grade 1 anterior listhesis of L4 on L5.  Modic type II degenerative endplate changes at L3-L4.  L1-L2: Mild bilateral facet hypertrophy.  L2-L3: Left paracentral disc protrusion with endplate spurring, and bilateral facet hypertrophy.  There is mild central spinal canal stenosis with moderate left L2 neuroforaminal stenosis.  L3-L4: Moderate disc bulge with endplate spurring, greater to the right.  There is moderate bilateral facet hypertrophy and ligamentum flavum thickening with bilateral facet effusions.  Now severe central spinal canal stenosis and moderate bilateral neuroforaminal stenosis.  L4-L5: Disc bulge with endplate spurring, and severe right and moderate left facet  hypertrophy with a left facet joint effusion.  Moderate central spinal canal stenosis with moderate to severe right and mild left neuroforaminal stenosis.  L5-S1: Mild disc bulge with moderate bilateral facet hypertrophy producing mild central spinal canal stenosis.  EMG/NCV of the bilateral lower extremities by Dr. Eddie Brandon on 4/24/2023: Mild to moderate chronic L5-S1 radiculopathies and mild peripheral neuropathy  Lumbar spine x-rays with flexion-extension views 4/3/2023: Grade 1 anterolisthesis of L5 on S1 with instability.  Anterior listhesis of L3 without instability.  Moderate to severe endplate degenerative changes most significant at L2-L3 and L3-L4.  MRI of the thoracic spine without contrast 10/26/2022: Grade 1 spondylolisthesis of T1 on T2.  Moderate multilevel degenerative disc disease with disc bulges.  There is moderate degrees of central spinal canal stenosis at multiple levels ranging from T3-T12.  There is severe central spinal stenosis at T11-T12.  Spinal cord appears normal in signal and caliber throughout.  Left hip x-rays 9/12/2022: There is some enthesopathy at both greater trochanters and ischial tuberosities.  Mild degenerative changes  AP pelvis, AP lateral left hip, ordered for pain, shows some dorsal acetabular osteophytes, joint space still reasonable both hips, some enthesopathy both greater trochanters and ischial tuberosities, no fractures, no comparison  Lumbar spine x-rays 8/19/2022:  multilevel degenerative disc disease and facet hypertrophy.  Grade 1 anterolisthesis of L4 on L5    Review of Systems   Constitutional:  Positive for appetite change.   Eyes:  Positive for redness and itching.   Endocrine: Positive for cold intolerance and heat intolerance.   Musculoskeletal:  Positive for arthralgias, back pain and joint swelling.   Neurological:  Positive for numbness.   All other systems reviewed and are negative.        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    History of infectious mononucleosis    Physical deconditioning    Bilateral leg pain    DDD (degenerative disc disease), lumbar    Spinal stenosis of lumbar region with neurogenic claudication    Hormone replacement therapy (HRT)    Menopausal symptoms    Acquired hypothyroidism    Nontoxic multinodular goiter    Postmenopausal    Yeast vaginitis    Fibromyalgia    Osteoarthritis    Lumbosacral disc disease    Mechanical back pain    Bursitis    Adolescent idiopathic scoliosis of lumbar region    Greater trochanteric bursitis of left hip    Gait disturbance    Other insomnia    JACKY (generalized anxiety disorder)    Spinal instability of lumbar region    Spondylolisthesis of lumbar region    Ptosis, right eyelid    Vision changes    Bilateral hearing loss       Past Medical History:   Diagnosis Date    Arthritis     Bursitis     Claustrophobia     CTS (carpal tunnel syndrome)     bilateral     Deep vein thrombosis     Fibromyalgia     Hemorrhoids     Hypertension     Low back pain     Lumbosacral disc disease     Neuropathy     Osteoarthritis     SBE (subacute bacterial endocarditis)     Tendinitis of knee     Thoracic disc disorder     Thyroid disorder     Thyroid nodule      (vaginal birth after )          Past Surgical History:   Procedure Laterality Date    ABDOMINAL HYSTERECTOMY       SECTION      COLONOSCOPY      CYST REMOVAL N/A 2022    DILATATION AND CURETTAGE      HYSTERECTOMY N/A         MANDIBLE FRACTURE SURGERY      OOPHORECTOMY Bilateral     TRIGGER POINT INJECTION      TUBAL ABDOMINAL LIGATION           Family History   Problem Relation Age of Onset    Arthritis Mother     Stroke Mother     Glaucoma Mother     Hypertension Mother     Heart disease Mother     Heart failure Mother     COPD Mother     Vision loss Mother     Heart disease Father     Lung disease Father     Hearing loss Father     Hypertension Brother     Hearing loss Brother     Thyroid disease  Brother     Hearing loss Sister     Thyroid disease Sister     Thyroid disease Sister     Breast cancer Neg Hx     Ovarian cancer Neg Hx          Social History     Socioeconomic History    Marital status: Single   Tobacco Use    Smoking status: Never    Smokeless tobacco: Never   Vaping Use    Vaping Use: Never used   Substance and Sexual Activity    Alcohol use: Never    Drug use: Never    Sexual activity: Not Currently           Current Outpatient Medications:     acetaminophen (TYLENOL) 650 MG 8 hr tablet, Take 1 tablet by mouth., Disp: , Rfl:     Alpha Lipoic Acid 200 MG capsule, Take 400 mg by mouth 3 (Three) Times a Day., Disp: 180 capsule, Rfl: 5    ascorbic acid (VITAMIN C) 1000 MG tablet, Take 1 tablet by mouth Daily., Disp: , Rfl:     Cholecalciferol (Vitamin D) 50 MCG (2000 UT) tablet, Take 1 tablet by mouth Daily., Disp: , Rfl:     desloratadine (CLARINEX) 5 MG tablet, Take 1 tablet by mouth Daily., Disp: , Rfl:     Dietary Management Product (Rheumate) capsule, Take one capsule by mouth once daily, Disp: 90 capsule, Rfl: 1    docusate sodium 100 MG capsule, Take 1 capsule by mouth., Disp: , Rfl:     doxycycline (PERIOSTAT) 20 MG tablet, , Disp: , Rfl:     DULoxetine (CYMBALTA) 60 MG capsule, Take 1 capsule by mouth Daily., Disp: 90 capsule, Rfl: 3    estradiol (ESTRACE) 1 MG tablet, Take 1 tablet by mouth Daily., Disp: , Rfl:     fluocinonide (LIDEX) 0.05 % external solution, , Disp: , Rfl:     hydrochlorothiazide (HYDRODIURIL) 25 MG tablet, , Disp: , Rfl:     levothyroxine (Synthroid) 50 MCG tablet, Take 1 tablet by mouth Daily., Disp: 90 tablet, Rfl: 3    losartan (COZAAR) 50 MG tablet, , Disp: , Rfl:     Magnesium 100 MG capsule, Daily., Disp: , Rfl:     MULTIPLE VITAMINS-MINERALS ER PO, Take 1 tablet by mouth Daily., Disp: , Rfl:     nabumetone (RELAFEN) 750 MG tablet, Take 1 tablet by mouth 2 (Two) Times a Day., Disp: , Rfl:     Omega-3 Fatty Acids (FISH OIL) 1000 MG capsule capsule, Take 1,200 mg  "by mouth Daily With Breakfast., Disp: , Rfl:     pregabalin (LYRICA) 75 MG capsule, Take 1 capsule by mouth 3 (Three) Times a Day., Disp: 270 capsule, Rfl: 1    Xdemvy 0.25 % solution, , Disp: , Rfl:     zolpidem (AMBIEN) 10 MG tablet, Take 1 tablet by mouth., Disp: , Rfl:       No Known Allergies      Ht 162.6 cm (64\")   Wt 113 kg (250 lb)   BMI 42.91 kg/m²           Physical Exam:    Constitutional: Patient appears well-developed, well-nourished, well-hydrated, appears younger than stated age  HEENT: Head: Normocephalic and atraumatic  Eyes: Conjunctivae and lids are normal  Pupils: Equal, round, reactive to light  Peripheral vascular exam: Posterior tibialis: right 2+ and left 2+. Dorsalis pedis: right 2+ and left 2+. No edema. Presence of varicose veins.  Musculoskeletal   Gait and station: Gait evaluation demonstrated shuffling and antalgia  Lumbar spine: Passive and active range of motion are limited secondary to pain. Extension, lateral flexion, rotation of the lumbar spine increased and reproduced pain. Lumbar facet joint loading maneuvers are positive.  Eddie test, Gaenslen's test, thigh thrust test, SI compression test, Yeoman's test are negative   Piriformis maneuvers are negative   Palpation of the bilateral ischial tuberosities, unrevealing   Hip joints: The range of motion of the hip joints is almost full and without pain   Palpation of the bilateral greater trochanter, reveals tenderness bilaterally  Examination of the Iliotibial band: unrevealing   Neurological:   Patient is alert and oriented to person, place, and time.   Speech: Normal.   Cortical function: Normal mental status.   Reflex Scores:  Right patellar: 0+  Left patellar: 0+  Right Achilles: 0+  Left Achilles: 0+  Motor strength: 5/5  Motor Tone: Normal  Involuntary movements: None.   Superficial/Primitive Reflexes: Primitive reflexes were absent.   Right Rosales: Absent  Left Rosales: Absent  Right ankle clonus: Absent  Left ankle " clonus: Absent   Babinsky: Absent  Long tract signs: Negative. Straight leg raising test: positive on the right. Femoral stretch sign: Negative.   Sensory exam: Intact to light touch, intact pain and temperature sensation, intact vibration sensation and normal proprioception.   Coordination: Normal finger to nose and heel to shin. Normal balance and negative Romberg's sign   Skin and subcutaneous tissue: Skin is warm and intact. No rash noted. No cyanosis.   Psychiatric: Judgment and insight: Normal. Recent and remote memory: Intact. Mood and affect: Normal.     ASSESSMENT:   1. Lumbar stenosis with neurogenic claudication    2. Lumbar radiculopathy    3. DDD (degenerative disc disease), lumbar    4. Spondylosis of lumbar region without myelopathy or radiculopathy    5. Fibromyalgia    6. Physical deconditioning    7. JACKY (generalized anxiety disorder)    8. BMI 40.0-44.9, adult            PLAN/MEDICAL DECISION MAKING:  Ms. Alison Benitez, 68 y.o. female reports a longstanding history of chronic lower back and left hip pain.  I last saw her in follow-up consultation on March 7, 2023, for evaluation of undergoing a recent diagnostic and therapeutic bilateral L3-L4 transforaminal epidural steroid injection performed on December 21, 2022, from which she reported experiencing complete relief of her overall pain lasting 2 weeks, with almost complete resolution of her left lower extremity pain that was ongoing.  She additionally underwent a diagnostic and therapeutic left L3-L4 and left L4-L5 transforaminal epidural steroid injection performed on October 3, 2022, which provided her with almost complete resolution of her pain lasting 2 months.  She has been seen by Dr. Iván Stanley, and was found not to be a surgical candidate at that time, as patient was continuing to benefit from conservative measures, although, if she continued to struggle he recommended the possibility of Dr. Stanley recommended decompression with  fusion and stabilization at L3-L5.  More recently, her symptoms began to progress and she was sent for follow-up by neurosurgery.  She underwent a new MRI of the lumbar spine performed which continued to demonstrate multilevel disc desiccation with endplate spurring and Modic 2 endplate changes at L3-L4, which has progressed.  There is multilevel disc bulges with facet hypertrophy.  At L3-L4 there is a moderate disc bulge with endplate spurring with facet hypertrophy and ligamentum flavum thickening with bilateral facet effusions, which has progressed from prior MRI with severe central spinal stenosis and moderate bilateral neuroforaminal stenosis.  At L4-L5 there is a disc bulge with endplate spurring, severe right and moderate left facet hypertrophy with a left facet joint effusion and moderate central spinal stenosis with moderate to severe right and mild left neuroforaminal stenosis.  There was previous suspicions of discitis at L3-L4 due to significant endplate and discogenic changes, although work-up was negative.  She underwent recent electrodiagnostic studies of the bilateral lower extremities which demonstrated mild to moderate chronic bilateral L5-S1 radiculopathies with mild peripheral neuropathy. She has been participating in physical therapy with some improvements.  At her last follow-up with neurosurgery on 12/15/2023, from which she continues to be deemed a surgical candidate for L3-L5 fusion and stabilization.  She continues to be quite leery of surgery and her aftercare.  It has been requested she trial 1 more set of epidural injections due to significant effectiveness.  She also does continue with symptoms of mechanical lower back pain but continues to experience radicular pain associated with neurogenic claudication.  Patient has failed to obtain pain relief with conservative measures including oral analgesics (Lyrica, Norco, etc), physical therapy (ongoing), to name a few.  I have reviewed all  available patient's medical records as well as previous therapies as referenced above. I had a lengthy conversation with Ms. Alison Solano Emmanuel regarding her chronic pain condition and potential therapeutic options including risks, benefits, alternative therapies, to name a few.  Therefore, I have proposed the following plan:  1. Interventional pain management measures: Patient will be scheduled for bilateral L3-L4 transforaminal epidural steroid injections.  She will follow-up with neurosurgery thereafter.  2. Pharmacological measures: Reviewed and discussed;   A. Patient takes nabumetone, Norco, diclofenac gel, pregabalin,duloxetine. Patient also takes zolpidem.  B. Continue Rheumate one tablet daily  C. Continue alpha lipoid acid 3450-4191 mg per day divided into 3 doses  3. Long-term rehabilitation efforts:  A. The patient has a history of falls. I did complete a risk assessment for falls. Fall precautions: Patient has been instructed regarding universal fall precautions, such as;   Using gait aids a cane or a rolling walker at the appropriate height at all times for ambulation   Removing all area rugs and coffee tables to create a safe environment at home  Ensure clean, dry floors  Wearing supportive footwear and properly fitting clothing  Ensure bed/chair is appropriate height and patient's feet can touch the floor  Using a shower transfer bench  Using walk-in shower and having shower safety bars installed  Ensure proper lighting, minimize glare  Have nightlights operational and in use  Participation in an exercise program for gait training, balance training and strength  Avoid carrying laundry up and down steps  Ensure proper compliance and organization of medications to avoid errors   Avoid use of over the counter sedatives and alcohol consumption  Ensure easy access to call bell, glasses, TV control, telephone  Ensure glasses/hearing aids are in use or close by (on top of night table)  B.  Patient will  continue a comprehensive physical therapy program for water therapy, therapeutic exercise, core strengthening, gait and balance training, neurodynamics, E-STIM, myofascial release, cupping, dry needling, home exercises  C. Start an exercise program such as chair yoga and water therapy  D. Contrast therapy: Apply ice-packs for 15-20 minutes, followed by heating pads for 15-20 minutes to affected area   E. Patient's Body mass index is 40.75 kg/m². Patient counseled on the importance of weight loss to help with overall health and pain control.   4. The patient has been instructed to contact my office with any questions or difficulties. The patient understands the plan and agrees to proceed accordingly.            Pain Medications               acetaminophen (TYLENOL) 650 MG 8 hr tablet Take 1 tablet by mouth.    DULoxetine (CYMBALTA) 60 MG capsule Take 1 capsule by mouth Daily.    nabumetone (RELAFEN) 750 MG tablet Take 1 tablet by mouth 2 (Two) Times a Day.    pregabalin (LYRICA) 75 MG capsule Take 1 capsule by mouth 3 (Three) Times a Day.           Please note that portions of this note were completed with a voice recognition program.     ELDON Hawkins    Patient Care Team:  Kacy Huertas MD as PCP - General (Family Medicine)  Anatoliy Everett MD as Consulting Physician (Anesthesiology)  Abdifatah Jordan MD as Consulting Physician (Endocrinology)  Kimber Correa APRN as Nurse Practitioner (Nurse Practitioner)  Moris Ellis MD as Consulting Physician (Pain Medicine)  Sussy El APRN as Nurse Practitioner (Nurse Practitioner)     No orders of the defined types were placed in this encounter.        Future Appointments   Date Time Provider Department Center   2/14/2024  3:20 PM [facility] SCREENING [facility]   7/8/2024 11:00 AM Kimber Correa APRN [facility]

## 2024-01-30 ENCOUNTER — TELEPHONE (OUTPATIENT)
Dept: PAIN MEDICINE | Facility: CLINIC | Age: 69
End: 2024-01-30
Payer: MEDICARE

## 2024-01-30 NOTE — TELEPHONE ENCOUNTER
Caller: KARLA   Relationship to Patient: SELF  Phone Number: [phone] (home)   Reason for Call: PATIENT CALLED STATING THAT SHE NEEDS TO SPEAK TO SOMEONE ABOUT HER MEDICATION LIST, SHE STATES THAT SHE NEEDS TO MAKE SOME CORRECTIONS PRIOR TO HER UPCOMING PROCEDURE

## 2024-02-05 ENCOUNTER — TELEPHONE (OUTPATIENT)
Dept: FAMILY MEDICINE CLINIC | Age: 69
End: 2024-02-05

## 2024-02-05 NOTE — TELEPHONE ENCOUNTER
Patient had  eye surgery on Friday and they told patient to stop ambien before procedure and she wanted to tell you that her surgery went well.Now she is not sleeping well and having nightmares and having cognitive issues.She wants to know what the best option you think would be if she needs to cut it in a half or not take it.

## 2024-02-05 NOTE — TELEPHONE ENCOUNTER
Spoke with pt and she advised that she is going to get online and check mychart, and then she also is going to bring a paper list. I advised that was fine.  No further needs expressed.

## 2024-02-12 DIAGNOSIS — M48.061 SPINAL STENOSIS OF LUMBAR REGION WITHOUT NEUROGENIC CLAUDICATION: ICD-10-CM

## 2024-02-12 DIAGNOSIS — I10 ESSENTIAL HYPERTENSION: ICD-10-CM

## 2024-02-12 DIAGNOSIS — B99.9 NEUROPATHY DUE TO INFECTION: ICD-10-CM

## 2024-02-12 DIAGNOSIS — G63 NEUROPATHY DUE TO INFECTION: ICD-10-CM

## 2024-02-12 DIAGNOSIS — M79.7 FIBROMYALGIA: ICD-10-CM

## 2024-02-12 RX ORDER — PREGABALIN 75 MG/1
75 CAPSULE ORAL 3 TIMES DAILY
Qty: 270 CAPSULE | Refills: 1 | Status: SHIPPED | OUTPATIENT
Start: 2024-02-12

## 2024-02-12 RX ORDER — LOSARTAN POTASSIUM 50 MG/1
50 TABLET ORAL DAILY
Qty: 90 TABLET | Refills: 3 | Status: SHIPPED | OUTPATIENT
Start: 2024-02-12 | End: 2024-02-16 | Stop reason: SDUPTHER

## 2024-02-12 NOTE — TELEPHONE ENCOUNTER
Refill request received from patient      Next Office Visit Date:  Future Appointments   Date Time Provider Department Center   2/27/2024 10:45 AM Sera Valadez MD [facility]   7/30/2024  2:30 PM Sera Valadez MD [facility] [facility] - Please review via PDMP        Last Office Visit:    1/10/2024    No

## 2024-02-13 DIAGNOSIS — M54.16 LUMBAR RADICULOPATHY: ICD-10-CM

## 2024-02-13 DIAGNOSIS — M48.062 LUMBAR STENOSIS WITH NEUROGENIC CLAUDICATION: Primary | ICD-10-CM

## 2024-02-14 ENCOUNTER — OUTSIDE FACILITY SERVICE (OUTPATIENT)
Dept: PAIN MEDICINE | Facility: CLINIC | Age: 69
End: 2024-02-14
Payer: MEDICARE

## 2024-02-14 PROCEDURE — 99152 MOD SED SAME PHYS/QHP 5/>YRS: CPT | Performed by: ANESTHESIOLOGY

## 2024-02-14 PROCEDURE — 64483 NJX AA&/STRD TFRM EPI L/S 1: CPT | Performed by: ANESTHESIOLOGY

## 2024-02-15 ENCOUNTER — TELEPHONE (OUTPATIENT)
Dept: PAIN MEDICINE | Facility: CLINIC | Age: 69
End: 2024-02-15
Payer: MEDICARE

## 2024-02-16 ENCOUNTER — PATIENT MESSAGE (OUTPATIENT)
Dept: FAMILY MEDICINE CLINIC | Age: 69
End: 2024-02-16

## 2024-02-16 DIAGNOSIS — I10 ESSENTIAL HYPERTENSION: ICD-10-CM

## 2024-02-16 RX ORDER — LOSARTAN POTASSIUM 50 MG/1
50 TABLET ORAL DAILY
Qty: 90 TABLET | Refills: 3 | Status: SHIPPED | OUTPATIENT
Start: 2024-02-16

## 2024-02-16 NOTE — TELEPHONE ENCOUNTER
Patient called, requested refill.       Next Office Visit Date:  Future Appointments   Date Time Provider Department Center   2/27/2024 10:45 AM Sera Valadez MD [facility] [facility]   7/30/2024  2:30 PM Sera Valadez MD [facility]       [facility] please review via PDMP     Patient called saying she didn't call her mail pharmacy soon enough for her Losartan to get filled and sent to her house now patient only has two left in bottle and has not received the ones in the mail.Says she will not receive them for 10 14 days, and was wondering if you could send in just 10 tablets to hold her over.

## 2024-02-19 DIAGNOSIS — I10 ESSENTIAL HYPERTENSION: ICD-10-CM

## 2024-02-19 RX ORDER — LOSARTAN POTASSIUM 50 MG/1
50 TABLET ORAL DAILY
Qty: 10 TABLET | Refills: 0 | Status: SHIPPED | OUTPATIENT
Start: 2024-02-19

## 2024-02-19 NOTE — TELEPHONE ENCOUNTER
From: Kristyn Martin  To: Dr. Sera Valadez  Sent: 2/16/2024 2:18 PM EST  Subject: losarton potassium 50    did you get my message I need ten losarton blood pressure pills?   I won’t get mail order in time   I have one for tomorrow     I found out late yesterday they did not mail my order. I needed to call them and redo all my prescriptions in January . I had no idea     [facility] [place] and i’ll pay for ten?     thank you so so much

## 2024-02-26 DIAGNOSIS — G63 NEUROPATHY DUE TO INFECTION: ICD-10-CM

## 2024-02-26 DIAGNOSIS — M48.061 SPINAL STENOSIS OF LUMBAR REGION WITHOUT NEUROGENIC CLAUDICATION: ICD-10-CM

## 2024-02-26 DIAGNOSIS — M79.7 FIBROMYALGIA: ICD-10-CM

## 2024-02-26 DIAGNOSIS — B99.9 NEUROPATHY DUE TO INFECTION: ICD-10-CM

## 2024-02-26 RX ORDER — PREGABALIN 75 MG/1
75 CAPSULE ORAL 3 TIMES DAILY
Qty: 90 CAPSULE | Refills: 0 | Status: SHIPPED | OUTPATIENT
Start: 2024-02-26

## 2024-02-26 NOTE — TELEPHONE ENCOUNTER
Caller: Alison Benitez May    Relationship: Self    Best call back number: [phone]    Requested Prescriptions:   Requested Prescriptions     Pending Prescriptions Disp Refills    pregabalin (LYRICA) 75 MG capsule 270 capsule 1     Sig: Take 1 capsule by mouth 3 (Three) Times a Day.        Pharmacy where request should be sent: [facility] [address] [place] [address] [phone] [facility] [phone] FX     Last office visit with prescribing clinician: 5/26/2023   Last telemedicine visit with prescribing clinician: 1/5/2024   Next office visit with prescribing clinician: 7/8/2024     Additional details provided by patient: PATIENT HAS 4 DAYS WORTH REMAINING AND [facility] IS OUT OF STOCK AND NOT SURE WHEN THEY'LL HAVE IT AGAIN. NEEDS 30 DAYS SCRIPT FROM LOCAL PHARMACY.    Does the patient have less than a 3 day supply:  [] Yes  [x] No    Would you like a call back once the refill request has been completed: [] Yes [x] No    If the office needs to give you a call back, can they leave a voicemail: [] Yes [x] No    Stevie Linda Rep   02/26/24 10:43 EST

## 2024-02-26 NOTE — TELEPHONE ENCOUNTER
08/28/2018  Reji Gates is a 59 y.o., male.    Anesthesia Evaluation    I have reviewed the Patient Summary Reports.    I have reviewed the Nursing Notes.   I have reviewed the Medications.     Review of Systems  Anesthesia Hx:  No problems with previous Anesthesia  Denies Family Hx of Anesthesia complications.   Denies Personal Hx of Anesthesia complications.   Social:  Non-Smoker    Hematology/Oncology:  Hematology Normal   Oncology Normal     EENT/Dental:EENT/Dental Normal   Cardiovascular:  Cardiovascular Normal Exercise tolerance: good     Pulmonary:  Pulmonary Normal    Renal/:  Renal/ Normal     Hepatic/GI:   GERD, well controlled    Musculoskeletal:  Spine Disorders: lumbar Chronic Pain    Neurological:  Neurology Normal    Endocrine:  Endocrine Normal    Dermatological:  Skin Normal    Psych:   anxiety depression          Physical Exam  General:  Well nourished    Airway/Jaw/Neck:  Airway Findings: Mouth Opening: Normal Tongue: Normal  General Airway Assessment: Adult  Mallampati: I  TM Distance: Normal, at least 6 cm  Jaw/Neck Findings:  Neck ROM: Normal ROM      Dental:  Dental Findings: In tact             Anesthesia Plan  Type of Anesthesia, risks & benefits discussed:  Anesthesia Type:  general  Patient's Preference:   Intra-op Monitoring Plan: standard ASA monitors  Intra-op Monitoring Plan Comments:   Post Op Pain Control Plan:   Post Op Pain Control Plan Comments:   Induction:   IV  Beta Blocker:         Informed Consent: Patient understands risks and agrees with Anesthesia plan.  Questions answered. Anesthesia consent signed with patient.  ASA Score: 2     Day of Surgery Review of History & Physical:    H&P update referred to the surgeon.     Anesthesia Plan Notes: HIV+        Ready For Surgery From Anesthesia Perspective.        Rx Refill Note  Requested Prescriptions     Pending Prescriptions Disp Refills    pregabalin (LYRICA) 75 MG capsule 270 capsule 1     Sig: Take 1 capsule by mouth 3 (Three) Times a Day.      Last office visit with prescribing clinician: 5/26/2023   Last telemedicine visit with prescribing clinician: 1/5/2024   Next office visit with prescribing clinician: 7/8/2024                         Would you like a call back once the refill request has been completed: [] Yes [] No    If the office needs to give you a call back, can they leave a voicemail: [] Yes [] No    Vicenta Molina CMA  02/26/24, 10:57 EST

## 2024-02-26 NOTE — TELEPHONE ENCOUNTER
I approved 30 day supply of pregabalin to [facility] with #[phone] with no refills per patient request. Thanks, ELDON Kong

## 2024-02-27 ENCOUNTER — OFFICE VISIT (OUTPATIENT)
Dept: FAMILY MEDICINE CLINIC | Age: 69
End: 2024-02-27
Payer: MEDICARE

## 2024-02-27 VITALS
BODY MASS INDEX: 42.78 KG/M2 | RESPIRATION RATE: 16 BRPM | WEIGHT: 250.6 LBS | OXYGEN SATURATION: 97 % | SYSTOLIC BLOOD PRESSURE: 134 MMHG | HEART RATE: 106 BPM | HEIGHT: 64 IN | TEMPERATURE: 97.3 F | DIASTOLIC BLOOD PRESSURE: 88 MMHG

## 2024-02-27 DIAGNOSIS — H92.01 RIGHT EAR PAIN: Primary | ICD-10-CM

## 2024-02-27 DIAGNOSIS — G89.29 CHRONIC LOW BACK PAIN, UNSPECIFIED BACK PAIN LATERALITY, UNSPECIFIED WHETHER SCIATICA PRESENT: ICD-10-CM

## 2024-02-27 DIAGNOSIS — M54.50 CHRONIC LOW BACK PAIN, UNSPECIFIED BACK PAIN LATERALITY, UNSPECIFIED WHETHER SCIATICA PRESENT: ICD-10-CM

## 2024-02-27 PROCEDURE — 1123F ACP DISCUSS/DSCN MKR DOCD: CPT | Performed by: FAMILY MEDICINE

## 2024-02-27 PROCEDURE — 99214 OFFICE O/P EST MOD 30 MIN: CPT | Performed by: FAMILY MEDICINE

## 2024-02-27 RX ORDER — CALCIUM CARBONATE 160(400)MG
1 TABLET,CHEWABLE ORAL DAILY
Qty: 1 EACH | Refills: 0 | Status: SHIPPED | OUTPATIENT
Start: 2024-02-27

## 2024-02-27 ASSESSMENT — ENCOUNTER SYMPTOMS
RESPIRATORY NEGATIVE: 1
EYE REDNESS: 1
BACK PAIN: 1
EYE ITCHING: 1
GASTROINTESTINAL NEGATIVE: 1

## 2024-02-27 NOTE — PROGRESS NOTES
No chief complaint on file.      Have you seen any other physician or provider since your last visit yes - [facility]    Have you had any other diagnostic tests since your last visit? yes - Bilateral bottom eyelid Sx    Have you changed or stopped any medications since your last visit? no       
Speech normal.         Behavior: Behavior normal. Behavior is cooperative.         Thought Content: Thought content normal.         Cognition and Memory: Cognition and memory normal.         Judgment: Judgment normal.          No results found for requested labs within last 30 days.     Hemoglobin A1C (%)   Date Value   2020 5.3     LDL Calculated (mg/dL)   Date Value   2023 110       Lab Results   Component Value Date/Time    WBC 6.2 2023 09:18 AM    NEUTROABS 3.2 2023 09:18 AM    HGB 13.6 2023 09:18 AM    HCT 40.8 2023 09:18 AM    MCV 85.7 2023 09:18 AM     2023 09:18 AM     Lab Results   Component Value Date    TSH 1.16 2023       ASSESSMENT/PLAN    Diagnosis Orders   1. Right ear pain  External Referral To ENT      2. Chronic low back pain, unspecified back pain laterality, unspecified whether sciatica present  Misc. Devices (ROLLATOR ULTRA-LIGHT) MISC    DME Order for Bath/Shower Seat at OP          Orders Placed This Encounter   Medications    Misc. Devices (ROLLATOR ULTRA-LIGHT) MISC     Si Device by Does not apply route daily     Dispense:  1 each     Refill:  0        Medications Discontinued During This Encounter   Medication Reason    Biotin 5 MG CAPS LIST CLEANUP    Omega-3 Fatty Acids (FISH OIL) 1000 MG CAPS LIST CLEANUP    pyridoxine (B-6) 100 MG tablet LIST CLEANUP    Tens Unit MISC LIST CLEANUP       Controlled Substances Monitoring:      Please note: This chart was generated using Dragon dictation software. Although every effort was made to ensure the accuracy of this automated transcription, some errors in transcription may have occurred.

## 2024-02-29 ENCOUNTER — OFFICE VISIT (OUTPATIENT)
Dept: FAMILY MEDICINE CLINIC | Age: 69
End: 2024-02-29

## 2024-02-29 VITALS
WEIGHT: 249.6 LBS | SYSTOLIC BLOOD PRESSURE: 134 MMHG | HEART RATE: 93 BPM | TEMPERATURE: 98.1 F | RESPIRATION RATE: 18 BRPM | OXYGEN SATURATION: 99 % | DIASTOLIC BLOOD PRESSURE: 76 MMHG | HEIGHT: 64 IN | BODY MASS INDEX: 42.61 KG/M2

## 2024-02-29 DIAGNOSIS — R09.81 CONGESTION OF NASAL SINUS: Primary | ICD-10-CM

## 2024-02-29 DIAGNOSIS — H66.93 ACUTE BILATERAL OTITIS MEDIA: ICD-10-CM

## 2024-02-29 LAB
INFLUENZA A ANTIBODY: NEGATIVE
INFLUENZA B ANTIBODY: NEGATIVE
Lab: NORMAL
QC PASS/FAIL: NORMAL
S PYO AG THROAT QL: NORMAL
SARS-COV-2 RDRP RESP QL NAA+PROBE: NEGATIVE

## 2024-02-29 RX ORDER — AMOXICILLIN AND CLAVULANATE POTASSIUM 875; 125 MG/1; MG/1
1 TABLET, FILM COATED ORAL 2 TIMES DAILY
Qty: 14 TABLET | Refills: 0 | Status: SHIPPED | OUTPATIENT
Start: 2024-02-29 | End: 2024-03-07

## 2024-02-29 ASSESSMENT — ENCOUNTER SYMPTOMS
EYE REDNESS: 1
SORE THROAT: 1
RHINORRHEA: 1
COUGH: 1
SINUS PRESSURE: 1
SINUS PAIN: 1
GASTROINTESTINAL NEGATIVE: 1
PHOTOPHOBIA: 1

## 2024-02-29 NOTE — PROGRESS NOTES
[address] [place].  [place] [address]  Dept: [phone]  Loc: [phone]     SUBJECTIVE:  Kristyn Martin is a 68 y.o. female that presents with   Chief Complaint   Patient presents with    Cough    Congestion    Pharyngitis    Headache   .    HPI:    Kristyn Martin is a pleasant 68 year old white female who presents to clinic with cough, congestion, sore throat and headache. She says that she hasn't been taking her allergy pill, as she was notified from the pharmaceutical company that some of the medications had been compromised and wouldn't be available. This has been a few weeks ago and she has been progressively feeling more congested. Her eyes are red and she is bruised below her right eye, but this is from a surgical procedure she had on her lower eye lids on February 2.     Cough  This is a new problem. The current episode started in the past 7 days. The problem has been unchanged. The cough is Productive of sputum (thick, green). Associated symptoms include ear pain, eye redness, headaches, rhinorrhea and a sore throat. Pertinent negatives include no fever. Nothing aggravates the symptoms. She has tried nothing for the symptoms. Her past medical history is significant for environmental allergies.   Pharyngitis  This is a new problem. The current episode started in the past 7 days. The problem has been unchanged. Associated symptoms include coughing, headaches and a sore throat. Pertinent negatives include no fever.   Headache  Headache pattern:  Headache sometimes there, sometimes not at all (associtated with congestion)      Review of Systems   Constitutional:  Negative for fever.   HENT:  Positive for ear pain, rhinorrhea, sinus pressure, sinus pain and sore throat.    Eyes:  Positive for photophobia and redness.   Respiratory:  Positive for cough.         Productive, dark green   Cardiovascular: Negative.    Gastrointestinal: Negative.

## 2024-03-04 ENCOUNTER — OFFICE VISIT (OUTPATIENT)
Dept: FAMILY MEDICINE CLINIC | Age: 69
End: 2024-03-04
Payer: MEDICARE

## 2024-03-04 VITALS
DIASTOLIC BLOOD PRESSURE: 84 MMHG | HEART RATE: 85 BPM | BODY MASS INDEX: 42.17 KG/M2 | OXYGEN SATURATION: 96 % | SYSTOLIC BLOOD PRESSURE: 128 MMHG | RESPIRATION RATE: 16 BRPM | WEIGHT: 247 LBS | HEIGHT: 64 IN | TEMPERATURE: 97.7 F

## 2024-03-04 DIAGNOSIS — L27.0 DRUG-INDUCED SKIN RASH: ICD-10-CM

## 2024-03-04 DIAGNOSIS — J20.9 ACUTE BRONCHITIS, UNSPECIFIED ORGANISM: Primary | ICD-10-CM

## 2024-03-04 PROCEDURE — 1123F ACP DISCUSS/DSCN MKR DOCD: CPT | Performed by: FAMILY MEDICINE

## 2024-03-04 PROCEDURE — 96372 THER/PROPH/DIAG INJ SC/IM: CPT | Performed by: FAMILY MEDICINE

## 2024-03-04 PROCEDURE — 99213 OFFICE O/P EST LOW 20 MIN: CPT | Performed by: FAMILY MEDICINE

## 2024-03-04 RX ORDER — METHYLPREDNISOLONE SODIUM SUCCINATE 125 MG/2ML
125 INJECTION, POWDER, LYOPHILIZED, FOR SOLUTION INTRAMUSCULAR; INTRAVENOUS ONCE
Status: COMPLETED | OUTPATIENT
Start: 2024-03-04 | End: 2024-03-04

## 2024-03-04 RX ORDER — METHYLPREDNISOLONE SODIUM SUCCINATE 125 MG/2ML
125 INJECTION, POWDER, LYOPHILIZED, FOR SOLUTION INTRAMUSCULAR; INTRAVENOUS ONCE
Status: SHIPPED | OUTPATIENT
Start: 2024-03-04

## 2024-03-04 RX ORDER — ALBUTEROL SULFATE 90 UG/1
2 AEROSOL, METERED RESPIRATORY (INHALATION) EVERY 4 HOURS PRN
Qty: 18 G | Refills: 3 | Status: SHIPPED | OUTPATIENT
Start: 2024-03-04

## 2024-03-04 RX ORDER — LEVOFLOXACIN 500 MG/1
500 TABLET, FILM COATED ORAL DAILY
Qty: 7 TABLET | Refills: 0 | Status: SHIPPED | OUTPATIENT
Start: 2024-03-04 | End: 2024-03-11

## 2024-03-04 RX ORDER — PREDNISONE 5 MG/1
5 TABLET ORAL DAILY
Qty: 21 TABLET | Refills: 0 | Status: SHIPPED | OUTPATIENT
Start: 2024-03-04 | End: 2024-03-10

## 2024-03-04 RX ORDER — TRIAMCINOLONE ACETONIDE 0.25 MG/G
CREAM TOPICAL
Qty: 80 G | Refills: 0 | Status: SHIPPED | OUTPATIENT
Start: 2024-03-04

## 2024-03-04 RX ORDER — CEFTRIAXONE 1 G/1
1000 INJECTION, POWDER, FOR SOLUTION INTRAMUSCULAR; INTRAVENOUS ONCE
Status: COMPLETED | OUTPATIENT
Start: 2024-03-04 | End: 2024-03-04

## 2024-03-04 RX ADMIN — METHYLPREDNISOLONE SODIUM SUCCINATE 125 MG: 125 INJECTION, POWDER, LYOPHILIZED, FOR SOLUTION INTRAMUSCULAR; INTRAVENOUS at 12:58

## 2024-03-04 RX ADMIN — CEFTRIAXONE 1000 MG: 1 INJECTION, POWDER, FOR SOLUTION INTRAMUSCULAR; INTRAVENOUS at 12:56

## 2024-03-04 NOTE — PROGRESS NOTES
Chief Complaint   Patient presents with    Rash     Per patient she has itching, blister sores on her back for the past 2 days.       Have you seen any other physician or provider since your last visit no    Have you had any other diagnostic tests since your last visit? no    Have you changed or stopped any medications since your last visit? no   Administrations This Visit       cefTRIAXone (ROCEPHIN) injection 1,000 mg       Admin Date  03/04/2024  12:56 Action  Given Dose  1,000 mg Route  IntraMUSCular Site  Dorsogluteal Right Administered By  Chloé Ryan MA    Ordering Provider: Sera Valadez MD    NDC: 32970-493-64    :  CRITICAL CARE    Patient Supplied?: No              methylPREDNISolone sodium succ (SOLU-MEDROL) injection 125 mg       Admin Date  03/04/2024  12:58 Action  Given Dose  125 mg Route  IntraMUSCular Site  Dorsogluteal Left Administered By  Chloé Ryan MA    Ordering Provider: Sera Valadez MD    NDC: 49503-794-64    : [facility]    Patient Supplied?: No                 Patient tolerated injection well. Patient advised to wait 20 minutes in the office following the injection. No signs/symptoms of reaction noted after 20 minutes.      
      Controlled Substances Monitoring:      Please note: This chart was generated using Dragon dictation software. Although every effort was made to ensure the accuracy of this automated transcription, some errors in transcription may have occurred.

## 2024-03-11 DIAGNOSIS — J30.2 SEASONAL ALLERGIES: ICD-10-CM

## 2024-03-11 DIAGNOSIS — K59.00 CONSTIPATION, UNSPECIFIED CONSTIPATION TYPE: ICD-10-CM

## 2024-03-11 DIAGNOSIS — I10 ESSENTIAL HYPERTENSION: ICD-10-CM

## 2024-03-11 DIAGNOSIS — Z78.0 POST-MENOPAUSAL: ICD-10-CM

## 2024-03-11 RX ORDER — DOCUSATE SODIUM 100 MG/1
100 CAPSULE, LIQUID FILLED ORAL 2 TIMES DAILY
Qty: 60 CAPSULE | Refills: 5 | Status: SHIPPED | OUTPATIENT
Start: 2024-03-11 | End: 2024-09-07

## 2024-03-11 RX ORDER — DESLORATADINE 5 MG/1
TABLET ORAL
Qty: 90 TABLET | Refills: 3 | Status: SHIPPED | OUTPATIENT
Start: 2024-03-11

## 2024-03-11 RX ORDER — HYDROCHLOROTHIAZIDE 25 MG/1
25 TABLET ORAL DAILY
Qty: 90 TABLET | Refills: 3 | Status: SHIPPED | OUTPATIENT
Start: 2024-03-11

## 2024-03-11 RX ORDER — ESTRADIOL 1 MG/1
1 TABLET ORAL DAILY
Qty: 63 TABLET | Refills: 3 | Status: SHIPPED | OUTPATIENT
Start: 2024-03-11

## 2024-03-11 NOTE — TELEPHONE ENCOUNTER
Refill request received from pharmacy      Next Office Visit Date:  Future Appointments   Date Time Provider Department Center   4/3/2024 10:45 AM Sera Valadez MD [facility]   7/30/2024  2:30 PM Sera Valadez MD [facility] [facility] - Please review via PDMP        Last Office Visit:    3/4/2024

## 2024-03-12 DIAGNOSIS — E03.9 ACQUIRED HYPOTHYROIDISM: ICD-10-CM

## 2024-03-12 RX ORDER — LEVOTHYROXINE SODIUM 0.05 MG/1
50 TABLET ORAL DAILY
Qty: 90 TABLET | Refills: 3 | Status: SHIPPED | OUTPATIENT
Start: 2024-03-12

## 2024-03-12 NOTE — TELEPHONE ENCOUNTER
Rx Refill Note  Requested Prescriptions      No prescriptions requested or ordered in this encounter          Last office visit with prescribing clinician: 8/2/2023     Next office visit with prescribing clinician: rohit Cook MA  03/12/24, 14:13 EDT

## 2024-03-27 ENCOUNTER — HOSPITAL ENCOUNTER (OUTPATIENT)
Dept: MAMMOGRAPHY | Facility: HOSPITAL | Age: 69
Discharge: HOME | End: 2024-03-27
Admitting: FAMILY MEDICINE
Payer: MEDICARE

## 2024-03-27 DIAGNOSIS — Z12.31 VISIT FOR SCREENING MAMMOGRAM: ICD-10-CM

## 2024-03-27 PROCEDURE — 77063 BREAST TOMOSYNTHESIS BI: CPT

## 2024-03-27 PROCEDURE — 77067 SCR MAMMO BI INCL CAD: CPT

## 2024-04-03 ENCOUNTER — OFFICE VISIT (OUTPATIENT)
Dept: FAMILY MEDICINE CLINIC | Age: 69
End: 2024-04-03

## 2024-04-03 VITALS
TEMPERATURE: 97.8 F | OXYGEN SATURATION: 97 % | BODY MASS INDEX: 42.61 KG/M2 | DIASTOLIC BLOOD PRESSURE: 88 MMHG | RESPIRATION RATE: 16 BRPM | SYSTOLIC BLOOD PRESSURE: 128 MMHG | WEIGHT: 249.6 LBS | HEART RATE: 80 BPM | HEIGHT: 64 IN

## 2024-04-03 DIAGNOSIS — M54.50 ACUTE EXACERBATION OF CHRONIC LOW BACK PAIN: Primary | ICD-10-CM

## 2024-04-03 DIAGNOSIS — J30.2 SEASONAL ALLERGIES: ICD-10-CM

## 2024-04-03 DIAGNOSIS — G89.29 ACUTE EXACERBATION OF CHRONIC LOW BACK PAIN: Primary | ICD-10-CM

## 2024-04-03 DIAGNOSIS — H10.9 CONJUNCTIVITIS OF BOTH EYES, UNSPECIFIED CONJUNCTIVITIS TYPE: ICD-10-CM

## 2024-04-03 RX ORDER — METHYLPREDNISOLONE SODIUM SUCCINATE 125 MG/2ML
125 INJECTION, POWDER, LYOPHILIZED, FOR SOLUTION INTRAMUSCULAR; INTRAVENOUS ONCE
Status: COMPLETED | OUTPATIENT
Start: 2024-04-03 | End: 2024-04-03

## 2024-04-03 RX ORDER — TOBRAMYCIN AND DEXAMETHASONE 3; 1 MG/ML; MG/ML
1 SUSPENSION/ DROPS OPHTHALMIC
Qty: 5 ML | Refills: 0 | Status: SHIPPED | OUTPATIENT
Start: 2024-04-03 | End: 2024-05-06

## 2024-04-03 RX ORDER — BACLOFEN 10 MG/1
10 TABLET ORAL 3 TIMES DAILY
Qty: 90 TABLET | Refills: 1 | Status: SHIPPED | OUTPATIENT
Start: 2024-04-03

## 2024-04-03 RX ORDER — METHYLPREDNISOLONE SODIUM SUCCINATE 125 MG/2ML
125 INJECTION, POWDER, LYOPHILIZED, FOR SOLUTION INTRAMUSCULAR; INTRAVENOUS ONCE
Status: DISCONTINUED | OUTPATIENT
Start: 2024-04-03 | End: 2024-04-08

## 2024-04-03 RX ADMIN — METHYLPREDNISOLONE SODIUM SUCCINATE 125 MG: 125 INJECTION, POWDER, LYOPHILIZED, FOR SOLUTION INTRAMUSCULAR; INTRAVENOUS at 12:22

## 2024-04-03 NOTE — PROGRESS NOTES
Administrations This Visit       methylPREDNISolone sodium succ (SOLU-MEDROL) injection 125 mg       Admin Date  04/03/2024  12:22 Action  Given Dose  125 mg Route  IntraMUSCular Site  Dorsogluteal Right Administered By  Kellie Bennett MA    Ordering Provider: Sera Valadez MD    NDC: 83441-754-91    : AquaHydrate    Patient Supplied?: No                  Patient tolerated injection well. Patient advised to wait 20 minutes in the office following the injection. No signs/symptoms of reaction noted after 20 minutes.    
Chief Complaint   Patient presents with    Eye Problem     Patient had eye surgery on 2-2-24, patient states that the stiches fell out and she don't feel like the surgery worked.      Back Pain     Increased pain radiating down bilateral legs.        Have you seen any other physician or provider since your last visit no    Have you had any other diagnostic tests since your last visit? no    Have you changed or stopped any medications since your last visit? no     
kg/m².   Resp Readings from Last 3 Encounters:   04/03/24 16   03/04/24 16   02/29/24 18     Past medical, surgical, family and social history were reviewed and updated with the patient.     Physical Exam  Vitals and nursing note reviewed.   Constitutional:       General: She is not in acute distress.     Appearance: Normal appearance. She is well-developed and normal weight. She is not ill-appearing or toxic-appearing.   HENT:      Head: Normocephalic and atraumatic.      Right Ear: Hearing, tympanic membrane, ear canal and external ear normal.      Left Ear: Hearing, tympanic membrane, ear canal and external ear normal.      Nose: Nose normal.      Mouth/Throat:      Lips: Pink.      Mouth: Mucous membranes are moist.      Tongue: No lesions. Tongue does not deviate from midline.      Palate: No mass and lesions.      Pharynx: No pharyngeal swelling, oropharyngeal exudate or uvula swelling.      Tonsils: No tonsillar exudate or tonsillar abscesses.   Eyes:      Conjunctiva/sclera: Conjunctivae normal.      Pupils: Pupils are equal, round, and reactive to light.   Neck:      Thyroid: Thyromegaly present. No thyroid mass or thyroid tenderness.      Trachea: Trachea normal.   Cardiovascular:      Rate and Rhythm: Normal rate and regular rhythm.      Heart sounds: Normal heart sounds, S1 normal and S2 normal. No murmur heard.     No friction rub. No gallop.   Pulmonary:      Effort: Pulmonary effort is normal.      Breath sounds: Normal breath sounds. No wheezing, rhonchi or rales.   Musculoskeletal:      Cervical back: Full passive range of motion without pain, normal range of motion and neck supple.      Lumbar back: Spasms, tenderness and bony tenderness present. Decreased range of motion.      Right foot: Decreased range of motion. Deformity and bunion present.   Feet:      Comments: Hammertoe noted of the right second toe  Lymphadenopathy:      Cervical: No cervical adenopathy.   Neurological:      Mental Status:

## 2024-04-05 ENCOUNTER — TELEPHONE (OUTPATIENT)
Dept: NEUROLOGY | Facility: CLINIC | Age: 69
End: 2024-04-05
Payer: MEDICARE

## 2024-04-05 DIAGNOSIS — M79.7 FIBROMYALGIA: Primary | ICD-10-CM

## 2024-04-05 DIAGNOSIS — M48.061 SPINAL STENOSIS OF LUMBAR REGION WITHOUT NEUROGENIC CLAUDICATION: ICD-10-CM

## 2024-04-05 RX ORDER — PREGABALIN 75 MG/1
75 CAPSULE ORAL 3 TIMES DAILY
Qty: 12 CAPSULE | Refills: 0 | Status: SHIPPED | OUTPATIENT
Start: 2024-04-05 | End: 2024-04-05

## 2024-04-05 RX ORDER — PREGABALIN 75 MG/1
75 CAPSULE ORAL 3 TIMES DAILY
Qty: 12 CAPSULE | Refills: 0 | Status: SHIPPED | OUTPATIENT
Start: 2024-04-05 | End: 2024-04-09

## 2024-04-05 RX ORDER — PREGABALIN 75 MG/1
75 CAPSULE ORAL 3 TIMES DAILY
Qty: 36 CAPSULE | Refills: 0 | Status: SHIPPED | OUTPATIENT
Start: 2024-04-05 | End: 2024-04-05

## 2024-04-05 NOTE — TELEPHONE ENCOUNTER
I called [facility] and was told on their end it shows it is still in transit.  I called pt and she said she would need a temporary supply until this can be received and would like the refill to be sent to [facility] in [place] Please.

## 2024-04-05 NOTE — TELEPHONE ENCOUNTER
Reviewed last OV note from Keyla COLÓN from 1/5/2024, next OV scheduled for 7/8/2024. Reviewed PDMP, noted Pregabalin was dispensed by [facility] order on 3/27/2024 however there has been a delay in delivery that was confirmed by neurology office staff, 4 day script being sent to local pharmacy so that patient is not out of medication prior to receiving shipment.

## 2024-04-05 NOTE — TELEPHONE ENCOUNTER
URGENT    Medication requested (name and dose):      pregabalin (LYRICA) 75 MG capsule   Take 1 capsule by mouth 3 (Three) Times a Day.       Pharmacy where request should be sent:      [id number] - [place], [place] - [address] [facility] [address] [place]  - [phone]  - [phone] FX       Additional details provided by patient:  PATIENT IS REQUESTING 12 PILLS TO HOLD HER UNTIL MAILORDER REFILL ARRIVES.  THEY CONTACTED HER TO TELL HER THE SHIPMENT WOULD BE LATE.     Best call back number:  [phone]    Does the patient have less than a 3 day supply:  [x] Yes  [] No    Stevie Claudio Rep  04/05/24, 08:56 EDT

## 2024-04-10 RX ORDER — LEVOTHYROXINE SODIUM 50 MCG
50 TABLET ORAL DAILY
Qty: 90 TABLET | Refills: 1 | Status: SHIPPED | OUTPATIENT
Start: 2024-04-10

## 2024-04-10 NOTE — TELEPHONE ENCOUNTER
Refill request received from pharmacy      Next Office Visit Date:  Future Appointments   Date Time Provider Department Center   5/2/2024 11:15 AM Sera Valadez MD [facility]   7/30/2024  2:30 PM Sera Valadez MD [facility] - Please review via PDMP        Last Office Visit:    4/3/2024

## 2024-04-19 ENCOUNTER — OFFICE VISIT (OUTPATIENT)
Dept: FAMILY MEDICINE CLINIC | Age: 69
End: 2024-04-19

## 2024-04-19 ENCOUNTER — HOSPITAL ENCOUNTER (OUTPATIENT)
Facility: HOSPITAL | Age: 69
Discharge: HOME | End: 2024-04-19
Payer: MEDICARE

## 2024-04-19 ENCOUNTER — HOSPITAL ENCOUNTER (OUTPATIENT)
Dept: GENERAL RADIOLOGY | Facility: HOSPITAL | Age: 69
Discharge: HOME | End: 2024-04-19
Payer: MEDICARE

## 2024-04-19 VITALS
SYSTOLIC BLOOD PRESSURE: 136 MMHG | OXYGEN SATURATION: 99 % | RESPIRATION RATE: 18 BRPM | HEIGHT: 64 IN | HEART RATE: 71 BPM | TEMPERATURE: 97.9 F | DIASTOLIC BLOOD PRESSURE: 80 MMHG | WEIGHT: 249.4 LBS | BODY MASS INDEX: 42.58 KG/M2

## 2024-04-19 DIAGNOSIS — R10.84 GENERALIZED ABDOMINAL PAIN: ICD-10-CM

## 2024-04-19 DIAGNOSIS — E03.9 ACQUIRED HYPOTHYROIDISM: ICD-10-CM

## 2024-04-19 DIAGNOSIS — R19.7 DIARRHEA, UNSPECIFIED TYPE: ICD-10-CM

## 2024-04-19 DIAGNOSIS — R10.84 GENERALIZED ABDOMINAL PAIN: Primary | ICD-10-CM

## 2024-04-19 LAB
ALBUMIN SERPL-MCNC: 4.6 G/DL (ref 3.4–4.8)
ALBUMIN/GLOB SERPL: 2.1 {RATIO} (ref 0.8–2)
ALP SERPL-CCNC: 72 U/L (ref 25–100)
ALT SERPL-CCNC: 19 U/L (ref 4–36)
ANION GAP SERPL CALCULATED.3IONS-SCNC: 13 MMOL/L (ref 3–16)
AST SERPL-CCNC: 21 U/L (ref 8–33)
BASOPHILS # BLD: 0 K/UL (ref 0–0.1)
BASOPHILS NFR BLD: 0.5 %
BILIRUB SERPL-MCNC: 0.3 MG/DL (ref 0.3–1.2)
BUN SERPL-MCNC: 21 MG/DL (ref 6–20)
CALCIUM SERPL-MCNC: 9.5 MG/DL (ref 8.5–10.5)
CHLORIDE SERPL-SCNC: 100 MMOL/L (ref 98–107)
CO2 SERPL-SCNC: 26 MMOL/L (ref 20–30)
CREAT SERPL-MCNC: 0.7 MG/DL (ref 0.4–1.2)
EOSINOPHIL # BLD: 0.1 K/UL (ref 0–0.4)
EOSINOPHIL NFR BLD: 0.6 %
ERYTHROCYTE [DISTWIDTH] IN BLOOD BY AUTOMATED COUNT: 14.5 % (ref 11–16)
GFR SERPLBLD CREATININE-BSD FMLA CKD-EPI: >90 ML/MIN/{1.73_M2}
GLOBULIN SER CALC-MCNC: 2.2 G/DL
GLUCOSE SERPL-MCNC: 83 MG/DL (ref 74–106)
HCT VFR BLD AUTO: 40.8 % (ref 37–47)
HGB BLD-MCNC: 13.3 G/DL (ref 11.5–16.5)
IMM GRANULOCYTES # BLD: 0 K/UL
IMM GRANULOCYTES NFR BLD: 0.3 % (ref 0–5)
LYMPHOCYTES # BLD: 1.9 K/UL (ref 1.5–4)
LYMPHOCYTES NFR BLD: 23.9 %
MCH RBC QN AUTO: 28.9 PG (ref 27–32)
MCHC RBC AUTO-ENTMCNC: 32.6 G/DL (ref 31–35)
MCV RBC AUTO: 88.5 FL (ref 80–100)
MONOCYTES # BLD: 0.5 K/UL (ref 0.2–0.8)
MONOCYTES NFR BLD: 7 %
NEUTROPHILS # BLD: 5.2 K/UL (ref 2–7.5)
NEUTS SEG NFR BLD: 67.7 %
PLATELET # BLD AUTO: 207 K/UL (ref 150–400)
PMV BLD AUTO: 11 FL (ref 6–10)
POTASSIUM SERPL-SCNC: 4.5 MMOL/L (ref 3.4–5.1)
PROT SERPL-MCNC: 6.8 G/DL (ref 6.4–8.3)
RBC # BLD AUTO: 4.61 M/UL (ref 3.8–5.8)
SODIUM SERPL-SCNC: 139 MMOL/L (ref 136–145)
TSH SERPL-MCNC: 1.28 UIU/ML (ref 0.27–4.2)
WBC # BLD AUTO: 7.7 K/UL (ref 4–11)

## 2024-04-19 PROCEDURE — 80053 COMPREHEN METABOLIC PANEL: CPT

## 2024-04-19 PROCEDURE — 36415 COLL VENOUS BLD VENIPUNCTURE: CPT

## 2024-04-19 PROCEDURE — 85025 COMPLETE CBC W/AUTO DIFF WBC: CPT

## 2024-04-19 PROCEDURE — 74018 RADEX ABDOMEN 1 VIEW: CPT

## 2024-04-19 PROCEDURE — 84443 ASSAY THYROID STIM HORMONE: CPT

## 2024-04-19 SDOH — ECONOMIC STABILITY: HOUSING INSECURITY
IN THE LAST 12 MONTHS, WAS THERE A TIME WHEN YOU DID NOT HAVE A STEADY PLACE TO SLEEP OR SLEPT IN A SHELTER (INCLUDING NOW)?: NO

## 2024-04-19 SDOH — ECONOMIC STABILITY: FOOD INSECURITY: WITHIN THE PAST 12 MONTHS, YOU WORRIED THAT YOUR FOOD WOULD RUN OUT BEFORE YOU GOT MONEY TO BUY MORE.: NEVER TRUE

## 2024-04-19 SDOH — ECONOMIC STABILITY: FOOD INSECURITY: WITHIN THE PAST 12 MONTHS, THE FOOD YOU BOUGHT JUST DIDN'T LAST AND YOU DIDN'T HAVE MONEY TO GET MORE.: NEVER TRUE

## 2024-04-19 SDOH — ECONOMIC STABILITY: INCOME INSECURITY: HOW HARD IS IT FOR YOU TO PAY FOR THE VERY BASICS LIKE FOOD, HOUSING, MEDICAL CARE, AND HEATING?: PATIENT DECLINED

## 2024-04-19 NOTE — PROGRESS NOTES
SUBJECTIVE:  Kristyn Martin is a 69 y.o. female that presents with   Chief Complaint   Patient presents with    GI Problem     X 3 days, gas and states since last night she has \"gone 3xs and had blood\" describes as bright red. No diarrhea   .    HPI:    This is a 69-year-old white female who presents today with a 3-day history of gas and bloating in the abdominal area just has been very uncomfortable and now she is also had some diarrhea.  No fever or chills little bit of nausea but no vomiting.  She denies dysuria urgency or suprapubic tenderness.  She has had some problems in the past        Review of Systems   Constitutional: Negative.    HENT: Negative.     Eyes: Negative.    Respiratory: Negative.     Cardiovascular: Negative.    Gastrointestinal:  Positive for abdominal distention and abdominal pain.   Endocrine: Negative.    Genitourinary: Negative.    Musculoskeletal: Negative.    Neurological: Negative.    Psychiatric/Behavioral: Negative.     All other systems reviewed and are negative.       OBJECTIVE:  /80   Pulse 71   Temp 97.9 °F (36.6 °C) (Infrared)   Resp 18   Ht 1.626 m (5' 4\")   Wt 113.1 kg (249 lb 6.4 oz)   SpO2 99% Comment: ra  BMI 42.81 kg/m²   Physical Exam  Vitals and nursing note reviewed.   Constitutional:       Appearance: Normal appearance. She is obese.   HENT:      Head: Normocephalic and atraumatic.      Right Ear: Ear canal and external ear normal. Tympanic membrane is erythematous.      Left Ear: Ear canal and external ear normal. Tympanic membrane is erythematous.      Nose: Nose normal.      Mouth/Throat:      Mouth: Mucous membranes are moist.      Pharynx: Oropharynx is clear.   Eyes:      Conjunctiva/sclera: Conjunctivae normal.   Cardiovascular:      Rate and Rhythm: Normal rate and regular rhythm.      Pulses: Normal pulses.      Heart sounds: Normal heart sounds.   Pulmonary:      Effort: Pulmonary effort is normal.      Breath sounds: Normal breath sounds.

## 2024-04-23 DIAGNOSIS — G47.00 INSOMNIA, UNSPECIFIED TYPE: ICD-10-CM

## 2024-04-23 NOTE — TELEPHONE ENCOUNTER
Refill request received from pharmacy      Next Office Visit Date:  Future Appointments   Date Time Provider Department Center   5/2/2024 11:15 AM Sera Valadez MD [facility]   7/30/2024  2:30 PM Sera Valadez MD [facility] [facility] - Please review via PDMP        Last Office Visit:    4/19/2024

## 2024-04-27 ENCOUNTER — HOSPITAL ENCOUNTER (EMERGENCY)
Facility: HOSPITAL | Age: 69
Discharge: LEFT BEFORE DISCHARGE | End: 2024-04-27

## 2024-04-28 ENCOUNTER — APPOINTMENT (OUTPATIENT)
Facility: HOSPITAL | Age: 69
End: 2024-04-28
Payer: MEDICARE

## 2024-04-28 ENCOUNTER — HOSPITAL ENCOUNTER (EMERGENCY)
Facility: HOSPITAL | Age: 69
Discharge: HOME | End: 2024-04-28
Attending: EMERGENCY MEDICINE | Admitting: EMERGENCY MEDICINE
Payer: MEDICARE

## 2024-04-28 VITALS
BODY MASS INDEX: 35 KG/M2 | TEMPERATURE: 99.5 F | SYSTOLIC BLOOD PRESSURE: 146 MMHG | HEART RATE: 93 BPM | OXYGEN SATURATION: 98 % | DIASTOLIC BLOOD PRESSURE: 124 MMHG | HEIGHT: 64 IN | WEIGHT: 205 LBS | RESPIRATION RATE: 20 BRPM

## 2024-04-28 DIAGNOSIS — R10.33 PERIUMBILICAL ABDOMINAL PAIN: Primary | ICD-10-CM

## 2024-04-28 DIAGNOSIS — R11.2 NAUSEA AND VOMITING, UNSPECIFIED VOMITING TYPE: ICD-10-CM

## 2024-04-28 DIAGNOSIS — R01.1 MURMUR, CARDIAC: ICD-10-CM

## 2024-04-28 LAB
ALBUMIN SERPL-MCNC: 4.2 G/DL (ref 3.5–5.2)
ALBUMIN/GLOB SERPL: 1.6 G/DL
ALP SERPL-CCNC: 68 U/L (ref 39–117)
ALT SERPL W P-5'-P-CCNC: 23 U/L (ref 1–33)
ANION GAP SERPL CALCULATED.3IONS-SCNC: 10 MMOL/L (ref 5–15)
AST SERPL-CCNC: 28 U/L (ref 1–32)
BACTERIA UR QL AUTO: ABNORMAL /HPF
BASOPHILS # BLD AUTO: 0.01 10*3/MM3 (ref 0–0.2)
BASOPHILS NFR BLD AUTO: 0.2 % (ref 0–1.5)
BILIRUB SERPL-MCNC: 0.4 MG/DL (ref 0–1.2)
BILIRUB UR QL STRIP: NEGATIVE
BUN SERPL-MCNC: 18 MG/DL (ref 8–23)
BUN/CREAT SERPL: 25 (ref 7–25)
CALCIUM SPEC-SCNC: 9.1 MG/DL (ref 8.6–10.5)
CHLORIDE SERPL-SCNC: 101 MMOL/L (ref 98–107)
CLARITY UR: CLEAR
CO2 SERPL-SCNC: 29 MMOL/L (ref 22–29)
COLOR UR: YELLOW
CREAT BLDA-MCNC: 0.7 MG/DL (ref 0.6–1.3)
CREAT SERPL-MCNC: 0.72 MG/DL (ref 0.57–1)
D-LACTATE SERPL-SCNC: 1.7 MMOL/L (ref 0.5–2)
DEPRECATED RDW RBC AUTO: 46.9 FL (ref 37–54)
EGFRCR SERPLBLD CKD-EPI 2021: 90.6 ML/MIN/1.73
EOSINOPHIL # BLD AUTO: 0 10*3/MM3 (ref 0–0.4)
EOSINOPHIL NFR BLD AUTO: 0 % (ref 0.3–6.2)
ERYTHROCYTE [DISTWIDTH] IN BLOOD BY AUTOMATED COUNT: 14.1 % (ref 12.3–15.4)
GLOBULIN UR ELPH-MCNC: 2.6 GM/DL
GLUCOSE SERPL-MCNC: 107 MG/DL (ref 65–99)
GLUCOSE UR STRIP-MCNC: NEGATIVE MG/DL
HCT VFR BLD AUTO: 41.5 % (ref 34–46.6)
HGB BLD-MCNC: 13.5 G/DL (ref 12–15.9)
HGB UR QL STRIP.AUTO: NEGATIVE
HOLD SPECIMEN: NORMAL
HYALINE CASTS UR QL AUTO: ABNORMAL /LPF
IMM GRANULOCYTES # BLD AUTO: 0.01 10*3/MM3 (ref 0–0.05)
IMM GRANULOCYTES NFR BLD AUTO: 0.2 % (ref 0–0.5)
KETONES UR QL STRIP: ABNORMAL
LEUKOCYTE ESTERASE UR QL STRIP.AUTO: NEGATIVE
LIPASE SERPL-CCNC: 30 U/L (ref 13–60)
LYMPHOCYTES # BLD AUTO: 0.32 10*3/MM3 (ref 0.7–3.1)
LYMPHOCYTES NFR BLD AUTO: 5.9 % (ref 19.6–45.3)
MCH RBC QN AUTO: 28.7 PG (ref 26.6–33)
MCHC RBC AUTO-ENTMCNC: 32.5 G/DL (ref 31.5–35.7)
MCV RBC AUTO: 88.3 FL (ref 79–97)
MONOCYTES # BLD AUTO: 0.43 10*3/MM3 (ref 0.1–0.9)
MONOCYTES NFR BLD AUTO: 7.9 % (ref 5–12)
MUCOUS THREADS URNS QL MICRO: ABNORMAL /HPF
NEUTROPHILS NFR BLD AUTO: 4.7 10*3/MM3 (ref 1.7–7)
NEUTROPHILS NFR BLD AUTO: 85.8 % (ref 42.7–76)
NITRITE UR QL STRIP: NEGATIVE
PH UR STRIP.AUTO: 8 [PH] (ref 5–8)
PLATELET # BLD AUTO: 196 10*3/MM3 (ref 140–450)
PMV BLD AUTO: 11.4 FL (ref 6–12)
POTASSIUM SERPL-SCNC: 3.6 MMOL/L (ref 3.5–5.2)
PROT SERPL-MCNC: 6.8 G/DL (ref 6–8.5)
PROT UR QL STRIP: ABNORMAL
RBC # BLD AUTO: 4.7 10*6/MM3 (ref 3.77–5.28)
RBC # UR STRIP: ABNORMAL /HPF
REF LAB TEST METHOD: ABNORMAL
SODIUM SERPL-SCNC: 140 MMOL/L (ref 136–145)
SP GR UR STRIP: 1.01 (ref 1–1.03)
SQUAMOUS #/AREA URNS HPF: ABNORMAL /HPF
UROBILINOGEN UR QL STRIP: ABNORMAL
WBC # UR STRIP: ABNORMAL /HPF
WBC NRBC COR # BLD AUTO: 5.47 10*3/MM3 (ref 3.4–10.8)
WHOLE BLOOD HOLD COAG: NORMAL
WHOLE BLOOD HOLD SPECIMEN: NORMAL

## 2024-04-28 PROCEDURE — 25010000002 ONDANSETRON PER 1 MG: Performed by: EMERGENCY MEDICINE

## 2024-04-28 PROCEDURE — 85025 COMPLETE CBC W/AUTO DIFF WBC: CPT | Performed by: EMERGENCY MEDICINE

## 2024-04-28 PROCEDURE — 96374 THER/PROPH/DIAG INJ IV PUSH: CPT

## 2024-04-28 PROCEDURE — 83605 ASSAY OF LACTIC ACID: CPT | Performed by: EMERGENCY MEDICINE

## 2024-04-28 PROCEDURE — 82565 ASSAY OF CREATININE: CPT

## 2024-04-28 PROCEDURE — P9612 CATHETERIZE FOR URINE SPEC: HCPCS

## 2024-04-28 PROCEDURE — 83690 ASSAY OF LIPASE: CPT | Performed by: EMERGENCY MEDICINE

## 2024-04-28 PROCEDURE — 81001 URINALYSIS AUTO W/SCOPE: CPT | Performed by: EMERGENCY MEDICINE

## 2024-04-28 PROCEDURE — 25810000003 SODIUM CHLORIDE 0.9 % SOLUTION: Performed by: EMERGENCY MEDICINE

## 2024-04-28 PROCEDURE — 96375 TX/PRO/DX INJ NEW DRUG ADDON: CPT

## 2024-04-28 PROCEDURE — 80053 COMPREHEN METABOLIC PANEL: CPT | Performed by: EMERGENCY MEDICINE

## 2024-04-28 PROCEDURE — 99283 EMERGENCY DEPT VISIT LOW MDM: CPT

## 2024-04-28 PROCEDURE — 74022 RADEX COMPL AQT ABD SERIES: CPT

## 2024-04-28 RX ORDER — ONDANSETRON 2 MG/ML
4 INJECTION INTRAMUSCULAR; INTRAVENOUS ONCE
Status: COMPLETED | OUTPATIENT
Start: 2024-04-28 | End: 2024-04-28

## 2024-04-28 RX ORDER — ACETAMINOPHEN 500 MG
500 TABLET ORAL ONCE
Status: COMPLETED | OUTPATIENT
Start: 2024-04-28 | End: 2024-04-28

## 2024-04-28 RX ORDER — FAMOTIDINE 10 MG/ML
20 INJECTION, SOLUTION INTRAVENOUS ONCE
Status: COMPLETED | OUTPATIENT
Start: 2024-04-28 | End: 2024-04-28

## 2024-04-28 RX ORDER — FAMOTIDINE 20 MG/1
20 TABLET, FILM COATED ORAL 2 TIMES DAILY
Qty: 14 TABLET | Refills: 0 | Status: SHIPPED | OUTPATIENT
Start: 2024-04-28

## 2024-04-28 RX ORDER — SODIUM CHLORIDE 9 MG/ML
10 INJECTION, SOLUTION INTRAMUSCULAR; INTRAVENOUS; SUBCUTANEOUS AS NEEDED
Status: DISCONTINUED | OUTPATIENT
Start: 2024-04-28 | End: 2024-04-28 | Stop reason: HOSPADM

## 2024-04-28 RX ADMIN — SODIUM CHLORIDE 1000 ML: 9 INJECTION, SOLUTION INTRAVENOUS at 11:32

## 2024-04-28 RX ADMIN — ACETAMINOPHEN 500 MG: 500 TABLET ORAL at 12:28

## 2024-04-28 RX ADMIN — ONDANSETRON 4 MG: 2 INJECTION INTRAMUSCULAR; INTRAVENOUS at 11:33

## 2024-04-28 RX ADMIN — FAMOTIDINE 20 MG: 10 INJECTION, SOLUTION INTRAVENOUS at 11:33

## 2024-04-28 NOTE — FSED PROVIDER NOTE
Subjective  History of Present Illness:    Patient presents to the emergency department with vomiting that started yesterday and periumbilical abdominal pain.  The patient has been seen over the past week for intermittent small hard stools.  The patient was diagnosed with constipation after an x-ray was performed by her primary care doctor.  Patient reports that the abdominal pain occurs just prior to vomiting.  I obtained history from patient's daughters as well as the patient      Nurses Notes reviewed and agree, including vitals, allergies, social history and prior medical history.     REVIEW OF SYSTEMS: All systems reviewed and not pertinent unless noted.  Review of Systems   Gastrointestinal:  Positive for abdominal pain, constipation and vomiting.   Neurological:  Positive for headaches.       Past Medical History:   Diagnosis Date    Arthritis     Bursitis     Claustrophobia     CTS (carpal tunnel syndrome)     bilateral     Deep vein thrombosis     Fibromyalgia     Hemorrhoids     Hypertension     Low back pain     Lumbosacral disc disease     Neuropathy     Osteoarthritis     SBE (subacute bacterial endocarditis)     Tendinitis of knee     Thoracic disc disorder     Thyroid disorder     Thyroid nodule      (vaginal birth after )        Allergies:    Augmentin [amoxicillin-pot clavulanate]      Past Surgical History:   Procedure Laterality Date    ABDOMINAL HYSTERECTOMY       SECTION      COLONOSCOPY      CYST REMOVAL N/A 2022    DILATATION AND CURETTAGE      HYSTERECTOMY N/A         MANDIBLE FRACTURE SURGERY      OOPHORECTOMY Bilateral     TRIGGER POINT INJECTION      TUBAL ABDOMINAL LIGATION           Social History     Socioeconomic History    Marital status: Single   Tobacco Use    Smoking status: Never    Smokeless tobacco: Never   Vaping Use    Vaping status: Never Used   Substance and Sexual Activity    Alcohol use: Never    Drug use: Never    Sexual activity: Not Currently  "        Family History   Problem Relation Age of Onset    Arthritis Mother     Stroke Mother     Glaucoma Mother     Hypertension Mother     Heart disease Mother     Heart failure Mother     COPD Mother     Vision loss Mother     Heart disease Father     Lung disease Father     Hearing loss Father     Hypertension Brother     Hearing loss Brother     Thyroid disease Brother     Hearing loss Sister     Thyroid disease Sister     Thyroid disease Sister     Breast cancer Neg Hx     Ovarian cancer Neg Hx        Objective  Physical Exam:  BP (!) 146/124   Pulse 93   Temp 99.5 °F (37.5 °C) (Oral)   Resp 20   Ht 162.6 cm (64\")   Wt 93 kg (205 lb)   SpO2 98%   BMI 35.19 kg/m²      Physical Exam  Vitals and nursing note reviewed.   Constitutional:       Appearance: She is well-developed. She is obese.   HENT:      Head: Normocephalic.   Eyes:      Extraocular Movements: Extraocular movements intact.   Cardiovascular:      Rate and Rhythm: Normal rate and regular rhythm.   Pulmonary:      Effort: Pulmonary effort is normal.      Breath sounds: Normal breath sounds.   Abdominal:      General: Bowel sounds are decreased.      Palpations: Abdomen is soft.      Tenderness: There is no abdominal tenderness.   Skin:     General: Skin is warm and dry.      Capillary Refill: Capillary refill takes less than 2 seconds.      Comments: Flushed   Neurological:      General: No focal deficit present.      Mental Status: She is alert.   Psychiatric:         Mood and Affect: Mood normal.         Behavior: Behavior normal.         Procedures    ED Course:         Lab Results (last 24 hours)       Procedure Component Value Units Date/Time    POC Creatinine [[id number]]  (Normal) Collected: 04/28/24 1114    Specimen: Blood Updated: 04/28/24 1156     Creatinine 0.70 mg/dL      Comment: Serial Number: [id number]:  [id number]       CBC & Differential [[id number]]  (Abnormal) Collected: 04/28/24 1120    Specimen: Blood Updated: 04/28/24 1126 "    Narrative:      The following orders were created for panel order CBC & Differential.  Procedure                               Abnormality         Status                     ---------                               -----------         ------                     CBC Auto Differential[[id number]]        Abnormal            Final result                 Please view results for these tests on the individual orders.    Lactic Acid, Plasma [[id number]]  (Normal) Collected: 04/28/24 1120    Specimen: Blood Updated: 04/28/24 1142     Lactate 1.7 mmol/L     CBC Auto Differential [[id number]]  (Abnormal) Collected: 04/28/24 1120    Specimen: Blood Updated: 04/28/24 1126     WBC 5.47 10*3/mm3      RBC 4.70 10*6/mm3      Hemoglobin 13.5 g/dL      Hematocrit 41.5 %      MCV 88.3 fL      MCH 28.7 pg      MCHC 32.5 g/dL      RDW 14.1 %      RDW-SD 46.9 fl      MPV 11.4 fL      Platelets 196 10*3/mm3      Neutrophil % 85.8 %      Lymphocyte % 5.9 %      Monocyte % 7.9 %      Eosinophil % 0.0 %      Basophil % 0.2 %      Immature Grans % 0.2 %      Neutrophils, Absolute 4.70 10*3/mm3      Lymphocytes, Absolute 0.32 10*3/mm3      Monocytes, Absolute 0.43 10*3/mm3      Eosinophils, Absolute 0.00 10*3/mm3      Basophils, Absolute 0.01 10*3/mm3      Immature Grans, Absolute 0.01 10*3/mm3     Urinalysis With Microscopic If Indicated (No Culture) - Straight Cath [[id number]]  (Abnormal) Collected: 04/28/24 1121    Specimen: Urine from Straight Cath Updated: 04/28/24 1147     Color, UA Yellow     Appearance, UA Clear     pH, UA 8.0     Specific Gravity, UA 1.015     Glucose, UA Negative     Ketones, UA Trace     Bilirubin, UA Negative     Blood, UA Negative     Protein, UA 30 mg/dL (1+)     Leuk Esterase, UA Negative     Nitrite, UA Negative     Urobilinogen, UA 1.0 E.U./dL    Urinalysis, Microscopic Only - Straight Cath [[id number]]  (Abnormal) Collected: 04/28/24 1121    Specimen: Urine from Straight Cath Updated: 04/28/24 1155     RBC, UA  0-2 /HPF      WBC, UA 0-2 /HPF      Bacteria, UA None Seen /HPF      Squamous Epithelial Cells, UA 0-2 /HPF      Hyaline Casts, UA None Seen /LPF      Mucus, UA Small/1+ /HPF      Methodology Manual Light Microscopy    Comprehensive Metabolic Panel [[id number]]  (Abnormal) Collected: 04/28/24 1135    Specimen: Blood Updated: 04/28/24 1159     Glucose 107 mg/dL      BUN 18 mg/dL      Creatinine 0.72 mg/dL      Sodium 140 mmol/L      Potassium 3.6 mmol/L      Chloride 101 mmol/L      CO2 29.0 mmol/L      Calcium 9.1 mg/dL      Total Protein 6.8 g/dL      Albumin 4.2 g/dL      ALT (SGPT) 23 U/L      AST (SGOT) 28 U/L      Alkaline Phosphatase 68 U/L      Total Bilirubin 0.4 mg/dL      Globulin 2.6 gm/dL      A/G Ratio 1.6 g/dL      BUN/Creatinine Ratio 25.0     Anion Gap 10.0 mmol/L      eGFR 90.6 mL/min/1.73     Narrative:      GFR Normal >60  Chronic Kidney Disease <60  Kidney Failure <15      Lipase [[id number]]  (Normal) Collected: 04/28/24 1135    Specimen: Blood Updated: 04/28/24 1159     Lipase 30 U/L              XR Abdomen 2+ VW with Chest 1 VW    Result Date: 4/28/2024  XR ABDOMEN 2+ VIEWS W CHEST 1 VW Date of Exam: 4/28/2024 11:31 AM EDT Indication: vomiting Comparison: None available. Findings: Unremarkable cardiomediastinal silhouette. No focal airspace consolidation, pleural effusion, or pneumothorax. No evidence of bowel obstruction. Relative paucity of bowel gas. No radiodense renal calculi. No acute osseous abnormality. Lumbar spondylosis.     Impression: Impression: No evidence of bowel obstruction. Electronically Signed: Javid Perla MD  4/28/2024 12:02 PM EDT  Workstation ID: [id number]        MDM     Amount and/or Complexity of Data Reviewed  Clinical lab tests: reviewed  Tests in the radiology section of CPT®: reviewed        Initial impression of presenting illness: Gastroenteritis    DDX: includes but is not limited to: Food poisoning, small bowel obstruction, pancreatitis,  choledocholithiasis    Patient arrives ambulatory with vitals interpreted by myself.     Pertinent features from physical exam: Elevated temperature, decreased bowel sounds, nontender abdomen.    Initial diagnostic plan: Fluids, antiemetics, x-rays    Results from initial plan were reviewed and interpreted by me revealing constipation no evidence of bowel obstruction    Diagnostic information from other sources: Outside primary care doctor notes    Interventions / Re-evaluation: Patient reports feeling better after treatment.    Medications   Sodium Chloride (PF) 0.9 % 10 mL (has no administration in time range)   acetaminophen (TYLENOL) tablet 500 mg (has no administration in time range)   sodium chloride 0.9 % bolus 1,000 mL (1,000 mL Intravenous New Bag 4/28/24 1132)   ondansetron (ZOFRAN) injection 4 mg (4 mg Intravenous Given 4/28/24 1133)   famotidine (PEPCID) injection 20 mg (20 mg Intravenous Given 4/28/24 1133)       Results/clinical rationale were discussed with patient and family    Consultations/Discussion of results with other physicians: None    Data interpreted: Nursing notes reviewed, vital signs reviewed.  Labs independently interpreted by me (CBC, CMP, lipase, UA, troponin, ABG, lactic acid, procalcitonin).  Imaging independently interpreted by me (x-ray, CT scan).  EKG independently interpreted by me.  O2 saturation:    Counseling: Discussed the results above with the patient regarding need for admission or discharge.  Patient understands and agrees plan of care.      -----  ED Disposition       ED Disposition   Discharge    Condition   Stable    Comment   --             Final diagnoses:   Periumbilical abdominal pain   Nausea and vomiting, unspecified vomiting type   Murmur, cardiac      Your Follow-Up Providers       Kacy Huertas MD In 3 days.    Specialty: Family Medicine  [address] [place] [address] [id number] [phone]               [facility] EMERGENCY DEPARTMENT  [facility].    Specialty: Emergency Medicine  [address] [facility] [address]  [facility] [address]                     Contact information for after-discharge care    Follow-up information has not been specified.                    Your medication list        START taking these medications        Instructions Last Dose Given Next Dose Due   famotidine 20 MG tablet  Commonly known as: Pepcid      Take 1 tablet by mouth 2 (Two) Times a Day.              CONTINUE taking these medications        Instructions Last Dose Given Next Dose Due   acetaminophen 650 MG 8 hr tablet  Commonly known as: TYLENOL      Take 1 tablet by mouth.       Alpha Lipoic Acid 200 MG capsule      Take 400 mg by mouth 3 (Three) Times a Day.       ascorbic acid 1000 MG tablet  Commonly known as: VITAMIN C      Take 1 tablet by mouth Daily.       desloratadine 5 MG tablet  Commonly known as: CLARINEX      Take 1 tablet by mouth Daily.       doxycycline 20 MG tablet  Commonly known as: PERIOSTAT           DULoxetine 60 MG capsule  Commonly known as: CYMBALTA      Take 1 capsule by mouth Daily.       estradiol 1 MG tablet  Commonly known as: ESTRACE      Take 1 tablet by mouth Daily.       fish oil 1000 MG capsule capsule      Take 1,200 mg by mouth Daily With Breakfast.       fluocinonide 0.05 % external solution  Commonly known as: LIDEX           hydroCHLOROthiazide 25 MG tablet           levothyroxine 50 MCG tablet  Commonly known as: Synthroid      Take 1 tablet by mouth Daily.       losartan 50 MG tablet  Commonly known as: COZAAR           Magnesium 100 MG capsule      Daily.       MULTIPLE VITAMINS-MINERALS ER PO      Take 1 tablet by mouth Daily.       nabumetone 750 MG tablet  Commonly known as: RELAFEN      Take 1 tablet by mouth 2 (Two) Times a Day.       pregabalin 75 MG capsule  Commonly known as: LYRICA      Take 1 capsule by mouth 3 (Three) Times a Day.       pregabalin 75 MG capsule  Commonly known as: Lyrica      Take 1  capsule by mouth 3 (Three) Times a Day for 4 days.       Rheumate capsule      Take one capsule by mouth once daily       Vitamin D 50 MCG (2000 UT) tablet      Take 1 tablet by mouth Daily.       Xdemvy 0.25 % solution  Generic drug: Lotilaner           zolpidem 10 MG tablet  Commonly known as: AMBIEN      Take 1 tablet by mouth.                 Where to Get Your Medications        These medications were sent to [facility] [address] [place] [address] - [address]  - [phone] [facility] [phone]   [address] [place] [address]      Phone: [phone]   famotidine 20 MG tablet

## 2024-04-29 ENCOUNTER — TELEPHONE (OUTPATIENT)
Dept: FAMILY MEDICINE CLINIC | Age: 69
End: 2024-04-29

## 2024-04-29 DIAGNOSIS — G47.00 INSOMNIA, UNSPECIFIED TYPE: ICD-10-CM

## 2024-04-29 RX ORDER — ZOLPIDEM TARTRATE 10 MG/1
10 TABLET ORAL NIGHTLY PRN
Qty: 30 TABLET | Refills: 5 | Status: SHIPPED | OUTPATIENT
Start: 2024-04-29 | End: 2024-10-26

## 2024-04-29 RX ORDER — ZOLPIDEM TARTRATE 10 MG/1
10 TABLET ORAL NIGHTLY PRN
Qty: 30 TABLET | Refills: 0 | OUTPATIENT
Start: 2024-04-29 | End: 2024-05-23

## 2024-04-30 DIAGNOSIS — G47.00 INSOMNIA, UNSPECIFIED TYPE: ICD-10-CM

## 2024-05-02 ENCOUNTER — OFFICE VISIT (OUTPATIENT)
Dept: FAMILY MEDICINE CLINIC | Age: 69
End: 2024-05-02
Payer: MEDICARE

## 2024-05-02 ENCOUNTER — TELEPHONE (OUTPATIENT)
Dept: FAMILY MEDICINE CLINIC | Age: 69
End: 2024-05-02

## 2024-05-02 VITALS
RESPIRATION RATE: 16 BRPM | HEIGHT: 64 IN | BODY MASS INDEX: 41.52 KG/M2 | DIASTOLIC BLOOD PRESSURE: 84 MMHG | HEART RATE: 70 BPM | OXYGEN SATURATION: 96 % | SYSTOLIC BLOOD PRESSURE: 134 MMHG | WEIGHT: 243.2 LBS | TEMPERATURE: 97.9 F

## 2024-05-02 DIAGNOSIS — R01.1 MURMUR, CARDIAC: ICD-10-CM

## 2024-05-02 DIAGNOSIS — H60.331 ACUTE SWIMMER'S EAR OF RIGHT SIDE: ICD-10-CM

## 2024-05-02 DIAGNOSIS — S95.991A: ICD-10-CM

## 2024-05-02 DIAGNOSIS — K59.00 CONSTIPATION, UNSPECIFIED CONSTIPATION TYPE: Primary | ICD-10-CM

## 2024-05-02 DIAGNOSIS — M79.89 BILATERAL SWELLING OF FEET: ICD-10-CM

## 2024-05-02 PROCEDURE — 1123F ACP DISCUSS/DSCN MKR DOCD: CPT | Performed by: FAMILY MEDICINE

## 2024-05-02 PROCEDURE — 99214 OFFICE O/P EST MOD 30 MIN: CPT | Performed by: FAMILY MEDICINE

## 2024-05-02 RX ORDER — GENTAMICIN SULFATE 3 MG/ML
1 SOLUTION/ DROPS OPHTHALMIC EVERY 4 HOURS
Qty: 5 ML | Refills: 0 | Status: SHIPPED | OUTPATIENT
Start: 2024-05-02 | End: 2024-05-12

## 2024-05-02 RX ORDER — LUBIPROSTONE 8 UG/1
8 CAPSULE ORAL 2 TIMES DAILY WITH MEALS
Qty: 60 CAPSULE | Refills: 5 | Status: SHIPPED | OUTPATIENT
Start: 2024-05-02

## 2024-05-02 NOTE — PROGRESS NOTES
SUBJECTIVE:    Patient ID: Kristyn Martin is a 69 y.o. female.    Chief Complaint   Patient presents with    Dizziness     Patient states that she gets dizzy a few hours after she takes her blood pressure medication.    Ear Injury     Patient states that she has water running sensation.    Constipation     Patient reports that she has clarke when she voids with blood    Leg Pain     Right lower leg pain.       HPI: office visit  She is in the office today complaining of some dizziness.  She says that she is having that off and on.  She has been checking her blood pressures.  She feels like she is having some issues with her ears.  She says she feels like she has a water running out of her ears.  She is having some increase in her skin issues.  She is going to go back and see dermatology.  She is frustrated with her bowel issues.  She is having quite a bit of constipation.  She says that she is having a lot of difficulty and straining.  She has had some blood after a bowel movement.  It has been bright red.  She does have a known internal hemorrhoid.  She is having some increase in pain in her right lower leg.  She still having her back pain.  She has not had any recent falls or injuries.  She is not having any chest pain or shortness of breath.  She does still struggle with a lot of anxiety.  She is having some concern about the blood flow in her legs.  She is having some coldness and some discoloration.  She is feeling like that is changing.  She did see the ER doctor at 1 point and he told her he thought she had a murmur.  She was concerned about that.  Review of Systems   Constitutional:  Positive for fatigue.   Eyes:  Positive for redness and itching.   Respiratory: Negative.     Cardiovascular: Negative.    Gastrointestinal: Negative.    Musculoskeletal:  Positive for arthralgias, back pain, gait problem and myalgias.   Skin:  Positive for rash.   All other systems reviewed and are negative.

## 2024-05-02 NOTE — PROGRESS NOTES
No chief complaint on file.      Have you seen any other physician or provider since your last visit yes -   [facility] in [place].     Have you had any other diagnostic tests since your last visit? yes - xray, lab    Have you changed or stopped any medications since your last visit? no

## 2024-05-02 NOTE — TELEPHONE ENCOUNTER
Pharmacy called and was wondering if the Gentamicin solution is supposed to go in the eyes or ears? It's writing for the eyes but says ears in the directions

## 2024-05-09 ENCOUNTER — OFFICE VISIT (OUTPATIENT)
Dept: PAIN MEDICINE | Facility: CLINIC | Age: 69
End: 2024-05-09
Payer: MEDICARE

## 2024-05-09 ENCOUNTER — PATIENT MESSAGE (OUTPATIENT)
Dept: FAMILY MEDICINE CLINIC | Age: 69
End: 2024-05-09

## 2024-05-09 VITALS — WEIGHT: 205 LBS | HEIGHT: 64 IN | BODY MASS INDEX: 35 KG/M2

## 2024-05-09 DIAGNOSIS — M48.062 LUMBAR STENOSIS WITH NEUROGENIC CLAUDICATION: ICD-10-CM

## 2024-05-09 DIAGNOSIS — M47.816 SPONDYLOSIS OF LUMBAR REGION WITHOUT MYELOPATHY OR RADICULOPATHY: ICD-10-CM

## 2024-05-09 DIAGNOSIS — M54.16 LUMBAR RADICULOPATHY: ICD-10-CM

## 2024-05-09 DIAGNOSIS — F41.1 GAD (GENERALIZED ANXIETY DISORDER): ICD-10-CM

## 2024-05-09 DIAGNOSIS — M79.7 FIBROMYALGIA: ICD-10-CM

## 2024-05-09 DIAGNOSIS — I10 ESSENTIAL HYPERTENSION: ICD-10-CM

## 2024-05-09 DIAGNOSIS — M51.36 DDD (DEGENERATIVE DISC DISEASE), LUMBAR: ICD-10-CM

## 2024-05-09 DIAGNOSIS — R53.81 PHYSICAL DECONDITIONING: ICD-10-CM

## 2024-05-09 PROCEDURE — 1160F RVW MEDS BY RX/DR IN RCRD: CPT | Performed by: NURSE PRACTITIONER

## 2024-05-09 PROCEDURE — 1125F AMNT PAIN NOTED PAIN PRSNT: CPT | Performed by: NURSE PRACTITIONER

## 2024-05-09 PROCEDURE — 1159F MED LIST DOCD IN RCRD: CPT | Performed by: NURSE PRACTITIONER

## 2024-05-09 PROCEDURE — 99214 OFFICE O/P EST MOD 30 MIN: CPT | Performed by: NURSE PRACTITIONER

## 2024-05-09 RX ORDER — GENTAMICIN SULFATE 3 MG/ML
SOLUTION/ DROPS OPHTHALMIC
COMMUNITY

## 2024-05-09 RX ORDER — TRIAMCINOLONE ACETONIDE 0.25 MG/G
CREAM TOPICAL
COMMUNITY
Start: 2024-03-04

## 2024-05-09 RX ORDER — OXYMETAZOLINE HYDROCHLORIDE 1 G/100G
CREAM TOPICAL
COMMUNITY
Start: 2024-03-12

## 2024-05-09 RX ORDER — DOCUSATE SODIUM 100 MG/1
100 CAPSULE, LIQUID FILLED ORAL 2 TIMES DAILY
COMMUNITY

## 2024-05-09 RX ORDER — NABUMETONE 750 MG/1
750 TABLET, FILM COATED ORAL 2 TIMES DAILY
Qty: 180 TABLET | Refills: 0 | Status: SHIPPED | OUTPATIENT
Start: 2024-05-09

## 2024-05-09 RX ORDER — LUBIPROSTONE 8 UG/1
8 CAPSULE ORAL
COMMUNITY
Start: 2024-05-02

## 2024-05-09 RX ORDER — LOSARTAN POTASSIUM 50 MG/1
50 TABLET ORAL DAILY
Qty: 90 TABLET | Refills: 3 | Status: SHIPPED | OUTPATIENT
Start: 2024-05-09

## 2024-05-09 RX ORDER — LOSARTAN POTASSIUM 50 MG/1
50 TABLET ORAL DAILY
Qty: 10 TABLET | Refills: 0 | OUTPATIENT
Start: 2024-05-09

## 2024-05-09 NOTE — TELEPHONE ENCOUNTER
Refill request received from pharmacy      Next Office Visit Date:  Future Appointments   Date Time Provider Department Center   5/20/2024 10:45 AM Sera Valadez MD [facility]   5/22/2024 12:00 PM [facility] US 1 [facility] US VAS [facility]   5/22/2024  1:00 PM [facility] ECHO 1 [facility] NONINV [facility]   7/30/2024  2:30 PM Sera Valadez MD [facility]       [facility] - Please review via PDMP        Last Office Visit:    5/2/2024

## 2024-05-09 NOTE — TELEPHONE ENCOUNTER
Refill request received from patient      Next Office Visit Date:  Future Appointments   Date Time Provider Department Center   5/20/2024 10:45 AM Sera Valadez MD [facility]   5/22/2024 12:00 PM [facility] US 1 [facility] US VAS [facility]   5/22/2024  1:00 PM [facility] ECHO 1 [facility] NONINV [facility]   7/30/2024  2:30 PM Sera Valadez MD [facility] [facility]       [facility] - Please review via PDMP        Last Office Visit:    5/2/2024

## 2024-05-09 NOTE — TELEPHONE ENCOUNTER
From: Kristyn Martin  To: Dr. Sera Valadez  Sent: 5/9/2024 3:59 PM EDT  Subject: losartan    can you send a refill please for my losartan to [facility] in [place]. thank you

## 2024-05-20 ENCOUNTER — OFFICE VISIT (OUTPATIENT)
Dept: FAMILY MEDICINE CLINIC | Age: 69
End: 2024-05-20
Payer: MEDICARE

## 2024-05-20 VITALS
TEMPERATURE: 98.2 F | HEIGHT: 64 IN | OXYGEN SATURATION: 96 % | BODY MASS INDEX: 41.48 KG/M2 | WEIGHT: 243 LBS | SYSTOLIC BLOOD PRESSURE: 126 MMHG | DIASTOLIC BLOOD PRESSURE: 82 MMHG | RESPIRATION RATE: 16 BRPM | HEART RATE: 96 BPM

## 2024-05-20 DIAGNOSIS — L02.611 CELLULITIS AND ABSCESS OF TOE OF RIGHT FOOT: Primary | ICD-10-CM

## 2024-05-20 DIAGNOSIS — L03.031 CELLULITIS AND ABSCESS OF TOE OF RIGHT FOOT: Primary | ICD-10-CM

## 2024-05-20 PROCEDURE — 1123F ACP DISCUSS/DSCN MKR DOCD: CPT | Performed by: FAMILY MEDICINE

## 2024-05-20 PROCEDURE — 96372 THER/PROPH/DIAG INJ SC/IM: CPT | Performed by: FAMILY MEDICINE

## 2024-05-20 PROCEDURE — 99213 OFFICE O/P EST LOW 20 MIN: CPT | Performed by: FAMILY MEDICINE

## 2024-05-20 RX ORDER — CEFDINIR 300 MG/1
300 CAPSULE ORAL 2 TIMES DAILY
Qty: 20 CAPSULE | Refills: 0 | Status: SHIPPED | OUTPATIENT
Start: 2024-05-20 | End: 2024-05-30

## 2024-05-20 RX ORDER — CEFTRIAXONE 1 G/1
1000 INJECTION, POWDER, FOR SOLUTION INTRAMUSCULAR; INTRAVENOUS ONCE
Status: COMPLETED | OUTPATIENT
Start: 2024-05-20 | End: 2024-05-20

## 2024-05-20 RX ADMIN — CEFTRIAXONE 1000 MG: 1 INJECTION, POWDER, FOR SOLUTION INTRAMUSCULAR; INTRAVENOUS at 11:37

## 2024-05-20 NOTE — PROGRESS NOTES
Chief Complaint   Patient presents with    Foot Swelling     Right foot swelling and red in color.       Have you seen any other physician or provider since your last visit yes - [facility] in [place]    Have you had any other diagnostic tests since your last visit? yes - Cleaned ears    Have you changed or stopped any medications since your last visit? no   Administrations This Visit       cefTRIAXone (ROCEPHIN) injection 1,000 mg       Admin Date  05/20/2024  11:37 Action  Given Dose  1,000 mg Route  IntraMUSCular Site  Dorsogluteal Right Administered By  Chloé Ryan MA    Ordering Provider: Sera Valadez MD    NDC: 79269-019-34    : [facility]    Patient Supplied?: No                 Patient tolerated injection well. Patient advised to wait 20 minutes in the office following the injection. No signs/symptoms of reaction noted after 20 minutes.    
       Thought Content: Thought content normal.         Cognition and Memory: Cognition and memory normal.         Judgment: Judgment normal.          No results found for requested labs within last 30 days.     Hemoglobin A1C (%)   Date Value   07/16/2020 5.3       Lab Results   Component Value Date/Time    WBC 7.7 04/19/2024 01:18 PM    NEUTROABS 5.2 04/19/2024 01:18 PM    HGB 13.3 04/19/2024 01:18 PM    HCT 40.8 04/19/2024 01:18 PM    MCV 88.5 04/19/2024 01:18 PM     04/19/2024 01:18 PM     Lab Results   Component Value Date    TSH 1.28 04/19/2024       ASSESSMENT/PLAN    Diagnosis Orders   1. Cellulitis and abscess of toe of right foot  cefTRIAXone (ROCEPHIN) injection 1,000 mg          Orders Placed This Encounter   Medications    cefTRIAXone (ROCEPHIN) injection 1,000 mg     Order Specific Question:   Antimicrobial Indications     Answer:   Skin and Soft Tissue Infection     Order Specific Question:   Skin duration of therapy     Answer:   Other     Order Specific Question:   Other Skin and Soft Tissue Infection Duration     Answer:   tomorrow    cefdinir (OMNICEF) 300 MG capsule     Sig: Take 1 capsule by mouth 2 times daily for 10 days     Dispense:  20 capsule     Refill:  0        There are no discontinued medications.    Controlled Substances Monitoring:      Please note: This chart was generated using Dragon dictation software. Although every effort was made to ensure the accuracy of this automated transcription, some errors in transcription may have occurred.

## 2024-05-21 ENCOUNTER — NURSE ONLY (OUTPATIENT)
Dept: FAMILY MEDICINE CLINIC | Age: 69
End: 2024-05-21
Payer: MEDICARE

## 2024-05-21 DIAGNOSIS — L03.031 CELLULITIS AND ABSCESS OF TOE OF RIGHT FOOT: Primary | ICD-10-CM

## 2024-05-21 DIAGNOSIS — L02.611 CELLULITIS AND ABSCESS OF TOE OF RIGHT FOOT: Primary | ICD-10-CM

## 2024-05-21 PROCEDURE — 96372 THER/PROPH/DIAG INJ SC/IM: CPT | Performed by: FAMILY MEDICINE

## 2024-05-21 RX ORDER — CEFTRIAXONE 1 G/1
1000 INJECTION, POWDER, FOR SOLUTION INTRAMUSCULAR; INTRAVENOUS ONCE
Status: COMPLETED | OUTPATIENT
Start: 2024-05-21 | End: 2024-05-21

## 2024-05-21 RX ADMIN — CEFTRIAXONE 1000 MG: 1 INJECTION, POWDER, FOR SOLUTION INTRAMUSCULAR; INTRAVENOUS at 16:30

## 2024-05-21 NOTE — PROGRESS NOTES
Patient tolerated injection well. Patient advised to wait 20 minutes in the office following the injection. No signs/symptoms of reaction noted after 20 minutes.  Administrations This Visit       cefTRIAXone (ROCEPHIN) injection 1,000 mg       Admin Date  05/21/2024  16:30 Action  Given Dose  1,000 mg Route  IntraMUSCular Site  Dorsogluteal Left Administered By  Donna Feliciano RN    Ordering Provider: Sera Valadez MD    NDC: 63177-396-75    : [facility]    Patient Supplied?: No

## 2024-05-22 ENCOUNTER — HOSPITAL ENCOUNTER (OUTPATIENT)
Dept: ULTRASOUND IMAGING | Facility: HOSPITAL | Age: 69
Discharge: HOME | End: 2024-05-22
Payer: MEDICARE

## 2024-05-22 ENCOUNTER — HOSPITAL ENCOUNTER (OUTPATIENT)
Facility: HOSPITAL | Age: 69
Discharge: HOME | End: 2024-05-24
Payer: MEDICARE

## 2024-05-22 DIAGNOSIS — M79.89 BILATERAL SWELLING OF FEET: ICD-10-CM

## 2024-05-22 DIAGNOSIS — S95.991A: ICD-10-CM

## 2024-05-22 DIAGNOSIS — R01.1 MURMUR, CARDIAC: ICD-10-CM

## 2024-05-22 PROCEDURE — 93306 TTE W/DOPPLER COMPLETE: CPT

## 2024-05-22 PROCEDURE — 93922 UPR/L XTREMITY ART 2 LEVELS: CPT

## 2024-05-23 LAB
ECHO AO ASC DIAM: 2.7 CM
ECHO AO ROOT DIAM: 2.8 CM
ECHO AR MAX VEL PISA: 4.7 M/S
ECHO AV AREA VTI: 2.1 CM2
ECHO AV MEAN GRADIENT: 8 MMHG
ECHO AV PEAK GRADIENT: 17 MMHG
ECHO AV PEAK VELOCITY: 2.1 M/S
ECHO AV VELOCITY RATIO: 0.62
ECHO AV VTI: 41.5 CM
ECHO LA AREA 4C: 15.5 CM2
ECHO LA DIAMETER: 2.9 CM
ECHO LA MINOR AXIS: 2.9 CM
ECHO LA TO AORTIC ROOT RATIO: 1.04
ECHO LA VOL BP: 43 ML (ref 22–52)
ECHO LV E' LATERAL VELOCITY: 11 CM/S
ECHO LV E' SEPTAL VELOCITY: 8 CM/S
ECHO LV FRACTIONAL SHORTENING: 29 % (ref 28–44)
ECHO LV INTERNAL DIMENSION DIASTOLIC: 4.5 CM (ref 3.9–5.3)
ECHO LV INTERNAL DIMENSION SYSTOLIC: 3.2 CM
ECHO LV ISOVOLUMETRIC RELAXATION TIME (IVRT): 87 MS
ECHO LV IVSD: 1.1 CM (ref 0.6–0.9)
ECHO LV IVSS: 1.2 CM
ECHO LV MASS 2D: 164 G (ref 67–162)
ECHO LV POSTERIOR WALL DIASTOLIC: 1 CM (ref 0.6–0.9)
ECHO LV POSTERIOR WALL SYSTOLIC: 1.2 CM
ECHO LV RELATIVE WALL THICKNESS RATIO: 0.44
ECHO LVOT AREA: 2.8 CM2
ECHO LVOT AV VTI INDEX: 0.75
ECHO LVOT DIAM: 1.9 CM
ECHO LVOT PEAK GRADIENT: 6 MMHG
ECHO LVOT PEAK VELOCITY: 1.3 M/S
ECHO LVOT SV: 87.8 ML
ECHO LVOT VTI: 31 CM
ECHO MV A VELOCITY: 1.3 M/S
ECHO MV AREA PHT: 3.9 CM2
ECHO MV E DECELERATION TIME (DT): 229 MS
ECHO MV E VELOCITY: 1.1 M/S
ECHO MV E/A RATIO: 0.85
ECHO MV E/E' LATERAL: 10
ECHO MV E/E' RATIO (AVERAGED): 11.88
ECHO MV E/E' SEPTAL: 13.75
ECHO MV MEAN GRADIENT: 7 MMHG
ECHO MV PEAK GRADIENT: 7 MMHG
ECHO MV PRESSURE HALF TIME (PHT): 56 MS
ECHO MV REGURGITANT PEAK GRADIENT: 23 MMHG
ECHO MV REGURGITANT PEAK VELOCITY: 2.4 M/S
ECHO RA AREA 4C: 14.8 CM2
ECHO RV INTERNAL DIMENSION: 2.2 CM

## 2024-05-28 ENCOUNTER — TELEPHONE (OUTPATIENT)
Dept: PAIN MEDICINE | Facility: CLINIC | Age: 69
End: 2024-05-28
Payer: MEDICARE

## 2024-05-28 DIAGNOSIS — Z78.0 POST-MENOPAUSAL: ICD-10-CM

## 2024-05-28 DIAGNOSIS — G47.00 INSOMNIA, UNSPECIFIED TYPE: ICD-10-CM

## 2024-05-28 RX ORDER — ZOLPIDEM TARTRATE 10 MG/1
10 TABLET ORAL NIGHTLY PRN
Qty: 30 TABLET | OUTPATIENT
Start: 2024-05-28

## 2024-05-28 NOTE — TELEPHONE ENCOUNTER
These need to go to [facility] in [place] instead of [facility] in [place]. She is now only using [facility].

## 2024-05-28 NOTE — TELEPHONE ENCOUNTER
Patient called to let us know that she is going to stop taking her Rheumate due to she feels it makes her stomach hurt,and the alpha lipoc acids she is just going to take 1 day to see will it help.

## 2024-05-29 ENCOUNTER — TELEPHONE (OUTPATIENT)
Dept: PHARMACY | Facility: HOSPITAL | Age: 69
End: 2024-05-29

## 2024-05-29 RX ORDER — ZOLPIDEM TARTRATE 10 MG/1
10 TABLET ORAL NIGHTLY PRN
Qty: 30 TABLET | Refills: 5 | Status: SHIPPED | OUTPATIENT
Start: 2024-05-29 | End: 2024-11-25

## 2024-05-29 RX ORDER — ESTRADIOL 1 MG/1
1 TABLET ORAL DAILY
Qty: 63 TABLET | Refills: 3 | Status: SHIPPED | OUTPATIENT
Start: 2024-05-29

## 2024-05-29 RX ORDER — ACETAMINOPHEN 325 MG/1
650 TABLET ORAL DAILY
COMMUNITY

## 2024-05-29 RX ORDER — MOMETASONE FUROATE 1 MG/G
CREAM TOPICAL
COMMUNITY

## 2024-05-29 NOTE — TELEPHONE ENCOUNTER
to see if there are any changes.  This supplement can potentially help with neuropathy symptoms, so if pt's neuropathy worsens, this would need to be addressed with her provider. Pt stated that she did ask her pain provider if she could stop this medication as well and received permission to stop it.    Recommended that pt separate the multivitamin and magnesium from her levothyroxine by at least 4 hours.  Pt is now going to take the multivitamin and magnesium with supper.    Of note, notified pt that her last lipid panel from 12/1/23 was elevated (total cholesterol and triglycerides were elevated).  Recommended that pt have that panel repeated with her next appt with provider.    Lastly, recommended that pt's magnesium levels be checked since she has been on a magnesium supplement long term and her levels haven't been checked recently.    Went over the above information with the pt's daughter Ju.    Maicol Franco, SilvinoD

## 2024-05-31 ENCOUNTER — PATIENT MESSAGE (OUTPATIENT)
Dept: FAMILY MEDICINE CLINIC | Age: 69
End: 2024-05-31

## 2024-05-31 DIAGNOSIS — G89.29 CHRONIC LOW BACK PAIN, UNSPECIFIED BACK PAIN LATERALITY, UNSPECIFIED WHETHER SCIATICA PRESENT: Primary | ICD-10-CM

## 2024-05-31 DIAGNOSIS — M54.50 CHRONIC LOW BACK PAIN, UNSPECIFIED BACK PAIN LATERALITY, UNSPECIFIED WHETHER SCIATICA PRESENT: Primary | ICD-10-CM

## 2024-06-03 NOTE — TELEPHONE ENCOUNTER
From: Kristyn Martin  To: Dr. Sera Valadez  Sent: 5/31/2024 4:18 PM EDT  Subject: meds    I hope you’re doing well. I’m doing just fine    I went to see Maicol to see if he could rearrange my medicine. I wanted to make sure that I was taking them right. Also to see if I could cut out some expense  I also explained to him about the Digestive problems I’ve been having and he thought maybe that the Rheumate pill might be some of the problem. Also, he wondered about the alpha liptoic pill. I called Dr. Gonzalez and it was OK with him if I stop taking them    I was wanting to know if it’s OK with you if I stop taking them?     I got the report that my artery test that I had turned out fine. Thank God.   There was something abnormal about my heart test I’m hoping it was just a murmur Maybe you could give me a message when you get time    Thank for taking the time to read this. I know you’re a very busy girl    I hope you have a wonderful wonderful weekend!     Kristyn

## 2024-06-05 DIAGNOSIS — M48.062 LUMBAR STENOSIS WITH NEUROGENIC CLAUDICATION: Primary | ICD-10-CM

## 2024-06-06 ENCOUNTER — TELEPHONE (OUTPATIENT)
Dept: PAIN MEDICINE | Facility: CLINIC | Age: 69
End: 2024-06-06
Payer: MEDICARE

## 2024-06-06 NOTE — TELEPHONE ENCOUNTER
----- Message from Sussy El sent at 6/5/2024  3:20 PM EDT -----  Please let patient know I am ordering MRI to be done so she can see neurosurgery, I have spoken with Juanita from [facility].

## 2024-06-06 NOTE — TELEPHONE ENCOUNTER
DAIANAM to let patient know that Sussy put in a order for her to get a MRI done so she can be seen by neurosurgery.

## 2024-06-10 ENCOUNTER — TELEPHONE (OUTPATIENT)
Dept: FAMILY MEDICINE CLINIC | Age: 69
End: 2024-06-10

## 2024-06-10 NOTE — TELEPHONE ENCOUNTER
Patient called and stated that Dr. Hoffmann is wanting her to have an MRI Lumbar Spine but she doesn't want to go to [place] to have it done. She was wondering if you could order it so she could go to [facility] and have it done.

## 2024-06-11 ENCOUNTER — TELEPHONE (OUTPATIENT)
Age: 69
End: 2024-06-11
Payer: MEDICARE

## 2024-06-11 NOTE — TELEPHONE ENCOUNTER
WE RECEIVED AN INCOMING FAX FROM [facility] ON 5/16/24.  IT STATES THAT THE PT GAVE AUTHORIZATION FOR A PHARMACY TRANSFER FOR HER NABUMETONE 750MG.  A NEW SCRIPT NEEDS TO BE WRITTEN & SIGNED & FAXED BACK TO  [phone]. (PREVIOUS PHARMACY USED WAS [facility])

## 2024-06-13 DIAGNOSIS — G89.29 CHRONIC LOW BACK PAIN, UNSPECIFIED BACK PAIN LATERALITY, UNSPECIFIED WHETHER SCIATICA PRESENT: Primary | ICD-10-CM

## 2024-06-13 DIAGNOSIS — M54.50 CHRONIC LOW BACK PAIN, UNSPECIFIED BACK PAIN LATERALITY, UNSPECIFIED WHETHER SCIATICA PRESENT: Primary | ICD-10-CM

## 2024-06-13 RX ORDER — NABUMETONE 750 MG/1
750 TABLET, FILM COATED ORAL 2 TIMES DAILY
Qty: 180 TABLET | Refills: 0 | Status: SHIPPED | OUTPATIENT
Start: 2024-06-13

## 2024-06-13 NOTE — TELEPHONE ENCOUNTER
She does. She stated her back specialist is wanting her to go to [place] to have it done and she would rather go to [facility].

## 2024-06-14 ENCOUNTER — HOSPITAL ENCOUNTER (OUTPATIENT)
Dept: PHYSICAL THERAPY | Facility: HOSPITAL | Age: 69
Setting detail: THERAPIES SERIES
Discharge: HOME | End: 2024-06-14
Payer: MEDICARE

## 2024-06-14 PROCEDURE — 97110 THERAPEUTIC EXERCISES: CPT | Performed by: PHYSICAL THERAPIST

## 2024-06-14 PROCEDURE — 97162 PT EVAL MOD COMPLEX 30 MIN: CPT | Performed by: PHYSICAL THERAPIST

## 2024-06-14 NOTE — THERAPY EVALUATION
TREATMENT PLAN            Pt. actively involved in establishing Plan of Care and Goals: Yes  Patient/ Caregiver education and instruction:               Treatment may include any combination of the following:       Frequency / Duration:  Patient to be seen   for   weeks      Eval Complexity:         PT Treatment Completed:  Exercises:      Treatment Reasoning                               Therapy Time  Individual Time In:         Individual Time Out:    Minutes:          Therapist Signature: Amanda Karen, PT    Date: 6/14/2024     I certify that the above Therapy Services are being furnished while the patient is under my care. I agree with the treatment plan and certify that this therapy is necessary.      Physician's Signature:  ___________________________   Date:_______                                                                   Sera Valadez MD        Physician Comments: _______________________________________________    Please sign and return to [facility] PHYS[facility]ERAP[facility].  Please fax to the location listed below. THANK YOU for this referral!    [facility]  [facility]ICAL [facility]  [address] [facility] [place] [address]  Dept: [phone]  Loc: [phone]       POC NOTE

## 2024-06-19 ENCOUNTER — HOSPITAL ENCOUNTER (OUTPATIENT)
Dept: PHYSICAL THERAPY | Facility: HOSPITAL | Age: 69
Setting detail: THERAPIES SERIES
Discharge: HOME | End: 2024-06-19
Payer: MEDICARE

## 2024-06-19 PROCEDURE — 97110 THERAPEUTIC EXERCISES: CPT | Performed by: PHYSICAL THERAPIST

## 2024-06-19 NOTE — PROGRESS NOTES
Physical Therapy Daily Treatment Note   Date:  2024    TIme In:   0956                   Time Out:1109    Patient Name:  Kristyn Martin    :  1955  MRN: [id number]    Restrictions/Precautions:    Pertinent Medical History:  Medical/Treatment Diagnosis Information:  Low back pain, unspecified [M54.50]     Insurance/Certification information:  Payor: [facility] MEDICARE / Plan: ANTHEM MEDIBLUE ESSENTIAL/PLUS / Product Type: *No Product type* /   Physician Information:  Sera Valadez MD  Plan of care signed (Y/N):    Visit# / total visits:     2 /    G-Code (if applicable):      Date / Visit # G-Code Applied:         Progress Note: []  Yes  []  No  Next due by: Visit #10       Pain level:   7/10  Subjective: Patient states that her arthritis is really hurting today but she wants to do as much as she can because she feels better after exercising.     Objective:  Observation:   Test measurements:    Palpation:    Exercises:  Exercise Resistance/Repetitions Date performed   Sci Fit  level 4- 6 min  19   Pullies flex  3 min ABD 3 min  19   corner wall press  10x 2 sets  19   standing with support for B hip ABD  10x 2 sets  19   standing with support for B hip Ext  20x  19   standing trunk rotation with red tubbing  20x B 19   seated ball squeeze  30x  19   supine trunk rotation  20x  19   supine posterior pelvic tilt  20x  19   supine figure 4  20x  19   clam shell GTB  20x  19   Prone laying on elbows  5 min  19     Other Therapeutic Activities:      Manual Treatments:      Modalities:  Cold pack to B lumbar area in prone, 15 min      Timed Code Treatment Minutes:  58      Total Treatment Minutes:  73    Treatment/Activity Tolerance:  [x] Patient tolerated treatment well [] Patient limited by fatigue  [] Patient limited by pain  [] Patient limited by other medical complications  [x] Other: Patient tries hard to complete all exercises.  She was able to complete all treatment with frequent VC, TC and deep

## 2024-06-20 ENCOUNTER — TELEPHONE (OUTPATIENT)
Dept: NEUROSURGERY | Facility: CLINIC | Age: 69
End: 2024-06-20
Payer: MEDICARE

## 2024-06-20 DIAGNOSIS — M48.061 SPINAL STENOSIS OF LUMBAR REGION WITHOUT NEUROGENIC CLAUDICATION: ICD-10-CM

## 2024-06-20 DIAGNOSIS — B99.9 NEUROPATHY DUE TO INFECTION: ICD-10-CM

## 2024-06-20 DIAGNOSIS — M54.50 CHRONIC LOW BACK PAIN, UNSPECIFIED BACK PAIN LATERALITY, UNSPECIFIED WHETHER SCIATICA PRESENT: Primary | ICD-10-CM

## 2024-06-20 DIAGNOSIS — M79.7 FIBROMYALGIA: ICD-10-CM

## 2024-06-20 DIAGNOSIS — G89.29 CHRONIC LOW BACK PAIN, UNSPECIFIED BACK PAIN LATERALITY, UNSPECIFIED WHETHER SCIATICA PRESENT: Primary | ICD-10-CM

## 2024-06-20 DIAGNOSIS — G63 NEUROPATHY DUE TO INFECTION: ICD-10-CM

## 2024-06-20 RX ORDER — PREGABALIN 75 MG/1
75 CAPSULE ORAL 3 TIMES DAILY
Qty: 270 CAPSULE | Refills: 0 | Status: SHIPPED | OUTPATIENT
Start: 2024-06-20

## 2024-06-20 NOTE — TELEPHONE ENCOUNTER
Caller: Emmanuel Alison M    Relationship: Self    Best call back number: [phone]    Requested Prescriptions:   Requested Prescriptions     Pending Prescriptions Disp Refills    pregabalin (LYRICA) 75 MG capsule 90 capsule 0     Sig: Take 1 capsule by mouth 3 (Three) Times a Day.        Pharmacy where request should be sent: [id number] - [place], [place] - [address] [facility] [address]  - [phone] [facility] [phone]      Last office visit with prescribing clinician: 5/26/2023   Last telemedicine visit with prescribing clinician: 1/5/2024   Next office visit with prescribing clinician: 7/8/2024     Additional details provided by patient: PATIENT IS MOVING AWAY FROM HER MAIL ORDER AND HAS APPROX 2 WEEKS WORTH LEFT.     Does the patient have less than a 3 day supply:  [] Yes  [x] No    Would you like a call back once the refill request has been completed: [] Yes [x] No    If the office needs to give you a call back, can they leave a voicemail: [] Yes [x] No    Stevie Thomas Rep   06/20/24 09:26 EDT

## 2024-06-20 NOTE — TELEPHONE ENCOUNTER
Provider:  Jaden  Surgery/Procedure:  rohit  Surgery/Procedure Date:  na  Last visit:  Office Visit with Corinna Castellanos PA-C (12/15/2023)    Next visit: na     Reason for call: Patient called and wanted to know if her MRI Lumbar was with or without.  I have called her back to let her know that Sussy El put the order in for without.  She said ok and thanked me for the call back.

## 2024-06-20 NOTE — TELEPHONE ENCOUNTER
Rx Refill Note  Requested Prescriptions     Pending Prescriptions Disp Refills    pregabalin (LYRICA) 75 MG capsule 90 capsule 0     Sig: Take 1 capsule by mouth 3 (Three) Times a Day.      Last office visit with prescribing clinician: 5/26/2023   Last telemedicine visit with prescribing clinician: 1/5/2024   Next office visit with prescribing clinician: 7/8/2024                         Would you like a call back once the refill request has been completed: [] Yes [] No    If the office needs to give you a call back, can they leave a voicemail: [] Yes [] No    Vicenta Molina CMA  06/20/24, 09:45 EDT

## 2024-06-25 ENCOUNTER — HOSPITAL ENCOUNTER (OUTPATIENT)
Dept: MRI IMAGING | Facility: HOSPITAL | Age: 69
Discharge: HOME | End: 2024-06-25
Payer: MEDICARE

## 2024-06-25 DIAGNOSIS — G89.29 CHRONIC LOW BACK PAIN, UNSPECIFIED BACK PAIN LATERALITY, UNSPECIFIED WHETHER SCIATICA PRESENT: ICD-10-CM

## 2024-06-25 DIAGNOSIS — M54.50 CHRONIC LOW BACK PAIN, UNSPECIFIED BACK PAIN LATERALITY, UNSPECIFIED WHETHER SCIATICA PRESENT: ICD-10-CM

## 2024-06-25 PROCEDURE — 72148 MRI LUMBAR SPINE W/O DYE: CPT

## 2024-06-26 ENCOUNTER — HOSPITAL ENCOUNTER (OUTPATIENT)
Dept: PHYSICAL THERAPY | Facility: HOSPITAL | Age: 69
Setting detail: THERAPIES SERIES
Discharge: HOME | End: 2024-06-26
Payer: MEDICARE

## 2024-06-26 PROCEDURE — 97110 THERAPEUTIC EXERCISES: CPT | Performed by: PHYSICAL THERAPIST

## 2024-06-26 NOTE — PROGRESS NOTES
Physical Therapy Daily Treatment Note   Date:  2024    TIme In:   1132                   Time Out: 1242    Patient Name:  Kristyn Martin    :  1955  MRN: [id number]    Restrictions/Precautions:    Pertinent Medical History:  Medical/Treatment Diagnosis Information:  Low back pain, unspecified [M54.50]     Insurance/Certification information:  Payor: [facility] MEDICARE / Plan: ANTHEM MEDIBLUE ESSENTIAL/PLUS / Product Type: *No Product type* /   Physician Information:  Sera Valadez MD  Plan of care signed (Y/N):    Visit# / total visits:     3 /    G-Code (if applicable):      Date / Visit # G-Code Applied:         Progress Note: []  Yes  [x]  No  Next due by: Visit #10       Pain level:   4/10  Subjective: Patient states that she is walking stiff today but feeling a little better.  She states that she is doing her HEP daily.     Objective:  Observation:   Test measurements:    Palpation:    Exercises:  Exercise Resistance/Repetitions Date performed   Sci Fit  level 4- 6 min  26   Pullies flex  3 min ABD 3 min  26   corner wall press  10x 2 sets  26   standing with support for B hip ABD  10x 2 sets  26   standing with support for B hip Ext  20x  26   standing trunk rotation with red tubbing  20x B 26   seated ball squeeze  30x  26   supine trunk rotation  20x  26   supine posterior pelvic tilt  20x  26   supine figure 4  20x  26   clam shell GTB  20x  26   Prone laying on elbows  5 min  26     Other Therapeutic Activities:      Manual Treatments:      Modalities:  Cold pack to B lumbar area in prone, 15 min      Timed Code Treatment Minutes:  58      Total Treatment Minutes:  70    Treatment/Activity Tolerance:  [x] Patient tolerated treatment well [] Patient limited by fatigue  [] Patient limited by pain  [] Patient limited by other medical complications  [x] Other: Patient tries hard to complete all exercises.  She was able to complete all treatment with frequent VC, TC and deep breathing cues.  She

## 2024-07-09 ENCOUNTER — TELEPHONE (OUTPATIENT)
Dept: NEUROSURGERY | Facility: CLINIC | Age: 69
End: 2024-07-09
Payer: MEDICARE

## 2024-07-09 NOTE — TELEPHONE ENCOUNTER
PROVIDER: DR. ALVARENGA    REASON FOR CALL:    PATIENT CALLED STATING THAT [facility] SHARED AN MRI WITH US THIS MORNING AND WANTED TO KNOW IF WE HAD GOTTEN IT. SHE WAS UNDER THE IMPRESSION THAT IT WAS BEING SHARED THROUGH THE SYSTEM. SHE WANTS US TO LET HER KNOW WHEN WE GET IT THROUGH HER [employer]. PLEASE KEEP AND EYE OUR FOR THAT AND LET HER KNOW. THANKS.    RESPOND VIA opinions.h

## 2024-07-10 ENCOUNTER — HOSPITAL ENCOUNTER (OUTPATIENT)
Dept: PHYSICAL THERAPY | Facility: HOSPITAL | Age: 69
Setting detail: THERAPIES SERIES
Discharge: HOME | End: 2024-07-10
Payer: MEDICARE

## 2024-07-10 PROCEDURE — 97110 THERAPEUTIC EXERCISES: CPT | Performed by: PHYSICAL THERAPIST

## 2024-07-10 PROCEDURE — G0283 ELEC STIM OTHER THAN WOUND: HCPCS | Performed by: PHYSICAL THERAPIST

## 2024-07-10 NOTE — PROGRESS NOTES
Physical Therapy Daily Treatment Note/Reassessment   Date:  7/10/2024    TIme In:   1020                  Time Out: 1130    Patient Name:  Kristyn Martin    :  1955  MRN: [id number]    Restrictions/Precautions:    Pertinent Medical History:  Medical/Treatment Diagnosis Information:  Low back pain, unspecified [M54.50]     Insurance/Certification information:  Payor: [facility] MEDICARE / Plan: ANTHEM MEDIBLUE ESSENTIAL/PLUS / Product Type: *No Product type* /   Physician Information:  Sera Valadez MD  Plan of care signed (Y/N):    Visit# / total visits:     4 /    G-Code (if applicable):      Date / Visit # G-Code Applied:         Progress Note: [x]  Yes  [x]  No  Next due by:  24    Pain level:   7/10  Subjective: Patient states that she is walking stiff today but feeling a little better. She states that physical therapy is really helping her and she was able to go shopping this week for the first time in a long time.  She states that she is doing her HEP daily.     Objective:  Observation:   Test measurements:  Patient is IND with HEP  Patient's pain was 7/10, on arrival and 1/10 after modalities   Patient's trunk strength is 4-/5  Patient has increased her ability to AMB without an assistive device for 500ft + IND and safe    Back Index score is 40 today      Exercises:  Exercise Resistance/Repetitions Date performed   Sci Fit  level 4- 6 min  10   Pullies flex  3 min ABD 3 min  10   corner wall press  10x 2 sets  10   standing with support for B hip ABD  10x 2 sets  10   standing with support for B hip Ext  20x  10   standing trunk rotation with red tubbing  20x B 10   seated ball squeeze  30x  10   supine trunk rotation  20x  10   supine posterior pelvic tilt  20x  10   supine figure 4  20x  10   clam shell GTB  20x  10   Prone laying on elbows  5 min  10   Patient to ascend and decend steps  2 flights of steps Next visit     Other Therapeutic Activities:      Manual Treatments:      Modalities:

## 2024-07-12 ENCOUNTER — OFFICE VISIT (OUTPATIENT)
Dept: NEUROSURGERY | Facility: CLINIC | Age: 69
End: 2024-07-12
Payer: MEDICARE

## 2024-07-12 VITALS
SYSTOLIC BLOOD PRESSURE: 130 MMHG | DIASTOLIC BLOOD PRESSURE: 94 MMHG | BODY MASS INDEX: 41.07 KG/M2 | HEIGHT: 64 IN | WEIGHT: 240.6 LBS

## 2024-07-12 DIAGNOSIS — R93.7 ABNORMAL FINDINGS ON DIAGNOSTIC IMAGING OF OTHER PARTS OF MUSCULOSKELETAL SYSTEM: ICD-10-CM

## 2024-07-12 DIAGNOSIS — M48.062 SPINAL STENOSIS OF LUMBAR REGION WITH NEUROGENIC CLAUDICATION: Primary | ICD-10-CM

## 2024-07-12 DIAGNOSIS — M43.16 SPONDYLOLISTHESIS OF LUMBAR REGION: ICD-10-CM

## 2024-07-12 PROCEDURE — 1160F RVW MEDS BY RX/DR IN RCRD: CPT | Performed by: PHYSICIAN ASSISTANT

## 2024-07-12 PROCEDURE — 99214 OFFICE O/P EST MOD 30 MIN: CPT | Performed by: PHYSICIAN ASSISTANT

## 2024-07-12 PROCEDURE — 1159F MED LIST DOCD IN RCRD: CPT | Performed by: PHYSICIAN ASSISTANT

## 2024-07-12 RX ORDER — CHLORHEXIDINE GLUCONATE 40 MG/ML
SOLUTION TOPICAL
Qty: 120 ML | Refills: 0 | Status: SHIPPED | OUTPATIENT
Start: 2024-07-12

## 2024-07-12 RX ORDER — FAMOTIDINE 10 MG
20 TABLET ORAL
OUTPATIENT
Start: 2024-07-12

## 2024-07-12 RX ORDER — MOMETASONE FUROATE 1 MG/G
CREAM TOPICAL
COMMUNITY

## 2024-07-12 NOTE — PROGRESS NOTES
Patient: Alison Benitez  : 1955  Chart #: [id number]    Date of Service: 2024    CHIEF COMPLAINT: Low back and bilateral lower extremity pain    History of Present Illness Ms. Benitez is a 69-year-old woman who is known to Dr. Stanley's service.  She has known lumbar stenosis and has been deemed a surgical candidate for decompression fusion and stabilization L3-5.  She has back pain that extends into her legs and is worse with standing and walking.  Over the last year her activities have become very limited.  She's had epidural injections and physical therapy which helped her get by for sometime. Symptoms always come back and she is at the end of her rope.  She has put off surgery for awhile because she lives alone and is concerned about aftercare. She does have a daughter and other people around who are able to help.      Past Medical History:   Diagnosis Date    Arthritis     Bursitis     Claustrophobia     CTS (carpal tunnel syndrome)     bilateral     Deep vein thrombosis     Fibromyalgia     Hemorrhoids     Hypertension     Low back pain     Lumbosacral disc disease     Neuropathy     Osteoarthritis     SBE (subacute bacterial endocarditis)     Tendinitis of knee     Thoracic disc disorder     Thyroid disorder     Thyroid nodule      (vaginal birth after )          Current Outpatient Medications:     acetaminophen (TYLENOL) 650 MG 8 hr tablet, Take 1 tablet by mouth., Disp: , Rfl:     ascorbic acid (VITAMIN C) 1000 MG tablet, Take 1 tablet by mouth Daily., Disp: , Rfl:     Cholecalciferol (Vitamin D) 50 MCG (2000) tablet, Take 1 tablet by mouth Daily., Disp: , Rfl:     docusate sodium (COLACE) 100 MG capsule, Take 1 capsule by mouth 2 (Two) Times a Day., Disp: , Rfl:     DULoxetine (CYMBALTA) 60 MG capsule, Take 1 capsule by mouth Daily., Disp: 90 capsule, Rfl: 3    estradiol (ESTRACE) 1 MG tablet, Take 1 tablet by mouth Daily., Disp: , Rfl:     hydrochlorothiazide (HYDRODIURIL) 25 MG  tablet, , Disp: , Rfl:     levothyroxine (Synthroid) 50 MCG tablet, Take 1 tablet by mouth Daily., Disp: 90 tablet, Rfl: 3    losartan (COZAAR) 50 MG tablet, , Disp: , Rfl:     lubiprostone (AMITIZA) 8 MCG capsule, Take 1 capsule by mouth., Disp: , Rfl:     Magnesium 100 MG capsule, Daily., Disp: , Rfl:     metroNIDAZOLE (METROCREAM) 0.75 % cream, , Disp: , Rfl:     mometasone (ELOCON) 0.1 % cream, Apply a thin layer topically to the affected area daily (apply to both ears), Disp: , Rfl:     MULTIPLE VITAMINS-MINERALS ER PO, Take 1 tablet by mouth Daily., Disp: , Rfl:     nabumetone (RELAFEN) 750 MG tablet, Take 1 tablet by mouth 2 (Two) Times a Day., Disp: 180 tablet, Rfl: 0    Omega-3 Fatty Acids (FISH OIL) 1000 MG capsule capsule, Take 1,200 mg by mouth Daily With Breakfast., Disp: , Rfl:     polyethyl glycol-propyl glycol (SYSTANE) 0.4-0.3 % solution ophthalmic solution (artificial tears), 1 drop., Disp: , Rfl:     pregabalin (LYRICA) 75 MG capsule, Take 1 capsule by mouth 3 (Three) Times a Day., Disp: 270 capsule, Rfl: 0    Rhofade 1 % cream, APPLY A PEA-SIZED AMOUNT BY TOPICAL ROUTE ONCE DAILY TO COVER AREAS OF FACE WITH THIN LAYER AVOIDING THE EYES AND LIPS, Disp: , Rfl:     zolpidem (AMBIEN) 10 MG tablet, Take 1 tablet by mouth., Disp: , Rfl:     Alpha Lipoic Acid 200 MG capsule, Take 400 mg by mouth 3 (Three) Times a Day., Disp: 180 capsule, Rfl: 5    Chlorhexidine Gluconate 4 % solution, Shower each day with solution for 5 days beginning 5 days before surgery., Disp: 120 mL, Rfl: 0    desloratadine (CLARINEX) 5 MG tablet, Take 1 tablet by mouth Daily., Disp: , Rfl:     Dietary Management Product (Rheumate) capsule, Take one capsule by mouth once daily, Disp: 90 capsule, Rfl: 1    doxycycline (PERIOSTAT) 20 MG tablet, , Disp: , Rfl:     famotidine (Pepcid) 20 MG tablet, Take 1 tablet by mouth 2 (Two) Times a Day., Disp: 14 tablet, Rfl: 0    gentamicin (GARAMYCIN) 0.3 % ophthalmic solution, INSTILL 1 DROP TO  AFFECTED EAR EVERY 4 HOURS FOR 10 DAYS, Disp: , Rfl:     mupirocin (BACTROBAN) 2 % nasal ointment, Apply to the inside of each nostril with a cotton swab two times daily, morning and evening, for 5 days before surgery., Disp: 10 each, Rfl: 0    pregabalin (Lyrica) 75 MG capsule, Take 1 capsule by mouth 3 (Three) Times a Day for 4 days., Disp: 12 capsule, Rfl: 0    Xdemvy 0.25 % solution, , Disp: , Rfl:     Past Surgical History:   Procedure Laterality Date    ABDOMINAL HYSTERECTOMY       SECTION      COLONOSCOPY      CYST REMOVAL N/A 2022    DILATATION AND CURETTAGE      HYSTERECTOMY N/A         MANDIBLE FRACTURE SURGERY      OOPHORECTOMY Bilateral     TRIGGER POINT INJECTION      TUBAL ABDOMINAL LIGATION         Social History     Socioeconomic History    Marital status: Single   Tobacco Use    Smoking status: Never    Smokeless tobacco: Never   Vaping Use    Vaping status: Never Used   Substance and Sexual Activity    Alcohol use: Never    Drug use: Never    Sexual activity: Not Currently         Review of Systems   Constitutional:  Positive for activity change and fatigue. Negative for appetite change, chills, diaphoresis, fever and unexpected weight change.   HENT:  Positive for ear discharge. Negative for congestion, dental problem, drooling, ear pain, facial swelling, hearing loss, mouth sores, nosebleeds, postnasal drip, rhinorrhea, sinus pressure, sinus pain, sneezing, sore throat, tinnitus, trouble swallowing and voice change.    Eyes:  Positive for redness. Negative for photophobia, pain, discharge, itching and visual disturbance.   Respiratory:  Negative for apnea, cough, choking, chest tightness, shortness of breath, wheezing and stridor.    Cardiovascular:  Negative for chest pain, palpitations and leg swelling.   Gastrointestinal:  Negative for abdominal distention, abdominal pain, anal bleeding, blood in stool, constipation, diarrhea, nausea, rectal pain and vomiting.   Endocrine:  "Negative for cold intolerance, heat intolerance, polydipsia, polyphagia and polyuria.   Genitourinary:  Negative for decreased urine volume, difficulty urinating, dyspareunia, dysuria, enuresis, flank pain, frequency, genital sores, hematuria, menstrual problem, pelvic pain, urgency, vaginal bleeding, vaginal discharge and vaginal pain.   Musculoskeletal:  Positive for back pain, gait problem and myalgias. Negative for arthralgias, joint swelling, neck pain and neck stiffness.   Skin:  Negative for color change, pallor, rash and wound.   Allergic/Immunologic: Negative for environmental allergies, food allergies and immunocompromised state.   Neurological:  Positive for dizziness and numbness. Negative for tremors, seizures, syncope, facial asymmetry, speech difficulty, weakness, light-headedness and headaches.   Hematological:  Negative for adenopathy. Does not bruise/bleed easily.   Psychiatric/Behavioral:  Negative for agitation, behavioral problems, confusion, decreased concentration, dysphoric mood, hallucinations, self-injury, sleep disturbance and suicidal ideas. The patient is not nervous/anxious and is not hyperactive.        Objective   Vital Signs: Blood pressure 130/94, height 162.6 cm (64\"), weight 109 kg (240 lb 9.6 oz), not currently breastfeeding.  Physical Exam  Vitals and nursing note reviewed.   Constitutional:       General: She is not in acute distress.     Appearance: She is well-developed.   HENT:      Head: Normocephalic and atraumatic.   Psychiatric:         Behavior: Behavior normal.         Thought Content: Thought content normal.     Musculoskeletal:     Strength is intact in upper and lower extremities to direct testing.     Station and gait are normal.  Neurologic:     Muscle tone is normal throughout.     Coordination is intact.     Deep tendon reflexes: 2+ and symmetrical.     Sensation is intact to light touch throughout.     Patient is oriented to person, place, and time.       "   Independent review of radiographic imaging: MRI of the lumbar spine from demonstrates generous stenosis at L3-4 with bilateral joint effusions.  There is also stenosis at L4-5 with evidence of instability.      Assessment & Plan   Diagnosis:  1.  Lumbar stenosis with neurogenic claudication  2.  Lumbar spondylolisthesis with instability  3.  Mechanical low back pain    Medical Decision Making: I believe patient would benefit from decompression, fusion and stabilization L3-5.  She has put this off for a few years and is finally worn out with the pain and her limitations.  She is still very leery of surgery and her aftercare.  Dr Stanley stepped in today to offer her reassurance in that regard and also discussed the general nature of the procedure as well as all the risks and limitations associated.  I have answered her questions in great detail and assured her.  She would like to set this up for sometime in September.        Diagnoses and all orders for this visit:    1. Spinal stenosis of lumbar region with neurogenic claudication (Primary)  -     Case Request; Standing  -     MRSA Screen Culture (Outpatient) - Swab, Nares; Future  -     mupirocin (BACTROBAN) 2 % nasal ointment  -     ceFAZolin (ANCEF) 2 g in sodium chloride 0.9 % 100 mL IVPB  -     famotidine (PEPCID) tablet 20 mg  -     CBC & Differential; Future  -     Comprehensive Metabolic Panel; Future  -     Case Request    2. Spondylolisthesis of lumbar region    3. Abnormal findings on diagnostic imaging of other parts of musculoskeletal system  -     CBC & Differential; Future    Other orders  -     Follow Anesthesia Guidelines / Protocol; Future  -     Follow Anesthesia Guidelines / Protocol; Standing  -     Verify NPO Status; Standing  -     SCD (sequential compression device)- to be placed on patient in Pre-op; Standing  -     Verify / Perform Chlorhexidine Skin Prep; Standing  -     Obtain informed consent  -     Provide NPO Instructions to Patient;  "Future  -     Chlorhexidine Skin Prep; Future  -     Provide Patient with Enhanced Recovery Booklet(s) or Handout; Future  -     Provide \"Carbo Loading\" Instructions to Patient; Future  -     mupirocin (BACTROBAN) 2 % nasal ointment; Apply to the inside of each nostril with a cotton swab two times daily, morning and evening, for 5 days before surgery.  Dispense: 10 each; Refill: 0  -     Chlorhexidine Gluconate 4 % solution; Shower each day with solution for 5 days beginning 5 days before surgery.  Dispense: 120 mL; Refill: 0                      40 minutes was spent on face to face patient care explaining radiographic findings and interpretation, diagnoses, treatment options including surgery, and formulating a plan.  All questions were answered              Corinna Castellanos PA-C  Patient Care Team:  Kacy Huertas MD as PCP - General (Family Medicine)  Anatoliy Everett MD as Consulting Physician (Anesthesiology)  Abdifatah Jordan MD as Consulting Physician (Endocrinology)  Kimber Correa APRN as Nurse Practitioner (Nurse Practitioner)  Moris Ellis MD as Consulting Physician (Pain Medicine)  Sussy El APRN as Nurse Practitioner (Nurse Practitioner)              "

## 2024-07-15 DIAGNOSIS — K59.00 CONSTIPATION, UNSPECIFIED CONSTIPATION TYPE: ICD-10-CM

## 2024-07-15 RX ORDER — CHLORAL HYDRATE 500 MG
1000 CAPSULE ORAL
COMMUNITY

## 2024-07-15 RX ORDER — DOCUSATE SODIUM 100 MG/1
100 CAPSULE, LIQUID FILLED ORAL 2 TIMES DAILY
Qty: 60 CAPSULE | Refills: 5 | OUTPATIENT
Start: 2024-07-15 | End: 2025-01-11

## 2024-07-15 RX ORDER — ASCORBIC ACID 250 MG
250 TABLET,CHEWABLE ORAL DAILY
COMMUNITY

## 2024-07-15 RX ORDER — MULTIPLE VITAMINS W/ MINERALS TAB 9MG-400MCG
1 TAB ORAL DAILY
COMMUNITY

## 2024-07-15 NOTE — CARE COORDINATION
[facility]  Cancel/No Show Note    Service:  [x]Physical Therapy  []Occupational Therapy    Date: 7/17/24    # of Missed Tx:     Reason For Missed Treatment:  []Illness  []Provider unavailable  []No call/no show  []Late cancellation (<24 hours)  []Arrived >15 minutes late  []Other appointment  []Scheduling conflict  []Arrived on wrong day  []Physician discharged tx  []Transportation conflict  []Inclement weather  []Frequency of tx change  [x]No reason given  []Arrived but refused participation  []Unable to tolerate  []Medical complications  []Pt on hold due to-  []Other-     Plan:  [x]Rescheduled  []Continue at next scheduled apt  []Reminder phone call  []Contacted referring provider  []Discharged

## 2024-07-16 DIAGNOSIS — K59.00 CONSTIPATION, UNSPECIFIED CONSTIPATION TYPE: ICD-10-CM

## 2024-07-16 RX ORDER — DOCUSATE SODIUM 100 MG/1
100 CAPSULE, LIQUID FILLED ORAL 2 TIMES DAILY
Qty: 60 CAPSULE | Refills: 5 | Status: SHIPPED | OUTPATIENT
Start: 2024-07-16

## 2024-07-16 NOTE — TELEPHONE ENCOUNTER
Refill request received from pharmacy      Next Office Visit Date:  Future Appointments   Date Time Provider Department Center   7/24/2024 10:00 AM Amanda Jones PT [facility]   7/30/2024  2:30 PM Sera Valadez MD [facility] - Please review via PDMP        Last Office Visit:    5/20/2024

## 2024-07-17 ENCOUNTER — HOSPITAL ENCOUNTER (OUTPATIENT)
Dept: PHYSICAL THERAPY | Facility: HOSPITAL | Age: 69
Setting detail: THERAPIES SERIES
Discharge: HOME | End: 2024-07-17
Payer: MEDICARE

## 2024-07-20 NOTE — TELEPHONE ENCOUNTER
07/20/24 1352   Departure Condition   Mobility at Departure Ambulatory   Ambulatory Mobility With No Difficulty   Departure Mode By self   Discharged To Home   Patient Teaching Discharge instructions reviewed;Patient verbalized understanding   Discharged with LDA No        Patient called, requested refill.        Next Office Visit Date:  Future Appointments   Date Time Provider Jessie Victoria   2/16/2023  4:41 PM Geovanny Montoya PT [facility]   2/21/2023  9:45 AM Dion Ruiz MD [facility]   4/5/2023 10:30 AM Dion Ruiz MD [facility]   7/25/2023  2:00 PM Dion Ruiz MD [address] please review via PDMP

## 2024-07-24 ENCOUNTER — APPOINTMENT (OUTPATIENT)
Dept: PHYSICAL THERAPY | Facility: HOSPITAL | Age: 69
End: 2024-07-24
Payer: MEDICARE

## 2024-07-29 SDOH — HEALTH STABILITY: PHYSICAL HEALTH: ON AVERAGE, HOW MANY MINUTES DO YOU ENGAGE IN EXERCISE AT THIS LEVEL?: 0 MIN

## 2024-07-29 SDOH — HEALTH STABILITY: PHYSICAL HEALTH: ON AVERAGE, HOW MANY DAYS PER WEEK DO YOU ENGAGE IN MODERATE TO STRENUOUS EXERCISE (LIKE A BRISK WALK)?: 0 DAYS

## 2024-07-29 ASSESSMENT — PATIENT HEALTH QUESTIONNAIRE - PHQ9
2. FEELING DOWN, DEPRESSED OR HOPELESS: NOT AT ALL
SUM OF ALL RESPONSES TO PHQ QUESTIONS 1-9: 0
SUM OF ALL RESPONSES TO PHQ QUESTIONS 1-9: 0
1. LITTLE INTEREST OR PLEASURE IN DOING THINGS: NOT AT ALL
SUM OF ALL RESPONSES TO PHQ QUESTIONS 1-9: 0
SUM OF ALL RESPONSES TO PHQ QUESTIONS 1-9: 0
SUM OF ALL RESPONSES TO PHQ9 QUESTIONS 1 & 2: 0

## 2024-07-29 ASSESSMENT — LIFESTYLE VARIABLES
HOW OFTEN DO YOU HAVE SIX OR MORE DRINKS ON ONE OCCASION: 1
HOW MANY STANDARD DRINKS CONTAINING ALCOHOL DO YOU HAVE ON A TYPICAL DAY: PATIENT DOES NOT DRINK
HOW OFTEN DO YOU HAVE A DRINK CONTAINING ALCOHOL: NEVER
HOW OFTEN DO YOU HAVE A DRINK CONTAINING ALCOHOL: 1
HOW MANY STANDARD DRINKS CONTAINING ALCOHOL DO YOU HAVE ON A TYPICAL DAY: 0

## 2024-07-30 ENCOUNTER — HOSPITAL ENCOUNTER (OUTPATIENT)
Dept: GENERAL RADIOLOGY | Facility: HOSPITAL | Age: 69
Discharge: HOME | End: 2024-07-30
Payer: MEDICARE

## 2024-07-30 ENCOUNTER — OFFICE VISIT (OUTPATIENT)
Dept: FAMILY MEDICINE CLINIC | Age: 69
End: 2024-07-30
Payer: MEDICARE

## 2024-07-30 ENCOUNTER — HOSPITAL ENCOUNTER (OUTPATIENT)
Facility: HOSPITAL | Age: 69
Discharge: HOME | End: 2024-07-30
Payer: MEDICARE

## 2024-07-30 VITALS
TEMPERATURE: 98.1 F | HEART RATE: 94 BPM | BODY MASS INDEX: 41.28 KG/M2 | RESPIRATION RATE: 16 BRPM | DIASTOLIC BLOOD PRESSURE: 82 MMHG | OXYGEN SATURATION: 98 % | SYSTOLIC BLOOD PRESSURE: 136 MMHG | HEIGHT: 64 IN | WEIGHT: 241.8 LBS

## 2024-07-30 DIAGNOSIS — Z00.00 MEDICARE ANNUAL WELLNESS VISIT, SUBSEQUENT: Primary | ICD-10-CM

## 2024-07-30 DIAGNOSIS — M25.561 CHRONIC PAIN OF RIGHT KNEE: Primary | ICD-10-CM

## 2024-07-30 DIAGNOSIS — G89.29 CHRONIC PAIN OF RIGHT KNEE: ICD-10-CM

## 2024-07-30 DIAGNOSIS — G89.29 CHRONIC PAIN OF RIGHT KNEE: Primary | ICD-10-CM

## 2024-07-30 DIAGNOSIS — M25.561 CHRONIC PAIN OF RIGHT KNEE: ICD-10-CM

## 2024-07-30 PROCEDURE — 1123F ACP DISCUSS/DSCN MKR DOCD: CPT | Performed by: FAMILY MEDICINE

## 2024-07-30 PROCEDURE — G0439 PPPS, SUBSEQ VISIT: HCPCS | Performed by: FAMILY MEDICINE

## 2024-07-30 PROCEDURE — 73562 X-RAY EXAM OF KNEE 3: CPT

## 2024-07-30 NOTE — PROGRESS NOTES
No chief complaint on file.      Have you seen any other physician or provider since your last visit no    Have you had any other diagnostic tests since your last visit? no    Have you changed or stopped any medications since your last visit? no       
Allergies  Prior to Visit Medications    Medication Sig Taking? Authorizing Provider   docusate sodium (COLACE) 100 MG capsule TAKE 1 CAPSULE BY MOUTH TWICE DAILY Yes Sera Valadez MD   estradiol (ESTRACE) 1 MG tablet Take 1 tablet by mouth daily Yes Sera Valadez MD   zolpidem (AMBIEN) 10 MG tablet Take 1 tablet by mouth nightly as needed for Sleep for up to 180 days. Yes Sera Valadez MD   Cholecalciferol 50 MCG (2000 UT) TABS Take 1 tablet by mouth daily Yes Elin Boyle MD   MAGNESIUM CITRATE PO Take 400 mg by mouth daily Yes Elin Boyle MD   acetaminophen (TYLENOL) 325 MG tablet Take 2 tablets by mouth daily With lunch Yes Elin Boyle MD   mometasone (ELOCON) 0.1 % cream Apply a thin layer topically to the affected area daily (apply to both ears) Yes Elin Boyle MD   polyethyl glycol-propyl glycol 0.4-0.3 % (SYSTANE) 0.4-0.3 % ophthalmic solution Place 1 drop into both eyes as needed for Dry Eyes Yes Elin Boyle MD   losartan (COZAAR) 50 MG tablet Take 1 tablet by mouth daily Yes Sera Valadez MD   lubiprostone (AMITIZA) 8 MCG CAPS capsule Take 1 capsule by mouth 2 times daily (with meals) Yes Sera Valadez MD   SYNTHROID 50 MCG tablet TAKE 1 TABLET DAILY Yes Sera Valadez MD   hydroCHLOROthiazide (HYDRODIURIL) 25 MG tablet Take 1 tablet by mouth daily Yes Sera Valadez MD   Probiotic Product (PROBIOTIC DAILY PO) Take 1 capsule by mouth daily Yes Elin Boyle MD   Omega-3 Fatty Acids (FISH OIL) 1200 MG CAPS Take 1,200 mg by mouth daily Yes Elin Boyle MD   pregabalin (LYRICA) 75 MG capsule Take 1 capsule by mouth 3 times daily. Yes Elin Boyle MD   Gel Base GEL Apply topically as needed Yes Elin Boyle MD   RHOFADE 1 % CREA Apply to face daily Yes Elin Boyle MD   DULoxetine (CYMBALTA) 60 MG extended release capsule Take 1 capsule by mouth daily Yes Elin Boyle MD   Ascorbic Acid (VITAMIN C PO)

## 2024-07-30 NOTE — PATIENT INSTRUCTIONS
attack happens when blood flow is completely blocked. A high-fat diet, smoking, and other factors increase the risk of heart disease.  Your doctor has found that you have a chance of having heart disease. A heart-healthy lifestyle can help keep your heart healthy and prevent heart disease. This lifestyle includes eating healthy, being active, staying at a weight that's healthy for you, and not smoking or using tobacco. It also includes taking medicines as directed, managing other health conditions, and trying to get a healthy amount of sleep.  Follow-up care is a key part of your treatment and safety. Be sure to make and go to all appointments, and call your doctor if you are having problems. It's also a good idea to know your test results and keep a list of the medicines you take.  How can you care for yourself at home?  Diet    Use less salt when you cook and eat. This helps lower your blood pressure. Taste food before salting. Add only a little salt when you think you need it. With time, your taste buds will adjust to less salt.     Eat fewer snack items, fast foods, canned soups, and other high-salt, high-fat, processed foods.     Read food labels and try to avoid saturated and trans fats. They increase your risk of heart disease by raising cholesterol levels.     Limit the amount of solid fat--butter, margarine, and shortening--you eat. Use olive, peanut, or canola oil when you cook. Bake, broil, and steam foods instead of frying them.     Eat a variety of fruit and vegetables every day. Dark green, deep orange, red, or yellow fruits and vegetables are especially good for you. Examples include spinach, carrots, peaches, and berries.     Foods high in fiber can reduce your cholesterol and provide important vitamins and minerals. High-fiber foods include whole-grain cereals and breads, oatmeal, beans, brown rice, citrus fruits, and apples.     Eat lean proteins. Heart-healthy proteins include seafood, lean meats

## 2024-08-01 ENCOUNTER — HOSPITAL ENCOUNTER (OUTPATIENT)
Facility: HOSPITAL | Age: 69
Discharge: HOME | End: 2024-08-01
Payer: MEDICARE

## 2024-08-01 ENCOUNTER — OFFICE VISIT (OUTPATIENT)
Dept: FAMILY MEDICINE CLINIC | Age: 69
End: 2024-08-01
Payer: MEDICARE

## 2024-08-01 VITALS
HEART RATE: 99 BPM | OXYGEN SATURATION: 98 % | DIASTOLIC BLOOD PRESSURE: 76 MMHG | HEIGHT: 64 IN | SYSTOLIC BLOOD PRESSURE: 112 MMHG | WEIGHT: 242.2 LBS | RESPIRATION RATE: 16 BRPM | BODY MASS INDEX: 41.35 KG/M2 | TEMPERATURE: 97.2 F

## 2024-08-01 DIAGNOSIS — M25.561 CHRONIC PAIN OF RIGHT KNEE: Primary | ICD-10-CM

## 2024-08-01 DIAGNOSIS — I10 ESSENTIAL HYPERTENSION: ICD-10-CM

## 2024-08-01 DIAGNOSIS — R53.83 FATIGUE, UNSPECIFIED TYPE: ICD-10-CM

## 2024-08-01 DIAGNOSIS — M16.10 PRIMARY OSTEOARTHRITIS OF HIP, UNSPECIFIED LATERALITY: ICD-10-CM

## 2024-08-01 DIAGNOSIS — E55.9 VITAMIN D DEFICIENCY: ICD-10-CM

## 2024-08-01 DIAGNOSIS — Z13.220 SCREENING, LIPID: ICD-10-CM

## 2024-08-01 DIAGNOSIS — M54.50 CHRONIC LOW BACK PAIN, UNSPECIFIED BACK PAIN LATERALITY, UNSPECIFIED WHETHER SCIATICA PRESENT: ICD-10-CM

## 2024-08-01 DIAGNOSIS — G89.29 CHRONIC LOW BACK PAIN, UNSPECIFIED BACK PAIN LATERALITY, UNSPECIFIED WHETHER SCIATICA PRESENT: ICD-10-CM

## 2024-08-01 DIAGNOSIS — G89.29 CHRONIC PAIN OF RIGHT KNEE: Primary | ICD-10-CM

## 2024-08-01 LAB
25(OH)D3 SERPL-MCNC: 50.8 NG/ML (ref 32–100)
ALBUMIN SERPL-MCNC: 4.4 G/DL (ref 3.4–4.8)
ALBUMIN/GLOB SERPL: 1.7 {RATIO} (ref 0.8–2)
ALP SERPL-CCNC: 77 U/L (ref 25–100)
ALT SERPL-CCNC: 20 U/L (ref 4–36)
ANION GAP SERPL CALCULATED.3IONS-SCNC: 13 MMOL/L (ref 3–16)
AST SERPL-CCNC: 22 U/L (ref 8–33)
BILIRUB SERPL-MCNC: 0.3 MG/DL (ref 0.3–1.2)
BUN SERPL-MCNC: 22 MG/DL (ref 6–20)
CALCIUM SERPL-MCNC: 9.9 MG/DL (ref 8.5–10.5)
CHLORIDE SERPL-SCNC: 99 MMOL/L (ref 98–107)
CHOLEST SERPL-MCNC: 200 MG/DL (ref 0–200)
CO2 SERPL-SCNC: 28 MMOL/L (ref 20–30)
CREAT SERPL-MCNC: 0.8 MG/DL (ref 0.4–1.2)
ERYTHROCYTE [DISTWIDTH] IN BLOOD BY AUTOMATED COUNT: 13.6 % (ref 11–16)
FOLATE SERPL-MCNC: 18.07 NG/ML
GFR SERPLBLD CREATININE-BSD FMLA CKD-EPI: 80 ML/MIN/{1.73_M2}
GLOBULIN SER CALC-MCNC: 2.6 G/DL
GLUCOSE SERPL-MCNC: 90 MG/DL (ref 74–106)
HCT VFR BLD AUTO: 42.8 % (ref 37–47)
HDLC SERPL-MCNC: 51 MG/DL (ref 40–60)
HGB BLD-MCNC: 13.6 G/DL (ref 11.5–16.5)
LDLC SERPL CALC-MCNC: 97 MG/DL
MCH RBC QN AUTO: 27.8 PG (ref 27–32)
MCHC RBC AUTO-ENTMCNC: 31.8 G/DL (ref 31–35)
MCV RBC AUTO: 87.5 FL (ref 80–100)
PLATELET # BLD AUTO: 229 K/UL (ref 150–400)
PMV BLD AUTO: 11.8 FL (ref 6–10)
POTASSIUM SERPL-SCNC: 4.4 MMOL/L (ref 3.4–5.1)
PROT SERPL-MCNC: 7 G/DL (ref 6.4–8.3)
RBC # BLD AUTO: 4.89 M/UL (ref 3.8–5.8)
SODIUM SERPL-SCNC: 140 MMOL/L (ref 136–145)
T4 FREE SERPL-MCNC: 1.45 NG/DL (ref 0.92–1.68)
TRIGL SERPL-MCNC: 262 MG/DL (ref 0–249)
VIT B12 SERPL-MCNC: 407 PG/ML (ref 211–911)
VLDLC SERPL CALC-MCNC: 52 MG/DL
WBC # BLD AUTO: 6.4 K/UL (ref 4–11)

## 2024-08-01 PROCEDURE — 82607 VITAMIN B-12: CPT

## 2024-08-01 PROCEDURE — 84439 ASSAY OF FREE THYROXINE: CPT

## 2024-08-01 PROCEDURE — 20610 DRAIN/INJ JOINT/BURSA W/O US: CPT | Performed by: FAMILY MEDICINE

## 2024-08-01 PROCEDURE — 85027 COMPLETE CBC AUTOMATED: CPT

## 2024-08-01 PROCEDURE — 82746 ASSAY OF FOLIC ACID SERUM: CPT

## 2024-08-01 PROCEDURE — 1123F ACP DISCUSS/DSCN MKR DOCD: CPT | Performed by: FAMILY MEDICINE

## 2024-08-01 PROCEDURE — 82306 VITAMIN D 25 HYDROXY: CPT

## 2024-08-01 PROCEDURE — 99213 OFFICE O/P EST LOW 20 MIN: CPT | Performed by: FAMILY MEDICINE

## 2024-08-01 PROCEDURE — 36415 COLL VENOUS BLD VENIPUNCTURE: CPT

## 2024-08-01 PROCEDURE — 80053 COMPREHEN METABOLIC PANEL: CPT

## 2024-08-01 PROCEDURE — 80061 LIPID PANEL: CPT

## 2024-08-01 PROCEDURE — 3074F SYST BP LT 130 MM HG: CPT | Performed by: FAMILY MEDICINE

## 2024-08-01 PROCEDURE — 3078F DIAST BP <80 MM HG: CPT | Performed by: FAMILY MEDICINE

## 2024-08-01 RX ORDER — METHYLPREDNISOLONE ACETATE 40 MG/ML
40 INJECTION, SUSPENSION INTRA-ARTICULAR; INTRALESIONAL; INTRAMUSCULAR; SOFT TISSUE ONCE
Status: COMPLETED | OUTPATIENT
Start: 2024-08-01 | End: 2024-08-01

## 2024-08-01 RX ORDER — LIDOCAINE HYDROCHLORIDE 10 MG/ML
1 INJECTION, SOLUTION INFILTRATION; PERINEURAL ONCE
Status: COMPLETED | OUTPATIENT
Start: 2024-08-01 | End: 2024-08-01

## 2024-08-01 RX ADMIN — METHYLPREDNISOLONE ACETATE 40 MG: 40 INJECTION, SUSPENSION INTRA-ARTICULAR; INTRALESIONAL; INTRAMUSCULAR; SOFT TISSUE at 11:19

## 2024-08-01 RX ADMIN — LIDOCAINE HYDROCHLORIDE 1 ML: 10 INJECTION, SOLUTION INFILTRATION; PERINEURAL at 11:19

## 2024-08-01 NOTE — PROGRESS NOTES
Chief Complaint   Patient presents with    Knee Pain     Right knee pain, patient is asking for an injection today.        Have you seen any other physician or provider since your last visit yes [facility]    Have you had any other diagnostic tests since your last visit? Xray Right Knee    Have you changed or stopped any medications since your last visit? no   Administrations This Visit       lidocaine 1 % injection 1 mL       Admin Date  08/01/2024  11:19 Action  Given Dose  1 mL Route  Other Site  Knee Right Administered By  Chloé Ryan MA    Ordering Provider: Sera Valadez MD    Patient Supplied?: No              methylPREDNISolone acetate (DEPO-MEDROL) injection 40 mg       Admin Date  08/01/2024  11:19 Action  Given Dose  40 mg Route  Intra-artICUlar Site  Knee Right Administered By  Chloé Ryan MA    Ordering Provider: Sera Valadez MD    Patient Supplied?: No                 Patient tolerated injection well. Patient advised to wait 20 minutes in the office following the injection. No signs/symptoms of reaction noted after 20 minutes.    
Chronic pain of right knee  lidocaine 1 % injection 1 mL    methylPREDNISolone acetate (DEPO-MEDROL) injection 40 mg    methylPREDNISolone acetate (DEPO-MEDROL) injection 40 mg    lidocaine PF 1 % injection 1 mL    12770 - [place] DRAIN/INJECT LARGE JOINT/BURSA      2. Fatigue, unspecified type  TSH    T4, Free    Vitamin B12 & Folate      3. Chronic low back pain, unspecified back pain laterality, unspecified whether sciatica present        4. Essential hypertension  Comprehensive Metabolic Panel    CBC      5. Primary osteoarthritis of hip, unspecified laterality  methylPREDNISolone acetate (DEPO-MEDROL) injection 40 mg    lidocaine PF 1 % injection 1 mL    07004 - [place] DRAIN/INJECT LARGE JOINT/BURSA      6. Vitamin D deficiency  Vitamin D 25 Hydroxy      7. Screening, lipid  LIPID PANEL        Steroid injection was performed by Sera Valadez MD using Plain Lidocaine 1% 1ml and Depo-Medrol 40mg 1ml Intra-Articular in the right knee. No complications or reactions noted. Patient tolerated procedure well.     Orders Placed This Encounter   Medications    lidocaine 1 % injection 1 mL    methylPREDNISolone acetate (DEPO-MEDROL) injection 40 mg    methylPREDNISolone acetate (DEPO-MEDROL) injection 40 mg    lidocaine PF 1 % injection 1 mL        There are no discontinued medications.    Controlled Substances Monitoring:      Please note: This chart was generated using Dragon dictation software. Although every effort was made to ensure the accuracy of this automated transcription, some errors in transcription may have occurred.

## 2024-08-07 ENCOUNTER — TELEPHONE (OUTPATIENT)
Dept: NEUROSURGERY | Facility: CLINIC | Age: 69
End: 2024-08-07

## 2024-08-07 RX ORDER — METHYLPREDNISOLONE ACETATE 40 MG/ML
40 INJECTION, SUSPENSION INTRA-ARTICULAR; INTRALESIONAL; INTRAMUSCULAR; SOFT TISSUE ONCE
Status: SHIPPED | OUTPATIENT
Start: 2024-08-01

## 2024-08-07 RX ORDER — LIDOCAINE HYDROCHLORIDE 10 MG/ML
1 INJECTION, SOLUTION EPIDURAL; INFILTRATION; INTRACAUDAL; PERINEURAL ONCE
Status: SHIPPED | OUTPATIENT
Start: 2024-08-01

## 2024-08-07 NOTE — TELEPHONE ENCOUNTER
Reason for sending: ECHO RECEIVED ALSO IN CARE EVERYWHERE    Documents Description: ECHO    Name of Sender:     Date Indexed: 05/22/24    Notes (if needed): Received echo results. Also in care everywhere

## 2024-08-08 DIAGNOSIS — M19.90 ARTHRITIS: ICD-10-CM

## 2024-08-08 DIAGNOSIS — G89.29 CHRONIC PAIN OF RIGHT KNEE: Primary | ICD-10-CM

## 2024-08-08 DIAGNOSIS — M25.561 CHRONIC PAIN OF RIGHT KNEE: Primary | ICD-10-CM

## 2024-08-14 ENCOUNTER — HOSPITAL ENCOUNTER (OUTPATIENT)
Dept: PHYSICAL THERAPY | Facility: HOSPITAL | Age: 69
Setting detail: THERAPIES SERIES
Discharge: HOME | End: 2024-08-14
Payer: MEDICARE

## 2024-08-14 PROCEDURE — 97110 THERAPEUTIC EXERCISES: CPT | Performed by: PHYSICAL THERAPIST

## 2024-08-14 PROCEDURE — 97162 PT EVAL MOD COMPLEX 30 MIN: CPT | Performed by: PHYSICAL THERAPIST

## 2024-08-21 ENCOUNTER — HOSPITAL ENCOUNTER (OUTPATIENT)
Dept: PHYSICAL THERAPY | Facility: HOSPITAL | Age: 69
Setting detail: THERAPIES SERIES
Discharge: HOME | End: 2024-08-21
Payer: MEDICARE

## 2024-08-21 ENCOUNTER — APPOINTMENT (OUTPATIENT)
Dept: PHYSICAL THERAPY | Facility: HOSPITAL | Age: 69
End: 2024-08-21
Payer: MEDICARE

## 2024-08-21 PROCEDURE — 97110 THERAPEUTIC EXERCISES: CPT | Performed by: PHYSICAL THERAPIST

## 2024-08-21 NOTE — PROGRESS NOTES
Physical Therapy Daily Treatment Note   Date:  2024    TIme In:   1058                   Time Out: 1200    Patient Name:  Kristyn Martin    :  1955  MRN: [id number]    Restrictions/Precautions:    Pertinent Medical History:  Medical/Treatment Diagnosis Information:  Low back pain, unspecified [M54.50]     Insurance/Certification information:  Payor: [facility] MEDICARE / Plan: ANTHEM MEDIBLUE ESSENTIAL/PLUS / Product Type: *No Product type* /   Physician Information:  Sera Valadez MD  Plan of care signed (Y/N):    Visit# / total visits:     5 /    G-Code (if applicable):      Date / Visit # G-Code Applied:         Progress Note: []  Yes  [x]  No  Next due by: Visit #10       Pain level:   4/10  Subjective: Patient states that she is walking stiff today but her knees are feeling a little better.  She states that she is doing her HEP daily.     Objective:  Observation:   Test measurements:    Palpation:    Exercises:  Exercise Resistance/Repetitions Date performed   Sci Fit  level 4- 6 min  21   Pullies flex  3 min ABD 3 min  21   corner wall press  10x 2 sets  21   standing with support for B hip ABD  10x 2 sets  21   standing with support for B hip Ext  20x  21   standing trunk rotation with red tubbing  20x B 21   seated ball squeeze  30x  21   supine trunk rotation  20x  21   supine posterior pelvic tilt  20x  21   supine figure 4  20x  21   clam shell GTB  20x  21   Prone laying on elbows  5 min  21   Seated hip flex with BTB 20x 21   Standing with support for marching 20x 21   Seated hamstring curls with BTB BLE 20x 21    seated TKE BLE with 2lb ankle wts  20x 21   Sit to stand 10x 21     Other Therapeutic Activities:      Manual Treatments:      Modalities:  Cold pack to B lumbar area in prone, 15 min      Timed Code Treatment Minutes:  50      Total Treatment Minutes:  62    Treatment/Activity Tolerance:  [x] Patient tolerated treatment well [] Patient limited by fatigue  [] Patient limited by

## 2024-08-22 ENCOUNTER — PATIENT MESSAGE (OUTPATIENT)
Dept: FAMILY MEDICINE CLINIC | Age: 69
End: 2024-08-22

## 2024-08-23 ENCOUNTER — PATIENT MESSAGE (OUTPATIENT)
Dept: FAMILY MEDICINE CLINIC | Age: 69
End: 2024-08-23

## 2024-08-23 NOTE — TELEPHONE ENCOUNTER
Refill request received from patient      Next Office Visit Date:  Future Appointments   Date Time Provider Department Center   8/28/2024 11:00 AM Amanda Jones, PT [facility] - Please review via PDMP        Last Office Visit:    8/1/2024

## 2024-08-24 RX ORDER — DULOXETIN HYDROCHLORIDE 60 MG/1
60 CAPSULE, DELAYED RELEASE ORAL DAILY
Qty: 30 CAPSULE | Refills: 3 | Status: SHIPPED | OUTPATIENT
Start: 2024-08-24

## 2024-08-26 DIAGNOSIS — M79.7 FIBROMYALGIA: Primary | ICD-10-CM

## 2024-08-26 DIAGNOSIS — M25.50 ARTHRALGIA, UNSPECIFIED JOINT: ICD-10-CM

## 2024-08-26 NOTE — TELEPHONE ENCOUNTER
PtJones Wants meds sent to [facility] in [place]    48 y/o F with pmhx of DM, fibromyalgia, Lupus, chronic migraines presents to the ED c/o worsening pain and swelling on 4th digit of right hand s/p cat bite. Pt states her cat bit her finger a week ago. Pt went to urgent care and was given 10 day course of levaquin which she has been compliant with taking. Pt states that pain and swelling have not improved despite abx treatment and notes some oozing drainage from the bite site. Pt was sent to the ED from urgent care for further evaluation. Pt has been taking hydromorphone with little relief in pain, last dose 0500 this morning. Denies fever/chills, red streaks, numbness/tingling, abd pain, N/V, CP, SOB. Pt reports allergy to pcn, clindamycin, cipro, effexor.

## 2024-08-28 ENCOUNTER — APPOINTMENT (OUTPATIENT)
Dept: PHYSICAL THERAPY | Facility: HOSPITAL | Age: 69
End: 2024-08-28
Payer: MEDICARE

## 2024-08-28 ENCOUNTER — HOSPITAL ENCOUNTER (OUTPATIENT)
Dept: PHYSICAL THERAPY | Facility: HOSPITAL | Age: 69
Setting detail: THERAPIES SERIES
Discharge: HOME | End: 2024-08-28
Payer: MEDICARE

## 2024-08-28 PROCEDURE — 97110 THERAPEUTIC EXERCISES: CPT | Performed by: PHYSICAL THERAPIST

## 2024-08-29 NOTE — PROGRESS NOTES
Physical Therapy Daily Treatment Note   Date:  2024    TIme In:   1057                   Time Out: 1200    Patient Name:  Kristyn Martin    :  1955  MRN: [id number]    Restrictions/Precautions:    Pertinent Medical History:  Medical/Treatment Diagnosis Information:  Low back pain, unspecified [M54.50]     Insurance/Certification information:  Payor: [facility] MEDICARE / Plan: ANTHEM MEDIBLUE ESSENTIAL/PLUS / Product Type: *No Product type* /   Physician Information:  Sera Valadez MD  Plan of care signed (Y/N):    Visit# / total visits:     6 /    G-Code (if applicable):      Date / Visit # G-Code Applied:         Progress Note: []  Yes  [x]  No  Next due by: Visit #10       Pain level:   1/10  Subjective: Patient states that she is walking stiff today but her knees are feeling a little better.  She states that she is doing her HEP daily.     Objective:  Observation:   Test measurements:    Palpation:    Exercises:  Exercise Resistance/Repetitions Date performed   Sci Fit  level 4- 6 min  28   Pullies flex  3 min ABD 3 min  28   corner wall press  10x 2 sets  28   standing with support for B hip ABD  10x 2 sets  28   standing with support for B hip Ext  20x  28   standing trunk rotation with red tubbing  20x B 28   seated ball squeeze  30x  28   supine trunk rotation  20x  28   supine posterior pelvic tilt  20x  28   supine figure 4  20x  28   clam shell GTB  20x  28   Prone laying on elbows  5 min  28   Seated hip flex with BTB 20x 28   Standing with support for marching 20x 28   Seated hamstring curls with BTB BLE 20x 28    seated TKE BLE with 2lb ankle wts  20x 28   Sit to stand 10x 28     Other Therapeutic Activities:      Manual Treatments:      Modalities:  Cold pack to B lumbar area in prone, 15 min      Timed Code Treatment Minutes:  50      Total Treatment Minutes:  63    Treatment/Activity Tolerance:  [x] Patient tolerated treatment well [] Patient limited by fatigue  [] Patient limited by

## 2024-09-04 ENCOUNTER — HOSPITAL ENCOUNTER (OUTPATIENT)
Dept: PHYSICAL THERAPY | Facility: HOSPITAL | Age: 69
Setting detail: THERAPIES SERIES
Discharge: HOME | End: 2024-09-04

## 2024-09-04 NOTE — CARE COORDINATION
[facility]  Cancel/No Show Note    Service:  [x]Physical Therapy  []Occupational Therapy    Date:9/4/24    # of Missed Tx:     Reason For Missed Treatment:  []Illness  []Provider unavailable  []No call/no show  []Late cancellation (<24 hours)  []Arrived >15 minutes late  []Other appointment  []Scheduling conflict  []Arrived on wrong day  []Physician discharged tx  []Transportation conflict  []Inclement weather  []Frequency of tx change  []No reason given  []Arrived but refused participation  []Unable to tolerate  []Medical complications  []Pt on hold due to-  [x]Other-     Plan:  []Rescheduled  []Continue at next scheduled apt  []Reminder phone call  []Contacted referring provider  []Discharged

## 2024-09-05 ENCOUNTER — PRE-ADMISSION TESTING (OUTPATIENT)
Dept: PREADMISSION TESTING | Facility: HOSPITAL | Age: 69
End: 2024-09-05
Payer: MEDICARE

## 2024-09-05 ENCOUNTER — ANESTHESIA EVENT (OUTPATIENT)
Dept: PERIOP | Facility: HOSPITAL | Age: 69
End: 2024-09-05
Payer: MEDICARE

## 2024-09-05 VITALS — HEIGHT: 64 IN | BODY MASS INDEX: 40.99 KG/M2 | WEIGHT: 240.08 LBS

## 2024-09-05 DIAGNOSIS — M48.062 SPINAL STENOSIS OF LUMBAR REGION WITH NEUROGENIC CLAUDICATION: ICD-10-CM

## 2024-09-05 DIAGNOSIS — R93.7 ABNORMAL FINDINGS ON DIAGNOSTIC IMAGING OF OTHER PARTS OF MUSCULOSKELETAL SYSTEM: ICD-10-CM

## 2024-09-05 LAB
ALBUMIN SERPL-MCNC: 4.3 G/DL (ref 3.5–5.2)
ALBUMIN/GLOB SERPL: 1.5 G/DL
ALP SERPL-CCNC: 86 U/L (ref 39–117)
ALT SERPL W P-5'-P-CCNC: 25 U/L (ref 1–33)
ANION GAP SERPL CALCULATED.3IONS-SCNC: 14 MMOL/L (ref 5–15)
AST SERPL-CCNC: 27 U/L (ref 1–32)
BASOPHILS # BLD AUTO: 0.03 10*3/MM3 (ref 0–0.2)
BASOPHILS NFR BLD AUTO: 0.5 % (ref 0–1.5)
BILIRUB SERPL-MCNC: 0.4 MG/DL (ref 0–1.2)
BUN SERPL-MCNC: 14 MG/DL (ref 8–23)
BUN/CREAT SERPL: 18.7 (ref 7–25)
CALCIUM SPEC-SCNC: 10.2 MG/DL (ref 8.6–10.5)
CHLORIDE SERPL-SCNC: 99 MMOL/L (ref 98–107)
CO2 SERPL-SCNC: 26 MMOL/L (ref 22–29)
CREAT SERPL-MCNC: 0.75 MG/DL (ref 0.57–1)
DEPRECATED RDW RBC AUTO: 43.7 FL (ref 37–54)
EGFRCR SERPLBLD CKD-EPI 2021: 86.3 ML/MIN/1.73
EOSINOPHIL # BLD AUTO: 0.05 10*3/MM3 (ref 0–0.4)
EOSINOPHIL NFR BLD AUTO: 0.8 % (ref 0.3–6.2)
ERYTHROCYTE [DISTWIDTH] IN BLOOD BY AUTOMATED COUNT: 14 % (ref 12.3–15.4)
GLOBULIN UR ELPH-MCNC: 2.9 GM/DL
GLUCOSE SERPL-MCNC: 118 MG/DL (ref 65–99)
HBA1C MFR BLD: 5.4 % (ref 4.8–5.6)
HCT VFR BLD AUTO: 42.1 % (ref 34–46.6)
HGB BLD-MCNC: 13.7 G/DL (ref 12–15.9)
IMM GRANULOCYTES # BLD AUTO: 0.01 10*3/MM3 (ref 0–0.05)
IMM GRANULOCYTES NFR BLD AUTO: 0.2 % (ref 0–0.5)
LYMPHOCYTES # BLD AUTO: 1.95 10*3/MM3 (ref 0.7–3.1)
LYMPHOCYTES NFR BLD AUTO: 30.2 % (ref 19.6–45.3)
MCH RBC QN AUTO: 28.3 PG (ref 26.6–33)
MCHC RBC AUTO-ENTMCNC: 32.5 G/DL (ref 31.5–35.7)
MCV RBC AUTO: 87 FL (ref 79–97)
MONOCYTES # BLD AUTO: 0.42 10*3/MM3 (ref 0.1–0.9)
MONOCYTES NFR BLD AUTO: 6.5 % (ref 5–12)
NEUTROPHILS NFR BLD AUTO: 3.99 10*3/MM3 (ref 1.7–7)
NEUTROPHILS NFR BLD AUTO: 61.8 % (ref 42.7–76)
NRBC BLD AUTO-RTO: 0 /100 WBC (ref 0–0.2)
PLATELET # BLD AUTO: 231 10*3/MM3 (ref 140–450)
PMV BLD AUTO: 11.2 FL (ref 6–12)
POTASSIUM SERPL-SCNC: 3.3 MMOL/L (ref 3.5–5.2)
PROT SERPL-MCNC: 7.2 G/DL (ref 6–8.5)
QT INTERVAL: 368 MS
QTC INTERVAL: 419 MS
RBC # BLD AUTO: 4.84 10*6/MM3 (ref 3.77–5.28)
SODIUM SERPL-SCNC: 139 MMOL/L (ref 136–145)
WBC NRBC COR # BLD AUTO: 6.45 10*3/MM3 (ref 3.4–10.8)

## 2024-09-05 PROCEDURE — 80053 COMPREHEN METABOLIC PANEL: CPT

## 2024-09-05 PROCEDURE — 93005 ELECTROCARDIOGRAM TRACING: CPT

## 2024-09-05 PROCEDURE — 83036 HEMOGLOBIN GLYCOSYLATED A1C: CPT

## 2024-09-05 PROCEDURE — 93010 ELECTROCARDIOGRAM REPORT: CPT | Performed by: STUDENT IN AN ORGANIZED HEALTH CARE EDUCATION/TRAINING PROGRAM

## 2024-09-05 PROCEDURE — 36415 COLL VENOUS BLD VENIPUNCTURE: CPT

## 2024-09-05 PROCEDURE — 85025 COMPLETE CBC W/AUTO DIFF WBC: CPT

## 2024-09-05 PROCEDURE — 87081 CULTURE SCREEN ONLY: CPT

## 2024-09-05 RX ORDER — UREA 40 G/100G
1 LOTION TOPICAL DAILY
COMMUNITY

## 2024-09-05 NOTE — PAT
Bactroban (if prescribed) and Chlorhexidine Prescription prescribed by physician before PAT visit.  Verified with patient that medication(s) were picked up from their pharmacy.  Written instructions given to patient during PAT visit.  Patient/family also instructed to complete skin prep checklist and return the checklist on the day of surgery to preoperative staff.  Patient/family verbalized understanding.    Patient to apply Chlorhexadine wipes  to surgical area (as instructed) the night before procedure and the AM of procedure. Wipes provided.    Per Anesthesia Request, patient instructed not to take their ACE/ARB medications on the AM of surgery.    Patient instructed to drink 20 ounces of Gatorade or Gatorlyte (if diabetic) and it needs to be completed 1 hour (for Main OR patients) or 2 hours (scheduled  section & [facility] patients) before given arrival time for procedure (NO RED Gatorade and NO Gatorade Zero).    Patient verbalized understanding.    Patient viewed general PAT education video as instructed in their preoperative information received from their surgeon.  Patient stated the general PAT education video was viewed in its entirety and survey completed.  Copies of [facility] general education handouts (Incentive Spirometry, Meds to Beds Program, Patient Belongings, Pre-op skin preparation instructions, Blood Glucose testing, Visitor policy, Surgery FAQ, Code H) distributed to patient if not printed. Education related to the PAT pass and skin preparation for surgery (if applicable) completed in PAT as a reinforcement to PAT education video. Patient instructed to return PAT pass provided today as well as completed skin preparation sheet (if applicable) on the day of procedure.     Additionally if patient had not viewed video yet but intended to view it at home or in our waiting area, then referred them to the handout with QR code/link provided during PAT visit.  Encouraged patient/family to read [facility] general  education handouts thoroughly and notify PAT staff with any questions or concerns. Patient verbalized understanding of all information and priority content.    EKG from PAT today faxed to anesthesiology department for review and cardiac clearance. RN spoke with Dr Osorio  and reviewed pertinent medical history and EKG results.  Per Dr Osorio, patient is cleared to proceed with procedure as planned without additional cardiac testing. Patient denies chest pain or increased shortness of breath.

## 2024-09-06 LAB — MRSA SPEC QL CULT: NORMAL

## 2024-09-09 ENCOUNTER — TELEPHONE (OUTPATIENT)
Dept: FAMILY MEDICINE CLINIC | Age: 69
End: 2024-09-09

## 2024-09-12 ENCOUNTER — HOSPITAL ENCOUNTER (OUTPATIENT)
Facility: HOSPITAL | Age: 69
Discharge: HOME | End: 2024-09-15
Attending: NEUROLOGICAL SURGERY | Admitting: NEUROLOGICAL SURGERY
Payer: MEDICARE

## 2024-09-12 ENCOUNTER — APPOINTMENT (OUTPATIENT)
Dept: GENERAL RADIOLOGY | Facility: HOSPITAL | Age: 69
End: 2024-09-12
Payer: MEDICARE

## 2024-09-12 ENCOUNTER — ANESTHESIA (OUTPATIENT)
Dept: PERIOP | Facility: HOSPITAL | Age: 69
End: 2024-09-12
Payer: MEDICARE

## 2024-09-12 DIAGNOSIS — M48.062 SPINAL STENOSIS OF LUMBAR REGION WITH NEUROGENIC CLAUDICATION: ICD-10-CM

## 2024-09-12 DIAGNOSIS — M48.062 LUMBAR STENOSIS WITH NEUROGENIC CLAUDICATION: Primary | ICD-10-CM

## 2024-09-12 DIAGNOSIS — B99.9 NEUROPATHY DUE TO INFECTION: ICD-10-CM

## 2024-09-12 DIAGNOSIS — G63 NEUROPATHY DUE TO INFECTION: ICD-10-CM

## 2024-09-12 PROCEDURE — 25010000002 SUGAMMADEX 200 MG/2ML SOLUTION: Performed by: NURSE ANESTHETIST, CERTIFIED REGISTERED

## 2024-09-12 PROCEDURE — 61783 SCAN PROC SPINAL: CPT | Performed by: NEUROLOGICAL SURGERY

## 2024-09-12 PROCEDURE — 22842 INSERT SPINE FIXATION DEVICE: CPT

## 2024-09-12 PROCEDURE — C1713 ANCHOR/SCREW BN/BN,TIS/BN: HCPCS | Performed by: NEUROLOGICAL SURGERY

## 2024-09-12 PROCEDURE — 25010000002 PROPOFOL 10 MG/ML EMULSION: Performed by: NURSE ANESTHETIST, CERTIFIED REGISTERED

## 2024-09-12 PROCEDURE — 25010000002 VANCOMYCIN 1 G RECONSTITUTED SOLUTION: Performed by: NEUROLOGICAL SURGERY

## 2024-09-12 PROCEDURE — 25010000002 CEFAZOLIN PER 500 MG: Performed by: NEUROLOGICAL SURGERY

## 2024-09-12 PROCEDURE — 63053 LAM FACTC/FRMT ARTHRD LUM EA: CPT

## 2024-09-12 PROCEDURE — 25010000002 FENTANYL CITRATE (PF) 100 MCG/2ML SOLUTION: Performed by: NURSE ANESTHETIST, CERTIFIED REGISTERED

## 2024-09-12 PROCEDURE — 25010000002 DEXAMETHASONE PER 1 MG: Performed by: NURSE ANESTHETIST, CERTIFIED REGISTERED

## 2024-09-12 PROCEDURE — 22632 ARTHRD PST TQ 1NTRSPC LM EA: CPT | Performed by: NEUROLOGICAL SURGERY

## 2024-09-12 PROCEDURE — 76000 FLUOROSCOPY <1 HR PHYS/QHP: CPT

## 2024-09-12 PROCEDURE — 22632 ARTHRD PST TQ 1NTRSPC LM EA: CPT

## 2024-09-12 PROCEDURE — 63053 LAM FACTC/FRMT ARTHRD LUM EA: CPT | Performed by: NEUROLOGICAL SURGERY

## 2024-09-12 PROCEDURE — 63052 LAM FACETC/FRMT ARTHRD LUM 1: CPT | Performed by: NEUROLOGICAL SURGERY

## 2024-09-12 PROCEDURE — 22842 INSERT SPINE FIXATION DEVICE: CPT | Performed by: NEUROLOGICAL SURGERY

## 2024-09-12 PROCEDURE — 25010000002 HYDROMORPHONE 1 MG/ML SOLUTION

## 2024-09-12 PROCEDURE — 25010000002 ONDANSETRON PER 1 MG: Performed by: NURSE ANESTHETIST, CERTIFIED REGISTERED

## 2024-09-12 PROCEDURE — 25810000003 LACTATED RINGERS PER 1000 ML: Performed by: ANESTHESIOLOGY

## 2024-09-12 PROCEDURE — 25010000002 CEFAZOLIN PER 500 MG: Performed by: PHYSICIAN ASSISTANT

## 2024-09-12 PROCEDURE — 22630 ARTHRD PST TQ 1NTRSPC LUM: CPT | Performed by: NEUROLOGICAL SURGERY

## 2024-09-12 PROCEDURE — 22630 ARTHRD PST TQ 1NTRSPC LUM: CPT

## 2024-09-12 PROCEDURE — 25010000002 FENTANYL CITRATE (PF) 50 MCG/ML SOLUTION

## 2024-09-12 PROCEDURE — 22853 INSJ BIOMECHANICAL DEVICE: CPT

## 2024-09-12 PROCEDURE — 25810000003 SODIUM CHLORIDE PER 500 ML: Performed by: NEUROLOGICAL SURGERY

## 2024-09-12 PROCEDURE — 63052 LAM FACETC/FRMT ARTHRD LUM 1: CPT

## 2024-09-12 PROCEDURE — 22853 INSJ BIOMECHANICAL DEVICE: CPT | Performed by: NEUROLOGICAL SURGERY

## 2024-09-12 DEVICE — ROD 1555501060 5.5 CCM NS CURV 60MM
Type: IMPLANTABLE DEVICE | Site: SPINE LUMBAR | Status: FUNCTIONAL
Brand: CD HORIZON® SPINAL SYSTEM

## 2024-09-12 DEVICE — MAS 55840026540T 5.5/6.0 FENSTRTD 6.5X40
Type: IMPLANTABLE DEVICE | Site: SPINE LUMBAR | Status: FUNCTIONAL
Brand: CD HORIZON® FENESTRATED SCREW SET

## 2024-09-12 DEVICE — SPACER 84332408 ADAPTIX 24MM X 8MM
Type: IMPLANTABLE DEVICE | Site: SPINE LUMBAR | Status: FUNCTIONAL
Brand: ADAPTIX™ INTERBODY SYSTEM WITH TITAN NANOLOCK™ SURFACE TECHNOLOGY

## 2024-09-12 DEVICE — SSC BONE WAX
Type: IMPLANTABLE DEVICE | Site: SPINE LUMBAR | Status: FUNCTIONAL
Brand: SSC BONE WAX

## 2024-09-12 DEVICE — IMPLANTABLE DEVICE
Type: IMPLANTABLE DEVICE | Site: SPINE LUMBAR | Status: FUNCTIONAL
Brand: SURGIFOAM® ABSORBABLE GELATIN SPONGE, U.S.P.

## 2024-09-12 DEVICE — FLOSEAL WITH RECOTHROM - 10ML.
Type: IMPLANTABLE DEVICE | Site: SPINE LUMBAR | Status: FUNCTIONAL
Brand: FLOSEAL HEMOSTATIC MATRIX

## 2024-09-12 DEVICE — DBM T44145 5CC ORTHOBLEND SMALL DEFGRAFT
Type: IMPLANTABLE DEVICE | Site: SPINE LUMBAR | Status: FUNCTIONAL
Brand: GRAFTON®AND GRAFTON PLUS®DEMINERALIZED BONE MATRIX (DBM)

## 2024-09-12 RX ORDER — SODIUM CHLORIDE 0.9 % (FLUSH) 0.9 %
10 SYRINGE (ML) INJECTION AS NEEDED
Status: DISCONTINUED | OUTPATIENT
Start: 2024-09-12 | End: 2024-09-12 | Stop reason: SDUPTHER

## 2024-09-12 RX ORDER — FENTANYL CITRATE 50 UG/ML
INJECTION, SOLUTION INTRAMUSCULAR; INTRAVENOUS
Status: COMPLETED
Start: 2024-09-12 | End: 2024-09-12

## 2024-09-12 RX ORDER — FENTANYL CITRATE 50 UG/ML
INJECTION, SOLUTION INTRAMUSCULAR; INTRAVENOUS AS NEEDED
Status: DISCONTINUED | OUTPATIENT
Start: 2024-09-12 | End: 2024-09-12 | Stop reason: SURG

## 2024-09-12 RX ORDER — SODIUM CHLORIDE, SODIUM LACTATE, POTASSIUM CHLORIDE, CALCIUM CHLORIDE 600; 310; 30; 20 MG/100ML; MG/100ML; MG/100ML; MG/100ML
9 INJECTION, SOLUTION INTRAVENOUS CONTINUOUS
Status: DISCONTINUED | OUTPATIENT
Start: 2024-09-12 | End: 2024-09-12 | Stop reason: SDUPTHER

## 2024-09-12 RX ORDER — BUPIVACAINE HYDROCHLORIDE AND EPINEPHRINE 2.5; 5 MG/ML; UG/ML
INJECTION, SOLUTION EPIDURAL; INFILTRATION; INTRACAUDAL; PERINEURAL AS NEEDED
Status: DISCONTINUED | OUTPATIENT
Start: 2024-09-12 | End: 2024-09-12 | Stop reason: HOSPADM

## 2024-09-12 RX ORDER — LIDOCAINE HYDROCHLORIDE 10 MG/ML
INJECTION, SOLUTION EPIDURAL; INFILTRATION; INTRACAUDAL; PERINEURAL AS NEEDED
Status: DISCONTINUED | OUTPATIENT
Start: 2024-09-12 | End: 2024-09-12 | Stop reason: SURG

## 2024-09-12 RX ORDER — PROPOFOL 10 MG/ML
VIAL (ML) INTRAVENOUS AS NEEDED
Status: DISCONTINUED | OUTPATIENT
Start: 2024-09-12 | End: 2024-09-12 | Stop reason: SURG

## 2024-09-12 RX ORDER — FENTANYL CITRATE 50 UG/ML
50 INJECTION, SOLUTION INTRAMUSCULAR; INTRAVENOUS
Status: DISCONTINUED | OUTPATIENT
Start: 2024-09-12 | End: 2024-09-12 | Stop reason: HOSPADM

## 2024-09-12 RX ORDER — FAMOTIDINE 20 MG/1
20 TABLET, FILM COATED ORAL EVERY 12 HOURS SCHEDULED
Status: DISCONTINUED | OUTPATIENT
Start: 2024-09-12 | End: 2024-09-15 | Stop reason: HOSPADM

## 2024-09-12 RX ORDER — ONDANSETRON 2 MG/ML
4 INJECTION INTRAMUSCULAR; INTRAVENOUS EVERY 6 HOURS PRN
Status: DISCONTINUED | OUTPATIENT
Start: 2024-09-12 | End: 2024-09-15 | Stop reason: HOSPADM

## 2024-09-12 RX ORDER — ESTRADIOL 0.5 MG/1
1 TABLET ORAL DAILY
Status: DISCONTINUED | OUTPATIENT
Start: 2024-09-12 | End: 2024-09-15 | Stop reason: HOSPADM

## 2024-09-12 RX ORDER — PROMETHAZINE HYDROCHLORIDE 12.5 MG/1
12.5 TABLET ORAL EVERY 6 HOURS PRN
Status: DISCONTINUED | OUTPATIENT
Start: 2024-09-12 | End: 2024-09-15 | Stop reason: HOSPADM

## 2024-09-12 RX ORDER — HYDROCODONE BITARTRATE AND ACETAMINOPHEN 5; 325 MG/1; MG/1
1 TABLET ORAL EVERY 4 HOURS PRN
Status: DISCONTINUED | OUTPATIENT
Start: 2024-09-12 | End: 2024-09-15 | Stop reason: HOSPADM

## 2024-09-12 RX ORDER — FAMOTIDINE 10 MG/ML
20 INJECTION, SOLUTION INTRAVENOUS ONCE
Status: DISCONTINUED | OUTPATIENT
Start: 2024-09-12 | End: 2024-09-12 | Stop reason: SDUPTHER

## 2024-09-12 RX ORDER — FAMOTIDINE 10 MG/ML
20 INJECTION, SOLUTION INTRAVENOUS ONCE
Status: DISCONTINUED | OUTPATIENT
Start: 2024-09-12 | End: 2024-09-12

## 2024-09-12 RX ORDER — OXYCODONE AND ACETAMINOPHEN 7.5; 325 MG/1; MG/1
1 TABLET ORAL EVERY 4 HOURS PRN
Status: DISCONTINUED | OUTPATIENT
Start: 2024-09-12 | End: 2024-09-15 | Stop reason: HOSPADM

## 2024-09-12 RX ORDER — ZOLPIDEM TARTRATE 5 MG/1
5 TABLET ORAL NIGHTLY PRN
Status: DISCONTINUED | OUTPATIENT
Start: 2024-09-12 | End: 2024-09-15 | Stop reason: HOSPADM

## 2024-09-12 RX ORDER — MORPHINE SULFATE 4 MG/ML
4 INJECTION, SOLUTION INTRAMUSCULAR; INTRAVENOUS EVERY 4 HOURS PRN
Status: DISCONTINUED | OUTPATIENT
Start: 2024-09-12 | End: 2024-09-15 | Stop reason: HOSPADM

## 2024-09-12 RX ORDER — MIDAZOLAM HYDROCHLORIDE 1 MG/ML
0.5 INJECTION INTRAMUSCULAR; INTRAVENOUS
Status: DISCONTINUED | OUTPATIENT
Start: 2024-09-12 | End: 2024-09-12 | Stop reason: HOSPADM

## 2024-09-12 RX ORDER — SODIUM CHLORIDE 0.9 % (FLUSH) 0.9 %
10 SYRINGE (ML) INJECTION EVERY 12 HOURS SCHEDULED
Status: DISCONTINUED | OUTPATIENT
Start: 2024-09-12 | End: 2024-09-12

## 2024-09-12 RX ORDER — POLYETHYLENE GLYCOL 3350 17 G/17G
17 POWDER, FOR SOLUTION ORAL DAILY PRN
Status: DISCONTINUED | OUTPATIENT
Start: 2024-09-12 | End: 2024-09-15 | Stop reason: HOSPADM

## 2024-09-12 RX ORDER — DEXAMETHASONE SODIUM PHOSPHATE 4 MG/ML
INJECTION, SOLUTION INTRA-ARTICULAR; INTRALESIONAL; INTRAMUSCULAR; INTRAVENOUS; SOFT TISSUE AS NEEDED
Status: DISCONTINUED | OUTPATIENT
Start: 2024-09-12 | End: 2024-09-12 | Stop reason: SURG

## 2024-09-12 RX ORDER — DROPERIDOL 2.5 MG/ML
0.62 INJECTION, SOLUTION INTRAMUSCULAR; INTRAVENOUS ONCE AS NEEDED
Status: DISCONTINUED | OUTPATIENT
Start: 2024-09-12 | End: 2024-09-12 | Stop reason: HOSPADM

## 2024-09-12 RX ORDER — SODIUM CHLORIDE 0.9 % (FLUSH) 0.9 %
10 SYRINGE (ML) INJECTION EVERY 12 HOURS SCHEDULED
Status: DISCONTINUED | OUTPATIENT
Start: 2024-09-12 | End: 2024-09-15 | Stop reason: HOSPADM

## 2024-09-12 RX ORDER — L.ACID,PARA/B.BIFIDUM/S.THERM 8B CELL
1 CAPSULE ORAL DAILY
Status: DISCONTINUED | OUTPATIENT
Start: 2024-09-12 | End: 2024-09-15 | Stop reason: HOSPADM

## 2024-09-12 RX ORDER — SODIUM CHLORIDE 0.9 % (FLUSH) 0.9 %
10 SYRINGE (ML) INJECTION AS NEEDED
Status: DISCONTINUED | OUTPATIENT
Start: 2024-09-12 | End: 2024-09-12

## 2024-09-12 RX ORDER — SODIUM CHLORIDE, SODIUM LACTATE, POTASSIUM CHLORIDE, CALCIUM CHLORIDE 600; 310; 30; 20 MG/100ML; MG/100ML; MG/100ML; MG/100ML
90 INJECTION, SOLUTION INTRAVENOUS CONTINUOUS
Status: DISCONTINUED | OUTPATIENT
Start: 2024-09-12 | End: 2024-09-14

## 2024-09-12 RX ORDER — ONDANSETRON 2 MG/ML
INJECTION INTRAMUSCULAR; INTRAVENOUS AS NEEDED
Status: DISCONTINUED | OUTPATIENT
Start: 2024-09-12 | End: 2024-09-12 | Stop reason: SURG

## 2024-09-12 RX ORDER — SODIUM CHLORIDE 0.9 % (FLUSH) 0.9 %
10 SYRINGE (ML) INJECTION AS NEEDED
Status: DISCONTINUED | OUTPATIENT
Start: 2024-09-12 | End: 2024-09-15 | Stop reason: HOSPADM

## 2024-09-12 RX ORDER — FAMOTIDINE 20 MG/1
20 TABLET, FILM COATED ORAL ONCE
Status: DISCONTINUED | OUTPATIENT
Start: 2024-09-12 | End: 2024-09-12

## 2024-09-12 RX ORDER — SODIUM CHLORIDE 9 MG/ML
40 INJECTION, SOLUTION INTRAVENOUS AS NEEDED
Status: DISCONTINUED | OUTPATIENT
Start: 2024-09-12 | End: 2024-09-12 | Stop reason: SDUPTHER

## 2024-09-12 RX ORDER — MAGNESIUM HYDROXIDE 1200 MG/15ML
LIQUID ORAL AS NEEDED
Status: DISCONTINUED | OUTPATIENT
Start: 2024-09-12 | End: 2024-09-12 | Stop reason: HOSPADM

## 2024-09-12 RX ORDER — ACETAMINOPHEN 325 MG/1
650 TABLET ORAL EVERY 4 HOURS PRN
Status: DISCONTINUED | OUTPATIENT
Start: 2024-09-12 | End: 2024-09-15 | Stop reason: HOSPADM

## 2024-09-12 RX ORDER — SODIUM CHLORIDE 9 MG/ML
40 INJECTION, SOLUTION INTRAVENOUS AS NEEDED
Status: DISCONTINUED | OUTPATIENT
Start: 2024-09-12 | End: 2024-09-12

## 2024-09-12 RX ORDER — ROCURONIUM BROMIDE 10 MG/ML
INJECTION, SOLUTION INTRAVENOUS AS NEEDED
Status: DISCONTINUED | OUTPATIENT
Start: 2024-09-12 | End: 2024-09-12 | Stop reason: SURG

## 2024-09-12 RX ORDER — MIDAZOLAM HYDROCHLORIDE 1 MG/ML
0.5 INJECTION INTRAMUSCULAR; INTRAVENOUS
Status: DISCONTINUED | OUTPATIENT
Start: 2024-09-12 | End: 2024-09-12 | Stop reason: SDUPTHER

## 2024-09-12 RX ORDER — PROMETHAZINE HYDROCHLORIDE 12.5 MG/1
12.5 SUPPOSITORY RECTAL EVERY 6 HOURS PRN
Status: DISCONTINUED | OUTPATIENT
Start: 2024-09-12 | End: 2024-09-15 | Stop reason: HOSPADM

## 2024-09-12 RX ORDER — ACETAMINOPHEN 160 MG/5ML
650 SOLUTION ORAL EVERY 4 HOURS PRN
Status: DISCONTINUED | OUTPATIENT
Start: 2024-09-12 | End: 2024-09-15 | Stop reason: HOSPADM

## 2024-09-12 RX ORDER — LUBIPROSTONE 8 UG/1
8 CAPSULE ORAL 2 TIMES DAILY WITH MEALS
Status: DISCONTINUED | OUTPATIENT
Start: 2024-09-12 | End: 2024-09-15 | Stop reason: HOSPADM

## 2024-09-12 RX ORDER — PREGABALIN 75 MG/1
75 CAPSULE ORAL 3 TIMES DAILY
Status: DISCONTINUED | OUTPATIENT
Start: 2024-09-12 | End: 2024-09-15 | Stop reason: HOSPADM

## 2024-09-12 RX ORDER — LIDOCAINE HYDROCHLORIDE 10 MG/ML
0.5 INJECTION, SOLUTION EPIDURAL; INFILTRATION; INTRACAUDAL; PERINEURAL ONCE AS NEEDED
Status: COMPLETED | OUTPATIENT
Start: 2024-09-12 | End: 2024-09-12

## 2024-09-12 RX ORDER — FAMOTIDINE 20 MG/1
20 TABLET, FILM COATED ORAL ONCE
Status: DISCONTINUED | OUTPATIENT
Start: 2024-09-12 | End: 2024-09-12 | Stop reason: SDUPTHER

## 2024-09-12 RX ORDER — VANCOMYCIN HYDROCHLORIDE 1 G/20ML
INJECTION, POWDER, LYOPHILIZED, FOR SOLUTION INTRAVENOUS AS NEEDED
Status: DISCONTINUED | OUTPATIENT
Start: 2024-09-12 | End: 2024-09-12 | Stop reason: HOSPADM

## 2024-09-12 RX ORDER — NALOXONE HCL 0.4 MG/ML
0.4 VIAL (ML) INJECTION
Status: DISCONTINUED | OUTPATIENT
Start: 2024-09-12 | End: 2024-09-15 | Stop reason: HOSPADM

## 2024-09-12 RX ORDER — DIPHENHYDRAMINE HCL 25 MG
25 CAPSULE ORAL NIGHTLY PRN
Status: DISCONTINUED | OUTPATIENT
Start: 2024-09-12 | End: 2024-09-15 | Stop reason: HOSPADM

## 2024-09-12 RX ORDER — BISACODYL 5 MG/1
5 TABLET, DELAYED RELEASE ORAL DAILY PRN
Status: DISCONTINUED | OUTPATIENT
Start: 2024-09-12 | End: 2024-09-15 | Stop reason: HOSPADM

## 2024-09-12 RX ORDER — LIDOCAINE HYDROCHLORIDE 10 MG/ML
0.5 INJECTION, SOLUTION EPIDURAL; INFILTRATION; INTRACAUDAL; PERINEURAL ONCE AS NEEDED
Status: DISCONTINUED | OUTPATIENT
Start: 2024-09-12 | End: 2024-09-12 | Stop reason: SDUPTHER

## 2024-09-12 RX ORDER — LOSARTAN POTASSIUM 50 MG/1
50 TABLET ORAL DAILY
Status: DISCONTINUED | OUTPATIENT
Start: 2024-09-12 | End: 2024-09-15 | Stop reason: HOSPADM

## 2024-09-12 RX ORDER — FAMOTIDINE 20 MG/1
20 TABLET, FILM COATED ORAL ONCE
Status: COMPLETED | OUTPATIENT
Start: 2024-09-12 | End: 2024-09-12

## 2024-09-12 RX ORDER — LEVOTHYROXINE SODIUM 50 UG/1
50 TABLET ORAL DAILY
Status: DISCONTINUED | OUTPATIENT
Start: 2024-09-13 | End: 2024-09-15 | Stop reason: HOSPADM

## 2024-09-12 RX ORDER — DULOXETIN HYDROCHLORIDE 60 MG/1
60 CAPSULE, DELAYED RELEASE ORAL DAILY
Status: DISCONTINUED | OUTPATIENT
Start: 2024-09-12 | End: 2024-09-12

## 2024-09-12 RX ORDER — HYDROCHLOROTHIAZIDE 25 MG/1
25 TABLET ORAL DAILY
Status: DISCONTINUED | OUTPATIENT
Start: 2024-09-12 | End: 2024-09-15 | Stop reason: HOSPADM

## 2024-09-12 RX ORDER — FAMOTIDINE 10 MG/ML
20 INJECTION, SOLUTION INTRAVENOUS EVERY 12 HOURS SCHEDULED
Status: DISCONTINUED | OUTPATIENT
Start: 2024-09-12 | End: 2024-09-15 | Stop reason: HOSPADM

## 2024-09-12 RX ORDER — DULOXETIN HYDROCHLORIDE 60 MG/1
60 CAPSULE, DELAYED RELEASE ORAL NIGHTLY
Status: DISCONTINUED | OUTPATIENT
Start: 2024-09-12 | End: 2024-09-15 | Stop reason: HOSPADM

## 2024-09-12 RX ORDER — HYDROMORPHONE HYDROCHLORIDE 1 MG/ML
0.5 INJECTION, SOLUTION INTRAMUSCULAR; INTRAVENOUS; SUBCUTANEOUS
Status: DISCONTINUED | OUTPATIENT
Start: 2024-09-12 | End: 2024-09-12 | Stop reason: HOSPADM

## 2024-09-12 RX ORDER — SODIUM CHLORIDE 9 MG/ML
40 INJECTION, SOLUTION INTRAVENOUS AS NEEDED
Status: DISCONTINUED | OUTPATIENT
Start: 2024-09-12 | End: 2024-09-15 | Stop reason: HOSPADM

## 2024-09-12 RX ORDER — AMOXICILLIN 250 MG
2 CAPSULE ORAL 2 TIMES DAILY
Status: DISCONTINUED | OUTPATIENT
Start: 2024-09-12 | End: 2024-09-15 | Stop reason: HOSPADM

## 2024-09-12 RX ORDER — FAMOTIDINE 20 MG/1
20 TABLET, FILM COATED ORAL
Status: DISCONTINUED | OUTPATIENT
Start: 2024-09-12 | End: 2024-09-12 | Stop reason: SDUPTHER

## 2024-09-12 RX ORDER — ONDANSETRON 4 MG/1
4 TABLET, ORALLY DISINTEGRATING ORAL EVERY 6 HOURS PRN
Status: DISCONTINUED | OUTPATIENT
Start: 2024-09-12 | End: 2024-09-15 | Stop reason: HOSPADM

## 2024-09-12 RX ORDER — SODIUM CHLORIDE 9 MG/ML
INJECTION, SOLUTION INTRAVENOUS AS NEEDED
Status: DISCONTINUED | OUTPATIENT
Start: 2024-09-12 | End: 2024-09-12 | Stop reason: HOSPADM

## 2024-09-12 RX ORDER — BISACODYL 10 MG
10 SUPPOSITORY, RECTAL RECTAL DAILY PRN
Status: DISCONTINUED | OUTPATIENT
Start: 2024-09-12 | End: 2024-09-15 | Stop reason: HOSPADM

## 2024-09-12 RX ORDER — LUBIPROSTONE 8 UG/1
8 CAPSULE ORAL 2 TIMES DAILY WITH MEALS
COMMUNITY

## 2024-09-12 RX ORDER — SODIUM CHLORIDE, SODIUM LACTATE, POTASSIUM CHLORIDE, CALCIUM CHLORIDE 600; 310; 30; 20 MG/100ML; MG/100ML; MG/100ML; MG/100ML
9 INJECTION, SOLUTION INTRAVENOUS CONTINUOUS
Status: DISCONTINUED | OUTPATIENT
Start: 2024-09-12 | End: 2024-09-15 | Stop reason: HOSPADM

## 2024-09-12 RX ADMIN — SODIUM CHLORIDE, POTASSIUM CHLORIDE, SODIUM LACTATE AND CALCIUM CHLORIDE: 600; 310; 30; 20 INJECTION, SOLUTION INTRAVENOUS at 09:00

## 2024-09-12 RX ADMIN — DEXAMETHASONE SODIUM PHOSPHATE 8 MG: 4 INJECTION INTRA-ARTICULAR; INTRALESIONAL; INTRAMUSCULAR; INTRAVENOUS; SOFT TISSUE at 07:02

## 2024-09-12 RX ADMIN — LOSARTAN POTASSIUM 50 MG: 50 TABLET, FILM COATED ORAL at 14:40

## 2024-09-12 RX ADMIN — SUGAMMADEX 200 MG: 100 INJECTION, SOLUTION INTRAVENOUS at 10:27

## 2024-09-12 RX ADMIN — HYDROMORPHONE HYDROCHLORIDE 0.5 MG: 1 INJECTION, SOLUTION INTRAMUSCULAR; INTRAVENOUS; SUBCUTANEOUS at 11:14

## 2024-09-12 RX ADMIN — HYDROCODONE BITARTRATE AND ACETAMINOPHEN 1 TABLET: 5; 325 TABLET ORAL at 14:40

## 2024-09-12 RX ADMIN — PREGABALIN 75 MG: 75 CAPSULE ORAL at 14:40

## 2024-09-12 RX ADMIN — FENTANYL CITRATE 100 MCG: 50 INJECTION, SOLUTION INTRAMUSCULAR; INTRAVENOUS at 06:58

## 2024-09-12 RX ADMIN — LIDOCAINE HYDROCHLORIDE 50 MG: 10 INJECTION, SOLUTION EPIDURAL; INFILTRATION; INTRACAUDAL; PERINEURAL at 06:58

## 2024-09-12 RX ADMIN — LUBIPROSTONE 8 MCG: 8 CAPSULE, GELATIN COATED ORAL at 17:51

## 2024-09-12 RX ADMIN — SENNOSIDES AND DOCUSATE SODIUM 2 TABLET: 50; 8.6 TABLET ORAL at 20:11

## 2024-09-12 RX ADMIN — Medication 1 CAPSULE: at 14:40

## 2024-09-12 RX ADMIN — HYDROMORPHONE HYDROCHLORIDE 0.5 MG: 1 INJECTION, SOLUTION INTRAMUSCULAR; INTRAVENOUS; SUBCUTANEOUS at 12:18

## 2024-09-12 RX ADMIN — SODIUM CHLORIDE 2 G: 900 INJECTION INTRAVENOUS at 07:05

## 2024-09-12 RX ADMIN — Medication 10 ML: at 20:11

## 2024-09-12 RX ADMIN — HYDROCODONE BITARTRATE AND ACETAMINOPHEN 1 TABLET: 5; 325 TABLET ORAL at 18:31

## 2024-09-12 RX ADMIN — OXYCODONE HYDROCHLORIDE AND ACETAMINOPHEN 1 TABLET: 7.5; 325 TABLET ORAL at 21:50

## 2024-09-12 RX ADMIN — SODIUM CHLORIDE 2000 MG: 900 INJECTION INTRAVENOUS at 22:46

## 2024-09-12 RX ADMIN — MUPIROCIN 1 APPLICATION: 20 OINTMENT TOPICAL at 20:11

## 2024-09-12 RX ADMIN — FAMOTIDINE 20 MG: 20 TABLET, FILM COATED ORAL at 20:11

## 2024-09-12 RX ADMIN — SODIUM CHLORIDE 2000 MG: 900 INJECTION INTRAVENOUS at 15:41

## 2024-09-12 RX ADMIN — FENTANYL CITRATE 50 MCG: 50 INJECTION, SOLUTION INTRAMUSCULAR; INTRAVENOUS at 12:28

## 2024-09-12 RX ADMIN — FAMOTIDINE 20 MG: 20 TABLET, FILM COATED ORAL at 06:30

## 2024-09-12 RX ADMIN — FENTANYL CITRATE 50 MCG: 50 INJECTION, SOLUTION INTRAMUSCULAR; INTRAVENOUS at 11:25

## 2024-09-12 RX ADMIN — DULOXETINE HYDROCHLORIDE 60 MG: 60 CAPSULE, DELAYED RELEASE ORAL at 20:11

## 2024-09-12 RX ADMIN — ONDANSETRON 4 MG: 2 INJECTION INTRAMUSCULAR; INTRAVENOUS at 10:27

## 2024-09-12 RX ADMIN — SODIUM CHLORIDE, POTASSIUM CHLORIDE, SODIUM LACTATE AND CALCIUM CHLORIDE 9 ML/HR: 600; 310; 30; 20 INJECTION, SOLUTION INTRAVENOUS at 06:08

## 2024-09-12 RX ADMIN — PROPOFOL 240 MG: 10 INJECTION, EMULSION INTRAVENOUS at 06:58

## 2024-09-12 RX ADMIN — LIDOCAINE HYDROCHLORIDE 0.5 ML: 10 INJECTION, SOLUTION EPIDURAL; INFILTRATION; INTRACAUDAL; PERINEURAL at 06:08

## 2024-09-12 RX ADMIN — MUPIROCIN 1 APPLICATION: 20 OINTMENT TOPICAL at 06:29

## 2024-09-12 RX ADMIN — PREGABALIN 75 MG: 75 CAPSULE ORAL at 20:11

## 2024-09-12 RX ADMIN — ROCURONIUM BROMIDE 80 MG: 10 INJECTION INTRAVENOUS at 06:58

## 2024-09-12 NOTE — ANESTHESIA PROCEDURE NOTES
Airway  Urgency: elective    Date/Time: 9/12/2024 7:00 AM  Airway not difficult    General Information and Staff    Patient location during procedure: OR  CRNA/CAA: Dipak Brown CRNA    Indications and Patient Condition  Indications for airway management: airway protection    Preoxygenated: yes  MILS not maintained throughout  Mask difficulty assessment: 2 - vent by mask + OA or adjuvant +/- NMBA    Final Airway Details  Final airway type: endotracheal airway      Successful airway: ETT  Cuffed: yes   Successful intubation technique: direct laryngoscopy  Endotracheal tube insertion site: oral  Blade: Juvencio  Blade size: 3  ETT size (mm): 7.0  Cormack-Lehane Classification: grade I - full view of glottis  Placement verified by: chest auscultation and capnometry   Measured from: lips  ETT/EBT  to lips (cm): 21  Number of attempts at approach: 1  Assessment: lips, teeth, and gum same as pre-op and atraumatic intubation    Additional Comments  Negative epigastric sounds, Breath sound equal bilaterally with symmetric chest rise and fall

## 2024-09-12 NOTE — ANESTHESIA PREPROCEDURE EVALUATION
Anesthesia Evaluation     Patient summary reviewed and Nursing notes reviewed   no history of anesthetic complications:   NPO Solid Status: > 8 hours  NPO Liquid Status: > 2 hours           Airway   Mallampati: II  TM distance: >3 FB  Neck ROM: full  Dental - normal exam     Pulmonary    (-) not a smoker  Cardiovascular     ECG reviewed    (+) hypertension, valvular problems/murmurs  (-) dysrhythmias, angina, cardiac stents    ROS comment: 2024 ECHO reviewed: Normal biventricular function, LVEF >55%. No significant valvular abnormalities.    Neuro/Psych  (+) psychiatric history Anxiety  (-) seizures, CVA  GI/Hepatic/Renal/Endo    (+) morbid obesity, thyroid problem hypothyroidism    Musculoskeletal     (+) back pain  Abdominal    Substance History      OB/GYN          Other   arthritis,                   Anesthesia Plan    ASA 3     general     intravenous induction     Anesthetic plan, risks, benefits, and alternatives have been provided, discussed and informed consent has been obtained with: patient.    Use of blood products discussed with patient  Consented to blood products.    Plan discussed with CRNA.      CODE STATUS:

## 2024-09-12 NOTE — ANESTHESIA POSTPROCEDURE EVALUATION
Patient: Alison Benitez    Procedure Summary       Date: 09/12/24 Room / Location:  [facility] /  [facility]    Anesthesia Start: 0654 Anesthesia Stop: 1054    Procedure: LUMBAR FUSION DECOMPRESSON WITH PEDICLE SCREWS L3-5 (Spine Lumbar) Diagnosis:       Spinal stenosis of lumbar region with neurogenic claudication      (Spinal stenosis of lumbar region with neurogenic claudication [M48.062])    Surgeons: Iván Stanley MD Provider: Fer Osorio Jr., MD    Anesthesia Type: general ASA Status: 3            Anesthesia Type: general    Vitals  Vitals Value Taken Time   /70 09/12/24 1050   Temp 97.3 °F (36.3 °C) 09/12/24 1050   Pulse 84 09/12/24 1054   Resp 21 09/12/24 1050   SpO2 91 % 09/12/24 1054   Vitals shown include unfiled device data.        Post Anesthesia Care and Evaluation    Patient location during evaluation: PACU  Patient participation: complete - patient participated  Level of consciousness: awake and alert  Pain management: adequate    Airway patency: patent  Anesthetic complications: No anesthetic complications  PONV Status: none  Cardiovascular status: hemodynamically stable and acceptable  Respiratory status: nonlabored ventilation, acceptable and nasal cannula  Hydration status: acceptable

## 2024-09-13 LAB
HCT VFR BLD AUTO: 39.1 % (ref 34–46.6)
HGB BLD-MCNC: 12.6 G/DL (ref 12–15.9)

## 2024-09-13 PROCEDURE — 97116 GAIT TRAINING THERAPY: CPT

## 2024-09-13 PROCEDURE — 97535 SELF CARE MNGMENT TRAINING: CPT | Performed by: OCCUPATIONAL THERAPIST

## 2024-09-13 PROCEDURE — 99024 POSTOP FOLLOW-UP VISIT: CPT | Performed by: NEUROLOGICAL SURGERY

## 2024-09-13 PROCEDURE — 97110 THERAPEUTIC EXERCISES: CPT

## 2024-09-13 PROCEDURE — 85018 HEMOGLOBIN: CPT | Performed by: NEUROLOGICAL SURGERY

## 2024-09-13 PROCEDURE — 85014 HEMATOCRIT: CPT | Performed by: NEUROLOGICAL SURGERY

## 2024-09-13 PROCEDURE — 97162 PT EVAL MOD COMPLEX 30 MIN: CPT

## 2024-09-13 PROCEDURE — 97165 OT EVAL LOW COMPLEX 30 MIN: CPT | Performed by: OCCUPATIONAL THERAPIST

## 2024-09-13 RX ORDER — BENZOCAINE/MENTHOL 6 MG-10 MG
1 LOZENGE MUCOUS MEMBRANE EVERY 6 HOURS PRN
Status: DISCONTINUED | OUTPATIENT
Start: 2024-09-13 | End: 2024-09-15 | Stop reason: HOSPADM

## 2024-09-13 RX ADMIN — FAMOTIDINE 20 MG: 20 TABLET, FILM COATED ORAL at 08:25

## 2024-09-13 RX ADMIN — MUPIROCIN 1 APPLICATION: 20 OINTMENT TOPICAL at 20:30

## 2024-09-13 RX ADMIN — DULOXETINE HYDROCHLORIDE 60 MG: 60 CAPSULE, DELAYED RELEASE ORAL at 20:30

## 2024-09-13 RX ADMIN — PREGABALIN 75 MG: 75 CAPSULE ORAL at 15:53

## 2024-09-13 RX ADMIN — OXYCODONE HYDROCHLORIDE AND ACETAMINOPHEN 1 TABLET: 7.5; 325 TABLET ORAL at 08:25

## 2024-09-13 RX ADMIN — HYDROCHLOROTHIAZIDE 25 MG: 25 TABLET ORAL at 08:25

## 2024-09-13 RX ADMIN — FAMOTIDINE 20 MG: 20 TABLET, FILM COATED ORAL at 20:30

## 2024-09-13 RX ADMIN — Medication 1 CAPSULE: at 08:25

## 2024-09-13 RX ADMIN — HYDROCODONE BITARTRATE AND ACETAMINOPHEN 1 TABLET: 5; 325 TABLET ORAL at 03:52

## 2024-09-13 RX ADMIN — Medication 10 ML: at 20:30

## 2024-09-13 RX ADMIN — SENNOSIDES AND DOCUSATE SODIUM 2 TABLET: 50; 8.6 TABLET ORAL at 20:30

## 2024-09-13 RX ADMIN — PREGABALIN 75 MG: 75 CAPSULE ORAL at 20:30

## 2024-09-13 RX ADMIN — HYDROCORTISONE 1 APPLICATION: 1 CREAM TOPICAL at 17:52

## 2024-09-13 RX ADMIN — PREGABALIN 75 MG: 75 CAPSULE ORAL at 08:25

## 2024-09-13 RX ADMIN — SENNOSIDES AND DOCUSATE SODIUM 2 TABLET: 50; 8.6 TABLET ORAL at 08:25

## 2024-09-13 RX ADMIN — Medication 10 ML: at 08:29

## 2024-09-13 RX ADMIN — HYDROCODONE BITARTRATE AND ACETAMINOPHEN 1 TABLET: 5; 325 TABLET ORAL at 15:53

## 2024-09-13 RX ADMIN — OXYCODONE HYDROCHLORIDE AND ACETAMINOPHEN 1 TABLET: 7.5; 325 TABLET ORAL at 12:07

## 2024-09-13 RX ADMIN — LUBIPROSTONE 8 MCG: 8 CAPSULE, GELATIN COATED ORAL at 08:25

## 2024-09-13 RX ADMIN — ESTRADIOL 1 MG: 0.5 TABLET ORAL at 08:28

## 2024-09-13 RX ADMIN — HYDROCODONE BITARTRATE AND ACETAMINOPHEN 1 TABLET: 5; 325 TABLET ORAL at 20:30

## 2024-09-13 RX ADMIN — MENTHOL 2 G: 10 GEL TOPICAL at 14:31

## 2024-09-13 RX ADMIN — LOSARTAN POTASSIUM 50 MG: 50 TABLET, FILM COATED ORAL at 08:25

## 2024-09-13 RX ADMIN — OXYCODONE HYDROCHLORIDE AND ACETAMINOPHEN 1 TABLET: 7.5; 325 TABLET ORAL at 17:51

## 2024-09-13 RX ADMIN — LUBIPROSTONE 8 MCG: 8 CAPSULE, GELATIN COATED ORAL at 17:51

## 2024-09-13 RX ADMIN — LEVOTHYROXINE SODIUM 50 MCG: 0.05 TABLET ORAL at 06:12

## 2024-09-14 PROCEDURE — 99024 POSTOP FOLLOW-UP VISIT: CPT | Performed by: NEUROLOGICAL SURGERY

## 2024-09-14 PROCEDURE — 97110 THERAPEUTIC EXERCISES: CPT

## 2024-09-14 PROCEDURE — 97116 GAIT TRAINING THERAPY: CPT

## 2024-09-14 RX ADMIN — MENTHOL 2 G: 10 GEL TOPICAL at 10:32

## 2024-09-14 RX ADMIN — HYDROCODONE BITARTRATE AND ACETAMINOPHEN 1 TABLET: 5; 325 TABLET ORAL at 12:27

## 2024-09-14 RX ADMIN — PREGABALIN 75 MG: 75 CAPSULE ORAL at 19:19

## 2024-09-14 RX ADMIN — PREGABALIN 75 MG: 75 CAPSULE ORAL at 08:39

## 2024-09-14 RX ADMIN — LEVOTHYROXINE SODIUM 50 MCG: 0.05 TABLET ORAL at 08:39

## 2024-09-14 RX ADMIN — Medication 1 CAPSULE: at 08:38

## 2024-09-14 RX ADMIN — HYDROCHLOROTHIAZIDE 25 MG: 25 TABLET ORAL at 08:39

## 2024-09-14 RX ADMIN — FAMOTIDINE 20 MG: 20 TABLET, FILM COATED ORAL at 08:39

## 2024-09-14 RX ADMIN — SENNOSIDES AND DOCUSATE SODIUM 2 TABLET: 50; 8.6 TABLET ORAL at 08:39

## 2024-09-14 RX ADMIN — LUBIPROSTONE 8 MCG: 8 CAPSULE, GELATIN COATED ORAL at 08:37

## 2024-09-14 RX ADMIN — HYDROCODONE BITARTRATE AND ACETAMINOPHEN 1 TABLET: 5; 325 TABLET ORAL at 16:33

## 2024-09-14 RX ADMIN — Medication 10 ML: at 19:19

## 2024-09-14 RX ADMIN — SENNOSIDES AND DOCUSATE SODIUM 2 TABLET: 50; 8.6 TABLET ORAL at 19:19

## 2024-09-14 RX ADMIN — DULOXETINE HYDROCHLORIDE 60 MG: 60 CAPSULE, DELAYED RELEASE ORAL at 19:19

## 2024-09-14 RX ADMIN — HYDROCODONE BITARTRATE AND ACETAMINOPHEN 1 TABLET: 5; 325 TABLET ORAL at 08:38

## 2024-09-14 RX ADMIN — MENTHOL 2 G: 10 GEL TOPICAL at 16:34

## 2024-09-14 RX ADMIN — LUBIPROSTONE 8 MCG: 8 CAPSULE, GELATIN COATED ORAL at 18:10

## 2024-09-14 RX ADMIN — PREGABALIN 75 MG: 75 CAPSULE ORAL at 16:33

## 2024-09-14 RX ADMIN — BISACODYL 5 MG: 5 TABLET, COATED ORAL at 18:11

## 2024-09-14 RX ADMIN — HYDROCODONE BITARTRATE AND ACETAMINOPHEN 1 TABLET: 5; 325 TABLET ORAL at 01:57

## 2024-09-14 RX ADMIN — LOSARTAN POTASSIUM 50 MG: 50 TABLET, FILM COATED ORAL at 08:39

## 2024-09-14 RX ADMIN — ESTRADIOL 1 MG: 0.5 TABLET ORAL at 08:36

## 2024-09-14 RX ADMIN — Medication 10 ML: at 08:40

## 2024-09-14 RX ADMIN — FAMOTIDINE 20 MG: 20 TABLET, FILM COATED ORAL at 19:19

## 2024-09-14 RX ADMIN — HYDROCODONE BITARTRATE AND ACETAMINOPHEN 1 TABLET: 5; 325 TABLET ORAL at 20:51

## 2024-09-15 VITALS
OXYGEN SATURATION: 96 % | RESPIRATION RATE: 16 BRPM | SYSTOLIC BLOOD PRESSURE: 110 MMHG | WEIGHT: 240 LBS | TEMPERATURE: 98.4 F | DIASTOLIC BLOOD PRESSURE: 71 MMHG | HEART RATE: 90 BPM | HEIGHT: 64 IN | BODY MASS INDEX: 40.97 KG/M2

## 2024-09-15 PROCEDURE — 99024 POSTOP FOLLOW-UP VISIT: CPT | Performed by: NEUROLOGICAL SURGERY

## 2024-09-15 RX ORDER — OXYCODONE AND ACETAMINOPHEN 5; 325 MG/1; MG/1
1 TABLET ORAL 3 TIMES DAILY PRN
Qty: 15 TABLET | Refills: 0 | Status: SHIPPED | OUTPATIENT
Start: 2024-09-15

## 2024-09-15 RX ADMIN — MENTHOL 2 G: 10 GEL TOPICAL at 04:37

## 2024-09-15 RX ADMIN — LEVOTHYROXINE SODIUM 50 MCG: 0.05 TABLET ORAL at 08:25

## 2024-09-15 RX ADMIN — HYDROCODONE BITARTRATE AND ACETAMINOPHEN 1 TABLET: 5; 325 TABLET ORAL at 08:25

## 2024-09-15 RX ADMIN — OXYCODONE HYDROCHLORIDE AND ACETAMINOPHEN 1 TABLET: 7.5; 325 TABLET ORAL at 04:35

## 2024-09-15 RX ADMIN — Medication 10 ML: at 08:26

## 2024-09-15 RX ADMIN — LUBIPROSTONE 8 MCG: 8 CAPSULE, GELATIN COATED ORAL at 08:25

## 2024-09-15 RX ADMIN — LOSARTAN POTASSIUM 50 MG: 50 TABLET, FILM COATED ORAL at 08:26

## 2024-09-15 RX ADMIN — SENNOSIDES AND DOCUSATE SODIUM 2 TABLET: 50; 8.6 TABLET ORAL at 08:26

## 2024-09-15 RX ADMIN — FAMOTIDINE 20 MG: 20 TABLET, FILM COATED ORAL at 08:25

## 2024-09-15 RX ADMIN — Medication 1 CAPSULE: at 08:25

## 2024-09-15 RX ADMIN — ESTRADIOL 1 MG: 0.5 TABLET ORAL at 08:25

## 2024-09-15 RX ADMIN — PREGABALIN 75 MG: 75 CAPSULE ORAL at 08:25

## 2024-09-15 RX ADMIN — HYDROCHLOROTHIAZIDE 25 MG: 25 TABLET ORAL at 08:26

## 2024-09-16 ENCOUNTER — TELEPHONE (OUTPATIENT)
Dept: NEUROSURGERY | Facility: CLINIC | Age: 69
End: 2024-09-16
Payer: MEDICARE

## 2024-09-16 ENCOUNTER — TELEPHONE (OUTPATIENT)
Dept: FAMILY MEDICINE CLINIC | Age: 69
End: 2024-09-16

## 2024-09-16 DIAGNOSIS — M48.062 SPINAL STENOSIS OF LUMBAR REGION WITH NEUROGENIC CLAUDICATION: Primary | ICD-10-CM

## 2024-09-16 DIAGNOSIS — M53.2X6 SPINAL INSTABILITY OF LUMBAR REGION: ICD-10-CM

## 2024-09-25 ENCOUNTER — HOSPITAL ENCOUNTER (OUTPATIENT)
Facility: HOSPITAL | Age: 69
Discharge: HOME | End: 2024-09-25
Payer: MEDICARE

## 2024-09-25 ENCOUNTER — HOSPITAL ENCOUNTER (OUTPATIENT)
Dept: GENERAL RADIOLOGY | Facility: HOSPITAL | Age: 69
Discharge: HOME | End: 2024-09-25
Payer: MEDICARE

## 2024-09-25 DIAGNOSIS — M48.062 SPINAL STENOSIS, LUMBAR REGION, WITH NEUROGENIC CLAUDICATION: ICD-10-CM

## 2024-09-25 PROCEDURE — 72100 X-RAY EXAM L-S SPINE 2/3 VWS: CPT

## 2024-10-02 ENCOUNTER — OFFICE VISIT (OUTPATIENT)
Dept: NEUROSURGERY | Facility: CLINIC | Age: 69
End: 2024-10-02
Payer: MEDICARE

## 2024-10-02 ENCOUNTER — PATIENT MESSAGE (OUTPATIENT)
Dept: FAMILY MEDICINE CLINIC | Age: 69
End: 2024-10-02

## 2024-10-02 VITALS — HEIGHT: 64 IN | BODY MASS INDEX: 39.95 KG/M2 | WEIGHT: 234 LBS | TEMPERATURE: 97.5 F

## 2024-10-02 DIAGNOSIS — M48.062 SPINAL STENOSIS OF LUMBAR REGION WITH NEUROGENIC CLAUDICATION: ICD-10-CM

## 2024-10-02 DIAGNOSIS — M53.2X6 SPINAL INSTABILITY OF LUMBAR REGION: ICD-10-CM

## 2024-10-02 DIAGNOSIS — M48.061 SPINAL STENOSIS OF LUMBAR REGION WITHOUT NEUROGENIC CLAUDICATION: ICD-10-CM

## 2024-10-02 DIAGNOSIS — I10 ESSENTIAL HYPERTENSION: ICD-10-CM

## 2024-10-02 DIAGNOSIS — Z98.1 S/P LUMBAR FUSION: Primary | ICD-10-CM

## 2024-10-02 DIAGNOSIS — M79.7 FIBROMYALGIA: ICD-10-CM

## 2024-10-02 DIAGNOSIS — B99.9 NEUROPATHY DUE TO INFECTION: ICD-10-CM

## 2024-10-02 DIAGNOSIS — G63 NEUROPATHY DUE TO INFECTION: ICD-10-CM

## 2024-10-02 PROCEDURE — 99024 POSTOP FOLLOW-UP VISIT: CPT

## 2024-10-02 RX ORDER — HYDROCHLOROTHIAZIDE 25 MG/1
25 TABLET ORAL DAILY
Qty: 90 TABLET | Refills: 3 | Status: CANCELLED | OUTPATIENT
Start: 2024-10-02

## 2024-10-02 RX ORDER — PREGABALIN 75 MG/1
75 CAPSULE ORAL 3 TIMES DAILY
Qty: 60 CAPSULE | Status: CANCELLED | OUTPATIENT
Start: 2024-10-02

## 2024-10-02 RX ORDER — PREGABALIN 75 MG/1
75 CAPSULE ORAL 3 TIMES DAILY
Qty: 270 CAPSULE | Refills: 0 | Status: SHIPPED | OUTPATIENT
Start: 2024-10-02

## 2024-10-02 NOTE — TELEPHONE ENCOUNTER
Rx Refill Note  Requested Prescriptions     Pending Prescriptions Disp Refills    pregabalin (LYRICA) 75 MG capsule [Pharmacy Med Name: PREGABALIN 75MG CAPSULES] 270 capsule      Sig: TAKE 1 CAPSULE BY MOUTH THREE TIMES DAILY      Last office visit with prescribing clinician: 5/26/2023   Last telemedicine visit with prescribing clinician: Visit date not found   Next office visit with prescribing clinician: 2/18/2025                         Would you like a call back once the refill request has been completed: [] Yes [] No    If the office needs to give you a call back, can they leave a voicemail: [] Yes [] No    Vicenta Molina CMA  10/02/24, 10:03 EDT

## 2024-10-02 NOTE — PROGRESS NOTES
Patient: Alison Benitez  : 1955  Chart #: [id number]    Date of Service: 10/02/2024    Chief Complaint: Post-op; low back and bilateral lower extremity pain    History of present illness: Ms. Leonardo is a 69-year-old woman with a protracted and progressive course of low back and bilateral lower extremity pain with walking and standing intolerance.  Studies demonstrated high-grade lumbar stenosis as well as spondylolisthesis and instability.  Thus, she underwent lumbar decompression with fusion and stabilization from L3-5 by Dr. Stanley on 2024.  The decompression was achieved at these levels, and the patient's surgical incision was closed subcuticularly.  Her hospital stay was uncomplicated.    Today, Ms. Benitez presents for a postoperative incision check with new plain films of her lumbar spine.  She takes Tylenol 3 times a day, mostly to stay on top of her arthritic pain.  She only took 6 of the narcotic medication she discharged this charge with and has long since discontinued that.  She denies fever, chills, or drainage from her incision.  She reports that the leg and back pain that she experienced prior to surgery has largely been eradicated.  She is delighted with her surgical outcome.      Review of Systems   Constitutional:  Negative for activity change, appetite change, chills, diaphoresis, fatigue, fever and unexpected weight change.   HENT:  Negative for congestion, dental problem, drooling, ear discharge, ear pain, facial swelling, hearing loss, mouth sores, nosebleeds, postnasal drip, rhinorrhea, sinus pressure, sinus pain, sneezing, sore throat, tinnitus, trouble swallowing and voice change.    Eyes:  Negative for photophobia, pain, discharge, redness, itching and visual disturbance.   Respiratory:  Negative for apnea, cough, choking, chest tightness, shortness of breath, wheezing and stridor.    Cardiovascular:  Negative for chest pain, palpitations and leg swelling.   Gastrointestinal:   "Negative for abdominal distention, abdominal pain, anal bleeding, blood in stool, constipation, diarrhea, nausea, rectal pain and vomiting.   Endocrine: Negative for cold intolerance, heat intolerance, polydipsia, polyphagia and polyuria.   Genitourinary:  Negative for decreased urine volume, difficulty urinating, dyspareunia, dysuria, enuresis, flank pain, frequency, genital sores, hematuria, menstrual problem, pelvic pain, urgency, vaginal bleeding, vaginal discharge and vaginal pain.   Musculoskeletal:  Negative for arthralgias, back pain, gait problem, joint swelling, myalgias, neck pain and neck stiffness.   Skin:  Negative for color change, pallor, rash and wound.   Allergic/Immunologic: Negative for environmental allergies, food allergies and immunocompromised state.   Neurological:  Negative for dizziness, tremors, seizures, syncope, facial asymmetry, speech difficulty, weakness, light-headedness, numbness and headaches.   Hematological:  Negative for adenopathy. Does not bruise/bleed easily.   Psychiatric/Behavioral:  Negative for agitation, behavioral problems, confusion, decreased concentration, dysphoric mood, hallucinations, self-injury, sleep disturbance and suicidal ideas. The patient is not nervous/anxious and is not hyperactive.         The patient's past medical history, past surgical history, family history, and social history have been reviewed at length in the electronic medical record.    Physical examination:  Temperature 97.5 °F (36.4 °C), temperature source Infrared, height 162.6 cm (64.02\"), weight 106 kg (234 lb), not currently breastfeeding.    Constitutional: The patient is well-developed. She is obese. She is pleasant and is in no acute distress.     HEENT: normocephalic, atraumatic, sclera clear    Musculoskeletal: Motor examination does not reveal weakness in the , upper or lower extremities.     Dermatological: The patient's lumbar incision is healing well; clean and dry. I " removed Steri Strips. No evidence of induration or excessive erythema.     Neurological Exam   A/A/C, speech clear, attention normal, conversant, answers questions appropriately, good historian.  Sensation is intact.  Gait is normal, balance is normal.   No tremors are noted.    Radiographic Imaging:  For my review plain films of the lumbar spine dated 9/25/2024 demonstrates fusion hardware spanning L3-5. It's well-placed. No evidence of perihardware lucency or malfunction.     Medical Decision Making  Diagnoses and all orders for this visit:    1. S/P lumbar fusion (Primary)  -     Ambulatory Referral to Physical Therapy for Evaluation & Treatment    2. Spinal stenosis of lumbar region with neurogenic claudication    3. Spinal instability of lumbar region    Mr. Benitez is virtually pain-free at this juncture.  She is pleased with her surgical outcome.  She would like to go to physical therapy moreso from the accountability aspect, but she has been keeping up with home exercises and home health.  I have advised her to await 1 to 2 weeks before initiating outpatient therapy.  She will follow-up with Dr. Stanley in 6 weeks to assess progress.  We reviewed signs of infection or new or worsening symptoms in her back and legs that she should make her office aware of in the interim.    Any copied data from previous notes included in the (1) HPI, (2) PE, (3) MDM and/or assessment and plan has been reviewed and is accurate as of this day.    Yuly Verma PA-C     Patient Care Team:  Kacy Huertas MD as PCP - General (Family Medicine)  Anatoliy Everett MD as Consulting Physician (Anesthesiology)  Abdifatah Jordan MD as Consulting Physician (Endocrinology)  Kimber Correa APRN as Nurse Practitioner (Nurse Practitioner)  Moris Ellis MD as Consulting Physician (Pain Medicine)  Sussy El APRN as Nurse Practitioner (Nurse Practitioner)        Answers submitted by the patient for this  visit:  Other (Submitted on 9/29/2024)  Please describe your symptoms.: three week chevk up after surgery  Have you had these symptoms before?: No  How long have you been having these symptoms?: Greater than 2 weeks  Please list any medications you are currently taking for this condition.: tylenol  Please describe any probable cause for these symptoms. : no symptoms  Primary Reason for Visit (Submitted on 9/29/2024)  What is the primary reason for your visit?: Problem Not Listed

## 2024-10-02 NOTE — TELEPHONE ENCOUNTER
Refill request received from patient      Next Office Visit Date:  No future appointments.    SKYLER - Please review via PDMP        Last Office Visit:    8/1/2024

## 2024-10-02 NOTE — TELEPHONE ENCOUNTER
Reviewed hospital discharge medication list - Lyrica 75 mg TID was continued at hospital discharge (9/15/2024). Reviewed Kimber's last telehealth note 1/5/2024, next OV with Kimber scheduled for 2/18/2025. Reviewed PDMP, refill of Lyrica 75 mg TID for 90 day supply has been sent.

## 2024-10-03 DIAGNOSIS — G89.29 CHRONIC LOW BACK PAIN, UNSPECIFIED BACK PAIN LATERALITY, UNSPECIFIED WHETHER SCIATICA PRESENT: Primary | ICD-10-CM

## 2024-10-03 DIAGNOSIS — M54.50 CHRONIC LOW BACK PAIN, UNSPECIFIED BACK PAIN LATERALITY, UNSPECIFIED WHETHER SCIATICA PRESENT: Primary | ICD-10-CM

## 2024-10-03 DIAGNOSIS — I10 ESSENTIAL HYPERTENSION: ICD-10-CM

## 2024-10-03 RX ORDER — PREGABALIN 75 MG/1
75 CAPSULE ORAL 3 TIMES DAILY
Qty: 90 CAPSULE | Refills: 2 | Status: SHIPPED | OUTPATIENT
Start: 2024-10-03 | End: 2025-01-01

## 2024-10-03 RX ORDER — HYDROCHLOROTHIAZIDE 25 MG/1
25 TABLET ORAL DAILY
Qty: 90 TABLET | Refills: 3 | Status: SHIPPED | OUTPATIENT
Start: 2024-10-03

## 2024-10-04 ENCOUNTER — TELEPHONE (OUTPATIENT)
Age: 69
End: 2024-10-04
Payer: MEDICARE

## 2024-10-04 NOTE — TELEPHONE ENCOUNTER
Pt is requesting a rx for her Nabumetone 750mg 1 BID to be sent to [facility] in [place].  She is no longer using mail order pharmacy. It looks like it was last sent in for her on 8/26/24 and she had a virtual visit on 2/5/24 per [employer]. Her RTC is 4/16/25.  Pt states she knows she can't take the medication for 2 more months, but wants to make sure it's at [facility].  Do you want to send in now or see pt sooner as she cancelled her last appt that was scheduled for 8/5/24?  -Citlalli Menjivar, BHANUA

## 2024-10-21 ENCOUNTER — HOSPITAL ENCOUNTER (OUTPATIENT)
Dept: PHYSICAL THERAPY | Facility: HOSPITAL | Age: 69
Setting detail: THERAPIES SERIES
Discharge: HOME | End: 2024-10-21
Payer: MEDICARE

## 2024-10-21 PROCEDURE — 97161 PT EVAL LOW COMPLEX 20 MIN: CPT

## 2024-10-21 PROCEDURE — 97110 THERAPEUTIC EXERCISES: CPT

## 2024-10-21 ASSESSMENT — PAIN SCALES - GENERAL: PAINLEVEL_OUTOF10: 8

## 2024-10-21 NOTE — THERAPY EVALUATION
Physical Therapy: Initial Evaluation    Patient: Kristyn Martin (69 y.o. female)   Examination Date: 10/21/2024  Plan of Care Certification Period: 10/21/2024 to 24      :  1955 ;    Confirmed: Yes MRN: [id number]  CSN: [id number]   Insurance: Payor: [facility] MEDICARE / Plan: ANTHEM MEDIBLUE ESSENTIAL/PLUS / Product Type: *No Product type* /   Insurance ID: [id number] - (Medicare Managed) Secondary Insurance (if applicable):    Referring Physician: Sera Valadez MD Steven Keifer, MD   PCP: Sera Valadez MD Visits to Date/Visits Approved:   /      No Show/Cancelled Appts:   /       Medical Diagnosis: Low back pain, unspecified [M54.50] s/p lumbar fusion  Treatment Diagnosis: s/p lumbar fusion     PERTINENT MEDICAL HISTORY   Patient Assessed for Rehabilitation Services: Yes       Medical History: Chart Reviewed: Yes   Past Medical History:   Diagnosis Date    Arthritis      Surgical History:   Past Surgical History:   Procedure Laterality Date     SECTION      x 2    COLONOSCOPY  2010    normal findings    COLONOSCOPY N/A 2020    COLORECTAL CANCER SCREENING, NOT HIGH RISK performed by Akua Torres MD at [facility] ENDOSCOPY    HYSTERECTOMY (CERVIX STATUS UNKNOWN)            SUBJECTIVE EXAMINATION     Subjective History:    Subjective: Pt underwent lumbar fusion surgery, L3-5, on 24 by Dr. Hoffmann.  Pt states she has had her follow up visit and was referred for PT services.  Pt states she has done very well and has responded very favorable to the surgery.  Pt states she has tightness and soreness at her lateral lumbar / flank area but overall improving.  She did have home health PT for 2-3 weeks.  She does report that she is off of her arthritis medication for 3 months post op, therefore she has been having discomfort in other joints.  She has been trying to walk short distances at home with good tolerance.  She denies any radicular symptoms but reports have a history of neuropathy

## 2024-10-24 ENCOUNTER — HOSPITAL ENCOUNTER (OUTPATIENT)
Dept: PHYSICAL THERAPY | Facility: HOSPITAL | Age: 69
Setting detail: THERAPIES SERIES
Discharge: HOME | End: 2024-10-24
Payer: MEDICARE

## 2024-10-24 PROCEDURE — 97110 THERAPEUTIC EXERCISES: CPT | Performed by: PHYSICAL THERAPIST

## 2024-10-24 NOTE — PROGRESS NOTES
Physical Therapy Daily Treatment Note   Date:  10/24/2024    TIme In:1036                      Time Out:1115    Patient Name:  Kristyn Martin    :  1955  MRN: [id number]    Restrictions/Precautions:    Pertinent Medical History:  Medical/Treatment Diagnosis Information:  Low back pain, unspecified [M54.50]     Insurance/Certification information:  Payor: [facility] MEDICARE / Plan: ANTHEM MEDIBLUE ESSENTIAL/PLUS / Product Type: *No Product type* /   Physician Information:  Sera Valadez MD  Plan of care signed (Y/N):    Visit# / total visits:     4 /    G-Code (if applicable):      Date / Visit # G-Code Applied:         Progress Note: []  Yes  [x]  No  Next due by: Visit #10       Pain level:   0/10  Subjective: Patient states that her back is doing better.  She states that she is doing her HEP daily and using her cold pack sometimes.     Objective:  Observation:   Test measurements:    Palpation:    Exercises:  Exercise Resistance/Repetitions Date performed   1:  Nu step Level 3, 6 min 24   2: Bent knee fall out   3x10 B 24   3: PPT  3x10 24   4: Hip add w/ ball   3x10 24   5: Clam shells  3x10 24   6: Mid rows with posture focus  3x10, GTB  24   7. SKTC  5x10\" 24                              Other Therapeutic Activities:      Manual Treatments:  Rockblade, and STM to lumbar area(Next visit)    Modalities:  cold pack to lumbar area, 10 min      Timed Code Treatment Minutes:  29      Total Treatment Minutes:  39    Treatment/Activity Tolerance:  [x] Patient tolerated treatment well [] Patient limited by fatigue  [] Patient limited by pain  [] Patient limited by other medical complications  [x] Other: Patient was able to complete all exercises today without increased LBP    Pain after treatment:      0/10    Prognosis: [x] Good [] Fair  [] Poor    Patient Requires Follow-up: [x] Yes  [] No    Plan:   [x] Continue per plan of care [] Alter current plan (see comments)  [] Plan of care initiated [] Hold pending

## 2024-10-28 ENCOUNTER — HOSPITAL ENCOUNTER (OUTPATIENT)
Dept: PHYSICAL THERAPY | Facility: HOSPITAL | Age: 69
Setting detail: THERAPIES SERIES
Discharge: HOME | End: 2024-10-28
Payer: MEDICARE

## 2024-10-28 PROCEDURE — 97140 MANUAL THERAPY 1/> REGIONS: CPT | Performed by: PHYSICAL THERAPIST

## 2024-10-28 PROCEDURE — 97110 THERAPEUTIC EXERCISES: CPT | Performed by: PHYSICAL THERAPIST

## 2024-10-28 NOTE — PROGRESS NOTES
Physical Therapy Daily Treatment Note   Date:  10/28/2024    TIme In:1031                      Time Out:1135    Patient Name:  Kristyn Martin    :  1955  MRN: [id number]    Restrictions/Precautions:    Pertinent Medical History:  Medical/Treatment Diagnosis Information:  Low back pain, unspecified [M54.50]     Insurance/Certification information:  Payor: [facility] MEDICARE / Plan: ANTHEM MEDIBLUE ESSENTIAL/PLUS / Product Type: *No Product type* /   Physician Information:  Sera Valadez MD  Plan of care signed (Y/N):    Visit# / total visits:     2 /    G-Code (if applicable):      Date / Visit # G-Code Applied:         Progress Note: []  Yes  [x]  No  Next due by: Visit #10       Pain level:   3/10  Subjective: Patient states that her back is doing better.  She states that she is doing her HEP daily and using her cold pack daily.     Objective:  Observation:   Test measurements:    Palpation:    Exercises:  Exercise Resistance/Repetitions Date performed   1:  Nu step Level 3, 6 min 28   2: Bent knee fall out   3x10 B 28   3: PPT  3x10 28   4: Hip add w/ ball   3x10 28   5: Clam shells  3x10 28   6: Mid rows with posture focus  3x10, GTB  28   7. SKTC  5x10\" 28                              Other Therapeutic Activities:      Manual Treatments:  Rockblade, and STM to lumbar area 10 min    Modalities:  cold pack to lumbar area, 15 min      Timed Code Treatment Minutes:  49      Total Treatment Minutes:  64    Treatment/Activity Tolerance:  [x] Patient tolerated treatment well [] Patient limited by fatigue  [] Patient limited by pain  [] Patient limited by other medical complications  [x] Other: Patient was able to complete all exercises today without increased LBP    Pain after treatment:      0/10    Prognosis: [x] Good [] Fair  [] Poor    Patient Requires Follow-up: [x] Yes  [] No    Plan:   [x] Continue per plan of care [] Alter current plan (see comments)  [] Plan of care initiated [] Hold pending MD

## 2024-10-29 RX ORDER — DULOXETIN HYDROCHLORIDE 60 MG/1
60 CAPSULE, DELAYED RELEASE ORAL DAILY
Qty: 90 CAPSULE | OUTPATIENT
Start: 2024-10-29

## 2024-10-30 ENCOUNTER — HOSPITAL ENCOUNTER (OUTPATIENT)
Dept: PHYSICAL THERAPY | Facility: HOSPITAL | Age: 69
Setting detail: THERAPIES SERIES
Discharge: HOME | End: 2024-10-30
Payer: MEDICARE

## 2024-10-30 PROCEDURE — 97110 THERAPEUTIC EXERCISES: CPT

## 2024-10-30 PROCEDURE — 97140 MANUAL THERAPY 1/> REGIONS: CPT

## 2024-10-30 NOTE — PROGRESS NOTES
Physical Therapy Daily Treatment Note   Date:  10/30/2024    TIme In:    956                  Time Out:   938    Patient Name:  Kristyn Martin    :  1955  MRN: [id number]    Restrictions/Precautions:    Pertinent Medical History:  Medical/Treatment Diagnosis Information:  Low back pain, unspecified [M54.50]     Insurance/Certification information:  Payor: [facility] MEDICARE / Plan: ANTHEM MEDIBLUE ESSENTIAL/PLUS / Product Type: *No Product type* /   Physician Information:  Sera Valadez MD  Plan of care signed (Y/N):    Visit# / total visits:     5 /    G-Code (if applicable):      Date / Visit # G-Code Applied:         Progress Note: []  Yes  [x]  No  Next due by: Visit #10       Pain level:   210    Subjective:  Patient states she's doing very well. Reports doing HEP.     Objective:  Observation:   Test measurements:    Palpation:    Exercises:  Exercise Resistance/Repetitions Date performed   Nu step Level 3   7 minutes 30   Mid rows with posture focus GTB  3x10 30   Posterior pelvic tilt 3x10 30   Hip add squeeze w/ball 3x10 30   Clam shells  3x10 30   SKTC  5x10\" 30   Bent knee fall outs 3x10 B 30                         Other Therapeutic Activities:  Patient requested to work on steps today as she has to negotiate them at [facility]. Able to ascend/descend 10 steps using one rail.    Manual Treatments:  Rockblade and STM to lumbar area 10 min    Modalities:  cold pack to lumbar area 10 min      Timed Code Treatment Minutes:  32      Total Treatment Minutes:  42    Treatment/Activity Tolerance:  [x] Patient tolerated treatment well [] Patient limited by fatigue  [] Patient limited by pain  [] Patient limited by other medical complications  [] Other:     Pain after treatment:      0/10    Prognosis: [x] Good [] Fair  [] Poor    Patient Requires Follow-up: [x] Yes  [] No    Plan:   [x] Continue per plan of care [] Alter current plan (see comments)  [] Plan of care initiated [] Hold pending MD visit []

## 2024-11-04 NOTE — CARE COORDINATION
[facility]  Cancel/No Show Note    Service:  [x]Physical Therapy  []Occupational Therapy    Date:11/5/24    # of Missed Tx:     Reason For Missed Treatment:  []Illness  []Provider unavailable  []No call/no show  []Late cancellation (<24 hours)  []Arrived >15 minutes late  []Other appointment  []Scheduling conflict  []Arrived on wrong day  []Physician discharged tx  []Transportation conflict  []Inclement weather  []Frequency of tx change  []No reason given  []Arrived but refused participation  []Unable to tolerate  []Medical complications  []Pt on hold due to-  []Other-     Plan:  []Rescheduled  []Continue at next scheduled apt  []Reminder phone call  []Contacted referring provider  []Discharged     14

## 2024-11-05 ENCOUNTER — HOSPITAL ENCOUNTER (OUTPATIENT)
Dept: PHYSICAL THERAPY | Facility: HOSPITAL | Age: 69
Setting detail: THERAPIES SERIES
Discharge: HOME | End: 2024-11-05
Payer: MEDICARE

## 2024-11-07 ENCOUNTER — HOSPITAL ENCOUNTER (OUTPATIENT)
Dept: PHYSICAL THERAPY | Facility: HOSPITAL | Age: 69
Setting detail: THERAPIES SERIES
Discharge: HOME | End: 2024-11-07
Payer: MEDICARE

## 2024-11-07 PROCEDURE — 97110 THERAPEUTIC EXERCISES: CPT | Performed by: PHYSICAL THERAPIST

## 2024-11-07 NOTE — PROGRESS NOTES
Physical Therapy Daily Treatment Note   Date:  2024    TIme In:1001                      Time Out:1050    Patient Name:  Kristyn Martin    :  1955  MRN: [id number]    Restrictions/Precautions:    Pertinent Medical History:  Medical/Treatment Diagnosis Information:  Low back pain, unspecified [M54.50]     Insurance/Certification information:  Payor: [facility] MEDICARE / Plan: ANTHEM MEDIBLUE ESSENTIAL/PLUS / Product Type: *No Product type* /   Physician Information:  Sera Valadez MD  Plan of care signed (Y/N):    Visit# / total visits:     6 /    G-Code (if applicable):      Date / Visit # G-Code Applied:         Progress Note: []  Yes  [x]  No  Next due by: Visit #10       Pain level:   3/10  Subjective: Patient states that her back is doing better.  She states that she is doing her HEP daily and using her cold pack daily.  She states that she hopes her dr will give her a new med for her arthritis because that is what is hurting her now.     Objective:  Observation:   Test measurements:    Palpation:    Exercises:  Exercise Resistance/Repetitions Date performed   1: Sci fit Level 3, 7 min 07   2: Bent knee fall out   3x10 B 07   3: PPT  3x10 07   4: Hip add w/ ball   3x10 07   5: Clam shells  3x10 07   6: Mid rows with posture focus  3x10, GTB  07   7. SKTC  5x10\" 07   8. Pullies, flex 3 min Next visit   9. Standing marching BLE 15 Next visit   10. Standing hip ABD BLE 15 Next visit   11. Standing heel raises BLE 15 Next visit          Other Therapeutic Activities:      Manual Treatments:  Rockblade, and STM to lumbar area 10 min    Modalities:  cold pack to lumbar area, 15 min      Timed Code Treatment Minutes:  49      Total Treatment Minutes: 49    Treatment/Activity Tolerance:  [x] Patient tolerated treatment well [] Patient limited by fatigue  [] Patient limited by pain  [] Patient limited by other medical complications  [x] Other: Patient was able to complete all exercises today without increased

## 2024-11-12 ENCOUNTER — OFFICE VISIT (OUTPATIENT)
Dept: FAMILY MEDICINE CLINIC | Age: 69
End: 2024-11-12

## 2024-11-12 ENCOUNTER — APPOINTMENT (OUTPATIENT)
Dept: PHYSICAL THERAPY | Facility: HOSPITAL | Age: 69
End: 2024-11-12
Payer: MEDICARE

## 2024-11-12 ENCOUNTER — HOSPITAL ENCOUNTER (OUTPATIENT)
Dept: PHYSICAL THERAPY | Facility: HOSPITAL | Age: 69
Setting detail: THERAPIES SERIES
Discharge: HOME | End: 2024-11-12
Payer: MEDICARE

## 2024-11-12 VITALS
HEIGHT: 64 IN | OXYGEN SATURATION: 97 % | BODY MASS INDEX: 39.91 KG/M2 | SYSTOLIC BLOOD PRESSURE: 118 MMHG | HEART RATE: 93 BPM | RESPIRATION RATE: 16 BRPM | TEMPERATURE: 97.3 F | DIASTOLIC BLOOD PRESSURE: 69 MMHG | WEIGHT: 233.8 LBS

## 2024-11-12 DIAGNOSIS — M25.562 CHRONIC PAIN OF BOTH KNEES: Primary | ICD-10-CM

## 2024-11-12 DIAGNOSIS — Z78.0 POST-MENOPAUSAL: ICD-10-CM

## 2024-11-12 DIAGNOSIS — M25.561 CHRONIC PAIN OF BOTH KNEES: Primary | ICD-10-CM

## 2024-11-12 DIAGNOSIS — M15.9 OSTEOARTHRITIS OF MULTIPLE JOINTS, UNSPECIFIED OSTEOARTHRITIS TYPE: ICD-10-CM

## 2024-11-12 DIAGNOSIS — G89.29 CHRONIC LOW BACK PAIN, UNSPECIFIED BACK PAIN LATERALITY, UNSPECIFIED WHETHER SCIATICA PRESENT: ICD-10-CM

## 2024-11-12 DIAGNOSIS — M79.7 FIBROMYALGIA: ICD-10-CM

## 2024-11-12 DIAGNOSIS — G89.29 CHRONIC PAIN OF BOTH KNEES: Primary | ICD-10-CM

## 2024-11-12 DIAGNOSIS — M16.10 PRIMARY OSTEOARTHRITIS OF HIP, UNSPECIFIED LATERALITY: ICD-10-CM

## 2024-11-12 DIAGNOSIS — M54.50 CHRONIC LOW BACK PAIN, UNSPECIFIED BACK PAIN LATERALITY, UNSPECIFIED WHETHER SCIATICA PRESENT: ICD-10-CM

## 2024-11-12 RX ORDER — FUROSEMIDE 20 MG/1
20 TABLET ORAL DAILY
Qty: 30 TABLET | Refills: 2 | Status: SHIPPED | OUTPATIENT
Start: 2024-11-12

## 2024-11-12 RX ORDER — LEVOTHYROXINE SODIUM 50 UG/1
50 TABLET ORAL DAILY
Qty: 90 TABLET | Refills: 3 | Status: SHIPPED | OUTPATIENT
Start: 2024-11-12 | End: 2024-11-13 | Stop reason: SDUPTHER

## 2024-11-12 RX ORDER — DULOXETIN HYDROCHLORIDE 60 MG/1
60 CAPSULE, DELAYED RELEASE ORAL DAILY
Qty: 90 CAPSULE | Refills: 3 | Status: SHIPPED | OUTPATIENT
Start: 2024-11-12

## 2024-11-12 RX ORDER — METHYLPREDNISOLONE ACETATE 40 MG/ML
40 INJECTION, SUSPENSION INTRA-ARTICULAR; INTRALESIONAL; INTRAMUSCULAR; SOFT TISSUE ONCE
Status: COMPLETED | OUTPATIENT
Start: 2024-11-12 | End: 2024-11-12

## 2024-11-12 RX ORDER — ESTRADIOL 1 MG/1
1 TABLET ORAL DAILY
Qty: 90 TABLET | Refills: 3 | Status: SHIPPED | OUTPATIENT
Start: 2024-11-12

## 2024-11-12 RX ADMIN — METHYLPREDNISOLONE ACETATE 40 MG: 40 INJECTION, SUSPENSION INTRA-ARTICULAR; INTRALESIONAL; INTRAMUSCULAR; SOFT TISSUE at 13:43

## 2024-11-12 RX ADMIN — Medication 1 ML: at 13:41

## 2024-11-12 RX ADMIN — METHYLPREDNISOLONE ACETATE 40 MG: 40 INJECTION, SUSPENSION INTRA-ARTICULAR; INTRALESIONAL; INTRAMUSCULAR; SOFT TISSUE at 13:42

## 2024-11-12 RX ADMIN — Medication 1 ML: at 13:42

## 2024-11-12 ASSESSMENT — ENCOUNTER SYMPTOMS
EYE REDNESS: 1
RESPIRATORY NEGATIVE: 1
EYE ITCHING: 1
GASTROINTESTINAL NEGATIVE: 1
BACK PAIN: 1

## 2024-11-12 NOTE — PROGRESS NOTES
SUBJECTIVE:    Patient ID: Kristyn Martin is a 69 y.o. female.    Chief Complaint   Patient presents with    Follow-Up from Hospital     Patient had lumbar spine surgery two months ago       HPI: office visit   She has been well after her back surgery.  She is following up dr ambrocio.  She is having less back pain.  She is having some increase in arthritis pain.  She is having some increase in the last month with the weather change. She is having a lot of bilateral knee.  She is having more issue with knee ankle and foot pain.  She is having some fibromyalgia issues as well.  She is concerned about hammertoe which is happening in both of her feet.  She is concerned about her mobility with her knees particularly hurting so bad.  She was under the impression that she was not allowed to get an injection in her knees until she had been 3 months from surgery since that was for arthritis but she actually had been told not to take her NSAIDs as what was the story.    Review of Systems   Constitutional:  Positive for fatigue.   Eyes:  Positive for redness and itching.   Respiratory: Negative.     Cardiovascular: Negative.    Gastrointestinal: Negative.    Musculoskeletal:  Positive for arthralgias, back pain, gait problem and myalgias.   Skin:  Positive for rash.   All other systems reviewed and are negative.       OBJECTIVE:  /69 (Cuff Size: Large Adult)   Pulse 93   Temp 97.3 °F (36.3 °C) (Infrared)   Resp 16   Ht 1.626 m (5' 4\")   Wt 106.1 kg (233 lb 12.8 oz)   SpO2 97% Comment: ra  BMI 40.13 kg/m²    Wt Readings from Last 3 Encounters:   11/12/24 106.1 kg (233 lb 12.8 oz)   08/01/24 109.9 kg (242 lb 3.2 oz)   07/30/24 109.7 kg (241 lb 12.8 oz)     BP Readings from Last 3 Encounters:   11/12/24 118/69   08/01/24 112/76   07/30/24 136/82      Pulse Readings from Last 3 Encounters:   11/12/24 93   08/01/24 99   07/30/24 94     Body mass index is 40.13 kg/m².   Resp Readings from Last 3 Encounters:   11/12/24 16

## 2024-11-12 NOTE — PROGRESS NOTES
\"Have you been to the ER, urgent care clinic since your last visit?  Hospitalized since your last visit?\"    Yes Two night stay after surgery    “Have you seen or consulted any other health care providers outside our system since your last visit?”    Yes  [facility] Lumbar Spine with Dr. Freeman two months ago    Administrations This Visit       lidocaine 1 % injection 1 mL       Admin Date  11/12/2024  13:41 Action  Given Dose  1 mL Route  Intra-artICUlar Site  Knee Left Documented By  Chloé Ryan MA    NDC: 10871-338-23    : [employer]    Patient Supplied?: No              lidocaine 1 % injection 5 mL       Admin Date  11/12/2024  13:42 Action  Given Dose  1 mL Route  Intra-artICUlar Site  Knee Right Documented By  Chloé Ryan MA    NDC: 39661-115-93    : [employer]    Patient Supplied?: No              methylPREDNISolone acetate (DEPO-MEDROL) injection 40 mg       Admin Date  11/12/2024  13:42 Action  Given Dose  40 mg Route  Intra-artICUlar Site  Knee Left Documented By  Chloé Ryan MA    NDC: [address]-2    : [employer]    Patient Supplied?: No               Admin Date  11/12/2024  13:43 Action  Given Dose  40 mg Route  Intra-artICUlar Site  Knee Right Documented By  Chloé Ryan MA    NDC: [address]-3    : [employer]    Patient Supplied?: No                 Patient tolerated injection well. Patient advised to wait 20 minutes in the office following the injection. No signs/symptoms of reaction noted after 20 minutes.

## 2024-11-12 NOTE — CARE COORDINATION
[facility]  Cancel/No Show Note    Service:  [x]Physical Therapy  []Occupational Therapy    Date: 11/12/24    # of Missed Tx:     Reason For Missed Treatment:  []Illness  []Provider unavailable  []No call/no show  []Late cancellation (<24 hours)  []Arrived >15 minutes late  []Other appointment  []Scheduling conflict  []Arrived on wrong day  []Physician discharged tx  []Transportation conflict  []Inclement weather  []Frequency of tx change  []No reason given  []Arrived but refused participation  []Unable to tolerate  []Medical complications  []Pt on hold due to-  [x]Other- Received shots in knee today.    Plan:  []Rescheduled  [x]Continue at next scheduled apt  []Reminder phone call  []Contacted referring provider  []Discharged

## 2024-11-13 DIAGNOSIS — E03.9 ACQUIRED HYPOTHYROIDISM: ICD-10-CM

## 2024-11-13 RX ORDER — LEVOTHYROXINE SODIUM 50 MCG
50 TABLET ORAL DAILY
Qty: 90 TABLET | Refills: 3 | Status: SHIPPED | OUTPATIENT
Start: 2024-11-13

## 2024-11-13 NOTE — TELEPHONE ENCOUNTER
PT CALLED STATING [facility] IN [place] GAVE HER LEVOTHYROXINE. PT STATED SHE WOULD LIKE TO TAKE SYNTHROID ONLY. SHE REQUESTED WE RESEND RX FOR SYNTHROID WITH NO SUBSTITUTIONS OR THE LIKE.

## 2024-11-14 ENCOUNTER — HOSPITAL ENCOUNTER (OUTPATIENT)
Dept: PHYSICAL THERAPY | Facility: HOSPITAL | Age: 69
Setting detail: THERAPIES SERIES
Discharge: HOME | End: 2024-11-14
Payer: MEDICARE

## 2024-11-14 NOTE — CARE COORDINATION
[facility]  Cancel/No Show Note    Service:  [x]Physical Therapy  []Occupational Therapy    Date:11/14/24    # of Missed Tx:     Reason For Missed Treatment:  []Illness  []Provider unavailable  []No call/no show  []Late cancellation (<24 hours)  []Arrived >15 minutes late  []Other appointment  []Scheduling conflict  []Arrived on wrong day  []Physician discharged tx  []Transportation conflict  []Inclement weather  []Frequency of tx change  []No reason given  []Arrived but refused participation  []Unable to tolerate  []Medical complications  []Pt on hold due to-  [x]Other-     Plan:  []Rescheduled  []Continue at next scheduled apt  []Reminder phone call  []Contacted referring provider  []Discharged

## 2024-11-17 DIAGNOSIS — G89.29 CHRONIC LOW BACK PAIN, UNSPECIFIED BACK PAIN LATERALITY, UNSPECIFIED WHETHER SCIATICA PRESENT: ICD-10-CM

## 2024-11-17 DIAGNOSIS — M54.50 CHRONIC LOW BACK PAIN, UNSPECIFIED BACK PAIN LATERALITY, UNSPECIFIED WHETHER SCIATICA PRESENT: ICD-10-CM

## 2024-11-18 RX ORDER — DULOXETIN HYDROCHLORIDE 60 MG/1
60 CAPSULE, DELAYED RELEASE ORAL DAILY
Qty: 90 CAPSULE | Refills: 3 | OUTPATIENT
Start: 2024-11-18

## 2024-11-18 NOTE — TELEPHONE ENCOUNTER
Refill request received from pharmacy      Next Office Visit Date:  Future Appointments   Date Time Provider Department Center   11/20/2024 11:00 AM Amanda Jones PT [facility]   11/21/2024 10:00 AM Sera Valadez MD [facility] [place] [facility] - Please review via PDMP        Last Office Visit:    11/12/2024

## 2024-11-19 ENCOUNTER — OFFICE VISIT (OUTPATIENT)
Dept: NEUROSURGERY | Facility: CLINIC | Age: 69
End: 2024-11-19
Payer: MEDICARE

## 2024-11-19 VITALS — HEIGHT: 64 IN | WEIGHT: 236 LBS | TEMPERATURE: 98 F | BODY MASS INDEX: 40.29 KG/M2

## 2024-11-19 DIAGNOSIS — M48.062 SPINAL STENOSIS OF LUMBAR REGION WITH NEUROGENIC CLAUDICATION: ICD-10-CM

## 2024-11-19 DIAGNOSIS — M43.16 SPONDYLOLISTHESIS OF LUMBAR REGION: ICD-10-CM

## 2024-11-19 DIAGNOSIS — Z98.1 S/P LUMBAR FUSION: Primary | ICD-10-CM

## 2024-11-19 DIAGNOSIS — M51.9 LUMBAR DISC DISEASE: ICD-10-CM

## 2024-11-19 PROCEDURE — 99024 POSTOP FOLLOW-UP VISIT: CPT | Performed by: NEUROLOGICAL SURGERY

## 2024-11-19 PROCEDURE — 1160F RVW MEDS BY RX/DR IN RCRD: CPT | Performed by: NEUROLOGICAL SURGERY

## 2024-11-19 PROCEDURE — 1159F MED LIST DOCD IN RCRD: CPT | Performed by: NEUROLOGICAL SURGERY

## 2024-11-19 RX ORDER — FUROSEMIDE 20 MG/1
1 TABLET ORAL DAILY
COMMUNITY
Start: 2024-11-12

## 2024-11-19 NOTE — PROGRESS NOTES
Patient: Alison Benitez  : 1955    Primary Care Provider: Kacy Huertas MD    Requesting Provider: As above        History    Chief Complaint: Low back and lower extremity pain with walking and standing intolerance.    History of Present Illness: Ms. Benitez is a 69-year-old woman with a protracted and progressive course of low back and bilateral lower extremity pain with walking and standing intolerance. Studies demonstrated high-grade lumbar stenosis as well as spondylolisthesis and instability. Thus, she underwent lumbar decompression with fusion and stabilization from L3-5 on 2024.  She is doing much better.  She is having less pain.  She is having more trouble with her knees and some of her soft tissues as well.    Review of Systems   Constitutional:  Negative for activity change, appetite change, chills, diaphoresis, fatigue, fever and unexpected weight change.   HENT:  Negative for congestion, dental problem, drooling, ear discharge, ear pain, facial swelling, hearing loss, mouth sores, nosebleeds, postnasal drip, rhinorrhea, sinus pressure, sinus pain, sneezing, sore throat, tinnitus, trouble swallowing and voice change.    Eyes:  Negative for photophobia, pain, discharge, redness, itching and visual disturbance.   Respiratory:  Negative for apnea, cough, choking, chest tightness, shortness of breath, wheezing and stridor.    Cardiovascular:  Negative for chest pain, palpitations and leg swelling.   Gastrointestinal:  Negative for abdominal distention, abdominal pain, anal bleeding, blood in stool, constipation, diarrhea, nausea, rectal pain and vomiting.   Endocrine: Negative for cold intolerance, heat intolerance, polydipsia, polyphagia and polyuria.   Genitourinary:  Negative for decreased urine volume, difficulty urinating, dyspareunia, dysuria, enuresis, flank pain, frequency, genital sores, hematuria, menstrual problem, pelvic pain, urgency, vaginal bleeding, vaginal discharge and  "vaginal pain.   Musculoskeletal:  Negative for arthralgias, back pain, gait problem, joint swelling, myalgias, neck pain and neck stiffness.   Skin:  Negative for color change, pallor, rash and wound.   Allergic/Immunologic: Negative for environmental allergies, food allergies and immunocompromised state.   Neurological:  Negative for dizziness, tremors, seizures, syncope, facial asymmetry, speech difficulty, weakness, light-headedness, numbness and headaches.   Hematological:  Negative for adenopathy. Does not bruise/bleed easily.   Psychiatric/Behavioral:  Negative for agitation, behavioral problems, confusion, decreased concentration, dysphoric mood, hallucinations, self-injury, sleep disturbance and suicidal ideas. The patient is not nervous/anxious and is not hyperactive.        The patient's past medical history, past surgical history, family history, and social history have been reviewed at length in the electronic medical record.      Physical Exam:   Temp 98 °F (36.7 °C) (Infrared)   Ht 162.6 cm (64\")   Wt 107 kg (236 lb)   BMI 40.51 kg/m²   Incision looks good.    Medical Decision Making    Data Review:   (All imaging studies were personally reviewed unless stated otherwise)  Plain films of the lumbar spine dated 9/25/2024 demonstrate good position of her construct from L3-5.    Diagnosis:   Lumbar spondylolisthesis, stenosis, instability status post decompression with fusion and stabilization L3-5.    Treatment Options:   Ms. Benitez is doing well.  She will follow-up in 4 months with new plain films of her lumbar spine.  She will hold off on her Relafen until that visit.  She may use Tylenol and ibuprofen in the meantime.  She is going to talk with Dr. Khan, her rheumatologist, about trying a different arthritis medicine in the future.    Scribed for Iván Stanley MD by Queenie Irizarry CMA on 11/19/2024 10:06 EST         I, Dr. Stanley, personally performed the services described in the " documentation, as scribed in my presence, and it is both accurate and complete.

## 2024-11-20 ENCOUNTER — TELEPHONE (OUTPATIENT)
Age: 69
End: 2024-11-20
Payer: MEDICARE

## 2024-11-20 ENCOUNTER — HOSPITAL ENCOUNTER (OUTPATIENT)
Dept: PHYSICAL THERAPY | Facility: HOSPITAL | Age: 69
Setting detail: THERAPIES SERIES
Discharge: HOME | End: 2024-11-20
Payer: MEDICARE

## 2024-11-20 NOTE — CARE COORDINATION
[facility]  Cancel/No Show Note    Service:  [x]Physical Therapy  []Occupational Therapy    Date:11/20/24    # of Missed Tx:     Reason For Missed Treatment:  []Illness  []Provider unavailable  []No call/no show  []Late cancellation (<24 hours)  []Arrived >15 minutes late  []Other appointment  []Scheduling conflict  []Arrived on wrong day  []Physician discharged tx  []Transportation conflict  []Inclement weather  []Frequency of tx change  []No reason given  []Arrived but refused participation  []Unable to tolerate  []Medical complications  []Pt on hold due to-  [x]Other-     Plan:  []Rescheduled  []Continue at next scheduled apt  []Reminder phone call  []Contacted referring provider  []Discharged    
no

## 2024-11-20 NOTE — TELEPHONE ENCOUNTER
Pt sent message via [employer] stating that she is currently not taking any medication from [facility] due to her back surgery.  She said that she can't take any of her meds until April due to it interfering with the healing process.  She said she hasn't had any meds since 8/29/24 and it's been very hard.  She said when she can, she wants to try the newest, most aggressive arthritis medication available.  She has discussed with Dr. Huertas and Dr. Stanley and just wanted to let [facility] know as well.  I explained to her that [facility] was currently out of the office and she could discuss all of this with her at her next appointment. -RICHARD Galloway

## 2024-11-21 ENCOUNTER — TELEMEDICINE (OUTPATIENT)
Age: 69
End: 2024-11-21

## 2024-11-21 DIAGNOSIS — F41.9 ANXIETY: ICD-10-CM

## 2024-11-21 DIAGNOSIS — M54.40 CHRONIC LOW BACK PAIN WITH SCIATICA, SCIATICA LATERALITY UNSPECIFIED, UNSPECIFIED BACK PAIN LATERALITY: Primary | ICD-10-CM

## 2024-11-21 DIAGNOSIS — G89.29 CHRONIC LOW BACK PAIN WITH SCIATICA, SCIATICA LATERALITY UNSPECIFIED, UNSPECIFIED BACK PAIN LATERALITY: Primary | ICD-10-CM

## 2024-11-25 ENCOUNTER — HOSPITAL ENCOUNTER (OUTPATIENT)
Dept: PHYSICAL THERAPY | Facility: HOSPITAL | Age: 69
Setting detail: THERAPIES SERIES
Discharge: HOME | End: 2024-11-25
Payer: MEDICARE

## 2024-11-25 PROCEDURE — 97110 THERAPEUTIC EXERCISES: CPT | Performed by: PHYSICAL THERAPIST

## 2024-11-25 NOTE — PROGRESS NOTES
Physical Therapy Daily Treatment Note/Reassessment   Date:  2024    TIme In:1108                      Time Out:1205    Patient Name:  Kristyn Martin    :  1955  MRN: [id number]    Restrictions/Precautions:    Pertinent Medical History:  Medical/Treatment Diagnosis Information:  Low back pain, unspecified [M54.50]     Insurance/Certification information:  Payor: [facility] MEDICARE / Plan: [employer] MEDIBLUE ESSENTIAL/PLUS / Product Type: *No Product type* /   Physician Information:  Sera Valadez MD  Plan of care signed (Y/N):    Visit# / total visits:     5 /    G-Code (if applicable):      Date / Visit # G-Code Applied:         Progress Note: []  Yes  [x]  No  Next due by: Visit #10       Pain level:   3/10  Subjective: Patient states that her back is doing much better.  She states that she is doing her HEP daily and using her cold pack daily.  She states that her arthritis is what is hurting her the most but her dr gave her injections in her knees and it has help. She states that physical therapy is really helping her.  Objective:  Observation:   Test measurements:    Patient's pain is 3/10  Patient is now IND with her HEP  Patient has increased trunk strength to 4-/5  Palpation:    Exercises:  Exercise Resistance/Repetitions Date performed   1: Sci fit Level 3, 7 min 25   2: Bent knee fall out   3x10 B 25   3: PPT  3x10 25   4: Hip add w/ ball   3x10 25   5: Clam shells  3x10 25   6: Mid rows with posture focus  3x10, GTB  25   7. SKTC  5x10\" 25   8. Pullies, flex 3 min 25   9. Standing marching BLE 15 25   10. Standing hip ABD BLE 15 25   11. Standing heel raises BLE 15 25   12. Hallway stairs 4 flights 25     Other Therapeutic Activities:      Manual Treatments:  Rockblade, and STM to lumbar area 10 min(deferred today)    Modalities:  cold pack to lumbar area, 15 min      Timed Code Treatment Minutes:  67      Total Treatment Minutes: 67    Patient Goal(s):    Short Term Goals Completed by 4 weeks 
No

## 2024-11-27 ENCOUNTER — HOSPITAL ENCOUNTER (OUTPATIENT)
Dept: PHYSICAL THERAPY | Facility: HOSPITAL | Age: 69
Setting detail: THERAPIES SERIES
Discharge: HOME | End: 2024-11-27
Payer: MEDICARE

## 2024-11-27 ENCOUNTER — OFFICE VISIT (OUTPATIENT)
Dept: ORTHOPEDIC SURGERY | Facility: CLINIC | Age: 69
End: 2024-11-27
Payer: MEDICARE

## 2024-11-27 VITALS
DIASTOLIC BLOOD PRESSURE: 88 MMHG | WEIGHT: 235.89 LBS | HEIGHT: 64 IN | SYSTOLIC BLOOD PRESSURE: 162 MMHG | BODY MASS INDEX: 40.27 KG/M2

## 2024-11-27 DIAGNOSIS — I87.8 VENOUS STASIS: ICD-10-CM

## 2024-11-27 DIAGNOSIS — M79.671 BILATERAL FOOT PAIN: Primary | ICD-10-CM

## 2024-11-27 DIAGNOSIS — M79.672 BILATERAL FOOT PAIN: Primary | ICD-10-CM

## 2024-11-27 DIAGNOSIS — M72.2 PLANTAR FASCIITIS: ICD-10-CM

## 2024-11-27 DIAGNOSIS — M76.62 ACHILLES TENDINITIS OF LEFT LOWER EXTREMITY: ICD-10-CM

## 2024-11-27 DIAGNOSIS — G62.9 NEUROPATHY: ICD-10-CM

## 2024-11-27 NOTE — PROGRESS NOTES
ESTABLISHED PATIENT    Patient: Alison Beintez  : 1955    Primary Care Provider: Kacy Huertas MD    Requesting Provider: As above    Follow-up (2 year f/u bilateral foot pain/)      History    Chief Complaint: Bilateral foot and leg pain    History of Present Illness: This is a very pleasant 69-year-old woman who I saw 2 years ago.  She is here with her daughter.  When I saw her she had fairly dense neuropathy, she had hammertoes that were not painful, she had venous stasis that was quite painful.  She also had significant spinal problems and limited activity.  She had a spinal fusion this fall, and had to stop taking her arthritis NSAID.  She is now having pain in many more places.  Her daughter brought her to make sure there is nothing broken.  She also has developed left heel pain the heel pain is worse on arising from a seated position and worse in the morning.  She has been doing therapy for her back but not for her heel pain.  She also has noted more swelling in the right leg.  She has been concerned about an area of depression in the anteromedial fat over the tibia.  She notes that her she has aching in both legs.  She is also concerned that the legs turn dark when they hang down especially the right one.  She also has noted some thickening of her toenails.  I explained it is probably fungus, I explained there is no really good treatment but she could talk to dermatology.  She has been putting Vicks vapor rub on the nails.    She also had questions about her right knee and whether she will need a knee replacement.  I cannot see the x-rays but I can see the report from 2024, it looks like she just has mild knee arthritis.  I reassured her that I do not think she will need a knee replacement anytime soon.  She also asked me about spurs and I explained that a spur is not a thorn sticking into someone, but rather it is a ridge of bone that occurs in the joint that loses cartilage.  The spur  is not the source of the problem it is the cartilage loss that causes pain.  Current Outpatient Medications on File Prior to Visit   Medication Sig Dispense Refill    Ascorbic Acid (Vitamin C) 250 MG chewable tablet Chew 500 mg Daily.      Cholecalciferol (Vitamin D) 50 MCG (2000 UT) tablet Take 1 tablet by mouth Daily.      Diclofenac Sodium (VOLTAREN) 1 % gel gel Apply 4 g topically to the appropriate area as directed 4 (Four) Times a Day As Needed (joint pain).      docusate sodium (COLACE) 100 MG capsule Take 1 capsule by mouth Daily.      DULoxetine (CYMBALTA) 60 MG capsule Take 1 capsule by mouth Daily. 90 capsule 3    estradiol (ESTRACE) 1 MG tablet Take 1 tablet by mouth Daily.      furosemide (LASIX) 20 MG tablet Take 1 tablet by mouth Daily.      losartan (COZAAR) 50 MG tablet Take 1 tablet by mouth Daily.      lubiprostone (AMITIZA) 8 MCG capsule Take 1 capsule by mouth 2 (Two) Times a Day With Meals.      MAGNESIUM CITRATE PO Take 400 mg by mouth Daily.      mometasone (ELOCON) 0.1 % cream Apply 1 Application topically to the appropriate area as directed Daily. ears      MULTIPLE VITAMINS-MINERALS ER PO Take 1 tablet by mouth Daily.      nabumetone (RELAFEN) 750 MG tablet Take 1 tablet by mouth 2 (Two) Times a Day. 60 tablet 1    Omega-3 Fatty Acids (fish oil) 1200 MG capsule capsule Take 1 capsule by mouth Daily With Breakfast.      polyethyl glycol-propyl glycol (SYSTANE) 0.4-0.3 % solution ophthalmic solution (artificial tears) Administer 1 drop to both eyes As Needed (dry eyes).      pregabalin (LYRICA) 75 MG capsule TAKE 1 CAPSULE BY MOUTH THREE TIMES DAILY 270 capsule 0    Probiotic Product (PROBIOTIC DAILY PO) Take 1 capsule by mouth Daily.      Rhofade 1 % cream Apply 1 Application topically to the appropriate area as directed Daily. Face for roscea      Synthroid 50 MCG tablet Take 1 tablet by mouth Daily. 90 tablet 3    Urea 40 % lotion Apply 1 Application topically Daily. Toes      Xdemvy 0.25  % solution Administer 1 drop to both eyes 2 (Two) Times a Day.      zolpidem (AMBIEN) 10 MG tablet Take 1 tablet by mouth Every Night.      [DISCONTINUED] acetaminophen (TYLENOL) 650 MG 8 hr tablet Take 2 tablets by mouth. (Patient not taking: Reported on 2024)      [DISCONTINUED] hydrochlorothiazide (HYDRODIURIL) 25 MG tablet Take 1 tablet by mouth Daily.      [DISCONTINUED] metroNIDAZOLE (METROCREAM) 0.75 % cream Apply 1 Application topically to the appropriate area as directed 2 (Two) Times a Day As Needed (face for roscea).       No current facility-administered medications on file prior to visit.      Allergies   Allergen Reactions    Augmentin [Amoxicillin-Pot Clavulanate] Rash      Past Medical History:   Diagnosis Date    Ankle sprain     Arthritis     Arthritis of back     Arthritis of neck     Bursitis     Bursitis of hip     Cervical disc disorder     Claustrophobia     CTS (carpal tunnel syndrome)     bilateral     Deep vein thrombosis     Fibromyalgia     Hemorrhoids     Hip arthrosis     Hypertension     Knee swelling     Low back pain     Lumbosacral disc disease     Murmur     echo  in epic    Neuropathy     Osteoarthritis     Scoliosis     Tendinitis of knee     Right knee    Thoracic disc disorder     Thyroid nodule      (vaginal birth after )      Past Surgical History:   Procedure Laterality Date    BACK SURGERY       SECTION      x2    COLONOSCOPY      CYST REMOVAL N/A 2022    chest    DILATATION AND CURETTAGE      HYSTERECTOMY N/A     LUMBAR LAMINECTOMY WITH FUSION N/A 2024    Procedure: LUMBAR FUSION DECOMPRESSON WITH PEDICLE SCREWS L3-5;  Surgeon: Iván Stanley MD;  Location: [facility];  Service: Neurosurgery;  Laterality: N/A;    MANDIBLE FRACTURE SURGERY      OOPHORECTOMY Bilateral     TRIGGER POINT INJECTION      TUBAL ABDOMINAL LIGATION       Family History   Problem Relation Age of Onset    Arthritis Mother     Stroke Mother     Glaucoma  "Mother     Hypertension Mother     Heart disease Mother     Heart failure Mother     COPD Mother     Vision loss Mother     Heart disease Father     Lung disease Father     Hearing loss Father     Hypertension Brother     Hearing loss Brother     Thyroid disease Brother     Hearing loss Sister     Thyroid disease Sister     Thyroid disease Sister     Breast cancer Neg Hx     Ovarian cancer Neg Hx       Social History     Socioeconomic History    Marital status: Single   Tobacco Use    Smoking status: Never     Passive exposure: Never    Smokeless tobacco: Never   Vaping Use    Vaping status: Never Used   Substance and Sexual Activity    Alcohol use: Never    Drug use: Never    Sexual activity: Not Currently     Partners: Male        Review of Systems    The following portions of the patient's history were reviewed and updated as appropriate: allergies, current medications, past family history, past medical history, past social history, past surgical history, and problem list.    Physical Exam:   /88   Ht 162.6 cm (64.02\")   Wt 107 kg (235 lb 14.3 oz)   BMI 40.47 kg/m²   GENERAL: Body habitus: morbidly obese    Lower extremity edema: Left: 2+ pitting; Right: 2+ pitting    Gait: antalgic with the first few steps     Mental Status:  awake and alert; oriented to person, place, and time  MSK:  Tibia:  Right:  exquisetly tender over subcutaneous border the area of concern that she points to is a little bit of thinner fat area over the anteromedial tibia, it is not worrisome, I explained that the fat distribution is not always even in the legs especially as we age; Left:  exquisetly tender over subcutaneous border        Ankle:  Right: non tender; Left:  non tender        Foot:  Right:  non tender and no change in the hammertoes ; Left:   No change in the hammertoes, she is tender at the origin of the plantar fascia and at the insertion of the Achilles    NEURO Sensation:  diminished    Medical Decision " Making    Data Review:   ordered and reviewed x-rays today and reviewed outside records    Assessment/Plan/Diagnosis/Treatment Options:   1. Bilateral foot pain  She has plantar fasciitis and Achilles tendinitis on the left.  I explained this to the patient and her daughter.  I explained that stretching is the key.  I showed her the toe pull stretch and we gave her a night splint.  I will also add Achilles tendinitis therapy to her current therapy regimen.  - XR Foot 2 View Bilateral  - XR Ankle 2 View Bilateral    2. Achilles tendinitis of left lower extremity  As above  - Ambulatory Referral to Physical Therapy for Evaluation & Treatment    3. Venous stasis  I think 1 thing that is contributing the most to her pain is her venous stasis.  She did get some compression socks but has not been wearing them.  She reports she has had a hard time finding some that fit.  I explained to her that all of her concerns about the legs turning dark the swelling etc. are all due to venous stasis.  She has a strong family history of this.  Her daughter even has had vein surgery.  I explained that she also needs to elevate her feet above her heart 30 minutes midmorning and midafternoon.  I think that will  help her pain significantly.  Discussed type and where to find them.  We discussed sizes.  I explained that even young [employer] basketball players whether they are male or female, all of them are wearing compression socks so I am certain that she can find some that fit.  4. Plantar fasciitis  As above I showed her how to do the stretches and we gave her a night splint    5. Neuropathy  Unchanged      >35 min on visit  Lashaun Mariscal MD

## 2024-11-27 NOTE — CARE COORDINATION
[facility]  Cancel/No Show Note    Service:  [x]Physical Therapy  []Occupational Therapy    Date:11/27/24    # of Missed Tx:     Reason For Missed Treatment:  [x]Illness  []Provider unavailable  []No call/no show  []Late cancellation (<24 hours)  []Arrived >15 minutes late  []Other appointment  []Scheduling conflict  []Arrived on wrong day  []Physician discharged tx  []Transportation conflict  []Inclement weather  []Frequency of tx change  []No reason given  []Arrived but refused participation  []Unable to tolerate  []Medical complications  []Pt on hold due to-  []Other-     Plan:  []Rescheduled  []Continue at next scheduled apt  []Reminder phone call  []Contacted referring provider  []Discharged

## 2024-12-03 ENCOUNTER — APPOINTMENT (OUTPATIENT)
Dept: PHYSICAL THERAPY | Facility: HOSPITAL | Age: 69
End: 2024-12-03
Payer: MEDICARE

## 2024-12-03 DIAGNOSIS — G47.00 INSOMNIA, UNSPECIFIED TYPE: ICD-10-CM

## 2024-12-03 NOTE — TELEPHONE ENCOUNTER
Refill request received from pharmacy      Next Office Visit Date:  Future Appointments   Date Time Provider Department Center   12/5/2024 10:30 AM Amanda Jones, PT [facility]   12/9/2024  9:30 AM Amanda Jones, PT [facility]   12/11/2024 11:00 AM Amanda Jones, PT [facility]   12/17/2024 11:00 AM Sera Valadez MD [facility]   2/20/2025 11:15 AM Sera Valadez MD [facility]       SKYLER - Please review via PDMP        Last Office Visit:    11/12/2024

## 2024-12-03 NOTE — TELEPHONE ENCOUNTER
Patient called, requested refill.       Next Office Visit Date:  Future Appointments   Date Time Provider Department Center   12/5/2024 10:30 AM Amanda Jones, PT [facility]   12/9/2024  9:30 AM Amanda Jones, PT [facility]   12/11/2024 11:00 AM Amanda Jones, PT [facility]   12/17/2024 11:00 AM Sera Valadez MD [facility]   2/20/2025 11:15 AM Sera Valadez MD [facility]       SKYLER please review via PDMP

## 2024-12-05 ENCOUNTER — HOSPITAL ENCOUNTER (OUTPATIENT)
Dept: PHYSICAL THERAPY | Facility: HOSPITAL | Age: 69
Setting detail: THERAPIES SERIES
Discharge: HOME | End: 2024-12-05
Payer: MEDICARE

## 2024-12-05 PROCEDURE — 97140 MANUAL THERAPY 1/> REGIONS: CPT | Performed by: PHYSICAL THERAPIST

## 2024-12-05 PROCEDURE — 97110 THERAPEUTIC EXERCISES: CPT | Performed by: PHYSICAL THERAPIST

## 2024-12-05 RX ORDER — ZOLPIDEM TARTRATE 10 MG/1
10 TABLET ORAL NIGHTLY PRN
Qty: 30 TABLET | Refills: 2 | Status: SHIPPED | OUTPATIENT
Start: 2024-12-05 | End: 2025-03-05

## 2024-12-05 RX ORDER — ZOLPIDEM TARTRATE 10 MG/1
10 TABLET ORAL NIGHTLY PRN
Qty: 30 TABLET | Refills: 5 | OUTPATIENT
Start: 2024-12-05 | End: 2025-06-03

## 2024-12-05 NOTE — PROGRESS NOTES
after treatment:      01/10    Prognosis: [x] Good [] Fair  [] Poor    Patient Requires Follow-up: [x] Yes  [] No    Plan:   [x] Continue per plan of care [] Alter current plan (see comments)  [] Plan of care initiated [] Hold pending MD visit [] Discharge    Plan for Next Session:    This note serves as D/C summary if patient is discharged prior to next visit.     Electronically signed by:  Amanda Jones PT

## 2024-12-08 PROBLEM — F41.9 ANXIETY: Status: ACTIVE | Noted: 2024-12-08

## 2024-12-08 PROBLEM — G89.29 CHRONIC LOW BACK PAIN WITH SCIATICA: Status: ACTIVE | Noted: 2024-12-08

## 2024-12-08 PROBLEM — M54.40 CHRONIC LOW BACK PAIN WITH SCIATICA: Status: ACTIVE | Noted: 2024-12-08

## 2024-12-09 ENCOUNTER — HOSPITAL ENCOUNTER (OUTPATIENT)
Dept: PHYSICAL THERAPY | Facility: HOSPITAL | Age: 69
Setting detail: THERAPIES SERIES
Discharge: HOME | End: 2024-12-09
Payer: MEDICARE

## 2024-12-09 PROCEDURE — 97110 THERAPEUTIC EXERCISES: CPT | Performed by: PHYSICAL THERAPIST

## 2024-12-09 PROCEDURE — 97140 MANUAL THERAPY 1/> REGIONS: CPT | Performed by: PHYSICAL THERAPIST

## 2024-12-09 NOTE — PROGRESS NOTES
Physical Therapy Daily Treatment Note   Date:  2024    TIme In:926                      Time Out:1031    Patient Name:  Kristyn Martin    :  1955  MRN: [id number]    Restrictions/Precautions:    Pertinent Medical History:  Medical/Treatment Diagnosis Information:  Low back pain, unspecified [M54.50]     Insurance/Certification information:  Payor: [employer] MEDICARE / Plan: [facility] MEDICARE ADVANTAGE [place] / Product Type: *No Product type* /   Physician Information:  Sera Valadez MD  Plan of care signed (Y/N):    Visit# / total visits:     7 /    G-Code (if applicable):      Date / Visit # G-Code Applied:         Progress Note: []  Yes  [x]  No  Next due by: Visit #10       Pain level:   0/10  Subjective: Patient states that her back is still doing so much better.  She states that she is doing her HEP daily and using her cold pack daily.  She states that her pain today is in her left buttock area and she is not sure why it is hurting.     Objective:  Observation:   Test measurements:    Palpation:    Exercises:  Exercise Resistance/Repetitions Date performed   1: Sci fit Level 3, 7 min 09   2: Bent knee fall out   3x10 B 09   3: PPT  3x10 09   4: Hip add w/ ball   3x10 09   5: Clam shells  3x10 09   6: Mid rows with posture focus  3x10, GTB  09   7. SKTC  5x10\" 09   8. Pullies, flex 3 min 09   9. Standing marching BLE 15 09   10. Standing hip ABD BLE 15 09   11. Standing heel raises BLE 15 09          Other Therapeutic Activities:      Manual Treatments:  Rockblade, and STM to lumbar area 10 min    Modalities:  cold pack to lumbar area, 10 min (deferred today)      Timed Code Treatment Minutes:  65      Total Treatment Minutes: 65    Treatment/Activity Tolerance:  [x] Patient tolerated treatment well [] Patient limited by fatigue  [] Patient limited by pain  [] Patient limited by other medical complications  [x] Other: Patient was able to complete all exercises today without increased LBP    Pain after 
(2) Patient Placed in Bed

## 2024-12-11 ENCOUNTER — APPOINTMENT (OUTPATIENT)
Dept: PHYSICAL THERAPY | Facility: HOSPITAL | Age: 69
End: 2024-12-11
Payer: MEDICARE

## 2024-12-11 ENCOUNTER — HOSPITAL ENCOUNTER (OUTPATIENT)
Dept: PHYSICAL THERAPY | Facility: HOSPITAL | Age: 69
Setting detail: THERAPIES SERIES
Discharge: HOME | End: 2024-12-11
Payer: MEDICARE

## 2024-12-11 PROCEDURE — 97110 THERAPEUTIC EXERCISES: CPT | Performed by: PHYSICAL THERAPIST

## 2024-12-11 NOTE — PROGRESS NOTES
Physical Therapy Daily Treatment Note   Date:  2024    TIme In: 1020                      Time Out:1108    Patient Name:  Kristyn Martin    :  1955  MRN: [id number]    Restrictions/Precautions:    Pertinent Medical History:  Medical/Treatment Diagnosis Information:  Low back pain, unspecified [M54.50]     Insurance/Certification information:  Payor: [employer] MEDICARE / Plan: [facility] MEDICARE ADVANTAGE [place] / Product Type: *No Product type* /   Physician Information:  Sera Valadez MD  Plan of care signed (Y/N):    Visit# / total visits:     8 /    G-Code (if applicable):      Date / Visit # G-Code Applied:         Progress Note: []  Yes  [x]  No  Next due by: Visit #10       Pain level:   0/10  Subjective: Patient states that her back is still doing so much better she is just a little sore.  She states that she is doing her HEP daily and using her cold pack daily.       Objective:  Observation:   Test measurements:    Palpation:    Exercises:  Exercise Resistance/Repetitions Date performed   1: Sci fit Level 3, 7 min 11   2: Bent knee fall out   3x10 B 11   3: PPT  3x10 11   4: Hip add w/ ball   3x10 11   5: Clam shells  3x10 11   6: Mid rows with posture focus  3x10, GTB  11   7. SKTC  5x10\" 11   8. Pullies, flex 3 min 11   9. Standing marching BLE 15 11   10. Standing hip ABD BLE 15 11   11. Standing heel raises BLE 15 11          Other Therapeutic Activities:      Manual Treatments:  Rockblade, and STM to lumbar area 10 min    Modalities:  cold pack to lumbar area, 10 min (deferred today)      Timed Code Treatment Minutes:  38      Total Treatment Minutes: 49    Treatment/Activity Tolerance:  [x] Patient tolerated treatment well [] Patient limited by fatigue  [] Patient limited by pain  [] Patient limited by other medical complications  [x] Other: Patient was able to complete all exercises today without increased LBP    Pain after treatment:      01/10    Prognosis: [x] Good [] Fair  []

## 2024-12-16 ENCOUNTER — APPOINTMENT (OUTPATIENT)
Dept: PHYSICAL THERAPY | Facility: HOSPITAL | Age: 69
End: 2024-12-16
Payer: MEDICARE

## 2024-12-17 ENCOUNTER — HOSPITAL ENCOUNTER (OUTPATIENT)
Facility: HOSPITAL | Age: 69
Discharge: HOME | End: 2024-12-17
Payer: MEDICARE

## 2024-12-17 ENCOUNTER — OFFICE VISIT (OUTPATIENT)
Age: 69
End: 2024-12-17

## 2024-12-17 ENCOUNTER — HOSPITAL ENCOUNTER (OUTPATIENT)
Dept: PHYSICAL THERAPY | Facility: HOSPITAL | Age: 69
Setting detail: THERAPIES SERIES
Discharge: HOME | End: 2024-12-17
Payer: MEDICARE

## 2024-12-17 VITALS
RESPIRATION RATE: 16 BRPM | OXYGEN SATURATION: 97 % | HEIGHT: 64 IN | BODY MASS INDEX: 40.6 KG/M2 | DIASTOLIC BLOOD PRESSURE: 72 MMHG | WEIGHT: 237.8 LBS | SYSTOLIC BLOOD PRESSURE: 130 MMHG | HEART RATE: 72 BPM | TEMPERATURE: 97.8 F

## 2024-12-17 DIAGNOSIS — R60.0 PEDAL EDEMA: ICD-10-CM

## 2024-12-17 DIAGNOSIS — G89.29 CHRONIC MIDLINE LOW BACK PAIN WITH SCIATICA, SCIATICA LATERALITY UNSPECIFIED: ICD-10-CM

## 2024-12-17 DIAGNOSIS — G89.29 CHRONIC PAIN OF RIGHT KNEE: Primary | ICD-10-CM

## 2024-12-17 DIAGNOSIS — M25.561 CHRONIC PAIN OF RIGHT KNEE: Primary | ICD-10-CM

## 2024-12-17 DIAGNOSIS — M54.40 CHRONIC MIDLINE LOW BACK PAIN WITH SCIATICA, SCIATICA LATERALITY UNSPECIFIED: ICD-10-CM

## 2024-12-17 LAB
ALBUMIN SERPL-MCNC: 4.5 G/DL (ref 3.4–4.8)
ALBUMIN/GLOB SERPL: 1.7 {RATIO} (ref 0.8–2)
ALP SERPL-CCNC: 94 U/L (ref 25–100)
ALT SERPL-CCNC: 18 U/L (ref 4–36)
ANION GAP SERPL CALCULATED.3IONS-SCNC: 15 MMOL/L (ref 3–16)
AST SERPL-CCNC: 19 U/L (ref 8–33)
BILIRUB SERPL-MCNC: 0.3 MG/DL (ref 0.3–1.2)
BUN SERPL-MCNC: 20 MG/DL (ref 6–20)
CALCIUM SERPL-MCNC: 9.9 MG/DL (ref 8.5–10.5)
CHLORIDE SERPL-SCNC: 100 MMOL/L (ref 98–107)
CO2 SERPL-SCNC: 26 MMOL/L (ref 20–30)
CREAT SERPL-MCNC: 0.7 MG/DL (ref 0.4–1.2)
GFR SERPLBLD CREATININE-BSD FMLA CKD-EPI: >90 ML/MIN/{1.73_M2}
GLOBULIN SER CALC-MCNC: 2.7 G/DL
GLUCOSE SERPL-MCNC: 87 MG/DL (ref 74–106)
POTASSIUM SERPL-SCNC: 4 MMOL/L (ref 3.4–5.1)
PROT SERPL-MCNC: 7.2 G/DL (ref 6.4–8.3)
SODIUM SERPL-SCNC: 141 MMOL/L (ref 136–145)

## 2024-12-17 PROCEDURE — 36415 COLL VENOUS BLD VENIPUNCTURE: CPT

## 2024-12-17 PROCEDURE — 97110 THERAPEUTIC EXERCISES: CPT | Performed by: PHYSICAL THERAPIST

## 2024-12-17 PROCEDURE — 97140 MANUAL THERAPY 1/> REGIONS: CPT | Performed by: PHYSICAL THERAPIST

## 2024-12-17 PROCEDURE — 80053 COMPREHEN METABOLIC PANEL: CPT

## 2024-12-17 RX ORDER — CHLORHEXIDINE GLUCONATE 40 MG/ML
SOLUTION TOPICAL
COMMUNITY

## 2024-12-17 RX ORDER — LOTILANER OPHTHALMIC SOLUTION 2.5 MG/ML
SOLUTION/ DROPS OPHTHALMIC
COMMUNITY
Start: 2024-11-20

## 2024-12-17 NOTE — PROGRESS NOTES
SUBJECTIVE:    Patient ID: Kristyn Martin is a 69 y.o. female.    Chief Complaint   Patient presents with    Knee Pain     Chronic, regular f/u    Anxiety     Regular f/u       HPI: office visit  She is in the office today in follow-up of her knee pain.  She is having a lot of pain in that right knee.  She is also having right hip and low back pain.  She is having some leg swelling.  She says that does seem to be a little better than it was now.  She does feel like she has been doing relatively well otherwise.  Her back pain seems to be pretty stable at this point.  She feels more confident about that.  She has not had any recent falls or injuries.  She is not having any chest pain or shortness of breath.  She is following up about an abnormal lab.  Review of Systems   All other systems reviewed and are negative.       OBJECTIVE:  /72   Pulse 72   Temp 97.8 °F (36.6 °C) (Infrared)   Resp 16   Ht 1.626 m (5' 4\")   Wt 107.9 kg (237 lb 12.8 oz)   SpO2 97% Comment: ra  BMI 40.82 kg/m²    Wt Readings from Last 3 Encounters:   12/17/24 107.9 kg (237 lb 12.8 oz)   11/12/24 106.1 kg (233 lb 12.8 oz)   08/01/24 109.9 kg (242 lb 3.2 oz)     BP Readings from Last 3 Encounters:   12/17/24 130/72   11/12/24 118/69   08/01/24 112/76      Pulse Readings from Last 3 Encounters:   12/17/24 72   11/12/24 93   08/01/24 99     Body mass index is 40.82 kg/m².   Resp Readings from Last 3 Encounters:   12/17/24 16   11/12/24 16   08/01/24 16     Past medical, surgical, family and social history were reviewed and updated with the patient.     Physical Exam  Vitals and nursing note reviewed.   Constitutional:       General: She is not in acute distress.     Appearance: Normal appearance. She is well-developed and normal weight. She is not ill-appearing or toxic-appearing.   HENT:      Head: Normocephalic and atraumatic.      Right Ear: Hearing, tympanic membrane, ear canal and external ear normal.      Left Ear: Hearing,

## 2024-12-17 NOTE — PROGRESS NOTES
\"Have you been to the ER, urgent care clinic since your last visit?  Hospitalized since your last visit?\"    NO    “Have you seen or consulted any other health care providers outside our system since your last visit?”    YES - When: approximately 1  weeks ago.  Where and Why: eye dr. [facility] podiatry

## 2024-12-18 ENCOUNTER — HOSPITAL ENCOUNTER (OUTPATIENT)
Dept: PHYSICAL THERAPY | Facility: HOSPITAL | Age: 69
Setting detail: THERAPIES SERIES
Discharge: HOME | End: 2024-12-18
Payer: MEDICARE

## 2024-12-18 PROCEDURE — 97110 THERAPEUTIC EXERCISES: CPT | Performed by: PHYSICAL THERAPIST

## 2024-12-18 PROCEDURE — 97032 APPL MODALITY 1+ESTIM EA 15: CPT | Performed by: PHYSICAL THERAPIST

## 2024-12-19 ENCOUNTER — TELEPHONE (OUTPATIENT)
Dept: ORTHOPEDIC SURGERY | Facility: CLINIC | Age: 69
End: 2024-12-19

## 2024-12-19 DIAGNOSIS — M76.62 ACHILLES TENDINITIS OF LEFT LOWER EXTREMITY: Primary | ICD-10-CM

## 2024-12-19 NOTE — TELEPHONE ENCOUNTER
Caller: GEOVANNA BUENO/[facility]    Relationship:     Best call back number: [phone]    What orders are you requesting (i.e. lab or imaging): PHYSICAL THERAPY [place]    In what timeframe would the patient need to come in: ASAP    Where will you receive your lab/imaging services: [facility]  FAX:  [phone]

## 2024-12-26 ENCOUNTER — HOSPITAL ENCOUNTER (OUTPATIENT)
Dept: PHYSICAL THERAPY | Facility: HOSPITAL | Age: 69
Setting detail: THERAPIES SERIES
Discharge: HOME | End: 2024-12-26
Payer: MEDICARE

## 2024-12-26 PROCEDURE — 97110 THERAPEUTIC EXERCISES: CPT

## 2024-12-26 NOTE — PROGRESS NOTES
Physical Therapy Daily Treatment Note/ Re-Assessment    Date:  2024    TIme In:  1007                    Time Out:  1053    Patient Name:  Kristyn Martin    :  1955  MRN: [id number]    Restrictions/Precautions:    Pertinent Medical History:  Medical/Treatment Diagnosis Information:  Low back pain, unspecified [M54.50]     Insurance/Certification information:  Payor: [employer] MEDICARE / [facility]AGE [place] / Product Type: *No Product type* /   Physician Information:  Sera Valadez MD  Plan of care signed (Y/N):    Visit# / total visits:     11 /    G-Code (if applicable):      Date / Visit # G-Code Applied:         Progress Note: [x]  Yes  []  No  Next due by: Visit #10       Pain level:   5/10 left foot    Subjective: Patient reports her back is doing better and she is having minimal day to day pain. Patient states she continues to feel like her back is weak when she tries to stand up after sitting for an hour or longer. Patient states she wants to continue working on the strength in her back. Patient reports she tries to do her HEP every day.      Objective:  Observation:   Test measurements:    Back Index Score: 26  Hip abduction MMT:   R: 4-/5  L: 4/5  Patient is now IND with her HEP  Patient has increased trunk strength to 4-/5    Exercises:  Exercise Resistance/Repetitions Date performed   1: Sci fit Level 3, 7 min 26   2: Bent knee fall out   3x10 B 26   3: PPT  3x10 26   4: Hip add w/ ball   3x10 26   5: Clam shells  3x10 26   6: Mid rows with posture focus  3x10, GTB  26   7. SKTC  5x10\" 26   8. Pullies, flex 3 min 26   9. Standing marching BLE 15 26   10. Standing hip ABD BLE 15 26   11. Standing heel raises BLE 15 26          Other Therapeutic Activities:      Manual Treatments:  Rockblade, and STM to lumbar area 10 min - patient deferred today     Modalities:  cold pack to lumbar area, 10 min       Timed Code Treatment Minutes:  36      Total Treatment Minutes:

## 2024-12-30 ENCOUNTER — OFFICE VISIT (OUTPATIENT)
Dept: SURGERY | Facility: CLINIC | Age: 69
End: 2024-12-30
Payer: MEDICARE

## 2024-12-30 ENCOUNTER — HOSPITAL ENCOUNTER (OUTPATIENT)
Dept: PHYSICAL THERAPY | Facility: HOSPITAL | Age: 69
Setting detail: THERAPIES SERIES
Discharge: HOME | End: 2024-12-30
Payer: MEDICARE

## 2024-12-30 VITALS
HEIGHT: 64 IN | WEIGHT: 246 LBS | OXYGEN SATURATION: 100 % | DIASTOLIC BLOOD PRESSURE: 98 MMHG | SYSTOLIC BLOOD PRESSURE: 150 MMHG | TEMPERATURE: 97.8 F | BODY MASS INDEX: 42 KG/M2 | HEART RATE: 98 BPM

## 2024-12-30 DIAGNOSIS — L72.3 SEBACEOUS CYST: Primary | ICD-10-CM

## 2024-12-30 DIAGNOSIS — L02.213 CUTANEOUS ABSCESS OF CHEST WALL: ICD-10-CM

## 2024-12-30 PROCEDURE — 97110 THERAPEUTIC EXERCISES: CPT

## 2024-12-30 RX ORDER — CARBOXYMETHYLCELLULOSE SODIUM 5 MG/ML
SOLUTION/ DROPS OPHTHALMIC
COMMUNITY
Start: 2024-12-02

## 2024-12-30 NOTE — PROGRESS NOTES
Follow-up: [x] Yes  [] No    Plan:   [x] Continue per plan of care [] Alter current plan (see comments)  [] Plan of care initiated [] Hold pending MD visit [] Discharge    Plan for Next Session:    This note serves as D/C summary if patient is discharged prior to next visit.     Electronically signed by:  Sulma Ward PTA

## 2024-12-30 NOTE — PROGRESS NOTES
Patient: Alison Benitez    YOB: 1955    Date: 12/30/2024    Primary Care Provider: Kacy Huertas MD    Chief Complaint   Patient presents with    Skin Lesion       SUBJECTIVE:    History of present illness:  The patient is in the office today for evaluation and treatment of a lesion on the chest wall.  Area very inflamed with fluctuance and tenderness.  Some erythema present as well.  Area very painful close to her bra strap.  Will need to have that removed today rather than wait due to the amount of pain discomfort and risk of pending abscess formation and rupture.    The following portions of the patient's history were reviewed and updated as appropriate: allergies, current medications, past family history, past medical history, past social history, past surgical history and problem list.      Review of Systems   Constitutional:  Negative for chills, fever and unexpected weight change.   HENT:  Negative for hearing loss, trouble swallowing and voice change.    Eyes:  Negative for visual disturbance.   Respiratory:  Negative for apnea, cough, chest tightness, shortness of breath and wheezing.    Cardiovascular:  Negative for chest pain, palpitations and leg swelling.   Gastrointestinal:  Negative for abdominal distention, abdominal pain, anal bleeding, blood in stool, constipation, diarrhea, nausea, rectal pain and vomiting.   Endocrine: Negative for cold intolerance and heat intolerance.   Genitourinary:  Negative for difficulty urinating, dysuria and flank pain.   Musculoskeletal:  Negative for back pain and gait problem.   Skin:  Negative for color change, rash and wound.   Neurological:  Negative for dizziness, syncope, speech difficulty, weakness, light-headedness, numbness and headaches.   Hematological:  Negative for adenopathy. Does not bruise/bleed easily.   Psychiatric/Behavioral:  Negative for confusion. The patient is not nervous/anxious.          Allergies:  Allergies   Allergen  Reactions    Augmentin [Amoxicillin-Pot Clavulanate] Rash     Beta lactam allergy details  Antibiotic reaction: rash  Age at reaction: adult  Dose to reaction time: unknown  Reason for antibiotic: unknown  Epinephrine required for reaction?: unknown  Tolerated antibiotics: unknown           Medications:    Current Outpatient Medications:     Ascorbic Acid (Vitamin C) 250 MG chewable tablet, Chew 500 mg Daily., Disp: , Rfl:     carboxymethylcellulose (Retaine CMC) 0.5 % solution, , Disp: , Rfl:     Cholecalciferol (Vitamin D) 50 MCG (2000 UT) tablet, Take 1 tablet by mouth Daily., Disp: , Rfl:     Diclofenac Sodium (VOLTAREN) 1 % gel gel, Apply 4 g topically to the appropriate area as directed 4 (Four) Times a Day As Needed (joint pain)., Disp: , Rfl:     docusate sodium (COLACE) 100 MG capsule, Take 1 capsule by mouth Daily., Disp: , Rfl:     DULoxetine (CYMBALTA) 60 MG capsule, Take 1 capsule by mouth Daily., Disp: 90 capsule, Rfl: 3    estradiol (ESTRACE) 1 MG tablet, Take 1 tablet by mouth Daily., Disp: , Rfl:     furosemide (LASIX) 20 MG tablet, Take 1 tablet by mouth Daily., Disp: , Rfl:     Glycerin, PF, (Optase Comfort Dry Eye) 1 % solution, , Disp: , Rfl:     losartan (COZAAR) 50 MG tablet, Take 1 tablet by mouth Daily., Disp: , Rfl:     lubiprostone (AMITIZA) 8 MCG capsule, Take 1 capsule by mouth 2 (Two) Times a Day With Meals., Disp: , Rfl:     MAGNESIUM CITRATE PO, Take 400 mg by mouth Daily., Disp: , Rfl:     mometasone (ELOCON) 0.1 % cream, Apply 1 Application topically to the appropriate area as directed Daily. ears, Disp: , Rfl:     MULTIPLE VITAMINS-MINERALS ER PO, Take 1 tablet by mouth Daily., Disp: , Rfl:     nabumetone (RELAFEN) 750 MG tablet, Take 1 tablet by mouth 2 (Two) Times a Day., Disp: 60 tablet, Rfl: 1    Omega-3 Fatty Acids (fish oil) 1200 MG capsule capsule, Take 1 capsule by mouth Daily With Breakfast., Disp: , Rfl:     polyethyl glycol-propyl glycol (SYSTANE) 0.4-0.3 % solution  ophthalmic solution (artificial tears), Administer 1 drop to both eyes As Needed (dry eyes)., Disp: , Rfl:     pregabalin (LYRICA) 75 MG capsule, TAKE 1 CAPSULE BY MOUTH THREE TIMES DAILY, Disp: 270 capsule, Rfl: 0    Probiotic Product (PROBIOTIC DAILY PO), Take 1 capsule by mouth Daily., Disp: , Rfl:     Rhofade 1 % cream, Apply 1 Application topically to the appropriate area as directed Daily. Face for roscea, Disp: , Rfl:     Synthroid 50 MCG tablet, Take 1 tablet by mouth Daily., Disp: 90 tablet, Rfl: 3    Urea 40 % lotion, Apply 1 Application topically Daily. Toes, Disp: , Rfl:     Xdemvy 0.25 % solution, Administer 1 drop to both eyes 2 (Two) Times a Day., Disp: , Rfl:     zolpidem (AMBIEN) 10 MG tablet, Take 1 tablet by mouth Every Night., Disp: , Rfl:     History:  Past Medical History:   Diagnosis Date    Ankle sprain     Arthritis     Arthritis of back     Arthritis of neck     Bursitis     Bursitis of hip     Cervical disc disorder     Claustrophobia     CTS (carpal tunnel syndrome)     bilateral     Deep vein thrombosis     Fibromyalgia     Hemorrhoids     Hip arthrosis     Hypertension     Knee swelling     Low back pain     Lumbosacral disc disease     Murmur     echo  in epic    Neuropathy     Osteoarthritis     Scoliosis     Tendinitis of knee     Right knee    Thoracic disc disorder     Thyroid nodule      (vaginal birth after )        Past Surgical History:   Procedure Laterality Date    BACK SURGERY       SECTION      x2    COLONOSCOPY      CYST REMOVAL N/A 2022    chest    DILATATION AND CURETTAGE      EYE SURGERY      HYSTERECTOMY N/A     LUMBAR LAMINECTOMY WITH FUSION N/A 2024    Procedure: LUMBAR FUSION DECOMPRESSON WITH PEDICLE SCREWS L3-5;  Surgeon: Iván Stanley MD;  Location[facility];  Service: Neurosurgery;  Laterality: N/A;    MANDIBLE FRACTURE SURGERY      OOPHORECTOMY Bilateral     TRIGGER POINT INJECTION      TUBAL ABDOMINAL LIGATION    "      Family History   Problem Relation Age of Onset    Arthritis Mother     Stroke Mother     Glaucoma Mother     Hypertension Mother     Heart disease Mother     Heart failure Mother     COPD Mother     Vision loss Mother     Heart disease Father     Lung disease Father     Hearing loss Father     Hypertension Brother     Hearing loss Brother     Thyroid disease Brother     Hearing loss Sister     Thyroid disease Sister     Thyroid disease Sister     Breast cancer Neg Hx     Ovarian cancer Neg Hx        Social History     Tobacco Use    Smoking status: Never     Passive exposure: Never    Smokeless tobacco: Never   Vaping Use    Vaping status: Never Used   Substance Use Topics    Alcohol use: Never    Drug use: Never        OBJECTIVE:    Vital Signs:   Vitals:    12/30/24 1518   BP: 150/98   Pulse: 98   Temp: 97.8 °F (36.6 °C)   TempSrc: Temporal   SpO2: 100%   Weight: 112 kg (246 lb)   Height: 162.6 cm (64.02\")       Physical Exam:       Skin-4 to 5 cm subcutaneous cyst with infection and erythema on the chest wall.  Tender to palpation and fluctuant.    Results Review:   I reviewed the patient's new clinical results.    Review of Systems was reviewed and confirmed as accurate as documented by the MA.    ASSESSMENT/PLAN:    1. Sebaceous cyst    2. Cutaneous abscess of chest wall        Recommend excision of the infected cyst today in the office.  I do not think she can wait to have it done later date.  Risk of bleeding infection recurrence discussed and patient agreeable.  She had no questions wishes to proceed with excision today.    Location: Chest wall    Procedure: Excision 4.5 cm cyst that infected on the chest wall with layered closure      I recommend excision. Procedure and the risks and benefits were explained including bleeding and infection. The patient understands these and wishes to proceed.     The patient was brought to the procedure room. Consent and time out were performed. The area was prepped " and draped in the usual fashion. 1% lidocaine with epinephrine was infused locally. An ellyptical incision was made around the lesion. Full thickness excision was performed. The lesion size was 4.5 cm. The wound was closed in layers with interrupted simple vicryl and Nylon for the skin. Wound closure size was 5 cm. There were no complications and the patient tolerated the procedure well. Hemostasis was well controlled with pressure and there was minimal blood loss. Wound instructions were given.           Electronically signed by Jackie Miner MD  12/30/24

## 2024-12-31 LAB — REF LAB TEST METHOD: NORMAL

## 2025-01-02 ENCOUNTER — HOSPITAL ENCOUNTER (OUTPATIENT)
Dept: PHYSICAL THERAPY | Facility: HOSPITAL | Age: 70
Setting detail: THERAPIES SERIES
Discharge: HOME | End: 2025-01-02

## 2025-01-02 NOTE — CARE COORDINATION
[facility]  Cancel/No Show Note    Service:  [x]Physical Therapy  []Occupational Therapy    Date: 1/2/24    # of Missed Tx:     Reason For Missed Treatment:  []Illness  []Provider unavailable  []No call/no show  []Late cancellation (<24 hours)  []Arrived >15 minutes late  []Other appointment  []Scheduling conflict  []Arrived on wrong day  []Physician discharged tx  []Transportation conflict  []Inclement weather  []Frequency of tx change  []No reason given  []Arrived but refused participation  []Unable to tolerate  [x]Medical complications  []Pt on hold due to-  []Other-     Plan:  [x]Rescheduled  []Continue at next scheduled apt  []Reminder phone call  []Contacted referring provider  []Discharged

## 2025-01-07 RX ORDER — FUROSEMIDE 20 MG/1
20 TABLET ORAL DAILY
Qty: 30 TABLET | Refills: 2 | Status: SHIPPED | OUTPATIENT
Start: 2025-01-07

## 2025-01-07 NOTE — TELEPHONE ENCOUNTER
Refill request received from pharmacy      Next Office Visit Date:  Future Appointments   Date Time Provider Department Center   2/20/2025 11:15 AM Sera Valadez MD [facility] [facility]   3/18/2025 10:45 AM Sera Valadez MD [facility]       [facility] - Please review via PDMP        Last Office Visit:    12/17/2024

## 2025-01-09 ENCOUNTER — OFFICE VISIT (OUTPATIENT)
Dept: SURGERY | Facility: CLINIC | Age: 70
End: 2025-01-09
Payer: MEDICARE

## 2025-01-09 VITALS
TEMPERATURE: 97.3 F | HEART RATE: 94 BPM | OXYGEN SATURATION: 97 % | WEIGHT: 246 LBS | BODY MASS INDEX: 42 KG/M2 | HEIGHT: 64 IN

## 2025-01-09 DIAGNOSIS — Z48.89 POSTOPERATIVE VISIT: Primary | ICD-10-CM

## 2025-01-09 PROCEDURE — 99024 POSTOP FOLLOW-UP VISIT: CPT | Performed by: SURGERY

## 2025-01-09 PROCEDURE — 1159F MED LIST DOCD IN RCRD: CPT | Performed by: SURGERY

## 2025-01-09 PROCEDURE — 1160F RVW MEDS BY RX/DR IN RCRD: CPT | Performed by: SURGERY

## 2025-01-09 NOTE — PROGRESS NOTES
"Patient: Aliosn Benitez    YOB: 1955    Date: 01/10/2025    Primary Care Provider: Kacy Huertas MD    Chief Complaint   Patient presents with    Post-op Follow-up     Excision        History of present illness:  I saw the patient in the office today as a followup from their recent excision on chest wall.  Pathology was reviewed and indicated epidermal inclusion cyst.   They state that they have done well post-operatively and are having no complaints.    Vital Signs:   Vitals:    01/09/25 1007   Pulse: 94   Temp: 97.3 °F (36.3 °C)   SpO2: 97%   Weight: 112 kg (246 lb)   Height: 162.6 cm (64.02\")       Physical Exam:    Wounds look good, no redness or drainage     Assessment / Plan :    1. Postoperative visit      Resume regular activity, follow-up as needed    Electronically signed by Jackie Miner MD  01/09/25                "

## 2025-01-27 RX ORDER — FUROSEMIDE 20 MG/1
20 TABLET ORAL DAILY
Qty: 30 TABLET | Refills: 2 | OUTPATIENT
Start: 2025-01-27

## 2025-02-24 ENCOUNTER — TRANSCRIBE ORDERS (OUTPATIENT)
Dept: ADMINISTRATIVE | Facility: HOSPITAL | Age: 70
End: 2025-02-24
Payer: MEDICARE

## 2025-02-24 DIAGNOSIS — Z12.31 SCREENING MAMMOGRAM FOR BREAST CANCER: Primary | ICD-10-CM

## 2025-02-28 ENCOUNTER — TELEPHONE (OUTPATIENT)
Age: 70
End: 2025-02-28
Payer: MEDICARE

## 2025-02-28 NOTE — TELEPHONE ENCOUNTER
Caller: Alison Benitez    Relationship: Self    Best call back number: [phone]         What was the call regarding: PATIENT HAS REQUESTED A CALL TO FOLLOW UP ON HOW LONG SHE HAD BEEN A PATIENT OF DR. CASON.  PATIENT ALSO REQUESTS TO BE SEEN EARLIER THAN HER APPOINTMENT ON 7/21 ( APPOINTMENT ON WAITLIST).  PLEASE CONTACT PATIENT AND ADVISE.

## 2025-02-28 NOTE — TELEPHONE ENCOUNTER
PT APPT HAD TO BE CANCELLED. IF PT CALLS BACK TO RESCHEDULE, PLEASE SCHEDULE WITH IRENE IVAN OR ALYCIA. IF NOT ALREADY ON, PLEASE ADD PT TO THE CANCELLATION LIST AS NORMAL PRIOTIRY WITH AN END DATE OF 12/31/2025.   -HUB READY TO SCHEDULE.

## 2025-03-01 DIAGNOSIS — G47.00 INSOMNIA, UNSPECIFIED TYPE: ICD-10-CM

## 2025-03-03 RX ORDER — ZOLPIDEM TARTRATE 10 MG/1
TABLET ORAL
Qty: 30 TABLET | Refills: 0 | Status: SHIPPED | OUTPATIENT
Start: 2025-03-03 | End: 2025-03-06

## 2025-03-03 NOTE — TELEPHONE ENCOUNTER
Refill request received from pharmacy      Next Office Visit Date:  Future Appointments   Date Time Provider Department Center   3/6/2025 10:45 AM Sera Valadez MD [facility] - Please review via PDMP        Last Office Visit:    12/17/2024

## 2025-03-05 SDOH — ECONOMIC STABILITY: FOOD INSECURITY: WITHIN THE PAST 12 MONTHS, YOU WORRIED THAT YOUR FOOD WOULD RUN OUT BEFORE YOU GOT MONEY TO BUY MORE.: NEVER TRUE

## 2025-03-05 SDOH — ECONOMIC STABILITY: FOOD INSECURITY: WITHIN THE PAST 12 MONTHS, THE FOOD YOU BOUGHT JUST DIDN'T LAST AND YOU DIDN'T HAVE MONEY TO GET MORE.: NEVER TRUE

## 2025-03-05 SDOH — ECONOMIC STABILITY: INCOME INSECURITY: IN THE LAST 12 MONTHS, WAS THERE A TIME WHEN YOU WERE NOT ABLE TO PAY THE MORTGAGE OR RENT ON TIME?: NO

## 2025-03-05 SDOH — ECONOMIC STABILITY: TRANSPORTATION INSECURITY
IN THE PAST 12 MONTHS, HAS LACK OF TRANSPORTATION KEPT YOU FROM MEETINGS, WORK, OR FROM GETTING THINGS NEEDED FOR DAILY LIVING?: NO

## 2025-03-05 SDOH — ECONOMIC STABILITY: TRANSPORTATION INSECURITY
IN THE PAST 12 MONTHS, HAS THE LACK OF TRANSPORTATION KEPT YOU FROM MEDICAL APPOINTMENTS OR FROM GETTING MEDICATIONS?: NO

## 2025-03-06 ENCOUNTER — OFFICE VISIT (OUTPATIENT)
Age: 70
End: 2025-03-06
Payer: MEDICARE

## 2025-03-06 ENCOUNTER — HOSPITAL ENCOUNTER (OUTPATIENT)
Facility: HOSPITAL | Age: 70
Discharge: HOME | End: 2025-03-06
Payer: MEDICARE

## 2025-03-06 ENCOUNTER — HOSPITAL ENCOUNTER (OUTPATIENT)
Dept: GENERAL RADIOLOGY | Facility: HOSPITAL | Age: 70
Discharge: HOME | End: 2025-03-06
Payer: MEDICARE

## 2025-03-06 VITALS
TEMPERATURE: 97.8 F | DIASTOLIC BLOOD PRESSURE: 86 MMHG | WEIGHT: 240 LBS | HEIGHT: 64 IN | OXYGEN SATURATION: 99 % | SYSTOLIC BLOOD PRESSURE: 114 MMHG | RESPIRATION RATE: 16 BRPM | BODY MASS INDEX: 40.97 KG/M2 | HEART RATE: 86 BPM

## 2025-03-06 DIAGNOSIS — L71.8 CONJUNCTIVITIS, ROSACEA: ICD-10-CM

## 2025-03-06 DIAGNOSIS — Z78.0 POSTMENOPAUSAL: ICD-10-CM

## 2025-03-06 DIAGNOSIS — R29.898 BILATERAL LEG WEAKNESS: ICD-10-CM

## 2025-03-06 DIAGNOSIS — R94.6 ABNORMAL THYROID SCAN: ICD-10-CM

## 2025-03-06 DIAGNOSIS — M20.42 HAMMER TOES OF BOTH FEET: ICD-10-CM
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
## 2025-03-06 DIAGNOSIS — M54.50 CHRONIC LOW BACK PAIN, UNSPECIFIED BACK PAIN LATERALITY, UNSPECIFIED WHETHER SCIATICA PRESENT: ICD-10-CM

## 2025-03-06 DIAGNOSIS — G47.09 OTHER INSOMNIA: ICD-10-CM

## 2025-03-06 DIAGNOSIS — M54.40 CHRONIC MIDLINE LOW BACK PAIN WITH SCIATICA, SCIATICA LATERALITY UNSPECIFIED: ICD-10-CM

## 2025-03-06 DIAGNOSIS — R53.83 OTHER FATIGUE: ICD-10-CM

## 2025-03-06 DIAGNOSIS — M20.41 HAMMER TOES OF BOTH FEET: ICD-10-CM

## 2025-03-06 DIAGNOSIS — H10.829 CONJUNCTIVITIS, ROSACEA: ICD-10-CM

## 2025-03-06 DIAGNOSIS — H02.409 PTOSIS DUE TO AGING: ICD-10-CM

## 2025-03-06 DIAGNOSIS — R30.0 DYSURIA: Primary | ICD-10-CM

## 2025-03-06 DIAGNOSIS — Z98.1 S/P SPINAL FUSION: ICD-10-CM

## 2025-03-06 DIAGNOSIS — G89.29 CHRONIC MIDLINE LOW BACK PAIN WITH SCIATICA, SCIATICA LATERALITY UNSPECIFIED: ICD-10-CM

## 2025-03-06 DIAGNOSIS — L71.9 ROSACEA: ICD-10-CM

## 2025-03-06 DIAGNOSIS — M79.671 BILATERAL FOOT PAIN: ICD-10-CM

## 2025-03-06 DIAGNOSIS — G89.29 CHRONIC LOW BACK PAIN, UNSPECIFIED BACK PAIN LATERALITY, UNSPECIFIED WHETHER SCIATICA PRESENT: ICD-10-CM

## 2025-03-06 DIAGNOSIS — M79.672 BILATERAL FOOT PAIN: ICD-10-CM

## 2025-03-06 LAB
BILIRUBIN, POC: NEGATIVE
BLOOD URINE, POC: NEGATIVE
CLARITY, POC: CLEAR
COLOR, POC: YELLOW
GLUCOSE URINE, POC: NEGATIVE MG/DL
KETONES, POC: NEGATIVE MG/DL
LEUKOCYTE EST, POC: NEGATIVE
NITRITE, POC: NEGATIVE
PH, POC: 7
PROTEIN, POC: NEGATIVE MG/DL
SPECIFIC GRAVITY, POC: 1.01
T4 FREE SERPL-MCNC: 1.3 NG/DL (ref 0.92–1.68)
TSH SERPL DL<=0.005 MIU/L-ACNC: 1.44 UIU/ML (ref 0.27–4.2)
UROBILINOGEN, POC: 0.2 MG/DL

## 2025-03-06 PROCEDURE — 84443 ASSAY THYROID STIM HORMONE: CPT

## 2025-03-06 PROCEDURE — 84439 ASSAY OF FREE THYROXINE: CPT

## 2025-03-06 PROCEDURE — 72100 X-RAY EXAM L-S SPINE 2/3 VWS: CPT

## 2025-03-06 PROCEDURE — 36415 COLL VENOUS BLD VENIPUNCTURE: CPT

## 2025-03-06 PROCEDURE — 81002 URINALYSIS NONAUTO W/O SCOPE: CPT | Performed by: FAMILY MEDICINE

## 2025-03-06 PROCEDURE — 87086 URINE CULTURE/COLONY COUNT: CPT

## 2025-03-06 RX ORDER — TRAZODONE HYDROCHLORIDE 50 MG/1
TABLET ORAL
Qty: 90 TABLET | Refills: 1 | Status: SHIPPED | OUTPATIENT
Start: 2025-03-06

## 2025-03-06 RX ORDER — PREGABALIN 75 MG/1
75 CAPSULE ORAL 3 TIMES DAILY
Qty: 90 CAPSULE | Refills: 2 | Status: SHIPPED | OUTPATIENT
Start: 2025-03-06 | End: 2025-06-04

## 2025-03-06 RX ORDER — METRONIDAZOLE 7.5 MG/G
GEL TOPICAL
Qty: 45 G | Refills: 5 | Status: SHIPPED | OUTPATIENT
Start: 2025-03-06

## 2025-03-06 RX ORDER — ZOLPIDEM TARTRATE 5 MG/1
5 TABLET ORAL NIGHTLY PRN
Qty: 30 TABLET | Refills: 2 | Status: SHIPPED | OUTPATIENT
Start: 2025-03-06 | End: 2025-06-04

## 2025-03-06 ASSESSMENT — ENCOUNTER SYMPTOMS
RESPIRATORY NEGATIVE: 1
GASTROINTESTINAL NEGATIVE: 1
BACK PAIN: 1

## 2025-03-06 NOTE — PROGRESS NOTES
\"Have you been to the ER, urgent care clinic since your last visit?  Hospitalized since your last visit?\"    NO    “Have you seen or consulted any other health care providers outside our system since your last visit?”    Yes   Eye Specialist  Dentist

## 2025-03-07 LAB — BACTERIA UR CULT: NORMAL

## 2025-03-11 ENCOUNTER — RESULTS FOLLOW-UP (OUTPATIENT)
Age: 70
End: 2025-03-11

## 2025-03-12 ENCOUNTER — HOSPITAL ENCOUNTER (OUTPATIENT)
Dept: PHYSICAL THERAPY | Facility: HOSPITAL | Age: 70
Setting detail: THERAPIES SERIES
Discharge: HOME | End: 2025-03-12
Payer: MEDICARE

## 2025-03-12 ENCOUNTER — HOSPITAL ENCOUNTER (OUTPATIENT)
Dept: ULTRASOUND IMAGING | Facility: HOSPITAL | Age: 70
Discharge: HOME | End: 2025-03-12
Payer: MEDICARE

## 2025-03-12 DIAGNOSIS — R94.6 ABNORMAL THYROID SCAN: ICD-10-CM

## 2025-03-12 PROCEDURE — 97110 THERAPEUTIC EXERCISES: CPT | Performed by: PHYSICAL THERAPIST

## 2025-03-12 PROCEDURE — 97162 PT EVAL MOD COMPLEX 30 MIN: CPT | Performed by: PHYSICAL THERAPIST

## 2025-03-12 PROCEDURE — 76536 US EXAM OF HEAD AND NECK: CPT

## 2025-03-12 NOTE — THERAPY EVALUATION
Physical Therapy: Initial Evaluation    Patient: Kristyn Martin (69 y.o. female)   Examination Date: 2025  Plan of Care Certification Period: 3/12/2025 to        :  1955 ;    Confirmed: Yes MRN: [id number]  CSN: [id number]   Insurance: Payor: [employer]ARE / Plan: [employer] MEDICARE ADVANTAGE [place] / Product Type: *No Product type* /   Insurance ID: [id number] - (Medicare Managed) Secondary Insurance (if applicable):    Referring Physician: Sera Valadez MD     PCP: Sera Valadez MD Visits to Date/Visits Approved:   /      No Show/Cancelled Appts:   /       Medical Diagnosis: Low back pain, unspecified [M54.50]    Treatment Diagnosis:       PERTINENT MEDICAL HISTORY           Medical History:     Past Medical History:   Diagnosis Date    Arthritis      Surgical History:   Past Surgical History:   Procedure Laterality Date     SECTION      x 2    COLONOSCOPY  2010    normal findings    COLONOSCOPY N/A 2020    COLORECTAL CANCER SCREENING, NOT HIGH RISK performed by Akua Torres MD at [facility] ENDOSCOPY    HYSTERECTOMY (CERVIX STATUS UNKNOWN)      LUMBAR SPINE SURGERY  2024    Fusion with Dr. Freeman       Medications:   Current Outpatient Medications:     pregabalin (LYRICA) 75 MG capsule, Take 1 capsule by mouth 3 times daily for 90 days. Max Daily Amount: 225 mg, Disp: 90 capsule, Rfl: 2    metroNIDAZOLE (METROGEL) 0.75 % gel, Apply topically 2 times daily. For roseacea, Disp: 45 g, Rfl: 5    traZODone (DESYREL) 50 MG tablet, Take 1 to 2 tablets at bedtime for sleep, Disp: 90 tablet, Rfl: 1    zolpidem (AMBIEN) 5 MG tablet, Take 1 tablet by mouth nightly as needed for Sleep for up to 90 days. Max Daily Amount: 5 mg, Disp: 30 tablet, Rfl: 2    furosemide (LASIX) 20 MG tablet, TAKE 1 TABLET BY MOUTH DAILY, Disp: 30 tablet, Rfl: 2    XDEMVY 0.25 % SOLN, INSTILL 1 DROP IN BOTH EYES TWICE DAILY FOR 6 WEEKS, Disp: , Rfl:     SYNTHROID 50 MCG tablet, Take 1 tablet by mouth daily,

## 2025-03-14 ENCOUNTER — RESULTS FOLLOW-UP (OUTPATIENT)
Dept: ULTRASOUND IMAGING | Facility: HOSPITAL | Age: 70
End: 2025-03-14

## 2025-03-14 ENCOUNTER — TELEPHONE (OUTPATIENT)
Dept: NEUROLOGY | Facility: CLINIC | Age: 70
End: 2025-03-14
Payer: MEDICARE

## 2025-03-17 NOTE — PROGRESS NOTES
Patient: Alison Benitez  : 1955  Chart #: [id number]    Date of Service: 2025    Chief Complaint   Patient presents with    Back Pain     Chief Complaint: Low back and lower extremity pain with walking and standing intolerance     HPI: Ms. Benitez  is a 69-year-old woman with a protracted and progressive course of low back and bilateral lower extremity pain with walking and standing intolerance. Studies demonstrated high-grade lumbar stenosis as well as spondylolisthesis and instability. Thus, she underwent lumbar decompression with fusion and stabilization from L3-5 on 2024.  She has overall improved in terms of her back and leg pain, but is struggling with getting around mostly due to what appears to be pain in her feet. She has known peripheral neuropathy, but feels her toes are constantly curled up and it affects her ability to ambulate. She reports two recent falls. She recently resumed physical therapy. She takes 75 mg pregabalin three times daily, duloxetine 60 mg daily, and relafen 750 mg twice daily. She in the process of weaning off her zolpidem as she was eating in the middle of the night with it. She has seen orthopedic surgery in the past for her feet and has a referral to a podiatrist in April.       Review of Systems   Constitutional:  Negative for activity change, appetite change, chills, diaphoresis, fatigue, fever and unexpected weight change.   HENT:  Negative for congestion, dental problem, drooling, ear discharge, ear pain, facial swelling, hearing loss, mouth sores, nosebleeds, postnasal drip, rhinorrhea, sinus pressure, sinus pain, sneezing, sore throat, tinnitus, trouble swallowing and voice change.    Eyes:  Negative for photophobia, pain, discharge, redness, itching and visual disturbance.   Respiratory:  Negative for apnea, cough, choking, chest tightness, shortness of breath, wheezing and stridor.    Cardiovascular:  Negative for chest pain, palpitations and leg  "swelling.   Gastrointestinal:  Negative for abdominal distention, abdominal pain, anal bleeding, blood in stool, constipation, diarrhea, nausea, rectal pain and vomiting.   Endocrine: Negative for cold intolerance, heat intolerance, polydipsia, polyphagia and polyuria.   Genitourinary:  Negative for decreased urine volume, difficulty urinating, dyspareunia, dysuria, enuresis, flank pain, frequency, genital sores, hematuria, menstrual problem, pelvic pain, urgency, vaginal bleeding, vaginal discharge and vaginal pain.   Musculoskeletal:  Positive for back pain. Negative for arthralgias, gait problem, joint swelling, myalgias, neck pain and neck stiffness.   Skin:  Negative for color change, pallor, rash and wound.   Allergic/Immunologic: Negative for environmental allergies, food allergies and immunocompromised state.   Neurological:  Negative for dizziness, tremors, seizures, syncope, facial asymmetry, speech difficulty, weakness, light-headedness, numbness and headaches.   Hematological:  Negative for adenopathy. Does not bruise/bleed easily.   Psychiatric/Behavioral:  Negative for agitation, behavioral problems, confusion, decreased concentration, dysphoric mood, hallucinations, self-injury, sleep disturbance and suicidal ideas. The patient is not nervous/anxious and is not hyperactive.         The patient's past medical history, past surgical history, family history, and social history have been reviewed at length in the electronic medical record.     Physical examination:  Temperature 97.3 °F (36.3 °C), temperature source Infrared, height 162.6 cm (64.02\"), weight 110 kg (243 lb), not currently breastfeeding.    Constitutional: The patient is well-developed, well-nourished. She is anxious about her health.     HEENT: normocephalic, atraumatic, sclera clear    Extremities: no pitting edema, bilateral toes are erythematous and curled under.     MSK: Lumbar incision is well-healed.  There is mild tenderness to " palpation at the incision site.    Neurological Exam   A/A/C, speech clear, attention normal, conversant, answers questions appropriately, good historian.  Motor examination does not reveal weakness in the , upper or lower extremities.   Sensation is intact.  Gait is normal, balance is normal.   No tremors are noted.  Reflexes are difficult to elicit throughout.   Rosales is negative. Clonus is negative.       Radiographic Imaging:  For my review plain films of the lumbar spine demonstrate a left hardware spanning L3-5.  There is multilevel degenerative disc disease in the lower spine in the upper lumbar spine.  There is advanced degenerative disc disease at L2-3, largely stable in comparison to radiographs from 9/25/2024.    Standing AP views of both ankles dated 11/27/2024 show small osteophytes at the tip of the malleolus.  There is reasonable joint space.  No tilt of the talus bilaterally.  Moderate phleboliths.  No significant change compared to 9/12/2022.    Medical Decision Making  Diagnoses and all orders for this visit:    1. S/P lumbar fusion (Primary)    2. Spinal stenosis of lumbar region with neurogenic claudication    3. Lumbar disc disease    Ms. Benitez overall endorses improvement from her surgery, but still endorses a variety of complaints, most predominantly numbness and tingling in her feet and lower extremities.  I have advised her to follow-up with her neurologist who has been treating this.  She may benefit from a modest increase in her pregabalin dosage.  Additionally, I have encouraged the patient to take some arthritis medicines as needed for some mechanical back pain symptoms that she does experience.  We are far enough away from the fusion surgery that she can take some of these medicines as needed.  I intend to see the patient back at the 1 year elsa to assess her fusion status, but per the patient's request I will see her back in 3 months to assess her progress overall and see how  she is doing.  I have encouraged her to continue with therapy and exercise classes.  She will reach out to us with any new or worsening symptoms.    Any copied data from previous notes included in the (1) HPI, (2) PE, (3) MDM and/or assessment and plan has been reviewed and is accurate as of this day.    Yuly Verma PA-C     Patient Care Team:  Kacy Huertas MD as PCP - General (Family Medicine)  Anatoliy Everett MD as Consulting Physician (Anesthesiology)  Abdifatah Jordan MD as Consulting Physician (Endocrinology)  Kimber Correa APRN as Nurse Practitioner (Nurse Practitioner)  Moris Ellis MD as Consulting Physician (Pain Medicine)  Sussy El APRN as Nurse Practitioner (Nurse Practitioner)  Lashaun Hu MD as Consulting Physician (Orthopedic Surgery)

## 2025-03-18 ENCOUNTER — OFFICE VISIT (OUTPATIENT)
Dept: NEUROSURGERY | Facility: CLINIC | Age: 70
End: 2025-03-18
Payer: MEDICARE

## 2025-03-18 VITALS — HEIGHT: 64 IN | BODY MASS INDEX: 41.48 KG/M2 | WEIGHT: 243 LBS | TEMPERATURE: 97.3 F

## 2025-03-18 DIAGNOSIS — M51.9 LUMBAR DISC DISEASE: ICD-10-CM

## 2025-03-18 DIAGNOSIS — Z98.1 S/P LUMBAR FUSION: Primary | ICD-10-CM

## 2025-03-18 DIAGNOSIS — M48.062 SPINAL STENOSIS OF LUMBAR REGION WITH NEUROGENIC CLAUDICATION: ICD-10-CM

## 2025-03-18 PROCEDURE — 99213 OFFICE O/P EST LOW 20 MIN: CPT

## 2025-03-19 ENCOUNTER — OFFICE VISIT (OUTPATIENT)
Age: 70
End: 2025-03-19
Payer: MEDICARE

## 2025-03-19 ENCOUNTER — HOSPITAL ENCOUNTER (OUTPATIENT)
Facility: HOSPITAL | Age: 70
Discharge: HOME | End: 2025-03-19
Payer: MEDICARE

## 2025-03-19 VITALS
TEMPERATURE: 97.9 F | DIASTOLIC BLOOD PRESSURE: 86 MMHG | HEART RATE: 83 BPM | HEIGHT: 64 IN | WEIGHT: 244.8 LBS | BODY MASS INDEX: 41.79 KG/M2 | SYSTOLIC BLOOD PRESSURE: 114 MMHG | OXYGEN SATURATION: 93 % | RESPIRATION RATE: 16 BRPM

## 2025-03-19 DIAGNOSIS — R60.9 SWELLING: ICD-10-CM

## 2025-03-19 DIAGNOSIS — R60.9 SWELLING: Primary | ICD-10-CM

## 2025-03-19 PROCEDURE — 99214 OFFICE O/P EST MOD 30 MIN: CPT | Performed by: FAMILY MEDICINE

## 2025-03-19 PROCEDURE — 1123F ACP DISCUSS/DSCN MKR DOCD: CPT | Performed by: FAMILY MEDICINE

## 2025-03-19 PROCEDURE — 1160F RVW MEDS BY RX/DR IN RCRD: CPT | Performed by: FAMILY MEDICINE

## 2025-03-19 PROCEDURE — 1159F MED LIST DOCD IN RCRD: CPT | Performed by: FAMILY MEDICINE

## 2025-03-19 PROCEDURE — 80053 COMPREHEN METABOLIC PANEL: CPT

## 2025-03-19 PROCEDURE — 85027 COMPLETE CBC AUTOMATED: CPT

## 2025-03-19 PROCEDURE — 36415 COLL VENOUS BLD VENIPUNCTURE: CPT

## 2025-03-19 ASSESSMENT — ENCOUNTER SYMPTOMS
BACK PAIN: 1
GASTROINTESTINAL NEGATIVE: 1
RESPIRATORY NEGATIVE: 1

## 2025-03-19 NOTE — PROGRESS NOTES
\"Have you been to the ER, urgent care clinic since your last visit?  Hospitalized since your last visit?\"    NO    “Have you seen or consulted any other health care providers outside our system since your last visit?”    Yes Dr. Freeman           
advised to consult with her podiatrist for further management. She is also advised to avoid any activities that exacerbate the pain.    2. Dry skin.  The skin appears irritated due to dryness, but no rash is observed. She is advised to apply hydrocortisone cream to the affected area. Regular application of lotion is also recommended to maintain skin hydration.    3. Sleep issues.  She is currently taking Ambien 0.5 mg as needed for sleep. She is advised to continue with the current dosage and monitor its effectiveness.    4. Eye irritation.  The eyes are significantly irritated, possibly due to the use of an ointment. She is advised to continue using the ointment as it provides soothing relief. She is also advised to avoid rubbing her eyes to prevent further irritation.      No orders of the defined types were placed in this encounter.       There are no discontinued medications.    Controlled Substances Monitoring:      Please note: This chart was generated using Dragon dictation software. Although every effort was made to ensure the accuracy of this automated transcription, some errors in transcription may have occurred.

## 2025-03-20 ENCOUNTER — HOSPITAL ENCOUNTER (OUTPATIENT)
Dept: PHYSICAL THERAPY | Facility: HOSPITAL | Age: 70
Setting detail: THERAPIES SERIES
Discharge: HOME | End: 2025-03-20
Payer: MEDICARE

## 2025-03-20 LAB
ALBUMIN SERPL-MCNC: 4.5 G/DL (ref 3.4–4.8)
ALBUMIN/GLOB SERPL: 1.6 {RATIO} (ref 0.8–2)
ALP SERPL-CCNC: 85 U/L (ref 25–100)
ALT SERPL-CCNC: 22 U/L (ref 4–36)
ANION GAP SERPL CALCULATED.3IONS-SCNC: 13 MMOL/L (ref 3–16)
AST SERPL-CCNC: 23 U/L (ref 8–33)
BILIRUB SERPL-MCNC: 0.3 MG/DL (ref 0.3–1.2)
BUN SERPL-MCNC: 18 MG/DL (ref 6–20)
CALCIUM SERPL-MCNC: 10.1 MG/DL (ref 8.3–10.6)
CHLORIDE SERPL-SCNC: 100 MMOL/L (ref 98–107)
CO2 SERPL-SCNC: 27 MMOL/L (ref 20–30)
CREAT SERPL-MCNC: 0.8 MG/DL (ref 0.4–1.2)
ERYTHROCYTE [DISTWIDTH] IN BLOOD BY AUTOMATED COUNT: 16.2 % (ref 11–16)
GFR SERPLBLD CREATININE-BSD FMLA CKD-EPI: 79 ML/MIN/{1.73_M2}
GLOBULIN SER CALC-MCNC: 2.9 G/DL
GLUCOSE SERPL-MCNC: 87 MG/DL (ref 74–106)
HCT VFR BLD AUTO: 41.4 % (ref 37–47)
HGB BLD-MCNC: 12.9 G/DL (ref 11.5–16.5)
MCH RBC QN AUTO: 25.4 PG (ref 27–32)
MCHC RBC AUTO-ENTMCNC: 31.2 G/DL (ref 31–35)
MCV RBC AUTO: 81.7 FL (ref 80–100)
PLATELET # BLD AUTO: 230 K/UL (ref 150–400)
PMV BLD AUTO: 11.6 FL (ref 6–10)
POTASSIUM SERPL-SCNC: 4.6 MMOL/L (ref 3.4–5.1)
PROT SERPL-MCNC: 7.4 G/DL (ref 6.4–8.3)
RBC # BLD AUTO: 5.07 M/UL (ref 3.8–5.8)
SODIUM SERPL-SCNC: 140 MMOL/L (ref 136–145)
WBC # BLD AUTO: 6.9 K/UL (ref 4–11)

## 2025-03-20 PROCEDURE — 97016 VASOPNEUMATIC DEVICE THERAPY: CPT | Performed by: PHYSICAL THERAPIST

## 2025-03-20 PROCEDURE — 97110 THERAPEUTIC EXERCISES: CPT | Performed by: PHYSICAL THERAPIST

## 2025-03-20 NOTE — PROGRESS NOTES
by pain  [] Patient limited by other medical complications  [x] Other:  Patient has MD appointment on Monday for her feet and hopes to get some answers to help decrease her pain.    Pain after treatment:      3/10     Prognosis: [x] Good [] Fair  [] Poor    Patient Requires Follow-up: [x] Yes  [] No    Plan:   [x] Continue per plan of care [] Alter current plan (see comments)  [] Plan of care initiated [] Hold pending MD visit [] Discharge    Plan for Next Session:    This note serves as D/C summary if patient is discharged prior to next visit.     Electronically signed by:  Amanda Jones PT

## 2025-03-21 ENCOUNTER — RESULTS FOLLOW-UP (OUTPATIENT)
Age: 70
End: 2025-03-21

## 2025-03-23 LAB
NCCN CRITERIA FLAG: NORMAL
TYRER CUZICK SCORE: 5.9

## 2025-03-24 ENCOUNTER — OFFICE VISIT (OUTPATIENT)
Age: 70
End: 2025-03-24
Payer: MEDICARE

## 2025-03-24 VITALS
BODY MASS INDEX: 41.4 KG/M2 | WEIGHT: 242.51 LBS | HEIGHT: 64 IN | SYSTOLIC BLOOD PRESSURE: 160 MMHG | DIASTOLIC BLOOD PRESSURE: 108 MMHG

## 2025-03-24 DIAGNOSIS — I87.8 VENOUS STASIS: ICD-10-CM

## 2025-03-24 DIAGNOSIS — M25.579 ANKLE PAIN, UNSPECIFIED CHRONICITY, UNSPECIFIED LATERALITY: Primary | ICD-10-CM

## 2025-03-24 DIAGNOSIS — G62.9 NEUROPATHY: ICD-10-CM

## 2025-03-24 DIAGNOSIS — M25.572 BILATERAL ANKLE PAIN, UNSPECIFIED CHRONICITY: ICD-10-CM

## 2025-03-24 DIAGNOSIS — M25.571 BILATERAL ANKLE PAIN, UNSPECIFIED CHRONICITY: ICD-10-CM

## 2025-03-24 DIAGNOSIS — M79.672 BILATERAL FOOT PAIN: ICD-10-CM

## 2025-03-24 DIAGNOSIS — M76.62 TENDONITIS, ACHILLES, LEFT: ICD-10-CM

## 2025-03-24 DIAGNOSIS — R26.89 BALANCE PROBLEMS: ICD-10-CM

## 2025-03-24 DIAGNOSIS — M76.62 ACHILLES TENDINITIS OF LEFT LOWER EXTREMITY: ICD-10-CM

## 2025-03-24 DIAGNOSIS — M79.671 BILATERAL FOOT PAIN: ICD-10-CM

## 2025-03-24 RX ORDER — TRAZODONE HYDROCHLORIDE 50 MG/1
50 TABLET ORAL NIGHTLY
COMMUNITY

## 2025-03-24 RX ORDER — METRONIDAZOLE 7.5 MG/G
LOTION TOPICAL EVERY 12 HOURS SCHEDULED
COMMUNITY

## 2025-03-24 RX ORDER — LOTILANER OPHTHALMIC SOLUTION 2.5 MG/ML
SOLUTION/ DROPS OPHTHALMIC
COMMUNITY

## 2025-03-24 RX ORDER — ZOLPIDEM TARTRATE 5 MG/1
5 TABLET ORAL NIGHTLY PRN
COMMUNITY

## 2025-03-24 NOTE — PROGRESS NOTES
[facility] [place] [facility] [place]       Office Follow Up Visit       Patient Name: Alison Benitez    Chief Complaint:   Chief Complaint   Patient presents with    Follow-up     4 month follow up- Bilateral foot pain       Referring Physician: No ref. provider found    History of Present Illness:   Alison Benitez returns to clinic today for to discuss her bilateral foot and ankle pain.  She saw Dr. Hu November 2020 for what seems to be similar problems with additional painful hammertoes today.  Her daughter is here with her.  Patient complains of left Achilles tendon pain and a knot on the back of her heel.  She complains that her toes are curling under on bilateral feet.  She has lower extremity pain and swelling.  She has known neuropathy.  She has left heel pain with weightbearing and walking getting up from a seated position.  She was diagnosed with Achilles tendinitis at her last visit however does not seem to remember this.  She reports her toes have been curling and she is having pain in the toes and in the forefoot.  Her daughter does remember discussion regarding the swelling and venous stasis contributing to much of her pain.  Patient does have compression stockings but  has not been wearing them.  She is here for evaluation and further treatment recommendations.      Subjective     Review of Systems   All other systems reviewed and are negative.       I have reviewed and updated the following portions of the patient's history and review of systems: allergies, current medications, past family history, past medical history, past social history, past surgical history and problem list.    Medications:   Current Outpatient Medications:     Ascorbic Acid (Vitamin C) 250 MG chewable tablet, Chew 500 mg Daily., Disp: , Rfl:     Cholecalciferol (Vitamin D) 50 MCG (2000 UT) tablet, Take 1 tablet by mouth Daily., Disp: , Rfl:     Diclofenac Sodium  (VOLTAREN) 1 % gel gel, Apply 4 g topically to the appropriate area as directed 4 (Four) Times a Day As Needed (joint pain)., Disp: , Rfl:     DULoxetine (CYMBALTA) 60 MG capsule, Take 1 capsule by mouth Daily., Disp: 90 capsule, Rfl: 3    estradiol (ESTRACE) 1 MG tablet, Take 1 tablet by mouth Daily., Disp: , Rfl:     furosemide (LASIX) 20 MG tablet, Take 1 tablet by mouth Daily., Disp: , Rfl:     losartan (COZAAR) 50 MG tablet, Take 1 tablet by mouth Daily., Disp: , Rfl:     Lotilaner (Xdemvy) 0.25 % solution, Apply  to eye(s) as directed by provider., Disp: , Rfl:     lubiprostone (AMITIZA) 8 MCG capsule, Take 1 capsule by mouth 2 (Two) Times a Day With Meals., Disp: , Rfl:     MAGNESIUM CITRATE PO, Take 400 mg by mouth Daily., Disp: , Rfl:     metroNIDAZOLE (FLAGYL) 0.75 % lotion lotion, Apply  topically to the appropriate area as directed Every 12 (Twelve) Hours., Disp: , Rfl:     mometasone (ELOCON) 0.1 % cream, Apply 1 Application topically to the appropriate area as directed Daily. ears, Disp: , Rfl:     MULTIPLE VITAMINS-MINERALS ER PO, Take 1 tablet by mouth Daily., Disp: , Rfl:     nabumetone (RELAFEN) 750 MG tablet, Take 1 tablet by mouth 2 (Two) Times a Day., Disp: 60 tablet, Rfl: 1    Omega-3 Fatty Acids (fish oil) 1200 MG capsule capsule, Take 1 capsule by mouth Daily With Breakfast., Disp: , Rfl:     polyethyl glycol-propyl glycol (SYSTANE) 0.4-0.3 % solution ophthalmic solution (artificial tears), Administer 1 drop to both eyes As Needed (dry eyes)., Disp: , Rfl:     pregabalin (LYRICA) 75 MG capsule, TAKE 1 CAPSULE BY MOUTH THREE TIMES DAILY, Disp: 270 capsule, Rfl: 0    Probiotic Product (PROBIOTIC DAILY PO), Take 1 capsule by mouth Daily., Disp: , Rfl:     Rhofade 1 % cream, Apply 1 Application topically to the appropriate area as directed Daily. Face for roscea, Disp: , Rfl:     Synthroid 50 MCG tablet, Take 1 tablet by mouth Daily., Disp: 90 tablet, Rfl: 3    traZODone (DESYREL) 50 MG tablet,  "Take 1 tablet by mouth Every Night. Takes 2 at night, Disp: , Rfl:     Urea 40 % lotion, Apply 1 Application topically Daily. Toes, Disp: , Rfl:     zolpidem (Ambien) 5 MG tablet, Take 1 tablet by mouth At Night As Needed for Sleep., Disp: , Rfl:     Allergies:   Allergies   Allergen Reactions    Augmentin [Amoxicillin-Pot Clavulanate] Rash     Beta lactam allergy details  Antibiotic reaction: rash  Age at reaction: adult  Dose to reaction time: unknown  Reason for antibiotic: unknown  Epinephrine required for reaction?: unknown  Tolerated antibiotics: unknown             Objective      Vital Signs:   Vitals:    03/24/25 1101   BP: (!) 160/108   Weight: 110 kg (242 lb 8.1 oz)   Height: 162.6 cm (64.02\")       Ortho Exam:  MSK:      Tibia:  Right:  exquisetly tender over subcutaneous border and with 2+ pitting edema ; Left:  exquisetly tender over subcutaneous border and with 2+ pitting edema      Ankle:  Right: non tender; Left:  tender over the insertion of the Achilles tendon with palpable pump bump      Foot:  Right: Nontender hammertoes 2 3 and 4.; Left: Nontender dislocated second toe hammertoes 3 and 4    NEURO:     [place] 5.07 monofilament test: not evaluated    Lower extremity sensation: intact     Calf Atrophy:none    Motor Function: all 5/5        Results Review:  XR Foot 2 View Bilateral  Indication: Bilateral foot pain.    Views: AP lateral and oblique views of the feet are submitted.     Impression: Significant hammering of both feet worse on the left compared   to right.  Second MTP joint on the left remains dorsally dislocated.    Prominent enthesophytes about the calcaneus.  No changes from prior.    Comparison: 11/27/2024.    XR Ankle 2 View Bilateral  Indication: Bilateral ankle pain.     Views: AP lateral and mortise views of the ankle are submitted.     Impression: The mortise is congruent. There is no widening. There is no   fracture subluxation or dislocation. There are no acute " changes. Prominent   osteophyte at medial malleolus. Joint spaces maintained. No significant   change from prior.     Comparison: 11/27/2024.       US THYROID  Result Date: 3/13/2025  1. A 1.1 cm TR 3 nodule of the right lobe of the thyroid is stable since 1/6/2023 and likely benign. 2. No new or increasing thyroid nodules. TI-RADS Category TR 3 ---- TI-RADS follow up criteria: TR1 Benign (No FNA) TR2 Not Suspicious (No FNA) TR3 Mildly Suspicious (FNA >= 2.5 cm, Follow >= 1.5 cm, at 1, 3, and 5 years) TR4 Moderately Suspicious (FNA >= 1.5 cm, Follow >= 1cm, at 1, 2, 3, and 5 years) TR5 Highly Suspicious (FNA >= 1 cm, Follow >= 0.5 cm yearly for 5 years) ---- Note: Any additional thyroid nodules visualized but not described have benign characteristics or do not meet or alter TI-RADS criteria for follow up. Following TI-RADS guidance, only up to 4 nodules will be scored. Deviation from TI-RADS management recommendations may be appropriate based on account of symptomatology, laboratory markers, or other risk factors. Clinical judgment should be used in conjunction with these recommendations. TI-RADS recommends that FNA biopsy should be limited to a maximum of two nodules and any suspicious lymphadenopathy. If multiple nodules meet the criteria for FNA, then the two with the highest point total (and then largest size as a tiebreaker) are the most appropriate to biopsy. Reference: MARLEN Juarez., PETAR Nino., ANNE Morrison., Negro, J.ANA PAULA., Viktoria, L.L., Manjula, S.A., Ace, AT. (2017). ACR Thyroid Imaging, Reporting and Data. ACR Thyroid Imaging, Reporting and Data System (TI-RADS): White Paper of the ACR TI-RADS Committee. J Am Scott Radiol. 14(5), 587-595. doi:10.1016/j.jacr.2017.01.046 Electronically signed by Casey Kelly MD    XR Spine Lumbar 2 or 3 View  Result Date: 3/6/2025  Postop changes of posterior and anterior interbody fusion L3-L5. Osteopenia. Mild dextroconvex scoliosis. Grade 1 anterolisthesis of L4 on  L5. Advanced degenerative disc disease in the visualized lower thoracic spine and lumbar spine above the level of fusion, worst at L2-3. Electronically signed by Dmitriy Fong        Assessment / Plan      Assessment:   Diagnoses and all orders for this visit:    1. Ankle pain, unspecified chronicity, unspecified laterality (Primary)    2. Bilateral ankle pain, unspecified chronicity  -     XR Ankle 2 View Bilateral    3. Bilateral foot pain  -     XR Foot 2 View Bilateral    4. Contracture of joints of both ankle and foot of left lower extremity    5. Contracture of joints of both ankle and foot of right lower extremity    6. Tendonitis, Achilles, left    7. Metatarsalgia of both feet    8. Venous stasis    9. Neuropathy        Quality Metrics:   BMI:   Class 3 Severe Obesity (BMI >=40). Obesity-related health conditions include the following: osteoarthritis. Obesity is . BMI is is above average; BMI management plan is completed. We discussed portion control and increasing exercise.       Tobacco:   Alison Benitez  reports that she has never smoked. She has never been exposed to tobacco smoke. She has never used smokeless tobacco.   Plan:  Left Achilles tendinitis.  I explained the patient problem to the patient.  I explained that the key to improving this is stretching and night splinting.  Patient is willing to try a round of physical therapy for this.  I showed her the Topol stretch to begin as well.  We have get her night splint.  I also recommended topical Voltaren gel.  Bilateral metatarsalgia with hammertoes and dislocated left second toe.  Bilateral foot pain with hammertoes bilaterally and dislocated left second toe.  I explained hammertoes to the patient and the progressive deformity which can lead to dislocation sometimes.  Looking back Dr. Hu's prior note, she had hammertoes at that time as well but they were not painful.  She reports there starting to bother her in shoes.  We discussed wide toed  shoes, shoes that give.  She is not having any metatarsalgia.  Not having any ulcers or sores on the PIP joint.  I explained there is surgery to correct this if they become a chronic problem, painful or recurrent sores.  Venous stasis with pitting edema.  Again, as per Dr. Hu's last note.  I suspect that this is contributing to a great deal of her pain.  She has increased pain in shoes the longer the day goes on.  She has pitting edema.   her daughter reports she has compression stockings at home, however, she does not wear them.  We discussed importance of compression stockings as well as elevating her legs above her heart several times daily.   Patient has no neuropathy with no change    Greater than 35 minutes was spent face-to-face with the patient.    Gina Campos PA-C  [facility] [facility]  Dictated using Dragon Speech Recognition.

## 2025-03-25 ENCOUNTER — HOSPITAL ENCOUNTER (OUTPATIENT)
Dept: PHYSICAL THERAPY | Facility: HOSPITAL | Age: 70
Setting detail: THERAPIES SERIES
Discharge: HOME | End: 2025-03-25
Payer: MEDICARE

## 2025-03-25 PROCEDURE — 97016 VASOPNEUMATIC DEVICE THERAPY: CPT | Performed by: PHYSICAL THERAPIST

## 2025-03-25 PROCEDURE — 97110 THERAPEUTIC EXERCISES: CPT | Performed by: PHYSICAL THERAPIST

## 2025-03-25 NOTE — PROGRESS NOTES
Physical Therapy Daily Treatment Note    Date:  3/25/2025    TIme In:   1140                Time Out:   1244    Patient Name:  Kristyn Martin   :  1955 MRN: [id number]    Restrictions/Precautions:    Pertinent Medical History:  Medical/Treatment Diagnosis Information:  Low back pain, unspecified [M54.50]     Insurance/Certification information:  Payor: [employer] MEDICARE / Plan: [facility] MEDICARE ADVANTAGE [place] / Product Type: *No Product type* /   Physician Information:  Sera Valadez MD  Plan of care signed (Y/N):    Visit# / total visits:     15 /    G-Code (if applicable):      Date / Visit # G-Code Applied:         Progress Note: []  Yes  [x]  No  Next due by: Visit #10       Pain level:   2/10 -B feet    Subjective:  Patient states her feet are feeling a lot today.  She states that she saw her foot dr yesterday and feels so much better because her dr told her that she does not need a surgery.  She states that she is doing her HEP daily. Reports her back is doing better too.     Objective:  Observation:   Test measurements:        Exercises:  Exercise Resistance/Repetitions Date performed   Sci fit Level 3   7 minutes 25   Standing with support for heel raises   20x with 2lb wts 25   Standing slant board  3x 30 sec ea  25   Standing marching  20x  2lb BLE 25   Standing hip ABD  20x  2lb BLE 25   Standing heel raises 20x  2lb BLE 25   Hip add w/ball  3x10 25   Bent knee fall outs 3x10 B 25    ball to wall 3x10  3x10  25   Clam shells  3x10 B 25   Sit to stand  10x 25    Single knee to chest   20x B 25   Hamstring stretch seated on bed  5x ea side holding for 15 sec ea  25    ball to wall  3x10  25   total gym   10x 2 sets 25   B feet on stool 4way  with GTB 15x ea way  25   Hallway stairs  1 flight 25   Heel raises on bottom step 15x 25     Other Therapeutic Activities:      Manual Treatments:       Modalities:  Vaso Pnuematic cold pack to left foot 15 min       Timed Code Treatment Minutes:

## 2025-04-01 ENCOUNTER — HOSPITAL ENCOUNTER (OUTPATIENT)
Dept: PHYSICAL THERAPY | Facility: HOSPITAL | Age: 70
Setting detail: THERAPIES SERIES
Discharge: HOME | End: 2025-04-01
Payer: MEDICARE

## 2025-04-01 PROCEDURE — 97110 THERAPEUTIC EXERCISES: CPT | Performed by: PHYSICAL THERAPIST

## 2025-04-01 PROCEDURE — 97016 VASOPNEUMATIC DEVICE THERAPY: CPT | Performed by: PHYSICAL THERAPIST

## 2025-04-01 NOTE — PROGRESS NOTES
Physical Therapy Daily Treatment Note    Date:  2025    TIme In:   1032               Time Out:   1135    Patient Name:  Kristyn Martin   :  1955 MRN: [id number]    Restrictions/Precautions:    Pertinent Medical History:  Medical/Treatment Diagnosis Information:  Low back pain, unspecified [M54.50]     Insurance/Certification information:  Payor: [employer] MEDICARE / [facility]AGE [place] / Product Type: *No Product type* /   Physician Information:  Sera Valadez MD  Plan of care signed (Y/N):    Visit# / total visits:     2 /    G-Code (if applicable):      Date / Visit # G-Code Applied:         Progress Note: []  Yes  [x]  No  Next due by: Visit #10       Pain level:   5/10 -B feet    Subjective:  Patient states her feet are still what is bothering her the most today.  She states that she is doing her HEP daily.  She states that physical therapy is really helping her.     Objective:  Observation:   Test measurements:        Exercises:  Exercise Resistance/Repetitions Date performed   Sci fit Level 3   7 minutes 1   Standing with support for heel raises   20x with 2lb wts 1   Standing slant board  3x 30 sec ea  1   Standing marching  20x  2lb BLE 1   Standing hip ABD  20x  2lb BLE 1   Standing heel raises 20x  2lb BLE 1   Hip add w/ball  3x10 1   Bent knee fall outs 3x10 B 1    ball to wall 3x10  3x10  1   Clam shells  3x10 B 1   Sit to stand  10x 1    Single knee to chest   20x B 1   Hamstring stretch seated on bed  5x ea side holding for 15 sec ea  1    ball to wall  3x10  1   total gym   10x 2 sets 1   B feet on stool 4way  with GTB 15x ea way  1   Hallway stairs  1 flight 1   Heel raises on bottom step 15x 1     Other Therapeutic Activities:      Manual Treatments:       Modalities:  Vaso Pnuematic cold pack to left foot 15 min       Timed Code Treatment Minutes:  63      Total Treatment Minutes:  63      Treatment/Activity Tolerance:  [x] Patient tolerated treatment well [] Patient

## 2025-04-03 ENCOUNTER — APPOINTMENT (OUTPATIENT)
Dept: PHYSICAL THERAPY | Facility: HOSPITAL | Age: 70
End: 2025-04-03
Payer: MEDICARE

## 2025-04-07 ENCOUNTER — OFFICE VISIT (OUTPATIENT)
Age: 70
End: 2025-04-07
Payer: MEDICARE

## 2025-04-07 VITALS
HEART RATE: 70 BPM | BODY MASS INDEX: 41.32 KG/M2 | DIASTOLIC BLOOD PRESSURE: 86 MMHG | WEIGHT: 242 LBS | HEIGHT: 64 IN | SYSTOLIC BLOOD PRESSURE: 142 MMHG | OXYGEN SATURATION: 98 %

## 2025-04-07 DIAGNOSIS — E03.9 ACQUIRED HYPOTHYROIDISM: Primary | ICD-10-CM

## 2025-04-07 DIAGNOSIS — E04.2 NONTOXIC MULTINODULAR GOITER: ICD-10-CM

## 2025-04-07 PROCEDURE — 99214 OFFICE O/P EST MOD 30 MIN: CPT | Performed by: INTERNAL MEDICINE

## 2025-04-07 PROCEDURE — 1160F RVW MEDS BY RX/DR IN RCRD: CPT | Performed by: INTERNAL MEDICINE

## 2025-04-07 PROCEDURE — 1159F MED LIST DOCD IN RCRD: CPT | Performed by: INTERNAL MEDICINE

## 2025-04-07 RX ORDER — LEVOTHYROXINE SODIUM 50 UG/1
50 TABLET ORAL DAILY
Qty: 90 TABLET | Refills: 3 | Status: SHIPPED | OUTPATIENT
Start: 2025-04-07

## 2025-04-07 NOTE — PATIENT INSTRUCTIONS
bag.  Cover up or remove any of your personal information on the empty containers by covering it with black permanent marker or duct tape.  Place the sealed container with the mixture, and the empty drug containers, in the trash.   If you use a medication that is in the form of a patch, dispose of used patches by folding them in half so that the sticky sides meet, and then flushing them down a toilet. They should not be placed in the household trash where children or pets can find them.  If you have any questions, ask your provider or pharmacist for more information.   Be sure to keep all appointments for provider visits or tests. Pre-Visit chart review completed. All lab and imaging orders reviewed for outstanding orders and none found. Referrals reviewed and none outstanding. All Health Maintenance requirements reviewed and noted for any that are due at upcoming visit. Any hospital admission documentation, ER documentation or consult notes scanned to chart since last visit have been reviewed and noted for provider to review during upcoming visit.

## 2025-04-07 NOTE — PROGRESS NOTES
"     Office Note      Date: 2025  Patient Name: Alison Benitez  MRN: [id number]  : 1955    Chief Complaint   Patient presents with    Hypothyroidism       History of Present Illness:   Alison Benitez is a 70 y.o. female who presents for Hypothyroidism  .   Current rx: synthroid 50 mcg per day -= levothyroxine     Changes in history:had back operations/ had thyroid ultrasound which whowed 1.1 cm stable trad 3 nodule on the right  Tsh normal in march   Questions /problems: none for me     Subjective          Review of Systems:   Review of Systems   Eyes:  Positive for pain, redness and visual disturbance.   Endocrine: Negative for cold intolerance and heat intolerance.   Psychiatric/Behavioral:  Positive for sleep disturbance.        The following portions of the patient's history were reviewed and updated as appropriate: allergies, current medications, past family history, past medical history, past social history, past surgical history, and problem list.    Objective     Visit Vitals  /86 (BP Location: Left arm, Patient Position: Sitting, Cuff Size: Adult)   Pulse 70   Ht 162.6 cm (64\")   Wt 110 kg (242 lb)   SpO2 98%   BMI 41.54 kg/m²           Physical Exam:  Physical Exam  Eyes:      Comments: Eyes are red and irritated   Neck:      Comments: No palpable thyroid nodules  Lymphadenopathy:      Cervical: No cervical adenopathy.   Neurological:      Mental Status: She is alert.         Assessment / Plan      Assessment & Plan:  Problem List Items Addressed This Visit       Acquired hypothyroidism - Primary    Current Assessment & Plan   Euthyroid on this dose     Dataa reviewed- tsh and free t4         Relevant Medications    levothyroxine (Synthroid) 50 MCG tablet    Nontoxic multinodular goiter    Current Assessment & Plan   Stable on exam and on ultrasound   Data reviewed ultrasound report          Relevant Medications    levothyroxine (Synthroid) 50 MCG tablet        Electronically signed " by : Abdifatah Jordan MD   04/07/2025

## 2025-04-08 ENCOUNTER — HOSPITAL ENCOUNTER (OUTPATIENT)
Dept: PHYSICAL THERAPY | Facility: HOSPITAL | Age: 70
Setting detail: THERAPIES SERIES
Discharge: HOME | End: 2025-04-08
Payer: MEDICARE

## 2025-04-08 ENCOUNTER — OFFICE VISIT (OUTPATIENT)
Age: 70
End: 2025-04-08
Payer: MEDICARE

## 2025-04-08 VITALS
DIASTOLIC BLOOD PRESSURE: 82 MMHG | WEIGHT: 241.2 LBS | TEMPERATURE: 97.1 F | SYSTOLIC BLOOD PRESSURE: 122 MMHG | HEIGHT: 64 IN | BODY MASS INDEX: 41.18 KG/M2 | HEART RATE: 58 BPM | OXYGEN SATURATION: 97 % | RESPIRATION RATE: 16 BRPM

## 2025-04-08 DIAGNOSIS — L71.8 CONJUNCTIVITIS, ROSACEA: Primary | ICD-10-CM

## 2025-04-08 DIAGNOSIS — M20.42 HAMMER TOES OF BOTH FEET: ICD-10-CM

## 2025-04-08 DIAGNOSIS — M20.41 HAMMER TOES OF BOTH FEET: ICD-10-CM

## 2025-04-08 DIAGNOSIS — M54.40 CHRONIC MIDLINE LOW BACK PAIN WITH SCIATICA, SCIATICA LATERALITY UNSPECIFIED: ICD-10-CM

## 2025-04-08 DIAGNOSIS — L71.9 ROSACEA: ICD-10-CM

## 2025-04-08 DIAGNOSIS — H10.829 CONJUNCTIVITIS, ROSACEA: Primary | ICD-10-CM

## 2025-04-08 DIAGNOSIS — G89.29 CHRONIC MIDLINE LOW BACK PAIN WITH SCIATICA, SCIATICA LATERALITY UNSPECIFIED: ICD-10-CM

## 2025-04-08 PROCEDURE — 97110 THERAPEUTIC EXERCISES: CPT | Performed by: PHYSICAL THERAPIST

## 2025-04-08 PROCEDURE — 1159F MED LIST DOCD IN RCRD: CPT | Performed by: FAMILY MEDICINE

## 2025-04-08 PROCEDURE — 1123F ACP DISCUSS/DSCN MKR DOCD: CPT | Performed by: FAMILY MEDICINE

## 2025-04-08 PROCEDURE — 99214 OFFICE O/P EST MOD 30 MIN: CPT | Performed by: FAMILY MEDICINE

## 2025-04-08 PROCEDURE — 1160F RVW MEDS BY RX/DR IN RCRD: CPT | Performed by: FAMILY MEDICINE

## 2025-04-08 ASSESSMENT — ENCOUNTER SYMPTOMS
BACK PAIN: 1
RESPIRATORY NEGATIVE: 1
GASTROINTESTINAL NEGATIVE: 1

## 2025-04-08 NOTE — PROGRESS NOTES
Physical Therapy Daily Treatment Note    Date:  2025    TIme In:   1403               Time Out:   1451    Patient Name:  Kristyn Matrin   :  1955 MRN: [id number]    Restrictions/Precautions:    Pertinent Medical History:  Medical/Treatment Diagnosis Information:  Low back pain, unspecified [M54.50]     Insurance/Certification information:  Payor: [employer] MEDICARE / Plan: [facility] MEDICARE ADVANTAGE [place] / Product Type: *No Product type* /   Physician Information:  Sera Valadez MD  Plan of care signed (Y/N):    Visit# / total visits:     16 /    G-Code (if applicable):      Date / Visit # G-Code Applied:         Progress Note: []  Yes  [x]  No  Next due by: Visit #10       Pain level:   5/10 -B feet    Subjective:  Patient states her feet are still what is bothering her the most today.  She states that she is doing her HEP daily.  She states that physical therapy is really helping her.     Objective:  Observation:   Test measurements:        Exercises:  Exercise Resistance/Repetitions Date performed   Sci fit Level 3   7 minutes 8   Standing with support for heel raises   20x with 2lb wts 8   Standing slant board  3x 30 sec ea  8   Standing marching  20x  2lb BLE 8   Standing hip ABD  20x  2lb BLE 8   Standing heel raises 20x  2lb BLE 8   Hip add w/ball  3x10 8   Bent knee fall outs 3x10 B 8    ball to wall 3x10  3x10  8   Clam shells  3x10 B 8   Sit to stand  10x 8    Single knee to chest   20x B 8   Hamstring stretch seated on bed  5x ea side holding for 15 sec ea  8    ball to wall  3x10  8   total gym   10x 2 sets 8   B feet on stool 4way  with GTB 15x ea way  8   Hallway stairs  1 flight 8   Heel raises on bottom step 15x 8     Other Therapeutic Activities:      Manual Treatments:       Modalities:  Vaso Pnuematic cold pack to left foot 15 min (deferred today)      Timed Code Treatment Minutes:  48      Total Treatment Minutes:  48      Treatment/Activity Tolerance:  [x] Patient tolerated treatment

## 2025-04-08 NOTE — PROGRESS NOTES
SUBJECTIVE:    Patient ID: Kristyn Martin is a 70 y.o. female.    Chief Complaint   Patient presents with    Rash     Patient states that the rash on her face is not any better    Anxiety     Reg F/U       HPI: office visit  History of Present Illness  The patient presents for evaluation of rosacea, thyroid nodules, hand pain, sleep issues, and foot issues.    Rosacea medication has been used, initially worsening symptoms before showing signs of improvement. Concerns about potential scarring from severe facial lesions are expressed. Significant eye discomfort is reported, possibly related to the medication. Despite applying the medication above the eyes, no improvement is noted. An ophthalmologist consultation in 2020 confirmed rosacea and suggested it might be systemic. Intermittent muscle spasms in the eyelids and varying degrees of puffiness in the eyes are reported. Care has been provided by Dr. Aguilar for several years for the eye condition.    A consultation with an endocrinologist occurred yesterday, during which thyroid nodules were palpated. The endocrinologist indicated a referral to a surgeon if surgical intervention is necessary and educated about symptoms of thyroid dysfunction, including temperature intolerance and fatigue.    Hand pain is reported, and a wrist splint is sought for relief. Previous injections from Dr. Parks for back pain were received, and resuming this treatment is being considered.    Ambien has been taken to facilitate early bedtime, with an attempt to wean off the medication currently underway.    Foot pain is experienced, and a specialist appointment for shoe fitting and compression sock adjustment is scheduled. Tennis shoes are worn upon waking up in the morning, with two pairs of shoes used, one for indoor use and the other for outdoor activities. Physical therapy sessions are attended twice a week, along with exercise twice a week.        Review of Systems   Constitutional:

## 2025-04-08 NOTE — PROGRESS NOTES
\"Have you been to the ER, urgent care clinic since your last visit?  Hospitalized since your last visit?\"    NO    “Have you seen or consulted any other health care providers outside our system since your last visit?”    NO    Have you had a mammogram?”   NO    Date of last Mammogram: 3/27/2024

## 2025-04-10 ENCOUNTER — HOSPITAL ENCOUNTER (OUTPATIENT)
Facility: HOSPITAL | Age: 70
Discharge: HOME | End: 2025-04-10
Admitting: FAMILY MEDICINE
Payer: MEDICARE

## 2025-04-10 ENCOUNTER — HOSPITAL ENCOUNTER (OUTPATIENT)
Dept: PHYSICAL THERAPY | Facility: HOSPITAL | Age: 70
Setting detail: THERAPIES SERIES
Discharge: HOME | End: 2025-04-10
Payer: MEDICARE

## 2025-04-10 DIAGNOSIS — Z12.31 SCREENING MAMMOGRAM FOR BREAST CANCER: ICD-10-CM

## 2025-04-10 PROCEDURE — 77067 SCR MAMMO BI INCL CAD: CPT

## 2025-04-10 PROCEDURE — 77063 BREAST TOMOSYNTHESIS BI: CPT

## 2025-04-10 PROCEDURE — 97110 THERAPEUTIC EXERCISES: CPT | Performed by: PHYSICAL THERAPIST

## 2025-04-10 NOTE — PROGRESS NOTES
fatigue  [] Patient limited by pain  [] Patient limited by other medical complications  [x] Other:  Patient was able to complete all of her treatment today with decreased pain and was feeling good when she left.    Pain after treatment:      3/10     Prognosis: [x] Good [] Fair  [] Poor    Patient Requires Follow-up: [x] Yes  [] No    Plan:   [x] Continue per plan of care [] Alter current plan (see comments)  [] Plan of care initiated [] Hold pending MD visit [] Discharge    Plan for Next Session:    This note serves as D/C summary if patient is discharged prior to next visit.     Electronically signed by:  Amanda Jones PT

## 2025-04-14 ENCOUNTER — OFFICE VISIT (OUTPATIENT)
Dept: NEUROLOGY | Facility: CLINIC | Age: 70
End: 2025-04-14
Payer: MEDICARE

## 2025-04-14 DIAGNOSIS — R26.9 GAIT DISTURBANCE: ICD-10-CM

## 2025-04-14 DIAGNOSIS — B99.9 NEUROPATHY DUE TO INFECTION: ICD-10-CM

## 2025-04-14 DIAGNOSIS — G63 NEUROPATHY DUE TO INFECTION: ICD-10-CM

## 2025-04-14 DIAGNOSIS — M79.7 FIBROMYALGIA: Primary | ICD-10-CM

## 2025-04-14 DIAGNOSIS — H02.401 PTOSIS, RIGHT EYELID: ICD-10-CM

## 2025-04-14 DIAGNOSIS — Z86.19 HISTORY OF INFECTIOUS MONONUCLEOSIS: ICD-10-CM

## 2025-04-14 PROBLEM — L57.0 ACTINIC KERATOSIS: Status: ACTIVE | Noted: 2024-12-16

## 2025-04-14 PROBLEM — H02.135 SENILE ECTROPION OF BOTH LOWER EYELIDS: Status: ACTIVE | Noted: 2024-01-09

## 2025-04-14 PROBLEM — F41.9 ANXIETY: Status: ACTIVE | Noted: 2024-12-08

## 2025-04-14 PROBLEM — H02.132 SENILE ECTROPION OF BOTH LOWER EYELIDS: Status: ACTIVE | Noted: 2024-01-09

## 2025-04-14 PROBLEM — L82.1 SEBORRHEIC KERATOSIS: Status: ACTIVE | Noted: 2024-12-16

## 2025-04-14 PROCEDURE — 1159F MED LIST DOCD IN RCRD: CPT | Performed by: NURSE PRACTITIONER

## 2025-04-14 PROCEDURE — 99214 OFFICE O/P EST MOD 30 MIN: CPT | Performed by: NURSE PRACTITIONER

## 2025-04-14 PROCEDURE — 1160F RVW MEDS BY RX/DR IN RCRD: CPT | Performed by: NURSE PRACTITIONER

## 2025-04-14 RX ORDER — PREGABALIN 100 MG/1
100 CAPSULE ORAL 3 TIMES DAILY
Qty: 270 CAPSULE | Refills: 1 | Status: SHIPPED | OUTPATIENT
Start: 2025-04-14

## 2025-04-14 RX ORDER — DULOXETIN HYDROCHLORIDE 60 MG/1
60 CAPSULE, DELAYED RELEASE ORAL DAILY
Qty: 90 CAPSULE | Refills: 3 | Status: SHIPPED | OUTPATIENT
Start: 2025-04-14

## 2025-04-14 NOTE — PROGRESS NOTES
Subjective:     Patient ID: Alison Benitez is a 70 y.o. female.    CC:   Chief Complaint   Patient presents with    Peripheral Neuropathy    Fibromyalgia       HPI:   History of Present Illness  This is a pleasant 70-year-old female last seen via telehealth on 01/05/2024 for longstanding fibromyalgia, neuropathy and low back pain. She has a history of mono infection. She also has had bilateral eyelid ptosis with right more than left for over 2 years. She was negative myasthenia gravis and neuromyelitis optical workup & she is following with an eye specialist.     In regard to her fibromyalgia and neuropathy, she takes duloxetine 60 mg daily and pregabalin 75 mg three times per day.     She does follow with rheumatology. She follows with endocrinology.    She reports an overall improvement in her condition.     She underwent a successful lumbar fusion decompression with pedicle screws at L3-L5 on 09/12/2024, performed by Dr. Stanley, which has resulted in significant back pain relief.     She is currently attending physical therapy sessions twice a week and participates in an exercise class twice a week. She has experienced several falls, including one near a windowsill and another in her yard, and is currently working on balance issues in physical therapy.    She has been diagnosed with ocular rosacea by Dr. Leon Dowell, which has been causing discomfort. Her right eye does not fully close during sleep, leading to swelling and irritation. She has an appointment scheduled with Dr. Kumar in 06/2025 to discuss potential upper eyelid surgery (she previously underwent lower eyelid lift surgery of both eyes in 2024).    She also reports neuropathic symptoms in her legs and hands, including sharp pains and swelling in her feet, which she attributes to arthritis. She has sought new footwear and insoles to alleviate discomfort. She uses compression stockings on her arms and has consulted Dr. Mariscal regarding potential  causes. She has been diagnosed with Achilles tendinitis and plantar fasciitis, which resolved spontaneously. She has been using carpal tunnel braces at night, which provide some relief, and is considering increasing her Lyrica dosage to 100 mg, a dose she has previously tolerated well. She continues to take duloxetine 60 mg daily. She tries to push through the pain which is moderate to severe and worsens at night. She experiences burning, numbness, tingling, stinging and constant pain throughout her arms, legs, hands and feet.    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 She has had neuropathy post-infection of bilateral lower extremities since 01/2019 after being diagnosed with mononucleosis and a positive ASO titer.      She underwent an updated EMG and NCVs of her lower extremities by Dr. Eddie Brandon, neurology in [place], [place] on 04/24/2023. This was ordered by neurosurgery that showed mild to moderate chronic bilateral L5-S1 radiculopathy and mild underlying peripheral neuropathy. This is per record review.     Prior labs 2019-CK level of 174.  Immunofixation and protein electrophoresis within normal limits.  Anti-hue and anti-Otilia antibodies negative.  Methylmalonic acid normal at 155.  Vitamin B12 951 and folate greater than 20.  Lyme antibodies negative.  Mono test negative.  ASO titer was elevated at 200. Hemoglobin A1c was 4.9%.  She was treated with high dose steroids and physical therapy. 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 repeat MRI of the lumbar spine on 6/7/2021 and this did show some high-grade and moderate stenosis from L3-L4 and L4-L5.      The patient was diagnosed with fibromyalgia in 11/2021. She is still seeing Rheumatology-she also treats her for multi-site Osteoporosis.  Prescribed nabumetone 750 mg twice daily.      Previously took gabapentin which was not helpful. Tried 3 muscle relaxer's in the past which she stopped taking due to not liking the side effects but cannot recall the names. This was with the arthritis specialist.     MRI of the lumbar spine without contrast on 8/19/2022 showing multilevel degenerative disc changes with L3-L5 moderate narrowing.  This has been reviewed by neurosurgery.  MRI of the thoracic spine without contrast on 10/25/2022 shows multilevel degenerative disc changes with central spinal canal stenosis most severe at T11-T12.  Imaging has been reviewed by pain management as well as neurosurgery with [facility].     Eyelid Ptosis since 2023.  Musk antibody, which was negative. Acetylcholine receptor antibody panel was negative. Neuromyelitis optica auto antibody IgG was negative and anti-myelin oligodendrocyte glycoprotein levels were also negative.     The following portions of the patient's history were reviewed and updated as appropriate: allergies, current medications, past family history, past medical history, past social history, past surgical history, and problem list.    Past Medical History:   Diagnosis Date    Ankle sprain     Arthritis     Arthritis of back     Arthritis of neck     Bursitis     Bursitis of hip     Cervical disc disorder     Chronic pain disorder     degenerative  arthritis.   stenosis of spine. neuropathy    Claustrophobia     CTS (carpal tunnel syndrome)     bilateral     Deep vein thrombosis     Difficulty walking     Extremity pain     Fibromyalgia     Fibromyalgia, primary     Goiter     Hemorrhoids     Hip arthrosis     HL (hearing loss)     Hypertension     Hypothyroidism     Joint pain     Knee swelling     Low back pain     Lumbosacral disc disease     Murmur     echo 5-2024 in epic    Neck pain     Neuropathy     Osteoarthritis     Rosacea     eyes    Scoliosis     Spinal stenosis     Tendinitis of knee      Right knee    Thoracic disc disorder     Thyroid nodule      (vaginal birth after )        Past Surgical History:   Procedure Laterality Date    BACK SURGERY       SECTION      x2    COLONOSCOPY      CYST REMOVAL N/A 2022    chest    DILATATION AND CURETTAGE      EYE SURGERY      HYSTERECTOMY N/A     LUMBAR LAMINECTOMY WITH FUSION N/A 2024    Procedure: LUMBAR FUSION DECOMPRESSON WITH PEDICLE SCREWS L3-5;  Surgeon: Iván Stanley MD;  Location: [facility];  Service: Neurosurgery;  Laterality: N/A;    MANDIBLE FRACTURE SURGERY      OOPHORECTOMY Bilateral     TRIGGER POINT INJECTION      TUBAL ABDOMINAL LIGATION         Social History     Socioeconomic History    Marital status: Single   Tobacco Use    Smoking status: Never     Passive exposure: Never    Smokeless tobacco: Never   Vaping Use    Vaping status: Never Used   Substance and Sexual Activity    Alcohol use: Never    Drug use: Never    Sexual activity: Not Currently     Partners: Male     Birth control/protection: Hysterectomy       Family History   Problem Relation Age of Onset    Arthritis Mother     Stroke Mother     Glaucoma Mother     Hypertension Mother     Heart disease Mother     Heart failure Mother     COPD Mother     Vision loss Mother     Heart disease Father     Lung disease Father     Hearing loss Father     Hypertension Brother     Hearing loss Brother     Thyroid disease Brother     Hearing loss Sister     Thyroid disease Sister     Thyroid disease Sister     Breast cancer Neg Hx     Ovarian cancer Neg Hx           Current Outpatient Medications:     Ascorbic Acid (Vitamin C) 250 MG chewable tablet, Chew 500 mg Daily., Disp: , Rfl:     Cholecalciferol (Vitamin D) 50 MCG (2000) tablet, Take 1 tablet by mouth Daily., Disp: , Rfl:     Diclofenac Sodium (VOLTAREN) 1 % gel gel, Apply 4 g topically to the appropriate area as directed 4 (Four) Times a Day As Needed (joint pain)., Disp: , Rfl:     DULoxetine  (CYMBALTA) 60 MG capsule, Take 1 capsule by mouth Daily., Disp: 90 capsule, Rfl: 3    estradiol (ESTRACE) 1 MG tablet, Take 1 tablet by mouth Daily., Disp: , Rfl:     furosemide (LASIX) 20 MG tablet, Take 1 tablet by mouth Daily., Disp: , Rfl:     levothyroxine (Synthroid) 50 MCG tablet, Take 1 tablet by mouth Daily., Disp: 90 tablet, Rfl: 3    losartan (COZAAR) 50 MG tablet, Take 1 tablet by mouth Daily., Disp: , Rfl:     Lotilaner (Xdemvy) 0.25 % solution, Apply  to eye(s) as directed by provider., Disp: , Rfl:     MAGNESIUM CITRATE PO, Take 400 mg by mouth Daily., Disp: , Rfl:     metroNIDAZOLE (FLAGYL) 0.75 % lotion lotion, Apply  topically to the appropriate area as directed Every 12 (Twelve) Hours., Disp: , Rfl:     mometasone (ELOCON) 0.1 % cream, Apply 1 Application topically to the appropriate area as directed Daily. ears, Disp: , Rfl:     MULTIPLE VITAMINS-MINERALS ER PO, Take 1 tablet by mouth Daily., Disp: , Rfl:     nabumetone (RELAFEN) 750 MG tablet, Take 1 tablet by mouth 2 (Two) Times a Day., Disp: 60 tablet, Rfl: 1    Omega-3 Fatty Acids (fish oil) 1200 MG capsule capsule, Take 1 capsule by mouth Daily With Breakfast., Disp: , Rfl:     polyethyl glycol-propyl glycol (SYSTANE) 0.4-0.3 % solution ophthalmic solution (artificial tears), Administer 1 drop to both eyes As Needed (dry eyes)., Disp: , Rfl:     pregabalin (LYRICA) 100 MG capsule, Take 1 capsule by mouth 3 (Three) Times a Day., Disp: 270 capsule, Rfl: 1    Probiotic Product (PROBIOTIC DAILY PO), Take 1 capsule by mouth Daily., Disp: , Rfl:     Rhofade 1 % cream, Apply 1 Application topically to the appropriate area as directed Daily. Face for roscea, Disp: , Rfl:     traZODone (DESYREL) 50 MG tablet, Take 1 tablet by mouth Every Night. Takes 2 at night, Disp: , Rfl:     Urea 40 % lotion, Apply 1 Application topically Daily. Toes, Disp: , Rfl:     zolpidem (Ambien) 5 MG tablet, Take 1 tablet by mouth At Night As Needed for Sleep., Disp: ,  "Rfl:     lubiprostone (AMITIZA) 8 MCG capsule, Take 1 capsule by mouth 2 (Two) Times a Day With Meals. (Patient not taking: Reported on 4/14/2025), Disp: , Rfl:      Review of Systems   Constitutional:  Positive for activity change.   Musculoskeletal:  Positive for arthralgias, gait problem and myalgias.   Neurological:  Positive for numbness.   Psychiatric/Behavioral:  Positive for sleep disturbance. The patient is nervous/anxious.    All other systems reviewed and are negative.       Objective:  /68   Pulse 107   Ht 162.6 cm (64\")   Wt 110 kg (243 lb)   SpO2 97%   BMI 41.71 kg/m²     Neurological Exam  Mental Status  Alert. Oriented to person, place, time and situation. Recent and remote memory are intact. Speech is normal. Language is fluent with no aphasia. Attention and concentration are normal. Fund of knowledge is appropriate for level of education.    Cranial Nerves  CN II: Vision test:   Reports this is chronic. Visual acuity is normal. Visual fields full to confrontation.  CN III, IV, VI: Extraocular movements intact bilaterally. Bilateral ptosis. R>L top chronic ptosis. Pupils equal round and reactive to light bilaterally.  CN V: Facial sensation is normal.  CN VII: Full and symmetric facial movement.  CN IX, X: Palate elevates symmetrically. Normal gag reflex.  CN XI: Shoulder shrug strength is normal.  CN XII: Tongue midline without atrophy or fasciculations.    Motor  Normal muscle bulk throughout. No fasciculations present. Normal muscle tone. No abnormal involuntary movements. Strength is 5/5 throughout all four extremities.    Generalized pain, but very strong.    Sensory  Light touch abnormality: Pinprick abnormality: Temperature abnormality: Vibration abnormality: Sensation: BLE.     Reflexes                                            Right                      Left  Brachioradialis                    2+                         2+  Biceps                                 2+               "           2+  Patellar                                1+                         1+  Achilles                                1+                         1+  Right Plantar: downgoing  Left Plantar: downgoing    Right pathological reflexes: Roosevelt's absent. Ankle clonus absent.  Left pathological reflexes: Roosevelt's absent. Ankle clonus absent.    Coordination    Finger-to-nose, rapid alternating movements and heel-to-shin normal bilaterally without dysmetria.    Gait  Casual gait: Wide stance. Reduced stride length. Antalgic gait. Normal right arm swing. Normal left arm swing. Romberg is absent. Normal pull test. Unable to rise from chair without using arms.        Physical Exam  Constitutional:       Appearance: Normal appearance.   Eyes:      Extraocular Movements: Extraocular movements intact.      Conjunctiva/sclera:      Right eye: Right conjunctiva is injected.      Left eye: Left conjunctiva is injected.      Pupils: Pupils are equal, round, and reactive to light.      Comments:   Reports this is chronic   Musculoskeletal:      Right lower le+ Edema present.      Left lower le+ Edema present.   Neurological:      Mental Status: She is alert.      Motor: Motor strength is normal.     Coordination: Coordination is intact. Romberg sign negative.      Deep Tendon Reflexes:      Reflex Scores:       Bicep reflexes are 2+ on the right side and 2+ on the left side.       Brachioradialis reflexes are 2+ on the right side and 2+ on the left side.       Patellar reflexes are 1+ on the right side and 1+ on the left side.       Achilles reflexes are 1+ on the right side and 1+ on the left side.  Psychiatric:         Mood and Affect: Mood is anxious.         Speech: Speech normal.         Results:  Results    XR Spine Lumbar 2 or 3 View  Result Date: 3/6/2025  Postop changes of posterior and anterior interbody fusion L3-L5. Osteopenia. Mild dextroconvex scoliosis. Grade 1 anterolisthesis of L4 on L5. Advanced  degenerative disc disease in the visualized lower thoracic spine and lumbar spine above the level of fusion, worst at L2-3. Electronically signed by Dmitriy Fong    Assessment/Plan:     Diagnoses and all orders for this visit:    1. Fibromyalgia (Primary)  -     pregabalin (LYRICA) 100 MG capsule; Take 1 capsule by mouth 3 (Three) Times a Day.  Dispense: 270 capsule; Refill: 1  -     DULoxetine (CYMBALTA) 60 MG capsule; Take 1 capsule by mouth Daily.  Dispense: 90 capsule; Refill: 3    2. 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  Dispense: 270 capsule; Refill: 1  -     DULoxetine (CYMBALTA) 60 MG capsule; Take 1 capsule by mouth Daily.  Dispense: 90 capsule; Refill: 3    3. Ptosis, right eyelid  Comments:  continue with eye specialist    4. History of infectious mononucleosis    5. Gait disturbance           Assessment & Plan  1. Fibromyalgia & Neuropathy.  She will continue her current regimen of duloxetine 60 mg daily. The dosage of pregabalin will be increased to 100 mg three times per day, and the 75 mg dose will be discontinued. A controlled substance agreement has been signed for the use of Lyrica for this year. If she experiences any issues with the increased dose, she should notify the office.    2. Ptosis & Ocular Rosacea.  She will continue her follow-up with Dr. Leon Dowell for the management of her ocular rosacea and Dr. Kumar right eyelid ptosis. An appointment is scheduled for June to discuss potential upper eyelid surgery.    3. Arthritis.  She reports significant arthritis pain, particularly in her feet and hands. She has been prescribed nabumetone and has been back on it for less than a month. She will continue with this medication.    4. Balance impairment.  She will continue with physical therapy twice a week to  address balance issues. She reports significant falls and is working on balance improvement with her physical therapist. Consider MRI of the brain if symptoms not improving. Multifactorial.    Follow-up  She will follow up in November or sooner if needed.    She underwent a successful lumbar fusion decompression with pedicle screws at L3-L5 on 09/12/2024, performed by Dr. Stanley.    Reviewed medications, potential side effects and signs and symptoms to report. Discussed risk versus benefits of treatment plan with patient and/or family-including medications, labs and radiology that may be ordered. Addressed questions and concerns during visit. Patient and/or family verbalized understanding and agree with plan.    As part of this patient's treatment plan I am prescribing controlled substances. The patient has been made aware of appropriate use of such medications, including potential risk of somnolence, limited ability to drive and/or work safely, and potential for dependence or overdose. It has also been made clear that these medications are for use by the patient only, without concomitant use of alcohol or other substances unless prescribed. Keep out of reach of children.  Ethan report has been reviewed. If this is going to be prescribed long term, [facility] Controlled Substance Agreement Contract has also been read and signed by patient and myself.    During this visit the following were done:  Labs Reviewed [x]    Labs Ordered []    Radiology Reports Reviewed []    Radiology Ordered []    PCP Records Reviewed []    Referring Provider Records Reviewed []    ER Records Reviewed []    Hospital Records Reviewed []    History Obtained From Family []    Radiology Images Reviewed []    Other Reviewed [x]    Records Requested []      04/14/25   16:15 EDT    Patient or patient representative verbalized consent for the use of Ambient Listening during the visit with  ELDON Rosas for chart documentation. 4/15/2025   05:51 EDT    Note to patient: The 21st Century Cures Act makes medical notes like these available to patients in the interest of transparency. However, be advised this is a medical document. It is intended as peer to peer communication. It is written in medical language and may contain abbreviations or verbiage that are unfamiliar. It may appear blunt or direct. Medical documents are intended to carry relevant information, facts as evident, and the clinical opinion of the provider.

## 2025-04-14 NOTE — LETTER
April 15, 2025     Kacy Huertas MD  [address] [facility] [address]    Patient: Alison Benitez   YOB: 1955   Date of Visit: 4/14/2025       Dear Kacy Huertas MD    Alison Benitez was in my office today. Below is a copy of my note.    If you have questions, please do not hesitate to call me. I look forward to following Alison along with you.         Sincerely,        ELDON Rosas        CC: Leon Dowell, KATLYN Kumar MD    Subjective:     Patient ID: Alison Benitez is a 70 y.o. female.    CC:   Chief Complaint   Patient presents with   • Peripheral Neuropathy   • Fibromyalgia       HPI:   History of Present Illness  This is a pleasant 70-year-old female last seen via telehealth on 01/05/2024 for longstanding fibromyalgia, neuropathy and low back pain. She has a history of mono infection. She also has had bilateral eyelid ptosis with right more than left for over 2 years. She was negative myasthenia gravis and neuromyelitis optical workup & she is following with an eye specialist.     In regard to her fibromyalgia and neuropathy, she takes duloxetine 60 mg daily and pregabalin 75 mg three times per day.     She does follow with rheumatology. She follows with endocrinology.    She reports an overall improvement in her condition.     She underwent a successful lumbar fusion decompression with pedicle screws at L3-L5 on 09/12/2024, performed by Dr. Stanley, which has resulted in significant back pain relief.     She is currently attending physical therapy sessions twice a week and participates in an exercise class twice a week. She has experienced several falls, including one near a windowsill and another in her yard, and is currently working on balance issues in physical therapy.    She has been diagnosed with ocular rosacea by Dr. eLon Dowell, which has been causing discomfort. Her right eye does not fully close during sleep, leading to swelling and  irritation. She has an appointment scheduled with Dr. Kumar in 06/2025 to discuss potential upper eyelid surgery (she previously underwent lower eyelid lift surgery of both eyes in 2024).    She also reports neuropathic symptoms in her legs and hands, including sharp pains and swelling in her feet, which she attributes to arthritis. She has sought new footwear and insoles to alleviate discomfort. She uses compression stockings on her arms and has consulted Dr. Mariscal regarding potential causes. She has been diagnosed with Achilles tendinitis and plantar fasciitis, which resolved spontaneously. She has been using carpal tunnel braces at night, which provide some relief, and is considering increasing her Lyrica dosage to 100 mg, a dose she has previously tolerated well. She continues to take duloxetine 60 mg daily. She tries to push through the pain which is moderate to severe and worsens at night. She experiences burning, numbness, tingling, stinging and constant pain throughout her arms, legs, hands and feet.    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 She has had neuropathy post-infection of bilateral lower extremities since 01/2019 after being diagnosed with mononucleosis and a positive ASO titer.      She underwent an updated EMG and NCVs of her lower extremities by Dr. Eddie Brandon, neurology in [place], [place] on 04/24/2023. This was ordered by neurosurgery that showed mild to moderate chronic bilateral L5-S1 radiculopathy and mild underlying peripheral neuropathy. This is per record review.     Prior labs 2019-CK level of 174.  Immunofixation and protein electrophoresis within normal limits.  Anti-hue and anti-Otilia antibodies negative.  Methylmalonic acid normal at 155.  Vitamin B12 951 and folate greater than 20.  Lyme antibodies negative.  Mono test negative.  ASO  titer was elevated at 200. Hemoglobin A1c was 4.9%.  She was treated with high dose steroids and physical therapy. 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 repeat MRI of the lumbar spine on 6/7/2021 and this did show some high-grade and moderate stenosis from L3-L4 and L4-L5.      The patient was diagnosed with fibromyalgia in 11/2021. She is still seeing Rheumatology-she also treats her for multi-site Osteoporosis. Prescribed nabumetone 750 mg twice daily.      Previously took gabapentin which was not helpful. Tried 3 muscle relaxer's in the past which she stopped taking due to not liking the side effects but cannot recall the names. This was with the arthritis specialist.     MRI of the lumbar spine without contrast on 8/19/2022 showing multilevel degenerative disc changes with L3-L5 moderate narrowing.  This has been reviewed by neurosurgery.  MRI of the thoracic spine without contrast on 10/25/2022 shows multilevel degenerative disc changes with central spinal canal stenosis most severe at T11-T12.  Imaging has been reviewed by pain management as well as neurosurgery with [facility].     Eyelid Ptosis since 2023.  Musk antibody, which was negative. Acetylcholine receptor antibody panel was negative. Neuromyelitis optica auto antibody IgG was negative and anti-myelin oligodendrocyte glycoprotein levels were also negative.     The following portions of the patient's history were reviewed and updated as appropriate: allergies, current medications, past family history, past medical history, past social history, past surgical history, and problem list.    Past Medical History:   Diagnosis Date   • Ankle sprain    • Arthritis    • Arthritis of back    • Arthritis of neck    • Bursitis    • Bursitis of hip    • Cervical disc disorder    • Chronic pain disorder     degenerative  arthritis.   stenosis of spine. neuropathy   • Claustrophobia     • CTS (carpal tunnel syndrome)     bilateral    • Deep vein thrombosis    • Difficulty walking    • Extremity pain    • Fibromyalgia    • Fibromyalgia, primary    • Goiter    • Hemorrhoids    • Hip arthrosis    • HL (hearing loss)    • Hypertension    • Hypothyroidism    • Joint pain    • Knee swelling    • Low back pain    • Lumbosacral disc disease    • Murmur     echo  in epic   • Neck pain    • Neuropathy    • Osteoarthritis    • Rosacea     eyes   • Scoliosis    • Spinal stenosis    • Tendinitis of knee     Right knee   • Thoracic disc disorder    • Thyroid nodule    •  (vaginal birth after )        Past Surgical History:   Procedure Laterality Date   • BACK SURGERY     •  SECTION      x2   • COLONOSCOPY     • CYST REMOVAL N/A 2022    chest   • DILATATION AND CURETTAGE     • EYE SURGERY     • HYSTERECTOMY N/A    • LUMBAR LAMINECTOMY WITH FUSION N/A 2024    Procedure: LUMBAR FUSION DECOMPRESSON WITH PEDICLE SCREWS L3-5;  Surgeon: Iván Stanley MD;  Location: [facility];  Service: Neurosurgery;  Laterality: N/A;   • MANDIBLE FRACTURE SURGERY     • OOPHORECTOMY Bilateral    • TRIGGER POINT INJECTION     • TUBAL ABDOMINAL LIGATION         Social History     Socioeconomic History   • Marital status: Single   Tobacco Use   • Smoking status: Never     Passive exposure: Never   • Smokeless tobacco: Never   Vaping Use   • Vaping status: Never Used   Substance and Sexual Activity   • Alcohol use: Never   • Drug use: Never   • Sexual activity: Not Currently     Partners: Male     Birth control/protection: Hysterectomy       Family History   Problem Relation Age of Onset   • Arthritis Mother    • Stroke Mother    • Glaucoma Mother    • Hypertension Mother    • Heart disease Mother    • Heart failure Mother    • COPD Mother    • Vision loss Mother    • Heart disease Father    • Lung disease Father    • Hearing loss Father    • Hypertension Brother    • Hearing loss Brother    •  Thyroid disease Brother    • Hearing loss Sister    • Thyroid disease Sister    • Thyroid disease Sister    • Breast cancer Neg Hx    • Ovarian cancer Neg Hx           Current Outpatient Medications:   •  Ascorbic Acid (Vitamin C) 250 MG chewable tablet, Chew 500 mg Daily., Disp: , Rfl:   •  Cholecalciferol (Vitamin D) 50 MCG (2000 UT) tablet, Take 1 tablet by mouth Daily., Disp: , Rfl:   •  Diclofenac Sodium (VOLTAREN) 1 % gel gel, Apply 4 g topically to the appropriate area as directed 4 (Four) Times a Day As Needed (joint pain)., Disp: , Rfl:   •  DULoxetine (CYMBALTA) 60 MG capsule, Take 1 capsule by mouth Daily., Disp: 90 capsule, Rfl: 3  •  estradiol (ESTRACE) 1 MG tablet, Take 1 tablet by mouth Daily., Disp: , Rfl:   •  furosemide (LASIX) 20 MG tablet, Take 1 tablet by mouth Daily., Disp: , Rfl:   •  levothyroxine (Synthroid) 50 MCG tablet, Take 1 tablet by mouth Daily., Disp: 90 tablet, Rfl: 3  •  losartan (COZAAR) 50 MG tablet, Take 1 tablet by mouth Daily., Disp: , Rfl:   •  Lotilaner (Xdemvy) 0.25 % solution, Apply  to eye(s) as directed by provider., Disp: , Rfl:   •  MAGNESIUM CITRATE PO, Take 400 mg by mouth Daily., Disp: , Rfl:   •  metroNIDAZOLE (FLAGYL) 0.75 % lotion lotion, Apply  topically to the appropriate area as directed Every 12 (Twelve) Hours., Disp: , Rfl:   •  mometasone (ELOCON) 0.1 % cream, Apply 1 Application topically to the appropriate area as directed Daily. ears, Disp: , Rfl:   •  MULTIPLE VITAMINS-MINERALS ER PO, Take 1 tablet by mouth Daily., Disp: , Rfl:   •  nabumetone (RELAFEN) 750 MG tablet, Take 1 tablet by mouth 2 (Two) Times a Day., Disp: 60 tablet, Rfl: 1  •  Omega-3 Fatty Acids (fish oil) 1200 MG capsule capsule, Take 1 capsule by mouth Daily With Breakfast., Disp: , Rfl:   •  polyethyl glycol-propyl glycol (SYSTANE) 0.4-0.3 % solution ophthalmic solution (artificial tears), Administer 1 drop to both eyes As Needed (dry eyes)., Disp: , Rfl:   •  pregabalin (LYRICA) 100 MG  "capsule, Take 1 capsule by mouth 3 (Three) Times a Day., Disp: 270 capsule, Rfl: 1  •  Probiotic Product (PROBIOTIC DAILY PO), Take 1 capsule by mouth Daily., Disp: , Rfl:   •  Rhofade 1 % cream, Apply 1 Application topically to the appropriate area as directed Daily. Face for roscea, Disp: , Rfl:   •  traZODone (DESYREL) 50 MG tablet, Take 1 tablet by mouth Every Night. Takes 2 at night, Disp: , Rfl:   •  Urea 40 % lotion, Apply 1 Application topically Daily. Toes, Disp: , Rfl:   •  zolpidem (Ambien) 5 MG tablet, Take 1 tablet by mouth At Night As Needed for Sleep., Disp: , Rfl:   •  lubiprostone (AMITIZA) 8 MCG capsule, Take 1 capsule by mouth 2 (Two) Times a Day With Meals. (Patient not taking: Reported on 4/14/2025), Disp: , Rfl:      Review of Systems   Constitutional:  Positive for activity change.   Musculoskeletal:  Positive for arthralgias, gait problem and myalgias.   Neurological:  Positive for numbness.   Psychiatric/Behavioral:  Positive for sleep disturbance. The patient is nervous/anxious.    All other systems reviewed and are negative.       Objective:  /68   Pulse 107   Ht 162.6 cm (64\")   Wt 110 kg (243 lb)   SpO2 97%   BMI 41.71 kg/m²     Neurological Exam  Mental Status  Alert. Oriented to person, place, time and situation. Recent and remote memory are intact. Speech is normal. Language is fluent with no aphasia. Attention and concentration are normal. Fund of knowledge is appropriate for level of education.    Cranial Nerves  CN II: Vision test:   Reports this is chronic. Visual acuity is normal. Visual fields full to confrontation.  CN III, IV, VI: Extraocular movements intact bilaterally. Bilateral ptosis. R>L top chronic ptosis. Pupils equal round and reactive to light bilaterally.  CN V: Facial sensation is normal.  CN VII: Full and symmetric facial movement.  CN IX, X: Palate elevates symmetrically. Normal gag reflex.  CN XI: Shoulder shrug strength is normal.  CN XII: Tongue " midline without atrophy or fasciculations.    Motor  Normal muscle bulk throughout. No fasciculations present. Normal muscle tone. No abnormal involuntary movements. Strength is 5/5 throughout all four extremities.    Generalized pain, but very strong.    Sensory  Light touch abnormality: Pinprick abnormality: Temperature abnormality: Vibration abnormality: Sensation: BLE.     Reflexes                                            Right                      Left  Brachioradialis                    2+                         2+  Biceps                                 2+                         2+  Patellar                                1+                         1+  Achilles                                1+                         1+  Right Plantar: downgoing  Left Plantar: downgoing    Right pathological reflexes: Roosevelt's absent. Ankle clonus absent.  Left pathological reflexes: Roosevelt's absent. Ankle clonus absent.    Coordination    Finger-to-nose, rapid alternating movements and heel-to-shin normal bilaterally without dysmetria.    Gait  Casual gait: Wide stance. Reduced stride length. Antalgic gait. Normal right arm swing. Normal left arm swing. Romberg is absent. Normal pull test. Unable to rise from chair without using arms.        Physical Exam  Constitutional:       Appearance: Normal appearance.   Eyes:      Extraocular Movements: Extraocular movements intact.      Conjunctiva/sclera:      Right eye: Right conjunctiva is injected.      Left eye: Left conjunctiva is injected.      Pupils: Pupils are equal, round, and reactive to light.      Comments:   Reports this is chronic   Musculoskeletal:      Right lower le+ Edema present.      Left lower le+ Edema present.   Neurological:      Mental Status: She is alert.      Motor: Motor strength is normal.     Coordination: Coordination is intact. Romberg sign negative.      Deep Tendon Reflexes:      Reflex Scores:       Bicep reflexes are 2+ on the  right side and 2+ on the left side.       Brachioradialis reflexes are 2+ on the right side and 2+ on the left side.       Patellar reflexes are 1+ on the right side and 1+ on the left side.       Achilles reflexes are 1+ on the right side and 1+ on the left side.  Psychiatric:         Mood and Affect: Mood is anxious.         Speech: Speech normal.         Results:  Results    XR Spine Lumbar 2 or 3 View  Result Date: 3/6/2025  Postop changes of posterior and anterior interbody fusion L3-L5. Osteopenia. Mild dextroconvex scoliosis. Grade 1 anterolisthesis of L4 on L5. Advanced degenerative disc disease in the visualized lower thoracic spine and lumbar spine above the level of fusion, worst at L2-3. Electronically signed by Dmitriy Fong    Assessment/Plan:     Diagnoses and all orders for this visit:    1. Fibromyalgia (Primary)  -     pregabalin (LYRICA) 100 MG capsule; Take 1 capsule by mouth 3 (Three) Times a Day.  Dispense: 270 capsule; Refill: 1  -     DULoxetine (CYMBALTA) 60 MG capsule; Take 1 capsule by mouth Daily.  Dispense: 90 capsule; Refill: 3    2. 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  Dispense: 270 capsule; Refill: 1  -     DULoxetine (CYMBALTA) 60 MG capsule; Take 1 capsule by mouth Daily.  Dispense: 90 capsule; Refill: 3    3. Ptosis, right eyelid  Comments:  continue with eye specialist    4. History of infectious mononucleosis    5. Gait disturbance           Assessment & Plan  1. Fibromyalgia & Neuropathy.  She will continue her current regimen of duloxetine 60 mg daily. The dosage of pregabalin will be increased to 100 mg three times per day, and the 75 mg dose will be discontinued. A controlled substance agreement has been signed for the use of Lyrica for this year. If she experiences any issues with the  increased dose, she should notify the office.    2. Ptosis & Ocular Rosacea.  She will continue her follow-up with Dr. Leon Dowell for the management of her ocular rosacea and Dr. Kumar right eyelid ptosis. An appointment is scheduled for June to discuss potential upper eyelid surgery.    3. Arthritis.  She reports significant arthritis pain, particularly in her feet and hands. She has been prescribed nabumetone and has been back on it for less than a month. She will continue with this medication.    4. Balance impairment.  She will continue with physical therapy twice a week to address balance issues. She reports significant falls and is working on balance improvement with her physical therapist. Consider MRI of the brain if symptoms not improving. Multifactorial.    Follow-up  She will follow up in November or sooner if needed.    She underwent a successful lumbar fusion decompression with pedicle screws at L3-L5 on 09/12/2024, performed by Dr. Stanley.    Reviewed medications, potential side effects and signs and symptoms to report. Discussed risk versus benefits of treatment plan with patient and/or family-including medications, labs and radiology that may be ordered. Addressed questions and concerns during visit. Patient and/or family verbalized understanding and agree with plan.    As part of this patient's treatment plan I am prescribing controlled substances. The patient has been made aware of appropriate use of such medications, including potential risk of somnolence, limited ability to drive and/or work safely, and potential for dependence or overdose. It has also been made clear that these medications are for use by the patient only, without concomitant use of alcohol or other substances unless prescribed. Keep out of reach of children.  Ethan report has been reviewed. If this is going to be prescribed long term, [facility] Controlled Substance Agreement Contract has also been read and signed by patient and  myself.    During this visit the following were done:  Labs Reviewed [x]    Labs Ordered []    Radiology Reports Reviewed []    Radiology Ordered []    PCP Records Reviewed []    Referring Provider Records Reviewed []    ER Records Reviewed []    Hospital Records Reviewed []    History Obtained From Family []    Radiology Images Reviewed []    Other Reviewed [x]    Records Requested []      04/14/25   16:15 EDT    Patient or patient representative verbalized consent for the use of Ambient Listening during the visit with  ELDON Rosas for chart documentation. 4/15/2025  05:51 EDT    Note to patient: The 21st Century Cures Act makes medical notes like these available to patients in the interest of transparency. However, be advised this is a medical document. It is intended as peer to peer communication. It is written in medical language and may contain abbreviations or verbiage that are unfamiliar. It may appear blunt or direct. Medical documents are intended to carry relevant information, facts as evident, and the clinical opinion of the provider.

## 2025-04-15 ENCOUNTER — PATIENT ROUNDING (BHMG ONLY) (OUTPATIENT)
Dept: NEUROLOGY | Facility: CLINIC | Age: 70
End: 2025-04-15
Payer: MEDICARE

## 2025-04-15 ENCOUNTER — PATIENT MESSAGE (OUTPATIENT)
Age: 70
End: 2025-04-15

## 2025-04-15 ENCOUNTER — HOSPITAL ENCOUNTER (OUTPATIENT)
Dept: PHYSICAL THERAPY | Facility: HOSPITAL | Age: 70
Setting detail: THERAPIES SERIES
Discharge: HOME | End: 2025-04-15
Payer: MEDICARE

## 2025-04-15 VITALS
HEIGHT: 64 IN | OXYGEN SATURATION: 97 % | DIASTOLIC BLOOD PRESSURE: 68 MMHG | WEIGHT: 243 LBS | SYSTOLIC BLOOD PRESSURE: 132 MMHG | BODY MASS INDEX: 41.48 KG/M2 | HEART RATE: 107 BPM

## 2025-04-15 PROCEDURE — 97016 VASOPNEUMATIC DEVICE THERAPY: CPT | Performed by: PHYSICAL THERAPIST

## 2025-04-15 PROCEDURE — 97110 THERAPEUTIC EXERCISES: CPT | Performed by: PHYSICAL THERAPIST

## 2025-04-15 RX ORDER — FUROSEMIDE 20 MG/1
20 TABLET ORAL DAILY
Qty: 30 TABLET | Refills: 2 | Status: SHIPPED | OUTPATIENT
Start: 2025-04-15

## 2025-04-15 NOTE — PROGRESS NOTES
Physical Therapy Daily Treatment Note/Reassessment    Date:  4/15/2025    TIme In:   1110               Time Out:   1210    Patient Name:  Kristyn Martin   :  1955 MRN: [id number]    Restrictions/Precautions:    Pertinent Medical History:  Medical/Treatment Diagnosis Information:  Low back pain, unspecified [M54.50]     Insurance/Certification information:  Payor: [employer] MEDICARE / Plan: [facility] MEDICARE ADVANTAGE [place] / Product Type: *No Product type* /   Physician Information:  Sera Valadez MD  Plan of care signed (Y/N):    Visit# / total visits:     17 /    G-Code (if applicable):      Date / Visit # G-Code Applied:         Progress Note: []  Yes  [x]  No  Next due by: Visit #10       Pain level:   5/10 -B feet    Subjective:  Patient states her feet are still what is bothering her the most today.  She states that she is doing her HEP daily.      Objective:  Observation:   Test measurements:    Back Index Score: 22  Lower Extremity Functional Score: 44/80  Sit to stand: 18 sec  TUG score: 17sec      Exercises:  Exercise Resistance/Repetitions Date performed   Sci fit Level 3   7 minutes 15   Standing with support for heel raises   20x with 2lb wts 15   Standing slant board  3x 30 sec ea  15   Standing marching  20x  2lb BLE 15   Standing hip ABD  20x  2lb BLE 15   Standing heel raises 20x  2lb BLE 15   Hip add w/ball  3x10 15   Bent knee fall outs 3x10 B 15    ball to wall 3x10  3x10  15   Clam shells  3x10 B 15   Sit to stand  10x 15    Single knee to chest   20x B 15   Hamstring stretch seated on bed  5x ea side holding for 15 sec ea  15    ball to wall  3x10  15   total gym   10x 2 sets 15   B feet on stool 4way  with GTB 15x ea way  15   Hallway stairs  1 flight 15   Heel raises on bottom step 15x 15     Other Therapeutic Activities:      Manual Treatments:       Modalities:  Vaso Pnuematic cold pack to left foot 15 min     Timed Code Treatment Minutes:  60      Total Treatment Minutes:

## 2025-04-15 NOTE — TELEPHONE ENCOUNTER
Refill request received from pharmacy      Next Office Visit Date:  Future Appointments   Date Time Provider Department Center   4/15/2025 11:00 AM Amanda Jones, PT [facility]   4/16/2025 12:30 PM [facility] DEXA RM 1 [facility]   5/6/2025 10:45 AM Sera Valadez MD [facility] [place] [facility] - Please review via PDMP        Last Office Visit:    4/8/2025

## 2025-04-18 ENCOUNTER — TRANSCRIBE ORDERS (OUTPATIENT)
Dept: ADMINISTRATIVE | Facility: HOSPITAL | Age: 70
End: 2025-04-18
Payer: MEDICARE

## 2025-04-18 DIAGNOSIS — Z12.31 VISIT FOR SCREENING MAMMOGRAM: Primary | ICD-10-CM

## 2025-04-22 ENCOUNTER — HOSPITAL ENCOUNTER (OUTPATIENT)
Dept: GENERAL RADIOLOGY | Facility: HOSPITAL | Age: 70
Discharge: HOME | End: 2025-04-22
Payer: MEDICARE

## 2025-04-22 ENCOUNTER — TELEPHONE (OUTPATIENT)
Age: 70
End: 2025-04-22

## 2025-04-22 ENCOUNTER — HOSPITAL ENCOUNTER (OUTPATIENT)
Dept: PHYSICAL THERAPY | Facility: HOSPITAL | Age: 70
Setting detail: THERAPIES SERIES
Discharge: HOME | End: 2025-04-22
Payer: MEDICARE

## 2025-04-22 DIAGNOSIS — Z78.0 POSTMENOPAUSAL: ICD-10-CM

## 2025-04-22 PROCEDURE — 97110 THERAPEUTIC EXERCISES: CPT | Performed by: PHYSICAL THERAPIST

## 2025-04-22 PROCEDURE — 77080 DXA BONE DENSITY AXIAL: CPT

## 2025-04-22 NOTE — TELEPHONE ENCOUNTER
Patient called and stated that her left heel is giving her problems and the spot still hasn't healed. She said that she hasn't been able to walk on it at all. She was wondering what she should do?

## 2025-04-22 NOTE — PROGRESS NOTES
Physical Therapy Daily Treatment Note    Date:  2025    TIme In:   1330               Time Out:   1433    Patient Name:  Kristyn Martin   :  1955 MRN: [id number]    Restrictions/Precautions:    Pertinent Medical History:  Medical/Treatment Diagnosis Information:  Low back pain, unspecified [M54.50]     Insurance/Certification information:  Payor: [employer] MEDICARE / Plan: [facility] MEDICARE ADVANTAGE [place] / Product Type: *No Product type* /   Physician Information:  Sera Valadez MD  Plan of care signed (Y/N):    Visit# / total visits:     4 /    G-Code (if applicable):      Date / Visit # G-Code Applied:         Progress Note: []  Yes  [x]  No  Next due by: Visit #10       Pain level:   7/10 -L fo0t    Subjective:  Patient states her feet are still what is bothering her the most today.  She states that she is doing her HEP daily.      Objective:  Observation:   Test measurements:        Exercises:  Exercise Resistance/Repetitions Date performed   Sci fit Level 3   7 minutes 22   Standing with support for heel raises   20x with 2lb wts 22   Standing slant board  3x 30 sec ea  22   Standing marching  20x  2lb BLE 22   Standing hip ABD  20x  2lb BLE 22   Standing heel raises 20x  2lb BLE 22   Hip add w/ball  3x10 22   Bent knee fall outs 3x10 B 22    ball to wall 3x10  3x10  22   Clam shells  3x10 B 22   Sit to stand  10x 22    Single knee to chest   20x B 22   Hamstring stretch seated on bed  5x ea side holding for 15 sec ea  22    ball to wall  3x10  22   total gym   10x 2 sets 22   B feet on stool 4way  with GTB 15x ea way  22   Hallway stairs  1 flight 22   Heel raises on bottom step 15x 22     Other Therapeutic Activities:      Manual Treatments:       Modalities:  cold pack to left foot 10 min       Timed Code Treatment Minutes:  63      Total Treatment Minutes:  63      Treatment/Activity Tolerance:  [x] Patient tolerated treatment well [] Patient limited by fatigue  [] Patient limited by pain  []

## 2025-04-24 ENCOUNTER — OFFICE VISIT (OUTPATIENT)
Age: 70
End: 2025-04-24

## 2025-04-24 ENCOUNTER — HOSPITAL ENCOUNTER (OUTPATIENT)
Dept: PHYSICAL THERAPY | Facility: HOSPITAL | Age: 70
Setting detail: THERAPIES SERIES
Discharge: HOME | End: 2025-04-24
Payer: MEDICARE

## 2025-04-24 VITALS
OXYGEN SATURATION: 97 % | TEMPERATURE: 98.4 F | DIASTOLIC BLOOD PRESSURE: 70 MMHG | RESPIRATION RATE: 18 BRPM | HEIGHT: 64 IN | HEART RATE: 88 BPM | WEIGHT: 249.2 LBS | SYSTOLIC BLOOD PRESSURE: 116 MMHG | BODY MASS INDEX: 42.55 KG/M2

## 2025-04-24 DIAGNOSIS — R60.0 PEDAL EDEMA: ICD-10-CM

## 2025-04-24 DIAGNOSIS — M20.42 HAMMER TOES OF BOTH FEET: ICD-10-CM

## 2025-04-24 DIAGNOSIS — M20.41 HAMMER TOES OF BOTH FEET: ICD-10-CM

## 2025-04-24 DIAGNOSIS — G89.29 CHRONIC MIDLINE LOW BACK PAIN WITH SCIATICA, SCIATICA LATERALITY UNSPECIFIED: ICD-10-CM

## 2025-04-24 DIAGNOSIS — B35.1 TOENAIL FUNGUS: ICD-10-CM

## 2025-04-24 DIAGNOSIS — M79.672 BILATERAL FOOT PAIN: Primary | ICD-10-CM

## 2025-04-24 DIAGNOSIS — M54.40 CHRONIC MIDLINE LOW BACK PAIN WITH SCIATICA, SCIATICA LATERALITY UNSPECIFIED: ICD-10-CM

## 2025-04-24 DIAGNOSIS — M79.671 BILATERAL FOOT PAIN: Primary | ICD-10-CM

## 2025-04-24 PROCEDURE — 97110 THERAPEUTIC EXERCISES: CPT | Performed by: PHYSICAL THERAPIST

## 2025-04-24 RX ORDER — PREGABALIN 100 MG/1
100 CAPSULE ORAL 3 TIMES DAILY
COMMUNITY
Start: 2025-04-15

## 2025-04-24 RX ORDER — BUMETANIDE 1 MG/1
1 TABLET ORAL DAILY
Qty: 30 TABLET | Refills: 3 | Status: SHIPPED | OUTPATIENT
Start: 2025-04-24

## 2025-04-24 ASSESSMENT — ENCOUNTER SYMPTOMS
BACK PAIN: 1
RESPIRATORY NEGATIVE: 1
GASTROINTESTINAL NEGATIVE: 1

## 2025-04-24 NOTE — PROGRESS NOTES
Physical Therapy Daily Treatment Note    Date:  2025    TIme In:   0958              Time Out:   1100    Patient Name:  Kristyn Martin   :  1955 MRN: [id number]    Restrictions/Precautions:    Pertinent Medical History:  Medical/Treatment Diagnosis Information:  Low back pain, unspecified [M54.50]     Insurance/Certification information:  Payor: [employer] MEDICARE / Plan: [employer] MEDICARE ADVANTAGE [place] / Product Type: *No Product type* /   Physician Information:  Sera Valadez MD  Plan of care signed (Y/N):    Visit# / total visits:     18 /    G-Code (if applicable):      Date / Visit # G-Code Applied:         Progress Note: []  Yes  [x]  No  Next due by: Visit #10       Pain level:   5/10 -L fo0t    Subjective:  Patient states her feet are still what is bothering her the most today.  She states that she is going to see a pediatrist today and hopes to get some help.      Objective:  Observation:   Test measurements:        Exercises:  Exercise Resistance/Repetitions Date performed   Sci fit Level 3   7 minutes 24   Standing with support for heel raises   20x with 2lb wts 24   Standing slant board  3x 30 sec ea  24   Standing marching  20x  2lb BLE 24   Standing hip ABD  20x  2lb BLE 24   Standing heel raises 20x  2lb BLE 24   Hip add w/ball  3x10 24   Bent knee fall outs 3x10 B 24    ball to wall 3x10  3x10  24   Clam shells  3x10 B 24   Sit to stand  10x 24    Single knee to chest   20x B 24   Hamstring stretch seated on bed  5x ea side holding for 15 sec ea  24    ball to wall  3x10  24   total gym   10x 2 sets 24   B feet on stool 4way  with GTB 15x ea way  24   Hallway stairs  1 flight 24   Heel raises on bottom step 15x 24     Other Therapeutic Activities:  Bio freeze applied to left ankle    Manual Treatments:       Modalities:  cold pack to left foot 10 min (deferred today)      Timed Code Treatment Minutes:  62      Total Treatment Minutes:  62      Treatment/Activity Tolerance:  [x] Patient

## 2025-04-24 NOTE — PROGRESS NOTES
friction rub. No gallop.   Pulmonary:      Effort: Pulmonary effort is normal.      Breath sounds: Normal breath sounds. No wheezing, rhonchi or rales.   Musculoskeletal:      Cervical back: Neck supple. Tenderness present. Pain with movement present. Decreased range of motion.      Thoracic back: Decreased range of motion.      Lumbar back: Spasms, tenderness and bony tenderness present. Decreased range of motion.        Back:       Right knee: Swelling, bony tenderness and crepitus present. Decreased range of motion. Tenderness present over the medial joint line.      Left knee: Swelling and crepitus present. Decreased range of motion. Tenderness present over the medial joint line.      Right foot: Decreased range of motion. Deformity and bunion present.      Comments: Surgical incision has healed well.  It does seem to be clean and dry no signs of infection.   Feet:      Right foot:      Skin integrity: Erythema present.      Toenail Condition: Right toenails are normal.      Left foot:      Skin integrity: Skin integrity normal.      Comments: Hammertoe noted of the right second toe and 2nd and 3rd toe on the left foot. Bunion started on both feet. Some prominent tarsal bones on the top of the right foot.  Left heal has an obvious swelling and tenderness at the insertion of the achilles tendon.  No redness or warmth noted in that area  Lymphadenopathy:      Cervical: No cervical adenopathy.   Skin:     General: Skin is warm and dry.   Neurological:      Mental Status: She is alert and oriented to person, place, and time.      Deep Tendon Reflexes: Reflexes are normal and symmetric.   Psychiatric:         Attention and Perception: Attention and perception normal.         Mood and Affect: Mood is anxious.         Speech: Speech normal.         Behavior: Behavior normal. Behavior is cooperative.         Thought Content: Thought content normal.         Cognition and Memory: Cognition and memory normal.

## 2025-04-26 DIAGNOSIS — I10 ESSENTIAL HYPERTENSION: ICD-10-CM

## 2025-04-28 RX ORDER — LOSARTAN POTASSIUM 50 MG/1
50 TABLET ORAL DAILY
Qty: 90 TABLET | Refills: 3 | OUTPATIENT
Start: 2025-04-28

## 2025-04-28 RX ORDER — LOSARTAN POTASSIUM 50 MG/1
50 TABLET ORAL DAILY
Qty: 90 TABLET | Refills: 3 | Status: SHIPPED | OUTPATIENT
Start: 2025-04-28

## 2025-04-28 NOTE — TELEPHONE ENCOUNTER
Refill request received from pharmacy      Next Office Visit Date:  Future Appointments   Date Time Provider Department Center   4/29/2025 11:00 AM Amanda Jones PT [facility]   5/1/2025  2:30 PM Amanda Jones PT [facility]   5/6/2025 10:45 AM Sera Valadez MD [facility] [facility] [place] - Please review via PDMP        Last Office Visit:    4/24/2025

## 2025-04-28 NOTE — TELEPHONE ENCOUNTER
Refill request received from pharmacy      Next Office Visit Date:  Future Appointments   Date Time Provider Department Center   4/29/2025 11:00 AM Amanda Jones PT [facility]   5/1/2025  2:30 PM Amanda Jones PT [facility]   5/6/2025 10:45 AM Sera Valadez MD [facility] [facility] [place] - Please review via PDMP        Last Office Visit:    4/24/2025

## 2025-04-29 ENCOUNTER — HOSPITAL ENCOUNTER (OUTPATIENT)
Dept: PHYSICAL THERAPY | Facility: HOSPITAL | Age: 70
Setting detail: THERAPIES SERIES
Discharge: HOME | End: 2025-04-29
Payer: MEDICARE

## 2025-04-29 PROCEDURE — 97110 THERAPEUTIC EXERCISES: CPT | Performed by: PHYSICAL THERAPIST

## 2025-04-29 NOTE — PROGRESS NOTES
Physical Therapy Daily Treatment Note    Date:  2025    TIme In:   1052              Time Out:   1206    Patient Name:  Kristyn Martin   :  1955 MRN: [id number]    Restrictions/Precautions:    Pertinent Medical History:  Medical/Treatment Diagnosis Information:  Low back pain, unspecified [M54.50]     Insurance/Certification information:  Payor: [employer] MEDICARE / Plan: [facility] MEDICARE ADVANTAGE [place] / Product Type: *No Product type* /   Physician Information:  Sera Valadez MD  Plan of care signed (Y/N):    Visit# / total visits:     19 /    G-Code (if applicable):      Date / Visit # G-Code Applied:         Progress Note: []  Yes  [x]  No  Next due by: Visit #10       Pain level:   5/10     Subjective:  Patient states her left heel is what hurts her the most.  She states that she she got new shoes and they have helped her feet.  She states that she is doing her HEP daily.      Objective:  Observation:   Test measurements:        Exercises:  Exercise Resistance/Repetitions Date performed   Sci fit Level 3   7 minutes 29   Standing with support for heel raises   20x with 2lb wts 29   Standing slant board  3x 30 sec ea  29   Standing marching  20x  2lb BLE 29   Standing hip ABD  20x  2lb BLE 29   Standing heel raises 20x  2lb BLE 29   Hip add w/ball  3x10 29   Bent knee fall outs 3x10 B 29    ball to wall 3x10  3x10  29   Clam shells  3x10 B 29   Sit to stand  10x 29    Single knee to chest   20x B 29   Hamstring stretch seated on bed  5x ea side holding for 15 sec ea  29   BAPS board B feet 10x ea direction 29   total gym   10x 2 sets 29   B feet on stool 4way  with GTB 15x ea way  29   B feet on stool ABC's     Hallway stairs  1 flight 29   Heel raises on bottom step 15x 29     Other Therapeutic Activities:      Manual Treatments:       Modalities:  Vaso Pnuematic cold pack to left foot 15 min       Timed Code Treatment Minutes: 74      Total Treatment Minutes:  74      Treatment/Activity

## 2025-05-01 ENCOUNTER — HOSPITAL ENCOUNTER (OUTPATIENT)
Dept: PHYSICAL THERAPY | Facility: HOSPITAL | Age: 70
Setting detail: THERAPIES SERIES
Discharge: HOME | End: 2025-05-01

## 2025-05-01 NOTE — CARE COORDINATION
[facility]  Cancel/No Show Note    Service:  [x]Physical Therapy  []Occupational Therapy    Date:5/1/25    Reason For Missed Treatment:  []Illness  []Provider unavailable  []No call/no show  []Late cancellation (<24 hours)  []Arrived >15 minutes late  []Other appointment  []Scheduling conflict  []Arrived on wrong day  []Physician discharged tx  []Transportation conflict  []Inclement weather  []Frequency of tx change  []No reason given  []Arrived but refused participation  []Unable to tolerate  []Medical complications  []Pt on hold due to-  [x]Other-     Plan:  []Rescheduled  []Continue at next scheduled apt  []Reminder text message  []Reminder phone call  []Contacted referring provider  []Discharged

## 2025-05-08 ENCOUNTER — HOSPITAL ENCOUNTER (OUTPATIENT)
Dept: PHYSICAL THERAPY | Facility: HOSPITAL | Age: 70
Setting detail: THERAPIES SERIES
Discharge: HOME | End: 2025-05-08
Payer: MEDICARE

## 2025-05-08 PROCEDURE — 97110 THERAPEUTIC EXERCISES: CPT | Performed by: PHYSICAL THERAPIST

## 2025-05-08 NOTE — PROGRESS NOTES
Physical Therapy Daily Treatment Note    Date:  2025    TIme In:   1435              Time Out:   1540    Patient Name:  Kristyn Martin   :  1955 MRN: [id number]    Restrictions/Precautions:    Pertinent Medical History:  Medical/Treatment Diagnosis Information:  Low back pain, unspecified [M54.50]     Insurance/Certification information:  Payor: [employer] MEDICARE / Plan: [facility] MEDICARE ADVANTAGE [place] / Product Type: *No Product type* /   Physician Information:  Sera Valadez MD  Plan of care signed (Y/N):    Visit# / total visits:     20 /    G-Code (if applicable):      Date / Visit # G-Code Applied:         Progress Note: []  Yes  [x]  No  Next due by: Visit #10       Pain level:   4/10     Subjective:  Patient states her left heel is still what hurts her the most.  She states that her back is feeling better and she was able to go to a yoga class this week.  She states that she is doing her HEP daily.      Objective:  Observation:   Test measurements:        Exercises:  Exercise Resistance/Repetitions Date performed   Sci fit Level 3   7 minutes 8   Standing with support for heel raises   20x with 2lb wts 8   Standing slant board  3x 30 sec ea  8   Standing marching  20x  2lb BLE 8   Standing hip ABD  20x  2lb BLE 8   Standing heel raises 20x  2lb BLE 8   Hip add w/ball  3x10 8   Bent knee fall outs 3x10 B 8    ball to wall 3x10  3x10  8   Clam shells  3x10 B 8   Sit to stand  10x 8    Single knee to chest   20x B 8   Hamstring stretch seated on bed  5x ea side holding for 15 sec ea  8   BAPS board B feet 10x ea direction 8   total gym   10x 2 sets 8   B feet on stool 4way  with GTB 15x ea way  8   B feet on stool ABC's  8   Hallway stairs  1 flight 8   Heel raises on bottom step 15x 8     Other Therapeutic Activities:      Manual Treatments:       Modalities:   cold pack to left foot 10 min       Timed Code Treatment Minutes: 65      Total Treatment Minutes:  65      Treatment/Activity

## 2025-05-09 ENCOUNTER — OFFICE VISIT (OUTPATIENT)
Age: 70
End: 2025-05-09
Payer: MEDICARE

## 2025-05-09 VITALS
BODY MASS INDEX: 42.32 KG/M2 | HEIGHT: 64 IN | SYSTOLIC BLOOD PRESSURE: 148 MMHG | WEIGHT: 247.9 LBS | DIASTOLIC BLOOD PRESSURE: 90 MMHG

## 2025-05-09 DIAGNOSIS — I87.8 VENOUS STASIS: ICD-10-CM

## 2025-05-09 DIAGNOSIS — M76.62 ACHILLES TENDINITIS OF LEFT LOWER EXTREMITY: Primary | ICD-10-CM

## 2025-05-09 NOTE — PROGRESS NOTES
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  She reports she has been wearing compression stockings as recommended which has made a big difference.  Her biggest problem at this point is the left Achilles tendon.  She has been doing physical therapy as well as doing exercise classes twice a week.  She continues to have persisting pain in the Achilles.  It is inhibiting her daily activity.      Subjective     Review of Systems   Constitutional:  Negative for chills, fever, unexpected weight gain and unexpected weight loss.   HENT:  Negative for congestion, postnasal drip and rhinorrhea.    Eyes:  Negative for blurred vision.   Respiratory:  Negative for shortness of breath.    Cardiovascular:  Negative for leg swelling.   Gastrointestinal:  Negative for abdominal pain, nausea and vomiting.   Genitourinary:  Negative for difficulty urinating.   Musculoskeletal:  Positive for arthralgias. Negative for gait problem, joint swelling and myalgias.   Skin:  Negative for skin lesions and wound.   Neurological:  Negative for dizziness, weakness, light-headedness and numbness.   Hematological:  Does not bruise/bleed easily.   Psychiatric/Behavioral:  Negative for depressed mood.    All other systems reviewed and are negative.       I have reviewed and updated the following portions of the patient's history and review of systems: allergies, current medications, past family history, past medical history, past social history, past surgical  history and problem list.    Medications:   Current Outpatient Medications:     Ascorbic Acid (Vitamin C) 250 MG chewable tablet, Chew 500 mg Daily., Disp: , Rfl:     Cholecalciferol (Vitamin D) 50 MCG (2000 UT) tablet, Take 1 tablet by mouth Daily., Disp: , Rfl:     Diclofenac Sodium (VOLTAREN) 1 % gel gel, Apply 4 g topically to the appropriate area as directed 4 (Four) Times a Day As Needed (joint pain)., Disp: , Rfl:     DULoxetine (CYMBALTA) 60 MG capsule, Take 1 capsule by mouth Daily., Disp: 90 capsule, Rfl: 3    estradiol (ESTRACE) 1 MG tablet, Take 1 tablet by mouth Daily., Disp: , Rfl:     furosemide (LASIX) 20 MG tablet, Take 1 tablet by mouth Daily., Disp: , Rfl:     levothyroxine (Synthroid) 50 MCG tablet, Take 1 tablet by mouth Daily., Disp: 90 tablet, Rfl: 3    losartan (COZAAR) 50 MG tablet, Take 1 tablet by mouth Daily., Disp: , Rfl:     Lotilaner (Xdemvy) 0.25 % solution, Apply  to eye(s) as directed by provider., Disp: , Rfl:     lubiprostone (AMITIZA) 8 MCG capsule, Take 1 capsule by mouth 2 (Two) Times a Day With Meals., Disp: , Rfl:     MAGNESIUM CITRATE PO, Take 400 mg by mouth Daily., Disp: , Rfl:     metroNIDAZOLE (FLAGYL) 0.75 % lotion lotion, Apply  topically to the appropriate area as directed Every 12 (Twelve) Hours., Disp: , Rfl:     mometasone (ELOCON) 0.1 % cream, Apply 1 Application topically to the appropriate area as directed Daily. ears, Disp: , Rfl:     MULTIPLE VITAMINS-MINERALS ER PO, Take 1 tablet by mouth Daily., Disp: , Rfl:     nabumetone (RELAFEN) 750 MG tablet, Take 1 tablet by mouth 2 (Two) Times a Day., Disp: 60 tablet, Rfl: 1    Omega-3 Fatty Acids (fish oil) 1200 MG capsule capsule, Take 1 capsule by mouth Daily With Breakfast., Disp: , Rfl:     polyethyl glycol-propyl glycol (SYSTANE) 0.4-0.3 % solution ophthalmic solution (artificial tears), Administer 1 drop to both eyes As Needed (dry eyes)., Disp: , Rfl:     pregabalin (LYRICA) 100 MG capsule, Take 1 capsule by  "mouth 3 (Three) Times a Day., Disp: 270 capsule, Rfl: 1    Probiotic Product (PROBIOTIC DAILY PO), Take 1 capsule by mouth Daily., Disp: , Rfl:     Rhofade 1 % cream, Apply 1 Application topically to the appropriate area as directed Daily. Face for roscea, Disp: , Rfl:     traZODone (DESYREL) 50 MG tablet, Take 1 tablet by mouth Every Night. Takes 2 at night, Disp: , Rfl:     Urea 40 % lotion, Apply 1 Application topically Daily. Toes, Disp: , Rfl:     zolpidem (Ambien) 5 MG tablet, Take 1 tablet by mouth At Night As Needed for Sleep., Disp: , Rfl:     Allergies:   Allergies   Allergen Reactions    Augmentin [Amoxicillin-Pot Clavulanate] Rash     Beta lactam allergy details  Antibiotic reaction: rash  Age at reaction: adult  Dose to reaction time: unknown  Reason for antibiotic: unknown  Epinephrine required for reaction?: unknown  Tolerated antibiotics: unknown             Objective      Vital Signs:   Vitals:    05/09/25 1439   BP: 148/90   Weight: 112 kg (247 lb 14.4 oz)   Height: 162.6 cm (64.02\")       Ortho Exam:  Left foot and ankle exam: Very tender over the insertion of the Achilles tendon and proximally.  1+ edema left lower extremity.  5/5 motor strength.  Neurovascular intact distally.    Results Review:        Assessment / Plan      Assessment:   Diagnoses and all orders for this visit:    1. Achilles tendinitis of left lower extremity (Primary)    2. Venous stasis        Quality Metrics:   BMI:          Tobacco:   Alison Benitez  reports that she has never smoked. She has never been exposed to tobacco smoke. She has never used smokeless tobacco.      Plan:   Left Achilles tendinitis.  I reviewed clinical findings past and current treatment the patient.  Patient has been doing physical therapy and stretching on her own as well as exercise classes.  She continues to have pain and dysfunction.  The compression stockings have helped some.  We discussed further treatment including continued physical " therapy, topical anti-inflammatories versus casting.  Patient would like to go into a cast today.  I explained this is 6 weeks.  When she comes out I do not expect her to be 100% improved.  Plan today patient will go into a short leg fiberglass weightbearing cast on the left.  She will begin taking 81 mg aspirin twice daily for DVT prophylaxis.  I will see her back in 6 weeks for exam out of the cast, sooner if needed.    Gina Campos PA-C  [facility]    Dictated using Dragon Speech Recognition.

## 2025-05-12 ENCOUNTER — APPOINTMENT (OUTPATIENT)
Dept: PHYSICAL THERAPY | Facility: HOSPITAL | Age: 70
End: 2025-05-12
Payer: MEDICARE

## 2025-05-13 ENCOUNTER — HOSPITAL ENCOUNTER (OUTPATIENT)
Dept: PHYSICAL THERAPY | Facility: HOSPITAL | Age: 70
Setting detail: THERAPIES SERIES
Discharge: HOME | End: 2025-05-13
Payer: MEDICARE

## 2025-05-13 PROCEDURE — 97110 THERAPEUTIC EXERCISES: CPT | Performed by: PHYSICAL THERAPIST

## 2025-05-13 NOTE — PROGRESS NOTES
Physical Therapy Daily Treatment Note/Reassessment    Date:  2025    TIme In:   1100               Time Out:   1151    Patient Name:  Kristyn Martin   :  1955 MRN: [id number]    Restrictions/Precautions:    Pertinent Medical History:  Medical/Treatment Diagnosis Information:  Low back pain, unspecified [M54.50]     Insurance/Certification information:  Payor: [employer] MEDICARE / Plan: [employer] MEDICARE ADVANTAGE [place] / Product Type: *No Product type* /   Physician Information:  Sera Valadez MD  Plan of care signed (Y/N):    Visit# / total visits:     21 /    G-Code (if applicable):      Date / Visit # G-Code Applied:         Progress Note: []  Yes  [x]  No  Next due by: Visit #10       Pain level:   0/10 -B feet    Subjective:  Patient states that she saw her pediatrist last week who cast her left foot and put her in a walking boot.  She states that it does make her left heel and foot feel better but the walking boot is making her back hurt some.         Objective:  Observation:   Test measurements:    Back Index Score: 0  Lower Extremity Functional Score: 4/80 secondary to LLE cast  Sit to stand: 18 sec  TUG score: 17sec      Exercises:  Exercise Resistance/Repetitions Date performed   Sci fit Level 3   7 minutes 13   Standing with support for heel raises   20x with 2lb wts    Standing slant board  3x 30 sec ea  -   Standing marching  20x  2lb BLE -   Standing hip ABD  20x  2lb BLE -   Standing heel raises 20x  2lb BLE -   Hip add w/ball  3x10 13   Bent knee fall outs 3x10 B 13    ball to wall 3x10  3x10  13   Clam shells  3x10 B 13   Sit to stand  10x 13    Single knee to chest   20x B 13   Hamstring stretch seated on bed  5x ea side holding for 15 sec ea  13    ball to wall  3x10  13   total gym   10x 2 sets 13   B feet on stool 4way  with GTB 15x ea way  13   Hallway stairs  1 flight -   Heel raises on bottom step 15x -     Other Therapeutic Activities:      Manual Treatments:       Modalities:

## 2025-05-20 ENCOUNTER — HOSPITAL ENCOUNTER (OUTPATIENT)
Dept: PHYSICAL THERAPY | Facility: HOSPITAL | Age: 70
Setting detail: THERAPIES SERIES
Discharge: HOME | End: 2025-05-20
Payer: MEDICARE

## 2025-05-20 NOTE — CARE COORDINATION
[facility]  Cancel/No Show Note    Service:  [x]Physical Therapy  []Occupational Therapy    Date:5/20/25    Reason For Missed Treatment:  []Illness  []Provider unavailable  []No call/no show  []Late cancellation (<24 hours)  []Arrived >15 minutes late  []Other appointment  []Scheduling conflict  []Arrived on wrong day  []Physician discharged tx  []Transportation conflict  []Inclement weather  []Frequency of tx change  []No reason given  []Arrived but refused participation  []Unable to tolerate  []Medical complications  []Pt on hold due to-  [x]Other-     Plan:  []Rescheduled  [x]Continue at next scheduled apt  []Reminder text message  []Reminder phone call  []Contacted referring provider  []Discharged

## 2025-05-22 ENCOUNTER — HOSPITAL ENCOUNTER (OUTPATIENT)
Dept: PHYSICAL THERAPY | Facility: HOSPITAL | Age: 70
Setting detail: THERAPIES SERIES
Discharge: HOME | End: 2025-05-22
Payer: MEDICARE

## 2025-05-22 PROCEDURE — 97110 THERAPEUTIC EXERCISES: CPT | Performed by: PHYSICAL THERAPIST

## 2025-05-22 NOTE — PROGRESS NOTES
Physical Therapy Daily Treatment Note    Date:  2025    TIme In:   1511              Time Out:   1546    Patient Name:  Kristyn Martin   :  1955 MRN: [id number]    Restrictions/Precautions:    Pertinent Medical History:  Medical/Treatment Diagnosis Information:  Low back pain, unspecified [M54.50]     Insurance/Certification information:  Payor: [employer] MEDICARE / Plan: [facility] MEDICARE ADVANTAGE [place] / Product Type: *No Product type* /   Physician Information:  Sera Valadez MD  Plan of care signed (Y/N):    Visit# / total visits:     21 /    G-Code (if applicable):      Date / Visit # G-Code Applied:         Progress Note: []  Yes  [x]  No  Next due by: Visit #10       Pain level:   2/10     Subjective:   Patient states that her right heel is feeling a little better today.  She states that she is trying to be careful and not walk much while she has the cast. She states that she did have a fall while in her drive but was able to get up IND.     Objective:  Observation:   Test measurements:        Exercises:  Exercise Resistance/Repetitions Date performed   Sci fit Level 3   7 minutes 22   Standing with support for heel raises   20x with 2lb wts 22   Standing slant board  3x 30 sec ea  22   Standing marching  20x  2lb BLE 22   Standing hip ABD  20x  2lb BLE 22   Standing heel raises 20x  2lb BLE 22   Hip add w/ball  3x10 22   Bent knee fall outs 3x10 B 22    ball to wall 3x10  3x10  22   Clam shells  3x10 B 22   Sit to stand  10x 22    Single knee to chest   20x B 22   Hamstring stretch seated on bed  5x ea side holding for 15 sec ea  22   BAPS board B feet 10x ea direction 22   total gym   10x 2 sets 22   B feet on stool 4way  with GTB 15x ea way  22   B feet on stool ABC's  22   Hallway stairs  1 flight 22   Heel raises on bottom step 15x 22     Other Therapeutic Activities:      Manual Treatments:       Modalities:         Timed Code Treatment Minutes: 35      Total Treatment Minutes:

## 2025-05-28 ENCOUNTER — APPOINTMENT (OUTPATIENT)
Dept: PHYSICAL THERAPY | Facility: HOSPITAL | Age: 70
End: 2025-05-28
Payer: MEDICARE

## 2025-05-29 ENCOUNTER — HOSPITAL ENCOUNTER (OUTPATIENT)
Dept: PHYSICAL THERAPY | Facility: HOSPITAL | Age: 70
Setting detail: THERAPIES SERIES
Discharge: HOME | End: 2025-05-29
Payer: MEDICARE

## 2025-05-29 PROCEDURE — 97110 THERAPEUTIC EXERCISES: CPT | Performed by: PHYSICAL THERAPIST

## 2025-05-29 NOTE — PROGRESS NOTES
Physical Therapy Daily Treatment Note    Date:  2025    TIme In:   1002             Time Out:   1044    Patient Name:  Kristyn Martin   :  1955 MRN: [id number]    Restrictions/Precautions:    Pertinent Medical History:  Medical/Treatment Diagnosis Information:  Low back pain, unspecified [M54.50]     Insurance/Certification information:  Payor: [employer] MEDICARE / Plan: [employer] MEDICARE ADVANTAGE [place] / Product Type: *No Product type* /   Physician Information:  Sera Valadez MD  Plan of care signed (Y/N):    Visit# / total visits:     22 /    G-Code (if applicable):      Date / Visit # G-Code Applied:         Progress Note: []  Yes  [x]  No  Next due by: Visit #10       Pain level:   2/10     Subjective:   Patient states that her right cast is really bothering her today and she is going to go see her dr to see if there is something they can do.  She states that she is trying to be careful and not walk much while she has the cast. She states that she sat on a blanket in her yard with her granddaughter and could not get up without help.     Objective:  Observation:   Test measurements:        Exercises:  Exercise Resistance/Repetitions Date performed   Sci fit Level 3   7 minutes 29   Standing with support for heel raises   20x with 2lb wts 29   Standing slant board  3x 30 sec ea  29   Standing marching  20x  2lb BLE 29   Standing hip ABD  20x  2lb BLE 29   Standing heel raises 20x  2lb BLE 29   Hip add w/ball  3x10 29   Bent knee fall outs 3x10 B 29    ball to wall 3x10  3x10  29   Clam shells  3x10 B 29   Sit to stand  10x 29    Single knee to chest   20x B 29   Hamstring stretch seated on bed  5x ea side holding for 15 sec ea  29   BAPS board B feet 10x ea direction 29   total gym   10x 2 sets 29   B feet on stool 4way  with GTB 15x ea way  29   B feet on stool ABC's  29   Hallway stairs  1 flight 29   Heel raises on bottom step 15x 29     Other Therapeutic Activities:  Patient ed in how to get up

## 2025-06-02 ENCOUNTER — APPOINTMENT (OUTPATIENT)
Dept: PHYSICAL THERAPY | Facility: HOSPITAL | Age: 70
End: 2025-06-02
Payer: MEDICARE

## 2025-06-04 ENCOUNTER — HOSPITAL ENCOUNTER (OUTPATIENT)
Dept: PHYSICAL THERAPY | Facility: HOSPITAL | Age: 70
Setting detail: THERAPIES SERIES
Discharge: HOME | End: 2025-06-04
Payer: MEDICARE

## 2025-06-04 PROCEDURE — 97110 THERAPEUTIC EXERCISES: CPT | Performed by: PHYSICAL THERAPIST

## 2025-06-04 NOTE — PROGRESS NOTES
Physical Therapy Daily Treatment Note    Date:  2025    TIme In:   0930             Time Out:   1004    Patient Name:  Kristyn Martin   :  1955 MRN: [id number]    Restrictions/Precautions:    Pertinent Medical History:  Medical/Treatment Diagnosis Information:  Low back pain, unspecified [M54.50]     Insurance/Certification information:  Payor: [employer] MEDICARE / Plan: [facility] MEDICARE ADVANTAGE [place] / Product Type: *No Product type* /   Physician Information:  Sera Valadez MD  Plan of care signed (Y/N):    Visit# / total visits:     23 /    G-Code (if applicable):      Date / Visit # G-Code Applied:         Progress Note: []  Yes  [x]  No  Next due by: Visit #10       Pain level:   1/10     Subjective:   Patient states that she is doing a little better but did have another fall at home.  She states that her family was with her but she was able to stand up slowly but IND.  She states that she is trying to be careful and not walk much while she has the cast.      Objective:  Observation:   Test measurements:        Exercises:  Exercise Resistance/Repetitions Date performed   Sci fit Level 3   7 minutes 4   Standing with support for heel raises   20x with 2lb wts -   Standing slant board  3x 30 sec ea  -   Standing marching  20x  2lb BLE -   Standing hip ABD  20x  2lb BLE -   Standing heel raises 20x  2lb BLE -   Hip add w/ball  3x10 4   Bent knee fall outs 3x10 B 4    ball to wall 3x10  3x10  4   Clam shells  3x10 B 4   Sit to stand  10x -    Single knee to chest   20x B 4   Hamstring stretch seated on bed  5x ea side holding for 15 sec ea  4   BAPS board B feet 10x ea direction  R foot 4   total gym   10x 2 sets -   B feet on stool 4way  with GTB 15x ea way  4   B feet on stool ABC's  4   Hallway stairs  1 flight -   Heel raises on bottom step 15x 4     Other Therapeutic Activities:    Manual Treatments:       Modalities:         Timed Code Treatment Minutes: 34      Total Treatment Minutes:

## 2025-06-05 ENCOUNTER — APPOINTMENT (OUTPATIENT)
Dept: PHYSICAL THERAPY | Facility: HOSPITAL | Age: 70
End: 2025-06-05
Payer: MEDICARE

## 2025-06-05 DIAGNOSIS — G47.09 OTHER INSOMNIA: ICD-10-CM

## 2025-06-05 RX ORDER — TRAZODONE HYDROCHLORIDE 50 MG/1
TABLET ORAL
Qty: 90 TABLET | Refills: 1 | OUTPATIENT
Start: 2025-06-05

## 2025-06-11 ENCOUNTER — TELEPHONE (OUTPATIENT)
Dept: NEUROSURGERY | Facility: CLINIC | Age: 70
End: 2025-06-11
Payer: MEDICARE

## 2025-06-11 ENCOUNTER — APPOINTMENT (OUTPATIENT)
Dept: PHYSICAL THERAPY | Facility: HOSPITAL | Age: 70
End: 2025-06-11
Payer: MEDICARE

## 2025-06-11 DIAGNOSIS — Z98.1 S/P LUMBAR FUSION: Primary | ICD-10-CM

## 2025-06-11 DIAGNOSIS — M51.9 LUMBAR DISC DISEASE: ICD-10-CM

## 2025-06-11 NOTE — TELEPHONE ENCOUNTER
PATIENT KARLA COLUNGA CALLED WANTING TO LET RAYMOND KNOW THAT SHE HAS A CAST ON HER LEG FOR SOME ISSUE ON HER FOOT THAT THEY WANT HER TO KEEP PRESSURE OF HER FOOT. SHE STATES THAT SHE FEELS QUITE WELL REGARDING HER LOWER BACK. SHE HAS BEEN SEEING  ORTHO, THE PROVIDER THAT RAYMOND RECOMMENDED ABOUT HER FOOT. SHE WANTS TO KNOW IF RAYMOND FEELS ITS NECESSARY FOR HER TO COME IN AND SHE WILL IF SO, BUT IF ITS NOT SHE WOULD PREFER NOT TO. PLEASE CALL THE PT BACK TO ADVISE. THANKS.    CALL BACK # [phone]     CALL BACK ANYTIME

## 2025-06-12 NOTE — TELEPHONE ENCOUNTER
Spoke to patient. Appointment scheduled in Sept 2025. Patient reminded to get x-rays. Order mailed to patient.

## 2025-06-18 ENCOUNTER — APPOINTMENT (OUTPATIENT)
Dept: PHYSICAL THERAPY | Facility: HOSPITAL | Age: 70
End: 2025-06-18
Payer: MEDICARE

## 2025-06-20 ENCOUNTER — OFFICE VISIT (OUTPATIENT)
Age: 70
End: 2025-06-20
Payer: MEDICARE

## 2025-06-20 VITALS — WEIGHT: 247.4 LBS | BODY MASS INDEX: 42.24 KG/M2 | HEIGHT: 64 IN

## 2025-06-20 DIAGNOSIS — M76.62 ACHILLES TENDINITIS OF LEFT LOWER EXTREMITY: Primary | ICD-10-CM

## 2025-06-20 PROCEDURE — 1160F RVW MEDS BY RX/DR IN RCRD: CPT | Performed by: PHYSICIAN ASSISTANT

## 2025-06-20 PROCEDURE — 1159F MED LIST DOCD IN RCRD: CPT | Performed by: PHYSICIAN ASSISTANT

## 2025-06-20 PROCEDURE — 99213 OFFICE O/P EST LOW 20 MIN: CPT | Performed by: PHYSICIAN ASSISTANT

## 2025-06-20 RX ORDER — BUMETANIDE 1 MG/1
TABLET ORAL
COMMUNITY

## 2025-06-20 RX ORDER — TRIAMCINOLONE ACETONIDE 1 MG/G
OINTMENT TOPICAL
COMMUNITY

## 2025-06-20 NOTE — PROGRESS NOTES
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 provider found    History of Present Illness:   Alison Benitez returns to clinic today for left achilles tendonitis after 6 weeks casting.  She reports improved pain.  No new symptoms.      Subjective     Review of Systems   All other systems reviewed and are negative.       I have reviewed and updated the following portions of the patient's history and review of systems: allergies, current medications, past family history, past medical history, past social history, past surgical history and problem list.    Medications:   Current Outpatient Medications:   •  Ascorbic Acid (Vitamin C) 250 MG chewable tablet, Chew 500 mg Daily., Disp: , Rfl:   •  bumetanide (BUMEX) 1 MG tablet, TAKE 1 TABLET BY MOUTH DAILY. STOP LASIX, Disp: , Rfl:   •  Cholecalciferol (Vitamin D) 50 MCG (2000 UT) tablet, Take 1 tablet by mouth Daily., Disp: , Rfl:   •  Diclofenac Sodium (VOLTAREN) 1 % gel gel, Apply 4 g topically to the appropriate area as directed 4 (Four) Times a Day As Needed (joint pain)., Disp: , Rfl:   •  DULoxetine (CYMBALTA) 60 MG capsule, Take 1 capsule by mouth Daily., Disp: 90 capsule, Rfl: 3  •  estradiol (ESTRACE) 1 MG tablet, Take 1 tablet by mouth Daily., Disp: , Rfl:   •  furosemide (LASIX) 20 MG tablet, Take 1 tablet by mouth Daily., Disp: , Rfl:   •  levothyroxine (Synthroid) 50 MCG tablet, Take 1 tablet by mouth Daily., Disp: 90 tablet, Rfl: 3  •  losartan (COZAAR) 50 MG tablet, Take 1 tablet by mouth Daily., Disp: , Rfl:   •  MAGNESIUM CITRATE PO, Take 400 mg by mouth Daily., Disp: , Rfl:   •  metroNIDAZOLE (FLAGYL) 0.75 %  "lotion lotion, Apply  topically to the appropriate area as directed Every 12 (Twelve) Hours., Disp: , Rfl:   •  mometasone (ELOCON) 0.1 % cream, Apply 1 Application topically to the appropriate area as directed Daily. ears, Disp: , Rfl:   •  MULTIPLE VITAMINS-MINERALS ER PO, Take 1 tablet by mouth Daily., Disp: , Rfl:   •  nabumetone (RELAFEN) 750 MG tablet, Take 1 tablet by mouth 2 (Two) Times a Day., Disp: 60 tablet, Rfl: 1  •  Omega-3 Fatty Acids (fish oil) 1200 MG capsule capsule, Take 1 capsule by mouth Daily With Breakfast., Disp: , Rfl:   •  polyethyl glycol-propyl glycol (SYSTANE) 0.4-0.3 % solution ophthalmic solution (artificial tears), Administer 1 drop to both eyes As Needed (dry eyes)., Disp: , Rfl:   •  pregabalin (LYRICA) 100 MG capsule, Take 1 capsule by mouth 3 (Three) Times a Day., Disp: 270 capsule, Rfl: 1  •  Probiotic Product (PROBIOTIC DAILY PO), Take 1 capsule by mouth Daily., Disp: , Rfl:   •  Rhofade 1 % cream, Apply 1 Application topically to the appropriate area as directed Daily. Face for roscea, Disp: , Rfl:   •  triamcinolone (KENALOG) 0.1 % ointment, APPLY A THIN LAYER TO THE AFFECTED AREA TWICE DAILY FOR 2 WEEKS THEN TAKE 2 WEEKS OFF. REPEAT CYCLE AS NEEDED, Disp: , Rfl:   •  zolpidem (Ambien) 5 MG tablet, Take 1 tablet by mouth At Night As Needed for Sleep., Disp: , Rfl:     Allergies:   Allergies   Allergen Reactions   • Augmentin [Amoxicillin-Pot Clavulanate] Rash     Beta lactam allergy details  Antibiotic reaction: rash  Age at reaction: adult  Dose to reaction time: unknown  Reason for antibiotic: unknown  Epinephrine required for reaction?: unknown  Tolerated antibiotics: unknown             Objective      Vital Signs:   Vitals:    06/20/25 1113   Weight: 112 kg (247 lb 6.4 oz)   Height: 162.2 cm (63.86\")       Ortho Exam:  Left ankle exam: very mildly tender over the achilles. Remainder of the foot/ankle nontender. Motor function intact.    Results Review:  Mammo Screening " Digital Tomosynthesis Bilateral With CAD  Narrative: BILATERAL DIGITAL SCREENING MAMMOGRAM WITH TOMOSYNTHESIS     CLINICAL INDICATION: Screening mammogram.     TECHNIQUE: Bilateral low dose full field digital breast tomosynthesis  imaging was performed. CAD was utilized.     COMPARISON: Prior studies dating back to 8/3/2015.     FINDINGS: There are scattered fibroglandular densities.     RIGHT BREAST: No suspicious masses, calcifications, or areas of  distortion are seen.     LEFT BREAST: No suspicious masses, calcifications, or areas of  distortion are seen.     Impression: No suspicious abnormality identified.     OVERALL ASSESSMENT: ACR BI-RADS CATEGORY: 1, NEGATIVE:  Recommend  continued routine annual screening mammogram.     The standard false-negative rate of mammography is between 10% and 25%.   Complex patterns or increased breast density will markedly elevate the  false-negative rate of mammography.       A letter, in lay terminology, with the results of this exam will be  mailed to the patient.       4/16/2025 1:00 PM by Cathy Bravo MD on Workstation: [id number]          DEXA Bone Density Axial  Result Date: 4/23/2025  1. Bone mineral density is in the normal range for the hips and forearm. Lumbar spine could not be evaluated due to presence of previous fusion hardware. Secondary causes for low bone mass should be considered in patients with osteopenia and osteoporosis.  Patients with clinical history or radiographic documented fragility fracture have presumed osteoporosis.  All treatments require clinical judgment. WHO (World Health Organization) criteria for post-menopausal females and males over 50: T-score = Standard deviation from young normal controls Z-score = Standard deviation from age match controls Normal = T-score more positive than -1 Osteopenia = T-score -1.1 to -2.4 Osteoporosis =T-score more negative than -2.5 NOF Recommendations:  The NOF recommends an adult under the age of 50  needs 1,000 mg of calcium and 400-800 IU of vitamin D daily.  Adults 50 and over need 1,200 mg calcium and 800-1,000 IU of vitamin D daily.  Effective therapies for the prevention of osteoporosis include bisphosphonates.   FRAX Version 3.08 (10 year fracture Risk Assessment):  According to NOF and ISCD FRAX applies to untreated postmenopausal women and men ages 40-90 with a hip T-score between -1.1 and -2.4. FRAX is highly dependent on established risk factors.  Risk factors not captured in FRAX model include: frailty, falls, vitamin D deficiency, increased bone turnover, interval significant decline in BMD.  Patients with a FRAX of greater than 3% in the hip and greater than 20% for major osteoporotic fracture may  benefit from medical therapy for osteoporosis. Electronically signed by Alejo Back    Mammo Screening Digital Tomosynthesis Bilateral With CAD  Result Date: 4/16/2025  No suspicious abnormality identified.  OVERALL ASSESSMENT: ACR BI-RADS CATEGORY: 1, NEGATIVE:  Recommend continued routine annual screening mammogram.  The standard false-negative rate of mammography is between 10% and 25%. Complex patterns or increased breast density will markedly elevate the false-negative rate of mammography.   A letter, in lay terminology, with the results of this exam will be mailed to the patient.   4/16/2025 1:00 PM by Cathy Bravo MD on Workstation: [id number]      US THYROID  Result Date: 3/13/2025  1. A 1.1 cm TR 3 nodule of the right lobe of the thyroid is stable since 1/6/2023 and likely benign. 2. No new or increasing thyroid nodules. TI-RADS Category TR 3 ---- TI-RADS follow up criteria: TR1 Benign (No FNA) TR2 Not Suspicious (No FNA) TR3 Mildly Suspicious (FNA >= 2.5 cm, Follow >= 1.5 cm, at 1, 3, and 5 years) TR4 Moderately Suspicious (FNA >= 1.5 cm, Follow >= 1cm, at 1, 2, 3, and 5 years) TR5 Highly Suspicious (FNA >= 1 cm, Follow >= 0.5 cm yearly for 5 years) ---- Note: Any additional thyroid  nodules visualized but not described have benign characteristics or do not meet or alter TI-RADS criteria for follow up. Following TI-RADS guidance, only up to 4 nodules will be scored. Deviation from TI-RADS management recommendations may be appropriate based on account of symptomatology, laboratory markers, or other risk factors. Clinical judgment should be used in conjunction with these recommendations. TI-RADS recommends that FNA biopsy should be limited to a maximum of two nodules and any suspicious lymphadenopathy. If multiple nodules meet the criteria for FNA, then the two with the highest point total (and then largest size as a tiebreaker) are the most appropriate to biopsy. Reference: MARLEN Juarez., PETAR Nino., ANNE Morrison., SANDRA Tom., Viktoria, NANI., Manjula, SJonesA., Ace, AT. (2017). ACR Thyroid Imaging, Reporting and Data. ACR Thyroid Imaging, Reporting and Data System (TI-RADS): White Paper of the ACR TI-RADS Committee. J Am Scott Radiol. 14(5), 587-595. doi:10.1016/j.jacr.2017.01.046 Electronically signed by Casey Kelly MD    XR Spine Lumbar 2 or 3 View  Result Date: 3/6/2025  Postop changes of posterior and anterior interbody fusion L3-L5. Osteopenia. Mild dextroconvex scoliosis. Grade 1 anterolisthesis of L4 on L5. Advanced degenerative disc disease in the visualized lower thoracic spine and lumbar spine above the level of fusion, worst at L2-3. Electronically signed by Dmitriy Fong        Assessment / Plan      Assessment:   Diagnoses and all orders for this visit:    1. Achilles tendinitis of left lower extremity (Primary)    2. Venous stasis        Quality Metrics:          Plan:   Left achilles tendonitis with improved pain after 6 weeks casting.  Patient will return to PT and night splinting.  She will immediately begin the toe pull and runner stretch.  I will see her back as needed.    Gina Campos PA-C  [facility] [place] [facility]    Dictated using Dragon Speech Recognition.

## 2025-06-23 SDOH — HEALTH STABILITY: PHYSICAL HEALTH: ON AVERAGE, HOW MANY DAYS PER WEEK DO YOU ENGAGE IN MODERATE TO STRENUOUS EXERCISE (LIKE A BRISK WALK)?: 5 DAYS

## 2025-06-23 SDOH — HEALTH STABILITY: PHYSICAL HEALTH: ON AVERAGE, HOW MANY MINUTES DO YOU ENGAGE IN EXERCISE AT THIS LEVEL?: 20 MIN

## 2025-06-23 ASSESSMENT — LIFESTYLE VARIABLES
HOW OFTEN DO YOU HAVE A DRINK CONTAINING ALCOHOL: NEVER
HOW OFTEN DO YOU HAVE SIX OR MORE DRINKS ON ONE OCCASION: 1
HOW MANY STANDARD DRINKS CONTAINING ALCOHOL DO YOU HAVE ON A TYPICAL DAY: PATIENT DOES NOT DRINK
HOW MANY STANDARD DRINKS CONTAINING ALCOHOL DO YOU HAVE ON A TYPICAL DAY: 0
HOW OFTEN DO YOU HAVE A DRINK CONTAINING ALCOHOL: 1

## 2025-06-23 ASSESSMENT — PATIENT HEALTH QUESTIONNAIRE - PHQ9
SUM OF ALL RESPONSES TO PHQ QUESTIONS 1-9: 0
SUM OF ALL RESPONSES TO PHQ QUESTIONS 1-9: 0
2. FEELING DOWN, DEPRESSED OR HOPELESS: NOT AT ALL
1. LITTLE INTEREST OR PLEASURE IN DOING THINGS: NOT AT ALL
SUM OF ALL RESPONSES TO PHQ QUESTIONS 1-9: 0
SUM OF ALL RESPONSES TO PHQ QUESTIONS 1-9: 0

## 2025-06-26 ENCOUNTER — OFFICE VISIT (OUTPATIENT)
Age: 70
End: 2025-06-26

## 2025-06-26 VITALS
WEIGHT: 253.2 LBS | BODY MASS INDEX: 43.23 KG/M2 | OXYGEN SATURATION: 97 % | HEART RATE: 95 BPM | TEMPERATURE: 97.9 F | SYSTOLIC BLOOD PRESSURE: 136 MMHG | DIASTOLIC BLOOD PRESSURE: 84 MMHG | HEIGHT: 64 IN | RESPIRATION RATE: 16 BRPM

## 2025-06-26 DIAGNOSIS — G89.29 CHRONIC LOW BACK PAIN, UNSPECIFIED BACK PAIN LATERALITY, UNSPECIFIED WHETHER SCIATICA PRESENT: ICD-10-CM

## 2025-06-26 DIAGNOSIS — M54.50 CHRONIC LOW BACK PAIN, UNSPECIFIED BACK PAIN LATERALITY, UNSPECIFIED WHETHER SCIATICA PRESENT: ICD-10-CM

## 2025-06-26 DIAGNOSIS — L30.9 ECZEMA, UNSPECIFIED TYPE: ICD-10-CM

## 2025-06-26 DIAGNOSIS — M54.41 CHRONIC BILATERAL LOW BACK PAIN WITH SCIATICA, SCIATICA LATERALITY UNSPECIFIED: ICD-10-CM
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
## 2025-06-26 DIAGNOSIS — M54.42 CHRONIC BILATERAL LOW BACK PAIN WITH SCIATICA, SCIATICA LATERALITY UNSPECIFIED: ICD-10-CM

## 2025-06-26 DIAGNOSIS — G47.19 EXCESSIVE DAYTIME SLEEPINESS: ICD-10-CM

## 2025-06-26 DIAGNOSIS — E66.813 OBESITY, CLASS 3 (HCC): ICD-10-CM

## 2025-06-26 DIAGNOSIS — F41.9 ANXIETY: ICD-10-CM

## 2025-06-26 DIAGNOSIS — I10 ESSENTIAL HYPERTENSION: ICD-10-CM

## 2025-06-26 DIAGNOSIS — Z78.0 POST-MENOPAUSAL: ICD-10-CM

## 2025-06-26 DIAGNOSIS — G89.29 CHRONIC BILATERAL LOW BACK PAIN WITH SCIATICA, SCIATICA LATERALITY UNSPECIFIED: ICD-10-CM

## 2025-06-26 DIAGNOSIS — Z00.00 MEDICARE ANNUAL WELLNESS VISIT, SUBSEQUENT: Primary | ICD-10-CM

## 2025-06-26 DIAGNOSIS — G47.09 OTHER INSOMNIA: ICD-10-CM

## 2025-06-26 RX ORDER — LEVOTHYROXINE SODIUM 50 MCG
50 TABLET ORAL DAILY
Qty: 90 TABLET | Refills: 3 | Status: SHIPPED | OUTPATIENT
Start: 2025-06-26

## 2025-06-26 RX ORDER — PREGABALIN 75 MG/1
75 CAPSULE ORAL 3 TIMES DAILY
Qty: 90 CAPSULE | Refills: 2 | Status: CANCELLED | OUTPATIENT
Start: 2025-06-26 | End: 2025-09-24

## 2025-06-26 RX ORDER — PREGABALIN 150 MG/1
150 CAPSULE ORAL 3 TIMES DAILY
Qty: 90 CAPSULE | Refills: 2 | Status: SHIPPED | OUTPATIENT
Start: 2025-06-26 | End: 2025-09-24

## 2025-06-26 RX ORDER — ESTRADIOL 1 MG/1
1 TABLET ORAL DAILY
Qty: 90 TABLET | Refills: 3 | Status: SHIPPED | OUTPATIENT
Start: 2025-06-26

## 2025-06-26 RX ORDER — CALCIPOTRIENE 0.05 MG/ML
SOLUTION TOPICAL 2 TIMES DAILY
Qty: 60 ML | Refills: 2 | Status: SHIPPED | OUTPATIENT
Start: 2025-06-26

## 2025-06-26 RX ORDER — DULOXETIN HYDROCHLORIDE 60 MG/1
60 CAPSULE, DELAYED RELEASE ORAL DAILY
Qty: 90 CAPSULE | Refills: 3 | Status: SHIPPED | OUTPATIENT
Start: 2025-06-26

## 2025-06-26 RX ORDER — PREGABALIN 100 MG/1
100 CAPSULE ORAL 3 TIMES DAILY
Qty: 60 CAPSULE | Refills: 2 | Status: CANCELLED | OUTPATIENT
Start: 2025-06-26 | End: 2025-09-24

## 2025-06-26 RX ORDER — LOSARTAN POTASSIUM 50 MG/1
50 TABLET ORAL DAILY
Qty: 90 TABLET | Refills: 3 | Status: SHIPPED | OUTPATIENT
Start: 2025-06-26

## 2025-06-26 RX ORDER — BUMETANIDE 1 MG/1
1 TABLET ORAL DAILY
Qty: 30 TABLET | Refills: 3 | Status: SHIPPED | OUTPATIENT
Start: 2025-06-26

## 2025-06-26 NOTE — PROGRESS NOTES
Have you been to the ER, urgent care clinic since your last visit?  Hospitalized since your last visit?   NO    Have you seen or consulted any other health care providers outside our system since your last visit?   YES - When: approximately 8  weeks ago.  Where and Why: [facility] .  Dermatologist

## 2025-06-26 NOTE — PATIENT INSTRUCTIONS
health checkup your doctor will likely do as you age. Your health check might be done in a doctor's office, in your home, or at a hospital. The goal is to find out if you are having any problems that could make it hard to care for yourself or that make it unsafe for you to be on your own.  To measure your ADLs, your doctor will ask how hard it is for you to do routine tasks. Your doctor may also want to know if you have changed the way you do a task because of a health problem. Your doctor may watch how you:  Walk back and forth.  Keep your balance while you stand or walk.  Move from sitting to standing or from a bed to a chair.  Button or unbutton a shirt or sweater.  Remove and put on your shoes.  It's common to feel a little worried or anxious if you find you can't do all the things you used to be able to do. Talking with your doctor about ADLs is a way to make sure you're as safe as possible and able to care for yourself as well as you can. You may want to bring a caregiver, friend, or family member to your checkup. They can help you talk to your doctor.  Follow-up care is a key part of your treatment and safety. Be sure to make and go to all appointments, and call your doctor if you are having problems. It's also a good idea to know your test results and keep a list of the medicines you take.  Current as of: October 24, 2024  Content Version: 14.5  © 2024-2025 [employer].   Care instructions adapted under license by [employer]. If you have questions about a medical condition or this instruction, always ask your healthcare professional. [employer] disclaims any warranty or liability for your use of this information.         Starting a Weight-Loss Plan: Care Instructions  Overview    It can be a challenge to lose weight. But your doctor can help you make a weight-loss plan that meets your needs.  You don't have to make a lot of big changes at once. A better idea might be to focus on

## 2025-06-26 NOTE — PROGRESS NOTES
Medicare Annual Wellness Visit    Kristyn Martin is here for Medicare AWV    Assessment & Plan   Medicare annual wellness visit, subsequent  Chronic low back pain, unspecified back pain laterality, unspecified whether sciatica present  -     DULoxetine (CYMBALTA) 60 MG extended release capsule; Take 1 capsule by mouth daily, Disp-90 capsule, R-3Normal  Post-menopausal  -     estradiol (ESTRACE) 1 MG tablet; Take 1 tablet by mouth daily, Disp-90 tablet, R-3Normal  Essential hypertension  -     losartan (COZAAR) 50 MG tablet; Take 1 tablet by mouth daily, Disp-90 tablet, R-3Normal  Other insomnia  Chronic bilateral low back pain with sciatica, sciatica laterality unspecified  -     pregabalin (LYRICA) 150 MG capsule; Take 1 capsule by mouth in the morning, at noon, and at bedtime for 90 days. Max Daily Amount: 450 mg, Disp-90 capsule, R-2Normal  Anxiety  Excessive daytime sleepiness  -     Ambulatory referral to Sleep Medicine  Obesity, class 3 (E66.813)  Body mass index [BMI] 40.0-44.9, adult (Z68.41)  Falls, sequela  Eczema, unspecified type       Return in 1 year (on 6/26/2026) for Medicare AWV.     Subjective   The following acute and/or chronic problems were also addressed today: She is still struggling with her feet.  She is having a lot of difficulty with concerns about her pain.  She is still having hammertoe issues.  She is gotten a bruise on that second toe of her right foot due to the way she has been having to walk.  She says that she is struggling still with some anxiety.  She is still having issues with her sleep.  She is very frustrated with her weight.  She is still concerned about falling.  She has not had any chest pain.  She denies any shortness of breath.  She is having continued back pain.  She is going to go to physical therapy.      Patient's complete Health Risk Assessment and screening values have been reviewed and are found in Flowsheets. The following problems were reviewed today and where

## 2025-07-08 ENCOUNTER — TELEPHONE (OUTPATIENT)
Dept: ORTHOPEDIC SURGERY | Facility: CLINIC | Age: 70
End: 2025-07-08
Payer: MEDICARE

## 2025-07-08 NOTE — TELEPHONE ENCOUNTER
PATIENT IS CALLING IN ABOUT HER ANKLE. SHE STATES THAT SHE THINKS IT MIGHT NEED TO BE RE-EVALUATED. SHE HAS FALLEN ON IT TWICE SINCE SHE GOT HER CAST OFF 06/20. IT IS SWOLLEN AND LOOKS BRUISED. SHE THINKS SHE MIGHT NEED ANOTHER X-RAY OR SOME KIND OF ADVANCED IMAGING. HER ANKLE IS WEAK AND SORE.     IF CLINICAL STAFF COULD PLEASE ADVISE.     CALL BACK # [phone]

## 2025-07-09 ENCOUNTER — OFFICE VISIT (OUTPATIENT)
Age: 70
End: 2025-07-09
Payer: MEDICARE

## 2025-07-09 VITALS
BODY MASS INDEX: 42.17 KG/M2 | SYSTOLIC BLOOD PRESSURE: 144 MMHG | DIASTOLIC BLOOD PRESSURE: 84 MMHG | HEIGHT: 64 IN | WEIGHT: 247 LBS

## 2025-07-09 DIAGNOSIS — S86.012A ACHILLES RUPTURE, LEFT, INITIAL ENCOUNTER: ICD-10-CM

## 2025-07-09 DIAGNOSIS — M25.579 ANKLE PAIN, UNSPECIFIED CHRONICITY, UNSPECIFIED LATERALITY: Primary | ICD-10-CM

## 2025-07-09 RX ORDER — CALCIPOTRIENE 0.05 MG/ML
SOLUTION TOPICAL
COMMUNITY
Start: 2025-06-26

## 2025-07-09 NOTE — PROGRESS NOTES
[facility] [place] [facility] Office Follow Up       Office Follow Up Visit       Patient Name: Alison Benitez    Chief Complaint:   Chief Complaint   Patient presents with    Follow-up     3 Week Follow Up - Achilles tendinitis of left lower extremity        Referring Physician: No ref. provider found    History of Present Illness:   Alison Benitez returns to clinic today for complaints of increased left ankle pain and swelling.  She has been treated for chronic Achilles tendinitis with casting.  She has not yet started physical therapy.  She reports on 7/5/2025 she had increased pain and swelling.  Initially she thought she just turned her ankle.  She is having difficulty weightbearing and walking.  Patient does have a history of neuropathy.  She is not diabetic      Subjective     Review of Systems   Constitutional: Negative.  Negative for chills, fatigue and fever.   HENT: Negative.  Negative for congestion and dental problem.    Eyes: Negative.  Negative for blurred vision.   Respiratory: Negative.  Negative for shortness of breath.    Cardiovascular: Negative.  Negative for leg swelling.   Gastrointestinal: Negative.  Negative for abdominal pain.   Endocrine: Negative.  Negative for polyuria.   Genitourinary: Negative.  Negative for difficulty urinating.   Musculoskeletal:  Positive for arthralgias.   Skin: Negative.    Allergic/Immunologic: Negative.    Neurological: Negative.    Hematological: Negative.  Negative for adenopathy.   Psychiatric/Behavioral: Negative.  Negative for behavioral problems.    All other systems reviewed and are negative.       I have reviewed and updated the following portions of the patient's history and review of systems: allergies, current medications, past family history, past medical history, past social history, past surgical history and problem list.    Medications:   Current Outpatient Medications:     Ascorbic Acid (Vitamin C) 250  MG chewable tablet, Chew 500 mg Daily., Disp: , Rfl:     bumetanide (BUMEX) 1 MG tablet, TAKE 1 TABLET BY MOUTH DAILY. STOP LASIX, Disp: , Rfl:     Calcipotriene 0.005 % solution, , Disp: , Rfl:     Cholecalciferol (Vitamin D) 50 MCG (2000 UT) tablet, Take 1 tablet by mouth Daily., Disp: , Rfl:     Diclofenac Sodium (VOLTAREN) 1 % gel gel, Apply 4 g topically to the appropriate area as directed 4 (Four) Times a Day As Needed (joint pain)., Disp: , Rfl:     DULoxetine (CYMBALTA) 60 MG capsule, Take 1 capsule by mouth Daily., Disp: 90 capsule, Rfl: 3    estradiol (ESTRACE) 1 MG tablet, Take 1 tablet by mouth Daily., Disp: , Rfl:     losartan (COZAAR) 50 MG tablet, Take 1 tablet by mouth Daily., Disp: , Rfl:     MAGNESIUM CITRATE PO, Take 400 mg by mouth Daily., Disp: , Rfl:     metroNIDAZOLE (FLAGYL) 0.75 % lotion lotion, Apply  topically to the appropriate area as directed Every 12 (Twelve) Hours., Disp: , Rfl:     mometasone (ELOCON) 0.1 % cream, Apply 1 Application topically to the appropriate area as directed Daily. ears, Disp: , Rfl:     MULTIPLE VITAMINS-MINERALS ER PO, Take 1 tablet by mouth Daily., Disp: , Rfl:     nabumetone (RELAFEN) 750 MG tablet, Take 1 tablet by mouth 2 (Two) Times a Day., Disp: 60 tablet, Rfl: 1    Omega-3 Fatty Acids (fish oil) 1200 MG capsule capsule, Take 1 capsule by mouth Daily With Breakfast., Disp: , Rfl:     polyethyl glycol-propyl glycol (SYSTANE) 0.4-0.3 % solution ophthalmic solution (artificial tears), Administer 1 drop to both eyes As Needed (dry eyes)., Disp: , Rfl:     Probiotic Product (PROBIOTIC DAILY PO), Take 1 capsule by mouth Daily., Disp: , Rfl:     Rhofade 1 % cream, Apply 1 Application topically to the appropriate area as directed Daily. Face for roscea, Disp: , Rfl:     triamcinolone (KENALOG) 0.1 % ointment, APPLY A THIN LAYER TO THE AFFECTED AREA TWICE DAILY FOR 2 WEEKS THEN TAKE 2 WEEKS OFF. REPEAT CYCLE AS NEEDED, Disp: , Rfl:     zolpidem (Ambien) 5 MG  "tablet, Take 1 tablet by mouth At Night As Needed for Sleep., Disp: , Rfl:     Allergies:   Allergies   Allergen Reactions    Augmentin [Amoxicillin-Pot Clavulanate] Rash     Beta lactam allergy details  Antibiotic reaction: rash  Age at reaction: adult  Dose to reaction time: unknown  Reason for antibiotic: unknown  Epinephrine required for reaction?: unknown  Tolerated antibiotics: unknown             Objective      Vital Signs:   Vitals:    07/09/25 1251   BP: 144/84   BP Location: Left arm   Patient Position: Sitting   Weight: 112 kg (247 lb)   Height: 162.2 cm (63.86\")       Ortho Exam:  Left ankle exam: Foot and ankle are swollen.  Palpable gap in the Achilles with positive Ureña's test.  Intact posterior tib peroneal and anterior tib.    Results Review:  XR Ankle 2 View Bilateral  Indication: pain.     Views: AP lateral and mortise views of the ankle are submitted.     Impression: The mortise is congruent. There is no widening. There is no   fracture subluxation or dislocation. Prominent calcaneal enthesophytes.   Degenerative changes of the ankle joint.     Comparison: 3/24/2025.            Assessment / Plan      Assessment:   Diagnoses and all orders for this visit:    1. Ankle pain, unspecified chronicity, unspecified laterality (Primary)  -     Cancel: XR Ankle 2 View Bilateral  -     MRI Ankle Left Without Contrast; Future    2. Achilles rupture, left, initial encounter  -     MRI Ankle Left Without Contrast; Future        Quality Metrics:   BMI:   Class 3 Severe Obesity (BMI >=40). Obesity-related health conditions include the following: none. Obesity is unchanged. BMI is is above average; BMI management plan is completed.   Tobacco:   Alison WANG Emmanuel  reports that she has never smoked. She has never been exposed to tobacco smoke. She has never used smokeless tobacco.         Plan:   Left achilles rupture.  I reviewed today's xrays, clinical findings with the patient and her daughter.  On exam, she " has positive Ureña's test and palpable gap in achilles.  X-rays show broken and proximally retracted enthesophyte.  I explained to the patient and her daughter that she has ruptured her Achilles tendon.  We discussed that this can be treated either operatively or nonoperatively based on several variables.  Recommendation today is a placing go into a fiberglass plantarflexion short leg splint.  We will get a stat MRI.  Patient will follow-up with Dr. Rubio on 7/11/2025 at 7:40 AM.  In the interim, I recommended she elevate is much as possible.    Gina Campos PA-C  [facility] [place] [facility]    Dictated using Dragon Speech Recognition.

## 2025-07-10 ENCOUNTER — HOSPITAL ENCOUNTER (OUTPATIENT)
Dept: MRI IMAGING | Facility: HOSPITAL | Age: 70
Discharge: HOME | End: 2025-07-10
Admitting: PHYSICIAN ASSISTANT
Payer: MEDICARE

## 2025-07-10 DIAGNOSIS — S86.012A ACHILLES RUPTURE, LEFT, INITIAL ENCOUNTER: ICD-10-CM

## 2025-07-10 DIAGNOSIS — M25.579 ANKLE PAIN, UNSPECIFIED CHRONICITY, UNSPECIFIED LATERALITY: ICD-10-CM

## 2025-07-10 PROCEDURE — 73721 MRI JNT OF LWR EXTRE W/O DYE: CPT

## 2025-07-11 ENCOUNTER — OFFICE VISIT (OUTPATIENT)
Dept: ORTHOPEDIC SURGERY | Facility: CLINIC | Age: 70
End: 2025-07-11
Payer: MEDICARE

## 2025-07-11 VITALS
HEIGHT: 64 IN | WEIGHT: 246.91 LBS | BODY MASS INDEX: 42.15 KG/M2 | SYSTOLIC BLOOD PRESSURE: 140 MMHG | DIASTOLIC BLOOD PRESSURE: 82 MMHG

## 2025-07-11 DIAGNOSIS — S86.002A INJURY OF LEFT ACHILLES TENDON, INITIAL ENCOUNTER: Primary | ICD-10-CM

## 2025-07-11 RX ORDER — PREGABALIN 150 MG/1
150 CAPSULE ORAL 2 TIMES DAILY
COMMUNITY

## 2025-07-11 NOTE — PROGRESS NOTES
[facility] [place] [facility] Surgery Office Visit     Office Visit       Date: 07/11/2025   Patient Name: Alison Benitez  MRN: [id number]  YOB: 1955    Referring Physician: No ref. provider found     Chief Complaint:   Chief Complaint   Patient presents with    Left Ankle - Pain     Achilles rupture, left       History of Present Illness: Alison Benitez is a 70 y.o. female who is here today for chief complaint of left Achilles injury.  Previously seen by my partners.  Has been seen before for swelling neuropathy plantar fasciitis insertional Achilles tendinitis and mid substance Achilles tendinitis.  The patient denies diabetes and is unsure regarding the cause of her neuropathy.   has had neuropathy for many years and reports that for the last 3 years she has had difficulty ambulating secondary   2 lower extremity weakness and decreased sensation due to her neuropathy.   had lumbar surgery for stenosis last year which helped with lower back pain however did not really seem to help with her lower extremity weakness and sensation.   does not recall spine surgeon ever mentioning that lumbar stenosis was the cause of her neuropathy.  Stumbled approximately 1 week ago feeling a pop in her Achilles area.  Noticed swelling and weakness but able to ambulate following her injury.  Recently treated by one of my partners for her symptoms of primarily Achilles tendinitis and was made weightbearing in a boot for 6 weeks.  States boot pressing on posterior heel causes discomfort.  Today she is denying any pain at that location.  States all she feels is numbness.  Ambulates in clinic with an unsteady gait which patient states is baseline for her and she has walked that way for at least 3 years.  Patient accompanied by daughter who is helping to provide history.    Subjective   Review of Systems: Review of Systems     Past Medical History:   Past Medical History:    Diagnosis Date    Ankle sprain     Arthritis     Arthritis of back     Arthritis of neck     Bursitis     Bursitis of hip     Cervical disc disorder     Chronic pain disorder     degenerative  arthritis.   stenosis of spine. neuropathy    Claustrophobia     CTS (carpal tunnel syndrome)     bilateral     Deep vein thrombosis     Difficulty walking     Extremity pain     Fibromyalgia     Fibromyalgia, primary     Goiter     Hemorrhoids     Hip arthrosis     HL (hearing loss)     Hypertension     Hypothyroidism     Joint pain     Knee swelling     Low back pain     Lumbosacral disc disease     Murmur     echo  in epic    Neck pain     Neuropathy     Osteoarthritis     Rosacea     eyes    Scoliosis     Spinal stenosis     Tendinitis of knee     Right knee    Thoracic disc disorder     Thyroid nodule      (vaginal birth after )        Past Surgical History:   Past Surgical History:   Procedure Laterality Date    BACK SURGERY       SECTION      x2    COLONOSCOPY      CYST REMOVAL N/A 2022    chest    DILATATION AND CURETTAGE      EYE SURGERY      HYSTERECTOMY N/A     LUMBAR LAMINECTOMY WITH FUSION N/A 2024    Procedure: LUMBAR FUSION DECOMPRESSON WITH PEDICLE SCREWS L3-5;  Surgeon: Iván Stanley MD;  Location: [facility];  Service: Neurosurgery;  Laterality: N/A;    MANDIBLE FRACTURE SURGERY      OOPHORECTOMY Bilateral     TRIGGER POINT INJECTION      TUBAL ABDOMINAL LIGATION         Family History:   Family History   Problem Relation Age of Onset    Arthritis Mother     Stroke Mother     Glaucoma Mother     Hypertension Mother     Heart disease Mother     Heart failure Mother     COPD Mother     Vision loss Mother     Heart disease Father     Lung disease Father     Hearing loss Father     Hypertension Brother     Hearing loss Brother     Thyroid disease Brother     Hearing loss Sister     Thyroid disease Sister     Thyroid disease Sister     Breast cancer Neg Hx     Ovarian  cancer Neg Hx        Social History:   Social History     Socioeconomic History    Marital status: Single   Tobacco Use    Smoking status: Never     Passive exposure: Never    Smokeless tobacco: Never   Vaping Use    Vaping status: Never Used   Substance and Sexual Activity    Alcohol use: Never    Drug use: Never    Sexual activity: Not Currently     Partners: Male     Birth control/protection: Hysterectomy       Medications:   Current Outpatient Medications:     Ascorbic Acid (Vitamin C) 250 MG chewable tablet, Chew 500 mg Daily., Disp: , Rfl:     bumetanide (BUMEX) 1 MG tablet, TAKE 1 TABLET BY MOUTH DAILY. STOP LASIX, Disp: , Rfl:     Calcipotriene 0.005 % solution, , Disp: , Rfl:     Cholecalciferol (Vitamin D) 50 MCG (2000 UT) tablet, Take 1 tablet by mouth Daily., Disp: , Rfl:     Diclofenac Sodium (VOLTAREN) 1 % gel gel, Apply 4 g topically to the appropriate area as directed 4 (Four) Times a Day As Needed (joint pain)., Disp: , Rfl:     DULoxetine (CYMBALTA) 60 MG capsule, Take 1 capsule by mouth Daily., Disp: 90 capsule, Rfl: 3    estradiol (ESTRACE) 1 MG tablet, Take 1 tablet by mouth Daily., Disp: , Rfl:     losartan (COZAAR) 50 MG tablet, Take 1 tablet by mouth Daily., Disp: , Rfl:     MAGNESIUM CITRATE PO, Take 400 mg by mouth Daily., Disp: , Rfl:     metroNIDAZOLE (FLAGYL) 0.75 % lotion lotion, Apply  topically to the appropriate area as directed Every 12 (Twelve) Hours., Disp: , Rfl:     mometasone (ELOCON) 0.1 % cream, Apply 1 Application topically to the appropriate area as directed Daily. ears, Disp: , Rfl:     MULTIPLE VITAMINS-MINERALS ER PO, Take 1 tablet by mouth Daily., Disp: , Rfl:     nabumetone (RELAFEN) 750 MG tablet, Take 1 tablet by mouth 2 (Two) Times a Day., Disp: 60 tablet, Rfl: 1    Omega-3 Fatty Acids (fish oil) 1200 MG capsule capsule, Take 1 capsule by mouth Daily With Breakfast., Disp: , Rfl:     polyethyl glycol-propyl glycol (SYSTANE) 0.4-0.3 % solution ophthalmic solution  "(artificial tears), Administer 1 drop to both eyes As Needed (dry eyes)., Disp: , Rfl:     pregabalin (LYRICA) 150 MG capsule, Take 1 capsule by mouth 2 (Two) Times a Day., Disp: , Rfl:     Probiotic Product (PROBIOTIC DAILY PO), Take 1 capsule by mouth Daily., Disp: , Rfl:     Rhofade 1 % cream, Apply 1 Application topically to the appropriate area as directed Daily. Face for roscea, Disp: , Rfl:     triamcinolone (KENALOG) 0.1 % ointment, APPLY A THIN LAYER TO THE AFFECTED AREA TWICE DAILY FOR 2 WEEKS THEN TAKE 2 WEEKS OFF. REPEAT CYCLE AS NEEDED, Disp: , Rfl:     zolpidem (Ambien) 5 MG tablet, Take 1 tablet by mouth At Night As Needed for Sleep., Disp: , Rfl:     Allergies:   Allergies   Allergen Reactions    Augmentin [Amoxicillin-Pot Clavulanate] Rash     Beta lactam allergy details  Antibiotic reaction: rash  Age at reaction: adult  Dose to reaction time: unknown  Reason for antibiotic: unknown  Epinephrine required for reaction?: unknown  Tolerated antibiotics: unknown           I reviewed the patient's chief complaint, history of present illness, review of systems, past medical history, surgical history, family history, social history, medications and allergy list.     Objective    Vital Signs:   Vitals:    07/11/25 0742   BP: 140/82   Weight: 112 kg (246 lb 14.6 oz)   Height: 162.2 cm (63.86\")     Body mass index is 42.57 kg/m².    Ortho Exam:  left LE Foot and Ankle Exam:   Neurological exam of the superficial peroneal, deep peroneal, plantar, sural and saphenous nerves demonstrates intact sensation and normal motor function.   0/10 sites felt on monofilament testing of foot.   There is good perfusion to the toes.   The skin is intact throughout the foot and ankle without ulceration.   Passive range of motion of ankle, subtalar joint, midfoot and toes is within normal limits.   There is a palpable gap at the distal Achilles, positive Ureña test.  Patient ambulates in clinic with an unsteady gait.  " Denies pain with ambulation.  Chronic hammertoe deformities with no wounds or breaks in the skin.  Pitting edema about the leg and foot.  Foot warm and well-perfused.    Results Review:   Indication: pain.      Views: AP lateral and mortise views of the ankle are submitted.      Impression: The mortise is congruent. There is no widening. There is no fracture subluxation or dislocation. Prominent calcaneal enthesophytes. Degenerative changes of the ankle joint.      Comparison: 3/24/2025.     LEFT ANKLE MRI     HISTORY: Possible Achilles tendon rupture.     FINDINGS: On the sagittal images, there is complete disruption and  proximal retraction of the Achilles tendon. Tendinous retraction  measures approximately 4.8 cm. The retracted Achilles tendon appears  globular and heterogeneous. On the axial images, the tendon measures up  to 2.5 cm in transverse dimension.     Large plantar calcaneal spur is present. There is thickening of the  posterior plantar fascia. There is some increased signal at the  insertion on the plantar spur. Finding is well seen on image #10 of  series 4.     Mortise appears intact. Diffuse subcutaneous soft tissue edema is noted  about the ankle.     IMPRESSION:  1. Complete disruption and proximal retraction of the Achilles tendon,  as described.     2. Large plantar calcaneal spur with thickened posterior plantar fascia,  probably related to sequela of planter fasciitis and chronic partial  insertional tear of the plantar fascia.        This report was signed and finalized on 7/10/2025 2:19 PM by Royce Larkin MD.    07/11/25 I have personally reviewed and interpreted the images from outside facility with the documented findings, distal Achilles rupture near insertion site in the wake of tendinosis      Assessment / Plan    Assessment/Plan:   Diagnoses and all orders for this visit:    1. Injury of left Achilles tendon, initial encounter (Primary)      Discussed Achilles rupture (an injury  with risk of long term functional impairment) with patient as well as treatment options at length. Decision regarding surgical intervention considered.  I spent a long time in discussion with patient and patient's daughter.  Much of our discussion was focused on the patient's functional level prior to her injury which seems to have occurred about a week ago.  Ambulating today in normal footwear with less pain than she did a month ago.  Has a slow unsteady gait.  Per our discussion this is how the patient has been ambulating for the last 3 years.  I spent a long time discussing with patient what surgical intervention would look like and how it would potentially benefit her from a functional standpoint.  Following our extended discussion we concluded that the gains of surgical intervention as well as the risk of surgery do not outweigh the benefits especially in light of her baseline function, comorbidities, and goals moving forward.  As such I recommended nonsurgical management.  Patient placed in a tall cam walking boot and provided with an even up.  Patient to wear boot while she is sleeping and will up and ambulatory at all times until follow-up.  Can remove the boot for showers.  Will plan to see her back in 1 month for reevaluation.  Counseled patient and patient's daughter to contact the clinic sooner should any concerns arise.  Plan for therapy at some point to help regain as much strengthgait improvement as possible.    Follow Up:   Return in about 1 month (around 8/11/2025).      Angel Rubio MD  [facility]    I spent 60 minutes caring for Alison on this date of service. This time includes time spent by me in the following activities: preparing for the visit, reviewing tests, obtaining and/or reviewing a separately obtained history, performing a medically appropriate examination and/or evaluation, counseling and educating the patient/family/caregiver, documenting information in the medical  record, and care coordination.

## 2025-07-11 NOTE — PATIENT INSTRUCTIONS
"Pneumatic Walking Boot        Available on [employer], type in \"pneumatic walker boot\" and \"tall\" or \"short\"     Even Up          Available on [employer], type in \"even up for walking boot\"       NEUROPATHIC FOOT CARE GUIDELINES:  Diabetes can be dangerous to your feet. Even a small cut could have serious consequences. Diabetes may cause nerve damage that takes away the feeling in your feet. Diabetes may also reduce blood flow to the feet, making it harder to heal an injury or resist infection. Because of these problems, you might not notice a pebble in your shoe, so you could develop a blister, then a sore, then a stubborn infection that might cause amputation of your foot or leg.     To avoid serious foot problems that could result in losing a toe, foot, or leg, be sure to follow these guidelines.     Inspect your feet daily- Check for cuts, blisters, redness, swelling, or nail problems. Use a magnifying hand mirror to look at the bottom of your feet. Examine your feet more often if you get new shoes or start new activities. Call your doctor if you notice anything.    Wash your feet in lukewarm (not hot!) water- Keep your feet clean by washing them daily. But only use lukewarm water. The temperature you'd use on a  baby.     Be gentle when bathing your feet- Wash them using a soft washcloth or sponge. Dry by blotting or patting and make sure to carefully dry between the toes.     Moisturize your feet nightly, but not between your toes- Use a moisturizer daily to keep dry skin from itching or cracking. But DON'T moisturize between the toes. This could encourage a fungal infection. Keep the cream handy in the bedroom and/or bathroom. An easy method: Cover the foot with a thin layer of Vaseline or Bag Balm (you find these at [facility] or [facility]) and then a sock    Cut nails carefully straight across and file to round the borders. Don't cut them too short, since this could lead to ingrown nails and infection. " If you have an ingrown nail with reddened skin, soak it in soapy water    Never trim corns or calluses- No “bathroom surgery” let your doctor do the job.    Wear clean, dry socks- Change them daily.     Avoid the wrong type of socks- Avoid tight elastic bands (they reduce circulation). Don't wear thick or bulky socks as they take up more room in shoes and the shoe size may need to be adjusted. They can fit poorly and irritate the skin.     Wear socks to bed- If your feet get cold at night, wear socks. NEVER use a heating pad or hot water bottle.     Shake our your shoes and visually and manually inspect the inside of your shoes before wearing- Remember, you may not feel a pebble, so always shake out your shoes before putting them on.     Keep your feet warm and dry- Don't get your feet wet in snow or rain. Wear warm socks and shoes in winter.     Never walk barefoot- Not even at home! You could step on something and get a scratch ,cut or foreign body.     Take care of your diabetes- Keep your blood sugar levels under control.    Don't smoke- Smoking restricts blood flow in your feet.     Get periodic foot exams- See your physician on a regular basis for an examination to help prevent the foot complications of diabetes.

## 2025-07-16 DIAGNOSIS — G47.09 OTHER INSOMNIA: ICD-10-CM

## 2025-07-16 RX ORDER — ZOLPIDEM TARTRATE 5 MG/1
TABLET ORAL
Qty: 30 TABLET | Refills: 2 | Status: SHIPPED | OUTPATIENT
Start: 2025-07-16 | End: 2025-10-14

## 2025-07-16 NOTE — TELEPHONE ENCOUNTER
Refill request received from patient      Next Office Visit Date:  Future Appointments   Date Time Provider Department Center   8/7/2025 10:15 AM Sera Valadez MD [facility]       [facility] - Please review via PDMP        Last Office Visit:    6/26/2025

## 2025-07-16 NOTE — TELEPHONE ENCOUNTER
Refill request received from pharmacy      Next Office Visit Date:  Future Appointments   Date Time Provider Department Center   8/7/2025 10:15 AM Sera Valadez MD [facility] [facility] - Please review via PDMP        Last Office Visit:    6/26/2025

## 2025-07-17 PROBLEM — M25.50 CHRONIC PAIN OF MULTIPLE JOINTS: Status: ACTIVE | Noted: 2025-07-17

## 2025-07-17 PROBLEM — G89.29 CHRONIC PAIN OF MULTIPLE JOINTS: Status: ACTIVE | Noted: 2025-07-17

## 2025-07-17 PROBLEM — H57.89 EYE INFLAMMATION: Status: ACTIVE | Noted: 2025-07-17

## 2025-07-24 ENCOUNTER — TELEPHONE (OUTPATIENT)
Dept: ORTHOPEDIC SURGERY | Facility: CLINIC | Age: 70
End: 2025-07-24

## 2025-08-07 ENCOUNTER — OFFICE VISIT (OUTPATIENT)
Age: 70
End: 2025-08-07
Payer: MEDICARE

## 2025-08-07 VITALS
SYSTOLIC BLOOD PRESSURE: 132 MMHG | WEIGHT: 253.8 LBS | OXYGEN SATURATION: 96 % | BODY MASS INDEX: 43.33 KG/M2 | TEMPERATURE: 97.7 F | RESPIRATION RATE: 16 BRPM | HEART RATE: 76 BPM | HEIGHT: 64 IN | DIASTOLIC BLOOD PRESSURE: 78 MMHG

## 2025-08-07 DIAGNOSIS — M20.41 HAMMER TOES OF BOTH FEET: ICD-10-CM

## 2025-08-07 DIAGNOSIS — L30.9 ECZEMA, UNSPECIFIED TYPE: ICD-10-CM

## 2025-08-07 DIAGNOSIS — I10 ESSENTIAL HYPERTENSION: ICD-10-CM

## 2025-08-07 DIAGNOSIS — S86.012D RUPTURE OF LEFT ACHILLES TENDON, SUBSEQUENT ENCOUNTER: Primary | ICD-10-CM

## 2025-08-07 DIAGNOSIS — M54.50 CHRONIC LOW BACK PAIN, UNSPECIFIED BACK PAIN LATERALITY, UNSPECIFIED WHETHER SCIATICA PRESENT: ICD-10-CM

## 2025-08-07 DIAGNOSIS — G89.29 CHRONIC LOW BACK PAIN, UNSPECIFIED BACK PAIN LATERALITY, UNSPECIFIED WHETHER SCIATICA PRESENT: ICD-10-CM

## 2025-08-07 DIAGNOSIS — E66.813 OBESITY, CLASS 3 (HCC): ICD-10-CM

## 2025-08-07 DIAGNOSIS — N89.8 VAGINAL DISCHARGE: ICD-10-CM

## 2025-08-07 DIAGNOSIS — R60.0 PEDAL EDEMA: ICD-10-CM

## 2025-08-07 DIAGNOSIS — M20.42 HAMMER TOES OF BOTH FEET: ICD-10-CM

## 2025-08-07 LAB
BILIRUBIN, POC: NEGATIVE
BLOOD URINE, POC: NEGATIVE
CLARITY, POC: CLEAR
COLOR, POC: YELLOW
GLUCOSE URINE, POC: NEGATIVE MG/DL
KETONES, POC: NEGATIVE MG/DL
LEUKOCYTE EST, POC: NEGATIVE
NITRITE, POC: NEGATIVE
PH, POC: 7
PROTEIN, POC: NEGATIVE MG/DL
SPECIFIC GRAVITY, POC: 1.01
UROBILINOGEN, POC: 0.2 MG/DL

## 2025-08-07 PROCEDURE — 81002 URINALYSIS NONAUTO W/O SCOPE: CPT | Performed by: FAMILY MEDICINE

## 2025-08-07 RX ORDER — PREGABALIN 100 MG/1
100 CAPSULE ORAL 3 TIMES DAILY
COMMUNITY
Start: 2025-08-01

## 2025-08-07 RX ORDER — METRONIDAZOLE 500 MG/1
500 TABLET ORAL 2 TIMES DAILY
Qty: 14 TABLET | Refills: 0 | Status: SHIPPED | OUTPATIENT
Start: 2025-08-07 | End: 2025-08-14

## 2025-08-07 RX ORDER — BUMETANIDE 1 MG/1
1 TABLET ORAL DAILY
Qty: 30 TABLET | Refills: 3 | Status: SHIPPED | OUTPATIENT
Start: 2025-08-07

## 2025-08-07 ASSESSMENT — ENCOUNTER SYMPTOMS
BACK PAIN: 1
GASTROINTESTINAL NEGATIVE: 1
RESPIRATORY NEGATIVE: 1

## 2025-08-08 ENCOUNTER — OFFICE VISIT (OUTPATIENT)
Dept: ORTHOPEDIC SURGERY | Facility: CLINIC | Age: 70
End: 2025-08-08
Payer: MEDICARE

## 2025-08-08 VITALS
DIASTOLIC BLOOD PRESSURE: 80 MMHG | SYSTOLIC BLOOD PRESSURE: 146 MMHG | WEIGHT: 246 LBS | BODY MASS INDEX: 42 KG/M2 | HEIGHT: 64 IN

## 2025-08-08 DIAGNOSIS — S86.012A ACHILLES RUPTURE, LEFT, INITIAL ENCOUNTER: Primary | ICD-10-CM

## 2025-08-12 ENCOUNTER — HOSPITAL ENCOUNTER (OUTPATIENT)
Dept: PHYSICAL THERAPY | Facility: HOSPITAL | Age: 70
Setting detail: THERAPIES SERIES
Discharge: HOME | End: 2025-08-12
Payer: MEDICARE

## 2025-08-12 PROCEDURE — 97110 THERAPEUTIC EXERCISES: CPT | Performed by: PHYSICAL THERAPIST

## 2025-08-12 PROCEDURE — 97162 PT EVAL MOD COMPLEX 30 MIN: CPT | Performed by: PHYSICAL THERAPIST

## 2025-08-18 ENCOUNTER — HOSPITAL ENCOUNTER (OUTPATIENT)
Dept: PHYSICAL THERAPY | Facility: HOSPITAL | Age: 70
Setting detail: THERAPIES SERIES
Discharge: HOME | End: 2025-08-18
Payer: MEDICARE

## 2025-08-18 PROCEDURE — 97110 THERAPEUTIC EXERCISES: CPT | Performed by: PHYSICAL THERAPIST

## 2025-08-18 PROCEDURE — 97016 VASOPNEUMATIC DEVICE THERAPY: CPT | Performed by: PHYSICAL THERAPIST

## 2025-08-20 ENCOUNTER — HOSPITAL ENCOUNTER (OUTPATIENT)
Dept: PHYSICAL THERAPY | Facility: HOSPITAL | Age: 70
Setting detail: THERAPIES SERIES
Discharge: HOME | End: 2025-08-20
Payer: MEDICARE

## 2025-08-20 PROCEDURE — 97110 THERAPEUTIC EXERCISES: CPT | Performed by: PHYSICAL THERAPIST

## 2025-08-20 PROCEDURE — 97016 VASOPNEUMATIC DEVICE THERAPY: CPT | Performed by: PHYSICAL THERAPIST

## 2025-08-27 ENCOUNTER — HOSPITAL ENCOUNTER (OUTPATIENT)
Dept: PHYSICAL THERAPY | Facility: HOSPITAL | Age: 70
Setting detail: THERAPIES SERIES
Discharge: HOME | End: 2025-08-27
Payer: MEDICARE

## 2025-08-27 PROCEDURE — 97110 THERAPEUTIC EXERCISES: CPT | Performed by: PHYSICAL THERAPIST

## 2025-08-27 PROCEDURE — 97016 VASOPNEUMATIC DEVICE THERAPY: CPT | Performed by: PHYSICAL THERAPIST

## 2025-08-28 ENCOUNTER — HOSPITAL ENCOUNTER (OUTPATIENT)
Dept: PHYSICAL THERAPY | Facility: HOSPITAL | Age: 70
Setting detail: THERAPIES SERIES
Discharge: HOME | End: 2025-08-28
Payer: MEDICARE

## 2025-08-28 PROCEDURE — 97110 THERAPEUTIC EXERCISES: CPT | Performed by: PHYSICAL THERAPIST

## 2025-08-28 PROCEDURE — 97016 VASOPNEUMATIC DEVICE THERAPY: CPT | Performed by: PHYSICAL THERAPIST

## 2025-09-02 ENCOUNTER — HOSPITAL ENCOUNTER (OUTPATIENT)
Dept: PHYSICAL THERAPY | Facility: HOSPITAL | Age: 70
Setting detail: THERAPIES SERIES
Discharge: HOME | End: 2025-09-02
Payer: MEDICARE

## 2025-09-02 PROCEDURE — 97110 THERAPEUTIC EXERCISES: CPT | Performed by: PHYSICAL THERAPIST

## 2025-09-02 PROCEDURE — 97016 VASOPNEUMATIC DEVICE THERAPY: CPT | Performed by: PHYSICAL THERAPIST

## 2025-09-04 ENCOUNTER — HOSPITAL ENCOUNTER (OUTPATIENT)
Dept: PHYSICAL THERAPY | Facility: HOSPITAL | Age: 70
Setting detail: THERAPIES SERIES
Discharge: HOME | End: 2025-09-04
Payer: MEDICARE

## 2025-09-04 ENCOUNTER — HOSPITAL ENCOUNTER (OUTPATIENT)
Dept: GENERAL RADIOLOGY | Facility: HOSPITAL | Age: 70
Discharge: HOME | End: 2025-09-04
Payer: MEDICARE

## 2025-09-04 ENCOUNTER — HOSPITAL ENCOUNTER (OUTPATIENT)
Facility: HOSPITAL | Age: 70
Discharge: HOME | End: 2025-09-04
Payer: MEDICARE

## 2025-09-04 DIAGNOSIS — I10 ESSENTIAL HYPERTENSION: ICD-10-CM

## 2025-09-04 DIAGNOSIS — M79.671 BILATERAL FOOT PAIN: ICD-10-CM

## 2025-09-04 DIAGNOSIS — M79.672 BILATERAL FOOT PAIN: ICD-10-CM

## 2025-09-04 DIAGNOSIS — M51.9 LUMBAR DISC DISEASE: ICD-10-CM

## 2025-09-04 DIAGNOSIS — E78.9 LIPID DISORDER: ICD-10-CM

## 2025-09-04 LAB
ALBUMIN SERPL-MCNC: 4.4 G/DL (ref 3.4–4.8)
ALBUMIN/GLOB SERPL: 1.6 {RATIO} (ref 0.8–2)
ALP SERPL-CCNC: 88 U/L (ref 25–100)
ALT SERPL-CCNC: 22 U/L (ref 4–36)
ANION GAP SERPL CALCULATED.3IONS-SCNC: 14 MMOL/L (ref 3–16)
AST SERPL-CCNC: 23 U/L (ref 8–33)
BILIRUB SERPL-MCNC: 0.4 MG/DL (ref 0.3–1.2)
BUN SERPL-MCNC: 19 MG/DL (ref 6–20)
CALCIUM SERPL-MCNC: 9.9 MG/DL (ref 8.3–10.6)
CHLORIDE SERPL-SCNC: 99 MMOL/L (ref 98–107)
CHOLEST SERPL-MCNC: 200 MG/DL (ref 0–200)
CO2 SERPL-SCNC: 25 MMOL/L (ref 20–30)
CREAT SERPL-MCNC: 0.7 MG/DL (ref 0.6–1.2)
ERYTHROCYTE [DISTWIDTH] IN BLOOD BY AUTOMATED COUNT: 14.6 % (ref 11–16)
FOLATE SERPL-MCNC: >20 NG/ML
GFR SERPLBLD CREATININE-BSD FMLA CKD-EPI: >90 ML/MIN/{1.73_M2}
GLOBULIN SER CALC-MCNC: 2.7 G/DL
GLUCOSE SERPL-MCNC: 89 MG/DL (ref 74–106)
HCT VFR BLD AUTO: 42 % (ref 37–47)
HDLC SERPL-MCNC: 51 MG/DL (ref 40–60)
HGB BLD-MCNC: 13.6 G/DL (ref 11.5–16.5)
LDLC SERPL CALC-MCNC: 99 MG/DL
MCH RBC QN AUTO: 27.2 PG (ref 27–32)
MCHC RBC AUTO-ENTMCNC: 32.4 G/DL (ref 31–35)
MCV RBC AUTO: 84 FL (ref 80–100)
PLATELET # BLD AUTO: 221 K/UL (ref 150–400)
PMV BLD AUTO: 11.8 FL (ref 6–10)
POTASSIUM SERPL-SCNC: 4.1 MMOL/L (ref 3.4–5.1)
PROT SERPL-MCNC: 7.1 G/DL (ref 6.4–8.3)
RBC # BLD AUTO: 5 M/UL (ref 3.8–5.8)
SODIUM SERPL-SCNC: 138 MMOL/L (ref 136–145)
TRIGL SERPL-MCNC: 251 MG/DL (ref 0–249)
VIT B12 SERPL-MCNC: 295 PG/ML (ref 211–911)
VLDLC SERPL CALC-MCNC: 50 MG/DL
WBC # BLD AUTO: 5.6 K/UL (ref 4–11)

## 2025-09-04 PROCEDURE — 82607 VITAMIN B-12: CPT

## 2025-09-04 PROCEDURE — 97110 THERAPEUTIC EXERCISES: CPT | Performed by: PHYSICAL THERAPIST

## 2025-09-04 PROCEDURE — 82746 ASSAY OF FOLIC ACID SERUM: CPT

## 2025-09-04 PROCEDURE — 85027 COMPLETE CBC AUTOMATED: CPT

## 2025-09-04 PROCEDURE — 36415 COLL VENOUS BLD VENIPUNCTURE: CPT

## 2025-09-04 PROCEDURE — 80053 COMPREHEN METABOLIC PANEL: CPT

## 2025-09-04 PROCEDURE — 80061 LIPID PANEL: CPT

## 2025-09-04 PROCEDURE — 72100 X-RAY EXAM L-S SPINE 2/3 VWS: CPT

## 2025-09-04 PROCEDURE — 97016 VASOPNEUMATIC DEVICE THERAPY: CPT | Performed by: PHYSICAL THERAPIST

## (undated) DEVICE — GLV SURG PREMIERPRO ORTHO LTX PF SZ8 BRN

## (undated) DEVICE — SPHR MARKR STEALTH STATION

## (undated) DEVICE — KT DRN EVAC WND PVC PCH WTROC RND 10F400

## (undated) DEVICE — GLV SURG PREMIERPRO MIC LTX PF SZ7.5 BRN

## (undated) DEVICE — SYR LUERLOK 30CC

## (undated) DEVICE — STRAP POSTN KN/BDY FM 5X72IN DISP

## (undated) DEVICE — SUT VIC PLS CTD ANTIB BR 3/0 8/18IN 45CM

## (undated) DEVICE — ADHS LIQ MASTISOL 2/3ML

## (undated) DEVICE — IRRIGATOR BULB ASEPTO 60CC STRL

## (undated) DEVICE — STRIP,CLOSURE,WOUND,MEDI-STRIP,1/2X4: Brand: MEDLINE

## (undated) DEVICE — ANTIBACTERIAL UNDYED BRAIDED (POLYGLACTIN 910), SYNTHETIC ABSORBABLE SUTURE: Brand: COATED VICRYL

## (undated) DEVICE — TRAP,MUCUS SPECIMEN,40CC: Brand: MEDLINE

## (undated) DEVICE — SUT SILK 2/0 PS 18IN 1588H

## (undated) DEVICE — PK NEURO DISC 10

## (undated) DEVICE — BLANKT WARM UPPR/BDY ARM/OUT 57X196CM

## (undated) DEVICE — PATIENT RETURN ELECTRODE, SINGLE-USE, CONTACT QUALITY MONITORING, ADULT, WITH 9FT CORD, FOR PATIENTS WEIGING OVER 33LBS. (15KG): Brand: MEGADYNE

## (undated) DEVICE — DRAPE,INSTRUMENT,MAGNETIC,10X16: Brand: MEDLINE

## (undated) DEVICE — DRP C/ARMOR

## (undated) DEVICE — SNAP KOVER: Brand: UNBRANDED

## (undated) DEVICE — PACK,UNIVERSAL,NO GOWNS: Brand: MEDLINE

## (undated) DEVICE — SUT VIC 0 CTD BR 18IN UNDYE VCP724D

## (undated) DEVICE — DRSNG WND GZ PAD BORDERED 4X8IN STRL

## (undated) DEVICE — GOWN,REINFORCE,POLY,SIRUS,BREATH SLV,XLG: Brand: MEDLINE

## (undated) DEVICE — SHEET,DRAPE,40X58,STERILE: Brand: MEDLINE

## (undated) DEVICE — TOOL MR8-14MH30 MR8 14CM MATCH 3MM: Brand: MIDAS REX MR8

## (undated) DEVICE — JACKSON TABLE POSITIONER KIT: Brand: MEDLINE INDUSTRIES, INC.

## (undated) DEVICE — DRP CASSET 10 RAY LG 24X40

## (undated) DEVICE — CONN TBG Y 5 IN 1 LF STRL